# Patient Record
Sex: FEMALE | Race: BLACK OR AFRICAN AMERICAN | NOT HISPANIC OR LATINO | Employment: UNEMPLOYED | URBAN - METROPOLITAN AREA
[De-identification: names, ages, dates, MRNs, and addresses within clinical notes are randomized per-mention and may not be internally consistent; named-entity substitution may affect disease eponyms.]

---

## 2023-03-23 ENCOUNTER — DOCUMENTATION (OUTPATIENT)
Dept: CARE COORDINATION | Facility: CLINIC | Age: 68
End: 2023-03-23
Payer: COMMERCIAL

## 2023-10-17 DIAGNOSIS — Z72.0 TOBACCO ABUSE: Primary | ICD-10-CM

## 2023-10-17 RX ORDER — NICOTINE POLACRILEX 2 MG/1
2 LOZENGE ORAL EVERY 2 HOUR PRN
Qty: 100 LOZENGE | Refills: 6 | Status: CANCELLED | OUTPATIENT
Start: 2023-10-17

## 2023-10-17 RX ORDER — NICOTINE POLACRILEX 2 MG/1
2 LOZENGE ORAL EVERY 2 HOUR PRN
COMMUNITY
Start: 2023-08-25 | End: 2023-10-24 | Stop reason: SDUPTHER

## 2023-10-23 ENCOUNTER — HOSPITAL ENCOUNTER (OUTPATIENT)
Dept: CARDIOLOGY | Facility: CLINIC | Age: 68
Discharge: HOME | End: 2023-10-23
Payer: COMMERCIAL

## 2023-10-23 DIAGNOSIS — Z95.810 PRESENCE OF AUTOMATIC (IMPLANTABLE) CARDIAC DEFIBRILLATOR: ICD-10-CM

## 2023-10-23 DIAGNOSIS — I42.0 DILATED CARDIOMYOPATHY (MULTI): ICD-10-CM

## 2023-10-23 DIAGNOSIS — I50.9 HEART FAILURE (MULTI): ICD-10-CM

## 2023-10-23 PROCEDURE — 93296 REM INTERROG EVL PM/IDS: CPT

## 2023-10-23 PROCEDURE — 93295 DEV INTERROG REMOTE 1/2/MLT: CPT | Performed by: INTERNAL MEDICINE

## 2023-10-24 RX ORDER — NICOTINE POLACRILEX 2 MG/1
2 LOZENGE ORAL EVERY 2 HOUR PRN
Qty: 100 LOZENGE | Refills: 3 | Status: SHIPPED | OUTPATIENT
Start: 2023-10-24 | End: 2023-12-18 | Stop reason: WASHOUT

## 2023-11-20 ENCOUNTER — APPOINTMENT (OUTPATIENT)
Dept: RADIOLOGY | Facility: HOSPITAL | Age: 68
DRG: 208 | End: 2023-11-20
Payer: COMMERCIAL

## 2023-11-20 ENCOUNTER — HOSPITAL ENCOUNTER (INPATIENT)
Facility: HOSPITAL | Age: 68
LOS: 6 days | Discharge: HOME | DRG: 208 | End: 2023-11-26
Attending: EMERGENCY MEDICINE | Admitting: STUDENT IN AN ORGANIZED HEALTH CARE EDUCATION/TRAINING PROGRAM
Payer: COMMERCIAL

## 2023-11-20 DIAGNOSIS — E87.20 METABOLIC ACIDOSIS: ICD-10-CM

## 2023-11-20 DIAGNOSIS — I20.81 ANGINA PECTORIS WITH CORONARY MICROVASCULAR DYSFUNCTION (CMS-HCC): ICD-10-CM

## 2023-11-20 DIAGNOSIS — R11.2 NAUSEA VOMITING AND DIARRHEA: ICD-10-CM

## 2023-11-20 DIAGNOSIS — J96.21 ACUTE ON CHRONIC RESPIRATORY FAILURE WITH HYPOXIA (MULTI): Primary | ICD-10-CM

## 2023-11-20 DIAGNOSIS — R19.7 NAUSEA VOMITING AND DIARRHEA: ICD-10-CM

## 2023-11-20 DIAGNOSIS — R00.0 TACHYCARDIA: ICD-10-CM

## 2023-11-20 DIAGNOSIS — M47.816 LUMBAR SPONDYLOSIS: ICD-10-CM

## 2023-11-20 DIAGNOSIS — M54.40 LOW BACK PAIN WITH SCIATICA, SCIATICA LATERALITY UNSPECIFIED, UNSPECIFIED BACK PAIN LATERALITY, UNSPECIFIED CHRONICITY: ICD-10-CM

## 2023-11-20 DIAGNOSIS — K21.9 GASTRO-ESOPHAGEAL REFLUX DISEASE WITHOUT ESOPHAGITIS: ICD-10-CM

## 2023-11-20 DIAGNOSIS — I50.41 ACUTE COMBINED SYSTOLIC (CONGESTIVE) AND DIASTOLIC (CONGESTIVE) HEART FAILURE (MULTI): ICD-10-CM

## 2023-11-20 DIAGNOSIS — D50.9 IRON DEFICIENCY ANEMIA, UNSPECIFIED IRON DEFICIENCY ANEMIA TYPE: ICD-10-CM

## 2023-11-20 DIAGNOSIS — I50.42 CHRONIC COMBINED SYSTOLIC AND DIASTOLIC CHF (CONGESTIVE HEART FAILURE) (MULTI): ICD-10-CM

## 2023-11-20 DIAGNOSIS — J44.9 CHRONIC OBSTRUCTIVE PULMONARY DISEASE, UNSPECIFIED COPD TYPE (MULTI): ICD-10-CM

## 2023-11-20 DIAGNOSIS — I50.20 HFREF (HEART FAILURE WITH REDUCED EJECTION FRACTION) (MULTI): ICD-10-CM

## 2023-11-20 LAB
ALBUMIN SERPL BCP-MCNC: 4 G/DL (ref 3.4–5)
ALP SERPL-CCNC: 107 U/L (ref 33–136)
ALT SERPL W P-5'-P-CCNC: 15 U/L (ref 7–45)
ANION GAP BLDV CALCULATED.4IONS-SCNC: 14 MMOL/L (ref 10–25)
ANION GAP BLDV CALCULATED.4IONS-SCNC: 15 MMOL/L (ref 10–25)
ANION GAP SERPL CALC-SCNC: 14 MMOL/L (ref 10–20)
AST SERPL W P-5'-P-CCNC: 20 U/L (ref 9–39)
BASE EXCESS BLDV CALC-SCNC: -6.6 MMOL/L (ref -2–3)
BASE EXCESS BLDV CALC-SCNC: -7 MMOL/L (ref -2–3)
BASOPHILS # BLD AUTO: 0.02 X10*3/UL (ref 0–0.1)
BASOPHILS NFR BLD AUTO: 0.2 %
BILIRUB SERPL-MCNC: 0.4 MG/DL (ref 0–1.2)
BNP SERPL-MCNC: 238 PG/ML (ref 0–99)
BODY TEMPERATURE: 37 DEGREES CELSIUS
BODY TEMPERATURE: 37 DEGREES CELSIUS
BUN SERPL-MCNC: 15 MG/DL (ref 6–23)
CA-I BLDV-SCNC: 1.04 MMOL/L (ref 1.1–1.33)
CA-I BLDV-SCNC: 1.16 MMOL/L (ref 1.1–1.33)
CALCIUM SERPL-MCNC: 8.9 MG/DL (ref 8.6–10.6)
CARDIAC TROPONIN I PNL SERPL HS: 168 NG/L (ref 0–34)
CHLORIDE BLDV-SCNC: 108 MMOL/L (ref 98–107)
CHLORIDE BLDV-SCNC: 109 MMOL/L (ref 98–107)
CHLORIDE SERPL-SCNC: 108 MMOL/L (ref 98–107)
CO2 SERPL-SCNC: 22 MMOL/L (ref 21–32)
CREAT SERPL-MCNC: 1.22 MG/DL (ref 0.5–1.05)
EOSINOPHIL # BLD AUTO: 0.23 X10*3/UL (ref 0–0.7)
EOSINOPHIL NFR BLD AUTO: 1.9 %
ERYTHROCYTE [DISTWIDTH] IN BLOOD BY AUTOMATED COUNT: 19.2 % (ref 11.5–14.5)
FLUAV RNA RESP QL NAA+PROBE: NOT DETECTED
FLUBV RNA RESP QL NAA+PROBE: NOT DETECTED
GFR SERPL CREATININE-BSD FRML MDRD: 48 ML/MIN/1.73M*2
GLUCOSE BLD MANUAL STRIP-MCNC: 196 MG/DL (ref 74–99)
GLUCOSE BLDV-MCNC: 196 MG/DL (ref 74–99)
GLUCOSE BLDV-MCNC: 246 MG/DL (ref 74–99)
GLUCOSE SERPL-MCNC: 188 MG/DL (ref 74–99)
HCO3 BLDV-SCNC: 19.7 MMOL/L (ref 22–26)
HCO3 BLDV-SCNC: 20.9 MMOL/L (ref 22–26)
HCT VFR BLD AUTO: 47.2 % (ref 36–46)
HCT VFR BLD EST: 44 % (ref 36–46)
HCT VFR BLD EST: 46 % (ref 36–46)
HGB BLD-MCNC: 14.8 G/DL (ref 12–16)
HGB BLDV-MCNC: 14.7 G/DL (ref 12–16)
HGB BLDV-MCNC: 15.3 G/DL (ref 12–16)
HOLD SPECIMEN: NORMAL
IMM GRANULOCYTES # BLD AUTO: 0.03 X10*3/UL (ref 0–0.7)
IMM GRANULOCYTES NFR BLD AUTO: 0.2 % (ref 0–0.9)
LACTATE BLDV-SCNC: 2.4 MMOL/L (ref 0.4–2)
LACTATE BLDV-SCNC: 2.6 MMOL/L (ref 0.4–2)
LIPASE SERPL-CCNC: 22 U/L (ref 9–82)
LYMPHOCYTES # BLD AUTO: 1.01 X10*3/UL (ref 1.2–4.8)
LYMPHOCYTES NFR BLD AUTO: 8.4 %
MAGNESIUM SERPL-MCNC: 2.32 MG/DL (ref 1.6–2.4)
MCH RBC QN AUTO: 28.4 PG (ref 26–34)
MCHC RBC AUTO-ENTMCNC: 31.4 G/DL (ref 32–36)
MCV RBC AUTO: 91 FL (ref 80–100)
MONOCYTES # BLD AUTO: 0.53 X10*3/UL (ref 0.1–1)
MONOCYTES NFR BLD AUTO: 4.4 %
NEUTROPHILS # BLD AUTO: 10.24 X10*3/UL (ref 1.2–7.7)
NEUTROPHILS NFR BLD AUTO: 84.9 %
NRBC BLD-RTO: 0 /100 WBCS (ref 0–0)
OXYHGB MFR BLDV: 51.8 % (ref 45–75)
OXYHGB MFR BLDV: 77.1 % (ref 45–75)
PCO2 BLDV: 41 MM HG (ref 41–51)
PCO2 BLDV: 50 MM HG (ref 41–51)
PH BLDV: 7.23 PH (ref 7.33–7.43)
PH BLDV: 7.29 PH (ref 7.33–7.43)
PHOSPHATE SERPL-MCNC: 2.6 MG/DL (ref 2.5–4.9)
PLATELET # BLD AUTO: 239 X10*3/UL (ref 150–450)
PO2 BLDV: 36 MM HG (ref 35–45)
PO2 BLDV: 55 MM HG (ref 35–45)
POTASSIUM BLDV-SCNC: 3.8 MMOL/L (ref 3.5–5.3)
POTASSIUM BLDV-SCNC: 4 MMOL/L (ref 3.5–5.3)
POTASSIUM SERPL-SCNC: 4 MMOL/L (ref 3.5–5.3)
PROT SERPL-MCNC: 7.3 G/DL (ref 6.4–8.2)
RBC # BLD AUTO: 5.21 X10*6/UL (ref 4–5.2)
SAO2 % BLDV: 55 % (ref 45–75)
SAO2 % BLDV: 80 % (ref 45–75)
SARS-COV-2 RNA RESP QL NAA+PROBE: NOT DETECTED
SODIUM BLDV-SCNC: 139 MMOL/L (ref 136–145)
SODIUM BLDV-SCNC: 140 MMOL/L (ref 136–145)
SODIUM SERPL-SCNC: 140 MMOL/L (ref 136–145)
WBC # BLD AUTO: 12.1 X10*3/UL (ref 4.4–11.3)

## 2023-11-20 PROCEDURE — 71045 X-RAY EXAM CHEST 1 VIEW: CPT | Mod: FR

## 2023-11-20 PROCEDURE — 83690 ASSAY OF LIPASE: CPT

## 2023-11-20 PROCEDURE — 85018 HEMOGLOBIN: CPT

## 2023-11-20 PROCEDURE — 93010 ELECTROCARDIOGRAM REPORT: CPT | Performed by: EMERGENCY MEDICINE

## 2023-11-20 PROCEDURE — 84484 ASSAY OF TROPONIN QUANT: CPT

## 2023-11-20 PROCEDURE — 2020000001 HC ICU ROOM DAILY

## 2023-11-20 PROCEDURE — 83735 ASSAY OF MAGNESIUM: CPT

## 2023-11-20 PROCEDURE — 74018 RADEX ABDOMEN 1 VIEW: CPT | Mod: FR

## 2023-11-20 PROCEDURE — 36415 COLL VENOUS BLD VENIPUNCTURE: CPT

## 2023-11-20 PROCEDURE — 82435 ASSAY OF BLOOD CHLORIDE: CPT

## 2023-11-20 PROCEDURE — 2500000004 HC RX 250 GENERAL PHARMACY W/ HCPCS (ALT 636 FOR OP/ED)

## 2023-11-20 PROCEDURE — 99291 CRITICAL CARE FIRST HOUR: CPT | Mod: 25 | Performed by: EMERGENCY MEDICINE

## 2023-11-20 PROCEDURE — 2500000001 HC RX 250 WO HCPCS SELF ADMINISTERED DRUGS (ALT 637 FOR MEDICARE OP)

## 2023-11-20 PROCEDURE — 96374 THER/PROPH/DIAG INJ IV PUSH: CPT | Mod: 59

## 2023-11-20 PROCEDURE — 2500000002 HC RX 250 W HCPCS SELF ADMINISTERED DRUGS (ALT 637 FOR MEDICARE OP, ALT 636 FOR OP/ED)

## 2023-11-20 PROCEDURE — 82947 ASSAY GLUCOSE BLOOD QUANT: CPT

## 2023-11-20 PROCEDURE — 2500000005 HC RX 250 GENERAL PHARMACY W/O HCPCS

## 2023-11-20 PROCEDURE — 31500 INSERT EMERGENCY AIRWAY: CPT

## 2023-11-20 PROCEDURE — 87636 SARSCOV2 & INF A&B AMP PRB: CPT

## 2023-11-20 PROCEDURE — 96375 TX/PRO/DX INJ NEW DRUG ADDON: CPT | Mod: 59

## 2023-11-20 PROCEDURE — 2500000005 HC RX 250 GENERAL PHARMACY W/O HCPCS: Performed by: EMERGENCY MEDICINE

## 2023-11-20 PROCEDURE — 74018 RADEX ABDOMEN 1 VIEW: CPT | Mod: FOREIGN READ | Performed by: RADIOLOGY

## 2023-11-20 PROCEDURE — 84295 ASSAY OF SERUM SODIUM: CPT

## 2023-11-20 PROCEDURE — 84100 ASSAY OF PHOSPHORUS: CPT

## 2023-11-20 PROCEDURE — 83605 ASSAY OF LACTIC ACID: CPT

## 2023-11-20 PROCEDURE — 99285 EMERGENCY DEPT VISIT HI MDM: CPT | Performed by: EMERGENCY MEDICINE

## 2023-11-20 PROCEDURE — 31500 INSERT EMERGENCY AIRWAY: CPT | Performed by: EMERGENCY MEDICINE

## 2023-11-20 PROCEDURE — 5A1945Z RESPIRATORY VENTILATION, 24-96 CONSECUTIVE HOURS: ICD-10-PCS | Performed by: EMERGENCY MEDICINE

## 2023-11-20 PROCEDURE — 71045 X-RAY EXAM CHEST 1 VIEW: CPT | Mod: FOREIGN READ | Performed by: RADIOLOGY

## 2023-11-20 PROCEDURE — 85025 COMPLETE CBC W/AUTO DIFF WBC: CPT

## 2023-11-20 PROCEDURE — 96376 TX/PRO/DX INJ SAME DRUG ADON: CPT | Mod: 59

## 2023-11-20 PROCEDURE — 71045 X-RAY EXAM CHEST 1 VIEW: CPT | Mod: FY

## 2023-11-20 PROCEDURE — 0BH17EZ INSERTION OF ENDOTRACHEAL AIRWAY INTO TRACHEA, VIA NATURAL OR ARTIFICIAL OPENING: ICD-10-PCS | Performed by: EMERGENCY MEDICINE

## 2023-11-20 PROCEDURE — 94002 VENT MGMT INPAT INIT DAY: CPT | Mod: CCI

## 2023-11-20 PROCEDURE — 82746 ASSAY OF FOLIC ACID SERUM: CPT | Performed by: STUDENT IN AN ORGANIZED HEALTH CARE EDUCATION/TRAINING PROGRAM

## 2023-11-20 PROCEDURE — 82607 VITAMIN B-12: CPT | Performed by: STUDENT IN AN ORGANIZED HEALTH CARE EDUCATION/TRAINING PROGRAM

## 2023-11-20 PROCEDURE — 84132 ASSAY OF SERUM POTASSIUM: CPT

## 2023-11-20 PROCEDURE — 82805 BLOOD GASES W/O2 SATURATION: CPT

## 2023-11-20 PROCEDURE — 94762 N-INVAS EAR/PLS OXIMTRY CONT: CPT

## 2023-11-20 PROCEDURE — 83880 ASSAY OF NATRIURETIC PEPTIDE: CPT

## 2023-11-20 PROCEDURE — 82330 ASSAY OF CALCIUM: CPT

## 2023-11-20 RX ORDER — PROPOFOL 10 MG/ML
INJECTION, EMULSION INTRAVENOUS
Status: COMPLETED
Start: 2023-11-20 | End: 2023-11-20

## 2023-11-20 RX ORDER — ISOSORBIDE DINITRATE 20 MG/1
20 TABLET ORAL ONCE
Status: DISCONTINUED | OUTPATIENT
Start: 2023-11-20 | End: 2023-11-21

## 2023-11-20 RX ORDER — METOCLOPRAMIDE HYDROCHLORIDE 5 MG/ML
10 INJECTION INTRAMUSCULAR; INTRAVENOUS ONCE
Status: COMPLETED | OUTPATIENT
Start: 2023-11-20 | End: 2023-11-20

## 2023-11-20 RX ORDER — CEFEPIME 1 G/50ML
2 INJECTION, SOLUTION INTRAVENOUS EVERY 12 HOURS
Status: COMPLETED | OUTPATIENT
Start: 2023-11-20 | End: 2023-11-25

## 2023-11-20 RX ORDER — ETOMIDATE 2 MG/ML
INJECTION INTRAVENOUS
Status: COMPLETED
Start: 2023-11-20 | End: 2023-11-20

## 2023-11-20 RX ORDER — ROCURONIUM BROMIDE 10 MG/ML
INJECTION, SOLUTION INTRAVENOUS
Status: COMPLETED
Start: 2023-11-20 | End: 2023-11-20

## 2023-11-20 RX ORDER — FUROSEMIDE 10 MG/ML
40 INJECTION INTRAMUSCULAR; INTRAVENOUS ONCE
Status: COMPLETED | OUTPATIENT
Start: 2023-11-20 | End: 2023-11-20

## 2023-11-20 RX ORDER — HYDROMORPHONE HYDROCHLORIDE 1 MG/ML
1 INJECTION, SOLUTION INTRAMUSCULAR; INTRAVENOUS; SUBCUTANEOUS ONCE
Status: COMPLETED | OUTPATIENT
Start: 2023-11-20 | End: 2023-11-20

## 2023-11-20 RX ORDER — PROPOFOL 10 MG/ML
5-20 INJECTION, EMULSION INTRAVENOUS CONTINUOUS
Status: DISCONTINUED | OUTPATIENT
Start: 2023-11-20 | End: 2023-11-21

## 2023-11-20 RX ORDER — FENTANYL CITRATE-0.9 % NACL/PF 10 MCG/ML
50 PLASTIC BAG, INJECTION (ML) INTRAVENOUS CONTINUOUS
Status: DISCONTINUED | OUTPATIENT
Start: 2023-11-20 | End: 2023-11-21

## 2023-11-20 RX ORDER — FENTANYL CITRATE 50 UG/ML
INJECTION, SOLUTION INTRAMUSCULAR; INTRAVENOUS
Status: COMPLETED
Start: 2023-11-20 | End: 2023-11-20

## 2023-11-20 RX ORDER — ROCURONIUM BROMIDE 10 MG/ML
60 INJECTION, SOLUTION INTRAVENOUS ONCE
Status: COMPLETED | OUTPATIENT
Start: 2023-11-20 | End: 2023-11-20

## 2023-11-20 RX ORDER — IPRATROPIUM BROMIDE AND ALBUTEROL SULFATE 2.5; .5 MG/3ML; MG/3ML
3 SOLUTION RESPIRATORY (INHALATION)
Status: DISPENSED | OUTPATIENT
Start: 2023-11-20 | End: 2023-11-20

## 2023-11-20 RX ORDER — ETOMIDATE 2 MG/ML
20 INJECTION INTRAVENOUS ONCE
Status: COMPLETED | OUTPATIENT
Start: 2023-11-20 | End: 2023-11-20

## 2023-11-20 RX ORDER — ROCURONIUM BROMIDE 10 MG/ML
100 INJECTION, SOLUTION INTRAVENOUS ONCE
Status: COMPLETED | OUTPATIENT
Start: 2023-11-20 | End: 2023-11-20

## 2023-11-20 RX ORDER — HYDROMORPHONE HYDROCHLORIDE 1 MG/ML
INJECTION, SOLUTION INTRAMUSCULAR; INTRAVENOUS; SUBCUTANEOUS
Status: COMPLETED
Start: 2023-11-20 | End: 2023-11-20

## 2023-11-20 RX ORDER — FENTANYL CITRATE 50 UG/ML
50 INJECTION, SOLUTION INTRAMUSCULAR; INTRAVENOUS ONCE
Status: COMPLETED | OUTPATIENT
Start: 2023-11-20 | End: 2023-11-20

## 2023-11-20 RX ORDER — ONDANSETRON HYDROCHLORIDE 2 MG/ML
4 INJECTION, SOLUTION INTRAVENOUS ONCE
Status: COMPLETED | OUTPATIENT
Start: 2023-11-20 | End: 2023-11-20

## 2023-11-20 RX ORDER — ROCURONIUM BROMIDE 10 MG/ML
40 INJECTION, SOLUTION INTRAVENOUS ONCE
Status: COMPLETED | OUTPATIENT
Start: 2023-11-20 | End: 2023-11-20

## 2023-11-20 RX ORDER — PHENYLEPHRINE HCL IN 0.9% NACL 0.4MG/10ML
SYRINGE (ML) INTRAVENOUS
Status: DISCONTINUED
Start: 2023-11-20 | End: 2023-11-20 | Stop reason: WASHOUT

## 2023-11-20 RX ADMIN — ETOMIDATE 20 MG: 2 INJECTION, SOLUTION INTRAVENOUS at 18:35

## 2023-11-20 RX ADMIN — IPRATROPIUM BROMIDE AND ALBUTEROL SULFATE 3 ML: .5; 3 SOLUTION RESPIRATORY (INHALATION) at 17:45

## 2023-11-20 RX ADMIN — PROPOFOL 15 MCG/KG/MIN: 10 INJECTION, EMULSION INTRAVENOUS at 18:25

## 2023-11-20 RX ADMIN — FENTANYL CITRATE 50 MCG: 50 INJECTION, SOLUTION INTRAMUSCULAR; INTRAVENOUS at 18:35

## 2023-11-20 RX ADMIN — ROCURONIUM 40 MG: 50 INJECTION, SOLUTION INTRAVENOUS at 18:10

## 2023-11-20 RX ADMIN — ETOMIDATE 20 MG: 2 INJECTION INTRAVENOUS at 18:02

## 2023-11-20 RX ADMIN — FUROSEMIDE 40 MG: 10 INJECTION, SOLUTION INTRAVENOUS at 17:45

## 2023-11-20 RX ADMIN — HYDROMORPHONE HYDROCHLORIDE 1 MG: 1 INJECTION, SOLUTION INTRAMUSCULAR; INTRAVENOUS; SUBCUTANEOUS at 19:33

## 2023-11-20 RX ADMIN — ROCURONIUM BROMIDE 60 MG: 10 INJECTION, SOLUTION INTRAVENOUS at 18:05

## 2023-11-20 RX ADMIN — ETOMIDATE 20 MG: 2 INJECTION INTRAVENOUS at 18:35

## 2023-11-20 RX ADMIN — FENTANYL CITRATE 50 MCG/HR: 0.05 INJECTION, SOLUTION INTRAMUSCULAR; INTRAVENOUS at 22:52

## 2023-11-20 RX ADMIN — IPRATROPIUM BROMIDE AND ALBUTEROL SULFATE 3 ML: .5; 3 SOLUTION RESPIRATORY (INHALATION) at 17:12

## 2023-11-20 RX ADMIN — ROCURONIUM BROMIDE 40 MG: 10 INJECTION, SOLUTION INTRAVENOUS at 18:10

## 2023-11-20 RX ADMIN — ETOMIDATE 20 MG: 2 INJECTION, SOLUTION INTRAVENOUS at 18:02

## 2023-11-20 RX ADMIN — Medication 100 PERCENT: at 18:37

## 2023-11-20 RX ADMIN — FUROSEMIDE 40 MG: 10 INJECTION, SOLUTION INTRAMUSCULAR; INTRAVENOUS at 22:15

## 2023-11-20 RX ADMIN — ROCURONIUM 60 MG: 50 INJECTION, SOLUTION INTRAVENOUS at 18:05

## 2023-11-20 RX ADMIN — ROCURONIUM BROMIDE 100 MG: 10 INJECTION, SOLUTION INTRAVENOUS at 18:35

## 2023-11-20 RX ADMIN — METOCLOPRAMIDE 10 MG: 5 INJECTION, SOLUTION INTRAMUSCULAR; INTRAVENOUS at 14:08

## 2023-11-20 RX ADMIN — ONDANSETRON 4 MG: 2 INJECTION INTRAMUSCULAR; INTRAVENOUS at 13:22

## 2023-11-20 RX ADMIN — ROCURONIUM 100 MG: 50 INJECTION, SOLUTION INTRAVENOUS at 18:35

## 2023-11-20 ASSESSMENT — LIFESTYLE VARIABLES
EVER HAD A DRINK FIRST THING IN THE MORNING TO STEADY YOUR NERVES TO GET RID OF A HANGOVER: NO
HAVE PEOPLE ANNOYED YOU BY CRITICIZING YOUR DRINKING: NO
REASON UNABLE TO ASSESS: NO
EVER FELT BAD OR GUILTY ABOUT YOUR DRINKING: NO
HAVE YOU EVER FELT YOU SHOULD CUT DOWN ON YOUR DRINKING: NO

## 2023-11-20 ASSESSMENT — COLUMBIA-SUICIDE SEVERITY RATING SCALE - C-SSRS
2. HAVE YOU ACTUALLY HAD ANY THOUGHTS OF KILLING YOURSELF?: NO
6. HAVE YOU EVER DONE ANYTHING, STARTED TO DO ANYTHING, OR PREPARED TO DO ANYTHING TO END YOUR LIFE?: NO
1. IN THE PAST MONTH, HAVE YOU WISHED YOU WERE DEAD OR WISHED YOU COULD GO TO SLEEP AND NOT WAKE UP?: NO

## 2023-11-20 ASSESSMENT — PAIN - FUNCTIONAL ASSESSMENT: PAIN_FUNCTIONAL_ASSESSMENT: 0-10

## 2023-11-20 NOTE — ED TRIAGE NOTES
Pt presents to ED via ems for c/o n/v, diarrhea and SOB that started 1 hour prior to arrival. Upon arrival pt groggy and hard to stay awake. EMS states pts BP was 80/50, they gave her 150mls of NS and pressure went back up to 140/80. BP WNL upon arrival. Per ems pt was 88% on room air, they placed her on 4L NC. Pt was 91% room air upon arrival, placed back on NC at 2L. Pt reports abd pain at this time, denies chest pain. Is a&o x3 but drowsy.

## 2023-11-20 NOTE — ED PROVIDER NOTES
CC: Flu Symptoms     History provided by: Patient, EMS, and Nursing Staff  Limitations to History: None    HPI:  Patient is a 68-year-old female with extensive medical history including but not limited to HrEF (EF 15-20%) s/p ICD 5/2020, CAD s/p NSTEMI s/p RIRI to LCX (Jan 2016), MVR s/p mitral valve repair  in (June 2019; 29 mm Epic bioprosthetic valve with Dr. Montana), COPD (never formally diagnosed), HTN, HL, GERD, CVA, and DMII who presents with flulike symptoms.  She notes few episodes of nonbloody, nonbilious emesis earlier today with diarrhea.  She denies known sick contact.  Patient is drowsy during examination but alert and oriented x3, responding to questions and commands.  She has no focal tenderness, bilateral lower extremity edema, denies any significant shortness of breath.  She denies any fevers, chills, urinary symptoms.    Per chart review, patient was admitted in September 2023 for COPD exacerbation and acute hypoxic hypercapnic respiratory failure with mental status change that improved with positive pressure ventilation    External Records Reviewed: Prior ED visits, discharge summaries, outpatient medical records, Care Everywhere reviewed as appropriate  ???????????????????????????????????????????????????????????????  Triage Vitals:  T 35.3 °C (95.5 °F)  HR 82  /77  RR 16  O2 91 % None (Room air)    Physical Exam  Vitals and nursing note reviewed.   Constitutional:       General: She is not in acute distress.     Appearance: Normal appearance.      Comments: Chronically-ill appearing   HENT:      Head: Normocephalic and atraumatic.   Eyes:      Conjunctiva/sclera: Conjunctivae normal.   Cardiovascular:      Rate and Rhythm: Normal rate and regular rhythm.   Pulmonary:      Effort: Pulmonary effort is normal. No respiratory distress.   Abdominal:      General: Abdomen is flat. There is no distension.      Palpations: Abdomen is soft.      Tenderness: There is no abdominal tenderness.  There is no right CVA tenderness, left CVA tenderness or guarding.   Musculoskeletal:         General: Normal range of motion.      Cervical back: Normal range of motion and neck supple.   Skin:     General: Skin is warm and dry.   Neurological:      General: No focal deficit present.      Mental Status: She is alert and oriented to person, place, and time. Mental status is at baseline.      GCS: GCS eye subscore is 4. GCS verbal subscore is 5. GCS motor subscore is 6.      Cranial Nerves: Cranial nerves 2-12 are intact.      Sensory: Sensation is intact.      Motor: Motor function is intact.   Psychiatric:         Mood and Affect: Mood normal.         Behavior: Behavior normal.        ???????????????????????????????????????????????????????????????  ED Course/Treatment/Medical Decision Making  MDM:  Patient is a 68-year-old female who presents with nausea, vomiting, diarrhea.  Patient is respirating appropriately on room air no acute distress.  She was initially brought in on 2 L oxygen due to desats to 88% however during my evaluation she was satting 97% on room air no acute respiratory distress.  She does not appear septic or peritonitic.  Patient does appear drowsy but is responding to painful stimuli and speaking in full sentences and responding to my commands.  Diagnoses considered include metabolic encephalopathy, hypercarbia, DKA, viral illness.  In the absence of photophobia, headache, neck pain, seizure-like activity I have low suspicion for meningitis or encephalitis.  Conservative fluid resuscitation given patient's history of heart failure provided with 500 cc of IVF. I do not believe patient warrants imaging based on non-tender abdomen, non-distended despite scant blood tinged loose stool in ED, no obvious external lesions appreciated.  Given patient's comorbidities, NAGMA, persistent nausea, vomiting, diarrhea and fluid losses and limitations of fluid resuscitation due to extensive heart failure I  believe patient would benefit from admission for further monitoring and p.o. challenge.     ED Course:  ED Course as of 11/21/23 1449   Mon Nov 20, 2023   1249 Venous blood gas with slightly elevated lactate of 2.4, acidosis that appears metabolic with pH 7.29 and hyperchloremia 109, glucose 196 and bicarbonate 19, likely in the setting of GI losses, will further evaluate with formal metabolic panel [SA]   1322 EKG obtained at 1230 with normal sinus rhythm rate 82, UT interval 152 ms,  ms, QTc 474 ms, there is motion artifact limiting interpretation however ST depressions in 2, 3, aVF, V5 are seen on previous EKGs as well, no acute ST segment elevations [SA]   1421 POCT Lactate, Venous(!): 2.4 [AL]   1436 CBC with slight leukocytosis of 12.1 with neutrophil predominance, likely reactional.  Hemoglobin stable. [SA]   1447 Covid/flu neg [SA]      ED Course User Index  [AL] Pj Medellin MD  [SA] Jemima Palomino DO         Diagnoses as of 11/21/23 1449   Nausea vomiting and diarrhea   Metabolic acidosis   Acute on chronic respiratory failure with hypoxia (CMS/HCC)       EKG Interpretation:  See ED Course/Below:    Independent Interpretation of Studies:  I independently interpreted labs/imaging as stated in ED Course or below.    Differential diagnoses considered include but are not limited to: See MDM/Below:    Social Determinants Limiting Care:  None identified      Disposition:  Patient signed out in stable condition pending completion of work-up including labs and p.o. challenge and final disposition    JIAN King, PGY-2    I reviewed the case with the attending ED physician. The attending ED physician agrees with the plan. Patient and/or patient´s representative was counseled regarding labs, imaging, likely diagnosis, and plan. All questions were answered.    Disclaimer: This note was dictated by speech recognition.  Attempt at proofreading was made to minimize errors.  Errors in  transcription may be present.  Please call if questions.    Procedures ? [employer] last updated 11/21/2023 2:49 PM        Jemima Palomino, DO  Resident  11/20/23 1437       Jemima Palomino, DO  Resident  11/20/23 1448       Jemima Palomino, DO  Resident  11/21/23 1453

## 2023-11-20 NOTE — PROGRESS NOTES
Patient was handed off to me from the previous team. For full history, physical, and prior ED course, please see previous provider note prior to patient handoff. This is an addendum to the record.    HPI/prior hospital course:   Patient is a 68-year-old female with extensive medical history including but not limited to HrEF (EF 15-20%) s/p ICD 5/2020, CAD s/p NSTEMI s/p RIRI to LCX (Jan 2016), MVR s/p mitral valve repair  in (June 2019; 29 mm Epic bioprosthetic valve with Dr. Montana), COPD (never formally diagnosed), HTN, HL, GERD, CVA, and DMII who presented with nausea, vomiting and diarrhea.  Impression of prior provider most consistent with gastroenteritis.  Patient treated symptomatically with Zofran, metoclopramide and 500 mL fluid bolus given clinical evidence of dehydration.  Patient with dry mucous membranes, not on gap metabolic acidosis prompting decision being made to fluid resuscitate.  Patient handed off pending results of labs with plan to admit for symptomatic treatment of suspected gastroenteritis in context of a difficult patient to manage at home given severe heart failure.      Hospital Course/MDM:  Labs with troponin 168, downtrending from recent baselines, , bicarbonate 22 with no elevation in anion gap.  Over the course of the ED stay, patient began having increased work of breathing.  Work-up most significant for B-lines in all fields on POC US but no wheezing on auscultation more consistent with worsening heart failure than COPD.  Patient treated with 40 mg furosemide as well as trialed with DuoNebs.  DuoNebs did not have marked difference in patient's respiratory effort.  Patient began desatting even on nonrebreather mask and was escalated to BiPAP.  Patient during this time was becoming more tired, somnolent and still having nausea and spit up but was oxygenating and ventilating well.  Given concern for patient's ability to protect airway given her somnolence and nausea, decision  was made to intubate patient.  This was discussed both with patient and patient's family who were agreeable with plan of care.  Patient preoxygenated with BiPAP and intubated with 7.5 tube as described in procedure note.  Intubation was somewhat prolonged by infiltration of medication from IV but patient was appropriately bagged with no hypoxia during extended intubation.  Suspicion is the patient received first 100 mg of rocuronium subcutaneously rather than IV.  Patient did receive IO due to difficulty obtaining second point of access after first point of access infiltrated during intubation attempt.  Postintubation chest x-ray confirmed tube placement.  Patient started on propofol drip at 15 mcg/kg as blood pressure still 150s/90s after intubation.  Fentanyl bolused for postintubation analgesia.  NG tube placed and second point of access and external jugular achieved.  Patient did have transient episode of hypotension down to 80s/50s prompting discontinuation of propofol.  Patient with increase in blood pressure with no other interventions back to 180s/110s and propofol resumed.  Given concern for paralysis without sedation, patient also started on fentanyl drip at 50 mcg/h.  Patient has listed allergy to fentanyl but family reports this was just mental status changes she did not like rather than true allergy and were okay with patient receiving this medication while sedated.  Case was discussed with MICU and heart failure ICU and patient admitted to heart failure ICU for further management.  In discussion with ICU, there is some concern for pneumonia based on chest x-ray as well and patient covered with vancomycin and cefepime given penicillin allergy.  CT of chest/abdomen/pelvis additionally ordered and pending at time of handoff.    Disposition:  Admitted to HF ICU    Patient seen and discussed with Dr. Golden Locke MD, PhD  Emergency Medicine PGY2

## 2023-11-21 ENCOUNTER — APPOINTMENT (OUTPATIENT)
Dept: RADIOLOGY | Facility: HOSPITAL | Age: 68
DRG: 208 | End: 2023-11-21
Payer: COMMERCIAL

## 2023-11-21 ENCOUNTER — APPOINTMENT (OUTPATIENT)
Dept: CARDIOLOGY | Facility: HOSPITAL | Age: 68
DRG: 208 | End: 2023-11-21
Payer: COMMERCIAL

## 2023-11-21 ENCOUNTER — HOSPITAL ENCOUNTER (OUTPATIENT)
Dept: CARDIOLOGY | Facility: CLINIC | Age: 68
Discharge: HOME | End: 2023-11-21
Payer: COMMERCIAL

## 2023-11-21 PROBLEM — J90 BILATERAL PLEURAL EFFUSION: Status: ACTIVE | Noted: 2023-04-10

## 2023-11-21 PROBLEM — F41.9 ANXIETY: Status: ACTIVE | Noted: 2023-09-02

## 2023-11-21 PROBLEM — R00.0 TACHYCARDIA: Status: ACTIVE | Noted: 2022-12-30

## 2023-11-21 PROBLEM — Z99.2 END STAGE RENAL FAILURE ON DIALYSIS (MULTI): Status: ACTIVE | Noted: 2023-03-22

## 2023-11-21 PROBLEM — Z95.2 HISTORY OF MITRAL VALVE REPLACEMENT: Status: ACTIVE | Noted: 2022-12-30

## 2023-11-21 PROBLEM — E11.9 TYPE 2 DIABETES MELLITUS (MULTI): Status: ACTIVE | Noted: 2023-03-20

## 2023-11-21 PROBLEM — R04.0 EPISTAXIS: Status: ACTIVE | Noted: 2023-11-21

## 2023-11-21 PROBLEM — I24.9 ACUTE ISCHEMIC HEART DISEASE, UNSPECIFIED (MULTI): Status: ACTIVE | Noted: 2019-12-02

## 2023-11-21 PROBLEM — J18.9 PNEUMONIA: Status: ACTIVE | Noted: 2020-05-18

## 2023-11-21 PROBLEM — K80.20 CHOLELITHIASIS: Status: ACTIVE | Noted: 2023-11-21

## 2023-11-21 PROBLEM — Z86.16 PERSONAL HISTORY OF COVID-19: Status: ACTIVE | Noted: 2023-09-02

## 2023-11-21 PROBLEM — T14.8XXA OPEN WOUND: Status: ACTIVE | Noted: 2023-11-21

## 2023-11-21 PROBLEM — N17.9 ACUTE RENAL FAILURE ON DIALYSIS (CMS-HCC): Status: ACTIVE | Noted: 2023-04-10

## 2023-11-21 PROBLEM — K21.9 GASTRO-ESOPHAGEAL REFLUX DISEASE WITHOUT ESOPHAGITIS: Status: ACTIVE | Noted: 2019-12-02

## 2023-11-21 PROBLEM — N18.6 END STAGE RENAL FAILURE ON DIALYSIS (MULTI): Status: ACTIVE | Noted: 2023-03-22

## 2023-11-21 PROBLEM — Z20.822 CONTACT WITH AND (SUSPECTED) EXPOSURE TO COVID-19: Status: ACTIVE | Noted: 2023-09-02

## 2023-11-21 PROBLEM — Z95.810 CARDIAC DEFIBRILLATOR IN PLACE: Status: ACTIVE | Noted: 2023-03-20

## 2023-11-21 PROBLEM — J96.01 ACUTE RESPIRATORY FAILURE WITH HYPOXEMIA (MULTI): Status: ACTIVE | Noted: 2023-04-10

## 2023-11-21 PROBLEM — G47.33 OBSTRUCTIVE SLEEP APNEA SYNDROME: Status: ACTIVE | Noted: 2023-04-10

## 2023-11-21 PROBLEM — J81.0 ACUTE PULMONARY EDEMA (MULTI): Status: ACTIVE | Noted: 2023-04-10

## 2023-11-21 PROBLEM — I50.20 HFREF (HEART FAILURE WITH REDUCED EJECTION FRACTION) (MULTI): Status: ACTIVE | Noted: 2023-11-21

## 2023-11-21 PROBLEM — I42.9 CARDIOMYOPATHY (MULTI): Status: ACTIVE | Noted: 2023-06-22

## 2023-11-21 PROBLEM — J34.2 DEVIATED NASAL SEPTUM: Status: ACTIVE | Noted: 2023-06-13

## 2023-11-21 PROBLEM — J45.909 ASTHMA (HHS-HCC): Status: ACTIVE | Noted: 2023-11-21

## 2023-11-21 PROBLEM — R57.0 CARDIOGENIC SHOCK (MULTI): Status: ACTIVE | Noted: 2023-04-10

## 2023-11-21 PROBLEM — Z99.2 ACUTE RENAL FAILURE ON DIALYSIS (CMS-HCC): Status: ACTIVE | Noted: 2023-04-10

## 2023-11-21 PROBLEM — E78.5 HYPERLIPIDEMIA: Status: ACTIVE | Noted: 2023-09-02

## 2023-11-21 PROBLEM — Z86.2 HISTORY OF HEMOLYTIC ANEMIA: Status: ACTIVE | Noted: 2023-04-10

## 2023-11-21 PROBLEM — F32.A DEPRESSION: Status: ACTIVE | Noted: 2020-09-12

## 2023-11-21 PROBLEM — I22.2 SUBSEQUENT NON-ST ELEVATION (NSTEMI) MYOCARDIAL INFARCTION (MULTI): Status: ACTIVE | Noted: 2023-03-21

## 2023-11-21 PROBLEM — J30.1 ALLERGIC RHINITIS DUE TO POLLEN: Status: ACTIVE | Noted: 2023-04-10

## 2023-11-21 PROBLEM — D50.9 IRON DEFICIENCY ANEMIA: Status: ACTIVE | Noted: 2023-09-02

## 2023-11-21 LAB
ALBUMIN SERPL BCP-MCNC: 3.6 G/DL (ref 3.4–5)
ALP SERPL-CCNC: 100 U/L (ref 33–136)
ALT SERPL W P-5'-P-CCNC: 13 U/L (ref 7–45)
ANION GAP BLDV CALCULATED.4IONS-SCNC: 12 MMOL/L (ref 10–25)
ANION GAP SERPL CALC-SCNC: 17 MMOL/L (ref 10–20)
AORTIC VALVE PEAK VELOCITY: 1.14
APTT PPP: 27 SECONDS (ref 27–38)
AST SERPL W P-5'-P-CCNC: 17 U/L (ref 9–39)
AV PEAK GRADIENT: 5.2
AVA (PEAK VEL): 1.73
BASE EXCESS BLDMV CALC-SCNC: -0.2 MMOL/L (ref -2–3)
BASE EXCESS BLDMV CALC-SCNC: -1.6 MMOL/L (ref -2–3)
BASE EXCESS BLDMV CALC-SCNC: -1.9 MMOL/L (ref -2–3)
BASE EXCESS BLDMV CALC-SCNC: 0.1 MMOL/L (ref -2–3)
BASE EXCESS BLDV CALC-SCNC: -4.7 MMOL/L (ref -2–3)
BASOPHILS # BLD AUTO: 0.01 X10*3/UL (ref 0–0.1)
BASOPHILS NFR BLD AUTO: 0.2 %
BILIRUB SERPL-MCNC: 0.6 MG/DL (ref 0–1.2)
BNP SERPL-MCNC: 3373 PG/ML (ref 0–99)
BNP SERPL-MCNC: >5000 PG/ML (ref 0–99)
BODY TEMPERATURE: 37 DEGREES CELSIUS
BUN SERPL-MCNC: 21 MG/DL (ref 6–23)
CA-I BLDV-SCNC: 1.13 MMOL/L (ref 1.1–1.33)
CALCIUM SERPL-MCNC: 8.9 MG/DL (ref 8.6–10.6)
CARDIAC TROPONIN I PNL SERPL HS: 362 NG/L (ref 0–34)
CARDIAC TROPONIN I PNL SERPL HS: 533 NG/L (ref 0–34)
CARDIAC TROPONIN I PNL SERPL HS: 636 NG/L (ref 0–34)
CARDIAC TROPONIN I PNL SERPL HS: 657 NG/L (ref 0–34)
CHLORIDE BLDV-SCNC: 110 MMOL/L (ref 98–107)
CHLORIDE SERPL-SCNC: 110 MMOL/L (ref 98–107)
CO2 SERPL-SCNC: 22 MMOL/L (ref 21–32)
CREAT SERPL-MCNC: 1.58 MG/DL (ref 0.5–1.05)
EJECTION FRACTION APICAL 4 CHAMBER: 34.5
EJECTION FRACTION: 23
EOSINOPHIL # BLD AUTO: 0.01 X10*3/UL (ref 0–0.7)
EOSINOPHIL NFR BLD AUTO: 0.2 %
ERYTHROCYTE [DISTWIDTH] IN BLOOD BY AUTOMATED COUNT: 18 % (ref 11.5–14.5)
ERYTHROCYTE [DISTWIDTH] IN BLOOD BY AUTOMATED COUNT: 18.1 % (ref 11.5–14.5)
EST. AVERAGE GLUCOSE BLD GHB EST-MCNC: 111 MG/DL
GFR SERPL CREATININE-BSD FRML MDRD: 35 ML/MIN/1.73M*2
GLUCOSE BLD MANUAL STRIP-MCNC: 118 MG/DL (ref 74–99)
GLUCOSE BLD MANUAL STRIP-MCNC: 141 MG/DL (ref 74–99)
GLUCOSE BLD MANUAL STRIP-MCNC: 86 MG/DL (ref 74–99)
GLUCOSE BLD MANUAL STRIP-MCNC: 91 MG/DL (ref 74–99)
GLUCOSE BLD MANUAL STRIP-MCNC: 98 MG/DL (ref 74–99)
GLUCOSE BLDV-MCNC: 134 MG/DL (ref 74–99)
GLUCOSE SERPL-MCNC: 63 MG/DL (ref 74–99)
HBA1C MFR BLD: 5.5 %
HCO3 BLDMV-SCNC: 21.7 MMOL/L (ref 22–26)
HCO3 BLDMV-SCNC: 22.2 MMOL/L (ref 22–26)
HCO3 BLDMV-SCNC: 23.8 MMOL/L (ref 22–26)
HCO3 BLDMV-SCNC: 24 MMOL/L (ref 22–26)
HCO3 BLDV-SCNC: 22.1 MMOL/L (ref 22–26)
HCT VFR BLD AUTO: 41.3 % (ref 36–46)
HCT VFR BLD AUTO: 44 % (ref 36–46)
HCT VFR BLD EST: 47 % (ref 36–46)
HGB BLD-MCNC: 13.5 G/DL (ref 12–16)
HGB BLD-MCNC: 14.3 G/DL (ref 12–16)
HGB BLDV-MCNC: 15.6 G/DL (ref 12–16)
IMM GRANULOCYTES # BLD AUTO: 0.02 X10*3/UL (ref 0–0.7)
IMM GRANULOCYTES NFR BLD AUTO: 0.3 % (ref 0–0.9)
INHALED O2 CONCENTRATION: 100 %
INHALED O2 CONCENTRATION: 30 %
INHALED O2 CONCENTRATION: 50 %
INR PPP: 1.1 (ref 0.9–1.1)
LACTATE BLDV-SCNC: 1.9 MMOL/L (ref 0.4–2)
LACTATE BLDV-SCNC: 2.2 MMOL/L (ref 0.4–2)
LACTATE SERPL-SCNC: 1.3 MMOL/L (ref 0.4–2)
LEFT VENTRICLE INTERNAL DIMENSION DIASTOLE: 5.9 (ref 3.5–6)
LEFT VENTRICULAR OUTFLOW TRACT DIAMETER: 1.86
LYMPHOCYTES # BLD AUTO: 1.24 X10*3/UL (ref 1.2–4.8)
LYMPHOCYTES NFR BLD AUTO: 19.6 %
MAGNESIUM SERPL-MCNC: 2.25 MG/DL (ref 1.6–2.4)
MCH RBC QN AUTO: 29 PG (ref 26–34)
MCH RBC QN AUTO: 29.3 PG (ref 26–34)
MCHC RBC AUTO-ENTMCNC: 32.5 G/DL (ref 32–36)
MCHC RBC AUTO-ENTMCNC: 32.7 G/DL (ref 32–36)
MCV RBC AUTO: 89 FL (ref 80–100)
MCV RBC AUTO: 90 FL (ref 80–100)
MITRAL VALVE E/A RATIO: 0.76
MITRAL VALVE E/E' RATIO: 48.46
MONOCYTES # BLD AUTO: 0.53 X10*3/UL (ref 0.1–1)
MONOCYTES NFR BLD AUTO: 8.4 %
MRSA DNA SPEC QL NAA+PROBE: NOT DETECTED
NEUTROPHILS # BLD AUTO: 4.53 X10*3/UL (ref 1.2–7.7)
NEUTROPHILS NFR BLD AUTO: 71.3 %
NRBC BLD-RTO: 0 /100 WBCS (ref 0–0)
NRBC BLD-RTO: 0 /100 WBCS (ref 0–0)
OXYHGB MFR BLDMV: 55.6 % (ref 45–75)
OXYHGB MFR BLDMV: 65.1 % (ref 45–75)
OXYHGB MFR BLDMV: 65.7 % (ref 45–75)
OXYHGB MFR BLDMV: 66.8 % (ref 45–75)
OXYHGB MFR BLDV: 91.6 % (ref 45–75)
PCO2 BLDMV: 32 MM HG (ref 41–51)
PCO2 BLDMV: 35 MM HG (ref 41–51)
PCO2 BLDMV: 35 MM HG (ref 41–51)
PCO2 BLDMV: 37 MM HG (ref 41–51)
PCO2 BLDV: 46 MM HG (ref 41–51)
PH BLDMV: 7.41 PH (ref 7.33–7.43)
PH BLDMV: 7.42 PH (ref 7.33–7.43)
PH BLDMV: 7.44 PH (ref 7.33–7.43)
PH BLDMV: 7.44 PH (ref 7.33–7.43)
PH BLDV: 7.29 PH (ref 7.33–7.43)
PLATELET # BLD AUTO: 177 X10*3/UL (ref 150–450)
PLATELET # BLD AUTO: 222 X10*3/UL (ref 150–450)
PO2 BLDMV: 36 MM HG (ref 35–45)
PO2 BLDMV: 39 MM HG (ref 35–45)
PO2 BLDMV: 41 MM HG (ref 35–45)
PO2 BLDMV: 42 MM HG (ref 35–45)
PO2 BLDV: 68 MM HG (ref 35–45)
POTASSIUM BLDV-SCNC: 4.6 MMOL/L (ref 3.5–5.3)
POTASSIUM SERPL-SCNC: 4.6 MMOL/L (ref 3.5–5.3)
PROCALCITONIN SERPL-MCNC: 3.44 NG/ML
PROT SERPL-MCNC: 6.3 G/DL (ref 6.4–8.2)
PROTHROMBIN TIME: 12.1 SECONDS (ref 9.8–12.8)
RBC # BLD AUTO: 4.6 X10*6/UL (ref 4–5.2)
RBC # BLD AUTO: 4.93 X10*6/UL (ref 4–5.2)
RIGHT VENTRICLE FREE WALL PEAK S': 6
RIGHT VENTRICLE PEAK SYSTOLIC PRESSURE: 28.6
SAO2 % BLDMV: 57 % (ref 45–75)
SAO2 % BLDMV: 66 % (ref 45–75)
SAO2 % BLDMV: 67 % (ref 45–75)
SAO2 % BLDMV: 68 % (ref 45–75)
SAO2 % BLDV: 94 % (ref 45–75)
SODIUM BLDV-SCNC: 139 MMOL/L (ref 136–145)
SODIUM SERPL-SCNC: 144 MMOL/L (ref 136–145)
TSH SERPL-ACNC: 1.41 MIU/L (ref 0.44–3.98)
WBC # BLD AUTO: 2.3 X10*3/UL (ref 4.4–11.3)
WBC # BLD AUTO: 6.3 X10*3/UL (ref 4.4–11.3)

## 2023-11-21 PROCEDURE — 2500000002 HC RX 250 W HCPCS SELF ADMINISTERED DRUGS (ALT 637 FOR MEDICARE OP, ALT 636 FOR OP/ED): Performed by: STUDENT IN AN ORGANIZED HEALTH CARE EDUCATION/TRAINING PROGRAM

## 2023-11-21 PROCEDURE — 85027 COMPLETE CBC AUTOMATED: CPT | Performed by: STUDENT IN AN ORGANIZED HEALTH CARE EDUCATION/TRAINING PROGRAM

## 2023-11-21 PROCEDURE — 94660 CPAP INITIATION&MGMT: CPT

## 2023-11-21 PROCEDURE — 82947 ASSAY GLUCOSE BLOOD QUANT: CPT

## 2023-11-21 PROCEDURE — 2500000004 HC RX 250 GENERAL PHARMACY W/ HCPCS (ALT 636 FOR OP/ED)

## 2023-11-21 PROCEDURE — 85025 COMPLETE CBC W/AUTO DIFF WBC: CPT | Performed by: NURSE PRACTITIONER

## 2023-11-21 PROCEDURE — 2500000004 HC RX 250 GENERAL PHARMACY W/ HCPCS (ALT 636 FOR OP/ED): Performed by: INTERNAL MEDICINE

## 2023-11-21 PROCEDURE — 2500000004 HC RX 250 GENERAL PHARMACY W/ HCPCS (ALT 636 FOR OP/ED): Performed by: STUDENT IN AN ORGANIZED HEALTH CARE EDUCATION/TRAINING PROGRAM

## 2023-11-21 PROCEDURE — 71045 X-RAY EXAM CHEST 1 VIEW: CPT

## 2023-11-21 PROCEDURE — 3E033XZ INTRODUCTION OF VASOPRESSOR INTO PERIPHERAL VEIN, PERCUTANEOUS APPROACH: ICD-10-PCS | Performed by: STUDENT IN AN ORGANIZED HEALTH CARE EDUCATION/TRAINING PROGRAM

## 2023-11-21 PROCEDURE — 87641 MR-STAPH DNA AMP PROBE: CPT | Performed by: STUDENT IN AN ORGANIZED HEALTH CARE EDUCATION/TRAINING PROGRAM

## 2023-11-21 PROCEDURE — 87205 SMEAR GRAM STAIN: CPT | Performed by: STUDENT IN AN ORGANIZED HEALTH CARE EDUCATION/TRAINING PROGRAM

## 2023-11-21 PROCEDURE — 84145 PROCALCITONIN (PCT): CPT | Performed by: STUDENT IN AN ORGANIZED HEALTH CARE EDUCATION/TRAINING PROGRAM

## 2023-11-21 PROCEDURE — 83880 ASSAY OF NATRIURETIC PEPTIDE: CPT | Performed by: NURSE PRACTITIONER

## 2023-11-21 PROCEDURE — 94003 VENT MGMT INPAT SUBQ DAY: CPT

## 2023-11-21 PROCEDURE — 93005 ELECTROCARDIOGRAM TRACING: CPT

## 2023-11-21 PROCEDURE — 83036 HEMOGLOBIN GLYCOSYLATED A1C: CPT | Performed by: NURSE PRACTITIONER

## 2023-11-21 PROCEDURE — 85610 PROTHROMBIN TIME: CPT | Performed by: NURSE PRACTITIONER

## 2023-11-21 PROCEDURE — 83605 ASSAY OF LACTIC ACID: CPT | Performed by: NURSE PRACTITIONER

## 2023-11-21 PROCEDURE — 87075 CULTR BACTERIA EXCEPT BLOOD: CPT | Performed by: NURSE PRACTITIONER

## 2023-11-21 PROCEDURE — 2500000004 HC RX 250 GENERAL PHARMACY W/ HCPCS (ALT 636 FOR OP/ED): Performed by: NURSE PRACTITIONER

## 2023-11-21 PROCEDURE — 2500000005 HC RX 250 GENERAL PHARMACY W/O HCPCS: Performed by: NURSE PRACTITIONER

## 2023-11-21 PROCEDURE — 71045 X-RAY EXAM CHEST 1 VIEW: CPT | Performed by: RADIOLOGY

## 2023-11-21 PROCEDURE — 80053 COMPREHEN METABOLIC PANEL: CPT | Performed by: NURSE PRACTITIONER

## 2023-11-21 PROCEDURE — 87449 NOS EACH ORGANISM AG IA: CPT | Performed by: STUDENT IN AN ORGANIZED HEALTH CARE EDUCATION/TRAINING PROGRAM

## 2023-11-21 PROCEDURE — 82805 BLOOD GASES W/O2 SATURATION: CPT | Performed by: NURSE PRACTITIONER

## 2023-11-21 PROCEDURE — 36415 COLL VENOUS BLD VENIPUNCTURE: CPT | Performed by: NURSE PRACTITIONER

## 2023-11-21 PROCEDURE — 84484 ASSAY OF TROPONIN QUANT: CPT | Performed by: NURSE PRACTITIONER

## 2023-11-21 PROCEDURE — 37799 UNLISTED PX VASCULAR SURGERY: CPT

## 2023-11-21 PROCEDURE — 74176 CT ABD & PELVIS W/O CONTRAST: CPT

## 2023-11-21 PROCEDURE — 83880 ASSAY OF NATRIURETIC PEPTIDE: CPT | Performed by: STUDENT IN AN ORGANIZED HEALTH CARE EDUCATION/TRAINING PROGRAM

## 2023-11-21 PROCEDURE — 87070 CULTURE OTHR SPECIMN AEROBIC: CPT | Performed by: STUDENT IN AN ORGANIZED HEALTH CARE EDUCATION/TRAINING PROGRAM

## 2023-11-21 PROCEDURE — 93010 ELECTROCARDIOGRAM REPORT: CPT | Performed by: INTERNAL MEDICINE

## 2023-11-21 PROCEDURE — 02HP32Z INSERTION OF MONITORING DEVICE INTO PULMONARY TRUNK, PERCUTANEOUS APPROACH: ICD-10-PCS | Performed by: STUDENT IN AN ORGANIZED HEALTH CARE EDUCATION/TRAINING PROGRAM

## 2023-11-21 PROCEDURE — S4991 NICOTINE PATCH NONLEGEND: HCPCS | Performed by: STUDENT IN AN ORGANIZED HEALTH CARE EDUCATION/TRAINING PROGRAM

## 2023-11-21 PROCEDURE — 2500000001 HC RX 250 WO HCPCS SELF ADMINISTERED DRUGS (ALT 637 FOR MEDICARE OP)

## 2023-11-21 PROCEDURE — 5A09357 ASSISTANCE WITH RESPIRATORY VENTILATION, LESS THAN 24 CONSECUTIVE HOURS, CONTINUOUS POSITIVE AIRWAY PRESSURE: ICD-10-PCS | Performed by: STUDENT IN AN ORGANIZED HEALTH CARE EDUCATION/TRAINING PROGRAM

## 2023-11-21 PROCEDURE — 2500000001 HC RX 250 WO HCPCS SELF ADMINISTERED DRUGS (ALT 637 FOR MEDICARE OP): Performed by: STUDENT IN AN ORGANIZED HEALTH CARE EDUCATION/TRAINING PROGRAM

## 2023-11-21 PROCEDURE — 93306 TTE W/DOPPLER COMPLETE: CPT

## 2023-11-21 PROCEDURE — 82805 BLOOD GASES W/O2 SATURATION: CPT | Performed by: STUDENT IN AN ORGANIZED HEALTH CARE EDUCATION/TRAINING PROGRAM

## 2023-11-21 PROCEDURE — 99223 1ST HOSP IP/OBS HIGH 75: CPT | Performed by: NURSE PRACTITIONER

## 2023-11-21 PROCEDURE — 87899 AGENT NOS ASSAY W/OPTIC: CPT | Performed by: STUDENT IN AN ORGANIZED HEALTH CARE EDUCATION/TRAINING PROGRAM

## 2023-11-21 PROCEDURE — 37799 UNLISTED PX VASCULAR SURGERY: CPT | Performed by: NURSE PRACTITIONER

## 2023-11-21 PROCEDURE — 99291 CRITICAL CARE FIRST HOUR: CPT | Performed by: STUDENT IN AN ORGANIZED HEALTH CARE EDUCATION/TRAINING PROGRAM

## 2023-11-21 PROCEDURE — C9113 INJ PANTOPRAZOLE SODIUM, VIA: HCPCS | Performed by: NURSE PRACTITIONER

## 2023-11-21 PROCEDURE — 2020000001 HC ICU ROOM DAILY

## 2023-11-21 PROCEDURE — 83605 ASSAY OF LACTIC ACID: CPT

## 2023-11-21 PROCEDURE — 83735 ASSAY OF MAGNESIUM: CPT | Performed by: NURSE PRACTITIONER

## 2023-11-21 PROCEDURE — 71250 CT THORAX DX C-: CPT | Performed by: RADIOLOGY

## 2023-11-21 PROCEDURE — 84484 ASSAY OF TROPONIN QUANT: CPT | Performed by: STUDENT IN AN ORGANIZED HEALTH CARE EDUCATION/TRAINING PROGRAM

## 2023-11-21 PROCEDURE — 74176 CT ABD & PELVIS W/O CONTRAST: CPT | Performed by: RADIOLOGY

## 2023-11-21 PROCEDURE — 93306 TTE W/DOPPLER COMPLETE: CPT | Performed by: INTERNAL MEDICINE

## 2023-11-21 PROCEDURE — 96372 THER/PROPH/DIAG INJ SC/IM: CPT | Performed by: NURSE PRACTITIONER

## 2023-11-21 PROCEDURE — 2500000001 HC RX 250 WO HCPCS SELF ADMINISTERED DRUGS (ALT 637 FOR MEDICARE OP): Performed by: NURSE PRACTITIONER

## 2023-11-21 PROCEDURE — 84443 ASSAY THYROID STIM HORMONE: CPT | Performed by: NURSE PRACTITIONER

## 2023-11-21 PROCEDURE — 87081 CULTURE SCREEN ONLY: CPT | Performed by: STUDENT IN AN ORGANIZED HEALTH CARE EDUCATION/TRAINING PROGRAM

## 2023-11-21 RX ORDER — ISOSORBIDE DINITRATE 10 MG/1
10 TABLET ORAL 2 TIMES DAILY
COMMUNITY
End: 2023-11-26 | Stop reason: HOSPADM

## 2023-11-21 RX ORDER — ONDANSETRON HYDROCHLORIDE 2 MG/ML
4 INJECTION, SOLUTION INTRAVENOUS ONCE
Status: COMPLETED | OUTPATIENT
Start: 2023-11-21 | End: 2023-11-21

## 2023-11-21 RX ORDER — DOCUSATE SODIUM 50 MG/5ML
100 LIQUID ORAL DAILY
Status: DISCONTINUED | OUTPATIENT
Start: 2023-11-21 | End: 2023-11-22

## 2023-11-21 RX ORDER — ACETAMINOPHEN 500 MG
1000 TABLET ORAL EVERY 8 HOURS PRN
COMMUNITY
Start: 2023-10-19

## 2023-11-21 RX ORDER — IPRATROPIUM BROMIDE AND ALBUTEROL SULFATE 2.5; .5 MG/3ML; MG/3ML
3 SOLUTION RESPIRATORY (INHALATION) EVERY 6 HOURS PRN
Status: DISCONTINUED | OUTPATIENT
Start: 2023-11-21 | End: 2023-11-26 | Stop reason: HOSPADM

## 2023-11-21 RX ORDER — ENOXAPARIN SODIUM 100 MG/ML
40 INJECTION SUBCUTANEOUS EVERY 24 HOURS
Status: DISCONTINUED | OUTPATIENT
Start: 2023-11-21 | End: 2023-11-26 | Stop reason: HOSPADM

## 2023-11-21 RX ORDER — DEXAMETHASONE 4 MG/1
1 TABLET ORAL
COMMUNITY
End: 2023-11-26 | Stop reason: HOSPADM

## 2023-11-21 RX ORDER — DEXMEDETOMIDINE HYDROCHLORIDE 4 UG/ML
.1-1.5 INJECTION, SOLUTION INTRAVENOUS CONTINUOUS
Status: DISCONTINUED | OUTPATIENT
Start: 2023-11-21 | End: 2023-11-21

## 2023-11-21 RX ORDER — ONDANSETRON HYDROCHLORIDE 2 MG/ML
INJECTION, SOLUTION INTRAVENOUS
Status: COMPLETED
Start: 2023-11-21 | End: 2023-11-21

## 2023-11-21 RX ORDER — BUMETANIDE 0.5 MG/1
0.5 TABLET ORAL
Status: ON HOLD | COMMUNITY
Start: 2023-08-25 | End: 2023-11-26 | Stop reason: SDUPTHER

## 2023-11-21 RX ORDER — DEXTROSE 50 % IN WATER (D50W) INTRAVENOUS SYRINGE
25
Status: DISCONTINUED | OUTPATIENT
Start: 2023-11-21 | End: 2023-11-26 | Stop reason: HOSPADM

## 2023-11-21 RX ORDER — INSULIN LISPRO 100 [IU]/ML
0-5 INJECTION, SOLUTION INTRAVENOUS; SUBCUTANEOUS EVERY 4 HOURS
Status: DISCONTINUED | OUTPATIENT
Start: 2023-11-21 | End: 2023-11-22

## 2023-11-21 RX ORDER — VANCOMYCIN HYDROCHLORIDE 1 G/200ML
1000 INJECTION, SOLUTION INTRAVENOUS EVERY 24 HOURS
Status: DISCONTINUED | OUTPATIENT
Start: 2023-11-22 | End: 2023-11-22

## 2023-11-21 RX ORDER — NICOTINE 7MG/24HR
1 PATCH, TRANSDERMAL 24 HOURS TRANSDERMAL DAILY
Status: DISCONTINUED | OUTPATIENT
Start: 2024-01-02 | End: 2023-11-25

## 2023-11-21 RX ORDER — ATORVASTATIN CALCIUM 80 MG/1
80 TABLET, FILM COATED ORAL DAILY
COMMUNITY
Start: 2023-03-23 | End: 2023-11-26 | Stop reason: HOSPADM

## 2023-11-21 RX ORDER — FENTANYL CITRATE 50 UG/ML
25 INJECTION, SOLUTION INTRAMUSCULAR; INTRAVENOUS
Status: DISCONTINUED | OUTPATIENT
Start: 2023-11-21 | End: 2023-11-22

## 2023-11-21 RX ORDER — MULTIVIT-MIN/IRON FUM/FOLIC AC 7.5 MG-4
1 TABLET ORAL
COMMUNITY
End: 2023-12-18 | Stop reason: WASHOUT

## 2023-11-21 RX ORDER — NOREPINEPHRINE BITARTRATE/D5W 8 MG/250ML
PLASTIC BAG, INJECTION (ML) INTRAVENOUS
Status: DISPENSED
Start: 2023-11-21 | End: 2023-11-22

## 2023-11-21 RX ORDER — PANTOPRAZOLE SODIUM 40 MG/1
40 TABLET, DELAYED RELEASE ORAL
Status: DISCONTINUED | OUTPATIENT
Start: 2023-11-22 | End: 2023-11-26 | Stop reason: HOSPADM

## 2023-11-21 RX ORDER — HYDRALAZINE HYDROCHLORIDE 10 MG/1
10 TABLET, FILM COATED ORAL 2 TIMES DAILY
COMMUNITY
End: 2023-11-26 | Stop reason: HOSPADM

## 2023-11-21 RX ORDER — AZITHROMYCIN 500 MG/1
500 TABLET, FILM COATED ORAL
Status: COMPLETED | OUTPATIENT
Start: 2023-11-21 | End: 2023-11-23

## 2023-11-21 RX ORDER — DAPAGLIFLOZIN 10 MG/1
10 TABLET, FILM COATED ORAL DAILY
COMMUNITY
End: 2024-06-10 | Stop reason: SDUPTHER

## 2023-11-21 RX ORDER — DEXMEDETOMIDINE HYDROCHLORIDE 4 UG/ML
.1-1.5 INJECTION, SOLUTION INTRAVENOUS CONTINUOUS
Status: DISCONTINUED | OUTPATIENT
Start: 2023-11-21 | End: 2023-11-22

## 2023-11-21 RX ORDER — DEXMEDETOMIDINE HYDROCHLORIDE 4 UG/ML
INJECTION, SOLUTION INTRAVENOUS
Status: COMPLETED
Start: 2023-11-21 | End: 2023-11-21

## 2023-11-21 RX ORDER — DOBUTAMINE HYDROCHLORIDE 400 MG/100ML
2.5 INJECTION INTRAVENOUS CONTINUOUS
Status: DISCONTINUED | OUTPATIENT
Start: 2023-11-21 | End: 2023-11-22

## 2023-11-21 RX ORDER — POLYETHYLENE GLYCOL 3350 17 G/17G
17 POWDER, FOR SOLUTION ORAL DAILY PRN
Status: DISCONTINUED | OUTPATIENT
Start: 2023-11-21 | End: 2023-11-26 | Stop reason: HOSPADM

## 2023-11-21 RX ORDER — BUDESONIDE AND FORMOTEROL FUMARATE DIHYDRATE 80; 4.5 UG/1; UG/1
2 AEROSOL RESPIRATORY (INHALATION) 2 TIMES DAILY
COMMUNITY
Start: 2023-11-20

## 2023-11-21 RX ORDER — PANTOPRAZOLE SODIUM 40 MG/10ML
40 INJECTION, POWDER, LYOPHILIZED, FOR SOLUTION INTRAVENOUS
Status: DISCONTINUED | OUTPATIENT
Start: 2023-11-21 | End: 2023-11-21

## 2023-11-21 RX ORDER — NAPROXEN SODIUM 220 MG/1
81 TABLET, FILM COATED ORAL DAILY
COMMUNITY
Start: 2023-08-25 | End: 2024-02-21

## 2023-11-21 RX ORDER — VANCOMYCIN HYDROCHLORIDE 1 G/200ML
15 INJECTION, SOLUTION INTRAVENOUS EVERY 24 HOURS
Status: DISCONTINUED | OUTPATIENT
Start: 2023-11-21 | End: 2023-11-21 | Stop reason: ALTCHOICE

## 2023-11-21 RX ORDER — DEXTROSE MONOHYDRATE 100 MG/ML
0.3 INJECTION, SOLUTION INTRAVENOUS ONCE AS NEEDED
Status: DISCONTINUED | OUTPATIENT
Start: 2023-11-21 | End: 2023-11-26 | Stop reason: HOSPADM

## 2023-11-21 RX ORDER — SODIUM NITROPRUSSIDE IN 0.9% SODIUM CHLORIDE 0.5 MG/ML
0-5 INJECTION INTRAVENOUS CONTINUOUS
Status: DISCONTINUED | OUTPATIENT
Start: 2023-11-21 | End: 2023-11-22

## 2023-11-21 RX ORDER — DOBUTAMINE HYDROCHLORIDE 400 MG/100ML
2.5 INJECTION INTRAVENOUS CONTINUOUS
Status: DISCONTINUED | OUTPATIENT
Start: 2023-11-21 | End: 2023-11-21

## 2023-11-21 RX ORDER — SODIUM NITROPRUSSIDE IN 0.9% SODIUM CHLORIDE 0.5 MG/ML
INJECTION INTRAVENOUS
Status: COMPLETED
Start: 2023-11-21 | End: 2023-11-21

## 2023-11-21 RX ORDER — IBUPROFEN 200 MG
1 TABLET ORAL DAILY
Status: DISCONTINUED | OUTPATIENT
Start: 2023-11-21 | End: 2023-11-25

## 2023-11-21 RX ORDER — VANCOMYCIN HYDROCHLORIDE 1 G/200ML
1000 INJECTION, SOLUTION INTRAVENOUS ONCE
Status: COMPLETED | OUTPATIENT
Start: 2023-11-21 | End: 2023-11-21

## 2023-11-21 RX ADMIN — DOBUTAMINE IN DEXTROSE 2.5 MCG/KG/MIN: 400 INJECTION, SOLUTION INTRAVENOUS at 12:03

## 2023-11-21 RX ADMIN — CEFEPIME 2 G: 1 INJECTION, SOLUTION INTRAVENOUS at 05:00

## 2023-11-21 RX ADMIN — PROPOFOL 10 MCG/KG/MIN: 10 INJECTION, EMULSION INTRAVENOUS at 06:46

## 2023-11-21 RX ADMIN — ONDANSETRON HYDROCHLORIDE 4 MG: 2 INJECTION, SOLUTION INTRAVENOUS at 20:30

## 2023-11-21 RX ADMIN — DEXMEDETOMIDINE HYDROCHLORIDE 0.2 MCG/KG/HR: 4 INJECTION, SOLUTION INTRAVENOUS at 09:31

## 2023-11-21 RX ADMIN — SODIUM CHLORIDE, POTASSIUM CHLORIDE, SODIUM LACTATE AND CALCIUM CHLORIDE 500 ML: 600; 310; 30; 20 INJECTION, SOLUTION INTRAVENOUS at 21:29

## 2023-11-21 RX ADMIN — ONDANSETRON 4 MG: 2 INJECTION, SOLUTION INTRAMUSCULAR; INTRAVENOUS at 20:30

## 2023-11-21 RX ADMIN — DOCUSATE SODIUM 100 MG: 50 LIQUID ORAL at 09:00

## 2023-11-21 RX ADMIN — PROPOFOL 15 MCG/KG/MIN: 10 INJECTION, EMULSION INTRAVENOUS at 03:27

## 2023-11-21 RX ADMIN — CEFEPIME 2 G: 1 INJECTION, SOLUTION INTRAVENOUS at 15:37

## 2023-11-21 RX ADMIN — PERFLUTREN 3 ML OF DILUTION: 6.52 INJECTION, SUSPENSION INTRAVENOUS at 15:47

## 2023-11-21 RX ADMIN — SODIUM CHLORIDE, POTASSIUM CHLORIDE, SODIUM LACTATE AND CALCIUM CHLORIDE 500 ML: 600; 310; 30; 20 INJECTION, SOLUTION INTRAVENOUS at 13:01

## 2023-11-21 RX ADMIN — CEFEPIME 2 G: 1 INJECTION, SOLUTION INTRAVENOUS at 23:07

## 2023-11-21 RX ADMIN — VANCOMYCIN HYDROCHLORIDE 1000 MG: 1 INJECTION, SOLUTION INTRAVENOUS at 05:00

## 2023-11-21 RX ADMIN — SODIUM NITROPRUSSIDE 0.2 MCG/KG/MIN: 0.5 INJECTION, SOLUTION INTRAVENOUS at 12:25

## 2023-11-21 RX ADMIN — SODIUM NITROPRUSSIDE 0.3 MCG: 0.5 INJECTION, SOLUTION INTRAVENOUS at 05:05

## 2023-11-21 RX ADMIN — NICOTINE 1 PATCH: 14 PATCH, EXTENDED RELEASE TRANSDERMAL at 12:30

## 2023-11-21 RX ADMIN — Medication 50 PERCENT: at 08:19

## 2023-11-21 RX ADMIN — PANTOPRAZOLE SODIUM 40 MG: 40 INJECTION, POWDER, FOR SOLUTION INTRAVENOUS at 08:30

## 2023-11-21 RX ADMIN — AZITHROMYCIN DIHYDRATE 500 MG: 500 TABLET, FILM COATED ORAL at 09:00

## 2023-11-21 RX ADMIN — Medication: at 21:29

## 2023-11-21 RX ADMIN — FENTANYL CITRATE 75 MCG/HR: 0.05 INJECTION, SOLUTION INTRAMUSCULAR; INTRAVENOUS at 03:28

## 2023-11-21 RX ADMIN — DEXMEDETOMIDINE HYDROCHLORIDE 0.2 MCG/KG/HR: 400 INJECTION INTRAVENOUS at 12:18

## 2023-11-21 RX ADMIN — ENOXAPARIN SODIUM 40 MG: 100 INJECTION SUBCUTANEOUS at 08:30

## 2023-11-21 SDOH — ECONOMIC STABILITY: TRANSPORTATION INSECURITY

## 2023-11-21 SDOH — ECONOMIC STABILITY: INCOME INSECURITY

## 2023-11-21 SDOH — ECONOMIC STABILITY: HOUSING INSECURITY

## 2023-11-21 SDOH — SOCIAL STABILITY: SOCIAL INSECURITY: DO YOU FEEL ANYONE HAS EXPLOITED OR TAKEN ADVANTAGE OF YOU FINANCIALLY OR OF YOUR PERSONAL PROPERTY?: UNABLE TO ASSESS

## 2023-11-21 SDOH — SOCIAL STABILITY: SOCIAL INSECURITY: ARE THERE ANY APPARENT SIGNS OF INJURIES/BEHAVIORS THAT COULD BE RELATED TO ABUSE/NEGLECT?: UNABLE TO ASSESS

## 2023-11-21 SDOH — SOCIAL STABILITY: SOCIAL INSECURITY: ABUSE: ADULT

## 2023-11-21 SDOH — ECONOMIC STABILITY: FOOD INSECURITY

## 2023-11-21 SDOH — SOCIAL STABILITY: SOCIAL INSECURITY: ARE YOU OR HAVE YOU BEEN THREATENED OR ABUSED PHYSICALLY, EMOTIONALLY, OR SEXUALLY BY ANYONE?: UNABLE TO ASSESS

## 2023-11-21 SDOH — SOCIAL STABILITY: SOCIAL INSECURITY: HAS ANYONE EVER THREATENED TO HURT YOUR FAMILY OR YOUR PETS?: UNABLE TO ASSESS

## 2023-11-21 SDOH — SOCIAL STABILITY: SOCIAL INSECURITY: DO YOU FEEL UNSAFE GOING BACK TO THE PLACE WHERE YOU ARE LIVING?: UNABLE TO ASSESS

## 2023-11-21 SDOH — SOCIAL STABILITY: SOCIAL INSECURITY: HAVE YOU HAD THOUGHTS OF HARMING ANYONE ELSE?: UNABLE TO ASSESS

## 2023-11-21 SDOH — SOCIAL STABILITY: SOCIAL INSECURITY: DOES ANYONE TRY TO KEEP YOU FROM HAVING/CONTACTING OTHER FRIENDS OR DOING THINGS OUTSIDE YOUR HOME?: UNABLE TO ASSESS

## 2023-11-21 SDOH — SOCIAL STABILITY: SOCIAL INSECURITY: WERE YOU ABLE TO COMPLETE ALL THE BEHAVIORAL HEALTH SCREENINGS?: NO

## 2023-11-21 SDOH — HEALTH STABILITY: PHYSICAL HEALTH

## 2023-11-21 ASSESSMENT — ACTIVITIES OF DAILY LIVING (ADL)
JUDGMENT_ADEQUATE_SAFELY_COMPLETE_DAILY_ACTIVITIES: UNABLE TO ASSESS
LACK_OF_TRANSPORTATION: NO
HEARING - RIGHT EAR: FUNCTIONAL
ADEQUATE_TO_COMPLETE_ADL: UNABLE TO ASSESS
ADEQUATE_TO_COMPLETE_ADL: UNABLE TO ASSESS
FEEDING YOURSELF: INDEPENDENT
ASSISTIVE_DEVICE: OTHER (COMMENT)
JUDGMENT_ADEQUATE_SAFELY_COMPLETE_DAILY_ACTIVITIES: UNABLE TO ASSESS
PATIENT'S MEMORY ADEQUATE TO SAFELY COMPLETE DAILY ACTIVITIES?: UNABLE TO ASSESS
BATHING: INDEPENDENT
PATIENT'S MEMORY ADEQUATE TO SAFELY COMPLETE DAILY ACTIVITIES?: UNABLE TO ASSESS
HEARING - LEFT EAR: FUNCTIONAL
FEEDING YOURSELF: INDEPENDENT
DRESSING YOURSELF: INDEPENDENT
TOILETING: INDEPENDENT
WALKS IN HOME: INDEPENDENT
HEARING - LEFT EAR: FUNCTIONAL
GROOMING: INDEPENDENT
HEARING - RIGHT EAR: FUNCTIONAL
TOILETING: INDEPENDENT
DRESSING YOURSELF: INDEPENDENT
WALKS IN HOME: INDEPENDENT
BATHING: INDEPENDENT
GROOMING: INDEPENDENT

## 2023-11-21 ASSESSMENT — COGNITIVE AND FUNCTIONAL STATUS - GENERAL
CLIMB 3 TO 5 STEPS WITH RAILING: A LOT
EATING MEALS: TOTAL
MOBILITY SCORE: 6
DRESSING REGULAR UPPER BODY CLOTHING: A LITTLE
MOVING FROM LYING ON BACK TO SITTING ON SIDE OF FLAT BED WITH BEDRAILS: TOTAL
CLIMB 3 TO 5 STEPS WITH RAILING: TOTAL
STANDING UP FROM CHAIR USING ARMS: TOTAL
PATIENT BASELINE BEDBOUND: NO
TOILETING: TOTAL
STANDING UP FROM CHAIR USING ARMS: TOTAL
TURNING FROM BACK TO SIDE WHILE IN FLAT BAD: TOTAL
MOBILITY SCORE: 14
MOVING TO AND FROM BED TO CHAIR: TOTAL
WALKING IN HOSPITAL ROOM: A LOT
MOVING FROM LYING ON BACK TO SITTING ON SIDE OF FLAT BED WITH BEDRAILS: A LITTLE
TOILETING: A LITTLE
DAILY ACTIVITIY SCORE: 6
TURNING FROM BACK TO SIDE WHILE IN FLAT BAD: A LITTLE
DRESSING REGULAR LOWER BODY CLOTHING: TOTAL
MOVING TO AND FROM BED TO CHAIR: TOTAL
EATING MEALS: TOTAL
TOILETING: TOTAL
DRESSING REGULAR LOWER BODY CLOTHING: TOTAL
DRESSING REGULAR UPPER BODY CLOTHING: TOTAL
CLIMB 3 TO 5 STEPS WITH RAILING: TOTAL
TURNING FROM BACK TO SIDE WHILE IN FLAT BAD: TOTAL
MOBILITY SCORE: 6
HELP NEEDED FOR BATHING: TOTAL
DRESSING REGULAR LOWER BODY CLOTHING: A LITTLE
HELP NEEDED FOR BATHING: TOTAL
PERSONAL GROOMING: TOTAL
PERSONAL GROOMING: A LITTLE
HELP NEEDED FOR BATHING: A LITTLE
EATING MEALS: A LITTLE
WALKING IN HOSPITAL ROOM: TOTAL
WALKING IN HOSPITAL ROOM: TOTAL
DAILY ACTIVITIY SCORE: 18
STANDING UP FROM CHAIR USING ARMS: A LOT
PERSONAL GROOMING: TOTAL
DAILY ACTIVITIY SCORE: 6
DRESSING REGULAR UPPER BODY CLOTHING: TOTAL
MOVING TO AND FROM BED TO CHAIR: A LOT
MOVING FROM LYING ON BACK TO SITTING ON SIDE OF FLAT BED WITH BEDRAILS: TOTAL

## 2023-11-21 ASSESSMENT — PAIN - FUNCTIONAL ASSESSMENT
PAIN_FUNCTIONAL_ASSESSMENT: CPOT (CRITICAL CARE PAIN OBSERVATION TOOL)
PAIN_FUNCTIONAL_ASSESSMENT: 0-10
PAIN_FUNCTIONAL_ASSESSMENT: 0-10

## 2023-11-21 ASSESSMENT — PAIN SCALES - GENERAL
PAINLEVEL_OUTOF10: 0 - NO PAIN
PAINLEVEL_OUTOF10: 0 - NO PAIN

## 2023-11-21 ASSESSMENT — LIFESTYLE VARIABLES
SUBSTANCE_ABUSE_PAST_12_MONTHS: YES
PRESCIPTION_ABUSE_PAST_12_MONTHS: NO

## 2023-11-21 ASSESSMENT — COLUMBIA-SUICIDE SEVERITY RATING SCALE - C-SSRS
6. HAVE YOU EVER DONE ANYTHING, STARTED TO DO ANYTHING, OR PREPARED TO DO ANYTHING TO END YOUR LIFE?: NO
1. IN THE PAST MONTH, HAVE YOU WISHED YOU WERE DEAD OR WISHED YOU COULD GO TO SLEEP AND NOT WAKE UP?: NO
2. HAVE YOU ACTUALLY HAD ANY THOUGHTS OF KILLING YOURSELF?: NO

## 2023-11-21 NOTE — SIGNIFICANT EVENT
23 0158   Pre-Procedure Checklist   Emergent Line Insertion Yes   Type of Line to be Placed Introducer   Consent Obtained No   Emergency Medication Necessary No   Patient Identified with 2 Independent Identifiers Yes   Review of Allergies, Anticoagulation, Relevant Labs, ECG/Telemetry Yes   Risks/Benefits/Alternatives Discussed with Patient/POA/Legal Representative Yes   Stop Sign on Door Yes   Time Out Performed Yes   Catheter Exchange No   Positioning and Preparation Checklist   All People, Including Patient, in the Room with Cap and Mask Yes   Fluoroscopy Used to Identify Vessel and Guide Insertion; Sterile Cover Used No   Ultrasound Used to Identify Vessel and Guide Insertion; Sterile Cover Used Yes   Full Barrier Precautions Followed (Mask, Cap, Gown, Gloves) Yes   Hands Washed Yes   Monitors Attached with Sound Alarms On Yes   Full Body Sterile Drape (Head-to-Toe) Used to Cover Patient Yes   [place] Position (For IJ and Subclavian) Yes   CHG Skin Prep Used and Allowed to Air Dry Prior to Skin Procedure Yes   Procedure Checklist   Blood Aspirated From All Lumens, All Ports Subsequently Flushed Yes   Catheter Caps Placed On All Lumens; Lumens Clamped Yes   Maintain Guidewire Control Throughout, Ensuring Guidewire Removal Yes   Maintain Sterile Field Throughout Insertion Yes   Catheter Secured Yes   Confirmatory Test of Venous Placement Longitudinal ultrasound   Post-Procedure Checklist   Date and Time Written on Dressing Yes   Sharp and Wire Count and Safe Disposal of all Sharps/Wires Yes   Sterile Dressing Applied Per Protocol Yes   X-ray Ordered or ECG Image Yes     Central Line Insertion Practices/2nd Observer Note   Name:  Melissa Marinelli  Admission Date:  2023 12:14 PM  MRN: [id number]  Attending Provider: Brandin Keene MD  Room/Bed:               Sex: female  : 1955       Age: 68 y.o.    Pre-Procedure Checklist  Emergent Line Insertion: Yes  Type of Line to be Placed:  Saturation Normal Values: Arterial Blood: PO2 75-100mmHg, %Sat 95-98%, pH 7.35-7.45mmHg, pCO2 35-45mmHg, CO2 19-25mmol/L, Venous Blood: %Sat 60-85%, pH 7.35-7.45mmHg, pCO2 38-50mmHg, CO2 22-30mmol/L.   Introducer  Consent Obtained: No  Emergency Medication Necessary: (P) No  Patient Identified with 2 Independent Identifiers: (P) Yes  Review of Allergies, Anticoagulation, Relevant Labs, ECG/Telemetry: (P) Yes  Risks/Benefits/Alternatives Discussed with Patient/POA/Legal Representative: (P) Yes  Stop Sign on Door: (P) Yes  Time Out Performed: (P) Yes  Catheter Exchange: (P) No  Positioning and Preparation Checklist  All People, Including Patient, in the Room with Cap and Mask: (P) Yes  Fluoroscopy Used to Identify Vessel and Guide Insertion; Sterile Cover Used: (P) No  Ultrasound Used to Identify Vessel and Guide Insertion; Sterile Cover Used: (P) Yes  Full Barrier Precautions Followed (Mask, Cap, Gown, Gloves): (P) Yes  Hands Washed: (P) Yes  Monitors Attached with Sound Alarms On: (P) Yes  Full Body Sterile Drape (Head-to-Toe) Used to Cover Patient: (P) Yes  [place] Position (For IJ and Subclavian): (P) Yes  CHG Skin Prep Used and Allowed to Air Dry Prior to Skin Procedure: (P) Yes  Procedure Checklist  Blood Aspirated From All Lumens, All Ports Subsequently Flushed: (P) Yes  Catheter Caps Placed On All Lumens; Lumens Clamped: (P) Yes  Maintain Guidewire Control Throughout, Ensuring Guidewire Removal: (P) Yes  Maintain Sterile Field Throughout Insertion: (P) Yes  Catheter Secured: (P) Yes  Confirmatory Test of Venous Placement: (P) Longitudinal ultrasound  Post-Procedure Checklist  Date and Time Written on Dressing: (P) Yes  Sharp and Wire Count and Safe Disposal of all Sharps/Wires: (P) Yes  Sterile Dressing Applied Per Protocol: (P) Yes  X-ray Ordered or ECG Image: (P) Yes    Teresa Hartman RN  Date: 11/21/2023  Time: 2:28 AM

## 2023-11-21 NOTE — PROGRESS NOTES
"Social Work Note   11/21/23 2660   Discharge Planning   Living Arrangements Children   Support Systems Children;Family members;Other (Comment)   Assistance Needed Pt's daughter reported that pt uses a cane as needed.   Type of Residence Private residence   Do you have animals or pets at home? No   Home or Post Acute Services None   Financial Resource Strain   How hard is it for you to pay for the very basics like food, housing, medical care, and heating? Not hard   Housing Stability   In the last 12 months, was there a time when you were not able to pay the mortgage or rent on time? N   In the last 12 months, how many places have you lived? 1   In the last 12 months, was there a time when you did not have a steady place to sleep or slept in a shelter (including now)? N   Transportation Needs   In the past 12 months, has lack of transportation kept you from medical appointments or from getting medications? no   In the past 12 months, has lack of transportation kept you from meetings, work, or from getting things needed for daily living? No       LSW met with pt's daughter at bedside due to pt being intubated. LSW asked pt's daughter questions to assess for discharge planning. Pt's daughter reported that pt resides with her in a residential residence. She reported all utilities are on in the home and there no concerns financially for keeping the utilities on. She reported the pt typically drives places herself with no issues. She reported pt utilizes a cane as needed, typically if she is feeling tired or unwell. She reported pt also utilizes another daughter as support, Elin. She reported pt utilized [facility] as the preferred pharmacy. She reported pt used to utilize a \"home therapist\" but was unsure exactly. She reported pt has someone from the insurance company come and check in either yearly or every 6 months. Pt's daughter reported they had no questions and no current SW needs.     DELICIA Weir  "

## 2023-11-21 NOTE — PROGRESS NOTES
SOCIAL WORK NOTE   SW met with team for rounds. Patient is newly admitted, currently on inotrope. Social work to follow.  CHRIS Paula, LISW-S ([id number])

## 2023-11-21 NOTE — H&P
[facility]  HFICU HISTORY AND PHYSICAL     Melissa Marinelli/[id number]    Admit Date: 11/20/2023  Hospital Length of Stay: 1   ICU Length of Stay: 1h   Primary Service:   Primary HF Cardiologist:   Referring:    HPI:   Melissa Marinelli is  69yo female with a past medical history significant for HFrEF (EF 15-20%), CAD s/p NSTEMI with RIRI to LCx (Jan 2016), s/p MVR (June 2019),  s/p ICD placement 5/2020, COPD (never formally diagnosed), HTN, HL, GERD, CVA, CKD IIIB and DMII who presented to the ED with a chief complaint of non-bloody, non-bilious emesis and diarrhea at home.  She was given a 500ml fluid bolus as it was thought that she might be dehydrated however she became hypoxic and tachypneic.  She was initially placed on a non-rebreather mask and was upgraded to  BIPAP and was doing well, however she continued to have nausea and vomiting prompting intubation for airway protection.   Basic labs, and a CXR were perfomed in the ED showing a BNP of 238, NML Lipase level and hyperinflated lungs with focal lung consolidations.  Prior to intubation patient denied sick contacts, fever, chills or urinary symptoms.  The decision was made to admit her to HFICU to r/o CHF exacerbation.       Interval history: Patient was admitted 9/2023 for COPD exacerbation and acute hypoxic hypercapnic respiratory respiratory failure with mental status changes that improved with NIPPV.      Upon arrival to HFICU, Patient was intubated, sedated and unable to answer questions. Ventilator settings as follows : AC, 18, 450, 50%, 8Fentanyl 50mcg/hr, Propfol 30mcg/kg/min.  B/L lower extremities were cool to the touch and without edema, no JVD present.  [place] tapan catheter placed bedside, awaiting swan numbers.        Cardiac Tests:  - TTE 3/21/23: Left ventricular systolic function is severely decreased with a 15-20% estimated ejection fraction.  Reduced RV systolic function.      Past Medical History:  Past Medical  History:   Diagnosis Date    Asthma     CAD (coronary artery disease)     CHF (congestive heart failure) (CMS/[facility])     CKD (chronic kidney disease)     COPD (chronic obstructive pulmonary disease) (CMS/HCC)     CVA (cerebral vascular accident) (CMS/HCC)     Diabetes mellitus (CMS/HCC)     GERD (gastroesophageal reflux disease)     Hyperlipidemia     Hypertension     NSTEMI (non-ST elevated myocardial infarction) (CMS/HCC)     Unspecified systolic (congestive) heart failure (CMS/HCC) 08/11/2022    HFrEF (heart failure with reduced ejection fraction)       Past Surgical History:  Past Surgical History:   Procedure Laterality Date    MITRAL VALVE REPLACEMENT  06/2019    OTHER SURGICAL HISTORY  08/11/2022    Tonsillectomy    OTHER SURGICAL HISTORY  08/11/2022    Right ventricular assist device placement    OTHER SURGICAL HISTORY  08/11/2022    Mitral valve replacement       Family History:  Family History   Problem Relation Name Age of Onset    Other (CVA) Brother         Social History:  Social History     Socioeconomic History    Marital status: Single     Spouse name: Not on file    Number of children: Not on file    Years of education: Not on file    Highest education level: Not on file   Occupational History    Not on file   Tobacco Use    Smoking status: Every Day     Types: Cigarettes    Smokeless tobacco: Not on file   Substance and Sexual Activity    Alcohol use: Not on file    Drug use: Yes     Types: Marijuana    Sexual activity: Not on file   Other Topics Concern    Not on file   Social History Narrative    Not on file     Social Determinants of Health     Financial Resource Strain: Not on file   Food Insecurity: Not on file   Transportation Needs: Not on file   Physical Activity: Not on file   Stress: Not on file   Social Connections: Not on file   Intimate Partner Violence: Not on file   Housing Stability: Not on file       Allergies:  Allergies   Allergen Reactions    Metoprolol Shortness of breath and  "Other    Ticagrelor Anaphylaxis and Other     Feels a severe \"burning feeling\" in her chest    Ace Inhibitors Cough and Other     COUGH    Fentanyl Other    Gadolinium-Containing Contrast Media Hives and Other    Hydralazine Dermatitis    Iodinated Contrast Media Hives    Prednisone Other    Statins-Hmg-Coa Reductase Inhibitors Unknown    Egg Rash    Penicillins Rash and Unknown       Prior to Admission Meds:  Medications Prior to Admission   Medication Sig Dispense Refill Last Dose    acetaminophen (Tylenol) 500 mg tablet Take 2 tablets (1,000 mg) by mouth every 8 hours if needed for mild pain (1 - 3).       aspirin 81 mg chewable tablet Chew 1 tablet (81 mg) once daily.       atorvastatin (Lipitor) 80 mg tablet Take 1 tablet (80 mg) by mouth once daily.       bumetanide (Bumex) 0.5 mg tablet Take 1 tablet (0.5 mg) by mouth once daily.       Symbicort 80-4.5 mcg/actuation inhaler Inhale 2 puffs twice a day.       Farxiga 10 mg Take 1 tablet (10 mg) by mouth once daily.       Flovent  mcg/actuation inhaler Inhale 1 puff 2 times a day.       hydrALAZINE (Apresoline) 10 mg tablet Take 1 tablet (10 mg) by mouth 2 times a day.       isosorbide dinitrate (Isordil) 10 mg tablet Take 1 tablet (10 mg) by mouth 2 times a day.       multivitamin with minerals (multivit-min-iron fum-folic ac) tablet Take 1 tablet by mouth once daily.       nicotine polacrilex (Nicorette) 2 mg lozenge Use 1 lozenge (2 mg) in the mouth or throat every 2 hours if needed for smoking cessation. 100 lozenge 3        Current Medications:  Infusions:  fentaNYL, Last Rate: 50 mcg/hr (11/20/23 2252)  propofol, Last Rate: 20 mcg/kg/min (11/21/23 0020)      Scheduled:  cefepime, 2 g, q12h  enoxaparin, 40 mg, q24h  pantoprazole, 40 mg, Daily before breakfast  vancomycin, 1,000 mg, Once      PRN:  oxygen, , Continuous PRN - O2/gases  promethazine, 12.5 mg, q6h PRN          Invasive Hemodynamics:    Most Recent Range Past 24hrs   BP (Art)   No data " recorded   MAP(Art)   No data recorded   RA/CVP   No data recorded   PA   No data recorded   PA(mean)   No data recorded   PCWP   No data recorded   CO   No data recorded   CI   No data recorded   Mixed Venous   No data recorded   SVR    No data recorded   PVR   No data recorded       VENT:    Most Recent Range Past 24hrs   Mode Volume control/assist control    FiO2 50 % FiO2 (%)  Min: 50 %   Min taken time: 11/21/23 0120  Max: 100 %   Max taken time: 11/20/23 2031   Rate 18 Resp Rate (Set)  Min: 18   Min taken time: 11/21/23 0120  Max: 18   Max taken time: 11/21/23 0120   Vt 450 mL  Vt (Set, mL)  Min: 450 mL   Min taken time: 11/21/23 0120  Max: 450 mL   Max taken time: 11/21/23 0120   PEEP 8 cm H20 PEEP/CPAP (cm H2O)  Min: 8 cm H20   Min taken time: 11/21/23 0120  Max: 8 cm H20   Max taken time: 11/21/23 0120     PHYSICAL EXAM:   Visit Vitals  /67   Pulse (!) 121   Temp 35.3 °C (95.5 °F)   Resp 18   SpO2 100%   Smoking Status Every Day       Wt Readings from Last 5 Encounters:   06/22/23 67.1 kg (148 lb)   05/04/23 66 kg (145 lb 6.4 oz)   04/17/23 66.2 kg (146 lb)   12/30/22 65.8 kg (145 lb 0.2 oz)   08/11/22 67.1 kg (148 lb)       INTAKE/OUTPUT:  No intake/output data recorded.     Physical Exam  Constitutional:       Interventions: She is sedated, intubated and restrained.   HENT:      Head: Normocephalic and atraumatic.      Comments: OG in place      Mouth/Throat:      Mouth: Mucous membranes are moist.   Eyes:      Pupils: Pupils are equal, round, and reactive to light.   Neck:      Comments: Lexa to right neck, dressing c/d/i  Cardiovascular:      Rate and Rhythm: Normal rate and regular rhythm.      Pulses: Normal pulses.      Heart sounds: Normal heart sounds.   Pulmonary:      Effort: She is intubated.      Breath sounds: Normal breath sounds and air entry.      Comments: 7.5 ETT, 25cm at the lip  Abdominal:      General: Abdomen is flat.      Palpations: Abdomen is soft.   Genitourinary:      Comments: FTG draining clear yellow urine   Musculoskeletal:      Right lower leg: No edema.      Left lower leg: No edema.   Skin:     General: Skin is cool and dry.       Review of Systems   Unable to perform ROS: Intubated       DATA:  CMP:  Recent Labs     11/20/23  1548 09/02/23  0730 09/01/23  0524 08/31/23  2221 08/31/23  1041 08/31/23  0356 03/21/23 2005 03/21/23  0203 03/20/23 2235 03/20/23  1604 12/20/21  0701 07/16/21  0500 07/15/21  0315 07/14/21  1234 07/13/21  1007 05/22/20 2029 05/21/20  2102    137 139 138 142 138 137 139 CANCELED 140   < > 139   < > 138  --    < > 138   K 4.0 4.1 4.5 4.3 4.4 4.0 4.4 4.6 CANCELED 3.6   < > 3.9   < > 4.0  --    < > 5.1   * 103 100 99 104 102 102 100 CANCELED 104   < > 104   < > 103  --    < > 111*   CO2 22 24 25 25 29 24 24 21 CANCELED 25   < > 28   < > 26  --    < > 21   ANIONGAP 14 14 19 18 13 16 15 23* CANCELED 15   < > 11   < > 13  --    < > 11   BUN 15 34* 30* 28* 21 19 41* 32* CANCELED 29*   < > 26*   < > 40*  --    < > 26*   CREATININE 1.22* 1.61* 1.68* 1.58* 1.45* 1.34* 1.84* 1.84* CANCELED 1.56*   < > 1.33*   < > 1.89*  --    < > 1.23*   EGFR 48*  --   --   --   --   --   --   --   --   --   --   --   --   --   --   --   --    MG 2.32 2.80* 2.99* 1.95 2.34 2.38  --   --   --   --   --  2.37  --  2.17 2.41*  --  2.40    < > = values in this interval not displayed.     Recent Labs     11/20/23  1548 09/02/23  0730 09/01/23  0524 08/31/23  2221 08/31/23  1041 08/31/23  0430 08/31/23  0356 03/21/23 2005 03/21/23  0203 03/20/23  2235 03/20/23  1604 12/23/21  0454 12/20/21  0701 07/14/21  1234 07/13/21  0700   ALBUMIN 4.0 3.2* 4.1 3.9 4.1  --  3.8 3.8 4.3 CANCELED 3.9   < > 4.4   < > 4.0   ALT 15  --   --   --  10  --  9  --  21 CANCELED 12  --  10  --  10   AST 20  --   --   --  16  --  13  --  38 CANCELED 24  --  19  --  21   BILITOT 0.4  --   --   --  0.3  --  0.3  --  0.4 CANCELED 0.3  --  0.3  --  0.4   LIPASE 22  --   --   --   --  CANCELED 54   --  59 CANCELED  --   --  96*  --  75    < > = values in this interval not displayed.     CBC:  Recent Labs     11/20/23  1325 09/02/23  0730 09/01/23  0524 08/31/23  1040 08/31/23  0356 03/21/23  2005 03/21/23  0203 03/20/23  2235   WBC 12.1* 7.9 12.8* 9.4 11.8* 6.1 7.3 CANCELED   HGB 14.8 9.4* 11.4* 11.1* 11.2* 12.5 12.9 CANCELED   HCT 47.2* 31.4* 36.9 35.5* 34.5* 37.0 39.1 CANCELED    205 276 229 334 231 241 CANCELED   MCV 91 92 89 87 87 90 90 CANCELED     COAG:   Recent Labs     03/20/23  1604 12/20/21  0721 05/02/21  1255 05/18/20  0343 05/16/20  1400 05/14/20  2106 12/17/19  1223 12/06/19  1942   INR 1.0 1.0 1.0 1.1 1.1 1.1 1.1 1.2*     ABO:   Recent Labs     03/20/23  1604   ABO O     HEME/ENDO:  Recent Labs     08/31/23  1637 08/31/23  1548 05/21/20  2102 05/15/20  0338 12/17/19  1223   FERRITIN  --  37 277*   < >  --    IRONSAT  --  10* 16*  --   --    TSH  --  0.51  --   --  0.26*   HGBA1C 5.8* CANCELED  --    < >  --     < > = values in this interval not displayed.      CARDIAC:   Recent Labs     11/20/23  2252 11/20/23  1325 08/31/23  1603 08/31/23  1041 08/31/23  0501 08/31/23  0356 03/21/23  1950 03/21/23  1531 03/21/23  0514 03/21/23  0203 03/20/23  1520 03/20/23  1520 01/05/23  0854 12/20/21  0700 07/13/21  0700 05/02/21  1255 05/21/20  2102 12/06/19  1942 11/29/19  0937 11/22/19  0418 11/21/19  0513 11/20/19  0327 11/19/19  0411 11/18/19  0808 11/17/19  0436   LDH  --   --   --   --   --   --   --   --   --   --   --   --   --   --   --   --  293*  --  414* 481* 463* 539* 479* 528* 585*   TROPHS 362* 168* CANCELED 736* 335* 383* CANCELED 1,167*   < > 269*   < > CANCELED  --   --   --   --   --   --   --   --   --   --   --   --   --    BNP  --  238*  --   --   --  655*  --   --   --  414*  --  CANCELED 415* 280* 242* 227*  --    < >  --   --   --   --   --   --   --     < > = values in this interval not displayed.     Recent Labs     08/31/23  0844 05/19/20  1451   LACTATEART 1.6  --   "  SO2MV  --  63     No results for input(s): \"TACROLIMUS\", \"SIROLIMUS\", \"CYCLOSPORINE\" in the last [id number] hours.      ASSESSMENT AND PLAN:   Neuro:  # Hx Anxiety  #Sedated   #Hx of CVA   - c/w propofol and fentanyl gtt   - daily Spontaneous awake trials   - Serial neuro and pain assessments   - PT/OT Consult, for post extubation period  - CAM ICU score every shift  - Sleep/wake cycle normalization    # Physical Status  -weight: 67.1kg  -Age-related debility/bed confined? /Reduced Mobility     #Substance abuse  -current cigaret and marijuana user     Cardiovascular:  # Acute on chronic HFrEF, EF 15-20%  - TTE 3/21/23: Left ventricular systolic function is severely decreased with a 15-20% estimated ejection fraction.  Reduced RV systolic function.  - admit weight: 67kg   - admit BNP >5000  -Lactate: 1.3  -opening SGC #s: /74 (90), CVP 5, PAP 29/14(18), W 12, CO/CI 3.2/1.89 (f) 2.5/1.45(t), SVR 2125, SVO2 66% (note that Hgb is 14.3)  - Nipride initiated  - Daily standing weights, 2gm sodium diet, 2L fluid restriction, strict I&Os    #CAD   #NSTEMI s/p RIRI to Lcx (Jan 2016)  #Hx of Mitral Valve repair (June 2019) w/ Dr Montana  -29mm Epic Bioprosthetc valve   - No need for anticoagulation   -High sensitivity Trop: 168-->362-->533; continue to trend      Pulmonary:   #Acute Hypoxic respiratory Failure requiring emergent intubation likely 2/2 COPDvs CHF exacerbation  - Ventilator settings: AC, 18, 450, 50%,  8  - CXR with signs of hyperinflated lungs and focal lung consolidations   - Covid and Flu negative   - Respiratory Viral Panel- pending   - daily spontaneous breathing trials       GI:  # Hx of GERD  #?gastroenteritis-->multiple episodes of nausea/diarrhea prior to presentation  - Intubated for airway protection d/t N/V on Bipap   - stool pathogen PCR   - PRN Phenergan   - liquid docusate and PRN mirilax    - PPI ordered        :  #CKD IIIB  -Baseline BUN/Cr: 29-41/ 1.3-1.8  -Admit BUN/Cr:  " 21/1.58  -I/Os  -avoid hypotension and nephrotoxic agents    Heme:  No active issue   - H/H         Endo:  #DM  - Euglycemic  - hgbA1c-->5.5     #Thyroid  -TSH--> 1.45       ID:  #Leukocytosis   WBC--> 12.1 in ED   - B/l blood cultures sent   -Cefepime and vancomycin ordered  -WBC 6.3 on am draw  -afebrile, nontoxic, no s/s infx  -trend temps q4h        PHYSICAL AND OCCUPATIONAL THERAPY: ordered     LINES:  PIVs   Central/Portia- 11/21  [place]- 11/20 ordered    DVT: Lovenox   VAP BUNDLE: ordered  ULCER PPX: ordered  GLYCEMIC CONTROL:  BOWEL CARE: docusate +PRN mirilax   INDWELLING CATHETER: bentley  NUTRITION: Adult diet Regular      EMERGENCY CONTACT: Extended Emergency Contact Information  Primary Emergency Contact: Sarah Caruso  Home Phone: [phone]  Relation: Child  FAMILY UPDATE:  CODE STATUS: Full Code  DISPO:      Dr. Keene aware of Admit    _________________________________________________  LAWSON Szymanski-CNP

## 2023-11-21 NOTE — CONSULTS
Inpatient consult to Pulmonology  Consult performed by: Hesham Flores MD  Consult ordered by: Janice Lenz, LAWSON-CNP  Reason for consult: respiratory failure, mechanical ventilation      Reason For Consult  Respiratory failure, mechanical ventilation    History Of Present Illness  Melissa Marinelli is a 68 y.o. female presenting with past medical history significant for HFrEF (EF 15-20%), CAD s/p NSTEMI with RIRI to LCx (Jan 2016), s/p MVR (June 2019),  s/p ICD placement 5/2020, COPD (never formally diagnosed), HTN, HL, GERD, CVA, CKD IIIB and DMII. She initially presented to the [facility] with reports of nausea, vomiting and diarrhea. On arrival to the ED, due concern for dehydration, she was given a small amount of volume resuscitation. However, patient developed worsening N/V followed by dyspnea and tachypnea. NIPPV was briefly initiated however the patient continued to deteriorate and so underwent intubation and initiation of mechanical ventilation.  After intubation, patient was given furosemide IV as well as broad-spectrum antibiotics.    On arrival to the ICU, patient was intubated and sedated.  No significant secretions were reported during suction prior to ICU arrival and none were appreciated on initial evaluation.  She was noted to have some waveforms on the ventilator associated with mild obstruction but not have any air trapping, had very acceptable plateau pressures and lung compliance.  She was hemodynamically stable.     Past Medical History  She has a past medical history of Asthma, CAD (coronary artery disease), CHF (congestive heart failure) (CMS/[facility]), CKD (chronic kidney disease), COPD (chronic obstructive pulmonary disease) (CMS/[facility]), CVA (cerebral vascular accident) (CMS/[facility]ly [facility]), Diabetes mellitus (CMS/[facility]), GERD (gastroesophageal reflux disease), Hyperlipidemia, Hypertension, NSTEMI (non-ST elevated myocardial infarction) (CMS/[facility]ly [facility]), and Unspecified systolic (congestive) heart failure (CMS/[facility]ly [facility])  (08/11/2022).    Surgical History  She has a past surgical history that includes Other surgical history (08/11/2022); Other surgical history (08/11/2022); Other surgical history (08/11/2022); and Mitral valve replacement (06/2019).     Social History  She reports that she has been smoking cigarettes. She does not have any smokeless tobacco history on file. She reports current drug use. Drug: Marijuana. No history on file for alcohol use.    Family History  Family History   Problem Relation Name Age of Onset    Other (CVA) Brother          Allergies  Metoprolol, Ticagrelor, Ace inhibitors, Fentanyl, Gadolinium-containing contrast media, Hydralazine, Iodinated contrast media, Prednisone, Statins-hmg-coa reductase inhibitors, Egg, and Penicillins    Review of Systems   Unable to perform ROS: Intubated        Physical Exam  Vitals and nursing note reviewed.   Constitutional:       General: She is not in acute distress.     Appearance: Normal appearance. She is not toxic-appearing or diaphoretic.      Interventions: She is intubated.   HENT:      Head: Normocephalic and atraumatic.   Eyes:      General: No scleral icterus.     Pupils: Pupils are equal, round, and reactive to light.   Cardiovascular:      Rate and Rhythm: Rhythm irregularly irregular.      Heart sounds: No murmur heard.  Pulmonary:      Effort: She is intubated.      Breath sounds: Normal breath sounds and air entry.   Abdominal:      General: There is no distension.      Palpations: Abdomen is soft.   Musculoskeletal:      Right lower leg: No edema.      Left lower leg: No edema.   Skin:     General: Skin is cool and dry.          Last Recorded Vitals  Blood pressure 105/67, pulse 105, temperature 35.3 °C (95.5 °F), resp. rate 18, SpO2 98 %.    Relevant Results  CXR (11/20/2023)  IMPRESSION:  Interval extubation, with perceived increasing pulmonary vascular  congestion and edema superimposed upon more focal consolidation in the  right upper lobe.    CXR  (11/21/23)  IMPRESSION:  Interval improvement of the previously noted opacification of the  right upper lung when compared to prior radiograph. No new  consolidation, effusion, or pneumothorax.     Assessment/Plan     Melissa Marinelli is a 68 y.o. female presenting with past medical history significant for HFrEF (EF 15-20%), CAD s/p NSTEMI with RIRI to LCx (Jan 2016), s/p MVR (June 2019),  s/p ICD placement 5/2020, COPD (never formally diagnosed), HTN, HL, GERD, CVA, CKD IIIB and DMII presents to HFICU for respiratory failure. Pulmonary consulted for recommendations    #Acute hypoxemic respiratory failure  #Mechanical ventilation  #Acute decompensated heart failure    Etiology for acute respiratory decompensation includes: volume overload in setting of IVF and basline HF (BNP >5000), aspiration pneumonitis in setting of N/V. Presentation not consistent with respiratory failure primarily due to acute exacerbation of COPD even if she has obstructive lung disease (no PFTs found in system).    Recommendations:  -Agree with broad-spectrum antibiotics, would add azithromycin 500 mg daily for 3 days  -Infectious workup with tracheal aspirate, serum procalcitonin  -Lung-protective ventilation with target Vt 6-8 mL/kg IBW (330-440 mL), PEEP titration using high PEEP vs FiO2 ARDSNet protocol, titrate FiO2 to goal SpO2 ~92%  -Minimize sedation for RASS goal 0 to -2 with daily SATs  -Can treat with bronchodilators PRN, would not treat with systemic corticosteroids    MICU team is available with further questions/issues, please call [phone] to contact fellow on-call.    Discussed with MICU attending Dr. Goddard.

## 2023-11-21 NOTE — PROGRESS NOTES
Procedure  Critical Care    Performed by: Ramila Franks MD  Authorized by: Vineet Velasquez MD    Critical care provider statement:     Critical care time (minutes):  45    Critical care time was exclusive of:  Separately billable procedures and treating other patients    Critical care was necessary to treat or prevent imminent or life-threatening deterioration of the following conditions:  Respiratory failure    Critical care was time spent personally by me on the following activities:  Development of treatment plan with patient or surrogate, evaluation of patient's response to treatment, examination of patient, ordering and performing treatments and interventions, ordering and review of laboratory studies, ordering and review of radiographic studies, pulse oximetry, re-evaluation of patient's condition and vascular access procedures              Ramila Franks MD

## 2023-11-21 NOTE — NURSING NOTE
Central Line Insertion Practices/2nd Observer Note   Name:  Melissa Marinelli  Admission Date:  2023 12:14 PM  MRN: [id number]  Attending Provider: Brandin Keene MD  Room/Bed:               Sex: female  : 1955       Age: 68 y.o.    Pre-Procedure Checklist  Emergent Line Insertion: Yes  Type of Line to be Placed: Introducer  Consent Obtained: No  Emergency Medication Necessary: (P) No  Patient Identified with 2 Independent Identifiers: (P) Yes  Review of Allergies, Anticoagulation, Relevant Labs, ECG/Telemetry: (P) Yes  Risks/Benefits/Alternatives Discussed with Patient/POA/Legal Representative: (P) Yes  Stop Sign on Door: (P) Yes  Time Out Performed: (P) Yes  Catheter Exchange: (P) No  Positioning and Preparation Checklist  All People, Including Patient, in the Room with Cap and Mask: (P) Yes  Fluoroscopy Used to Identify Vessel and Guide Insertion; Sterile Cover Used: (P) No  Ultrasound Used to Identify Vessel and Guide Insertion; Sterile Cover Used: (P) Yes  Full Barrier Precautions Followed (Mask, Cap, Gown, Gloves): (P) Yes  Hands Washed: (P) Yes  Monitors Attached with Sound Alarms On: (P) Yes  Full Body Sterile Drape (Head-to-Toe) Used to Cover Patient: (P) Yes  [place] Position (For IJ and Subclavian): (P) Yes  CHG Skin Prep Used and Allowed to Air Dry Prior to Skin Procedure: (P) Yes  Procedure Checklist  Blood Aspirated From All Lumens, All Ports Subsequently Flushed: (P) Yes  Catheter Caps Placed On All Lumens; Lumens Clamped: (P) Yes  Maintain Guidewire Control Throughout, Ensuring Guidewire Removal: (P) Yes  Maintain Sterile Field Throughout Insertion: (P) Yes  Catheter Secured: (P) Yes  Confirmatory Test of Venous Placement: (P) Longitudinal ultrasound  Post-Procedure Checklist  Date and Time Written on Dressing: (P) Yes  Sharp and Wire Count and Safe Disposal of all Sharps/Wires: (P) Yes  Sterile Dressing Applied Per Protocol: (P) Yes  X-ray Ordered or ECG Image: (P)  Yes    Teresa Hartman RN  Date: 11/21/2023  Time: 2:29 AM      This provider perfromed this procedure   Janice Lenz, APRN-CNP

## 2023-11-21 NOTE — PROGRESS NOTES
Vancomycin Dosing by Pharmacy- INITIAL    Melissa Marinelli is a 68 y.o. year old female who Pharmacy has been consulted for vancomycin dosing for pneumonia. Based on the patient's indication and renal status this patient will be dosed based on a goal AUC of 400-600.     Scr currently elevated, yet patient baseline appears to be ~1.3-1.8 mg/dL.     Visit Vitals  BP 70/56   Pulse 94   Temp 36.2 °C (97.1 °F)   Resp 12        Lab Results   Component Value Date    CREATININE 1.58 (H) 11/21/2023    CREATININE 1.22 (H) 11/20/2023    CREATININE 1.61 (H) 09/02/2023    CREATININE 1.68 (H) 09/01/2023    CREATININE 1.58 (H) 08/31/2023    CREATININE 1.45 (H) 08/31/2023        Patient weight is 67.1 kg    I/O last 3 completed shifts:  In: 415.6 (6.2 mL/kg) [I.V.:115.6 (1.7 mL/kg); IV Piggyback:300]  Out: 275 (4.1 mL/kg) [Urine:125 (0.1 mL/kg/hr); Emesis/NG output:150]  Weight: 67.1 kg   [unfilled]    Lab Results   Component Value Date    PATIENTTEMP 37.0 11/21/2023    PATIENTTEMP 37.0 11/21/2023    PATIENTTEMP 37.0 11/20/2023          Assessment/Plan     As renal function appears to be at baseline, Will initiate vancomycin maintenance,  1000 mg every 24 hours. Will monitor renal function closely and switch to dosing per levels as needed.     This dosing regimen is predicted by [employer] to result in the following pharmacokinetic parameters:    Loading dose: N/A  Regimen: 1000 mg IV every 24 hours.  Start time: 17:00 on 11/21/2023  Exposure target: AUC24 (range)400-600 mg/L.hr   AUC24,ss: 562 mg/L.hr  Probability of AUC24 > 400: 84 %  Ctrough,ss: 18.2 mg/L  Probability of Ctrough,ss > 20: 40 %  Probability of nephrotoxicity (Lodise BOB 2009): 14 %    Follow-up level will be ordered on 11/22 at AM labs unless clinically indicated sooner.  Will continue to monitor renal function daily while on vancomycin and order serum creatinine at least every 48 hours if not already ordered.  Follow for continued vancomycin needs, clinical  response, and signs/symptoms of toxicity.       Sung Herndon, PharmD

## 2023-11-21 NOTE — PROGRESS NOTES
[facility]  HFICU PROGRESS NOTE    Melissa Marinelli/[id number]    Admit Date: 11/20/2023  Hospital Length of Stay: 1   ICU Length of Stay: 10h   Primary Service: HFICU  Primary HF Cardiologist: Dr. Rivera     INTERVAL EVENTS / PERTINENT ROS:   Admitted to HFICU O/N after presenting to the ED for AHRF requiring intubation. Upon arrival SGC was completed at bedside that was consistent with low output state and she was started on nipride. This morning she was following commands appropriately off of sedation, but failed her SBT 2/2 apnea despite multiple attempts.     Plan:  - Start dobutamine 2.5 mcg/kg/min   - C/w nipride gtt   - Trend hemodynamics q6  - ECHO ordered  - Follow infectious workup: MRSA, strep PNA, legionella, sputum cx, blood cx, respiratory viral panel   - C/w cefepime + vancomycin + azithromycin   - Continue off of sedation, utilize fentanyl pushes as needed     MEDICATIONS  Infusions:  DOBUTamine  fentaNYL, Last Rate: Stopped (11/21/23 0650)  nitroprusside      Scheduled:  azithromycin, 500 mg, q24h AWAIS  cefepime, 2 g, q12h  docusate sodium, 100 mg, Daily  enoxaparin, 40 mg, q24h  insulin lispro, 0-5 Units, q4h  pantoprazole, 40 mg, Daily before breakfast  [START ON 11/22/2023] vancomycin, 1,000 mg, q24h      PRN:  dextrose 10 % in water (D10W), 0.3 g/kg/hr, Once PRN  dextrose, 25 g, q15 min PRN  fentaNYL, 25 mcg, q1h PRN  glucagon, 1 mg, q15 min PRN  ipratropium-albuteroL, 3 mL, q6h PRN  oxygen, , Continuous PRN - O2/gases  polyethylene glycol, 17 g, Daily PRN  promethazine, 12.5 mg, q6h PRN      Invasive Hemodynamics:    Most Recent Range Past 24hrs   BP (Art)  96/59 No data recorded   MAP(Art)  73 No data recorded   RA/CVP   No data recorded   PA 22/18 PAP  Min: 13/10  Max: 33/22   PA(mean) 20 mmHg PAP (Mean)  Min: 12 mmHg  Max: 25 mmHg   PCWP 12 mmHg PCWP (mmHg)  Min: 12 mmHg  Max: 12 mmHg   CO 3 L/min CO (L/min)  Min: 3 L/min  Max: 3 L/min   CI 2 L/min/m2 CI (L/min/m2)  " Min: 2 L/min/m2  Max: 2 L/min/m2   Mixed Venous 66 % SVO2 (%)  Min: 66 %  Max: 66 %   SVR   2266 No data recorded   PVR   No data recorded       VENT:    Most Recent Range Past 24hrs   Mode  (Pt again trialed on cpap, apnea triggered 3x in 5 mins, pt unable to SBT)    FiO2 50 % FiO2 (%)  Min: 40 %   Min taken time: 11/21/23 0415  Max: 100 %   Max taken time: 11/20/23 2031   Rate 14 Resp Rate (Set)  Min: 14   Min taken time: 11/21/23 0918  Max: 18   Max taken time: 11/21/23 0415   Vt 450 mL  Vt (Set, mL)  Min: 450 mL   Min taken time: 11/21/23 0918  Max: 450 mL   Max taken time: 11/21/23 0918   PEEP 5 cm H20 PEEP/CPAP (cm H2O)  Min: 5 cm H20   Min taken time: 11/21/23 0918  Max: 8 cm H20   Max taken time: 11/21/23 0415     PHYSICAL EXAM:   Visit Vitals  BP 70/56   Pulse 94   Temp 36.2 °C (97.1 °F)   Resp 12   Ht 1.626 m (5' 4.02\")   Wt 67.1 kg (147 lb 14.9 oz)   SpO2 99%   BMI 25.38 kg/m²   Smoking Status Every Day   BSA 1.74 m²       Wt Readings from Last 5 Encounters:   11/21/23 67.1 kg (147 lb 14.9 oz)   06/22/23 67.1 kg (148 lb)   05/04/23 66 kg (145 lb 6.4 oz)   04/17/23 66.2 kg (146 lb)   12/30/22 65.8 kg (145 lb 0.2 oz)       INTAKE/OUTPUT:  I/O last 3 completed shifts:  In: 415.6 (6.2 mL/kg) [I.V.:115.6 (1.7 mL/kg); IV Piggyback:300]  Out: 275 (4.1 mL/kg) [Urine:125 (0.1 mL/kg/hr); Emesis/NG output:150]  Weight: 67.1 kg      Physical Exam  Constitutional:       General: She is awake.      Appearance: She is ill-appearing.      Interventions: She is intubated and restrained.      Comments: Bilateral soft wrist restraints    Eyes:      Extraocular Movements: Extraocular movements intact.   Neck:      Vascular: No hepatojugular reflux or JVD.      Comments: [facility] RIJ- CDI   Cardiovascular:      Rate and Rhythm: Normal rate.      Pulses: Normal pulses.      Heart sounds: Normal heart sounds.      Comments: Cool extremities   Pulmonary:      Effort: She is intubated.      Breath sounds: Decreased breath sounds and " "rales present. No wheezing or rhonchi.   Abdominal:      General: Abdomen is flat. Bowel sounds are normal.      Palpations: Abdomen is soft.      Tenderness: There is no abdominal tenderness.   Genitourinary:     Comments: Willis present   Musculoskeletal:      Right lower leg: No edema.      Left lower leg: No edema.   Neurological:      Mental Status: She is alert and oriented to person, place, and time.      Comments: Drowsy        DATA:  CMP:  Results from last 7 days   Lab Units 11/21/23  0315 11/20/23  1548   SODIUM mmol/L 144 140   POTASSIUM mmol/L 4.6 4.0   CHLORIDE mmol/L 110* 108*   CO2 mmol/L 22 22   ANION GAP mmol/L 17 14   BUN mg/dL 21 15   CREATININE mg/dL 1.58* 1.22*   EGFR mL/min/1.73m*2 35* 48*   MAGNESIUM mg/dL 2.25 2.32   ALBUMIN g/dL 3.6 4.0   ALT U/L 13 15   AST U/L 17 20   BILIRUBIN TOTAL mg/dL 0.6 0.4   LIPASE U/L  --  22     CBC:  Results from last 7 days   Lab Units 11/21/23  0811 11/21/23  0315 11/20/23  1325   WBC AUTO x10*3/uL 2.3* 6.3 12.1*   HEMOGLOBIN g/dL 13.5 14.3 14.8   HEMATOCRIT % 41.3 44.0 47.2*   PLATELETS AUTO x10*3/uL 177 222 239   MCV fL 90 89 91     COAG:   Results from last 7 days   Lab Units 11/21/23  0315   INR  1.1     ABO: No results found for: \"ABO\"  HEME/ENDO:  Results from last 7 days   Lab Units 11/21/23  0316 11/21/23  0315   TSH mIU/L 1.41  --    HEMOGLOBIN A1C %  --  5.5      CARDIAC:   Results from last 7 days   Lab Units 11/21/23  0811 11/21/23  0316 11/21/23  0315 11/20/23  2252 11/20/23  1325   TROPHS ng/L  --  533*  --  362* 168*   BNP pg/mL 3,373*  --  >5,000*  --  238*       XR chest 1 view    Result Date: 11/21/2023  Interpreted By:  Roly Conti and Liller Gregory STUDY: XR CHEST 1 VIEW;  11/21/2023 2:57 am   INDICATION: Signs/Symptoms:[place] placement.   COMPARISON: 11/20/2023   ACCESSION NUMBER(S): [id number]   ORDERING CLINICIAN: KARLEY MAN   FINDINGS: AP radiograph of the chest was provided.   Left chest wall implantable " pacemaker/defibrillator with leads projecting over the expected location of the right atrial appendage and right ventricle. Endotracheal tube terminates3.8 cm from the lena. Right internal jugular Reading-Alex catheter projects over the expected location of the distal right main pulmonary artery. Enteric tube courses past the diaphragm and terminates outside the field of view.   CARDIOMEDIASTINAL SILHOUETTE: Cardiomediastinal silhouette is normal in size and configuration. Aortic arch calcifications are seen.   LUNGS: Interval improvement in aeration of the right upper lung zone when compared to prior radiograph. There is persistent mild hazy opacity within the area. No new focal consolidation, pleural effusion, or pneumothorax.   ABDOMEN: No remarkable upper abdominal findings.   BONES: No acute osseous changes.       Interval improvement of the previously noted opacification of the right upper lung when compared to prior radiograph. No new consolidation, effusion, or pneumothorax.   I personally reviewed the images/study and I agree with the findings as stated above by resident physician, Dr. Alex Sterling. The study was interpreted at [facility].   Signed by: Roly Conti 11/21/2023 11:05 AM Dictation workstation:   [id number]    XR chest 1 view    Result Date: 11/20/2023  STUDY: Chest Radiograph;  11/20/2023 6:43PM INDICATION: ETT placement. COMPARISON: XR chest 11/20/2023 ACCESSION NUMBER(S): [id number] ORDERING CLINICIAN: BRITTANY TINSLEY TECHNIQUE:  Frontal chest was obtained at 18:40 hours. FINDINGS: Endotracheal tube is in good position, tip 4.5 cm above the lena. NG tube extends into the distal esophagus, tip near the GE junction. There is moderate consolidation/edema in the right upper lobe, with background mild vascular congestion. There is no pneumothorax.    Endotracheal tube in good position. Remainder as above. Signed by Regan Flynn MD    XR  abdomen 1 view    Result Date: 11/20/2023  STUDY: Abdomen Radiographs;  12/20/2023 6:43PM INDICATION: Enteric tube placement. COMPARISON: None available. ACCESSION NUMBER(S): [id number] ORDERING CLINICIAN: TECHNIQUE:  One view(s) of the abdomen. FINDINGS:  The tip of the nasogastric tube is in the area of the fundus of the stomach..  The bowel pattern in the included abdomen is unremarkable but there is a question of cholelithiasis in the right upper quadrant..  No focal osseous abnormalities.      The nasogastric tube extends to the area of the fundus of the stomach.. Signed by Reagan Ely MD      ASSESSMENT AND PLAN:   Melissa Marinelli is a 69 y/o F with PMHx of HFrEF (EF 15-20%), CAD s/p NSTEMI with RIRI to LCx (Jan 2016), s/p MVR (June 2019), s/p ICD placement 5/2020, COPD (never formally diagnosed), HTN, HLD, GERD, CVA, CKD 3B and DM2 who presented to the ED with a chief complaint of non-bloody, non-bilious emesis and diarrhea at home. In the ED she became hypoxic and tachypneic and was trialed on BiPAP and eventually intubated 2/2 inability to protect her airway (11/21). She was admitted to the HFICU for concern for CHF exacerbation. Upon arrival SGC was completed and initial numbers where /74 (90), CVP 5, PAP 29/14(18), W 12, CO/CI 3.2/1.89 (f) 2.5/1.45(t), SVR 2125, SVO2 66% (note that Hgb is 14.3). She was started on a nipride gtt + dobutamine 2.5 mcg/kg/min. Also started on broad spectrum abx given concern for aspiration PNA and infectious workup was sent.    Neuro:  #Sedated  - C/w precedex for RAAS goal 0 to -2 for comfort while on MV  - Fentanyl 25 mcg q1 hr PRN for pain   - Daily SAT    - Bilateral wrist restraints ordered 11/21   - Serial neuro and pain assessments   - PT/OT Consult  - CAM ICU score every shift  - Sleep/wake cycle normalization     #Substance abuse  Current cigarette and marijuana user   - Nicotine patch ordered PRN      Cardiovascular:  #Acute on Chronic HFrEF, EF 15-20% s/p  ICD 5/2020  Home medications: isordil 10 mg BID, ASA 81 mg daily, bumex 0.5 mg daily PRN, farxiga 10 mg daily,   TTE (3/21/23): [id number]%. Reduced RV systolic function.  Admit weight: 67kg   Admit BNP: 3373  Opening SGC #s (11/21): /74 (90), CVP 5, PAP 29/14(18), W 12, CO/CI 3.2/1.89 (f) 2.5/1.45(t), SVR 2125, SVO2 66% (note that Hgb is 14.3)  Daily SGC#s (11/21): BP 70/56 (63), CVP 1, PAP 22/18 (20), W 5, CO/CI 2.79/1.62, SVR 1605, SVO2 57% on nipride   Device last interrogated 10/23/2023   - C/w nipride gtt  - Start dobutamine 2.5 mcg/kg/min   - 500 ml LR bolus ordered   - TTE ordered  - Daily standing weights, 2gm sodium diet, 2L fluid restriction, strict I&Os     #CAD   #NSTEMI s/p RIRI to Lcx (Jan 2016)  #Hx of Mitral Valve repair (June 2019) w/ Dr Montana  29mm Epic Bioprosthetc valve   EKG (11/20) NSR, , no acute ST elevation   Lipid panel (9/1/23): cholesterol 227, HDL 56, , , VLDL 21  Troponin 168-->362-->533  - C/w ASA, not on a statin at home   - Trend troponin q6     Pulmonary:   #Acute Hypoxic Respiratory Failure, unclear etiology  #Concern for aspiration PNA   #Hx of COPD   ETT (11/20-*)  CXR (11/21) RUL opacification  - Follow up infectious workup: respiratory viral panel, sputum cx, procalcitonin, strep PNA, legionella, blood cultures, MRSA  - Daily SBT   - CT chest pending read  - Duonebs q6 PRN   - Pulmonary following, appreciate recommendations  - Continue broad spectrum abx: vancomycin + cefepime + azithromycin      GI:  #GERD  #N/V/D (resolved)  KUB (11/20) no acute process  - Stool pathogen PCR ordered   - Phenergan PRN   - Bowel regimen: liquid docusate and miralax PRN  - PPI   - CT abd/pelvis pending read   - Fluids as above      :  #PATSY on CKD IIIB, likely prerenal 2/2 hypovolemia  Baseline sCr ~1.3-1.4  sCr currently 1.58  - Give 500 ml LR bolus as above   - I/Os  - Avoid hypotension and nephrotoxic agents     Heme:  #No active issue   - CTM      Endo:  #DM2  Euglycemic, HgbA1c 5.5 (11/2023)  - Sliding scale ordered     #Thyroid  TSH (11/21) 1.41  - CTM      ID:  #Leukocytosis (resolved)  #Concern for aspiration PNA  WBC 12K-> 6K, afebrile, nontoxic  - See pulmonary plan as above  - Trend temps q4h  - Send MRSA swab  - De-escalate antibiotics as able      PHYSICAL AND OCCUPATIONAL THERAPY: ordered    LINES:  PIVs  Cordis + SGC (11/21-*)  Willis (11/20-*)    DVT: lovenox   VAP BUNDLE: ordered  ULCER PPX: PPI  GLYCEMIC CONTROL: Sliding scale ordered   BOWEL CARE: miralax PRN  INDWELLING CATHETER: present   NUTRITION: NPO Diet; Effective now      EMERGENCY CONTACT: Extended Emergency Contact Information  Primary Emergency Contact: ShadySarah  Home Phone: [phone]  Relation: Child  FAMILY UPDATE: Updated over the phone 11/21  CODE STATUS: Full Code  DISPO: Remain in HFICU    Patient seen and assessed with Dr. Keene     _________________________________________________  Andre Bryant PA-C

## 2023-11-21 NOTE — PROGRESS NOTES
HFICU Attending Note    68F f/b Dr. Rivera for Stage D HFrEF (last EF 15-20%) with prio MVR and PCI history of tobacco use, and recurrent admissions for respiratory failure. Presented to ER with nausea, vomiting diarrhea. BNP lower than prior, weight stable. CXR with no effusions and focal RUL opacity (?aspiration). She was intubated in ER. Declined by the MICU team.     INvasive hemodynamics with low filling pressures and inconsistent with HF exacerbation to explain etiology of hypoxic respiratory failure. Low filling pressures requiring IVF resuscitation. Respiratory mechanics seems not c/f for COPD exacerbation based on vent assessment. Suspect aspiration/pneumonic process given focal consolidation.     She is apneic preventing extubation this AM.   Volume resuscitate based on swan numbers.   Ideally wean precedex though may need inotropic support to overcome effect of sedation.     This critically ill patient continues to be at-risk for clinically significant deterioration / failure due to the above mentioned dysfunctional, unstable organ systems.  I have personally identified and managed all complex critical care issues to prevent aforementioned clinical deterioration.  Critical care time is spent at bedside and/or the immediate area and has included, but is not limited to, the review of diagnostic tests, labs, radiographs, serial assessments of hemodynamics, respiratory status, ventilatory management, and family updates.  Time spent in procedures and teaching are reported separately.    Critical care time: __45__ minutes     Objective    Melissa Marinelli/[id number]  Admit Date: 11/20/2023  Hospital Length of Stay: 1   ICU Length of Stay: 5h     MEDICATIONS  Infusions:  fentaNYL, Last Rate: 75 mcg/hr (11/21/23 0559)  nitroprusside  propofol, Last Rate: 15 mcg/kg/min (11/21/23 0559)      Scheduled:  cefepime, 2 g, q12h  docusate sodium, 100 mg, Daily  enoxaparin, 40 mg, q24h  pantoprazole, 40 mg, Daily before  breakfast      PRN:  oxygen, , Continuous PRN - O2/gases  polyethylene glycol, 17 g, Daily PRN  promethazine, 12.5 mg, q6h PRN        Prior to Admission Meds:  Medications Prior to Admission   Medication Sig Dispense Refill Last Dose    acetaminophen (Tylenol) 500 mg tablet Take 2 tablets (1,000 mg) by mouth every 8 hours if needed for mild pain (1 - 3).   Unknown    aspirin 81 mg chewable tablet Chew 1 tablet (81 mg) once daily.   Unknown    atorvastatin (Lipitor) 80 mg tablet Take 1 tablet (80 mg) by mouth once daily.   Unknown    bumetanide (Bumex) 0.5 mg tablet Take 1 tablet (0.5 mg) by mouth once daily.   Unknown    Symbicort 80-4.5 mcg/actuation inhaler Inhale 2 puffs twice a day.   Unknown    Farxiga 10 mg Take 1 tablet (10 mg) by mouth once daily.   Unknown    Flovent  mcg/actuation inhaler Inhale 1 puff 2 times a day.   Unknown    hydrALAZINE (Apresoline) 10 mg tablet Take 1 tablet (10 mg) by mouth 2 times a day.   Unknown    isosorbide dinitrate (Isordil) 10 mg tablet Take 1 tablet (10 mg) by mouth 2 times a day.   Unknown    multivitamin with minerals (multivit-min-iron fum-folic ac) tablet Take 1 tablet by mouth once daily.   Unknown    nicotine polacrilex (Nicorette) 2 mg lozenge Use 1 lozenge (2 mg) in the mouth or throat every 2 hours if needed for smoking cessation. 100 lozenge 3 Unknown       Invasive Hemodynamics:    Most Recent Range Past 24hrs   BP (Art)   No data recorded   MAP(Art)   No data recorded   RA/CVP   No data recorded   PA 22/15 PAP  Min: 18/11  Max: 33/22   PA(mean) 17 mmHg PAP (Mean)  Min: 13 mmHg  Max: 25 mmHg   PCWP   No data recorded   CO   No data recorded   CI   No data recorded   Mixed Venous   No data recorded   SVR    No data recorded   PVR   No data recorded       VENT:    Most Recent Range Past 24hrs   Mode Volume control/assist control    FiO2 40 % FiO2 (%)  Min: 40 %   Min taken time: 11/21/23 0415  Max: 100 %   Max taken time: 11/20/23 2031   Rate 18 Resp Rate  "(Set)  Min: 18   Min taken time: 11/21/23 0415  Max: 18   Max taken time: 11/21/23 0415   Vt 450 mL  Vt (Set, mL)  Min: 450 mL   Min taken time: 11/21/23 0415  Max: 450 mL   Max taken time: 11/21/23 0415   PEEP 8 cm H20 PEEP/CPAP (cm H2O)  Min: 8 cm H20   Min taken time: 11/21/23 0415  Max: 8 cm H20   Max taken time: 11/21/23 0415 11/21/2023     5:45 AM 11/21/2023     5:30 AM 11/21/2023     5:15 AM 11/21/2023     5:00 AM 11/21/2023     4:45 AM 11/21/2023     4:30 AM 11/21/2023     4:15 AM   Vitals   Systolic 87 102 104 106 102 101 101   Diastolic 64 62 62 66 57 57 60   Heart Rate 86 90 83 81 83 83 97   Resp 18 18 18 18 21 18 18   Height (in)    1.626 m (5' 4.02\")      Weight (lb)    147.93      BMI    25.38 kg/m2      BSA (m2)    1.74 m2        Visit Vitals  BP 87/64   Pulse 86   Temp 35.4 °C (95.7 °F) (Temporal)   Resp 18   Ht 1.626 m (5' 4.02\")   Wt 67.1 kg (147 lb 14.9 oz)   SpO2 100%   BMI 25.38 kg/m²   Smoking Status Every Day   BSA 1.74 m²     Wt Readings from Last 5 Encounters:   11/21/23 67.1 kg (147 lb 14.9 oz)   06/22/23 67.1 kg (148 lb)   05/04/23 66 kg (145 lb 6.4 oz)   04/17/23 66.2 kg (146 lb)   12/30/22 65.8 kg (145 lb 0.2 oz)     INTAKE/OUTPUT:  No intake/output data recorded.   CHEST: Unlabored, Other (Comment) (course)  CV:  Normal sinus rhythm  ABD:  Soft Soft Bowel sounds:  , Flatus: Yes  EXT:   RLE:  ,   DP:    PT: Moderate  LLE:  ,   DP:    PT: Moderate  NEURO:   RASS: Light sedation  CAM: Negative  LOC: Sedated  Cognition: Impulsive, Follows commands, Poor judgement, Poor safety awareness, Poor attention/concentration  GCS: 14    DATA:  CMP:  Recent Labs     11/21/23  0315 11/20/23  1548 09/02/23  0730 09/01/23  0524 08/31/23  2221 08/31/23  1041 08/31/23  0356 03/21/23  2005 03/21/23  0203 03/20/23  2235 12/20/21  0701 07/16/21  0500 07/15/21  0315 07/14/21  1234 07/13/21  1007    140 137 139 138 142 138 137 139 CANCELED   < > 139   < > 138  --    K 4.6 4.0 4.1 4.5 4.3 4.4 4.0 " 4.4 4.6 CANCELED   < > 3.9   < > 4.0  --    * 108* 103 100 99 104 102 102 100 CANCELED   < > 104   < > 103  --    CO2 22 22 24 25 25 29 24 24 21 CANCELED   < > 28   < > 26  --    ANIONGAP 17 14 14 19 18 13 16 15 23* CANCELED   < > 11   < > 13  --    BUN 21 15 34* 30* 28* 21 19 41* 32* CANCELED   < > 26*   < > 40*  --    CREATININE 1.58* 1.22* 1.61* 1.68* 1.58* 1.45* 1.34* 1.84* 1.84* CANCELED   < > 1.33*   < > 1.89*  --    EGFR 35* 48*  --   --   --   --   --   --   --   --   --   --   --   --   --    MG 2.25 2.32 2.80* 2.99* 1.95 2.34 2.38  --   --   --   --  2.37  --  2.17 2.41*    < > = values in this interval not displayed.     Recent Labs     11/21/23  0315 11/20/23  1548 09/02/23  0730 09/01/23  0524 08/31/23  2221 08/31/23  1041 08/31/23  0430 08/31/23  0356 03/21/23 2005 03/21/23  0203 03/20/23  2235 03/20/23  1604 12/23/21  0454 12/20/21  0701 07/14/21  1234 07/13/21  0700   ALBUMIN 3.6 4.0 3.2* 4.1 3.9 4.1  --  3.8 3.8 4.3 CANCELED 3.9   < > 4.4   < > 4.0   ALT 13 15  --   --   --  10  --  9  --  21 CANCELED 12  --  10  --  10   AST 17 20  --   --   --  16  --  13  --  38 CANCELED 24  --  19  --  21   BILITOT 0.6 0.4  --   --   --  0.3  --  0.3  --  0.4 CANCELED 0.3  --  0.3  --  0.4   LIPASE  --  22  --   --   --   --  CANCELED 54  --  59 CANCELED  --   --  96*  --  75    < > = values in this interval not displayed.     CBC:  Recent Labs     11/21/23  0315 11/20/23  1325 09/02/23  0730 09/01/23  0524 08/31/23  1040 08/31/23  0356 03/21/23 2005 03/21/23  0203   WBC 6.3 12.1* 7.9 12.8* 9.4 11.8* 6.1 7.3   HGB 14.3 14.8 9.4* 11.4* 11.1* 11.2* 12.5 12.9   HCT 44.0 47.2* 31.4* 36.9 35.5* 34.5* 37.0 39.1    239 205 276 229 334 231 241   MCV 89 91 92 89 87 87 90 90     COAG:   Recent Labs     11/21/23  0315 03/21/23  2104 03/20/23  1604 12/23/21  0547 12/22/21  1039 12/22/21  0631 12/22/21  0138 12/21/21  1737 12/20/21  0721 05/02/21  1255 05/18/20  0343 05/16/20  1400 05/15/20  1636  05/14/20  2106 12/17/19  1223 06/07/19  0225 06/06/19  1400 06/06/19  1224 06/06/19  0749 05/23/19  1154 02/28/19  0809   INR 1.1  --  1.0  --   --   --   --   --  1.0 1.0 1.1 1.1  --  1.1 1.1   < > 1.3* 1.5* 0.9   < > 1.0   HAUF  --  1.2*  --  1.0 0.6 0.4 0.3   < >  --   --   --   --    < >  --   --    < >  --   --   --   --   --    DDIMERVTE  --   --   --   --   --   --   --   --   --   --   --   --   --   --   --   --   --   --   --   --  642*   FIBRINOGEN  --   --   --   --   --   --   --   --   --   --   --   --   --   --   --   --  190* 165* 416*  --   --     < > = values in this interval not displayed.     ABO:   Recent Labs     03/20/23  1604   ABO O     HEME/ENDO:   Recent Labs     11/21/23 0316 11/21/23 0315 08/31/23  1637 08/31/23  1548 05/21/20  2102 05/18/20  1135 05/15/20  2022 05/15/20  0338 12/17/19  1223 11/19/19  2313 11/17/19  0436   FERRITIN  --   --   --  37 277*  --  384* 395*  --   --  634*   IRONSAT  --   --   --  10* 16*  --   --   --   --   --  13*   TSH 1.41  --   --  0.51  --   --   --   --  0.26* 0.82  --    HGBA1C  --  5.5 5.8* CANCELED  --  6.0  --   --   --   --   --      CARDIAC:   Recent Labs     11/21/23 0316 11/21/23 0315 11/20/23  2252 11/20/23  1325 08/31/23  1603 08/31/23  1041 08/31/23  0501 08/31/23  0356 03/21/23  1950 03/21/23  0514 03/21/23  0203 03/20/23  1520 03/20/23  1520 01/05/23  0854 12/20/21  0700 07/13/21  0700 05/02/21  1255 05/21/20  2102 12/06/19  1942 11/29/19  0937 11/22/19  0418 11/21/19  0513 11/20/19  0327 11/19/19  0411 11/18/19  0808 11/17/19  0436   LDH  --   --   --   --   --   --   --   --   --   --   --   --   --   --   --   --   --  293*  --  414* 481* 463* 539* 479* 528* 585*   TROPHS 533*  --  362* 168* CANCELED 736* 335* 383* CANCELED   < > 269*   < > CANCELED  --   --   --   --   --   --   --   --   --   --   --   --   --    BNP  --  >5,000*  --  238*  --   --   --  655*  --   --  414*  --  CANCELED 415* 280* 242*   < >  --    < >  --   --    "--   --   --   --   --     < > = values in this interval not displayed.     Recent Labs     11/21/23  0321 08/31/23  0844   LACTATEART  --  1.6   SO2MV 66  --      No results for input(s): \"TACROLIMUS\", \"SIROLIMUS\", \"CYCLOSPORINE\" in the last 73845 hours.  Recent Labs     09/01/23  0524 05/21/20  2102 11/11/19  0248 11/08/19  0356 11/07/19  1957   CHOL 227* 109  --   --  215*   LDLF 150* 51  --   --  148*   HDL 56.9 26.9*  --   --  46.5   TRIG 103 156* 131   < > 103    < > = values in this interval not displayed.     MICRO:   Recent Labs     08/31/23  1548 05/16/20  0202 05/15/20  0338 11/10/19  1649   CRP  --   --   --  18.03*   PROCAL 2.64* 3.86* 4.95*  --      Susceptibility data from last 90 days.  Collected Specimen Info Organism   09/01/23 Respiratory Staphylococcus aureus       LDA:  Introducer 11/21/23 Internal jugular Right (Active)   Placement Date/Time: 11/21/23 0200   Hand Hygiene Completed: Yes  Location: Internal jugular  Orientation: Right  Placed by: Janice  Placement Verified: X-ray   Number of days: 0       Pulmonary Artery Catheter Internal jugular (Active)   Placement Date/Time: 11/21/23 0200   Hand Hygiene Performed Prior to CVC Insertion: Yes  Site Prep: Chlorhexidine   Site Prep Agent has Completely Dried Before Insertion: Yes  All 5 Sterile Barriers Used (Gloves, Gown, Cap, Mask, Large Sterile Drape):...   Number of days: 0       ETT  7.5 mm (Active)   Placement Date/Time: 11/20/23 1822   Hand Hygiene Completed: Yes  Mask Ventilation: Vent by mask + OA or adjuvant +/- NMBA  Technique: Direct laryngoscopy;Rapid sequence  ETT Type: ETT - single  Single Lumen Tube Size: 7.5 mm  Location: Oral  Airway I...   Number of days: 0       Urethral Catheter 16 Fr. (Active)   Placement Date/Time: 11/20/23 1851   Tube Size (Fr.): 16 Fr.  Catheter Balloon Size: 10 mL   Number of days: 0       NG/OG/Feeding Tube OG - [place] sump 16 Fr Left mouth (Active)   Placement Date/Time: 11/20/23 1827   Type of Tube: " NG/OG Tube  Tube Length: 52 cm  Tube Type: OG - [place] sump  NG/OG Tube Size: 16 Fr  Tube Location: Left mouth   Number of days: 0     NUTRITION: NPO Diet; Effective now  EMERGENCY CONTACT: Extended Emergency Contact Information  Primary Emergency Contact: Sarah Caruso  Home Phone: [phone]  Relation: Child  CODE STATUS: Full Code  DISPO: Discharge Planning  Living Arrangements: Alone  Support Systems: Children  Assistance Needed: not at baseline  Type of Residence: Other (Comment) (currently unknown)  Home or Post Acute Services: None  Patient expects to be discharged to:: [facility]  FOLLOWUP:   Future Appointments   Date Time Provider Department Center   3/18/2024 10:00 AM Kathy Rivera MD PhD [id number]

## 2023-11-21 NOTE — PROGRESS NOTES
"Vancomycin Dosing by Pharmacy- INITIAL    Melissa Marinelli is a 68 y.o. year old female who Pharmacy has been consulted for vancomycin dosing for pneumonia. Based on the patient's indication and renal status this patient will be dosed based on a goal trough/random level of 15-20.     Renal function is currently declining.    Visit Vitals  /67   Pulse 97   Temp 35.4 °C (95.7 °F) (Temporal)   Resp 18        Lab Results   Component Value Date    CREATININE 1.58 (H) 11/21/2023    CREATININE 1.22 (H) 11/20/2023    CREATININE 1.61 (H) 09/02/2023    CREATININE 1.68 (H) 09/01/2023    CREATININE 1.58 (H) 08/31/2023    CREATININE 1.45 (H) 08/31/2023        Patient weight is No results found for: \"PTWEIGHT\"    No results found for: \"CULTURE\"     No intake/output data recorded.  [unfilled]    Lab Results   Component Value Date    PATIENTTEMP 37.0 11/21/2023    PATIENTTEMP 37.0 11/20/2023    PATIENTTEMP 37.0 11/20/2023          Assessment/Plan     Patient will not be given a loading dose.  Will initiate vancomycin maintenance, a one time dose of 1000 mg.  Follow-up level will be ordered on 11/22 at 0500 unless clinically indicated sooner.  Will continue to monitor renal function daily while on vancomycin and order serum creatinine at least every 48 hours if not already ordered.  Follow for continued vancomycin needs, clinical response, and signs/symptoms of toxicity.       Leti Walters, PharmD       "

## 2023-11-21 NOTE — ED PROCEDURE NOTE
Procedure  Intubation    Performed by: Kory Locke MD  Authorized by: Ramila Franks MD    Consent:     Consent obtained:  Verbal and emergent situation    Consent given by:  Patient    Risks, benefits, and alternatives were discussed: yes      Risks discussed:  Aspiration, hypoxia, death and bleeding    Alternatives discussed:  Alternative treatment  Universal protocol:     Procedure explained and questions answered to patient or proxy's satisfaction: yes      Relevant documents present and verified: yes      Test results available: yes      Imaging studies available: yes      Required blood products, implants, devices, and special equipment available: yes      Site/side marked: yes      Immediately prior to procedure, a time out was called: yes      Patient identity confirmed:  Hospital-assigned identification number and arm band  Pre-procedure details:     Indications: airway protection, altered consciousness and respiratory failure      Patient status:  Altered mental status (somnolent)    Look externally: no concerns      Mouth opening - incisor distance:  3 or more finger widths    Hyoid-mental distance: 3 or more finger widths      Hyoid-thyroid distance: 2 or more finger widths      Mallampati score:  II    Obstruction: edema      Neck mobility: normal      Pharmacologic strategy: RSI      Induction agents:  Etomidate    Paralytics:  Rocuronium  Procedure details:     Preoxygenation:  BiLevel    CPR in progress: no      Number of attempts:  2  Successful intubation attempt details:     Intubation method:  Oral    Intubation technique: video assisted      Laryngoscope blade:  Mac 3    Bougie used: no      Grade view: I      Tube size (mm):  7.5    Tube type:  Cuffed    Tube visualized through cords: yes    First unsuccessful intubation attempt details:     Intubation method:  Oral    Intubation technique:  Video assisted    Laryngoscope blade:  Mac 3    Bougie used: no      Grade view: I      Tube size (mm):   7.5    Ventilation between 1st and 2nd attempt: yes with mask      Tube visualized through cords: no    Placement assessment:     ETT at teeth/gumline (cm):  21    Tube secured with:  ETT main    Breath sounds:  Equal    Placement verification: chest rise, colorimetric ETCO2, CXR verification, direct visualization, equal breath sounds, numeric ETCO2, tube exhalation and waveform ETCO2      CXR findings:  Appropriate position  Post-procedure details:     Procedure completion:  Tolerated  Comments:      Initial attempt complicated by patient's movement and collapsible vocal cords secondary to failure of paralytic due to IV extravasation.  Additionally with copious secretions making visualization difficult.               Kory Locke MD  Resident  11/20/23 2329

## 2023-11-22 ENCOUNTER — APPOINTMENT (OUTPATIENT)
Dept: RADIOLOGY | Facility: HOSPITAL | Age: 68
DRG: 208 | End: 2023-11-22
Payer: COMMERCIAL

## 2023-11-22 LAB
ALBUMIN SERPL BCP-MCNC: 3 G/DL (ref 3.4–5)
ANION GAP SERPL CALC-SCNC: 11 MMOL/L (ref 10–20)
BASE EXCESS BLDMV CALC-SCNC: -1.1 MMOL/L (ref -2–3)
BASE EXCESS BLDMV CALC-SCNC: -1.3 MMOL/L (ref -2–3)
BASE EXCESS BLDMV CALC-SCNC: 0.1 MMOL/L (ref -2–3)
BODY TEMPERATURE: 37 DEGREES CELSIUS
BUN SERPL-MCNC: 25 MG/DL (ref 6–23)
CALCIUM SERPL-MCNC: 8.1 MG/DL (ref 8.6–10.6)
CARDIAC TROPONIN I PNL SERPL HS: 606 NG/L (ref 0–34)
CARDIAC TROPONIN I PNL SERPL HS: 631 NG/L (ref 0–34)
CARDIAC TROPONIN I PNL SERPL HS: 639 NG/L (ref 0–34)
CHLORIDE SERPL-SCNC: 109 MMOL/L (ref 98–107)
CO2 SERPL-SCNC: 24 MMOL/L (ref 21–32)
CREAT SERPL-MCNC: 1.4 MG/DL (ref 0.5–1.05)
ERYTHROCYTE [DISTWIDTH] IN BLOOD BY AUTOMATED COUNT: 18.4 % (ref 11.5–14.5)
ERYTHROCYTE [DISTWIDTH] IN BLOOD BY AUTOMATED COUNT: 18.4 % (ref 11.5–14.5)
FERRITIN SERPL-MCNC: 92 NG/ML (ref 8–150)
FOLATE SERPL-MCNC: 11.2 NG/ML
GFR SERPL CREATININE-BSD FRML MDRD: 41 ML/MIN/1.73M*2
GLUCOSE BLD MANUAL STRIP-MCNC: 113 MG/DL (ref 74–99)
GLUCOSE BLD MANUAL STRIP-MCNC: 123 MG/DL (ref 74–99)
GLUCOSE BLD MANUAL STRIP-MCNC: 69 MG/DL (ref 74–99)
GLUCOSE BLD MANUAL STRIP-MCNC: 79 MG/DL (ref 74–99)
GLUCOSE BLD MANUAL STRIP-MCNC: 80 MG/DL (ref 74–99)
GLUCOSE BLD MANUAL STRIP-MCNC: 81 MG/DL (ref 74–99)
GLUCOSE BLD MANUAL STRIP-MCNC: 81 MG/DL (ref 74–99)
GLUCOSE BLD MANUAL STRIP-MCNC: 96 MG/DL (ref 74–99)
GLUCOSE BLD MANUAL STRIP-MCNC: 96 MG/DL (ref 74–99)
GLUCOSE SERPL-MCNC: 92 MG/DL (ref 74–99)
HCO3 BLDMV-SCNC: 24.3 MMOL/L (ref 22–26)
HCO3 BLDMV-SCNC: 24.3 MMOL/L (ref 22–26)
HCO3 BLDMV-SCNC: 26 MMOL/L (ref 22–26)
HCT VFR BLD AUTO: 33.2 % (ref 36–46)
HCT VFR BLD AUTO: 33.4 % (ref 36–46)
HGB BLD-MCNC: 10.6 G/DL (ref 12–16)
HGB BLD-MCNC: 10.7 G/DL (ref 12–16)
INHALED O2 CONCENTRATION: 21 %
INHALED O2 CONCENTRATION: 30 %
INHALED O2 CONCENTRATION: 30 %
IRON SATN MFR SERPL: 11 % (ref 25–45)
IRON SERPL-MCNC: 25 UG/DL (ref 35–150)
LEGIONELLA AG UR QL: NEGATIVE
MAGNESIUM SERPL-MCNC: 2.08 MG/DL (ref 1.6–2.4)
MCH RBC QN AUTO: 29.1 PG (ref 26–34)
MCH RBC QN AUTO: 29.3 PG (ref 26–34)
MCHC RBC AUTO-ENTMCNC: 31.9 G/DL (ref 32–36)
MCHC RBC AUTO-ENTMCNC: 32 G/DL (ref 32–36)
MCV RBC AUTO: 91 FL (ref 80–100)
MCV RBC AUTO: 92 FL (ref 80–100)
NRBC BLD-RTO: 0 /100 WBCS (ref 0–0)
NRBC BLD-RTO: 0 /100 WBCS (ref 0–0)
OXYHGB MFR BLDMV: 64 % (ref 45–75)
OXYHGB MFR BLDMV: 74 % (ref 45–75)
OXYHGB MFR BLDMV: 77.9 % (ref 45–75)
PCO2 BLDMV: 42 MM HG (ref 41–51)
PCO2 BLDMV: 43 MM HG (ref 41–51)
PCO2 BLDMV: 46 MM HG (ref 41–51)
PH BLDMV: 7.36 PH (ref 7.33–7.43)
PH BLDMV: 7.36 PH (ref 7.33–7.43)
PH BLDMV: 7.37 PH (ref 7.33–7.43)
PHOSPHATE SERPL-MCNC: 2.9 MG/DL (ref 2.5–4.9)
PLATELET # BLD AUTO: 116 X10*3/UL (ref 150–450)
PLATELET # BLD AUTO: 117 X10*3/UL (ref 150–450)
PO2 BLDMV: 42 MM HG (ref 35–45)
PO2 BLDMV: 47 MM HG (ref 35–45)
PO2 BLDMV: 51 MM HG (ref 35–45)
POTASSIUM SERPL-SCNC: 4.2 MMOL/L (ref 3.5–5.3)
RBC # BLD AUTO: 3.64 X10*6/UL (ref 4–5.2)
RBC # BLD AUTO: 3.65 X10*6/UL (ref 4–5.2)
S PNEUM AG UR QL: NEGATIVE
SAO2 % BLDMV: 65 % (ref 45–75)
SAO2 % BLDMV: 76 % (ref 45–75)
SAO2 % BLDMV: 79 % (ref 45–75)
SODIUM SERPL-SCNC: 140 MMOL/L (ref 136–145)
STAPHYLOCOCCUS SPEC CULT: ABNORMAL
TIBC SERPL-MCNC: 231 UG/DL (ref 240–445)
UIBC SERPL-MCNC: 206 UG/DL (ref 110–370)
VANCOMYCIN TROUGH SERPL-MCNC: 13.9 UG/ML (ref 5–20)
VIT B12 SERPL-MCNC: 316 PG/ML (ref 211–911)
WBC # BLD AUTO: 6.6 X10*3/UL (ref 4.4–11.3)
WBC # BLD AUTO: 6.9 X10*3/UL (ref 4.4–11.3)

## 2023-11-22 PROCEDURE — 2500000004 HC RX 250 GENERAL PHARMACY W/ HCPCS (ALT 636 FOR OP/ED): Performed by: NURSE PRACTITIONER

## 2023-11-22 PROCEDURE — 2500000001 HC RX 250 WO HCPCS SELF ADMINISTERED DRUGS (ALT 637 FOR MEDICARE OP): Performed by: STUDENT IN AN ORGANIZED HEALTH CARE EDUCATION/TRAINING PROGRAM

## 2023-11-22 PROCEDURE — 71045 X-RAY EXAM CHEST 1 VIEW: CPT | Mod: FY

## 2023-11-22 PROCEDURE — 83735 ASSAY OF MAGNESIUM: CPT | Performed by: STUDENT IN AN ORGANIZED HEALTH CARE EDUCATION/TRAINING PROGRAM

## 2023-11-22 PROCEDURE — 97530 THERAPEUTIC ACTIVITIES: CPT | Mod: GP

## 2023-11-22 PROCEDURE — 82947 ASSAY GLUCOSE BLOOD QUANT: CPT

## 2023-11-22 PROCEDURE — 2500000004 HC RX 250 GENERAL PHARMACY W/ HCPCS (ALT 636 FOR OP/ED)

## 2023-11-22 PROCEDURE — 2020000001 HC ICU ROOM DAILY

## 2023-11-22 PROCEDURE — 71045 X-RAY EXAM CHEST 1 VIEW: CPT | Performed by: STUDENT IN AN ORGANIZED HEALTH CARE EDUCATION/TRAINING PROGRAM

## 2023-11-22 PROCEDURE — 85027 COMPLETE CBC AUTOMATED: CPT | Performed by: STUDENT IN AN ORGANIZED HEALTH CARE EDUCATION/TRAINING PROGRAM

## 2023-11-22 PROCEDURE — 84484 ASSAY OF TROPONIN QUANT: CPT | Performed by: STUDENT IN AN ORGANIZED HEALTH CARE EDUCATION/TRAINING PROGRAM

## 2023-11-22 PROCEDURE — 80069 RENAL FUNCTION PANEL: CPT | Performed by: STUDENT IN AN ORGANIZED HEALTH CARE EDUCATION/TRAINING PROGRAM

## 2023-11-22 PROCEDURE — 82805 BLOOD GASES W/O2 SATURATION: CPT | Performed by: STUDENT IN AN ORGANIZED HEALTH CARE EDUCATION/TRAINING PROGRAM

## 2023-11-22 PROCEDURE — 2500000004 HC RX 250 GENERAL PHARMACY W/ HCPCS (ALT 636 FOR OP/ED): Performed by: STUDENT IN AN ORGANIZED HEALTH CARE EDUCATION/TRAINING PROGRAM

## 2023-11-22 PROCEDURE — 94003 VENT MGMT INPAT SUBQ DAY: CPT

## 2023-11-22 PROCEDURE — 99291 CRITICAL CARE FIRST HOUR: CPT | Performed by: STUDENT IN AN ORGANIZED HEALTH CARE EDUCATION/TRAINING PROGRAM

## 2023-11-22 PROCEDURE — 82728 ASSAY OF FERRITIN: CPT | Performed by: STUDENT IN AN ORGANIZED HEALTH CARE EDUCATION/TRAINING PROGRAM

## 2023-11-22 PROCEDURE — 97166 OT EVAL MOD COMPLEX 45 MIN: CPT | Mod: GO

## 2023-11-22 PROCEDURE — 97162 PT EVAL MOD COMPLEX 30 MIN: CPT | Mod: GP

## 2023-11-22 PROCEDURE — 83540 ASSAY OF IRON: CPT | Performed by: STUDENT IN AN ORGANIZED HEALTH CARE EDUCATION/TRAINING PROGRAM

## 2023-11-22 PROCEDURE — 2500000004 HC RX 250 GENERAL PHARMACY W/ HCPCS (ALT 636 FOR OP/ED): Performed by: INTERNAL MEDICINE

## 2023-11-22 PROCEDURE — 96372 THER/PROPH/DIAG INJ SC/IM: CPT | Performed by: NURSE PRACTITIONER

## 2023-11-22 PROCEDURE — 37799 UNLISTED PX VASCULAR SURGERY: CPT | Performed by: STUDENT IN AN ORGANIZED HEALTH CARE EDUCATION/TRAINING PROGRAM

## 2023-11-22 PROCEDURE — 80202 ASSAY OF VANCOMYCIN: CPT

## 2023-11-22 RX ORDER — ONDANSETRON HYDROCHLORIDE 2 MG/ML
4 INJECTION, SOLUTION INTRAVENOUS EVERY 8 HOURS PRN
Status: DISCONTINUED | OUTPATIENT
Start: 2023-11-22 | End: 2023-11-26 | Stop reason: HOSPADM

## 2023-11-22 RX ORDER — INSULIN LISPRO 100 [IU]/ML
0-5 INJECTION, SOLUTION INTRAVENOUS; SUBCUTANEOUS
Status: DISCONTINUED | OUTPATIENT
Start: 2023-11-23 | End: 2023-11-26 | Stop reason: HOSPADM

## 2023-11-22 RX ORDER — ONDANSETRON HYDROCHLORIDE 2 MG/ML
INJECTION, SOLUTION INTRAVENOUS
Status: DISPENSED
Start: 2023-11-22 | End: 2023-11-23

## 2023-11-22 RX ORDER — ISOSORBIDE DINITRATE 10 MG/1
10 TABLET ORAL
Status: DISCONTINUED | OUTPATIENT
Start: 2023-11-22 | End: 2023-11-25

## 2023-11-22 RX ORDER — FUROSEMIDE 10 MG/ML
40 INJECTION INTRAMUSCULAR; INTRAVENOUS ONCE
Status: COMPLETED | OUTPATIENT
Start: 2023-11-22 | End: 2023-11-22

## 2023-11-22 RX ORDER — AMOXICILLIN 250 MG
1 CAPSULE ORAL 2 TIMES DAILY
Status: DISCONTINUED | OUTPATIENT
Start: 2023-11-22 | End: 2023-11-26 | Stop reason: HOSPADM

## 2023-11-22 RX ORDER — VANCOMYCIN HYDROCHLORIDE 750 MG/150ML
750 INJECTION, SOLUTION INTRAVENOUS EVERY 24 HOURS
Status: DISCONTINUED | OUTPATIENT
Start: 2023-11-23 | End: 2023-11-22

## 2023-11-22 RX ORDER — DAPAGLIFLOZIN 10 MG/1
10 TABLET, FILM COATED ORAL DAILY
Status: DISCONTINUED | OUTPATIENT
Start: 2023-11-22 | End: 2023-11-26 | Stop reason: HOSPADM

## 2023-11-22 RX ORDER — AMOXICILLIN 250 MG
1 CAPSULE ORAL DAILY
Status: DISCONTINUED | OUTPATIENT
Start: 2023-11-22 | End: 2023-11-22

## 2023-11-22 RX ADMIN — FUROSEMIDE 40 MG: 10 INJECTION, SOLUTION INTRAMUSCULAR; INTRAVENOUS at 13:48

## 2023-11-22 RX ADMIN — CEFEPIME 2 G: 1 INJECTION, SOLUTION INTRAVENOUS at 12:50

## 2023-11-22 RX ADMIN — VANCOMYCIN HYDROCHLORIDE 1000 MG: 1 INJECTION, SOLUTION INTRAVENOUS at 05:31

## 2023-11-22 RX ADMIN — CEFEPIME 2 G: 1 INJECTION, SOLUTION INTRAVENOUS at 23:23

## 2023-11-22 RX ADMIN — SENNOSIDES AND DOCUSATE SODIUM 1 TABLET: 8.6; 5 TABLET ORAL at 20:05

## 2023-11-22 RX ADMIN — ISOSORBIDE DINITRATE 10 MG: 10 TABLET ORAL at 08:55

## 2023-11-22 RX ADMIN — AZITHROMYCIN DIHYDRATE 500 MG: 500 TABLET, FILM COATED ORAL at 09:04

## 2023-11-22 RX ADMIN — ENOXAPARIN SODIUM 40 MG: 100 INJECTION SUBCUTANEOUS at 08:55

## 2023-11-22 RX ADMIN — SODIUM CHLORIDE, SODIUM LACTATE, POTASSIUM CHLORIDE, AND CALCIUM CHLORIDE 500 ML: 600; 310; 30; 20 INJECTION, SOLUTION INTRAVENOUS at 04:00

## 2023-11-22 RX ADMIN — DAPAGLIFLOZIN 10 MG: 10 TABLET, FILM COATED ORAL at 15:11

## 2023-11-22 RX ADMIN — ISOSORBIDE DINITRATE 10 MG: 10 TABLET ORAL at 13:48

## 2023-11-22 ASSESSMENT — PAIN - FUNCTIONAL ASSESSMENT
PAIN_FUNCTIONAL_ASSESSMENT: 0-10

## 2023-11-22 ASSESSMENT — PAIN SCALES - GENERAL
PAINLEVEL_OUTOF10: 0 - NO PAIN

## 2023-11-22 ASSESSMENT — COGNITIVE AND FUNCTIONAL STATUS - GENERAL
CLIMB 3 TO 5 STEPS WITH RAILING: A LOT
MOBILITY SCORE: 16
WALKING IN HOSPITAL ROOM: A LOT
DRESSING REGULAR LOWER BODY CLOTHING: A LITTLE
HELP NEEDED FOR BATHING: A LITTLE
MOVING FROM LYING ON BACK TO SITTING ON SIDE OF FLAT BED WITH BEDRAILS: A LITTLE
MOVING TO AND FROM BED TO CHAIR: A LITTLE
DRESSING REGULAR UPPER BODY CLOTHING: A LITTLE
TOILETING: A LITTLE
STANDING UP FROM CHAIR USING ARMS: A LITTLE
DAILY ACTIVITIY SCORE: 20
TURNING FROM BACK TO SIDE WHILE IN FLAT BAD: A LITTLE

## 2023-11-22 ASSESSMENT — ACTIVITIES OF DAILY LIVING (ADL): BATHING_ASSISTANCE: MINIMAL

## 2023-11-22 NOTE — NURSING NOTE
FANNIE RIOS    HEMODYNAMICS 1200    PAP - 50/30 (38)  PAWP - 17  CVP - 13  SVR - 1508  SVRI - 2594  CO - 4.4  CI - 2.6  SVO2 - 65%    DRIPS - NONE

## 2023-11-22 NOTE — CONSULTS
Vancomycin Dosing by Pharmacy- Cessation of Therapy    Consult to pharmacy for vancomycin dosing has been discontinued by the prescriber, pharmacy will sign off at this time.    Please call pharmacy if there are further questions or re-enter a consult if vancomycin is resumed.     Monica Sna, PharmD

## 2023-11-22 NOTE — PROGRESS NOTES
HFICU Attending Note    68F f/b Dr. Rivera for Stage D HFrEF (last EF 15-20%) with prio MVR and PCI history of tobacco use, and recurrent admissions for respiratory failure. Presented to ER with nausea, vomiting diarrhea. BNP lower than prior, weight stable. CXR with no effusions and focal RUL opacity (?aspiration). She was intubated in ER. Declined by the MICU team.     INvasive hemodynamics with low filling pressures and inconsistent with HF exacerbation to explain etiology of hypoxic respiratory failure. Low filling pressures requiring IVF resuscitation. Respiratory mechanics seems not c/f for COPD exacerbation based on vent assessment. Suspect aspiration/pneumonic process given focal consolidation.     Successfully extubated overnight.   CI's much improved with removal of ETT with accompanying sedation.     This critically ill patient continues to be at-risk for clinically significant deterioration / failure due to the above mentioned dysfunctional, unstable organ systems.  I have personally identified and managed all complex critical care issues to prevent aforementioned clinical deterioration.  Critical care time is spent at bedside and/or the immediate area and has included, but is not limited to, the review of diagnostic tests, labs, radiographs, serial assessments of hemodynamics, respiratory status, ventilatory management, and family updates.  Time spent in procedures and teaching are reported separately.    Critical care time: __45__ minutes     Objective    Melissa Marinelli/[id number]  Admit Date: 11/20/2023  Hospital Length of Stay: 2   ICU Length of Stay: 1d 6h     MEDICATIONS  Infusions:       Scheduled:  azithromycin, 500 mg, q24h AWAIS  cefepime, 2 g, q12h  docusate sodium, 100 mg, Daily  enoxaparin, 40 mg, q24h  insulin lispro, 0-5 Units, q4h  isosorbide dinitrate, 10 mg, BID  nicotine, 1 patch, Daily   Followed by  [START ON 1/2/2024] nicotine, 1 patch, Daily  pantoprazole, 40 mg, Daily before  breakfast  perflutren protein A microsphere, 0.5 mL, Once in imaging      PRN:  dextrose 10 % in water (D10W), 0.3 g/kg/hr, Once PRN  dextrose, 25 g, q15 min PRN  glucagon, 1 mg, q15 min PRN  ipratropium-albuteroL, 3 mL, q6h PRN  oxygen, , Continuous PRN - O2/gases  oxygen, , Continuous PRN - O2/gases  polyethylene glycol, 17 g, Daily PRN  promethazine, 12.5 mg, q6h PRN        Prior to Admission Meds:  Medications Prior to Admission   Medication Sig Dispense Refill Last Dose    acetaminophen (Tylenol) 500 mg tablet Take 2 tablets (1,000 mg) by mouth every 8 hours if needed for mild pain (1 - 3).   Unknown    aspirin 81 mg chewable tablet Chew 1 tablet (81 mg) once daily.   Unknown    atorvastatin (Lipitor) 80 mg tablet Take 1 tablet (80 mg) by mouth once daily.   Unknown    bumetanide (Bumex) 0.5 mg tablet Take 1 tablet (0.5 mg) by mouth once daily.   Unknown    Symbicort 80-4.5 mcg/actuation inhaler Inhale 2 puffs twice a day.   Unknown    Farxiga 10 mg Take 1 tablet (10 mg) by mouth once daily.   Unknown    Flovent  mcg/actuation inhaler Inhale 1 puff 2 times a day.   Unknown    hydrALAZINE (Apresoline) 10 mg tablet Take 1 tablet (10 mg) by mouth 2 times a day.   Unknown    isosorbide dinitrate (Isordil) 10 mg tablet Take 1 tablet (10 mg) by mouth 2 times a day.   Unknown    multivitamin with minerals (multivit-min-iron fum-folic ac) tablet Take 1 tablet by mouth once daily.   Unknown    nicotine polacrilex (Nicorette) 2 mg lozenge Use 1 lozenge (2 mg) in the mouth or throat every 2 hours if needed for smoking cessation. 100 lozenge 3 Unknown       Invasive Hemodynamics:    Most Recent Range Past 24hrs   BP (Art)   No data recorded   MAP(Art)   No data recorded   RA/CVP   No data recorded   PA 39/17 PAP  Min: 13/10  Max: 47/29   PA(mean) 24 mmHg PAP (Mean)  Min: 12 mmHg  Max: 36 mmHg   PCWP 15 mmHg PCWP (mmHg)  Min: 5 mmHg  Max: 15 mmHg   CO 4.88 L/min CO (L/min)  Min: 2.79 L/min  Max: 5.6 L/min   CI 2.84  "L/min/m2 CI (L/min/m2)  Min: 1.62 L/min/m2  Max: 3.2 L/min/m2   Mixed Venous 57 % SVO2 (%)  Min: 57 %  Max: 57 %   SVR  1016 (dyne*sec)/cm5 SVR (dyne*sec)/cm5  Min: 1016 (dyne*sec)/cm5  Max: 1605 (dyne*sec)/cm5    (dyne*sec)/cm5 PVR (dyne*sec)/cm5  Min: 115 (dyne*sec)/cm5  Max: 344 (dyne*sec)/cm5       VENT:    Most Recent Range Past 24hrs   Mode CPAP    FiO2 30 % FiO2 (%)  Min: 30 %   Min taken time: 11/21/23 2350  Max: 50 %   Max taken time: 11/21/23 0819   Rate 14 Resp Rate (Set)  Min: 14   Min taken time: 11/21/23 1552  Max: 14   Max taken time: 11/21/23 1552   Vt 450 mL  Vt (Set, mL)  Min: 450 mL   Min taken time: 11/21/23 1552  Max: 450 mL   Max taken time: 11/21/23 1552   PEEP 5 cm H20 PEEP/CPAP (cm H2O)  Min: 5 cm H20   Min taken time: 11/1954  Max: 5 cm H20   Max taken time: 11/1954 11/22/2023     6:00 AM 11/22/2023     5:00 AM 11/22/2023     4:00 AM 11/22/2023     3:00 AM 11/22/2023     2:00 AM 11/22/2023     1:00 AM 11/22/2023    12:41 AM   Vitals   Systolic 109 109 117 111 88 93 109   Diastolic 59 59 76 63 51 52 57   Heart Rate 90 93 103 88 76 90 104   Temp    36.6 °C (97.9 °F)      Resp 22 19 19 24 15 18 19     Visit Vitals  /59   Pulse 90   Temp 36.6 °C (97.9 °F) (Temporal)   Resp 22   Ht 1.626 m (5' 4.02\")   Wt 67.1 kg (147 lb 14.9 oz)   SpO2 100%   BMI 25.38 kg/m²   Smoking Status Every Day   BSA 1.74 m²     Wt Readings from Last 5 Encounters:   11/21/23 67.1 kg (147 lb 14.9 oz)   06/22/23 67.1 kg (148 lb)   05/04/23 66 kg (145 lb 6.4 oz)   04/17/23 66.2 kg (146 lb)   12/30/22 65.8 kg (145 lb 0.2 oz)     INTAKE/OUTPUT:  I/O last 3 completed shifts:  In: 1942 (28.9 mL/kg) [P.O.:50; I.V.:192 (2.9 mL/kg); IV Piggyback:1700]  Out: 1575 (23.5 mL/kg) [Urine:1425 (0.6 mL/kg/hr); Emesis/NG output:150]  Weight: 67.1 kg    CHEST: Unlabored, Diminished  CV:  Normal sinus rhythm  ABD:  Nondistended Soft Bowel sounds: All quadrants, Flatus: Yes  EXT:   RLE:  ,   DP:    PT: " Moderate  LLE:  ,   DP:    PT: Moderate  NEURO:   RASS: Alert and calm  CAM: Negative  LOC: Alert  Cognition: Appropriate judgement, Appropriate attention/concentration, Follows commands  GCS: 15    DATA:  CMP:  Recent Labs     11/22/23 0525 11/21/23 0315 11/20/23  1548 09/02/23  0730 09/01/23  0524 08/31/23 2221 08/31/23  1041 08/31/23  0356 03/21/23 2005 03/21/23 0203 12/20/21  0701 07/16/21  0500 07/15/21  0315 07/14/21  1234    144 140 137 139 138 142 138 137 139   < > 139   < > 138   K 4.2 4.6 4.0 4.1 4.5 4.3 4.4 4.0 4.4 4.6   < > 3.9   < > 4.0   * 110* 108* 103 100 99 104 102 102 100   < > 104   < > 103   CO2 24 22 22 24 25 25 29 24 24 21   < > 28   < > 26   ANIONGAP 11 17 14 14 19 18 13 16 15 23*   < > 11   < > 13   BUN 25* 21 15 34* 30* 28* 21 19 41* 32*   < > 26*   < > 40*   CREATININE 1.40* 1.58* 1.22* 1.61* 1.68* 1.58* 1.45* 1.34* 1.84* 1.84*   < > 1.33*   < > 1.89*   EGFR 41* 35* 48*  --   --   --   --   --   --   --   --   --   --   --    MG 2.08 2.25 2.32 2.80* 2.99* 1.95 2.34 2.38  --   --   --  2.37  --  2.17    < > = values in this interval not displayed.       Recent Labs     11/22/23 0525 11/21/23 0315 11/20/23  1548 09/02/23 0730 09/01/23  0524 08/31/23 2221 08/31/23  1041 08/31/23  0430 08/31/23  0356 03/21/23 2005 03/21/23 0203 03/20/23 2235 03/20/23  1604 12/23/21  0454 12/20/21  0701 07/14/21  1234 07/13/21  0700   ALBUMIN 3.0* 3.6 4.0 3.2* 4.1 3.9 4.1  --  3.8   < > 4.3 CANCELED 3.9   < > 4.4   < > 4.0   ALT  --  13 15  --   --   --  10  --  9  --  21 CANCELED 12  --  10  --  10   AST  --  17 20  --   --   --  16  --  13  --  38 CANCELED 24  --  19  --  21   BILITOT  --  0.6 0.4  --   --   --  0.3  --  0.3  --  0.4 CANCELED 0.3  --  0.3  --  0.4   LIPASE  --   --  22  --   --   --   --  CANCELED 54  --  59 CANCELED  --   --  96*  --  75    < > = values in this interval not displayed.       CBC:  Recent Labs     11/22/23  0525 11/21/23  0811 11/21/23  0315  11/20/23  1325 09/02/23  0730 09/01/23  0524 08/31/23  1040 08/31/23  0356   WBC 6.9 2.3* 6.3 12.1* 7.9 12.8* 9.4 11.8*   HGB 10.6* 13.5 14.3 14.8 9.4* 11.4* 11.1* 11.2*   HCT 33.2* 41.3 44.0 47.2* 31.4* 36.9 35.5* 34.5*   * 177 222 239 205 276 229 334   MCV 91 90 89 91 92 89 87 87       COAG:   Recent Labs     11/21/23  0315 03/21/23  2104 03/20/23  1604 12/23/21  0547 12/22/21  1039 12/22/21  0631 12/22/21  0138 12/21/21  1737 12/20/21  0721 05/02/21  1255 05/18/20  0343 05/16/20  1400 05/15/20  1636 05/14/20  2106 12/17/19  1223 06/07/19  0225 06/06/19  1400 06/06/19  1224 06/06/19  0749 05/23/19  1154 02/28/19  0809   INR 1.1  --  1.0  --   --   --   --   --  1.0 1.0 1.1 1.1  --  1.1 1.1   < > 1.3* 1.5* 0.9   < > 1.0   HAUF  --  1.2*  --  1.0 0.6 0.4 0.3   < >  --   --   --   --    < >  --   --    < >  --   --   --   --   --    DDIMERVTE  --   --   --   --   --   --   --   --   --   --   --   --   --   --   --   --   --   --   --   --  642*   FIBRINOGEN  --   --   --   --   --   --   --   --   --   --   --   --   --   --   --   --  190* 165* 416*  --   --     < > = values in this interval not displayed.       ABO:   Recent Labs     03/20/23  1604   ABO O       HEME/ENDO:   Recent Labs     11/21/23  0316 11/21/23  0315 08/31/23  1637 08/31/23  1548 05/21/20  2102 05/18/20  1135 05/15/20  2022 05/15/20  0338 12/17/19  1223 11/19/19  2313 11/17/19  0436   FERRITIN  --   --   --  37 277*  --  384* 395*  --   --  634*   IRONSAT  --   --   --  10* 16*  --   --   --   --   --  13*   TSH 1.41  --   --  0.51  --   --   --   --  0.26* 0.82  --    HGBA1C  --  5.5 5.8* CANCELED  --  6.0  --   --   --   --   --        CARDIAC:   Recent Labs     11/22/23  0525 11/22/23  0116 11/21/23  2358 11/21/23  1619 11/21/23  1331 11/21/23  0811 11/21/23  0316 11/21/23  0315 11/20/23  2252 11/20/23  1325 08/31/23  0501 08/31/23  0356 03/21/23  0514 03/21/23  0203 03/20/23  1520 03/20/23  1520 01/05/23  0854 12/20/21  0700  "05/02/21  1255 05/21/20  2102 12/06/19  1942 11/29/19  0937 11/22/19  0418 11/21/19  0513 11/20/19  0327 11/19/19  0411 11/18/19  0808 11/17/19  0436   LDH  --   --   --   --   --   --   --   --   --   --   --   --   --   --   --   --   --   --   --  293*  --  414* 481* 463* 539* 479* 528* 585*   TROPHS 606* 639* 631* 657* 636*  --  533*  --  362* 168*   < > 383*   < > 269*   < > CANCELED  --   --   --   --   --   --   --   --   --   --   --   --    BNP  --   --   --   --   --  3,373*  --  >5,000*  --  238*  --  655*  --  414*  --  CANCELED 415* 280*   < >  --    < >  --   --   --   --   --   --   --     < > = values in this interval not displayed.       Recent Labs     11/22/23  0525 11/21/23 0321 08/31/23  0844   LACTATEART  --   --  1.6   SO2MV 76*   < >  --     < > = values in this interval not displayed.       No results for input(s): \"TACROLIMUS\", \"SIROLIMUS\", \"CYCLOSPORINE\" in the last 16537 hours.  Recent Labs     09/01/23  0524 05/21/20 2102 11/11/19  0248 11/08/19  0356 11/07/19  1957   CHOL 227* 109  --   --  215*   LDLF 150* 51  --   --  148*   HDL 56.9 26.9*  --   --  46.5   TRIG 103 156* 131   < > 103    < > = values in this interval not displayed.       MICRO:   Recent Labs     11/21/23  0811 08/31/23  1548 05/16/20  0202 05/15/20  0338 11/10/19  1649   CRP  --   --   --   --  18.03*   PROCAL 3.44* 2.64* 3.86*   < >  --     < > = values in this interval not displayed.       Susceptibility data from last 90 days.  Collected Specimen Info Organism   09/01/23 Respiratory Staphylococcus aureus         LDA:  Introducer 11/21/23 Internal jugular Right (Active)   Placement Date/Time: 11/21/23 0200   Hand Hygiene Completed: Yes  Location: Internal jugular  Orientation: Right  Placed by: Janice  Placement Verified: X-ray   Number of days: 1       Pulmonary Artery Catheter Internal jugular (Active)   Placement Date/Time: 11/21/23 0200   Hand Hygiene Performed Prior to CVC Insertion: Yes  Site Prep: " Chlorhexidine   Site Prep Agent has Completely Dried Before Insertion: Yes  All 5 Sterile Barriers Used (Gloves, Gown, Cap, Mask, Large Sterile Drape):...   Number of days: 1       Urethral Catheter 16 Fr. (Active)   Placement Date/Time: 11/20/23 1851   Tube Size (Fr.): 16 Fr.  Catheter Balloon Size: 10 mL   Number of days: 1     NUTRITION: NPO Diet; Effective now  EMERGENCY CONTACT: Extended Emergency Contact Information  Primary Emergency Contact: Sarah Caruso  Home Phone: [phone]  Relation: Child  CODE STATUS: Full Code  DISPO: Discharge Planning  Living Arrangements: Children  Support Systems: Children, Family members, Other (Comment)  Assistance Needed: Pt's daughter reported that pt uses a cane as needed.  Type of Residence: Private residence  Do you have animals or pets at home?: No  Home or Post Acute Services: None  Patient expects to be discharged to:: [facility]  FOLLOWUP:   Future Appointments   Date Time Provider Department Center   3/18/2024 10:00 AM Kathy Rivera MD PhD [id number]

## 2023-11-22 NOTE — PROGRESS NOTES
Occupational Therapy    Evaluation    Patient Name: Melissa Marinelli  MRN: [id number]  Today's Date: 11/22/2023  Time Calculation  Start Time: 1155  Stop Time: 1225  Time Calculation (min): 30 min    Assessment  IP OT Assessment  OT Assessment: impaired ADLs/transfers  Prognosis: Good  End of Session Communication: Bedside nurse  End of Session Patient Position: Bed, 3 rail up, Alarm off, not on at start of session  Plan:  Treatment Interventions: ADL retraining, Functional transfer training, Endurance training, Patient/family training, Equipment evaluation/education, Neuromuscular reeducation, Compensatory technique education  OT Frequency: 3 times per week  OT Discharge Recommendations: Moderate intensity level of continued care  OT Recommended Transfer Status: Minimal assist  OT - OK to Discharge: Yes    Subjective   General:  General  Reason for Referral: vomiting and diarrhea, r/o CHF exacerbation, AHRF requiring intubation; patient extubated 11/21  Past Medical History Relevant to Rehab: HFrEF (EF 15-20%), CAD s/p NSTEMI with RIRI to LCx (Jan 2016), s/p MVR (June 2019),  s/p ICD placement 5/2020, COPD (never formally diagnosed), HTN, HL, GERD, CVA, CKD IIIB and DMII  Family/Caregiver Present: No  Co-Treatment: PT  Co-Treatment Reason: to maximize patient safety and mobility  Prior to Session Communication: Bedside nurse  Patient Position Received: Bed, 3 rail up, Alarm off, not on at start of session  General Comment: patient very pleasant and agreeable to therapy; tele, [place], IV  Precautions:  Hearing/Visual Limitations: hearing is WFL  Medical Precautions: Cardiac precautions, Fall precautions  Vital Signs:  Heart Rate:  (pre 95, post 96)  Resp:  (pre 18, post 21)  SpO2:  (pre 94%, post 97%)  BP:  (pre 111/88, sitting EOB 71/50, post 94/57)  MAP (mmHg):  (pre 96, post 68)  Pain:  Pain Assessment  Pain Assessment: 0-10  Pain Score: 0 - No pain    Objective   Cognition:  Overall Cognitive Status: Within Functional  Limits  Arousal/Alertness: Appropriate responses to stimuli  Orientation Level: Oriented X4 (CAM-ICU (-))  Following Commands: Follows multistep commands with repetition  Attention:  (requires redirection to task at times)    Home Living:  Type of Home: House  Lives With:  (daughter (works days))  Home Adaptive Equipment:  (cane)  Home Layout: Two level  Home Access: Stairs to enter with rails  Entrance Stairs-Number of Steps: 4  Bathroom Shower/Tub: Tub/shower unit  Bathroom Equipment:  (shower chair/stool)   Prior Function:  Level of [place]: Independent with ADLs and functional transfers (daughter assists with IADLs)  Receives Help From:  (daughter)  ADL Assistance:  (independent but needs occasional assist for LB dressing, (S) for bathing)  Homemaking Assistance:  (patient's daughter preps meals and does the cleaning)  Ambulatory Assistance:  (independent in her house, uses cane in community)  Vocational: Retired  Leisure: enjoys singing and dancing  Hand Dominance: Right  IADL History:     ADL:  Eating Assistance: Independent  Eating Deficit: Setup  Grooming Assistance: Independent  Grooming Deficit: Setup (in sitting)  Bathing Assistance: Minimal  Bathing Deficit:  (anticipated)  UE Dressing Assistance: Stand by  UE Dressing Deficit:  (to don gown as robe)  LE Dressing Assistance: Minimal  LE Dressing Deficit: Don/doff R sock, Don/doff L sock  Toileting Assistance with Device: Stand by  Toileting Deficit:  (anticipated)  Activity Tolerance:     Bed Mobility/Transfers: Bed Mobility  Bed Mobility: Yes  Bed Mobility 1  Bed Mobility 1: Supine to sitting  Level of Assistance 1: Contact guard  Bed Mobility Comments 1: HOB elevated  Bed Mobility 2  Bed Mobility  2: Sitting to supine  Level of Assistance 2: Close supervision  Bed Mobility Comments 2: patient impulsively returned self to supine    Transfers  Transfer: Yes  Transfer 1  Transfer From 1: Sit to, Stand to  Transfer to 1: Sit, Stand  Technique 1: Sit  to stand, Stand to sit  Transfer Device 1: Walker  Transfer Level of Assistance 1: Contact guard  Trials/Comments 1: functional mobility with FWW and CGA, functional mobility without FWW and min A x2, patient with (B)LE buckling R >L without FWW    Sitting Balance:  Static Sitting Balance  Static Sitting-Level of Assistance: Distant supervision  Dynamic Sitting Balance  Dynamic Sitting-Comments: SBA/(S)  Standing Balance:  Static Standing Balance  Static Standing-Comment/Number of Minutes: CGA with FWW, min A x2 without FWW  Dynamic Standing Balance  Dynamic Standing-Comments: CGA with FWW, min A x2 without FWW     Vision: Vision - Basic Assessment  Current Vision:  (reports she is supposed to wear glasses all the time)  Sensation:  Light Touch: No apparent deficits  Strength:  Strength Comments: (B)  WFL, (B) shoulders/elbows >/= 3/35     Extremities: RUE   RUE : Within Functional Limits and LUE   LUE: Within Functional Limits    Outcome Measures: [facility] Daily Activity  Putting on and taking off regular lower body clothing: A little  Bathing (including washing, rinsing, drying): A little  Putting on and taking off regular upper body clothing: A little  Toileting, which includes using toilet, bedpan or urinal: A little  Taking care of personal grooming such as brushing teeth: None  Eating Meals: None  Daily Activity - Total Score: 20    , Confusion Assessment Method-ICU (CAM-ICU)  Feature 1: Acute Onset or Fluctuating Course: Negative  Overall CAM-ICU: Negative  ,    , and E = Exercise and Early Mobility  Current Activity: Ambulating in brown  Education Documentation  Body Mechanics, taught by Britany Cabrera OT at 11/22/2023  2:52 PM.  Learner: Patient  Readiness: Acceptance  Method: Explanation  Response: Verbalizes Understanding, Needs Reinforcement    ADL Training, taught by Britany Cabrera OT at 11/22/2023  2:52 PM.  Learner: Patient  Readiness: Acceptance  Method: Explanation  Response: Verbalizes  Understanding, Needs Reinforcement    Education Comments  No comments found.      Goals:   Encounter Problems       Encounter Problems (Active)       ADLs       Patient with complete upper body dressing with independent level of assistance donning and doffing all UE clothes with no adaptive equipment.       Start:  11/22/23    Expected End:  12/06/23            Patient with complete lower body dressing with independent level of assistance donning and doffing all LE clothes  with PRN adaptive equipment.       Start:  11/22/23    Expected End:  12/06/23            Patient will complete daily grooming tasks with independent level of assistance and PRN adaptive equipment while standing.       Start:  11/22/23    Expected End:  12/06/23            Patient will complete toileting including hygiene clothing management/hygiene with independent level of assistance.       Start:  11/22/23    Expected End:  12/06/23            Patient will perform IADLs/simulated household tasks with (I) while utilizing EC/WS principles as needed.       Start:  11/22/23    Expected End:  12/06/23               BALANCE       Pt will maintain dynamic standing balance during ADL task with modified independent level of assistance in order to demonstrate decreased risk of falling and improved postural control.       Start:  11/22/23    Expected End:  12/06/23               MOBILITY       Patient will perform Functional mobility Household distances/Community Distances with modified independent level of assistance and least restrictive device in order to improve safety and functional mobility.       Start:  11/22/23    Expected End:  12/06/23               TRANSFERS       Patient will perform bed mobility independent level of assistance in order to improve safety and independence with mobility       Start:  11/22/23    Expected End:  12/06/23            Patient will complete functional transfers with least restrictive device with modified independent  level of assistance.       Start:  11/22/23    Expected End:  12/06/23                  Britany Cabrera OTR/L

## 2023-11-22 NOTE — TREATMENT PLAN
Closing SGC #s    MAP 69, CVP 13, PAP 50/30 (38), W 17, SVR 1508, [place] CO/CI 4.4/2.6, SVO2 65% on isordil 10 mg BID    Lasix 40 mg IV push ordered    Andre Bryant PA-C  3:41 PM

## 2023-11-22 NOTE — PROGRESS NOTES
[facility]  HFICU PROGRESS NOTE    Melissa Marinelli/[id number]    Admit Date: 11/20/2023  Hospital Length of Stay: 2   ICU Length of Stay: 1d 10h   Primary Service: HFICU  Primary HF Cardiologist: Dr. Rivera     INTERVAL EVENTS / PERTINENT ROS:   Extubated to RA O/N and weaned off dobutamine 2.5 mcg/kg/min. Also received another 500 ml bolus given CP of 2. This morning is alert and oriented x3. Complains of constipation, but otherwise is feeling well. Patient denies nausea/vomiting, shortness of breath and chest pain. Sputum cx growing few gram + cocci. CT chest/abd/pelvis and ECHO completed yesterday.     Plan:  - Restart home isordil 10 mg BID  - Discontinue vancomycin given MRSA negative   - Follow infectious workup: sputum cx, blood cx, respiratory viral panel   - C/w cefepime + azithromycin   - Follow hemodynamics, likely will discontinue swan/cordis today  - Increase bowel regimen   - Remove bentley  - Send iron studies     MEDICATIONS  Scheduled:  azithromycin, 500 mg, q24h AWAIS  cefepime, 2 g, q12h  enoxaparin, 40 mg, q24h  insulin lispro, 0-5 Units, q4h  isosorbide dinitrate, 10 mg, BID  nicotine, 1 patch, Daily   Followed by  [START ON 1/2/2024] nicotine, 1 patch, Daily  pantoprazole, 40 mg, Daily before breakfast  perflutren protein A microsphere, 0.5 mL, Once in imaging  sennosides-docusate sodium, 1 tablet, Daily      PRN:  dextrose 10 % in water (D10W), 0.3 g/kg/hr, Once PRN  dextrose, 25 g, q15 min PRN  glucagon, 1 mg, q15 min PRN  ipratropium-albuteroL, 3 mL, q6h PRN  oxygen, , Continuous PRN - O2/gases  oxygen, , Continuous PRN - O2/gases  polyethylene glycol, 17 g, Daily PRN  promethazine, 12.5 mg, q6h PRN      Invasive Hemodynamics:    Most Recent Range Past 24hrs   BP (Art)  96/59 No data recorded   MAP(Art)  73 No data recorded   RA/CVP   No data recorded   PA 50/26 PAP  Min: 17/11  Max: 50/26   PA(mean) 37 mmHg PAP (Mean)  Min: 13 mmHg  Max: 37 mmHg   PCWP 15 mmHg PCWP  "(mmHg)  Min: 10 mmHg  Max: 15 mmHg   CO 4.88 L/min CO (L/min)  Min: 3.78 L/min  Max: 5.6 L/min   CI 2.84 L/min/m2 CI (L/min/m2)  Min: 2.2 L/min/m2  Max: 3.2 L/min/m2   Mixed Venous 57 % No data recorded   SVR  1016 (dyne*sec)/[id number] SVR (dyne*sec)/cm5  Min: 1016 (dyne*sec)/cm5  Max: 1248 (dyne*sec)/cm5    (dyne*sec)/cm5 PVR (dyne*sec)/cm5  Min: 115 (dyne*sec)/cm5  Max: 201 (dyne*sec)/cm5       VENT:    Most Recent Range Past 24hrs   Mode CPAP    FiO2 30 % FiO2 (%)  Min: 30 %   Min taken time: 11/21/23 2350  Max: 30 %   Max taken time: 11/21/23 2350   Rate 14 Resp Rate (Set)  Min: 14   Min taken time: 11/21/23 1552  Max: 14   Max taken time: 11/21/23 1552   Vt 450 mL  Vt (Set, mL)  Min: 450 mL   Min taken time: 11/21/23 1552  Max: 450 mL   Max taken time: 11/21/23 1552   PEEP 5 cm H20 PEEP/CPAP (cm H2O)  Min: 5 cm H20   Min taken time: 11/1954  Max: 5 cm H20   Max taken time: 11/1954     PHYSICAL EXAM:   Visit Vitals  BP 96/76   Pulse 93   Temp 36.6 °C (97.9 °F)   Resp 15   Ht 1.626 m (5' 4.02\")   Wt 67 kg (147 lb 11.3 oz)   SpO2 93%   BMI 25.34 kg/m²   Smoking Status Every Day   BSA 1.74 m²       Wt Readings from Last 5 Encounters:   11/22/23 67 kg (147 lb 11.3 oz)   06/22/23 67.1 kg (148 lb)   05/04/23 66 kg (145 lb 6.4 oz)   04/17/23 66.2 kg (146 lb)   12/30/22 65.8 kg (145 lb 0.2 oz)       INTAKE/OUTPUT:  I/O last 3 completed shifts:  In: 1942 (28.9 mL/kg) [P.O.:50; I.V.:192 (2.9 mL/kg); IV Piggyback:1700]  Out: 1575 (23.5 mL/kg) [Urine:1425 (0.6 mL/kg/hr); Emesis/NG output:150]  Weight: 67.1 kg      Physical Exam  Constitutional:       General: She is awake. She is not in acute distress.     Appearance: She is not ill-appearing.   Eyes:      Extraocular Movements: Extraocular movements intact.   Neck:      Vascular: No hepatojugular reflux or JVD.      Comments: [facility]- [facility]   Cardiovascular:      Rate and Rhythm: Normal rate.      Pulses: Normal pulses.      Heart sounds: Normal heart " "sounds.      Comments: Cool extremities   Pulmonary:      Breath sounds: Decreased breath sounds present. No wheezing, rhonchi or rales.   Abdominal:      General: Abdomen is flat. Bowel sounds are normal.      Palpations: Abdomen is soft.      Tenderness: There is no abdominal tenderness.   Genitourinary:     Comments: Willis present   Musculoskeletal:      Right lower leg: No edema.      Left lower leg: No edema.   Neurological:      Mental Status: She is alert and oriented to person, place, and time.   Psychiatric:         Behavior: Behavior is cooperative.       DATA:  CMP:  Results from last 7 days   Lab Units 11/22/23  0525 11/21/23  0315 11/20/23  1548   SODIUM mmol/L 140 144 140   POTASSIUM mmol/L 4.2 4.6 4.0   CHLORIDE mmol/L 109* 110* 108*   CO2 mmol/L 24 22 22   ANION GAP mmol/L 11 17 14   BUN mg/dL 25* 21 15   CREATININE mg/dL 1.40* 1.58* 1.22*   EGFR mL/min/1.73m*2 41* 35* 48*   MAGNESIUM mg/dL 2.08 2.25 2.32   ALBUMIN g/dL 3.0* 3.6 4.0   ALT U/L  --  13 15   AST U/L  --  17 20   BILIRUBIN TOTAL mg/dL  --  0.6 0.4   LIPASE U/L  --   --  22     CBC:  Results from last 7 days   Lab Units 11/22/23  0909 11/22/23  0525 11/21/23  0811 11/21/23  0315 11/20/23  1325   WBC AUTO x10*3/uL 6.6 6.9 2.3* 6.3 12.1*   HEMOGLOBIN g/dL 10.7* 10.6* 13.5 14.3 14.8   HEMATOCRIT % 33.4* 33.2* 41.3 44.0 47.2*   PLATELETS AUTO x10*3/uL 116* 117* 177 222 239   MCV fL 92 91 90 89 91     COAG:   Results from last 7 days   Lab Units 11/21/23 0315   INR  1.1     ABO: No results found for: \"ABO\"  HEME/ENDO:  Results from last 7 days   Lab Units 11/21/23  0316 11/21/23 0315   TSH mIU/L 1.41  --    HEMOGLOBIN A1C %  --  5.5      CARDIAC:   Results from last 7 days   Lab Units 11/22/23  0525 11/22/23  0116 11/21/23  2358 11/21/23  1619 11/21/23  1331 11/21/23  0811 11/21/23  0316 11/21/23  0315 11/20/23  2252 11/20/23  1325   TROPHS ng/L 606* 639* 631* 657* 636*  --  533*  --  362* 168*   BNP pg/mL  --   --   --   --   --  3,373*  -- "  >5,000*  --  238*          CT chest abdomen pelvis wo IV contrast    Result Date: 11/22/2023  Interpreted By:  Wei Mccain and Benza Andrew STUDY: CT CHEST ABDOMEN PELVIS WO CONTRAST;  11/21/2023 10:41 pm   INDICATION: Signs/Symptoms:Hypoxic, gastroenteritis symptoms.  Assessment of pneumonia versus fluid on chest and looking for infectious source in abdomen..   COMPARISON: CT angio chest dated 03/21/2023; CT abdomen and pelvis dated 03/21/2023   ACCESSION NUMBER(S): [id number]   ORDERING CLINICIAN: BRITTANY TINSLEY   TECHNIQUE: CT of the chest, abdomen and pelvis was performed. Contiguous axial images were obtained at 3 mm slice thickness through the chest, abdomen and pelvis. Coronal and sagittal reconstructions at 3 mm slice thickness were performed.  No intravenous or oral contrast agents were administered.   FINDINGS: Please note that the study is limited without intravenous contrast.   CHEST:     LUNG/PLEURA/LARGE AIRWAYS: Bilateral ground-glass opacities and smooth interlobular septal thickening compatible with edema has resolved from the prior CT from 03/29/2023. Ground-glass and reticular opacities in the dependent portion of the right upper lobe have mildly increased since the prior study. Similar mild bibasilar atelectasis. Background of mild-to-moderate centrilobular and paraseptal emphysema. No pleural effusions or pneumothorax.   VESSELS: Aorta and pulmonary arteries are normal caliber.  Moderate atherosclerotic changes are noted of the aorta and branching vessels. Severe coronary artery calcifications are present. Right internal jugular approach Holcomb-Alex catheter tip terminates in the proximal right interlobar pulmonary artery. Left chest wall pacemaker generator is noted with leads terminating in the right ventricle.   HEART: The heart is enlarged.  No pericardial effusion. A mitral valve prosthesis is noted.   MEDIASTINUM AND LELA: No mediastinal, hilar or axillary lymph nodes are present.   There is a moderate to large sliding hiatal hernia.   CHEST WALL AND LOWER NECK: Postsurgical changes in the right subclavian region. The visualized thyroid gland appears within normal limits. Median sternotomy wires are noted.   ABDOMEN:   LIVER: The liver is normal in size without evidence of focal liver lesions.   BILE DUCTS: The bile ducts are not dilated.   GALLBLADDER: There are radiopaque gallstones measuring up to 1.2 cm with no evidence of wall thickening or pericholecystic fluid.   PANCREAS: There are scattered pancreatic parenchymal calcifications.   SPLEEN: Within normal limits.   ADRENAL GLANDS: Bilateral adrenal glands appear normal.   KIDNEYS AND URETERS: The kidneys are normal in size. No hydroureteronephrosis or nephroureterolithiasis is present.   PELVIS:   BLADDER: The urinary bladder is decompressed with a Willis catheter in place. Air within the urinary bladder lumen likely related to instrumentation.   REPRODUCTIVE ORGANS: The uterus is present.   BOWEL: Moderate to large hiatal hernia. Otherwise, the stomach is unremarkable.  The small and large bowel are normal in caliber and demonstrate no wall thickening.  The appendix appears normal.   VESSELS: There is no aneurysmal dilatation of the abdominal aorta. The IVC is within normal limits. There is moderate atherosclerotic calcification of the abdominal aorta and its branches.   PERITONEUM/RETROPERITONEUM/LYMPH NODES: There is no free or loculated fluid collection, no free intraperitoneal air.  The retroperitoneum appears unremarkable.  No enlarged mesenteric lymph nodes.   ABDOMINAL WALL: There is a small fat containing umbilical hernia. Otherwise, the abdominal wall soft tissues appear normal.   BONES: No suspicious osseous lesions are present. Degenerative discogenic disease is noted in the lower thoracic and lumbar spine.       Chest 1. Ground-glass opacities in the dependent portion of the right upper and lower lobe may reflect residual  edema/atelectasis, with pneumonia not excluded. 2. Right internal jugular approach Guilford-Alex catheter tip terminates in the proximal right interlobar pulmonary artery. Consider retraction. 3. Cardiomegaly consistent with known history of systolic heart failure. 4. Moderate to large sliding hiatal hernia.   Abdomen-Pelvis 1. No acute findings in the abdomen or pelvis 2. Scattered pancreatic parenchymal calcifications. Correlate with history of chronic pancreatitis. 3. Cholelithiasis without evidence of acute cholecystitis.   I personally reviewed the images/study and I agree with the findings as stated above by resident physician, Michel Leonard MD. This study was interpreted at [place], [place].   MACRO: None   Signed by: Wei Mccain 11/22/2023 8:42 AM Dictation workstation:   [id number]    Transthoracic Echo (TTE) Complete    Result Date: 11/21/2023   [facility], [address] [place] [address]                Tel [phone] and Fax [phone] TRANSTHORACIC ECHOCARDIOGRAM REPORT  Patient Name:      FANNIE RIOS       Reading Physician:    [id number] Isaac Rivera MD Study Date:        11/21/2023           Ordering Provider:    [id number] FRANCISCO CHICAS MRN/PID:           [id number]             Fellow: Accession#:        [id number]         Nurse: Date of Birth/Age: 1955 / 68 years Sonographer:          Kary Tran RDCS Gender:            F                    Additional Staff: Height:            162.56 cm            Admit Date:           11/20/2023 Weight:            66.68 kg             Admission Status:     Inpatient -                                                               Critical/Stat                                                               (within 1-3 hours) BSA:               1.72 m2              Encounter#:           [id number]                                          Department Location:  [address] Blood Pressure: 77 /51 mmHg Study Type:    TRANSTHORACIC ECHO (TTE) COMPLETE Diagnosis/ICD: Acute combined systolic (congestive) and diastolic (congestive)                heart failure (CHF)-I50.41 Indication:    Congestive Heart Failure CPT Code:      Echo Complete w Full Doppler-20687 Patient History: Pertinent History: Hx of Low EF, CHF, CAD, HTN, DLD, CVA, Cardiogenic shock, MVR                    2019, ICD 2020. Study Detail: The following Echo studies were performed: 2D, M-Mode, Doppler and               color flow. Technically challenging study due to poor acoustic               windows, body habitus and patient lying in supine position. The               patient is intubated. Definity used as a contrast agent for               endocardial border definition. Total contrast used for this               procedure was 3.0 mL via IV push. Unable to obtain suprasternal               notch view.  PHYSICIAN INTERPRETATION: Left Ventricle: The left ventricular systolic function is severely decreased, with an estimated ejection fraction of 15-20%. The patient is in atrial fibrillation which may influence the estimate of left ventricular function and transvalvular flows. There is global hypokinesis of the left ventricle with minor regional variations. The left ventricular cavity size is severely dilated. Left ventricular diastolic filling was not assessed. There is a 1.2 cm x 0.6 cm mobile echodensity along the anterolateral wall of the LV (clips 56, 70) which could represent a calcified papillary muscle, thrombus or vegetation. The mobile echodensity is not well seen with definity echocontrast. Left Atrium: The left atrium is enlarged. Right Ventricle: The right ventricle is normal in size. There is normal right ventricular global systolic function. A device is visualized in the right ventricle. Right Atrium: The right atrium was not well visualized. There is a device visualized  in the right atrium. Aortic Valve: The aortic valve is probably trileaflet. There is no evidence of aortic valve regurgitation. The peak instantaneous gradient of the aortic valve is 5.2 mmHg. Mitral Valve: There is a prosthetic mitral valve present. There is a Epic mitral valve bioprosthesis with a 29 mm reported size. There is trace mitral valve regurgitation. Mean mitral gradient of 6.5 mmHg at a heart rate of 82 bpm. Tricuspid Valve: The tricuspid valve is structurally normal. There is trace tricuspid regurgitation. The Doppler estimated RVSP is within normal limits at 28.6 mmHg. Pulmonic Valve: The pulmonic valve is not well visualized. The pulmonic valve regurgitation was not well visualized. Pericardium: There is a trivial pericardial effusion. Aorta: The aortic root is normal. Systemic Veins: The inferior vena cava appears to be of normal size. In comparison to the previous echocardiogram(s): Compared with study from 3/21/2023, the mobile echodensity was seen on the echo from 3/21/23 and 7/14/2021 and intraoperative SHRUTHI in 2019 and appears similar. Current mitral mean gradient is similar to prior (current HR 82 bpm, previously 109 bpm).  CONCLUSIONS:  1. Left ventricular systolic function is severely decreased with a 15-20% estimated ejection fraction.  2. There is global hypokinesis of the left ventricle with minor regional variations.  3. Left ventricular cavity size is severely dilated.  4. There is a 1.2 cm x 0.6 cm mobile echodensity along the anterolateral wall of the LV (clips 56, 70) which could represent a calcified papillary muscle, thrombus or vegetation. The mobile echodensity is not well seen with definity echocontrast.  5. RVSP within normal limits.  6. There is a Epic mitral valve bioprosthesis with a 29 mm reported size. There is trace mitral valve regurgitation. Mean mitral gradient of 6.5 mmHg at a heart rate of 82 bpm.  7. Compared with study from 3/21/2023, the mobile echodensity was seen  on the echo from 3/21/23 and 2021 and intraoperative SHRUTHI in 2019 and appears similar. Current mitral mean gradient is similar to prior (current HR 82 bpm, previously 109 bpm). QUANTITATIVE DATA SUMMARY: 2D MEASUREMENTS:                           Normal Ranges: Ao Root d:     3.00 cm    (2.0-3.7cm) IVSd:          1.03 cm    (0.6-1.1cm) LVPWd:         1.26 cm    (0.6-1.1cm) LVIDd:         5.90 cm    (3.9-5.9cm) LV Mass Index: 167.1 g/m2 LA VOLUME:                     Normal Ranges: LA Vol A2C: 65.9 ml LV SYSTOLIC FUNCTION BY 2D PLANIMETRY (MOD):                     Normal Ranges: EF-A4C View: 34.5 % (>=55%) EF-A2C View: 10.5 % EF-Biplane:  23.0 % LV DIASTOLIC FUNCTION:                      Normal Ranges: MV Peak E:  1.45 m/s (0.7-1.2 m/s) MV Peak A:  1.92 m/s (0.42-0.7 m/s) E/A Ratio:  0.76     (1.0-2.2) MV e'       0.03 m/s (>8.0) E/e' Ratio: 48.46    (<8.0) a'          0.04 m/s MITRAL VALVE:                       Normal Ranges: MV Vmax:    2.04 m/s  (<=1.3m/s) MV peak P.6 mmHg (<5mmHg) MV mean P.5 mmHg  (<2mmHg) MV VTI:     38.89 cm  (10-13cm) MV DT:      237 msec  (150-240msec) AORTIC VALVE:                         Normal Ranges: AoV Vmax:      1.14 m/s (<=1.7m/s) AoV Peak P.2 mmHg (<20mmHg) LVOT Max Edvin:  0.72 m/s (<=1.1m/s) LVOT VTI:      10.09 cm LVOT Diameter: 1.86 cm  (1.8-2.4cm) AoV Area,Vmax: 1.73 cm2 (2.5-4.5cm2)  RIGHT VENTRICLE: RV s' 0.06 m/s TRICUSPID VALVE/RVSP:                             Normal Ranges: Peak TR Velocity: 2.53 m/s RV Syst Pressure: 28.6 mmHg (< 30mmHg) IVC Diam:         2.00 cm  [id number] Isaca Rivera MD Electronically signed on 2023 at 4:55:38 PM  ** Final **     XR chest 1 view    Result Date: 2023  Interpreted By:  Roly Conti and Liller Gregory STUDY: XR CHEST 1 VIEW;  2023 2:57 am   INDICATION: Signs/Symptoms:Mosby placement.   COMPARISON: 2023   ACCESSION NUMBER(S): [id number]   ORDERING CLINICIAN: KARLEY MAN   FINDINGS: AP  radiograph of the chest was provided.   Left chest wall implantable pacemaker/defibrillator with leads projecting over the expected location of the right atrial appendage and right ventricle. Endotracheal tube terminates3.8 cm from the lena. Right internal jugular Hinkle-Alex catheter projects over the expected location of the distal right main pulmonary artery. Enteric tube courses past the diaphragm and terminates outside the field of view.   CARDIOMEDIASTINAL SILHOUETTE: Cardiomediastinal silhouette is normal in size and configuration. Aortic arch calcifications are seen.   LUNGS: Interval improvement in aeration of the right upper lung zone when compared to prior radiograph. There is persistent mild hazy opacity within the area. No new focal consolidation, pleural effusion, or pneumothorax.   ABDOMEN: No remarkable upper abdominal findings.   BONES: No acute osseous changes.       Interval improvement of the previously noted opacification of the right upper lung when compared to prior radiograph. No new consolidation, effusion, or pneumothorax.   I personally reviewed the images/study and I agree with the findings as stated above by resident physician, Dr. Alex Sterling. The study was interpreted at [facility] in [facility].   Signed by: Roly Conti 11/21/2023 11:05 AM Dictation workstation:   [id number]        ASSESSMENT AND PLAN:   Melissa Marinelli is a 69 y/o F with PMHx of HFrEF (EF 15-20%), CAD s/p NSTEMI with RIRI to LCx (Jan 2016), s/p MVR (June 2019), s/p ICD placement 5/2020, COPD (never formally diagnosed), HTN, HLD, GERD, CVA, CKD 3B and DM2 who presented to the ED with a chief complaint of non-bloody, non-bilious emesis and diarrhea at home. In the ED she became hypoxic and tachypneic and was trialed on BiPAP and eventually intubated 2/2 inability to protect her airway (11/21). She was admitted to the HFICU for concern for CHF exacerbation. Upon arrival SGC  was completed and initial numbers where /74 (90), CVP 5, PAP 29/14(18), W 12, CO/CI 3.2/1.89 (f) 2.5/1.45(t), SVR 2125, SVO2 66% (note that Hgb is 14.3). She was started on a nipride gtt + dobutamine 2.5 mcg/kg/min briefly but was weaned off 11/21. Also started on broad spectrum abx given concern for aspiration PNA. CT chest/abd/pelvis completed 11/21 which showed ground glass opacities in the RUL and cholelithiasis without evidence of acute cholecystitis. She was extubated to RA 11/ 21. Hospital course c/b PATSY on CKD IIIB for which fluids were given + home farxiga was held.     Neuro:  #No active issues  A&Ox3  - Serial neuro and pain assessments   - PT/OT Consult  - CAM ICU score every shift  - Sleep/wake cycle normalization     #Substance abuse  Current cigarette and marijuana user   - Nicotine patch ordered PRN      Cardiovascular:  #Acute on Chronic HFrEF, EF 15-20% s/p ICD 5/2020  Home medications: isordil 10 mg BID, ASA 81 mg daily, bumex 0.5 mg daily PRN, farxiga 10 mg daily  TTE (3/21/23): [id number]%. Reduced RV systolic function.  TTE (11/21/23): LVEF 15-20%, 1.2cm x 0.6 cm mobile echodensity along the anterolateral wall of LV which could represent a calcified papillary muscle, thrombus or vegetation (previously seen on echo from 3/21/23 and 7/14/21)  Admit weight: 67 kg   Daily weight (11/22): 67 kg  Admit BNP: 3373  Opening SGC #s (11/21): /74 (90), CVP 5, PAP 29/14(18), W 12, CO/CI 3.2/1.89 (f) 2.5/1.45(t), SVR 2125, SVO2 66% (note that Hgb is 14.3)  Daily SGC#s (11/22): MAP 75, CVP 13, PAP 39/17 (24), W 15, CO/CI 4.88/2.84, SVR 1016, SVO2 76%   Nipride + dobutamine 2.5 mcg/kg/min weaned off 11/21  Device last interrogated 10/23/2023    - Restart home isordil 10 mg BID  - Holding home farxiga 10 mg daily given PATSY  - Assess daily need for diuresis  - Likely will remove SGC + cordis today   - Daily standing weights, 2gm sodium diet, 2L fluid restriction, strict I&Os     #CAD   #NSTEMI s/p RIRI  to Lcx (Jan 2016)  #Hx of Mitral Valve repair (June 2019) w/ Dr Montana  29mm Epic Bioprosthetc valve   EKG (11/20) NSR, , no acute ST elevation   Lipid panel (9/1/23): cholesterol 227, HDL 56, , , VLDL 21  - C/w ASA, not on a statin at home      Pulmonary:   #Acute Hypoxic Respiratory Failure (resolved)   #Concern for aspiration PNA   #Hx of COPD   ETT (11/20-11/21)  CXR (11/21) RUL opacification  CT Chest (11/21): bilateral ground glass opacities and smooth interlobular septal thickening. Mild-moderate centrilobular and paraseptal emphysema   Procalcitonin (11/21): 3.44  S/p vancomycin (11/20-11/21), MRSA (11/21) negative  - Follow up infectious workup: respiratory viral panel, sputum cx, procalcitonin, strep PNA, legionella, blood cultures  - Duonebs q6 PRN   - Pulmonary following, appreciate recommendations  - Continue broad spectrum abx: cefepime + azithromycin      GI:  #GERD  #Constipation  #Cholelithiasis  #N/V/D (resolved)  CT A/P (11/21) with cholelithiasis, no evidence of cholecystitis  - Bowel regimen: luz-colace BID and miralax PRN  - PPI   - Zofran PRN  - Stool PCR pending collection      :  #PATSY on CKD IIIB, likely prerenal 2/2 hypovolemia (improving)  Baseline sCr ~1.3-1.4  sCr currently 1.4  S/p 1 L of fluids  - I/Os  - Avoid hypotension and nephrotoxic agents     Heme:  #No active issue   - Send iron studies 11/22     Endo:  #DM2  Euglycemic, HgbA1c 5.5 (11/2023)  - Sliding scale ordered     #Thyroid  TSH (11/21) 1.41  - CTM      ID:  #Leukocytosis (resolved)  #Concern for aspiration PNA  WBC 12K-> 6K, afebrile, nontoxic  S/p Vancomycin (11/20-11/21), MRSA swab negative   - See pulmonary plan as above  - Trend temps q4h  - De-escalate antibiotics as able      PHYSICAL AND OCCUPATIONAL THERAPY: ordered    LINES:  PIVs  Cordis + SGC (11/21-*)  Willis (11/20-11/22)    DVT: lovenox   VAP BUNDLE: N/A  ULCER PPX: PPI  GLYCEMIC CONTROL: Sliding scale ordered   BOWEL CARE:  Stefany-colace BID +miralax   INDWELLING CATHETER: present   NUTRITION: Adult diet Cardiac; 70 gm fat; 2 - 3 grams Sodium      EMERGENCY CONTACT: Extended Emergency Contact Information  Primary Emergency Contact: Sarah Caruso  Home Phone: [phone]  Relation: Child  FAMILY UPDATE: Updated over the phone 11/22  CODE STATUS: Full Code  DISPO: Remain in HFICU, potential transfer later today    Patient seen and assessed with Dr. Keene     _________________________________________________  Andre Bryant PA-C

## 2023-11-22 NOTE — CARE PLAN
2106-  Extubation    Sedation was shut off at 1950 and ptient was initiated on CPAP trial at 2005.  NIF was -27, VC 1.1 and RSBI 21.  She remained on CPAP until 2106 when she was extubated to NC at 3L. No stridor or wheezes noted post extubation.  Plan for CPAP overnight.

## 2023-11-22 NOTE — CONSULTS
Vancomycin Dosing by Pharmacy- Cessation of Therapy    Consult to pharmacy for vancomycin dosing has been discontinued by the prescriber, pharmacy will sign off at this time.    Please call pharmacy if there are further questions or re-enter a consult if vancomycin is resumed.     Monica San, PharmD

## 2023-11-22 NOTE — PROGRESS NOTES
Physical Therapy    Physical Therapy Evaluation & Treatment    Patient Name: Melissa Marinelli  MRN: [id number]  Today's Date: 11/22/2023   Time Calculation  Start Time: 1155  Stop Time: 1221  Time Calculation (min): 26 min    Assessment/Plan   PT Assessment  PT Assessment Results: Decreased strength, Decreased endurance, Impaired balance, Decreased mobility  Rehab Prognosis: Excellent  Medical Staff Made Aware: Yes  End of Session Communication: Bedside nurse  End of Session Patient Position: Bed, 3 rail up, Alarm off, not on at start of session   IP OR SWING BED PT PLAN  Inpatient or Swing Bed: Inpatient  PT Plan  Treatment/Interventions: Bed mobility, Transfer training, Gait training, Balance training, Strengthening, Endurance training, Therapeutic exercise, Therapeutic activity  PT Plan: Skilled PT  PT Frequency: 4 times per week  PT Discharge Recommendations: Moderate intensity level of continued care  Equipment Recommended upon Discharge:  (Rollator)  PT Recommended Transfer Status: Assist x1  PT - OK to Discharge: Yes    Subjective     General Visit Information:  General  Reason for Referral: vomiting and diarrhea, r/o CHF exacerbation, AHRF requiring intubation; patient extubated 11/21  Past Medical History Relevant to Rehab: HFrEF (EF 15-20%), CAD s/p NSTEMI with RIRI to LCx (Jan 2016), s/p MVR (June 2019),  s/p ICD placement 5/2020, COPD (never formally diagnosed), HTN, HL, GERD, CVA, CKD IIIB and DMII  Family/Caregiver Present: No  Co-Treatment: OT  Co-Treatment Reason: to maximize patient safety and mobility  Prior to Session Communication: Bedside nurse  Patient Position Received: Bed, 3 rail up, Alarm off, not on at start of session  General Comment: Pt is pleasant and cooperative with therapy, motivated.    Home Living:  Home Living  Type of Home: House  Lives With:  (daughter who works during the day)  Home Adaptive Equipment: Cane  Home Layout: Two level, Stairs to alternate level with rails  Alternate  Level Stairs-Rails: Right  Alternate Level Stairs-Number of Steps: 20  Home Access: Stairs to enter with rails  Entrance Stairs-Rails: Right  Entrance Stairs-Number of Steps: 4  Bathroom Shower/Tub: Tub/shower unit    Prior Level of Function:  Prior Function Per Pt/Caregiver Report  Level of [place]: Independent with ADLs and functional transfers (daughter assists with ADLs)  Receives Help From:  (daughter)  Ambulatory Assistance: Independent  Gait: Independent, Assistive device (household amb without AD, uses cane for community ambulation)  Vocational: Retired  Leisure: enjoys singing and dancing  Hand Dominance: Right  Prior Function Comments: Denies falls within the last 2 months    Precautions:  Precautions  Hearing/Visual Limitations: hearing is WFL  Medical Precautions: Cardiac precautions, Fall precautions    Vital Signs:  Vital Signs  Heart Rate:  (PRE: 92; POST: 100)  SpO2:  (SpO2 >/= 96%)  BP:  (PRE: 111/88, POST: 94/57)    Objective     Pain:  Pain Assessment  Pain Assessment: 0-10  Pain Score: 0 - No pain    Cognition:  Cognition  Overall Cognitive Status: Within Functional Limits  Arousal/Alertness: Appropriate responses to stimuli  Orientation Level: Oriented X4  Following Commands: Follows all commands and directions without difficulty  Attention: Within Functional Limits    General Assessments:      Activity Tolerance  Early Mobility/Exercise Safety Screen: Proceed with mobilization - No exclusion criteria met    Sensation  Light Touch: No apparent deficits    Strength  Strength Comments: B LE strength >/= 3+/5 throughout       Static Sitting Balance  Static Sitting-Balance Support: No upper extremity supported, Feet supported  Static Sitting-Level of Assistance: Independent  Dynamic Sitting Balance  Dynamic Sitting-Balance Support: No upper extremity supported, Feet supported  Dynamic Sitting-Comments: Supervision    Static Standing Balance  Static Standing-Balance Support: Bilateral upper  extremity supported  Static Standing-Level of Assistance: Close supervision  Dynamic Standing Balance  Dynamic Standing-Balance Support: Bilateral upper extremity supported  Dynamic Standing-Comments: CGA with FWW; minAx1 without AD    Functional Assessments:  Bed Mobility  Bed Mobility: Yes  Bed Mobility 1  Bed Mobility 1: Supine to sitting  Level of Assistance 1: Contact guard  Bed Mobility Comments 1: HOB elevated  Bed Mobility 2  Bed Mobility  2: Sitting to supine  Level of Assistance 2: Close supervision    Transfers  Transfer: Yes  Transfer 1  Transfer From 1: Sit to  Transfer to 1: Stand  Technique 1: Sit to stand  Transfer Device 1: Walker  Transfer Level of Assistance 1: Contact guard  Transfers 2  Transfer From 2: Stand to  Transfer to 2: Sit  Technique 2: Stand to sit  Transfer Level of Assistance 2: Minimum assistance (x1 person assist)    Ambulation/Gait Training  Ambulation/Gait Training Performed: Yes  Ambulation/Gait Training 1  Surface 1: Level tile  Device 1: Rolling walker  Assistance 1: Contact guard  Quality of Gait 1: Forward flexed posture (slow katie)  Comments/Distance (ft) 1: 80 ft    Extremity/Trunk Assessments:  RLE   RLE : Within Functional Limits  LLE   LLE : Within Functional Limits    Treatments:  Therapeutic Activity  Therapeutic Activity Performed: Yes  Therapeutic Activity 1: Pt ambulated with no AD and minAx1 with mild pathway deviation and B knee buckling throughout (R >L). Pt reports this is not her baseline.  Therapeutic Activity 2: Pt sat EOB total of 8 minutes with supervision.    Outcome Measures:  [facility] Basic Mobility  Turning from your back to your side while in a flat bed without using bedrails: A little  Moving from lying on your back to sitting on the side of a flat bed without using bedrails: A little  Moving to and from bed to chair (including a wheelchair): A little  Standing up from a chair using your arms (e.g. wheelchair or bedside chair): A little  To walk in  hospital room: A lot  Climbing 3-5 steps with railing: A lot  Basic Mobility - Total Score: 16    Confusion Assessment Method-ICU (CAM-ICU)  Feature 1: Acute Onset or Fluctuating Course: Negative  Overall CAM-ICU: Negative    FSS-ICU  Ambulation: Walks >/ or equal to 50 feet with any assistance x1  Rolling: Minimal assistance (performs 75% or more of task)  Sitting: Supervision or set-up only  Transfer Sit-to-Stand: Minimal assistance (performs 75% or more of task)  Transfer Supine-to-Sit: Minimal assistance (performs 75% or more of task)  Total Score: 19    ICU Mobility Screen  Early Mobility/Exercise Safety Screen: Proceed with mobilization - No exclusion criteria met  E = Exercise and Early Mobility  Early Mobility/Exercise Safety Screen: Proceed with mobilization - No exclusion criteria met  Current Activity: Ambulating in brown    Encounter Problems       Encounter Problems (Active)       Balance       Pt will score >24 on Tinetti for low risk of falls (Progressing)       Start:  11/22/23    Expected End:  12/13/23               Mobility       Patient will ambulate >100 ft with LRAD and no LOB or LE buckling (Progressing)       Start:  11/22/23    Expected End:  12/13/23            Patient will ascend/descend a flight of stairs with handrail and no LOB with supervision (Progressing)       Start:  11/22/23    Expected End:  12/13/23               Transfers       Patient to transfer to and from sit to supine with supervision (Progressing)       Start:  11/22/23    Expected End:  12/13/23            Patient will transfer sit to and from stand with LRAD and supervision  (Progressing)       Start:  11/22/23    Expected End:  12/13/23                   Education Documentation  Mobility Training, taught by Kristina Cardoso PT at 11/22/2023  3:29 PM.  Learner: Patient  Readiness: Acceptance  Method: Explanation  Response: Verbalizes Understanding    Education Comments  No comments found.    Signed by Kristina Cardoso  DPT

## 2023-11-23 LAB
ALBUMIN SERPL BCP-MCNC: 3.1 G/DL (ref 3.4–5)
ANION GAP SERPL CALC-SCNC: 13 MMOL/L (ref 10–20)
BUN SERPL-MCNC: 22 MG/DL (ref 6–23)
CALCIUM SERPL-MCNC: 8.4 MG/DL (ref 8.6–10.6)
CHLORIDE SERPL-SCNC: 106 MMOL/L (ref 98–107)
CO2 SERPL-SCNC: 23 MMOL/L (ref 21–32)
CREAT SERPL-MCNC: 1.42 MG/DL (ref 0.5–1.05)
ERYTHROCYTE [DISTWIDTH] IN BLOOD BY AUTOMATED COUNT: 17.8 % (ref 11.5–14.5)
GFR SERPL CREATININE-BSD FRML MDRD: 40 ML/MIN/1.73M*2
GLUCOSE BLD MANUAL STRIP-MCNC: 101 MG/DL (ref 74–99)
GLUCOSE BLD MANUAL STRIP-MCNC: 73 MG/DL (ref 74–99)
GLUCOSE BLD MANUAL STRIP-MCNC: 77 MG/DL (ref 74–99)
GLUCOSE SERPL-MCNC: 68 MG/DL (ref 74–99)
HCT VFR BLD AUTO: 32.1 % (ref 36–46)
HGB BLD-MCNC: 9.9 G/DL (ref 12–16)
MAGNESIUM SERPL-MCNC: 2.02 MG/DL (ref 1.6–2.4)
MCH RBC QN AUTO: 28.3 PG (ref 26–34)
MCHC RBC AUTO-ENTMCNC: 30.8 G/DL (ref 32–36)
MCV RBC AUTO: 92 FL (ref 80–100)
NRBC BLD-RTO: 0 /100 WBCS (ref 0–0)
PHOSPHATE SERPL-MCNC: 2.2 MG/DL (ref 2.5–4.9)
PLATELET # BLD AUTO: 109 X10*3/UL (ref 150–450)
POTASSIUM SERPL-SCNC: 3.6 MMOL/L (ref 3.5–5.3)
RBC # BLD AUTO: 3.5 X10*6/UL (ref 4–5.2)
SODIUM SERPL-SCNC: 138 MMOL/L (ref 136–145)
WBC # BLD AUTO: 5.2 X10*3/UL (ref 4.4–11.3)

## 2023-11-23 PROCEDURE — 2500000004 HC RX 250 GENERAL PHARMACY W/ HCPCS (ALT 636 FOR OP/ED): Performed by: STUDENT IN AN ORGANIZED HEALTH CARE EDUCATION/TRAINING PROGRAM

## 2023-11-23 PROCEDURE — 96372 THER/PROPH/DIAG INJ SC/IM: CPT | Performed by: NURSE PRACTITIONER

## 2023-11-23 PROCEDURE — 82947 ASSAY GLUCOSE BLOOD QUANT: CPT

## 2023-11-23 PROCEDURE — 94640 AIRWAY INHALATION TREATMENT: CPT

## 2023-11-23 PROCEDURE — 1200000002 HC GENERAL ROOM WITH TELEMETRY DAILY

## 2023-11-23 PROCEDURE — 83735 ASSAY OF MAGNESIUM: CPT | Performed by: STUDENT IN AN ORGANIZED HEALTH CARE EDUCATION/TRAINING PROGRAM

## 2023-11-23 PROCEDURE — 2500000004 HC RX 250 GENERAL PHARMACY W/ HCPCS (ALT 636 FOR OP/ED): Performed by: NURSE PRACTITIONER

## 2023-11-23 PROCEDURE — 36415 COLL VENOUS BLD VENIPUNCTURE: CPT

## 2023-11-23 PROCEDURE — 2500000002 HC RX 250 W HCPCS SELF ADMINISTERED DRUGS (ALT 637 FOR MEDICARE OP, ALT 636 FOR OP/ED): Performed by: STUDENT IN AN ORGANIZED HEALTH CARE EDUCATION/TRAINING PROGRAM

## 2023-11-23 PROCEDURE — 2500000004 HC RX 250 GENERAL PHARMACY W/ HCPCS (ALT 636 FOR OP/ED)

## 2023-11-23 PROCEDURE — 99291 CRITICAL CARE FIRST HOUR: CPT | Performed by: STUDENT IN AN ORGANIZED HEALTH CARE EDUCATION/TRAINING PROGRAM

## 2023-11-23 PROCEDURE — S4991 NICOTINE PATCH NONLEGEND: HCPCS | Performed by: STUDENT IN AN ORGANIZED HEALTH CARE EDUCATION/TRAINING PROGRAM

## 2023-11-23 PROCEDURE — 2500000001 HC RX 250 WO HCPCS SELF ADMINISTERED DRUGS (ALT 637 FOR MEDICARE OP): Performed by: STUDENT IN AN ORGANIZED HEALTH CARE EDUCATION/TRAINING PROGRAM

## 2023-11-23 PROCEDURE — 94660 CPAP INITIATION&MGMT: CPT

## 2023-11-23 PROCEDURE — 37799 UNLISTED PX VASCULAR SURGERY: CPT | Performed by: STUDENT IN AN ORGANIZED HEALTH CARE EDUCATION/TRAINING PROGRAM

## 2023-11-23 PROCEDURE — 86022 PLATELET ANTIBODIES: CPT

## 2023-11-23 PROCEDURE — 80069 RENAL FUNCTION PANEL: CPT | Performed by: STUDENT IN AN ORGANIZED HEALTH CARE EDUCATION/TRAINING PROGRAM

## 2023-11-23 PROCEDURE — 85027 COMPLETE CBC AUTOMATED: CPT | Performed by: STUDENT IN AN ORGANIZED HEALTH CARE EDUCATION/TRAINING PROGRAM

## 2023-11-23 RX ORDER — POTASSIUM CHLORIDE 20 MEQ/1
40 TABLET, EXTENDED RELEASE ORAL ONCE
Status: COMPLETED | OUTPATIENT
Start: 2023-11-23 | End: 2023-11-23

## 2023-11-23 RX ORDER — FUROSEMIDE 10 MG/ML
40 INJECTION INTRAMUSCULAR; INTRAVENOUS ONCE
Status: COMPLETED | OUTPATIENT
Start: 2023-11-23 | End: 2023-11-23

## 2023-11-23 RX ORDER — ACETAMINOPHEN 325 MG/1
975 TABLET ORAL EVERY 6 HOURS PRN
Status: DISCONTINUED | OUTPATIENT
Start: 2023-11-23 | End: 2023-11-26 | Stop reason: HOSPADM

## 2023-11-23 RX ADMIN — CEFEPIME 2 G: 1 INJECTION, SOLUTION INTRAVENOUS at 11:50

## 2023-11-23 RX ADMIN — IRON SUCROSE 200 MG: 20 INJECTION, SOLUTION INTRAVENOUS at 21:02

## 2023-11-23 RX ADMIN — SENNOSIDES AND DOCUSATE SODIUM 1 TABLET: 8.6; 5 TABLET ORAL at 08:59

## 2023-11-23 RX ADMIN — NICOTINE 1 PATCH: 14 PATCH, EXTENDED RELEASE TRANSDERMAL at 08:59

## 2023-11-23 RX ADMIN — ACETAMINOPHEN 975 MG: 325 TABLET ORAL at 08:58

## 2023-11-23 RX ADMIN — SENNOSIDES AND DOCUSATE SODIUM 1 TABLET: 8.6; 5 TABLET ORAL at 20:01

## 2023-11-23 RX ADMIN — AZITHROMYCIN DIHYDRATE 500 MG: 500 TABLET, FILM COATED ORAL at 08:59

## 2023-11-23 RX ADMIN — ISOSORBIDE DINITRATE 10 MG: 10 TABLET ORAL at 16:14

## 2023-11-23 RX ADMIN — PANTOPRAZOLE SODIUM 40 MG: 40 TABLET, DELAYED RELEASE ORAL at 08:59

## 2023-11-23 RX ADMIN — DAPAGLIFLOZIN 10 MG: 10 TABLET, FILM COATED ORAL at 08:59

## 2023-11-23 RX ADMIN — CEFEPIME 2 G: 1 INJECTION, SOLUTION INTRAVENOUS at 23:23

## 2023-11-23 RX ADMIN — POTASSIUM CHLORIDE 40 MEQ: 1500 TABLET, EXTENDED RELEASE ORAL at 08:59

## 2023-11-23 RX ADMIN — ISOSORBIDE DINITRATE 10 MG: 10 TABLET ORAL at 08:59

## 2023-11-23 RX ADMIN — IPRATROPIUM BROMIDE AND ALBUTEROL SULFATE 3 ML: .5; 3 SOLUTION RESPIRATORY (INHALATION) at 19:49

## 2023-11-23 RX ADMIN — IPRATROPIUM BROMIDE AND ALBUTEROL SULFATE 3 ML: .5; 3 SOLUTION RESPIRATORY (INHALATION) at 00:23

## 2023-11-23 RX ADMIN — FUROSEMIDE 40 MG: 10 INJECTION, SOLUTION INTRAMUSCULAR; INTRAVENOUS at 08:59

## 2023-11-23 RX ADMIN — ENOXAPARIN SODIUM 40 MG: 100 INJECTION SUBCUTANEOUS at 08:59

## 2023-11-23 ASSESSMENT — COGNITIVE AND FUNCTIONAL STATUS - GENERAL
STANDING UP FROM CHAIR USING ARMS: A LITTLE
DRESSING REGULAR LOWER BODY CLOTHING: A LITTLE
DAILY ACTIVITIY SCORE: 20
TOILETING: A LITTLE
MOBILITY SCORE: 16
HELP NEEDED FOR BATHING: A LITTLE
TURNING FROM BACK TO SIDE WHILE IN FLAT BAD: A LITTLE
WALKING IN HOSPITAL ROOM: A LOT
MOVING TO AND FROM BED TO CHAIR: A LITTLE
MOVING FROM LYING ON BACK TO SITTING ON SIDE OF FLAT BED WITH BEDRAILS: A LITTLE
CLIMB 3 TO 5 STEPS WITH RAILING: A LOT
DRESSING REGULAR UPPER BODY CLOTHING: A LITTLE

## 2023-11-23 ASSESSMENT — PAIN SCALES - GENERAL
PAINLEVEL_OUTOF10: 3
PAINLEVEL_OUTOF10: 0 - NO PAIN

## 2023-11-23 ASSESSMENT — PAIN - FUNCTIONAL ASSESSMENT
PAIN_FUNCTIONAL_ASSESSMENT: 0-10

## 2023-11-23 NOTE — SIGNIFICANT EVENT
Patient safely transferred to the floor with all belongings in stable condition to the [facility] service. Upon evaluation, patient is HDS and in no acute distress with no active complaints. Based on chart review and physical examination, patient is appropriate for the [facility] service at this time.    Brief physical exam: lungs clear to auscultation, on room air. Regular rate and rhythm, no lower extremity edema. +BS. Last BM prior to admit, but +flatus. Alert and oriented X3.     Labwork reviewed, Plt with drop from 239 on admit to 109 this AM. Prophylactic lovenox held, PF4 sent out of abundance of caution. May be secondary to patient being dehydrated on admit as Hgb/Hct also with drops in levels.      All questions answered at this time, plan of care as per daily progress note. To be seen and discussed with AM staff.

## 2023-11-23 NOTE — PROGRESS NOTES
[facility]  HFICU PROGRESS NOTE    Melissa Marinelli/[id number]    Admit Date: 11/20/2023  Hospital Length of Stay: 3   ICU Length of Stay: 2d 10h   Primary Service: HFICU  Primary HF Cardiologist: Dr. Rivera     INTERVAL EVENTS / PERTINENT ROS:   No acute events O/N. No acute complaints this morning. Continues to have sputum production + cough. Respiratory culture growing staph aureus. Blood cultures, legionella and strep PNA came back negative. In for a transfer to HVI service.    Plan:  - Start IV venofer x 5 days given iron deficiency anemia, will benefit from outpatient GI follow up given this is ongoing  - C/w cefepime for PNA - will need a 5 day course (11/21-11/25)  - Last day of azithromycin   - 40 IV lasix ordered  - C/w home isordil 10 mg BID + farxiga 10 mg daily   - Transfer to HVI service    MEDICATIONS  Scheduled:  cefepime, 2 g, q12h  dapagliflozin propanediol, 10 mg, Daily  enoxaparin, 40 mg, q24h  insulin lispro, 0-5 Units, TID with meals  iron sucrose, 200 mg, Daily  isosorbide dinitrate, 10 mg, BID  nicotine, 1 patch, Daily   Followed by  [START ON 1/2/2024] nicotine, 1 patch, Daily  pantoprazole, 40 mg, Daily before breakfast  perflutren protein A microsphere, 0.5 mL, Once in imaging  sennosides-docusate sodium, 1 tablet, BID      PRN:  acetaminophen, 975 mg, q6h PRN  dextrose 10 % in water (D10W), 0.3 g/kg/hr, Once PRN  dextrose, 25 g, q15 min PRN  glucagon, 1 mg, q15 min PRN  ipratropium-albuteroL, 3 mL, q6h PRN  ondansetron, 4 mg, q8h PRN  oxygen, , Continuous PRN - O2/gases  polyethylene glycol, 17 g, Daily PRN      Invasive Hemodynamics:    Most Recent Range Past 24hrs   BP (Art)  96/59 No data recorded   MAP(Art)  73 No data recorded   RA/CVP   No data recorded   PA 52/29 PAP  Min: 35/18  Max: 59/32   PA(mean) 36 mmHg PAP (Mean)  Min: 23 mmHg  Max: 42 mmHg   PCWP 17 mmHg PCWP (mmHg)  Min: 17 mmHg  Max: 17 mmHg   CO 4.4 L/min (LORRAINE) CO (L/min)  Min: 4.4 L/min  Max:  "4.4 L/min   CI 2.6 L/min/m2 (LORRAINE) CI (L/min/m2)  Min: 2.6 L/min/m2  Max: 2.6 L/min/m2   Mixed Venous 65 % SVO2 (%)  Min: 65 %  Max: 65 %   SVR  1508 (dyne*sec)/[id number] SVR (dyne*sec)/cm5  Min: 1508 (dyne*sec)/cm5  Max: 1508 (dyne*sec)/cm5    (dyne*sec)/cm5 PVR (dyne*sec)/cm5  Min: 382 (dyne*sec)/cm5  Max: 382 (dyne*sec)/cm5       PHYSICAL EXAM:   Visit Vitals  /67   Pulse 78   Temp 36.6 °C (97.9 °F) (Temporal)   Resp 16   Ht 1.626 m (5' 4.02\")   Wt 67 kg (147 lb 11.3 oz)   SpO2 99%   BMI 25.34 kg/m²   Smoking Status Every Day   BSA 1.74 m²       Wt Readings from Last 5 Encounters:   11/22/23 67 kg (147 lb 11.3 oz)   06/22/23 67.1 kg (148 lb)   05/04/23 66 kg (145 lb 6.4 oz)   04/17/23 66.2 kg (146 lb)   12/30/22 65.8 kg (145 lb 0.2 oz)       INTAKE/OUTPUT:  I/O last 3 completed shifts:  In: 1690 (25.2 mL/kg) [P.O.:750; I.V.:40 (0.6 mL/kg); IV Piggyback:900]  Out: 2170 (32.4 mL/kg) [Urine:2170 (0.9 mL/kg/hr)]  Weight: 67 kg      Physical Exam  Constitutional:       General: She is awake. She is not in acute distress.     Appearance: She is not ill-appearing.   Eyes:      Extraocular Movements: Extraocular movements intact.   Neck:      Vascular: No hepatojugular reflux or JVD.   Cardiovascular:      Rate and Rhythm: Normal rate.      Pulses: Normal pulses.      Heart sounds: Normal heart sounds.      Comments: Cool extremities   Pulmonary:      Breath sounds: Decreased breath sounds present. No wheezing, rhonchi or rales.   Abdominal:      General: Abdomen is flat. Bowel sounds are normal.      Palpations: Abdomen is soft.      Tenderness: There is no abdominal tenderness.   Genitourinary:     Comments: Voiding freely   Musculoskeletal:      Right lower leg: No edema.      Left lower leg: No edema.   Neurological:      Mental Status: She is alert and oriented to person, place, and time.   Psychiatric:         Behavior: Behavior is cooperative.       DATA:  CMP:  Results from last 7 days   Lab Units " "11/23/23  0457 11/22/23  0525 11/21/23  0315 11/20/23  1548   SODIUM mmol/L 138 140 144 140   POTASSIUM mmol/L 3.6 4.2 4.6 4.0   CHLORIDE mmol/L 106 109* 110* 108*   CO2 mmol/L 23 24 22 22   ANION GAP mmol/L 13 11 17 14   BUN mg/dL 22 25* 21 15   CREATININE mg/dL 1.42* 1.40* 1.58* 1.22*   EGFR mL/min/1.73m*2 40* 41* 35* 48*   MAGNESIUM mg/dL 2.02 2.08 2.25 2.32   ALBUMIN g/dL 3.1* 3.0* 3.6 4.0   ALT U/L  --   --  13 15   AST U/L  --   --  17 20   BILIRUBIN TOTAL mg/dL  --   --  0.6 0.4   LIPASE U/L  --   --   --  22     CBC:  Results from last 7 days   Lab Units 11/23/23  0457 11/22/23  0909 11/22/23  0525 11/21/23  0811 11/21/23  0315 11/20/23  1325   WBC AUTO x10*3/uL 5.2 6.6 6.9 2.3* 6.3 12.1*   HEMOGLOBIN g/dL 9.9* 10.7* 10.6* 13.5 14.3 14.8   HEMATOCRIT % 32.1* 33.4* 33.2* 41.3 44.0 47.2*   PLATELETS AUTO x10*3/uL 109* 116* 117* 177 222 239   MCV fL 92 92 91 90 89 91     COAG:   Results from last 7 days   Lab Units 11/21/23  0315   INR  1.1       ABO: No results found for: \"ABO\"  HEME/ENDO:  Results from last 7 days   Lab Units 11/22/23  0525 11/21/23  0316 11/21/23  0315   FERRITIN ng/mL 92  --   --    IRON SATURATION % 11*  --   --    TSH mIU/L  --  1.41  --    HEMOGLOBIN A1C %  --   --  5.5      CARDIAC:   Results from last 7 days   Lab Units 11/22/23  0525 11/22/23  0116 11/21/23  2358 11/21/23  1619 11/21/23  1331 11/21/23  0811 11/21/23  0316 11/21/23  0315 11/20/23  2252 11/20/23  1325   TROPHS ng/L 606* 639* 631* 657* 636*  --  533*  --  362* 168*   BNP pg/mL  --   --   --   --   --  3,373*  --  >5,000*  --  238*          CT chest abdomen pelvis wo IV contrast    Result Date: 11/22/2023  Interpreted By:  Wei Mccain  and Horacio Pearson STUDY: CT CHEST ABDOMEN PELVIS WO CONTRAST;  11/21/2023 10:41 pm   INDICATION: Signs/Symptoms:Hypoxic, gastroenteritis symptoms.  Assessment of pneumonia versus fluid on chest and looking for infectious source in abdomen..   COMPARISON: CT angio chest dated 03/21/2023; " CT abdomen and pelvis dated 03/21/2023   ACCESSION NUMBER(S): [id number]   ORDERING CLINICIAN: BRITTANY TINSLEY   TECHNIQUE: CT of the chest, abdomen and pelvis was performed. Contiguous axial images were obtained at 3 mm slice thickness through the chest, abdomen and pelvis. Coronal and sagittal reconstructions at 3 mm slice thickness were performed.  No intravenous or oral contrast agents were administered.   FINDINGS: Please note that the study is limited without intravenous contrast.   CHEST:     LUNG/PLEURA/LARGE AIRWAYS: Bilateral ground-glass opacities and smooth interlobular septal thickening compatible with edema has resolved from the prior CT from 03/29/2023. Ground-glass and reticular opacities in the dependent portion of the right upper lobe have mildly increased since the prior study. Similar mild bibasilar atelectasis. Background of mild-to-moderate centrilobular and paraseptal emphysema. No pleural effusions or pneumothorax.   VESSELS: Aorta and pulmonary arteries are normal caliber.  Moderate atherosclerotic changes are noted of the aorta and branching vessels. Severe coronary artery calcifications are present. Right internal jugular approach [place]-Alex catheter tip terminates in the proximal right interlobar pulmonary artery. Left chest wall pacemaker generator is noted with leads terminating in the right ventricle.   HEART: The heart is enlarged.  No pericardial effusion. A mitral valve prosthesis is noted.   MEDIASTINUM AND LELA: No mediastinal, hilar or axillary lymph nodes are present.  There is a moderate to large sliding hiatal hernia.   CHEST WALL AND LOWER NECK: Postsurgical changes in the right subclavian region. The visualized thyroid gland appears within normal limits. Median sternotomy wires are noted.   ABDOMEN:   LIVER: The liver is normal in size without evidence of focal liver lesions.   BILE DUCTS: The bile ducts are not dilated.   GALLBLADDER: There are radiopaque gallstones measuring  up to 1.2 cm with no evidence of wall thickening or pericholecystic fluid.   PANCREAS: There are scattered pancreatic parenchymal calcifications.   SPLEEN: Within normal limits.   ADRENAL GLANDS: Bilateral adrenal glands appear normal.   KIDNEYS AND URETERS: The kidneys are normal in size. No hydroureteronephrosis or nephroureterolithiasis is present.   PELVIS:   BLADDER: The urinary bladder is decompressed with a Willis catheter in place. Air within the urinary bladder lumen likely related to instrumentation.   REPRODUCTIVE ORGANS: The uterus is present.   BOWEL: Moderate to large hiatal hernia. Otherwise, the stomach is unremarkable.  The small and large bowel are normal in caliber and demonstrate no wall thickening.  The appendix appears normal.   VESSELS: There is no aneurysmal dilatation of the abdominal aorta. The IVC is within normal limits. There is moderate atherosclerotic calcification of the abdominal aorta and its branches.   PERITONEUM/RETROPERITONEUM/LYMPH NODES: There is no free or loculated fluid collection, no free intraperitoneal air.  The retroperitoneum appears unremarkable.  No enlarged mesenteric lymph nodes.   ABDOMINAL WALL: There is a small fat containing umbilical hernia. Otherwise, the abdominal wall soft tissues appear normal.   BONES: No suspicious osseous lesions are present. Degenerative discogenic disease is noted in the lower thoracic and lumbar spine.       Chest 1. Ground-glass opacities in the dependent portion of the right upper and lower lobe may reflect residual edema/atelectasis, with pneumonia not excluded. 2. Right internal jugular approach [place]-Alex catheter tip terminates in the proximal right interlobar pulmonary artery. Consider retraction. 3. Cardiomegaly consistent with known history of systolic heart failure. 4. Moderate to large sliding hiatal hernia.   Abdomen-Pelvis 1. No acute findings in the abdomen or pelvis 2. Scattered pancreatic parenchymal calcifications.  Correlate with history of chronic pancreatitis. 3. Cholelithiasis without evidence of acute cholecystitis.   I personally reviewed the images/study and I agree with the findings as stated above by resident physician, Michel Leonard MD. This study was interpreted at [place], [place].   MACRO: None   Signed by: Wei Mccain 11/22/2023 8:42 AM Dictation workstation:   [id number]    Transthoracic Echo (TTE) Complete    Result Date: 11/21/2023   [facility], [address] [place] [address]                Tel [phone] and Fax [phone] TRANSTHORACIC ECHOCARDIOGRAM REPORT  Patient Name:      FANNIE RIOS       Reading Physician:    [id number] Isaac Rivera MD Study Date:        11/21/2023           Ordering Provider:    [id number] FRANCISCO CHICAS MRN/PID:           [id number]             Fellow: Accession#:        [id number]         Nurse: Date of Birth/Age: 1955 / 68 years Sonographer:          Kary Tran RDCS Gender:            F                    Additional Staff: Height:            162.56 cm            Admit Date:           11/20/2023 Weight:            66.68 kg             Admission Status:     Inpatient -                                                               Critical/Stat                                                               (within 1-3 hours) BSA:               1.72 m2              Encounter#:           [id number]                                         Department Location:  [address] Blood Pressure: 77 /51 mmHg Study Type:    TRANSTHORACIC ECHO (TTE) COMPLETE Diagnosis/ICD: Acute combined systolic (congestive) and diastolic (congestive)                heart failure (CHF)-I50.41 Indication:    Congestive Heart Failure CPT Code:      Echo Complete w Full Doppler-[id number] Patient History: Pertinent History: Hx of Low EF, CHF, CAD, HTN, DLD, CVA, Cardiogenic  shock, MVR                    2019, ICD 2020. Study Detail: The following Echo studies were performed: 2D, M-Mode, Doppler and               color flow. Technically challenging study due to poor acoustic               windows, body habitus and patient lying in supine position. The               patient is intubated. Definity used as a contrast agent for               endocardial border definition. Total contrast used for this               procedure was 3.0 mL via IV push. Unable to obtain suprasternal               notch view.  PHYSICIAN INTERPRETATION: Left Ventricle: The left ventricular systolic function is severely decreased, with an estimated ejection fraction of 15-20%. The patient is in atrial fibrillation which may influence the estimate of left ventricular function and transvalvular flows. There is global hypokinesis of the left ventricle with minor regional variations. The left ventricular cavity size is severely dilated. Left ventricular diastolic filling was not assessed. There is a 1.2 cm x 0.6 cm mobile echodensity along the anterolateral wall of the LV (clips 56, 70) which could represent a calcified papillary muscle, thrombus or vegetation. The mobile echodensity is not well seen with definity echocontrast. Left Atrium: The left atrium is enlarged. Right Ventricle: The right ventricle is normal in size. There is normal right ventricular global systolic function. A device is visualized in the right ventricle. Right Atrium: The right atrium was not well visualized. There is a device visualized in the right atrium. Aortic Valve: The aortic valve is probably trileaflet. There is no evidence of aortic valve regurgitation. The peak instantaneous gradient of the aortic valve is 5.2 mmHg. Mitral Valve: There is a prosthetic mitral valve present. There is a Epic mitral valve bioprosthesis with a 29 mm reported size. There is trace mitral valve regurgitation. Mean mitral gradient of 6.5 mmHg at a heart rate  of 82 bpm. Tricuspid Valve: The tricuspid valve is structurally normal. There is trace tricuspid regurgitation. The Doppler estimated RVSP is within normal limits at 28.6 mmHg. Pulmonic Valve: The pulmonic valve is not well visualized. The pulmonic valve regurgitation was not well visualized. Pericardium: There is a trivial pericardial effusion. Aorta: The aortic root is normal. Systemic Veins: The inferior vena cava appears to be of normal size. In comparison to the previous echocardiogram(s): Compared with study from 3/21/2023, the mobile echodensity was seen on the echo from 3/21/23 and 7/14/2021 and intraoperative SHRUTHI in 2019 and appears similar. Current mitral mean gradient is similar to prior (current HR 82 bpm, previously 109 bpm).  CONCLUSIONS:  1. Left ventricular systolic function is severely decreased with a 15-20% estimated ejection fraction.  2. There is global hypokinesis of the left ventricle with minor regional variations.  3. Left ventricular cavity size is severely dilated.  4. There is a 1.2 cm x 0.6 cm mobile echodensity along the anterolateral wall of the LV (clips 56, 70) which could represent a calcified papillary muscle, thrombus or vegetation. The mobile echodensity is not well seen with definity echocontrast.  5. RVSP within normal limits.  6. There is a Epic mitral valve bioprosthesis with a 29 mm reported size. There is trace mitral valve regurgitation. Mean mitral gradient of 6.5 mmHg at a heart rate of 82 bpm.  7. Compared with study from 3/21/2023, the mobile echodensity was seen on the echo from 3/21/23 and 7/14/2021 and intraoperative SHRUTHI in 2019 and appears similar. Current mitral mean gradient is similar to prior (current HR 82 bpm, previously 109 bpm). QUANTITATIVE DATA SUMMARY: 2D MEASUREMENTS:                           Normal Ranges: Ao Root d:     3.00 cm    (2.0-3.7cm) IVSd:          1.03 cm    (0.6-1.1cm) LVPWd:         1.26 cm    (0.6-1.1cm) LVIDd:         5.90 cm     (3.9-5.9cm) LV Mass Index: 167.1 g/m2 LA VOLUME:                     Normal Ranges: LA Vol A2C: 65.9 ml LV SYSTOLIC FUNCTION BY 2D PLANIMETRY (MOD):                     Normal Ranges: EF-A4C View: 34.5 % (>=55%) EF-A2C View: 10.5 % EF-Biplane:  23.0 % LV DIASTOLIC FUNCTION:                      Normal Ranges: MV Peak E:  1.45 m/s (0.7-1.2 m/s) MV Peak A:  1.92 m/s (0.42-0.7 m/s) E/A Ratio:  0.76     (1.0-2.2) MV e'       0.03 m/s (>8.0) E/e' Ratio: 48.46    (<8.0) a'          0.04 m/s MITRAL VALVE:                       Normal Ranges: MV Vmax:    2.04 m/s  (<=1.3m/s) MV peak P.6 mmHg (<5mmHg) MV mean P.5 mmHg  (<2mmHg) MV VTI:     38.89 cm  (10-13cm) MV DT:      237 msec  (150-240msec) AORTIC VALVE:                         Normal Ranges: AoV Vmax:      1.14 m/s (<=1.7m/s) AoV Peak P.2 mmHg (<20mmHg) LVOT Max Edvin:  0.72 m/s (<=1.1m/s) LVOT VTI:      10.09 cm LVOT Diameter: 1.86 cm  (1.8-2.4cm) AoV Area,Vmax: 1.73 cm2 (2.5-4.5cm2)  RIGHT VENTRICLE: RV s' 0.06 m/s TRICUSPID VALVE/RVSP:                             Normal Ranges: Peak TR Velocity: 2.53 m/s RV Syst Pressure: 28.6 mmHg (< 30mmHg) IVC Diam:         2.00 cm  [id number] Isaac Rivera MD Electronically signed on 2023 at 4:55:38 PM  ** Final **     XR chest 1 view    Result Date: 2023  Interpreted By:  Roly Conti and Liller Gregory STUDY: XR CHEST 1 VIEW;  2023 2:57 am   INDICATION: Signs/Symptoms:[place] placement.   COMPARISON: 2023   ACCESSION NUMBER(S): [id number]   ORDERING CLINICIAN: KARLEY MAN   FINDINGS: AP radiograph of the chest was provided.   Left chest wall implantable pacemaker/defibrillator with leads projecting over the expected location of the right atrial appendage and right ventricle. Endotracheal tube terminates3.8 cm from the lena. Right internal jugular [place]-Alex catheter projects over the expected location of the distal right main pulmonary artery. Enteric tube courses past the diaphragm and  terminates outside the field of view.   CARDIOMEDIASTINAL SILHOUETTE: Cardiomediastinal silhouette is normal in size and configuration. Aortic arch calcifications are seen.   LUNGS: Interval improvement in aeration of the right upper lung zone when compared to prior radiograph. There is persistent mild hazy opacity within the area. No new focal consolidation, pleural effusion, or pneumothorax.   ABDOMEN: No remarkable upper abdominal findings.   BONES: No acute osseous changes.       Interval improvement of the previously noted opacification of the right upper lung when compared to prior radiograph. No new consolidation, effusion, or pneumothorax.   I personally reviewed the images/study and I agree with the findings as stated above by resident physician, Dr. Alex Sterling. The study was interpreted at [facility] in [facility].   Signed by: Roly Conti 11/21/2023 11:05 AM Dictation workstation:   [id number]        ASSESSMENT AND PLAN:   Melissa Marinelli is a 67 y/o F with PMHx of HFrEF (EF 15-20%), CAD s/p NSTEMI with RIRI to LCx (Jan 2016), s/p MVR (June 2019), s/p ICD placement 5/2020, COPD (never formally diagnosed), HTN, HLD, GERD, CVA, CKD 3B and DM2 who presented to the ED with a chief complaint of non-bloody, non-bilious emesis and diarrhea at home. In the ED she became hypoxic and tachypneic and was trialed on BiPAP and eventually intubated 2/2 inability to protect her airway (11/21). She was admitted to the HFICU for concern for CHF exacerbation. Upon arrival SGC was completed and initial numbers where /74 (90), CVP 5, PAP 29/14(18), W 12, CO/CI 3.2/1.89 (f) 2.5/1.45(t), SVR 2125, SVO2 66% (note that Hgb is 14.3). She was started on a nipride gtt + dobutamine 2.5 mcg/kg/min briefly but was weaned off 11/21. Also started on broad spectrum abx given concern for aspiration PNA. CT chest/abd/pelvis completed 11/21 which showed ground glass opacities in the RUL  and cholelithiasis without evidence of acute cholecystitis. She was extubated to RA 11/ 21. Hospital course c/b PATSY on CKD IIIB for which fluids were given + home farxiga was held briefly. Sputum cx growing staph aureus and she will complete a 5 day course of cefepime (11/21-11/25). Strep PNA, legionella and blood cultures were negative. Started on IV venofer (11/23) for iron deficiency anemia, likely will benefit from outpatient GI for scope given ongoing anemia every hospital admission. Received some diuresis with IV lasix 40 mg 11/22 and 11/23. Stable for transfer to [facility] 11/23.    Neuro:  #No active issues  A&Ox3  - Serial neuro and pain assessments   - PT/OT Consult  - CAM ICU score every shift  - Sleep/wake cycle normalization     #Substance abuse  Current cigarette and marijuana user   - Nicotine patch ordered PRN      Cardiovascular:  #Acute on Chronic HFrEF, EF 15-20% s/p ICD 5/2020  Home medications: isordil 10 mg BID, ASA 81 mg daily, bumex 0.5 mg daily PRN, farxiga 10 mg daily  TTE (3/21/23): [id number]%. Reduced RV systolic function.  TTE (11/21/23): LVEF 15-20%, 1.2cm x 0.6 cm mobile echodensity along the anterolateral wall of LV which could represent a calcified papillary muscle, thrombus or vegetation (previously seen on echo from 3/21/23 and 7/14/21)  Admit weight: 67 kg   Daily weight (11/23): pending  Opening SGC #s (11/21): /74 (90), CVP 5, PAP 29/14(18), W 12, CO/CI 3.2/1.89 (f) 2.5/1.45(t), SVR 2125, SVO2 66% (note that Hgb is 14.3)  Closing SGC #s (11/22): MAP 69, CVP 13, PAP 50/30 (38), W 17, SVR 1508, [place] CO/CI 4.4/2.6, SVO2 65% on isordil 10 mg BID   Nipride + dobutamine 2.5 mcg/kg/min weaned off 11/21  Device last interrogated 10/23/2023    - C/w home isordil 10 mg BID  - C/w home farxiga 10 mg daily   - 40 mg IV lasix x1 (11/22 and 11/23)  - Daily standing weights, 2gm sodium diet, 2L fluid restriction, strict I&Os     #CAD   #NSTEMI s/p RIRI to Lcx (Jan 2016)  #Hx of Mitral Valve  repair (June 2019) w/ Dr Montana  29mm Epic Bioprosthetc valve   EKG (11/20) NSR, , no acute ST elevation   Lipid panel (9/1/23): cholesterol 227, HDL 56, , , VLDL 21  - C/w ASA, not on a statin at home      Pulmonary:   #Acute Hypoxic Respiratory Failure (resolved)   #Staph Aureus PNA   #Hx of COPD   ETT (11/20-11/21)  CXR (11/21) RUL opacification  CT Chest (11/21): bilateral ground glass opacities and smooth interlobular septal thickening. Mild-moderate centrilobular and paraseptal emphysema   Procalcitonin (11/21): 3.44  Strep PNA + legionella + blood cultures negative  Respiratory Cx (11/21)+ MSSA  S/p vancomycin (11/20-11/21), MRSA (11/21) negative  S/p azithromycin (11/21/11/23)  - Continue to follow infectious workup  - C/w cefepime (11/21-11/25)  - Duonebs q6 PRN   - Pulmonary following, appreciate recommendations    GI:  #GERD  #Constipation  #Cholelithiasis  #N/V/D (resolved)  CT A/P (11/21) with cholelithiasis, no evidence of cholecystitis  - Bowel regimen: luz-colace BID and miralax PRN  - PPI   - Zofran PRN  - Stool PCR pending collection      :  #CKD IIIB  Baseline sCr ~1.3-1.4  - I/Os  - Avoid hypotension and nephrotoxic agents     Heme:  #Iron deficiency anemia   Labs (11/22): iron 25, TIBC 231, % sat 11, ferritin 92  - Order IV venofer 200 mg x 5 doses (11/23-11/27)  - Will benefit from outpatient GI given ongoing iron deficiency anemia      Endo:  #DM2  Euglycemic, HgbA1c 5.5 (11/2023)  - Sliding scale ordered     #Thyroid  TSH (11/21) 1.41  - CTM      ID:  #Leukocytosis (resolved)  #PNA  WBC 12K-> 6K, afebrile, nontoxic  - See pulmonary plan as above  - Trend temps q4h     PHYSICAL AND OCCUPATIONAL THERAPY: ordered    LINES:  PIVs  Cordis + SGC (11/21-11/23)  Willis (11/20-11/22)    DVT: lovenox   VAP BUNDLE: N/A  ULCER PPX: PPI  GLYCEMIC CONTROL: Sliding scale ordered   BOWEL CARE: Lzu-colace BID +miralax   INDWELLING CATHETER: present   NUTRITION: Adult diet Cardiac; 70  gm fat; 2 - 3 grams Sodium      EMERGENCY CONTACT: Extended Emergency Contact Information  Primary Emergency Contact: Sarah Caruso  Home Phone: [phone]  Relation: Child  FAMILY UPDATE: Updated over the phone 11/23  CODE STATUS: Full Code  DISPO: Transfer to [facility]    Patient seen and assessed with Dr. Keene     _________________________________________________  Andre Bryant PA-C

## 2023-11-24 PROBLEM — J96.01 ACUTE RESPIRATORY FAILURE WITH HYPOXEMIA (MULTI): Status: RESOLVED | Noted: 2023-04-10 | Resolved: 2023-11-24

## 2023-11-24 PROBLEM — N17.9 ACUTE RENAL FAILURE ON DIALYSIS (CMS-HCC): Status: RESOLVED | Noted: 2023-04-10 | Resolved: 2023-11-24

## 2023-11-24 PROBLEM — Z99.2 ACUTE RENAL FAILURE ON DIALYSIS (CMS-HCC): Status: RESOLVED | Noted: 2023-04-10 | Resolved: 2023-11-24

## 2023-11-24 LAB
ALBUMIN SERPL BCP-MCNC: 3.7 G/DL (ref 3.4–5)
ANION GAP SERPL CALC-SCNC: 15 MMOL/L (ref 10–20)
APPEARANCE UR: CLEAR
BACTERIA SPEC RESP CULT: ABNORMAL
BILIRUB UR STRIP.AUTO-MCNC: NEGATIVE MG/DL
BUN SERPL-MCNC: 21 MG/DL (ref 6–23)
CALCIUM SERPL-MCNC: 8.9 MG/DL (ref 8.6–10.6)
CHLORIDE SERPL-SCNC: 105 MMOL/L (ref 98–107)
CO2 SERPL-SCNC: 23 MMOL/L (ref 21–32)
COLOR UR: YELLOW
CREAT SERPL-MCNC: 1.37 MG/DL (ref 0.5–1.05)
ERYTHROCYTE [DISTWIDTH] IN BLOOD BY AUTOMATED COUNT: 17.5 % (ref 11.5–14.5)
GFR SERPL CREATININE-BSD FRML MDRD: 42 ML/MIN/1.73M*2
GLUCOSE BLD MANUAL STRIP-MCNC: 100 MG/DL (ref 74–99)
GLUCOSE BLD MANUAL STRIP-MCNC: 110 MG/DL (ref 74–99)
GLUCOSE BLD MANUAL STRIP-MCNC: 111 MG/DL (ref 74–99)
GLUCOSE BLD MANUAL STRIP-MCNC: 129 MG/DL (ref 74–99)
GLUCOSE BLD MANUAL STRIP-MCNC: 90 MG/DL (ref 74–99)
GLUCOSE SERPL-MCNC: 69 MG/DL (ref 74–99)
GLUCOSE UR STRIP.AUTO-MCNC: ABNORMAL MG/DL
GRAM STN SPEC: ABNORMAL
GRAM STN SPEC: ABNORMAL
HCT VFR BLD AUTO: 36.3 % (ref 36–46)
HGB BLD-MCNC: 11.2 G/DL (ref 12–16)
HOLD SPECIMEN: NORMAL
INTERPRETATION FOR ANTI-PLATELET FACTOR 4 ANTIBODY: NEGATIVE
KETONES UR STRIP.AUTO-MCNC: NEGATIVE MG/DL
LEUKOCYTE ESTERASE UR QL STRIP.AUTO: NEGATIVE
MAGNESIUM SERPL-MCNC: 2.73 MG/DL (ref 1.6–2.4)
MCH RBC QN AUTO: 28.6 PG (ref 26–34)
MCHC RBC AUTO-ENTMCNC: 30.9 G/DL (ref 32–36)
MCV RBC AUTO: 93 FL (ref 80–100)
NITRITE UR QL STRIP.AUTO: NEGATIVE
NRBC BLD-RTO: 0 /100 WBCS (ref 0–0)
PH UR STRIP.AUTO: 6 [PH]
PHOSPHATE SERPL-MCNC: 2.8 MG/DL (ref 2.5–4.9)
PLATELET # BLD AUTO: 99 X10*3/UL (ref 150–450)
POTASSIUM SERPL-SCNC: 4.3 MMOL/L (ref 3.5–5.3)
POTASSIUM SERPL-SCNC: 6.4 MMOL/L (ref 3.5–5.3)
PROT UR STRIP.AUTO-MCNC: ABNORMAL MG/DL
RBC # BLD AUTO: 3.92 X10*6/UL (ref 4–5.2)
RBC # UR STRIP.AUTO: ABNORMAL /UL
RBC #/AREA URNS AUTO: NORMAL /HPF
SERUM AND PLATELET FACTOR 4: 0.16 OD UNITS
SODIUM SERPL-SCNC: 137 MMOL/L (ref 136–145)
SP GR UR STRIP.AUTO: 1.01
SQUAMOUS #/AREA URNS AUTO: NORMAL /HPF
UROBILINOGEN UR STRIP.AUTO-MCNC: <2 MG/DL
WBC # BLD AUTO: 7 X10*3/UL (ref 4.4–11.3)
WBC #/AREA URNS AUTO: NORMAL /HPF

## 2023-11-24 PROCEDURE — 36415 COLL VENOUS BLD VENIPUNCTURE: CPT | Performed by: STUDENT IN AN ORGANIZED HEALTH CARE EDUCATION/TRAINING PROGRAM

## 2023-11-24 PROCEDURE — 36415 COLL VENOUS BLD VENIPUNCTURE: CPT | Performed by: NURSE PRACTITIONER

## 2023-11-24 PROCEDURE — 85027 COMPLETE CBC AUTOMATED: CPT | Performed by: STUDENT IN AN ORGANIZED HEALTH CARE EDUCATION/TRAINING PROGRAM

## 2023-11-24 PROCEDURE — 82947 ASSAY GLUCOSE BLOOD QUANT: CPT

## 2023-11-24 PROCEDURE — 2500000001 HC RX 250 WO HCPCS SELF ADMINISTERED DRUGS (ALT 637 FOR MEDICARE OP): Performed by: NURSE PRACTITIONER

## 2023-11-24 PROCEDURE — 81001 URINALYSIS AUTO W/SCOPE: CPT | Performed by: NURSE PRACTITIONER

## 2023-11-24 PROCEDURE — 2500000002 HC RX 250 W HCPCS SELF ADMINISTERED DRUGS (ALT 637 FOR MEDICARE OP, ALT 636 FOR OP/ED): Performed by: STUDENT IN AN ORGANIZED HEALTH CARE EDUCATION/TRAINING PROGRAM

## 2023-11-24 PROCEDURE — 2500000001 HC RX 250 WO HCPCS SELF ADMINISTERED DRUGS (ALT 637 FOR MEDICARE OP): Performed by: STUDENT IN AN ORGANIZED HEALTH CARE EDUCATION/TRAINING PROGRAM

## 2023-11-24 PROCEDURE — 93010 ELECTROCARDIOGRAM REPORT: CPT | Performed by: INTERNAL MEDICINE

## 2023-11-24 PROCEDURE — 84132 ASSAY OF SERUM POTASSIUM: CPT | Performed by: NURSE PRACTITIONER

## 2023-11-24 PROCEDURE — 2500000004 HC RX 250 GENERAL PHARMACY W/ HCPCS (ALT 636 FOR OP/ED): Performed by: STUDENT IN AN ORGANIZED HEALTH CARE EDUCATION/TRAINING PROGRAM

## 2023-11-24 PROCEDURE — 1200000002 HC GENERAL ROOM WITH TELEMETRY DAILY

## 2023-11-24 PROCEDURE — 99232 SBSQ HOSP IP/OBS MODERATE 35: CPT | Performed by: INTERNAL MEDICINE

## 2023-11-24 PROCEDURE — 82310 ASSAY OF CALCIUM: CPT | Performed by: STUDENT IN AN ORGANIZED HEALTH CARE EDUCATION/TRAINING PROGRAM

## 2023-11-24 PROCEDURE — 97530 THERAPEUTIC ACTIVITIES: CPT | Mod: GP,CQ

## 2023-11-24 PROCEDURE — 83735 ASSAY OF MAGNESIUM: CPT | Performed by: STUDENT IN AN ORGANIZED HEALTH CARE EDUCATION/TRAINING PROGRAM

## 2023-11-24 PROCEDURE — 97116 GAIT TRAINING THERAPY: CPT | Mod: GP,CQ

## 2023-11-24 RX ORDER — ASPIRIN 81 MG/1
81 TABLET ORAL DAILY
Status: DISCONTINUED | OUTPATIENT
Start: 2023-11-24 | End: 2023-11-26 | Stop reason: HOSPADM

## 2023-11-24 RX ORDER — ASPIRIN 81 MG/1
81 TABLET ORAL DAILY
Status: DISCONTINUED | OUTPATIENT
Start: 2023-11-25 | End: 2023-11-24

## 2023-11-24 RX ADMIN — IPRATROPIUM BROMIDE AND ALBUTEROL SULFATE 3 ML: .5; 3 SOLUTION RESPIRATORY (INHALATION) at 15:16

## 2023-11-24 RX ADMIN — CEFEPIME 2 G: 1 INJECTION, SOLUTION INTRAVENOUS at 11:44

## 2023-11-24 RX ADMIN — CEFEPIME 2 G: 1 INJECTION, SOLUTION INTRAVENOUS at 23:05

## 2023-11-24 RX ADMIN — ISOSORBIDE DINITRATE 10 MG: 10 TABLET ORAL at 09:36

## 2023-11-24 RX ADMIN — DAPAGLIFLOZIN 10 MG: 10 TABLET, FILM COATED ORAL at 09:35

## 2023-11-24 RX ADMIN — SENNOSIDES AND DOCUSATE SODIUM 1 TABLET: 8.6; 5 TABLET ORAL at 09:36

## 2023-11-24 RX ADMIN — IPRATROPIUM BROMIDE AND ALBUTEROL SULFATE 3 ML: .5; 3 SOLUTION RESPIRATORY (INHALATION) at 22:55

## 2023-11-24 RX ADMIN — IRON SUCROSE 200 MG: 20 INJECTION, SOLUTION INTRAVENOUS at 05:27

## 2023-11-24 RX ADMIN — SENNOSIDES AND DOCUSATE SODIUM 1 TABLET: 8.6; 5 TABLET ORAL at 20:20

## 2023-11-24 RX ADMIN — ASPIRIN 81 MG: 81 TABLET, COATED ORAL at 16:54

## 2023-11-24 RX ADMIN — ISOSORBIDE DINITRATE 10 MG: 10 TABLET ORAL at 15:35

## 2023-11-24 ASSESSMENT — COGNITIVE AND FUNCTIONAL STATUS - GENERAL
HELP NEEDED FOR BATHING: A LITTLE
DRESSING REGULAR UPPER BODY CLOTHING: A LITTLE
DAILY ACTIVITIY SCORE: 20
MOVING FROM LYING ON BACK TO SITTING ON SIDE OF FLAT BED WITH BEDRAILS: A LITTLE
CLIMB 3 TO 5 STEPS WITH RAILING: A LITTLE
TURNING FROM BACK TO SIDE WHILE IN FLAT BAD: A LITTLE
WALKING IN HOSPITAL ROOM: A LITTLE
STANDING UP FROM CHAIR USING ARMS: A LITTLE
TOILETING: A LITTLE
STANDING UP FROM CHAIR USING ARMS: A LITTLE
DRESSING REGULAR UPPER BODY CLOTHING: A LITTLE
TOILETING: A LITTLE
MOBILITY SCORE: 17
CLIMB 3 TO 5 STEPS WITH RAILING: A LOT
TURNING FROM BACK TO SIDE WHILE IN FLAT BAD: A LITTLE
MOBILITY SCORE: 18
MOVING TO AND FROM BED TO CHAIR: A LITTLE
MOVING TO AND FROM BED TO CHAIR: A LITTLE
MOBILITY SCORE: 18
WALKING IN HOSPITAL ROOM: A LITTLE
WALKING IN HOSPITAL ROOM: A LITTLE
STANDING UP FROM CHAIR USING ARMS: A LITTLE
MOVING TO AND FROM BED TO CHAIR: A LITTLE
MOVING FROM LYING ON BACK TO SITTING ON SIDE OF FLAT BED WITH BEDRAILS: A LITTLE
DAILY ACTIVITIY SCORE: 20
MOVING FROM LYING ON BACK TO SITTING ON SIDE OF FLAT BED WITH BEDRAILS: A LITTLE
TURNING FROM BACK TO SIDE WHILE IN FLAT BAD: A LITTLE
DRESSING REGULAR LOWER BODY CLOTHING: A LITTLE
DRESSING REGULAR LOWER BODY CLOTHING: A LITTLE
CLIMB 3 TO 5 STEPS WITH RAILING: A LITTLE
HELP NEEDED FOR BATHING: A LITTLE

## 2023-11-24 ASSESSMENT — PAIN - FUNCTIONAL ASSESSMENT
PAIN_FUNCTIONAL_ASSESSMENT: 0-10
PAIN_FUNCTIONAL_ASSESSMENT: 0-10

## 2023-11-24 ASSESSMENT — PAIN SCALES - GENERAL
PAINLEVEL_OUTOF10: 0 - NO PAIN

## 2023-11-24 NOTE — HOSPITAL COURSE
Melissa Marinelli is  67yo female with a past medical history significant for HFrEF (EF 15-20%), CAD s/p NSTEMI with RIRI to LCx (Jan 2016), s/p MVR (June 2019),  s/p ICD placement 5/2020, COPD (never formally diagnosed), HTN, HL, GERD, CVA, CKD IIIB and DMII who presented to the ED with a chief complaint of non-bloody, non-bilious emesis and diarrhea at home.  She was given a 500ml fluid bolus as it was thought that she might be dehydrated however she became hypoxic and tachypneic.  She was initially placed on a non-rebreather mask and was upgraded to  BIPAP and was doing well, however she continued to have nausea and vomiting prompting intubation for airway protection.   Basic labs, and a CXR were perfomed in the ED showing a BNP of 238, NML Lipase level and hyperinflated lungs with focal lung consolidations.  Prior to intubation patient denied sick contacts, fever, chills or urinary symptoms.  The decision was made to admit her to HFICU to r/o CHF exacerbation.        Interval history: Patient was admitted 9/2023 for COPD exacerbation and acute hypoxic hypercapnic respiratory respiratory failure with mental status changes that improved with NIPPV.       Upon arrival to HFICU, Patient was intubated, sedated and unable to answer questions. Upon arrival SGC was completed and initial numbers where /74 (90), CVP 5, PAP 29/14(18), W 12, CO/CI 3.2/1.89 (f) 2.5/1.45(t), SVR 2125, SVO2 66% (note that Hgb is 14.3). She was started on a nipride gtt + dobutamine 2.5 mcg/kg/min briefly but was weaned off 11/21. Also started on broad spectrum abx given concern for aspiration PNA. CT chest/abd/pelvis completed 11/21 which showed ground glass opacities in the RUL and cholelithiasis without evidence of acute cholecystitis. She was extubated to RA 11/ 21. Hospital course c/b PATSY on CKD IIIB for which fluids were given + home farxiga was held briefly. Sputum cx growing staph aureus and she will complete a 5 day course of cefepime  (11/21-11/25). Strep PNA, legionella and blood cultures were negative. Started on IV venofer (11/23) for iron deficiency anemia, likely will benefit from outpatient GI for scope given ongoing anemia every hospital admission. Received some diuresis with IV lasix 40 mg 11/22 and 11/23. Stable for transfer to [facility] 11/23.     Floor Course:   Patient completed cefepime AB course for Staph aureus PNA on 11/25. She had GI upset with IV iron so iron was discontinued. Gastroenterology referral placed upon discharge for further outpatient workup and investigation.    She was noted to have thrombocytopenia, lovenox held. PF4 returned and was negative.     Echo 11/21/23 showed EF 15-20%, 1.2cm x 0.6 cm mobile echodensity along the anterolateral wall of LV which could represent a calcified papillary muscle, thrombus or vegetation (previously seen on echo from 3/21/23 and 7/14/21). She received PRN doses of lasix IV during hospital course. Kidney function remained stable. Home dapagliflozin continued. Patient historically not on BB or ACE due to allergy. Had previously been on Entresto but was dc'd for hypotension. Low dose Entresto (half tab low dose 24-26 mg) started this admission. BPs tolerated initiation of Entresto well, orthostatic BPs negative on date of discharge with no reports of dizziness. Home dose of bumex 0.5mg daily PRN continued. Consider MRA initiation in outpatient setting.     Patient will need outpatient determination of lipid lowering therapy, has previously not tolerated statins per daughter and patient.      Patient did not want outpatient PT/OT and wished to go home. Rollator ordered for home use.     Discharge weight: 65.1 kg    After all labs and VS were reviewed the decision was made that the patient was medically stable for discharge.  The patient was discharged in satisfactory condition.    More than 30 minutes were spent in coordinating patient discharge.

## 2023-11-24 NOTE — CARE PLAN
The patient's goals for the shift include      The clinical goals for the shift include Patient will be HDS by end of shift    Over the shift, the patient did not make progress toward the following goals. Barriers to progression include decreased mobility. Recommendations to address these barriers include encourage activity.    Problem: Skin  Goal: Decreased wound size/increased tissue granulation at next dressing change  Outcome: Progressing  Goal: Participates in plan/prevention/treatment measures  Outcome: Progressing  Goal: Prevent/manage excess moisture  Outcome: Progressing

## 2023-11-24 NOTE — PROGRESS NOTES
Physical Therapy    Physical Therapy Treatment    Patient Name: Melissa Marinelli  MRN: [id number]  Today's Date: 11/24/2023  Time Calculation  Start Time: 1113  Stop Time: 1136  Time Calculation (min): 23 min       Assessment/Plan   PT Assessment  Rehab Prognosis: Good  Strengths: Attitude of self, Support of Caregivers  End of Session Communication: Bedside nurse  End of Session Patient Position:  (seated EOB)     PT Plan  Treatment/Interventions: Bed mobility, Transfer training, Gait training, Balance training, Strengthening, Endurance training, Therapeutic exercise, Therapeutic activity  PT Plan: Skilled PT  PT Frequency: 4 times per week  PT Discharge Recommendations: Moderate intensity level of continued care  Equipment Recommended upon Discharge:  (Rollator)  PT Recommended Transfer Status: Assist x1  PT - OK to Discharge: Yes      General Visit Information:   PT  Visit  PT Received On: 11/24/23  Response to Previous Treatment: Patient with no complaints from previous session.  Prior to Session Communication: Bedside nurse  Patient Position Received: Bed, 3 rail up  Family/Caregiver Present: No  General Comment: Pt is pleasant and cooperative with therapy, motivated.     Subjective   Precautions:  Precautions  Medical Precautions: Cardiac precautions  Vital Signs:  Vital Signs  Heart Rate: 88  Heart Rate Source: Monitor    Objective   Pain:  Pain Assessment  Pain Assessment: 0-10  Pain Score: 0 - No pain  Cognition:  Cognition  Overall Cognitive Status: Within Functional Limits  Orientation Level: Oriented X4  Lines/Tubes/Drains:       PT Treatments:     Therapeutic Activity  Therapeutic Activity Performed: Yes  Therapeutic Activity 1: Tolerated dynamic activites within room with SBA and no major LOB or unsteadiness.     Bed Mobility  Bed Mobility: Yes  Bed Mobility 1  Bed Mobility 1: Supine to sitting  Level of Assistance 1: Close supervision  Bed Mobility Comments 1: HOB elevated  Ambulation/Gait  Training  Ambulation/Gait Training Performed: Yes  Ambulation/Gait Training 1  Surface 1: Level tile  Device 1: Rolling walker  Assistance 1: Close supervision  Comments/Distance (ft) 1: 200 ft, 15 ft x 2  Transfers  Transfer: Yes  Transfer 1  Transfer From 1: Sit to  Transfer to 1: Stand  Technique 1: Sit to stand  Transfer Level of Assistance 1: Close supervision  Trials/Comments 1: x2 trials  Transfers 2  Transfer From 2: Stand to  Transfer to 2: Sit  Technique 2: Stand to sit  Transfer Level of Assistance 2: Close supervision  Trials/Comments 2: x2 trials             Outcome Measures:  [facility] Basic Mobility  Turning from your back to your side while in a flat bed without using bedrails: A little  Moving from lying on your back to sitting on the side of a flat bed without using bedrails: A little  Moving to and from bed to chair (including a wheelchair): A little  Standing up from a chair using your arms (e.g. wheelchair or bedside chair): A little  To walk in hospital room: A little  Climbing 3-5 steps with railing: A little  Basic Mobility - Total Score: 18                            Education Documentation  Mobility Training, taught by Linda Pike PTA at 11/24/2023 11:58 AM.  Learner: Patient  Readiness: Acceptance  Method: Explanation  Response: Verbalizes Understanding    Education Comments  No comments found.          OP EDUCATION:       Encounter Problems       Encounter Problems (Active)       Balance       Pt will score >24 on Tinetti for low risk of falls (Progressing)       Start:  11/22/23    Expected End:  12/13/23               Mobility       Patient will ambulate >100 ft with LRAD and no LOB or LE buckling (Met)       Start:  11/22/23    Expected End:  12/13/23    Resolved:  11/24/23         Patient will ascend/descend a flight of stairs with handrail and no LOB with supervision (Progressing)       Start:  11/22/23    Expected End:  12/13/23                  Encounter Problems (Resolved)        Transfers       Patient to transfer to and from sit to supine with supervision (Met)       Start:  11/22/23    Expected End:  12/13/23    Resolved:  11/24/23         Patient will transfer sit to and from stand with LRAD and supervision  (Met)       Start:  11/22/23    Expected End:  12/13/23    Resolved:  11/24/23 11/24/23 at 11:59 AM   Linda Pike PTA   Rehab Office: [phone]

## 2023-11-24 NOTE — PROGRESS NOTES
Subjective Data:  Patient states breathing is better, still feels congested and coughing, walking well. Weight same as yesterday. Feels discomfort in her superpubic area, some dysuria yesterday, does not feel like she empties out completely when she urinates.     Overnight Events:    Patient had a reaction after IV iron last evening and does not want any more iron. Her symptoms of nausea, GI upset, HR and BP rise resolved.      Objective Data:  Last Recorded Vitals:  Vitals:    11/24/23 0813 11/24/23 0947 11/24/23 1113 11/24/23 1220   BP: 98/61 132/78  118/62   BP Location: Right arm Right arm  Right arm   Patient Position: Lying Lying  Lying   Pulse: 72 77 88 78   Resp: 18 18  18   Temp: 36.5 °C (97.7 °F)   36.8 °C (98.2 °F)   TempSrc: Temporal   Temporal   SpO2: 96%   97%   Weight:       Height:           Last Labs:  CBC - 11/24/2023:  8:46 AM  7.0 11.2 99    36.3      CMP - 11/24/2023:  8:46 AM  8.9 6.3 17 --- 0.6   2.8 3.7 13 100      PTT - 11/21/2023:  3:15 AM  1.1   12.1 27     TROPHS   Date/Time Value Ref Range Status   11/22/2023 05:25  0 - 34 ng/L Final     Comment:     Previous result verified on 11/21/2023 1512 on specimen/case [id number] called with component TRPHS for procedure Troponin I, High Sensitivity with value 636 ng/L.   11/22/2023 01:16  0 - 34 ng/L Final     Comment:     Previous result verified on 11/21/2023 1512 on specimen/case [id number] called with component TRPHS for procedure Troponin I, High Sensitivity with value 636 ng/L.   11/21/2023 11:58  0 - 34 ng/L Final     Comment:     Previous result verified on 11/21/2023 1512 on specimen/case [id number] called with component TRPHS for procedure Troponin I, High Sensitivity with value 636 ng/L.     BNP   Date/Time Value Ref Range Status   11/21/2023 08:11 AM 3,373 0 - 99 pg/mL Final   11/21/2023 03:15 AM >5,000 0 - 99 pg/mL Final     HGBA1C   Date/Time Value Ref Range Status   11/21/2023 03:15 AM 5.5 see  below % Final   08/31/2023 04:37 PM 5.8 % Final     Comment:          Diagnosis of Diabetes-Adults   Non-Diabetic: < or = 5.6%   Increased risk for developing diabetes: 5.7-6.4%   Diagnostic of diabetes: > or = 6.5%  .       Monitoring of Diabetes                Age (y)     Therapeutic Goal (%)   Adults:          >18           <7.0   Pediatrics:    13-18           <7.5                   7-12           <8.0                   0- 6            7.5-8.5   American Diabetes Association. Diabetes Care 33(S1), Jan 2010.     08/31/2023 03:48 PM CANCELED       Comment:          Diagnosis of Diabetes-Adults   Non-Diabetic: < or = 5.6%   Increased risk for developing diabetes: 5.7-6.4%   Diagnostic of diabetes: > or = 6.5%  .       Monitoring of Diabetes                Age (y)     Therapeutic Goal (%)   Adults:          >18           <7.0   Pediatrics:    13-18           <7.5                   7-12           <8.0                   0- 6            7.5-8.5   American Diabetes Association. Diabetes Care 33(S1), Jan 2010.    Result canceled by the ancillary.       VLDL   Date/Time Value Ref Range Status   09/01/2023 05:24 AM 21 0 - 40 mg/dL Final   05/21/2020 09:02 PM 31 0 - 40 mg/dL Final   11/07/2019 07:57 PM 21 0 - 40 mg/dL Final      Last I/O:  I/O last 3 completed shifts:  In: 700 (10.8 mL/kg) [P.O.:600; IV Piggyback:100]  Out: 650 (10 mL/kg) [Urine:650 (0.3 mL/kg/hr)]  Weight: 64.7 kg     Past Cardiology Tests (Last 3 Years):  EKG:  No results found for this or any previous visit from the past 1095 days.    Echo:  Transthoracic Echo (TTE) Complete 11/21/2023    Ejection Fractions:  EF   Date/Time Value Ref Range Status   11/21/2023 04:03 PM 23       Cath:  No results found for this or any previous visit from the past 1095 days.    Stress Test:  No results found for this or any previous visit from the past 1095 days.    Cardiac Imaging:  No results found for this or any previous visit from the past 1095 days.      Inpatient  Medications:  Scheduled medications   Medication Dose Route Frequency    cefepime  2 g intravenous q12h    dapagliflozin propanediol  10 mg oral Daily    [Held by provider] enoxaparin  40 mg subcutaneous q24h    insulin lispro  0-5 Units subcutaneous TID with meals    iron sucrose  200 mg intravenous Daily    isosorbide dinitrate  10 mg oral BID    nicotine  1 patch transdermal Daily    Followed by    [START ON 1/2/2024] nicotine  1 patch transdermal Daily    pantoprazole  40 mg oral Daily before breakfast    sennosides-docusate sodium  1 tablet oral BID     PRN medications   Medication    acetaminophen    dextrose 10 % in water (D10W)    dextrose    glucagon    ipratropium-albuteroL    ondansetron    oxygen    polyethylene glycol     Continuous Medications   Medication Dose Last Rate       Physical Exam:  General: A&OX3 NAD  HEENT: no JVD, dressing for RHC on right neck dry appears to be not intact  Lungs: crackles bilateral lower lobes posteriorly  Cardiovascular: regular rate and rhythm, I/IV systolic murmur left sternal border  Abdomen: soft, distended, bowel sounds present  Extremities:  no edema, pedal pulses palpable   Skin: warm and dry          Assessment/Plan     Melissa Marinelli is a 67 y/o F with PMHx of HFrEF (EF 15-20%), CAD s/p NSTEMI with RIRI to LCx (Jan 2016), s/p MVR (June 2019), s/p ICD placement 5/2020, COPD (never formally diagnosed), HTN, HLD, GERD, CVA, CKD 3B and DM2 who presented to the ED with a chief complaint of non-bloody, non-bilious emesis and diarrhea at home. In the ED she became hypoxic and tachypneic and was trialed on BiPAP and eventually intubated 2/2 inability to protect her airway (11/21). She was admitted to the HFICU for concern for CHF exacerbation. Upon arrival SGC was completed and initial numbers where /74 (90), CVP 5, PAP 29/14(18), W 12, CO/CI 3.2/1.89 (f) 2.5/1.45(t), SVR 2125, SVO2 66% (note that Hgb is 14.3). She was started on a nipride gtt + dobutamine 2.5  mcg/kg/min briefly but was weaned off 11/21. Also started on broad spectrum abx given concern for aspiration PNA. CT chest/abd/pelvis completed 11/21 which showed ground glass opacities in the RUL and cholelithiasis without evidence of acute cholecystitis. She was extubated to RA 11/ 21. Hospital course c/b PATSY on CKD IIIB for which fluids were given + home farxiga was held briefly. Sputum cx growing staph aureus and she will complete a 5 day course of cefepime (11/21-11/25). Strep PNA, legionella and blood cultures were negative. Started on IV venofer (11/23) for iron deficiency anemia, likely will benefit from outpatient GI for scope given ongoing anemia every hospital admission. Received some diuresis with IV lasix 40 mg 11/22 and 11/23. Stable for transfer to [facility] 11/23.       Acute on Chronic HFrEF, EF 15-20% s/p ICD 5/2020  - Home medications: isordil 10 mg BID, ASA 81 mg daily, bumex 0.5 mg daily PRN, farxiga 10 mg daily  - TTE (3/21/23): QSND62-63%. Reduced RV systolic function.  - TTE (11/21/23): LVEF 15-20%, 1.2cm x 0.6 cm mobile echodensity along the anterolateral wall of LV which could represent a calcified papillary muscle, thrombus or vegetation (previously seen on echo from 3/21/23 and 7/14/21)  - Admit weight: 67 kg   - Daily weight: 64.7  - Opening SGC #s (11/21): /74 (90), CVP 5, PAP 29/14(18), W 12, CO/CI 3.2/1.89 (f) 2.5/1.45(t), SVR 2125, SVO2 66% (note that Hgb is 14.3)  - Closing SGC #s (11/22): MAP 69, CVP 13, PAP 50/30 (38), W 17, SVR 1508, Chirag CO/CI 4.4/2.6, SVO2 65% on isordil 10 mg BID   - Nipride + dobutamine 2.5 mcg/kg/min weaned off 11/21  - Device last interrogated 10/23/2023    - C/w home isordil 10 mg BID and farxiga 10 mg daily   - 40 mg IV lasix x1 (11/22 and 11/23)  - allergy to ACE and BB  - Daily standing weights, 2gm sodium diet, 2L fluid restriction, strict I&Os    Thrombocytopenia  - PLT trending down from 239 on admit  - 99 (109, 116, 117, 177, 222, 239)  - PF4  pending     Superpubic discomfort  - some symptoms of UTI including incomplete emptying and dysuria yesterday  - UA      CAD   NSTEMI s/p RIRI to Lcx (Jan 2016)  Hx of Mitral Valve repair (June 2019) w/ Dr Montana  - 29mm Epic Bioprosthetc valve   - EKG (11/20) NSR, , no acute ST elevation   - Lipid panel (9/1/23): cholesterol 227, HDL 56, , , VLDL 21  - C/w ASA, not on a statin at home due to allergy       Acute Hypoxic Respiratory Failure (resolved)   Staph Aureus PNA   Hx of COPD   - ETT (11/20-11/21)  - CXR (11/21) RUL opacification  - CT Chest (11/21): bilateral ground glass opacities and smooth interlobular septal thickening. Mild-moderate centrilobular and paraseptal emphysema   - Procalcitonin (11/21): 3.44  - Strep PNA + legionella + blood cultures negative  - Respiratory Cx (11/21)+ MSSA  - S/p vancomycin (11/20-11/21), MRSA (11/21) negative  - S/p azithromycin (11/21/11/23)  - Continue to follow infectious workup  - C/w cefepime (11/21-11/25)  - Duonebs q6 PRN   - Pulmonary following, appreciate recommendations       GERD  Constipation  Cholelithiasis  N/V/D (resolved)  - CT A/P (11/21) with cholelithiasis, no evidence of cholecystitis  - Bowel regimen: luz-colace BID and miralax PRN  - PPI   - Zofran PRN  - Stool PCR pending collection        CKD IIIB  - Baseline sCr ~1.3-1.4  - Cr today 1.37 (1.42, 1.4, 1.58, 1.22)  - I/Os  - Avoid hypotension and nephrotoxic agents       Iron deficiency anemia   - Labs (11/22): iron 25, TIBC 231, % sat 11, ferritin 92  - Order IV venofer 200 mg x 5 doses (11/23-11/27) discontinued due to GI side effects  - Will benefit from outpatient GI given ongoing iron deficiency anemia        DM2  - Euglycemic, HgbA1c 5.5 (11/2023)  - Sliding scale ordered     Leukocytosis (resolved)  PNA  WBC 12K-> 6K, afebrile, nontoxic  - See pulmonary plan as above  - Trend temps q4h    Substance abuse  - Current cigarette and marijuana user   - Nicotine patch ordered PRN       Dispo: Anticipate DC tomorrow   PHYSICAL AND OCCUPATIONAL THERAPY: ordered, patient does not want outpatient or home PT/OT. Rollator ordered for home.   DVT: lovenox   EMERGENCY CONTACT: Extended Emergency Contact Information  Primary Emergency Contact: Sarah Caruso  Home Phone: [phone]  Relation: Child         Patient seen and assessed with Dr. Kelly          Peripheral IV 11/22/23 22 G Right Forearm (Active)   Site Assessment Clean;Dry;Intact 11/23/23 2331   Dressing Status Clean;Dry 11/23/23 2331   Number of days: 2       Code Status:  Full Code    I spent 60 minutes in the professional and overall care of this patient.        Bucky Naik, APRN-CNP

## 2023-11-25 LAB
ALBUMIN SERPL BCP-MCNC: 3.2 G/DL (ref 3.4–5)
ANION GAP SERPL CALC-SCNC: 13 MMOL/L (ref 10–20)
BACTERIA BLD CULT: NORMAL
BUN SERPL-MCNC: 17 MG/DL (ref 6–23)
CALCIUM SERPL-MCNC: 8.8 MG/DL (ref 8.6–10.6)
CHLORIDE SERPL-SCNC: 109 MMOL/L (ref 98–107)
CO2 SERPL-SCNC: 23 MMOL/L (ref 21–32)
CREAT SERPL-MCNC: 1.28 MG/DL (ref 0.5–1.05)
ERYTHROCYTE [DISTWIDTH] IN BLOOD BY AUTOMATED COUNT: 17.5 % (ref 11.5–14.5)
GFR SERPL CREATININE-BSD FRML MDRD: 45 ML/MIN/1.73M*2
GLUCOSE BLD MANUAL STRIP-MCNC: 141 MG/DL (ref 74–99)
GLUCOSE BLD MANUAL STRIP-MCNC: 151 MG/DL (ref 74–99)
GLUCOSE BLD MANUAL STRIP-MCNC: 90 MG/DL (ref 74–99)
GLUCOSE BLD MANUAL STRIP-MCNC: 93 MG/DL (ref 74–99)
GLUCOSE SERPL-MCNC: 68 MG/DL (ref 74–99)
HCT VFR BLD AUTO: 33.9 % (ref 36–46)
HGB BLD-MCNC: 11.1 G/DL (ref 12–16)
MAGNESIUM SERPL-MCNC: 2.36 MG/DL (ref 1.6–2.4)
MCH RBC QN AUTO: 29.8 PG (ref 26–34)
MCHC RBC AUTO-ENTMCNC: 32.7 G/DL (ref 32–36)
MCV RBC AUTO: 91 FL (ref 80–100)
NRBC BLD-RTO: 0 /100 WBCS (ref 0–0)
PHOSPHATE SERPL-MCNC: 2.4 MG/DL (ref 2.5–4.9)
PLATELET # BLD AUTO: 123 X10*3/UL (ref 150–450)
POTASSIUM SERPL-SCNC: 4.1 MMOL/L (ref 3.5–5.3)
RBC # BLD AUTO: 3.73 X10*6/UL (ref 4–5.2)
SODIUM SERPL-SCNC: 141 MMOL/L (ref 136–145)
WBC # BLD AUTO: 5.2 X10*3/UL (ref 4.4–11.3)

## 2023-11-25 PROCEDURE — 2500000002 HC RX 250 W HCPCS SELF ADMINISTERED DRUGS (ALT 637 FOR MEDICARE OP, ALT 636 FOR OP/ED): Performed by: INTERNAL MEDICINE

## 2023-11-25 PROCEDURE — 82947 ASSAY GLUCOSE BLOOD QUANT: CPT

## 2023-11-25 PROCEDURE — S4991 NICOTINE PATCH NONLEGEND: HCPCS | Performed by: INTERNAL MEDICINE

## 2023-11-25 PROCEDURE — 2500000002 HC RX 250 W HCPCS SELF ADMINISTERED DRUGS (ALT 637 FOR MEDICARE OP, ALT 636 FOR OP/ED): Performed by: STUDENT IN AN ORGANIZED HEALTH CARE EDUCATION/TRAINING PROGRAM

## 2023-11-25 PROCEDURE — 2500000001 HC RX 250 WO HCPCS SELF ADMINISTERED DRUGS (ALT 637 FOR MEDICARE OP): Performed by: STUDENT IN AN ORGANIZED HEALTH CARE EDUCATION/TRAINING PROGRAM

## 2023-11-25 PROCEDURE — 2500000001 HC RX 250 WO HCPCS SELF ADMINISTERED DRUGS (ALT 637 FOR MEDICARE OP)

## 2023-11-25 PROCEDURE — 1200000002 HC GENERAL ROOM WITH TELEMETRY DAILY

## 2023-11-25 PROCEDURE — 85027 COMPLETE CBC AUTOMATED: CPT | Performed by: STUDENT IN AN ORGANIZED HEALTH CARE EDUCATION/TRAINING PROGRAM

## 2023-11-25 PROCEDURE — 83735 ASSAY OF MAGNESIUM: CPT | Performed by: STUDENT IN AN ORGANIZED HEALTH CARE EDUCATION/TRAINING PROGRAM

## 2023-11-25 PROCEDURE — 99232 SBSQ HOSP IP/OBS MODERATE 35: CPT | Performed by: STUDENT IN AN ORGANIZED HEALTH CARE EDUCATION/TRAINING PROGRAM

## 2023-11-25 PROCEDURE — 2500000004 HC RX 250 GENERAL PHARMACY W/ HCPCS (ALT 636 FOR OP/ED): Performed by: STUDENT IN AN ORGANIZED HEALTH CARE EDUCATION/TRAINING PROGRAM

## 2023-11-25 PROCEDURE — 84100 ASSAY OF PHOSPHORUS: CPT | Performed by: STUDENT IN AN ORGANIZED HEALTH CARE EDUCATION/TRAINING PROGRAM

## 2023-11-25 PROCEDURE — 2500000001 HC RX 250 WO HCPCS SELF ADMINISTERED DRUGS (ALT 637 FOR MEDICARE OP): Performed by: NURSE PRACTITIONER

## 2023-11-25 PROCEDURE — 36415 COLL VENOUS BLD VENIPUNCTURE: CPT | Performed by: STUDENT IN AN ORGANIZED HEALTH CARE EDUCATION/TRAINING PROGRAM

## 2023-11-25 RX ORDER — IBUPROFEN 200 MG
1 TABLET ORAL DAILY
Status: DISCONTINUED | OUTPATIENT
Start: 2024-01-07 | End: 2023-11-26 | Stop reason: HOSPADM

## 2023-11-25 RX ORDER — IBUPROFEN 200 MG
1 TABLET ORAL DAILY
Status: DISCONTINUED | OUTPATIENT
Start: 2024-01-07 | End: 2023-11-25

## 2023-11-25 RX ORDER — NICOTINE 7MG/24HR
1 PATCH, TRANSDERMAL 24 HOURS TRANSDERMAL DAILY
Status: DISCONTINUED | OUTPATIENT
Start: 2024-01-21 | End: 2023-11-25

## 2023-11-25 RX ORDER — MICONAZOLE NITRATE 2 %
4 CREAM (GRAM) TOPICAL EVERY 2 HOUR PRN
Status: DISCONTINUED | OUTPATIENT
Start: 2023-11-25 | End: 2023-11-26 | Stop reason: HOSPADM

## 2023-11-25 RX ORDER — IBUPROFEN 200 MG
1 TABLET ORAL DAILY
Status: DISCONTINUED | OUTPATIENT
Start: 2023-11-26 | End: 2023-11-26 | Stop reason: HOSPADM

## 2023-11-25 RX ORDER — NICOTINE 7MG/24HR
1 PATCH, TRANSDERMAL 24 HOURS TRANSDERMAL DAILY
Status: DISCONTINUED | OUTPATIENT
Start: 2024-01-21 | End: 2023-11-26 | Stop reason: HOSPADM

## 2023-11-25 RX ORDER — IBUPROFEN 200 MG
1 TABLET ORAL ONCE
Status: DISCONTINUED | OUTPATIENT
Start: 2023-11-25 | End: 2023-11-26 | Stop reason: HOSPADM

## 2023-11-25 RX ADMIN — CEFEPIME 2 G: 1 INJECTION, SOLUTION INTRAVENOUS at 12:17

## 2023-11-25 RX ADMIN — IPRATROPIUM BROMIDE AND ALBUTEROL SULFATE 3 ML: .5; 3 SOLUTION RESPIRATORY (INHALATION) at 21:07

## 2023-11-25 RX ADMIN — SENNOSIDES AND DOCUSATE SODIUM 1 TABLET: 8.6; 5 TABLET ORAL at 21:06

## 2023-11-25 RX ADMIN — SENNOSIDES AND DOCUSATE SODIUM 1 TABLET: 8.6; 5 TABLET ORAL at 09:44

## 2023-11-25 RX ADMIN — SACUBITRIL AND VALSARTAN 0.5 TABLET: 24; 26 TABLET, FILM COATED ORAL at 21:05

## 2023-11-25 RX ADMIN — IPRATROPIUM BROMIDE AND ALBUTEROL SULFATE 3 ML: .5; 3 SOLUTION RESPIRATORY (INHALATION) at 11:46

## 2023-11-25 RX ADMIN — POLYETHYLENE GLYCOL 3350 17 G: 17 POWDER, FOR SOLUTION ORAL at 09:52

## 2023-11-25 RX ADMIN — NICOTINE 1 PATCH: 21 PATCH, EXTENDED RELEASE TRANSDERMAL at 23:42

## 2023-11-25 RX ADMIN — ISOSORBIDE DINITRATE 10 MG: 10 TABLET ORAL at 09:44

## 2023-11-25 RX ADMIN — CEFEPIME 2 G: 1 INJECTION, SOLUTION INTRAVENOUS at 23:42

## 2023-11-25 RX ADMIN — ASPIRIN 81 MG: 81 TABLET, COATED ORAL at 09:44

## 2023-11-25 RX ADMIN — DAPAGLIFLOZIN 10 MG: 10 TABLET, FILM COATED ORAL at 09:44

## 2023-11-25 ASSESSMENT — ACTIVITIES OF DAILY LIVING (ADL)
ADEQUATE_TO_COMPLETE_ADL: YES
JUDGMENT_ADEQUATE_SAFELY_COMPLETE_DAILY_ACTIVITIES: YES
TOILETING: INDEPENDENT
BATHING: INDEPENDENT
GROOMING: INDEPENDENT
HEARING - LEFT EAR: FUNCTIONAL
HEARING - RIGHT EAR: FUNCTIONAL
WALKS IN HOME: INDEPENDENT
FEEDING YOURSELF: INDEPENDENT
PATIENT'S MEMORY ADEQUATE TO SAFELY COMPLETE DAILY ACTIVITIES?: YES
DRESSING YOURSELF: INDEPENDENT

## 2023-11-25 ASSESSMENT — COGNITIVE AND FUNCTIONAL STATUS - GENERAL
MOVING TO AND FROM BED TO CHAIR: A LITTLE
HELP NEEDED FOR BATHING: A LITTLE
STANDING UP FROM CHAIR USING ARMS: A LITTLE
WALKING IN HOSPITAL ROOM: A LITTLE
STANDING UP FROM CHAIR USING ARMS: A LITTLE
MOBILITY SCORE: 20
MOVING TO AND FROM BED TO CHAIR: A LITTLE
MOBILITY SCORE: 20
WALKING IN HOSPITAL ROOM: A LITTLE
CLIMB 3 TO 5 STEPS WITH RAILING: A LITTLE
DAILY ACTIVITIY SCORE: 23
CLIMB 3 TO 5 STEPS WITH RAILING: A LITTLE

## 2023-11-25 ASSESSMENT — PAIN SCALES - GENERAL
PAINLEVEL_OUTOF10: 0 - NO PAIN
PAINLEVEL_OUTOF10: 0 - NO PAIN

## 2023-11-25 NOTE — CARE PLAN
The patient's goals for the shift include      The clinical goals for the shift include Pt will remain HDS throughout shift    NAEON, pt slept in bed, safety maintained, no c/o pain, remained HDS.    Problem: Skin  Goal: Decreased wound size/increased tissue granulation at next dressing change  Outcome: Progressing  Goal: Participates in plan/prevention/treatment measures  Outcome: Progressing  Goal: Prevent/manage excess moisture  Outcome: Progressing  Goal: Prevent/minimize sheer/friction injuries  Outcome: Progressing  Goal: Promote/optimize nutrition  Outcome: Progressing  Goal: Promote skin healing  Outcome: Progressing

## 2023-11-25 NOTE — CARE PLAN
Patient safe and stable throughout shift. Plan for last antibiotic tonight and to start entresto tonight. To be discharged tomorrow.

## 2023-11-25 NOTE — PROGRESS NOTES
"Subjective Data:  Patient reports that she feels \"good\" today and is looking forward to being discharged. She is finishing her course of IV antibiotics today and Entresto 0.5 tab of 24-26 mg is being trialed this evening. Isordil dc'd. Will monitor BPs closely. Discussed whether or not to start spironolactone, but held off today so as not to have too many medication changes.     - IV Abx course to finish today  - isordil dc'd, low dose half tab Entresto to start this evening   - anticipate DC tomorrow    Overnight Events:    None      Objective Data:  Last Recorded Vitals:  Vitals:    11/25/23 0600 11/25/23 0800 11/25/23 1100 11/25/23 1521   BP:  130/73 113/70 101/64   BP Location:  Left arm Right arm Right arm   Pulse:  97 96 100   Resp:  18 18 18   Temp:  36.4 °C (97.6 °F) 36.4 °C (97.6 °F) 36.7 °C (98.1 °F)   TempSrc:  Temporal Temporal Temporal   SpO2:  97% 98% 98%   Weight: 65.4 kg (144 lb 2.9 oz)      Height:           Last Labs:  Results for orders placed or performed during the hospital encounter of 11/20/23 (from the past 24 hour(s))   POCT GLUCOSE   Result Value Ref Range    POCT Glucose 129 (H) 74 - 99 mg/dL   CBC   Result Value Ref Range    WBC 5.2 4.4 - 11.3 x10*3/uL    nRBC 0.0 0.0 - 0.0 /100 WBCs    RBC 3.73 (L) 4.00 - 5.20 x10*6/uL    Hemoglobin 11.1 (L) 12.0 - 16.0 g/dL    Hematocrit 33.9 (L) 36.0 - 46.0 %    MCV 91 80 - 100 fL    MCH 29.8 26.0 - 34.0 pg    MCHC 32.7 32.0 - 36.0 g/dL    RDW 17.5 (H) 11.5 - 14.5 %    Platelets 123 (L) 150 - 450 x10*3/uL   Renal function panel   Result Value Ref Range    Glucose 68 (L) 74 - 99 mg/dL    Sodium 141 136 - 145 mmol/L    Potassium 4.1 3.5 - 5.3 mmol/L    Chloride 109 (H) 98 - 107 mmol/L    Bicarbonate 23 21 - 32 mmol/L    Anion Gap 13 10 - 20 mmol/L    Urea Nitrogen 17 6 - 23 mg/dL    Creatinine 1.28 (H) 0.50 - 1.05 mg/dL    eGFR 45 (L) >60 mL/min/1.73m*2    Calcium 8.8 8.6 - 10.6 mg/dL    Phosphorus 2.4 (L) 2.5 - 4.9 mg/dL    Albumin 3.2 (L) 3.4 - 5.0 " g/dL   Magnesium   Result Value Ref Range    Magnesium 2.36 1.60 - 2.40 mg/dL   POCT GLUCOSE   Result Value Ref Range    POCT Glucose 90 74 - 99 mg/dL   POCT GLUCOSE   Result Value Ref Range    POCT Glucose 141 (H) 74 - 99 mg/dL   POCT GLUCOSE   Result Value Ref Range    POCT Glucose 151 (H) 74 - 99 mg/dL         TROPHS   Date/Time Value Ref Range Status   11/22/2023 05:25  0 - 34 ng/L Final     Comment:     Previous result verified on 11/21/2023 1512 on specimen/case [id number] called with component TRPHS for procedure Troponin I, High Sensitivity with value 636 ng/L.   11/22/2023 01:16  0 - 34 ng/L Final     Comment:     Previous result verified on 11/21/2023 1512 on specimen/case [id number] called with component TRPHS for procedure Troponin I, High Sensitivity with value 636 ng/L.   11/21/2023 11:58  0 - 34 ng/L Final     Comment:     Previous result verified on 11/21/2023 1512 on specimen/case [id number] called with component TRPHS for procedure Troponin I, High Sensitivity with value 636 ng/L.     BNP   Date/Time Value Ref Range Status   11/21/2023 08:11 AM 3,373 0 - 99 pg/mL Final   11/21/2023 03:15 AM >5,000 0 - 99 pg/mL Final     HGBA1C   Date/Time Value Ref Range Status   11/21/2023 03:15 AM 5.5 see below % Final   08/31/2023 04:37 PM 5.8 % Final     Comment:          Diagnosis of Diabetes-Adults   Non-Diabetic: < or = 5.6%   Increased risk for developing diabetes: 5.7-6.4%   Diagnostic of diabetes: > or = 6.5%  .       Monitoring of Diabetes                Age (y)     Therapeutic Goal (%)   Adults:          >18           <7.0   Pediatrics:    13-18           <7.5                   7-12           <8.0                   0- 6            7.5-8.5   American Diabetes Association. Diabetes Care 33(S1), Jan 2010.     08/31/2023 03:48 PM CANCELED       Comment:          Diagnosis of Diabetes-Adults   Non-Diabetic: < or = 5.6%   Increased risk for developing diabetes: 5.7-6.4%    Diagnostic of diabetes: > or = 6.5%  .       Monitoring of Diabetes                Age (y)     Therapeutic Goal (%)   Adults:          >18           <7.0   Pediatrics:    13-18           <7.5                   7-12           <8.0                   0- 6            7.5-8.5   American Diabetes Association. Diabetes Care 33(S1), Jan 2010.    Result canceled by the ancillary.       VLDL   Date/Time Value Ref Range Status   09/01/2023 05:24 AM 21 0 - 40 mg/dL Final   05/21/2020 09:02 PM 31 0 - 40 mg/dL Final   11/07/2019 07:57 PM 21 0 - 40 mg/dL Final      Last I/O:  I/O last 3 completed shifts:  In: 520 (8 mL/kg) [P.O.:120; IV Piggyback:400]  Out: 1400 (21.4 mL/kg) [Urine:1400 (0.6 mL/kg/hr)]  Weight: 65.4 kg     Past Cardiology Tests (Last 3 Years):  EKG 11/24:  NSR, rightward axis deviation, rate 85 BPM    Echo:  Transthoracic Echo (TTE) Complete 11/21/2023  CONCLUSIONS:   1. Left ventricular systolic function is severely decreased with a 15-20% estimated ejection fraction.   2. There is global hypokinesis of the left ventricle with minor regional variations.   3. Left ventricular cavity size is severely dilated.   4. There is a 1.2 cm x 0.6 cm mobile echodensity along the anterolateral wall of the LV (clips 56, 70) which could represent a calcified papillary muscle, thrombus or vegetation. The mobile echodensity is not well seen with definity echocontrast.   5. RVSP within normal limits.   6. There is a Epic mitral valve bioprosthesis with a 29 mm reported size. There is trace mitral valve regurgitation. Mean mitral gradient of 6.5 mmHg at a heart rate of 82 bpm.   7. Compared with study from 3/21/2023, the mobile echodensity was seen on the echo from 3/21/23 and 7/14/2021 and intraoperative SHRUTHI in 2019 and appears similar. Current mitral mean gradient is similar to prior (current HR 82 bpm, previously 109 bpm).    Inpatient Medications:  Scheduled medications   Medication Dose Route Frequency    aspirin  81 mg oral  Daily    cefepime  2 g intravenous q12h    dapagliflozin propanediol  10 mg oral Daily    [Held by provider] enoxaparin  40 mg subcutaneous q24h    insulin lispro  0-5 Units subcutaneous TID with meals    [Held by provider] iron sucrose  200 mg intravenous Daily    nicotine  1 patch transdermal Daily    Followed by    [START ON 1/2/2024] nicotine  1 patch transdermal Daily    pantoprazole  40 mg oral Daily before breakfast    sacubitriL-valsartan  0.5 tablet oral BID    sennosides-docusate sodium  1 tablet oral BID     PRN medications   Medication    acetaminophen    dextrose 10 % in water (D10W)    dextrose    glucagon    ipratropium-albuteroL    ondansetron    oxygen    polyethylene glycol     Continuous Medications   Medication Dose Last Rate       Physical Exam:  General: A&OX3, NAD  HEENT: no JVD, dressing for RHC right neck in place  Lungs: clear to auscultation anteriorly, diminished posterior bases  Cardiovascular: regular rate and rhythm, I/IV systolic murmur left sternal border  Abdomen: soft, distended, bowel sounds present  Extremities:  no edema, pedal pulses palpable   Skin: warm and dry     Assessment/Plan     Melissa Marinelli is a 69 y/o F with PMHx of HFrEF (EF 15-20%), CAD s/p NSTEMI with RIRI to LCx (Jan 2016), s/p MVR (June 2019), s/p ICD placement 5/2020, COPD (never formally diagnosed), HTN, HLD, GERD, CVA, CKD 3B and DM2 who presented to the ED with a chief complaint of non-bloody, non-bilious emesis and diarrhea at home. In the ED she became hypoxic and tachypneic and was trialed on BiPAP and eventually intubated 2/2 inability to protect her airway (11/21). She was admitted to the HFICU for concern for CHF exacerbation. Upon arrival SGC was completed and initial numbers where /74 (90), CVP 5, PAP 29/14(18), W 12, CO/CI 3.2/1.89 (f) 2.5/1.45(t), SVR 2125, SVO2 66% (note that Hgb is 14.3). She was started on a nipride gtt + dobutamine 2.5 mcg/kg/min briefly but was weaned off 11/21. Also  started on broad spectrum abx given concern for aspiration PNA. CT chest/abd/pelvis completed 11/21 which showed ground glass opacities in the RUL and cholelithiasis without evidence of acute cholecystitis. She was extubated to RA 11/ 21. Hospital course c/b PATSY on CKD IIIB for which fluids were given + home farxiga was held briefly. Sputum cx growing staph aureus and she will complete a 5 day course of cefepime (11/21-11/25). Strep PNA, legionella and blood cultures were negative. Started on IV venofer (11/23) for iron deficiency anemia, likely will benefit from outpatient GI for scope given ongoing anemia every hospital admission. Received some diuresis with IV lasix 40 mg 11/22 and 11/23. Stable for transfer to [facility] 11/23.       Acute on Chronic HFrEF, EF 15-20% s/p ICD 5/2020  - Home medications: isordil 10 mg BID, ASA 81 mg daily, bumex 0.5 mg daily PRN, farxiga 10 mg daily  - TTE (3/21/23): IJWD75-26%. Reduced RV systolic function.  - TTE (11/21/23): LVEF 15-20%, 1.2cm x 0.6 cm mobile echodensity along the anterolateral wall of LV which could represent a calcified papillary muscle, thrombus or vegetation (previously seen on echo from 3/21/23 and 7/14/21)  - Admit weight: 67 kg   - Daily weight: 65.4kg (64.7kg)  - Opening SGC #s (11/21): /74 (90), CVP 5, PAP 29/14(18), W 12, CO/CI 3.2/1.89 (f) 2.5/1.45(t), SVR 2125, SVO2 66% (note that Hgb is 14.3)  - Closing SGC #s (11/22): MAP 69, CVP 13, PAP 50/30 (38), W 17, SVR 1508, Chirag CO/CI 4.4/2.6, SVO2 65% on isordil 10 mg BID   - Nipride + dobutamine 2.5 mcg/kg/min weaned off 11/21  - Device last interrogated 10/23/2023    - C/w farxiga 10 mg daily   - upon review of last admission notes and Dr. Rivera note, patient had been trialed with Entresto before and had hypotension--> will attempt low dose, half tab 24-26 Entresto this evening with close BP monitoring; isordil dc'd   - 40 mg IV lasix x1 (11/22 and 11/23)  - allergy to ACE and BB  - Daily standing  weights, 2gm sodium diet, 2L fluid restriction, strict I&Os    Thrombocytopenia- improving  - PLT trending down from 239 on admit  - 123 (99, 109, 116, 117, 177, 222, 239)  - PF4- negative     Suprapubic discomfort  - some symptoms of UTI including incomplete emptying and dysuria yesterday  - UA negative for LE/ nitrites     CAD   NSTEMI s/p RIRI to Lcx (Jan 2016)  Hx of Mitral Valve repair (June 2019) w/ Dr Montana  - 29mm Epic Bioprosthetc valve   - EKG (11/20) NSR, , no acute ST elevation   - Lipid panel (9/1/23): cholesterol 227, HDL 56, , , VLDL 21  - not on a statin at home due to allergy     Acute Hypoxic Respiratory Failure (resolved)   Staph Aureus PNA   Hx of COPD   - ETT (11/20-11/21)  - CXR (11/21) RUL opacification  - CT Chest (11/21): bilateral ground glass opacities and smooth interlobular septal thickening. Mild-moderate centrilobular and paraseptal emphysema   - Procalcitonin (11/21): 3.44  - Strep PNA + legionella + blood cultures negative  - Respiratory Cx (11/21)+ MSSA  - Pulmonary consulted for resp failure- rec'd broad spectrum Abx   - S/p vancomycin (11/20-11/21), MRSA (11/21) negative  - S/p azithromycin (11/21/11/23)  - Continue to follow infectious workup  - C/w cefepime (11/21-11/25)- to complete today  - Duonebs q6 PRN     GERD  Constipation  Cholelithiasis  N/V/D (resolved)  - CT A/P (11/21) with cholelithiasis, no evidence of cholecystitis  - Bowel regimen: luz-colace BID and miralax PRN  - PPI   - Zofran PRN    CKD IIIB  - Baseline sCr ~1.3-1.4  - Cr today 1.28 (1.37, 1.42, 1.4, 1.58, 1.22)  - I/Os  - Avoid hypotension and nephrotoxic agents     Iron deficiency anemia   - Labs (11/22): iron 25, TIBC 231, % sat 11, ferritin 92  - Order IV venofer 200 mg x 5 doses (11/23-11/27) discontinued due to GI side effects  - Will benefit from outpatient GI given ongoing iron deficiency anemia      DM2  - Euglycemic, HgbA1c 5.5 (11/2023)  - Sliding scale ordered      Leukocytosis (resolved)  PNA  WBC 12K-> 6K, afebrile, nontoxic  - See pulmonary plan as above  - Trend temps q4h    Substance abuse  - Current cigarette and marijuana user   - Nicotine patch ordered PRN      Dispo: Anticipate DC tomorrow   PHYSICAL AND OCCUPATIONAL THERAPY: ordered, patient does not want outpatient or home PT/OT. Rollator ordered for home.   DVT: held due to thrombocytopenia, encourage OOB/ SCDs  EMERGENCY CONTACT: Extended Emergency Contact Information  Primary Emergency Contact: ShadySarah  Home Phone: [phone]  Relation: Child    Patient seen and assessed with Dr. Richardson    Code Status:  Full Code    Bel Chilel, APRN-CNP

## 2023-11-26 VITALS
HEART RATE: 91 BPM | RESPIRATION RATE: 18 BRPM | TEMPERATURE: 97.5 F | SYSTOLIC BLOOD PRESSURE: 134 MMHG | BODY MASS INDEX: 24.5 KG/M2 | HEIGHT: 64 IN | OXYGEN SATURATION: 96 % | DIASTOLIC BLOOD PRESSURE: 78 MMHG | WEIGHT: 143.52 LBS

## 2023-11-26 PROBLEM — I42.9 CARDIOMYOPATHY (MULTI): Status: ACTIVE | Noted: 2023-03-20

## 2023-11-26 PROBLEM — Z95.810 PRESENCE OF AUTOMATIC (IMPLANTABLE) CARDIAC DEFIBRILLATOR: Status: ACTIVE | Noted: 2020-05-29

## 2023-11-26 PROBLEM — E11.9 TYPE 2 DIABETES MELLITUS WITHOUT COMPLICATION, WITHOUT LONG-TERM CURRENT USE OF INSULIN (MULTI): Status: ACTIVE | Noted: 2019-05-10

## 2023-11-26 PROBLEM — R19.7 NAUSEA VOMITING AND DIARRHEA: Status: RESOLVED | Noted: 2023-11-26 | Resolved: 2023-11-26

## 2023-11-26 PROBLEM — F32.9 MAJOR DEPRESSIVE DISORDER: Status: ACTIVE | Noted: 2019-12-02

## 2023-11-26 PROBLEM — N18.30 CHRONIC KIDNEY DISEASE, STAGE 3 (MULTI): Status: ACTIVE | Noted: 2020-05-14

## 2023-11-26 PROBLEM — J45.909 ASTHMA (HHS-HCC): Status: ACTIVE | Noted: 2023-04-10

## 2023-11-26 PROBLEM — I20.89 STABLE ANGINA (CMS-HCC): Status: ACTIVE | Noted: 2019-12-02

## 2023-11-26 PROBLEM — K29.00 ACUTE GASTRITIS: Status: ACTIVE | Noted: 2023-11-26

## 2023-11-26 PROBLEM — N18.9 ANEMIA DUE TO CHRONIC KIDNEY DISEASE: Status: ACTIVE | Noted: 2020-05-14

## 2023-11-26 PROBLEM — J96.21 ACUTE ON CHRONIC RESPIRATORY FAILURE WITH HYPOXIA (MULTI): Status: RESOLVED | Noted: 2023-11-26 | Resolved: 2023-11-26

## 2023-11-26 PROBLEM — D50.9 IRON DEFICIENCY ANEMIA: Status: ACTIVE | Noted: 2023-04-10

## 2023-11-26 PROBLEM — D63.1 ANEMIA DUE TO CHRONIC KIDNEY DISEASE: Status: ACTIVE | Noted: 2020-05-14

## 2023-11-26 PROBLEM — I25.5 ISCHEMIC CARDIOMYOPATHY: Status: ACTIVE | Noted: 2020-05-21

## 2023-11-26 PROBLEM — Z86.73 HISTORY OF TRANSIENT ISCHEMIC ATTACK: Status: ACTIVE | Noted: 2023-09-02

## 2023-11-26 PROBLEM — R11.2 NAUSEA VOMITING AND DIARRHEA: Status: ACTIVE | Noted: 2023-11-26

## 2023-11-26 PROBLEM — F17.200 NICOTINE DEPENDENCE: Status: ACTIVE | Noted: 2023-06-22

## 2023-11-26 PROBLEM — Z99.2 END STAGE RENAL FAILURE ON DIALYSIS (MULTI): Status: RESOLVED | Noted: 2023-03-22 | Resolved: 2023-11-26

## 2023-11-26 PROBLEM — F41.9 ANXIETY: Status: ACTIVE | Noted: 2023-03-20

## 2023-11-26 PROBLEM — R11.2 NAUSEA VOMITING AND DIARRHEA: Status: RESOLVED | Noted: 2023-11-26 | Resolved: 2023-11-26

## 2023-11-26 PROBLEM — K80.20 CHOLELITHIASIS: Status: ACTIVE | Noted: 2023-04-10

## 2023-11-26 PROBLEM — R19.7 NAUSEA VOMITING AND DIARRHEA: Status: ACTIVE | Noted: 2023-11-26

## 2023-11-26 PROBLEM — E87.20 ACIDOSIS, UNSPECIFIED: Status: ACTIVE | Noted: 2023-11-20

## 2023-11-26 PROBLEM — J69.0 ASPIRATION PNEUMONIA (MULTI): Status: ACTIVE | Noted: 2020-05-18

## 2023-11-26 PROBLEM — J96.21 ACUTE ON CHRONIC RESPIRATORY FAILURE WITH HYPOXIA (MULTI): Status: ACTIVE | Noted: 2023-11-26

## 2023-11-26 PROBLEM — N18.6 END STAGE RENAL FAILURE ON DIALYSIS (MULTI): Status: RESOLVED | Noted: 2023-03-22 | Resolved: 2023-11-26

## 2023-11-26 LAB
ALBUMIN SERPL BCP-MCNC: 3.5 G/DL (ref 3.4–5)
ANION GAP SERPL CALC-SCNC: 14 MMOL/L (ref 10–20)
BUN SERPL-MCNC: 14 MG/DL (ref 6–23)
CALCIUM SERPL-MCNC: 9 MG/DL (ref 8.6–10.6)
CHLORIDE SERPL-SCNC: 108 MMOL/L (ref 98–107)
CO2 SERPL-SCNC: 24 MMOL/L (ref 21–32)
CREAT SERPL-MCNC: 1.12 MG/DL (ref 0.5–1.05)
ERYTHROCYTE [DISTWIDTH] IN BLOOD BY AUTOMATED COUNT: 17.2 % (ref 11.5–14.5)
GFR SERPL CREATININE-BSD FRML MDRD: 53 ML/MIN/1.73M*2
GLUCOSE BLD MANUAL STRIP-MCNC: 140 MG/DL (ref 74–99)
GLUCOSE SERPL-MCNC: 69 MG/DL (ref 74–99)
HCT VFR BLD AUTO: 36.7 % (ref 36–46)
HGB BLD-MCNC: 11.5 G/DL (ref 12–16)
MAGNESIUM SERPL-MCNC: 2.38 MG/DL (ref 1.6–2.4)
MCH RBC QN AUTO: 29 PG (ref 26–34)
MCHC RBC AUTO-ENTMCNC: 31.3 G/DL (ref 32–36)
MCV RBC AUTO: 92 FL (ref 80–100)
NRBC BLD-RTO: 0 /100 WBCS (ref 0–0)
PHOSPHATE SERPL-MCNC: 2.4 MG/DL (ref 2.5–4.9)
PLATELET # BLD AUTO: 157 X10*3/UL (ref 150–450)
POTASSIUM SERPL-SCNC: 4.3 MMOL/L (ref 3.5–5.3)
RBC # BLD AUTO: 3.97 X10*6/UL (ref 4–5.2)
SODIUM SERPL-SCNC: 142 MMOL/L (ref 136–145)
WBC # BLD AUTO: 5.7 X10*3/UL (ref 4.4–11.3)

## 2023-11-26 PROCEDURE — 2500000001 HC RX 250 WO HCPCS SELF ADMINISTERED DRUGS (ALT 637 FOR MEDICARE OP): Performed by: STUDENT IN AN ORGANIZED HEALTH CARE EDUCATION/TRAINING PROGRAM

## 2023-11-26 PROCEDURE — 36415 COLL VENOUS BLD VENIPUNCTURE: CPT | Performed by: STUDENT IN AN ORGANIZED HEALTH CARE EDUCATION/TRAINING PROGRAM

## 2023-11-26 PROCEDURE — 83735 ASSAY OF MAGNESIUM: CPT | Performed by: STUDENT IN AN ORGANIZED HEALTH CARE EDUCATION/TRAINING PROGRAM

## 2023-11-26 PROCEDURE — 82947 ASSAY GLUCOSE BLOOD QUANT: CPT

## 2023-11-26 PROCEDURE — 2500000001 HC RX 250 WO HCPCS SELF ADMINISTERED DRUGS (ALT 637 FOR MEDICARE OP): Performed by: NURSE PRACTITIONER

## 2023-11-26 PROCEDURE — 80069 RENAL FUNCTION PANEL: CPT | Performed by: STUDENT IN AN ORGANIZED HEALTH CARE EDUCATION/TRAINING PROGRAM

## 2023-11-26 PROCEDURE — 2500000004 HC RX 250 GENERAL PHARMACY W/ HCPCS (ALT 636 FOR OP/ED): Performed by: STUDENT IN AN ORGANIZED HEALTH CARE EDUCATION/TRAINING PROGRAM

## 2023-11-26 PROCEDURE — 99239 HOSP IP/OBS DSCHRG MGMT >30: CPT | Performed by: INTERNAL MEDICINE

## 2023-11-26 PROCEDURE — 85027 COMPLETE CBC AUTOMATED: CPT | Performed by: STUDENT IN AN ORGANIZED HEALTH CARE EDUCATION/TRAINING PROGRAM

## 2023-11-26 PROCEDURE — 2500000001 HC RX 250 WO HCPCS SELF ADMINISTERED DRUGS (ALT 637 FOR MEDICARE OP)

## 2023-11-26 RX ORDER — PANTOPRAZOLE SODIUM 40 MG/1
40 TABLET, DELAYED RELEASE ORAL
Qty: 30 TABLET | Refills: 3 | Status: SHIPPED | OUTPATIENT
Start: 2023-11-27 | End: 2023-12-18 | Stop reason: WASHOUT

## 2023-11-26 RX ORDER — ALBUTEROL SULFATE 90 UG/1
2 AEROSOL, METERED RESPIRATORY (INHALATION) EVERY 4 HOURS PRN
Qty: 18 G | Refills: 11 | Status: SHIPPED | OUTPATIENT
Start: 2023-11-26

## 2023-11-26 RX ORDER — ALBUTEROL SULFATE 90 UG/1
2 AEROSOL, METERED RESPIRATORY (INHALATION) EVERY 6 HOURS PRN
Qty: 18 G | Refills: 11 | Status: SHIPPED | OUTPATIENT
Start: 2023-11-26 | End: 2023-11-26 | Stop reason: SDUPTHER

## 2023-11-26 RX ORDER — BUMETANIDE 0.5 MG/1
0.5 TABLET ORAL DAILY PRN
Qty: 30 TABLET | Refills: 3 | Status: SHIPPED | OUTPATIENT
Start: 2023-11-26 | End: 2024-02-03 | Stop reason: HOSPADM

## 2023-11-26 RX ADMIN — SACUBITRIL AND VALSARTAN 0.5 TABLET: 24; 26 TABLET, FILM COATED ORAL at 08:46

## 2023-11-26 RX ADMIN — POLYETHYLENE GLYCOL 3350 17 G: 17 POWDER, FOR SOLUTION ORAL at 06:47

## 2023-11-26 RX ADMIN — SENNOSIDES AND DOCUSATE SODIUM 1 TABLET: 8.6; 5 TABLET ORAL at 08:47

## 2023-11-26 RX ADMIN — DAPAGLIFLOZIN 10 MG: 10 TABLET, FILM COATED ORAL at 08:47

## 2023-11-26 RX ADMIN — ASPIRIN 81 MG: 81 TABLET, COATED ORAL at 08:47

## 2023-11-26 ASSESSMENT — PAIN - FUNCTIONAL ASSESSMENT: PAIN_FUNCTIONAL_ASSESSMENT: 0-10

## 2023-11-26 ASSESSMENT — PAIN SCALES - GENERAL: PAINLEVEL_OUTOF10: 0 - NO PAIN

## 2023-11-26 NOTE — CARE PLAN
The patient's goals for the shift include      The clinical goals for the shift include Patient will recieve IV antibiotics and be discharged.

## 2023-11-26 NOTE — DISCHARGE SUMMARY
Discharge Diagnosis  Nausea/ Vomiting/ Diarrhea  Acute hypoxic respiratory failure requiring intubation  Aspiration pneumonia  Acute on chronic systolic and diastolic heart failure    Issues Requiring Follow-Up  Outpatient GI work- up for ROBERT  Close heart failure follow- up and management    Hospital Course  Melissa Marinelli is  69yo female with a past medical history significant for HFrEF (EF 15-20%), CAD s/p NSTEMI with RIRI to LCx (Jan 2016), s/p MVR (June 2019),  s/p ICD placement 5/2020, COPD (never formally diagnosed), HTN, HL, GERD, CVA, CKD IIIB and DMII who presented to the ED with a chief complaint of non-bloody, non-bilious emesis and diarrhea at home.  She was given a 500ml fluid bolus as it was thought that she might be dehydrated however she became hypoxic and tachypneic.  She was initially placed on a non-rebreather mask and was upgraded to  BIPAP and was doing well, however she continued to have nausea and vomiting prompting intubation for airway protection.   Basic labs, and a CXR were perfomed in the ED showing a BNP of 238, NML Lipase level and hyperinflated lungs with focal lung consolidations.  Prior to intubation patient denied sick contacts, fever, chills or urinary symptoms.  The decision was made to admit her to HFICU to r/o CHF exacerbation.        Interval history: Patient was admitted 9/2023 for COPD exacerbation and acute hypoxic hypercapnic respiratory respiratory failure with mental status changes that improved with NIPPV.       Upon arrival to HFICU, Patient was intubated, sedated and unable to answer questions. Upon arrival SGC was completed and initial numbers where /74 (90), CVP 5, PAP 29/14(18), W 12, CO/CI 3.2/1.89 (f) 2.5/1.45(t), SVR 2125, SVO2 66% (note that Hgb is 14.3). She was started on a nipride gtt + dobutamine 2.5 mcg/kg/min briefly but was weaned off 11/21. Also started on broad spectrum abx given concern for aspiration PNA. CT chest/abd/pelvis completed 11/21 which  showed ground glass opacities in the RUL and cholelithiasis without evidence of acute cholecystitis. She was extubated to RA 11/ 21. Hospital course c/b PATSY on CKD IIIB for which fluids were given + home farxiga was held briefly. Sputum cx growing staph aureus and she will complete a 5 day course of cefepime (11/21-11/25). Strep PNA, legionella and blood cultures were negative. Started on IV venofer (11/23) for iron deficiency anemia, likely will benefit from outpatient GI for scope given ongoing anemia every hospital admission. Received some diuresis with IV lasix 40 mg 11/22 and 11/23. Stable for transfer to [facility] 11/23.     Floor Course:   Patient completed cefepime AB course for Staph aureus PNA on 11/25. She had GI upset with IV iron so iron was discontinued. Gastroenterology referral placed upon discharge for further outpatient workup and investigation.    She was noted to have thrombocytopenia, lovenox held. PF4 returned and was negative.     Echo 11/21/23 showed EF 15-20%, 1.2cm x 0.6 cm mobile echodensity along the anterolateral wall of LV which could represent a calcified papillary muscle, thrombus or vegetation (previously seen on echo from 3/21/23 and 7/14/21). She received PRN doses of lasix IV during hospital course. Kidney function remained stable. Home dapagliflozin continued. Patient historically not on BB or ACE due to allergy. Had previously been on Entresto but was dc'd for hypotension. Low dose Entresto (half tab low dose 24-26 mg) started this admission. BPs tolerated initiation of Entresto well, orthostatic BPs negative on date of discharge with no reports of dizziness. Home dose of bumex 0.5mg daily PRN continued. Consider MRA initiation in outpatient setting.     Patient will need outpatient determination of lipid lowering therapy, has previously not tolerated statins per daughter and patient.      Patient did not want outpatient PT/OT and wished to go home. Rollator ordered for home use.      Discharge weight: 65.1 kg    After all labs and VS were reviewed the decision was made that the patient was medically stable for discharge.  The patient was discharged in satisfactory condition.    More than 30 minutes were spent in coordinating patient discharge.         Pertinent Physical Exam At Time of Discharge  Physical Exam  Constitutional:       Appearance: Normal appearance.   HENT:      Head: Normocephalic and atraumatic.      Mouth/Throat:      Mouth: Mucous membranes are moist.   Eyes:      Extraocular Movements: Extraocular movements intact.   Cardiovascular:      Rate and Rhythm: Normal rate and regular rhythm.   Pulmonary:      Breath sounds: Normal breath sounds.   Abdominal:      General: Bowel sounds are normal.   Musculoskeletal:      Cervical back: Normal range of motion.      Right lower leg: No edema.      Left lower leg: No edema.   Skin:     General: Skin is warm and dry.   Neurological:      General: No focal deficit present.      Mental Status: She is alert and oriented to person, place, and time.   Psychiatric:         Mood and Affect: Mood normal.         Behavior: Behavior normal.         Home Medications     Medication List      START taking these medications     albuterol 90 mcg/actuation inhaler; Inhale 2 puffs every 4 hours if   needed for wheezing or shortness of breath.   pantoprazole 40 mg EC tablet; Commonly known as: ProtoNix; Take 1 tablet   (40 mg) by mouth once daily in the morning. Take before meals. Do not   crush, chew, or split. Do not start before November 27, 2023.; Start   taking on: November 27, 2023   sacubitriL-valsartan 24-26 mg tablet; Commonly known as: Entresto; Take   0.5 tablets by mouth 2 times a day.   walker misc; Rollator to assist with ambulation as needed     CHANGE how you take these medications     bumetanide 0.5 mg tablet; Commonly known as: Bumex; Take 1 tablet (0.5   mg) by mouth once daily as needed (for weight gain of 2 or more pounds in   a  day; or for weight gain of 5 or more pounds in 1 week).; What changed:   when to take this, reasons to take this     CONTINUE taking these medications     acetaminophen 500 mg tablet; Commonly known as: Tylenol   aspirin 81 mg chewable tablet   Farxiga 10 mg; Generic drug: dapagliflozin propanediol   multivitamin with minerals tablet   nicotine polacrilex 2 mg lozenge; Commonly known as: Nicorette; Use 1   lozenge (2 mg) in the mouth or throat every 2 hours if needed for smoking   cessation.   Symbicort 80-4.5 mcg/actuation inhaler; Generic drug:   budesonide-formoteroL     STOP taking these medications     atorvastatin 80 mg tablet; Commonly known as: Lipitor   Flovent  mcg/actuation inhaler; Generic drug: fluticasone   hydrALAZINE 10 mg tablet; Commonly known as: Apresoline   isosorbide dinitrate 10 mg tablet; Commonly known as: Isordil       Outpatient Follow-Up  Future Appointments   Date Time Provider Department Center   3/18/2024 10:00 AM Kathy Rivera MD PhD [id number]       Bel Chilel, APRDILCIA-CNP

## 2023-11-26 NOTE — DISCHARGE INSTRUCTIONS
Please take your medications as prescribed and do not change any medications without discussing with your doctor.     Please weight yourself every day and keep a record of these weights for your heart failure appointments. If you gain more than 2 pounds in a day or more than 5 pounds in a week, please take your bumex pill. If you are requiring more frequent doses of bumex for weight gain, call and let your doctor know.     Your appointments with Aura Dwyer and Dr. Rivera are requested. You should be called this week with your appointments. If you do not hear back in 2 days, please call [phone].    You have a history of iron deficiency anemia. You have a a referral placed to follow- up with gastroenterology.

## 2023-11-27 LAB
ATRIAL RATE: 95 BPM
P AXIS: 81 DEGREES
P OFFSET: 202 MS
P ONSET: 148 MS
PR INTERVAL: 138 MS
Q ONSET: 217 MS
QRS COUNT: 16 BEATS
QRS DURATION: 120 MS
QT INTERVAL: 402 MS
QTC CALCULATION(BAZETT): 505 MS
QTC FREDERICIA: 468 MS
R AXIS: 83 DEGREES
T AXIS: 204 DEGREES
T OFFSET: 418 MS
VENTRICULAR RATE: 95 BPM

## 2023-11-28 LAB
ATRIAL RATE: 70 BPM
P AXIS: -74 DEGREES
P OFFSET: 215 MS
P ONSET: 153 MS
PR INTERVAL: 132 MS
Q ONSET: 219 MS
QRS COUNT: 12 BEATS
QRS DURATION: 98 MS
QT INTERVAL: 556 MS
QTC CALCULATION(BAZETT): 600 MS
QTC FREDERICIA: 585 MS
R AXIS: 47 DEGREES
T AXIS: 165 DEGREES
T OFFSET: 497 MS
VENTRICULAR RATE: 70 BPM

## 2023-11-30 NOTE — DOCUMENTATION CLARIFICATION NOTE
"    PATIENT:               FANNIE RIOS  ACCT #:                  [id number]  MRN:                       [id number]  :                       1955  ADMIT DATE:       2023 12:14 PM  DISCH DATE:        2023 11:17 AM  RESPONDING PROVIDER #:        [id number]          PROVIDER RESPONSE TEXT:    Hypovolemic shock    CDI QUERY TEXT:    [facility]_Shock Diagnosis      Instruction:    Based on your assessment of the patient and the clinical information, please provide the requested documentation by clicking on the appropriate radio button and enter any additional information if prompted.    Question: Is there a diagnosis associated with the clinical information    When answering this query, please exercise your independent professional judgment. The fact that a question is being asked, does not imply that any particular answer is desired or expected.    The patient's clinical indicators include:  Clinical Information: Progress notes indicate patient is a 69y/o female presenting to ED with c/o N/V/D and transferred to ICU with respiratory failure    Clinical Indicators: Progress notes suggest patient has shock requiring nipride and dobutamine infusions.    ED Provider Note on  shows \"Patient did have transient episode of hypotension down to 80s/50s prompting discontinuation of propofol. Patient with increase in blood pressure with no other interventions back to 180s/110s and propofol resumed\"    ED Provider Note, : \"500 mL fluid bolus given clinical evidence of dehydration.\"    Per Progress Note on , \"Upon arrival SGC was completed and initial numbers where /74, CVP CO/CI 3.2/1.89\" and \"She was started on a nipride gtt\" and \"dobutamine\"  Progress Note on  suggests pt with hypovolemia: \"PATSY on CKD IIIB, likely prerenal 2/2 hypovolemia\"      VS  at 1226: 35.3-82-/77, 91% RA  Labs , wbc 12.1, venous lactate 2.4, , hs Troponin 168  CXR  at 0257: RUL " opacification    Treatment: IVF bolus, Nipride infusion, Dobutamine infusion, SGC monitoring, Broad spectrum IV antibiotics    Risk Factors: CHF exacerbation, aspiration pneumonitis  Options provided:  -- Cardiogenic shock  -- Septic shock  -- Hypovolemic shock  -- Distributive shock  -- Hypotension only without shock  -- Other - I will add my own diagnosis  -- Refer to Clinical Documentation Reviewer    Query created by: Mariah Gonzalez on 11/30/2023 9:27 AM      Electronically signed by:  KELSI GARVEY MD 11/30/2023 10:05 AM

## 2023-12-01 ENCOUNTER — HOSPITAL ENCOUNTER (OUTPATIENT)
Dept: CARDIOLOGY | Facility: HOSPITAL | Age: 68
Discharge: HOME | End: 2023-12-01
Payer: COMMERCIAL

## 2023-12-01 LAB
ATRIAL RATE: 85 BPM
P AXIS: 77 DEGREES
P OFFSET: 186 MS
P ONSET: 148 MS
PR INTERVAL: 140 MS
Q ONSET: 218 MS
QRS COUNT: 14 BEATS
QRS DURATION: 100 MS
QT INTERVAL: 388 MS
QTC CALCULATION(BAZETT): 461 MS
QTC FREDERICIA: 435 MS
R AXIS: 92 DEGREES
T AXIS: 131 DEGREES
T OFFSET: 412 MS
VENTRICULAR RATE: 85 BPM

## 2023-12-01 PROCEDURE — 93005 ELECTROCARDIOGRAM TRACING: CPT

## 2023-12-18 ENCOUNTER — OFFICE VISIT (OUTPATIENT)
Dept: CARDIOLOGY | Facility: CLINIC | Age: 68
End: 2023-12-18
Payer: COMMERCIAL

## 2023-12-18 VITALS
OXYGEN SATURATION: 93 % | HEIGHT: 64 IN | HEART RATE: 67 BPM | DIASTOLIC BLOOD PRESSURE: 72 MMHG | WEIGHT: 143 LBS | SYSTOLIC BLOOD PRESSURE: 129 MMHG | BODY MASS INDEX: 24.41 KG/M2

## 2023-12-18 DIAGNOSIS — I50.41 ACUTE COMBINED SYSTOLIC (CONGESTIVE) AND DIASTOLIC (CONGESTIVE) HEART FAILURE (MULTI): ICD-10-CM

## 2023-12-18 PROCEDURE — 3078F DIAST BP <80 MM HG: CPT | Performed by: STUDENT IN AN ORGANIZED HEALTH CARE EDUCATION/TRAINING PROGRAM

## 2023-12-18 PROCEDURE — 99215 OFFICE O/P EST HI 40 MIN: CPT | Performed by: STUDENT IN AN ORGANIZED HEALTH CARE EDUCATION/TRAINING PROGRAM

## 2023-12-18 PROCEDURE — 3044F HG A1C LEVEL LT 7.0%: CPT | Performed by: STUDENT IN AN ORGANIZED HEALTH CARE EDUCATION/TRAINING PROGRAM

## 2023-12-18 PROCEDURE — 3066F NEPHROPATHY DOC TX: CPT | Performed by: STUDENT IN AN ORGANIZED HEALTH CARE EDUCATION/TRAINING PROGRAM

## 2023-12-18 PROCEDURE — 1111F DSCHRG MED/CURRENT MED MERGE: CPT | Performed by: STUDENT IN AN ORGANIZED HEALTH CARE EDUCATION/TRAINING PROGRAM

## 2023-12-18 PROCEDURE — 1159F MED LIST DOCD IN RCRD: CPT | Performed by: STUDENT IN AN ORGANIZED HEALTH CARE EDUCATION/TRAINING PROGRAM

## 2023-12-18 PROCEDURE — 1125F AMNT PAIN NOTED PAIN PRSNT: CPT | Performed by: STUDENT IN AN ORGANIZED HEALTH CARE EDUCATION/TRAINING PROGRAM

## 2023-12-18 PROCEDURE — 3074F SYST BP LT 130 MM HG: CPT | Performed by: STUDENT IN AN ORGANIZED HEALTH CARE EDUCATION/TRAINING PROGRAM

## 2023-12-18 RX ORDER — IBUPROFEN 200 MG
1 TABLET ORAL EVERY 24 HOURS
Qty: 30 PATCH | Refills: 3 | Status: SHIPPED | OUTPATIENT
Start: 2023-12-18 | End: 2024-02-18 | Stop reason: HOSPADM

## 2023-12-18 RX ORDER — NICOTINE 7MG/24HR
1 PATCH, TRANSDERMAL 24 HOURS TRANSDERMAL EVERY 24 HOURS
COMMUNITY
End: 2023-12-18 | Stop reason: WASHOUT

## 2023-12-18 ASSESSMENT — ENCOUNTER SYMPTOMS
HEMATOCHEZIA: 0
DEPRESSION: 0
LOSS OF SENSATION IN FEET: 0
COUGH: 0
BRUISES/BLEEDS EASILY: 0
BACK PAIN: 0
NEAR-SYNCOPE: 0
SHORTNESS OF BREATH: 0
WEIGHT LOSS: 0
FOCAL WEAKNESS: 0
PND: 0
HEMOPTYSIS: 0
OCCASIONAL FEELINGS OF UNSTEADINESS: 0
SYNCOPE: 0
DYSPNEA ON EXERTION: 0
ALTERED MENTAL STATUS: 0
PALPITATIONS: 1
DECREASED APPETITE: 0
IRREGULAR HEARTBEAT: 0
HEMATURIA: 0
WEAKNESS: 0
ORTHOPNEA: 1
WEIGHT GAIN: 0

## 2023-12-18 ASSESSMENT — LIFESTYLE VARIABLES: SUBSTANCE_ABUSE: 0

## 2023-12-18 ASSESSMENT — PAIN SCALES - GENERAL: PAINLEVEL: 5

## 2023-12-18 NOTE — PROGRESS NOTES
" Chief Complaint     Ambulatory heart failure care      History of Present Illness  Referring Physician: Dr. KYLIE Franklin  Accompanied by: alone      HPI:      68 year old woman who presents for advanced heart failure care. She has a past medical history significant for HrEF (EF 15-20%, multiple RWMA on TTE 5/2020) CAD s/p NSTEMI s/p RIRI to LCX (Jan 2016), MVR s/p mitral valve repair in (June 2019; 29 mm Epic bioprosthetic valve with Dr. Montana), COPD, HTN, HL, GERD, hx of CVA, DM.  She was admitted to [facility] 5/14-24/2020. Per her discharge summary, \" drop in LVEF from 2019 (40%->10%) likely due to mitral valve replacement and subsequent increase in afterload with ongoing worsening of underlying heart failure. Medical team had multiple conversation with patient and family given pt has severe cardiac dysfunction and with medical comorbidities (COPD, CKD) is unlikely to benefit from advanced therapies. Medical team unable to restart home heart failure medications (Entresto, ivabridine, bumex) given her low BPs. Would consider ICD for primary prevention, however, pt seems open to palliative care options as she is determined to be home with her family and is resistant to undergo invasive therapies - stating she would not want to go back on HD (previously required iHD following episodes of severe cardiogenic shock).   Floor Course: Urine tox screen positive for cannibinoid. cPET held for now. Plan to present to Advanced Therapies committee for consideration of LVAD as DT. Patient spoke with Dr. Tracey prior to discharge. She is considering LVAD placement and will discuss with her family. EP consulted, ICD placed 5/22. Initiated on 7-day course of doxycycline.\"  On multiple occasions in the past durable LVAD therapy has been discussed and has been declined on multiple occasions.  She was hospitalized with COVID 3/2023, fortunately she did not need to be intubated or require ventilatory support. She also reports that she has " been hypotensive and her isosorbide dinitrate and hydralazine were both discontinued.  TTE 3/2023 LVEF 15 to 20%.   Ms Marinelli was admitted with hypoxic resp failure  2/2 Staph aureus requiring IPPV. She had SGC and had inotropic infusion < 24 hrs. She was found to have ROBERT and was referred to GI for further evaluation.  ARNi was initiated in hospital and was tolerated. She continues to tolerate ARNi 12/13 mg bid    There is no history of chest pain, SOB at rest, SOBOE,  PND,  bendopnoea, syncope, pre syncope, palpitations, or leg  swelling.  Chronic  1 pillow orthopnea, occasional palpitations.    She has been adherent with prescribed medications  She has not had any defibrillator shocks.     She continues to smoke approximately 10 cigarettes a day.     Surgical Hx:  - MVR     Social Hx;  - Marijuana   -daily cigarettes      Fam Hx:  Brother- CVA      The electronic medical record has been reviewed by me for salient history. All cardiovascular imaging and testing available in the electronic medical record, and [employer] has been reviewed.      Past Medical History  Problems    · History of HFrEF (heart failure with reduced ejection fraction) (428.20) (I50.20)   · Resolved Date: 03 Dec 2019     Medication Documentation Review Audit       Reviewed by Analisa Agosto RN (Registered Nurse) on 12/18/23 at 1023      Medication Order Taking? Sig Documenting Provider Last Dose Status   acetaminophen (Tylenol) 500 mg tablet [id number] Yes Take 2 tablets (1,000 mg) by mouth every 8 hours if needed for mild pain (1 - 3). Historical Provider, MD Taking Active   albuterol 90 mcg/actuation inhaler [id number] Yes Inhale 2 puffs every 4 hours if needed for wheezing or shortness of breath. Bel Chilel, APRN-CNP Taking Active   aspirin 81 mg chewable tablet [id number] Yes Chew 1 tablet (81 mg) once daily. Historical Provider, MD Taking Active   bumetanide (Bumex) 0.5 mg tablet [id number] Yes Take 1 tablet (0.5 mg) by mouth once daily as  "needed (for weight gain of 2 or more pounds in a day; or for weight gain of 5 or more pounds in 1 week). JASMIN Burt Taking Active   Farxiga 10 mg [id number] Yes Take 1 tablet (10 mg) by mouth once daily. Historical Provider, MD Taking Active     Discontinued 12/18/23 1012   nicotine (Nicoderm CQ) 7 mg/24 hr patch [id number] No Place 1 patch on the skin once every 24 hours. Historical Provider, MD Not Taking Active   nicotine polacrilex (Nicorette) 2 mg lozenge [id number]  Use 1 lozenge (2 mg) in the mouth or throat every 2 hours if needed for smoking cessation. Kathy Rivera MD PhD  Active     Discontinued 12/18/23 1015   sacubitriL-valsartan (Entresto) 24-26 mg tablet [id number] Yes Take 0.5 tablets by mouth 2 times a day. JASMIN Brut Taking Active   Symbicort 80-4.5 mcg/actuation inhaler [id number] Yes Inhale 2 puffs twice a day. Historical Provider, MD Taking Active   walker misc [id number] Yes Rollator to assist with ambulation as needed LAWSON Owens-JEAN-CLAUDE Taking Active                    Allergies   Allergen Reactions    Metoprolol Shortness of breath and Other    Ticagrelor Anaphylaxis and Other     Feels a severe \"burning feeling\" in her chest    Ace Inhibitors Cough and Other     COUGH    Fentanyl Other    Gadolinium-Containing Contrast Media Hives and Other    Hydralazine Dermatitis    Iodinated Contrast Media Hives    Prednisone Other    Statins-Hmg-Coa Reductase Inhibitors Unknown    Penicillins Rash and Unknown         Family History  Brother    · Family history of depression (V17.0) (Z81.8)   · Family history of suicide attempt (V17.0) (Z81.8)     Social History  Problems    · Activities of daily living (ADL's), independent   · Current every day smoker (305.1) (F17.200)   · Does not use illicit drugs (V49.89) (Z78.9)   · Former smoker (V15.82) (Z87.891)   · Lives with adult children   · Marijuana   · No alcohol use     Review of Systems   Constitutional: Negative for decreased " "appetite, weight gain and weight loss.   HENT:  Negative for hearing loss.    Eyes:  Negative for visual disturbance.   Cardiovascular:  Positive for orthopnea and palpitations. Negative for chest pain, dyspnea on exertion, irregular heartbeat, leg swelling, near-syncope, paroxysmal nocturnal dyspnea and syncope.   Respiratory:  Negative for cough, hemoptysis and shortness of breath.    Hematologic/Lymphatic: Does not bruise/bleed easily.   Skin:  Negative for rash.   Musculoskeletal:  Negative for arthritis and back pain.   Gastrointestinal:  Negative for hematochezia and melena.   Genitourinary:  Negative for hematuria.   Neurological:  Negative for focal weakness and weakness.   Psychiatric/Behavioral:  Negative for altered mental status and substance abuse.          Visit Vitals  /72 (Patient Position: Sitting)   Pulse 67   Ht 1.626 m (5' 4\")   Wt 64.9 kg (143 lb)   SpO2 93%   BMI 24.55 kg/m²   Smoking Status Every Day   BSA 1.71 m²         Recorded: [id number] 11:42AM   Heart Rate 81   Systolic 120, Sitting   Diastolic 72, Sitting   Height 5 ft 4 in   Weight 145 lb    BMI Calculated 24.89 kg/m2   BSA Calculated 1.71   Tobacco Use a) Yes   Falls Screening (Age 18+) a) No falls within the last year   O2 Saturation 100, RA      Physical Exam  On examination:     Very pleasant thin elderly AA woman in no apparent CP or painful distress  Well groomed   AO x 4  Neck: No JVD or HJR today  CVS: HS 1,2. Gde 1-2 ESM at LLSE  Resp: CTA bilaterally  Abdomen:Mildly obese, SNT, BS wnl  Extremities: No pedal oedema  Skin: warm and dry  CNS: No gross deficits      Results for orders placed during the hospital encounter of 11/20/23    Transthoracic Echo (TTE) Complete    Narrative  [facility], [address] [place] [address]  Tel [phone] and Fax [phone]    TRANSTHORACIC ECHOCARDIOGRAM REPORT      Patient Name:      FANNIE Stephens Physician:    [id number] Isaac Rivera MD  Study " Date:        11/21/2023           Ordering Provider:    [id number] FRANCISCO CHICAS  MRN/PID:           [id number]             Fellow:  Accession#:        [id number]         Nurse:  Date of Birth/Age: 1955 / 68 years Sonographer:          Kary Tran RDCS  Gender:            F                    Additional Staff:  Height:            162.56 cm            Admit Date:           11/20/2023  Weight:            66.68 kg             Admission Status:     Inpatient -  Critical/Stat  (within 1-3 hours)  BSA:               1.72 m2              Encounter#:           [id number]  Department Location:  [address]  Blood Pressure: 77 /51 mmHg    Study Type:    TRANSTHORACIC ECHO (TTE) COMPLETE  Diagnosis/ICD: Acute combined systolic (congestive) and diastolic (congestive)  heart failure (CHF)-I50.41  Indication:    Congestive Heart Failure  CPT Code:      Echo Complete w Full Doppler-[id number]    Patient History:  Pertinent History: Hx of Low EF, CHF, CAD, HTN, DLD, CVA, Cardiogenic shock, MVR  2019, ICD 2020.    Study Detail: The following Echo studies were performed: 2D, M-Mode, Doppler and  color flow. Technically challenging study due to poor acoustic  windows, body habitus and patient lying in supine position. The  patient is intubated. Definity used as a contrast agent for  endocardial border definition. Total contrast used for this  procedure was 3.0 mL via IV push. Unable to obtain suprasternal  notch view.      PHYSICIAN INTERPRETATION:  Left Ventricle: The left ventricular systolic function is severely decreased, with an estimated ejection fraction of 15-20%. The patient is in atrial fibrillation which may influence the estimate of left ventricular function and transvalvular flows. There is global hypokinesis of the left ventricle with minor regional variations. The left ventricular cavity size is severely dilated. Left ventricular diastolic filling was not assessed. There is a 1.2 cm x 0.6 cm mobile echodensity along the  anterolateral wall of the LV (clips 56, 70) which could represent a calcified papillary muscle, thrombus or vegetation. The mobile echodensity is not well seen with definity echocontrast.  Left Atrium: The left atrium is enlarged.  Right Ventricle: The right ventricle is normal in size. There is normal right ventricular global systolic function. A device is visualized in the right ventricle.  Right Atrium: The right atrium was not well visualized. There is a device visualized in the right atrium.  Aortic Valve: The aortic valve is probably trileaflet. There is no evidence of aortic valve regurgitation. The peak instantaneous gradient of the aortic valve is 5.2 mmHg.  Mitral Valve: There is a prosthetic mitral valve present. There is a Epic mitral valve bioprosthesis with a 29 mm reported size. There is trace mitral valve regurgitation. Mean mitral gradient of 6.5 mmHg at a heart rate of 82 bpm.  Tricuspid Valve: The tricuspid valve is structurally normal. There is trace tricuspid regurgitation. The Doppler estimated RVSP is within normal limits at 28.6 mmHg.  Pulmonic Valve: The pulmonic valve is not well visualized. The pulmonic valve regurgitation was not well visualized.  Pericardium: There is a trivial pericardial effusion.  Aorta: The aortic root is normal.  Systemic Veins: The inferior vena cava appears to be of normal size.  In comparison to the previous echocardiogram(s): Compared with study from 3/21/2023, the mobile echodensity was seen on the echo from 3/21/23 and 7/14/2021 and intraoperative SHRUTHI in 2019 and appears similar. Current mitral mean gradient is similar to prior (current HR 82 bpm, previously 109 bpm).      CONCLUSIONS:  1. Left ventricular systolic function is severely decreased with a 15-20% estimated ejection fraction.  2. There is global hypokinesis of the left ventricle with minor regional variations.  3. Left ventricular cavity size is severely dilated.  4. There is a 1.2 cm x 0.6 cm  mobile echodensity along the anterolateral wall of the LV (clips 56, 70) which could represent a calcified papillary muscle, thrombus or vegetation. The mobile echodensity is not well seen with definity echocontrast.  5. RVSP within normal limits.  6. There is a Epic mitral valve bioprosthesis with a 29 mm reported size. There is trace mitral valve regurgitation. Mean mitral gradient of 6.5 mmHg at a heart rate of 82 bpm.  7. Compared with study from 3/21/2023, the mobile echodensity was seen on the echo from 3/21/23 and 2021 and intraoperative SHRUTHI in 2019 and appears similar. Current mitral mean gradient is similar to prior (current HR 82 bpm, previously 109 bpm).    QUANTITATIVE DATA SUMMARY:  2D MEASUREMENTS:  Normal Ranges:  Ao Root d:     3.00 cm    (2.0-3.7cm)  IVSd:          1.03 cm    (0.6-1.1cm)  LVPWd:         1.26 cm    (0.6-1.1cm)  LVIDd:         5.90 cm    (3.9-5.9cm)  LV Mass Index: 167.1 g/m2    LA VOLUME:  Normal Ranges:  LA Vol A2C: 65.9 ml    LV SYSTOLIC FUNCTION BY 2D PLANIMETRY (MOD):  Normal Ranges:  EF-A4C View: 34.5 % (>=55%)  EF-A2C View: 10.5 %  EF-Biplane:  23.0 %    LV DIASTOLIC FUNCTION:  Normal Ranges:  MV Peak E:  1.45 m/s (0.7-1.2 m/s)  MV Peak A:  1.92 m/s (0.42-0.7 m/s)  E/A Ratio:  0.76     (1.0-2.2)  MV e'       0.03 m/s (>8.0)  E/e' Ratio: 48.46    (<8.0)  a'          0.04 m/s    MITRAL VALVE:  Normal Ranges:  MV Vmax:    2.04 m/s  (<=1.3m/s)  MV peak P.6 mmHg (<5mmHg)  MV mean P.5 mmHg  (<2mmHg)  MV VTI:     38.89 cm  (10-13cm)  MV DT:      237 msec  (150-240msec)    AORTIC VALVE:  Normal Ranges:  AoV Vmax:      1.14 m/s (<=1.7m/s)  AoV Peak P.2 mmHg (<20mmHg)  LVOT Max Edvin:  0.72 m/s (<=1.1m/s)  LVOT VTI:      10.09 cm  LVOT Diameter: 1.86 cm  (1.8-2.4cm)  AoV Area,Vmax: 1.73 cm2 (2.5-4.5cm2)      RIGHT VENTRICLE:  RV s' 0.06 m/s    TRICUSPID VALVE/RVSP:  Normal Ranges:  Peak TR Velocity: 2.53 m/s  RV Syst Pressure: 28.6 mmHg (< 30mmHg)  IVC Diam:          2.00 cm      IMPRESSION:     68 year old woman who presents for continued advanced heart failure care. She has a past medical history significant for HrEF (LVEF 15-20%, multiple RWMA on TTE 5/2020) CAD s/p NSTEMI s/p RIRI to LCX (Jan 2016), MVR s/p mitral valve repair in 6/2019; 29 mm Epic bioprosthetic valve with Dr. Montana), COPD, HTN, HL, GERD, hx of CVA, DM.Ms Marinelli has declined durable LVAD therapies in the past and again today.  She was hospitalized 7/13-16/2021 with severe sepsis thought to be of a pneumonic source. Repeat TTE during this admission 7/14/2021 demonstrated LVEF 15-20% LVIDD 5.3 cm with RVSP 33 mmHg.  SGLT2i started at last visit and has been -tolerated.  She was hospitalized with COVID 3/2023 and on TTE 3/2023 LVEF 15 to 20%.  She continues to decline durable LVAD therapy. She is a current smoker and would not be a candidate for heart transplantation, no desire to stop smoking.  Previously, she was rechallenged with isosorbide dinitrate and hydralazine in low doses but she has only tolerated isosorbide dinitrate. Hydralazine makes her subjectively very hypotensive and she has declined to use this medication.  Ms Marinelli was admitted with hypoxic resp failure  2/2 Staph aureus requiring IPPV. She had SGC and had inotropic infusion < 24 hrs. She was found to have ROBERT and was referred to GI for further evaluation.  ARNi was initiated in hospital and was tolerated. She continues to tolerate ARNi.       NYHA Functional Class: 2  ACC/AHA Stage D heart failure  Volume status: Euvolaemic  Perfusion status: Warm to touch     PLAN:     #HFrEF  Declining advanced HF therapies, we discussed the possibility of durable LVAD again today and she again declined. She continues to smoke and is not a candidate for heart transplantation.     -Medication optimisation:  - Increase ARNi to 24/26 mg bid -Avoiding BB  -Continue empagliflozin 10 mg once daily.  -Continue to hold ISDN/hydralazine  - Start [place] at  next visit   -Encouraged to monitor blood pressure at home  -Heart failure lifestyle modifications discussed and Qs answered  - Labs, pre next visit        #Active smoking history  She does now want to quit, quir date 12/18/2023  -Nicotine replacement therapy accepted  -Referral made to the smoking cessation team today    This note was transcribed using the Dragon Dictation system. There may be grammatical, punctuation, or verbiage errors that can occur with voice recognition programs.    Kathy Rivera MD PhD

## 2023-12-18 NOTE — PATIENT INSTRUCTIONS
Thank you for coming in today. If you have any questions or concerns, you may call the Heart Failure Office at [phone] option 6, or [phone].  You may also contact our heart failure nursing team via email on [email or web address].    For quicker results set-up your  [employer] account to receive results and other correspondence directly to your phone.    Please bring all your pills/medications to your Cardiology appointments.    **  - You have set your smoking QUIT date is set for TODAY! You can do it!    -We will renew your heart medications today    - Please make the following medication changes:  1. START nicotine patch 14 mg/day    2. INCREASE Entresto to 24/26 mg twice a day    - Please have the following tests done:  1.Blood tests just before your next visit (RFP, BNP, CBC, iron, TIBC, ferritin)    You will be referred to the following teams, CALL  [phone] to schedule your appointments with:  1.  Gastroenterology (evaluation of your iron deficiency)    2. Smoking cessation team    - Please make an appointment to be seen in :  1 month ( VIRTUAL)  3 months ( in person)

## 2024-01-17 ENCOUNTER — TELEMEDICINE (OUTPATIENT)
Dept: CARDIOLOGY | Facility: HOSPITAL | Age: 69
End: 2024-01-17
Payer: COMMERCIAL

## 2024-01-17 VITALS — SYSTOLIC BLOOD PRESSURE: 80 MMHG | HEART RATE: 60 BPM | DIASTOLIC BLOOD PRESSURE: 60 MMHG

## 2024-01-17 DIAGNOSIS — I50.41 ACUTE COMBINED SYSTOLIC (CONGESTIVE) AND DIASTOLIC (CONGESTIVE) HEART FAILURE (MULTI): ICD-10-CM

## 2024-01-17 PROCEDURE — 99215 OFFICE O/P EST HI 40 MIN: CPT | Performed by: STUDENT IN AN ORGANIZED HEALTH CARE EDUCATION/TRAINING PROGRAM

## 2024-01-17 RX ORDER — POLYETHYLENE GLYCOL 3350 17 G/17G
17 POWDER, FOR SOLUTION ORAL DAILY
Status: ON HOLD | COMMUNITY
End: 2024-02-17 | Stop reason: WASHOUT

## 2024-01-17 NOTE — PROGRESS NOTES
" Patient ID: Melissa Marinelli   Primary Cardiologist:  Primary Care Provider: Jeremiah Champion MD   Visit Type:  Follow Up   On Call: Patient      Verbal consent was requested and obtained from patient on this date for a telehealth visit.      Chief Complaint     Ambulatory heart failure care      History of Present Illness  Referring Physician: Dr. KYLIE Franklin  Accompanied by: alone      HPI:      68 year old woman who presents for advanced heart failure care. She has a past medical history significant for HrEF (EF 15-20%, multiple RWMA on TTE 5/2020) CAD s/p NSTEMI s/p RIRI to LCX (Jan 2016), MVR s/p mitral valve repair in (June 2019; 29 mm Epic bioprosthetic valve with Dr. Montana), COPD, HTN, HL, GERD, hx of CVA, DM.  She was admitted to [facility] 5/14-24/2020. Per her discharge summary, \" drop in LVEF from 2019 (40%->10%) likely due to mitral valve replacement and subsequent increase in afterload with ongoing worsening of underlying heart failure. Medical team had multiple conversation with patient and family given pt has severe cardiac dysfunction and with medical comorbidities (COPD, CKD) is unlikely to benefit from advanced therapies. Medical team unable to restart home heart failure medications (Entresto, ivabridine, bumex) given her low BPs. Would consider ICD for primary prevention, however, pt seems open to palliative care options as she is determined to be home with her family and is resistant to undergo invasive therapies - stating she would not want to go back on HD (previously required iHD following episodes of severe cardiogenic shock).   Floor Course: Urine tox screen positive for cannibinoid. cPET held for now. Plan to present to Advanced Therapies committee for consideration of LVAD as DT. Patient spoke with Dr. Tracey prior to discharge. She is considering LVAD placement and will discuss with her family. EP consulted, ICD placed 5/22. Initiated on 7-day course of doxycycline.\"  On multiple " occasions in the past durable LVAD therapy has been discussed and has been declined on multiple occasions.  She was hospitalized with COVID 3/2023, fortunately she did not need to be intubated or require ventilatory support. She also reports that she has been hypotensive and her isosorbide dinitrate and hydralazine were both discontinued.  TTE 3/2023 LVEF 15 to 20%.   Ms Marinelli was admitted with hypoxic resp failure  2/2 Staph aureus requiring IPPV. She had SGC and had inotropic infusion < 24 hrs. She was found to have ROBERT and was referred to GI for further evaluation.  ARNi was initiated in hospital and was tolerated. At last visit ARNi 12/13 mg bid was doubled to 24/26 mg twice daily.  This has been poorly tolerated.  She reports that she has been symptomatically hypotensive and has been drinking additional fluids and using salt rich meals to get her blood pressure up.  (See vitals below).    She also describes that she has a 2 pillow orthopnea (1 pillow at baseline).  She denies chest pain, dyspnea at rest.  She has no real exertional dyspnea but has been limiting herself to indoor activities due to the cold weather.  She further denies syncope, presyncope.  She did have leg swelling last week and used her loop diuretic for 2 days and has not had leg swelling since.  She is since off of her prn diuretic.     She has been adherent with prescribed medications.    She has not had any defibrillator shocks.     She continues to smoke approximately 10 cigarettes a day.  She stopped at the end of December 2023 but has resumed.     Surgical Hx:  - MVR     Social Hx;  - Marijuana   -daily cigarettes      Fam Hx:  Brother- CVA      The electronic medical record has been reviewed by me for salient history. All cardiovascular imaging and testing available in the electronic medical record, and R&Vo has been reviewed.       Past Medical History  Problems    · History of HFrEF (heart failure with reduced ejection fraction)  "428.20 (I50.20)   · Resolved Date: 03 Dec 2019     Medication Documentation Review Audit       Reviewed by Yue Urias RN (Registered Nurse) on 01/17/24 at 1147      Medication Order Taking? Sig Documenting Provider Last Dose Status   acetaminophen (Tylenol) 500 mg tablet [id number] Yes Take 2 tablets (1,000 mg) by mouth every 8 hours if needed for mild pain (1 - 3). Historical Provider, MD Taking Active   albuterol 90 mcg/actuation inhaler [id number] Yes Inhale 2 puffs every 4 hours if needed for wheezing or shortness of breath. LAWSON Burt-JEAN-CLAUDE Taking Active   aspirin 81 mg chewable tablet [id number] Yes Chew 1 tablet (81 mg) once daily. Historical Provider, MD Taking Active   bumetanide (Bumex) 0.5 mg tablet [id number] Yes Take 1 tablet (0.5 mg) by mouth once daily as needed (for weight gain of 2 or more pounds in a day; or for weight gain of 5 or more pounds in 1 week). LAWSON Burt-CNP Taking Active   Farxiga 10 mg [id number] Yes Take 1 tablet (10 mg) by mouth once daily. Historical Provider, MD Taking Active   nicotine (Nicoderm CQ) 14 mg/24 hr patch [id number] Yes Place 1 patch over 24 hours on the skin once every 24 hours. Kathy Rivera MD PhD Taking Active   sacubitriL-valsartan (Entresto) 24-26 mg tablet [id number] Yes Take 1 tablet by mouth 2 times a day. Kathy Rivera MD PhD Taking Active   Symbicort 80-4.5 mcg/actuation inhaler [id number] Yes Inhale 2 puffs twice a day. Historical Provider, MD Taking Active   walker misc [id number] Yes Rollator to assist with ambulation as needed Bucky Naik APRN-CNP Taking Active                   Allergies   Allergen Reactions    Metoprolol Shortness of breath and Other    Ticagrelor Anaphylaxis and Other     Feels a severe \"burning feeling\" in her chest    Ace Inhibitors Cough and Other     COUGH    Fentanyl Other    Gadolinium-Containing Contrast Media Hives and Other    Hydralazine Dermatitis    Iodinated Contrast Media Hives    Prednisone " Other    Statins-Hmg-Coa Reductase Inhibitors Unknown    Penicillins Rash and Unknown       Family History  Brother    · Family history of depression (V17.0) (Z81.8)   · Family history of suicide attempt (V17.0) (Z81.8)     Social History  Problems    · Activities of daily living (ADL's), independent   · Current every day smoker (305.1) (F17.200)   · Does not use illicit drugs (V49.89) (Z78.9)   · Former smoker (V15.82) (Z87.891)   · Lives with adult children   · Marijuana   · No alcohol use      Review of Systems   Constitutional: Negative for decreased appetite, weight gain and weight loss.   HENT:  Negative for hearing loss.    Eyes:  Negative for visual disturbance.   Cardiovascular:  Positive for orthopnea . Negative for chest pain, dyspnea on exertion, irregular heartbeat, leg swelling, near-syncope, paroxysmal nocturnal dyspnea and syncope.   Respiratory:  Negative for cough, hemoptysis and shortness of breath.    Hematologic/Lymphatic: Does not bruise/bleed easily.   Skin:  Negative for rash.   Musculoskeletal:  Negative for arthritis and back pain.   Gastrointestinal:  Negative for hematochezia and melena.   Genitourinary:  Negative for hematuria.   Neurological:  Negative for focal weakness and weakness.   Psychiatric/Behavioral:  Negative for altered mental status and substance abuse.        Visit Vitals  BP 80/60   Pulse 60   Smoking Status Every Day         Physical Exam  O/E:  No in person physical examination done  AO x 4  Apparent conversational SOB: N     Transthoracic Echo (TTE) Complete     Narrative  [facility], [address] [place] [address]  Tel [phone] and Fax [phone]     TRANSTHORACIC ECHOCARDIOGRAM REPORT        Patient Name:      FANNIE Stephens Physician:    [id number] Isaac Rivera MD  Study Date:        11/21/2023           Ordering Provider:    [id number] FRANCISCO CHICAS  MRN/PID:           [id number]             Fellow:  Accession#:         [id number]         Nurse:  Date of Birth/Age: 1955 / 68 years Sonographer:          Kary Tran RDCS  Gender:            F                    Additional Staff:  Height:            162.56 cm            Admit Date:           11/20/2023  Weight:            66.68 kg             Admission Status:     Inpatient -  Critical/Stat  (within 1-3 hours)  BSA:               1.72 m2              Encounter#:           [id number]  Department Location:  [address]  Blood Pressure: 77 /51 mmHg     Study Type:    TRANSTHORACIC ECHO (TTE) COMPLETE  Diagnosis/ICD: Acute combined systolic (congestive) and diastolic (congestive)  heart failure (CHF)-I50.41  Indication:    Congestive Heart Failure  CPT Code:      Echo Complete w Full Doppler-85660     Patient History:  Pertinent History: Hx of Low EF, CHF, CAD, HTN, DLD, CVA, Cardiogenic shock, MVR  2019, ICD 2020.     Study Detail: The following Echo studies were performed: 2D, M-Mode, Doppler and  color flow. Technically challenging study due to poor acoustic  windows, body habitus and patient lying in supine position. The  patient is intubated. Definity used as a contrast agent for  endocardial border definition. Total contrast used for this  procedure was 3.0 mL via IV push. Unable to obtain suprasternal  notch view.        PHYSICIAN INTERPRETATION:  Left Ventricle: The left ventricular systolic function is severely decreased, with an estimated ejection fraction of 15-20%. The patient is in atrial fibrillation which may influence the estimate of left ventricular function and transvalvular flows. There is global hypokinesis of the left ventricle with minor regional variations. The left ventricular cavity size is severely dilated. Left ventricular diastolic filling was not assessed. There is a 1.2 cm x 0.6 cm mobile echodensity along the anterolateral wall of the LV (clips 56, 70) which could represent a calcified papillary muscle, thrombus or vegetation. The mobile  echodensity is not well seen with definity echocontrast.  Left Atrium: The left atrium is enlarged.  Right Ventricle: The right ventricle is normal in size. There is normal right ventricular global systolic function. A device is visualized in the right ventricle.  Right Atrium: The right atrium was not well visualized. There is a device visualized in the right atrium.  Aortic Valve: The aortic valve is probably trileaflet. There is no evidence of aortic valve regurgitation. The peak instantaneous gradient of the aortic valve is 5.2 mmHg.  Mitral Valve: There is a prosthetic mitral valve present. There is a Epic mitral valve bioprosthesis with a 29 mm reported size. There is trace mitral valve regurgitation. Mean mitral gradient of 6.5 mmHg at a heart rate of 82 bpm.  Tricuspid Valve: The tricuspid valve is structurally normal. There is trace tricuspid regurgitation. The Doppler estimated RVSP is within normal limits at 28.6 mmHg.  Pulmonic Valve: The pulmonic valve is not well visualized. The pulmonic valve regurgitation was not well visualized.  Pericardium: There is a trivial pericardial effusion.  Aorta: The aortic root is normal.  Systemic Veins: The inferior vena cava appears to be of normal size.  In comparison to the previous echocardiogram(s): Compared with study from 3/21/2023, the mobile echodensity was seen on the echo from 3/21/23 and 7/14/2021 and intraoperative SHRUTHI in 2019 and appears similar. Current mitral mean gradient is similar to prior (current HR 82 bpm, previously 109 bpm).        CONCLUSIONS:  1. Left ventricular systolic function is severely decreased with a 15-20% estimated ejection fraction.  2. There is global hypokinesis of the left ventricle with minor regional variations.  3. Left ventricular cavity size is severely dilated.  4. There is a 1.2 cm x 0.6 cm mobile echodensity along the anterolateral wall of the LV (clips 56, 70) which could represent a calcified papillary muscle, thrombus  or vegetation. The mobile echodensity is not well seen with definity echocontrast.  5. RVSP within normal limits.  6. There is a Epic mitral valve bioprosthesis with a 29 mm reported size. There is trace mitral valve regurgitation. Mean mitral gradient of 6.5 mmHg at a heart rate of 82 bpm.  7. Compared with study from 3/21/2023, the mobile echodensity was seen on the echo from 3/21/23 and 2021 and intraoperative SHRUTHI in 2019 and appears similar. Current mitral mean gradient is similar to prior (current HR 82 bpm, previously 109 bpm).     QUANTITATIVE DATA SUMMARY:  2D MEASUREMENTS:  Normal Ranges:  Ao Root d:     3.00 cm    (2.0-3.7cm)  IVSd:          1.03 cm    (0.6-1.1cm)  LVPWd:         1.26 cm    (0.6-1.1cm)  LVIDd:         5.90 cm    (3.9-5.9cm)  LV Mass Index: 167.1 g/m2     LA VOLUME:  Normal Ranges:  LA Vol A2C: 65.9 ml     LV SYSTOLIC FUNCTION BY 2D PLANIMETRY (MOD):  Normal Ranges:  EF-A4C View: 34.5 % (>=55%)  EF-A2C View: 10.5 %  EF-Biplane:  23.0 %     LV DIASTOLIC FUNCTION:  Normal Ranges:  MV Peak E:  1.45 m/s (0.7-1.2 m/s)  MV Peak A:  1.92 m/s (0.42-0.7 m/s)  E/A Ratio:  0.76     (1.0-2.2)  MV e'       0.03 m/s (>8.0)  E/e' Ratio: 48.46    (<8.0)  a'          0.04 m/s     MITRAL VALVE:  Normal Ranges:  MV Vmax:    2.04 m/s  (<=1.3m/s)  MV peak P.6 mmHg (<5mmHg)  MV mean P.5 mmHg  (<2mmHg)  MV VTI:     38.89 cm  (10-13cm)  MV DT:      237 msec  (150-240msec)     AORTIC VALVE:  Normal Ranges:  AoV Vmax:      1.14 m/s (<=1.7m/s)  AoV Peak P.2 mmHg (<20mmHg)  LVOT Max Edvin:  0.72 m/s (<=1.1m/s)  LVOT VTI:      10.09 cm  LVOT Diameter: 1.86 cm  (1.8-2.4cm)  AoV Area,Vmax: 1.73 cm2 (2.5-4.5cm2)        RIGHT VENTRICLE:  RV s' 0.06 m/s     TRICUSPID VALVE/RVSP:  Normal Ranges:  Peak TR Velocity: 2.53 m/s  RV Syst Pressure: 28.6 mmHg (< 30mmHg)  IVC Diam:         2.00 cm        IMPRESSION:     68 year old woman who presents for continued advanced heart failure care. She has a past medical  history significant for HrEF (LVEF 15-20%, multiple RWMA on TTE 5/2020) CAD s/p NSTEMI s/p RIRI to LCX (Jan 2016), MVR s/p mitral valve repair in 6/2019; 29 mm Epic bioprosthetic valve with Dr. Montana), COPD, HTN, HL, GERD, hx of CVA, DM.Ms Marinelli has declined durable LVAD therapies in the past and again today.  She was hospitalized 7/13-16/2021 with severe sepsis thought to be of a pneumonic source. Repeat TTE during this admission 7/14/2021 demonstrated LVEF 15-20% LVIDD 5.3 cm with RVSP 33 mmHg.  SGLT2i started at last visit and has been -tolerated.  She was hospitalized with COVID 3/2023 and on TTE 3/2023 LVEF 15 to 20%.  She continues to decline durable LVAD therapy. She is a current smoker and would not be a candidate for heart transplantation, no desire to stop smoking.  Previously, she was rechallenged with isosorbide dinitrate and hydralazine in low doses but she has only tolerated isosorbide dinitrate. Hydralazine makes her subjectively very hypotensive and she has declined to use this medication.  Ms Marinelli was admitted with hypoxic resp failure  2/2 Staph aureus requiring IPPV. She had SGC and had inotropic infusion < 24 hrs. She was found to have ROBERT and was referred to GI for further evaluation.  ARNi was initiated in hospital and was tolerated. She did not tolerate ARNi up titration to 24/26 mg bid - symptomatic hypotension        NYHA Functional Class: 2  ACC/AHA Stage D heart failure  Volume status: Euvolaemic  Perfusion status: Warm to touch     PLAN:     #HFrEF  Declining advanced HF therapies, we have discussed the possibility of durable LVAD at multiple times in the past and today and she continues to decline. She continues to smoke and is not a candidate for heart transplantation.  We discussed the possibility of inotropic therapy for symptomatic improvement.  She indicated that she needed to think about this some more and discuss with her family.  She will call with questions, and we will  continue discussing this potential option.  She does understand that this would be with palliative/symptomatic improvement intent and would not be life-prolonging.     -Medication optimisation:  - Decrease ARNi to 12/13 mg bid , unable to tolerate 24/26 mg twice daily 2/2 symptomatic hypotension  -Avoiding BB given RVF  -Continue empagliflozin 10 mg once daily.  -Continue to hold ISDN/hydralazine  - Consider Jose Luis at next visit   -Encouraged to monitor blood pressure at home  -Heart failure lifestyle modifications discussed and Qs answered     #Active smoking history  She continues to want to quit, but is struggling.  -Nicotine replacement therapy prescribed  -Referral made to the smoking cessation team again today     This note was transcribed using the Dragon Dictation system. There may be grammatical, punctuation, or verbiage errors that can occur with voice recognition programs.      Time Spent: 30 minutes ( preparation, visit and documentation)     Kathy Rivera MD PhD

## 2024-01-17 NOTE — PATIENT INSTRUCTIONS
- Decrease Entresto to 1/2 pill of the 24/26 mg twice a day  -Smoking cessation team referral  - Cardiac rehab referral  - has follow up arranged

## 2024-01-22 ENCOUNTER — CLINICAL SUPPORT (OUTPATIENT)
Dept: EMERGENCY MEDICINE | Facility: HOSPITAL | Age: 69
DRG: 286 | End: 2024-01-22
Payer: COMMERCIAL

## 2024-01-22 ENCOUNTER — HOSPITAL ENCOUNTER (INPATIENT)
Facility: HOSPITAL | Age: 69
LOS: 12 days | Discharge: HOME HEALTH | DRG: 286 | End: 2024-02-03
Attending: EMERGENCY MEDICINE | Admitting: INTERNAL MEDICINE
Payer: COMMERCIAL

## 2024-01-22 ENCOUNTER — APPOINTMENT (OUTPATIENT)
Dept: RADIOLOGY | Facility: HOSPITAL | Age: 69
DRG: 286 | End: 2024-01-22
Payer: COMMERCIAL

## 2024-01-22 DIAGNOSIS — I50.20 HFREF (HEART FAILURE WITH REDUCED EJECTION FRACTION) (MULTI): ICD-10-CM

## 2024-01-22 DIAGNOSIS — K29.00 ACUTE GASTRITIS WITHOUT HEMORRHAGE, UNSPECIFIED GASTRITIS TYPE: ICD-10-CM

## 2024-01-22 DIAGNOSIS — Z95.2 HISTORY OF MITRAL VALVE REPLACEMENT: ICD-10-CM

## 2024-01-22 DIAGNOSIS — I50.22 CHRONIC SYSTOLIC HEART FAILURE (MULTI): ICD-10-CM

## 2024-01-22 DIAGNOSIS — J96.91 HYPOXIC RESPIRATORY FAILURE (MULTI): Primary | ICD-10-CM

## 2024-01-22 DIAGNOSIS — Z95.810 PRESENCE OF AUTOMATIC (IMPLANTABLE) CARDIAC DEFIBRILLATOR: ICD-10-CM

## 2024-01-22 DIAGNOSIS — N18.30 STAGE 3 CHRONIC KIDNEY DISEASE, UNSPECIFIED WHETHER STAGE 3A OR 3B CKD (MULTI): ICD-10-CM

## 2024-01-22 DIAGNOSIS — I25.5 ISCHEMIC CARDIOMYOPATHY: ICD-10-CM

## 2024-01-22 DIAGNOSIS — R06.09 OTHER FORMS OF DYSPNEA: ICD-10-CM

## 2024-01-22 LAB
ALBUMIN SERPL BCP-MCNC: 4.5 G/DL (ref 3.4–5)
ALP SERPL-CCNC: 87 U/L (ref 33–136)
ALT SERPL W P-5'-P-CCNC: 16 U/L (ref 7–45)
ANION GAP BLDV CALCULATED.4IONS-SCNC: 10 MMOL/L (ref 10–25)
ANION GAP BLDV CALCULATED.4IONS-SCNC: 10 MMOL/L (ref 10–25)
ANION GAP BLDV CALCULATED.4IONS-SCNC: 11 MMOL/L (ref 10–25)
ANION GAP BLDV CALCULATED.4IONS-SCNC: 12 MMOL/L (ref 10–25)
ANION GAP BLDV CALCULATED.4IONS-SCNC: 7 MMOL/L (ref 10–25)
ANION GAP SERPL CALC-SCNC: 15 MMOL/L (ref 10–20)
APTT PPP: 30 SECONDS (ref 27–38)
AST SERPL W P-5'-P-CCNC: 40 U/L (ref 9–39)
ATRIAL RATE: 93 BPM
BASE EXCESS BLDV CALC-SCNC: -0.8 MMOL/L (ref -2–3)
BASE EXCESS BLDV CALC-SCNC: -0.8 MMOL/L (ref -2–3)
BASE EXCESS BLDV CALC-SCNC: -1.4 MMOL/L (ref -2–3)
BASE EXCESS BLDV CALC-SCNC: -2.3 MMOL/L (ref -2–3)
BASE EXCESS BLDV CALC-SCNC: -3.2 MMOL/L (ref -2–3)
BASOPHILS # BLD AUTO: 0.04 X10*3/UL (ref 0–0.1)
BASOPHILS NFR BLD AUTO: 0.5 %
BILIRUB SERPL-MCNC: 0.4 MG/DL (ref 0–1.2)
BNP SERPL-MCNC: 335 PG/ML (ref 0–99)
BODY TEMPERATURE: 37 DEGREES CELSIUS
BUN SERPL-MCNC: 19 MG/DL (ref 6–23)
CA-I BLDV-SCNC: 1.15 MMOL/L (ref 1.1–1.33)
CA-I BLDV-SCNC: 1.17 MMOL/L (ref 1.1–1.33)
CA-I BLDV-SCNC: 1.17 MMOL/L (ref 1.1–1.33)
CA-I BLDV-SCNC: 1.19 MMOL/L (ref 1.1–1.33)
CA-I BLDV-SCNC: 1.22 MMOL/L (ref 1.1–1.33)
CALCIUM SERPL-MCNC: 9.3 MG/DL (ref 8.6–10.6)
CARDIAC TROPONIN I PNL SERPL HS: 1257 NG/L (ref 0–34)
CARDIAC TROPONIN I PNL SERPL HS: 151 NG/L (ref 0–34)
CARDIAC TROPONIN I PNL SERPL HS: 185 NG/L (ref 0–34)
CARDIAC TROPONIN I PNL SERPL HS: 214 NG/L (ref 0–34)
CARDIAC TROPONIN I PNL SERPL HS: 484 NG/L (ref 0–34)
CARDIAC TROPONIN I PNL SERPL HS: 996 NG/L (ref 0–34)
CHLORIDE BLDV-SCNC: 108 MMOL/L (ref 98–107)
CHLORIDE BLDV-SCNC: 109 MMOL/L (ref 98–107)
CHLORIDE BLDV-SCNC: 110 MMOL/L (ref 98–107)
CHLORIDE SERPL-SCNC: 107 MMOL/L (ref 98–107)
CO2 SERPL-SCNC: 25 MMOL/L (ref 21–32)
CREAT SERPL-MCNC: 1.3 MG/DL (ref 0.5–1.05)
EGFRCR SERPLBLD CKD-EPI 2021: 45 ML/MIN/1.73M*2
EOSINOPHIL # BLD AUTO: 0.52 X10*3/UL (ref 0–0.7)
EOSINOPHIL NFR BLD AUTO: 6.3 %
ERYTHROCYTE [DISTWIDTH] IN BLOOD BY AUTOMATED COUNT: 16.3 % (ref 11.5–14.5)
GLUCOSE BLD MANUAL STRIP-MCNC: 135 MG/DL (ref 74–99)
GLUCOSE BLDV-MCNC: 107 MG/DL (ref 74–99)
GLUCOSE BLDV-MCNC: 110 MG/DL (ref 74–99)
GLUCOSE BLDV-MCNC: 119 MG/DL (ref 74–99)
GLUCOSE BLDV-MCNC: 176 MG/DL (ref 74–99)
GLUCOSE BLDV-MCNC: 90 MG/DL (ref 74–99)
GLUCOSE SERPL-MCNC: 194 MG/DL (ref 74–99)
HCO3 BLDV-SCNC: 25.4 MMOL/L (ref 22–26)
HCO3 BLDV-SCNC: 25.4 MMOL/L (ref 22–26)
HCO3 BLDV-SCNC: 25.8 MMOL/L (ref 22–26)
HCO3 BLDV-SCNC: 26.9 MMOL/L (ref 22–26)
HCO3 BLDV-SCNC: 27.2 MMOL/L (ref 22–26)
HCT VFR BLD AUTO: 42.5 % (ref 36–46)
HCT VFR BLD EST: 40 % (ref 36–46)
HCT VFR BLD EST: 40 % (ref 36–46)
HCT VFR BLD EST: 41 % (ref 36–46)
HCT VFR BLD EST: 41 % (ref 36–46)
HCT VFR BLD EST: 42 % (ref 36–46)
HGB BLD-MCNC: 13.8 G/DL (ref 12–16)
HGB BLDV-MCNC: 13.3 G/DL (ref 12–16)
HGB BLDV-MCNC: 13.4 G/DL (ref 12–16)
HGB BLDV-MCNC: 13.6 G/DL (ref 12–16)
HGB BLDV-MCNC: 13.8 G/DL (ref 12–16)
HGB BLDV-MCNC: 14.1 G/DL (ref 12–16)
IMM GRANULOCYTES # BLD AUTO: 0.03 X10*3/UL (ref 0–0.7)
IMM GRANULOCYTES NFR BLD AUTO: 0.4 % (ref 0–0.9)
INHALED O2 CONCENTRATION: 28 %
INHALED O2 CONCENTRATION: 28 %
INHALED O2 CONCENTRATION: 30 %
INR PPP: 1 (ref 0.9–1.1)
LACTATE BLDV-SCNC: 1.5 MMOL/L (ref 0.4–2)
LACTATE BLDV-SCNC: 1.5 MMOL/L (ref 0.4–2)
LACTATE BLDV-SCNC: 1.6 MMOL/L (ref 0.4–2)
LACTATE BLDV-SCNC: 1.8 MMOL/L (ref 0.4–2)
LACTATE BLDV-SCNC: 2.2 MMOL/L (ref 0.4–2)
LACTATE BLDV-SCNC: 3.1 MMOL/L (ref 0.4–2)
LYMPHOCYTES # BLD AUTO: 1.75 X10*3/UL (ref 1.2–4.8)
LYMPHOCYTES NFR BLD AUTO: 21.3 %
MAGNESIUM SERPL-MCNC: 2.26 MG/DL (ref 1.6–2.4)
MCH RBC QN AUTO: 29.3 PG (ref 26–34)
MCHC RBC AUTO-ENTMCNC: 32.5 G/DL (ref 32–36)
MCV RBC AUTO: 90 FL (ref 80–100)
MONOCYTES # BLD AUTO: 0.29 X10*3/UL (ref 0.1–1)
MONOCYTES NFR BLD AUTO: 3.5 %
NEUTROPHILS # BLD AUTO: 5.59 X10*3/UL (ref 1.2–7.7)
NEUTROPHILS NFR BLD AUTO: 68 %
NRBC BLD-RTO: 0 /100 WBCS (ref 0–0)
OXYHGB MFR BLDV: 20.3 % (ref 45–75)
OXYHGB MFR BLDV: 24.4 % (ref 45–75)
OXYHGB MFR BLDV: 27.5 % (ref 45–75)
OXYHGB MFR BLDV: 63.2 % (ref 45–75)
OXYHGB MFR BLDV: 67.8 % (ref 45–75)
P AXIS: 68 DEGREES
P OFFSET: 189 MS
P ONSET: 147 MS
PCO2 BLDV: 47 MM HG (ref 41–51)
PCO2 BLDV: 47 MM HG (ref 41–51)
PCO2 BLDV: 60 MM HG (ref 41–51)
PCO2 BLDV: 63 MM HG (ref 41–51)
PCO2 BLDV: 68 MM HG (ref 41–51)
PH BLDV: 7.21 PH (ref 7.33–7.43)
PH BLDV: 7.22 PH (ref 7.33–7.43)
PH BLDV: 7.26 PH (ref 7.33–7.43)
PH BLDV: 7.34 PH (ref 7.33–7.43)
PH BLDV: 7.34 PH (ref 7.33–7.43)
PLATELET # BLD AUTO: 196 X10*3/UL (ref 150–450)
PO2 BLDV: 21 MM HG (ref 35–45)
PO2 BLDV: 25 MM HG (ref 35–45)
PO2 BLDV: 28 MM HG (ref 35–45)
PO2 BLDV: 41 MM HG (ref 35–45)
PO2 BLDV: 43 MM HG (ref 35–45)
POTASSIUM BLDV-SCNC: 4.5 MMOL/L (ref 3.5–5.3)
POTASSIUM BLDV-SCNC: 4.6 MMOL/L (ref 3.5–5.3)
POTASSIUM BLDV-SCNC: 4.7 MMOL/L (ref 3.5–5.3)
POTASSIUM BLDV-SCNC: 4.9 MMOL/L (ref 3.5–5.3)
POTASSIUM BLDV-SCNC: 5.2 MMOL/L (ref 3.5–5.3)
POTASSIUM SERPL-SCNC: 4.2 MMOL/L (ref 3.5–5.3)
POTASSIUM SERPL-SCNC: 7 MMOL/L (ref 3.5–5.3)
PR INTERVAL: 142 MS
PROT SERPL-MCNC: 8.4 G/DL (ref 6.4–8.2)
PROTHROMBIN TIME: 11.4 SECONDS (ref 9.8–12.8)
Q ONSET: 218 MS
QRS COUNT: 15 BEATS
QRS DURATION: 114 MS
QT INTERVAL: 380 MS
QTC CALCULATION(BAZETT): 472 MS
QTC FREDERICIA: 440 MS
R AXIS: 83 DEGREES
RBC # BLD AUTO: 4.71 X10*6/UL (ref 4–5.2)
SAO2 % BLDV: 21 % (ref 45–75)
SAO2 % BLDV: 25 % (ref 45–75)
SAO2 % BLDV: 29 % (ref 45–75)
SAO2 % BLDV: 66 % (ref 45–75)
SAO2 % BLDV: 70 % (ref 45–75)
SODIUM BLDV-SCNC: 138 MMOL/L (ref 136–145)
SODIUM BLDV-SCNC: 141 MMOL/L (ref 136–145)
SODIUM BLDV-SCNC: 141 MMOL/L (ref 136–145)
SODIUM BLDV-SCNC: 142 MMOL/L (ref 136–145)
SODIUM BLDV-SCNC: 142 MMOL/L (ref 136–145)
SODIUM SERPL-SCNC: 140 MMOL/L (ref 136–145)
T AXIS: 176 DEGREES
T OFFSET: 408 MS
VENTRICULAR RATE: 93 BPM
WBC # BLD AUTO: 8.2 X10*3/UL (ref 4.4–11.3)

## 2024-01-22 PROCEDURE — 83880 ASSAY OF NATRIURETIC PEPTIDE: CPT | Performed by: EMERGENCY MEDICINE

## 2024-01-22 PROCEDURE — 83605 ASSAY OF LACTIC ACID: CPT | Mod: MUE | Performed by: STUDENT IN AN ORGANIZED HEALTH CARE EDUCATION/TRAINING PROGRAM

## 2024-01-22 PROCEDURE — 2500000001 HC RX 250 WO HCPCS SELF ADMINISTERED DRUGS (ALT 637 FOR MEDICARE OP)

## 2024-01-22 PROCEDURE — 93005 ELECTROCARDIOGRAM TRACING: CPT

## 2024-01-22 PROCEDURE — 85025 COMPLETE CBC W/AUTO DIFF WBC: CPT | Performed by: EMERGENCY MEDICINE

## 2024-01-22 PROCEDURE — 82947 ASSAY GLUCOSE BLOOD QUANT: CPT | Mod: MUE

## 2024-01-22 PROCEDURE — 99291 CRITICAL CARE FIRST HOUR: CPT | Mod: 25 | Performed by: EMERGENCY MEDICINE

## 2024-01-22 PROCEDURE — 2500000002 HC RX 250 W HCPCS SELF ADMINISTERED DRUGS (ALT 637 FOR MEDICARE OP, ALT 636 FOR OP/ED): Mod: MUE | Performed by: STUDENT IN AN ORGANIZED HEALTH CARE EDUCATION/TRAINING PROGRAM

## 2024-01-22 PROCEDURE — 36415 COLL VENOUS BLD VENIPUNCTURE: CPT | Performed by: EMERGENCY MEDICINE

## 2024-01-22 PROCEDURE — 93010 ELECTROCARDIOGRAM REPORT: CPT | Performed by: EMERGENCY MEDICINE

## 2024-01-22 PROCEDURE — 84132 ASSAY OF SERUM POTASSIUM: CPT | Mod: MUE

## 2024-01-22 PROCEDURE — 87632 RESP VIRUS 6-11 TARGETS: CPT

## 2024-01-22 PROCEDURE — 2500000004 HC RX 250 GENERAL PHARMACY W/ HCPCS (ALT 636 FOR OP/ED): Performed by: STUDENT IN AN ORGANIZED HEALTH CARE EDUCATION/TRAINING PROGRAM

## 2024-01-22 PROCEDURE — 2500000004 HC RX 250 GENERAL PHARMACY W/ HCPCS (ALT 636 FOR OP/ED)

## 2024-01-22 PROCEDURE — 99223 1ST HOSP IP/OBS HIGH 75: CPT

## 2024-01-22 PROCEDURE — 84484 ASSAY OF TROPONIN QUANT: CPT | Performed by: STUDENT IN AN ORGANIZED HEALTH CARE EDUCATION/TRAINING PROGRAM

## 2024-01-22 PROCEDURE — 94762 N-INVAS EAR/PLS OXIMTRY CONT: CPT

## 2024-01-22 PROCEDURE — 83735 ASSAY OF MAGNESIUM: CPT | Performed by: STUDENT IN AN ORGANIZED HEALTH CARE EDUCATION/TRAINING PROGRAM

## 2024-01-22 PROCEDURE — 71045 X-RAY EXAM CHEST 1 VIEW: CPT

## 2024-01-22 PROCEDURE — 71045 X-RAY EXAM CHEST 1 VIEW: CPT | Performed by: RADIOLOGY

## 2024-01-22 PROCEDURE — 84132 ASSAY OF SERUM POTASSIUM: CPT | Performed by: EMERGENCY MEDICINE

## 2024-01-22 PROCEDURE — 5A09357 ASSISTANCE WITH RESPIRATORY VENTILATION, LESS THAN 24 CONSECUTIVE HOURS, CONTINUOUS POSITIVE AIRWAY PRESSURE: ICD-10-PCS | Performed by: INTERNAL MEDICINE

## 2024-01-22 PROCEDURE — 96375 TX/PRO/DX INJ NEW DRUG ADDON: CPT

## 2024-01-22 PROCEDURE — 84132 ASSAY OF SERUM POTASSIUM: CPT

## 2024-01-22 PROCEDURE — 85610 PROTHROMBIN TIME: CPT | Performed by: EMERGENCY MEDICINE

## 2024-01-22 PROCEDURE — 84484 ASSAY OF TROPONIN QUANT: CPT | Performed by: EMERGENCY MEDICINE

## 2024-01-22 PROCEDURE — 2500000002 HC RX 250 W HCPCS SELF ADMINISTERED DRUGS (ALT 637 FOR MEDICARE OP, ALT 636 FOR OP/ED): Mod: MUE

## 2024-01-22 PROCEDURE — 2500000004 HC RX 250 GENERAL PHARMACY W/ HCPCS (ALT 636 FOR OP/ED): Performed by: EMERGENCY MEDICINE

## 2024-01-22 PROCEDURE — 85730 THROMBOPLASTIN TIME PARTIAL: CPT | Performed by: EMERGENCY MEDICINE

## 2024-01-22 PROCEDURE — 93010 ELECTROCARDIOGRAM REPORT: CPT

## 2024-01-22 PROCEDURE — 94640 AIRWAY INHALATION TREATMENT: CPT

## 2024-01-22 PROCEDURE — 87899 AGENT NOS ASSAY W/OPTIC: CPT

## 2024-01-22 PROCEDURE — 99291 CRITICAL CARE FIRST HOUR: CPT | Performed by: EMERGENCY MEDICINE

## 2024-01-22 PROCEDURE — 80053 COMPREHEN METABOLIC PANEL: CPT | Performed by: EMERGENCY MEDICINE

## 2024-01-22 PROCEDURE — 94660 CPAP INITIATION&MGMT: CPT

## 2024-01-22 PROCEDURE — 99223 1ST HOSP IP/OBS HIGH 75: CPT | Performed by: STUDENT IN AN ORGANIZED HEALTH CARE EDUCATION/TRAINING PROGRAM

## 2024-01-22 PROCEDURE — 2500000004 HC RX 250 GENERAL PHARMACY W/ HCPCS (ALT 636 FOR OP/ED): Mod: SE | Performed by: EMERGENCY MEDICINE

## 2024-01-22 PROCEDURE — 96374 THER/PROPH/DIAG INJ IV PUSH: CPT

## 2024-01-22 PROCEDURE — 1200000002 HC GENERAL ROOM WITH TELEMETRY DAILY

## 2024-01-22 PROCEDURE — 84132 ASSAY OF SERUM POTASSIUM: CPT | Performed by: STUDENT IN AN ORGANIZED HEALTH CARE EDUCATION/TRAINING PROGRAM

## 2024-01-22 PROCEDURE — 84484 ASSAY OF TROPONIN QUANT: CPT | Mod: MUE

## 2024-01-22 PROCEDURE — 2500000002 HC RX 250 W HCPCS SELF ADMINISTERED DRUGS (ALT 637 FOR MEDICARE OP, ALT 636 FOR OP/ED): Mod: SE | Performed by: EMERGENCY MEDICINE

## 2024-01-22 PROCEDURE — 87449 NOS EACH ORGANISM AG IA: CPT

## 2024-01-22 PROCEDURE — 2500000005 HC RX 250 GENERAL PHARMACY W/O HCPCS: Mod: SE | Performed by: EMERGENCY MEDICINE

## 2024-01-22 RX ORDER — ONDANSETRON HYDROCHLORIDE 2 MG/ML
INJECTION, SOLUTION INTRAVENOUS
Status: DISPENSED
Start: 2024-01-22 | End: 2024-01-22

## 2024-01-22 RX ORDER — IPRATROPIUM BROMIDE AND ALBUTEROL SULFATE 2.5; .5 MG/3ML; MG/3ML
3 SOLUTION RESPIRATORY (INHALATION)
Status: COMPLETED | OUTPATIENT
Start: 2024-01-22 | End: 2024-01-22

## 2024-01-22 RX ORDER — ONDANSETRON 4 MG/1
4 TABLET, FILM COATED ORAL AS NEEDED
Status: DISCONTINUED | OUTPATIENT
Start: 2024-01-22 | End: 2024-01-23

## 2024-01-22 RX ORDER — FUROSEMIDE 10 MG/ML
40 INJECTION INTRAMUSCULAR; INTRAVENOUS ONCE
Status: COMPLETED | OUTPATIENT
Start: 2024-01-22 | End: 2024-01-22

## 2024-01-22 RX ORDER — NAPROXEN SODIUM 220 MG/1
81 TABLET, FILM COATED ORAL DAILY
Status: DISCONTINUED | OUTPATIENT
Start: 2024-01-22 | End: 2024-02-03 | Stop reason: HOSPADM

## 2024-01-22 RX ORDER — IBUPROFEN 200 MG
1 TABLET ORAL EVERY 24 HOURS
Status: CANCELLED | OUTPATIENT
Start: 2024-01-22

## 2024-01-22 RX ORDER — ACETAMINOPHEN 325 MG/1
975 TABLET ORAL EVERY 8 HOURS PRN
Status: DISCONTINUED | OUTPATIENT
Start: 2024-01-22 | End: 2024-02-03 | Stop reason: HOSPADM

## 2024-01-22 RX ORDER — AMOXICILLIN 250 MG
2 CAPSULE ORAL 2 TIMES DAILY
Status: DISCONTINUED | OUTPATIENT
Start: 2024-01-22 | End: 2024-01-27

## 2024-01-22 RX ORDER — ONDANSETRON HYDROCHLORIDE 2 MG/ML
4 INJECTION, SOLUTION INTRAVENOUS ONCE
Status: COMPLETED | OUTPATIENT
Start: 2024-01-22 | End: 2024-01-23

## 2024-01-22 RX ORDER — ENOXAPARIN SODIUM 100 MG/ML
40 INJECTION SUBCUTANEOUS EVERY 24 HOURS
Status: DISCONTINUED | OUTPATIENT
Start: 2024-01-22 | End: 2024-01-23

## 2024-01-22 RX ORDER — ONDANSETRON HYDROCHLORIDE 2 MG/ML
4 INJECTION, SOLUTION INTRAVENOUS ONCE
Status: COMPLETED | OUTPATIENT
Start: 2024-01-22 | End: 2024-01-22

## 2024-01-22 RX ORDER — DAPAGLIFLOZIN 10 MG/1
10 TABLET, FILM COATED ORAL DAILY
Status: DISCONTINUED | OUTPATIENT
Start: 2024-01-22 | End: 2024-01-28

## 2024-01-22 RX ORDER — INSULIN LISPRO 100 [IU]/ML
0-5 INJECTION, SOLUTION INTRAVENOUS; SUBCUTANEOUS
Status: DISCONTINUED | OUTPATIENT
Start: 2024-01-22 | End: 2024-02-03 | Stop reason: HOSPADM

## 2024-01-22 RX ORDER — IPRATROPIUM BROMIDE AND ALBUTEROL SULFATE 2.5; .5 MG/3ML; MG/3ML
SOLUTION RESPIRATORY (INHALATION)
Status: COMPLETED
Start: 2024-01-22 | End: 2024-01-22

## 2024-01-22 RX ORDER — POLYETHYLENE GLYCOL 3350 17 G/17G
17 POWDER, FOR SOLUTION ORAL DAILY
Status: DISCONTINUED | OUTPATIENT
Start: 2024-01-22 | End: 2024-01-26

## 2024-01-22 RX ORDER — DEXTROSE 50 % IN WATER (D50W) INTRAVENOUS SYRINGE
25
Status: DISCONTINUED | OUTPATIENT
Start: 2024-01-22 | End: 2024-02-03 | Stop reason: HOSPADM

## 2024-01-22 RX ORDER — DEXTROSE MONOHYDRATE 100 MG/ML
0.3 INJECTION, SOLUTION INTRAVENOUS ONCE AS NEEDED
Status: DISCONTINUED | OUTPATIENT
Start: 2024-01-22 | End: 2024-02-03 | Stop reason: HOSPADM

## 2024-01-22 RX ORDER — IPRATROPIUM BROMIDE AND ALBUTEROL SULFATE 2.5; .5 MG/3ML; MG/3ML
3 SOLUTION RESPIRATORY (INHALATION)
Status: DISCONTINUED | OUTPATIENT
Start: 2024-01-22 | End: 2024-01-23

## 2024-01-22 RX ORDER — NITROGLYCERIN 0.4 MG/1
TABLET SUBLINGUAL
Status: DISPENSED
Start: 2024-01-22 | End: 2024-01-22

## 2024-01-22 RX ADMIN — IPRATROPIUM BROMIDE AND ALBUTEROL SULFATE 3 ML: .5; 3 SOLUTION RESPIRATORY (INHALATION) at 13:55

## 2024-01-22 RX ADMIN — ASPIRIN 81 MG CHEWABLE TABLET 81 MG: 81 TABLET CHEWABLE at 19:07

## 2024-01-22 RX ADMIN — IPRATROPIUM BROMIDE AND ALBUTEROL SULFATE 3 ML: .5; 3 SOLUTION RESPIRATORY (INHALATION) at 05:50

## 2024-01-22 RX ADMIN — IPRATROPIUM BROMIDE AND ALBUTEROL SULFATE 3 ML: .5; 3 SOLUTION RESPIRATORY (INHALATION) at 10:33

## 2024-01-22 RX ADMIN — IPRATROPIUM BROMIDE AND ALBUTEROL SULFATE 3 ML: .5; 3 SOLUTION RESPIRATORY (INHALATION) at 11:55

## 2024-01-22 RX ADMIN — FUROSEMIDE 40 MG: 10 INJECTION, SOLUTION INTRAMUSCULAR; INTRAVENOUS at 06:30

## 2024-01-22 RX ADMIN — ONDANSETRON 4 MG: 2 INJECTION INTRAMUSCULAR; INTRAVENOUS at 09:15

## 2024-01-22 RX ADMIN — ONDANSETRON 4 MG: 2 INJECTION INTRAMUSCULAR; INTRAVENOUS at 23:31

## 2024-01-22 RX ADMIN — ONDANSETRON 4 MG: 2 INJECTION, SOLUTION INTRAMUSCULAR; INTRAVENOUS at 05:10

## 2024-01-22 RX ADMIN — IPRATROPIUM BROMIDE AND ALBUTEROL SULFATE 3 ML: .5; 3 SOLUTION RESPIRATORY (INHALATION) at 06:20

## 2024-01-22 RX ADMIN — ACETAMINOPHEN 975 MG: 325 TABLET ORAL at 19:25

## 2024-01-22 RX ADMIN — IPRATROPIUM BROMIDE AND ALBUTEROL SULFATE 3 ML: .5; 3 SOLUTION RESPIRATORY (INHALATION) at 10:13

## 2024-01-22 RX ADMIN — FUROSEMIDE 40 MG: 10 INJECTION, SOLUTION INTRAMUSCULAR; INTRAVENOUS at 06:10

## 2024-01-22 RX ADMIN — IPRATROPIUM BROMIDE AND ALBUTEROL SULFATE 3 ML: .5; 3 SOLUTION RESPIRATORY (INHALATION) at 19:07

## 2024-01-22 RX ADMIN — IPRATROPIUM BROMIDE AND ALBUTEROL SULFATE 3 ML: .5; 3 SOLUTION RESPIRATORY (INHALATION) at 12:47

## 2024-01-22 RX ADMIN — Medication 30 PERCENT: at 04:26

## 2024-01-22 RX ADMIN — ENOXAPARIN SODIUM 40 MG: 100 INJECTION SUBCUTANEOUS at 19:07

## 2024-01-22 RX ADMIN — DAPAGLIFLOZIN 10 MG: 10 TABLET, FILM COATED ORAL at 19:07

## 2024-01-22 RX ADMIN — IPRATROPIUM BROMIDE AND ALBUTEROL SULFATE 3 ML: .5; 3 SOLUTION RESPIRATORY (INHALATION) at 06:50

## 2024-01-22 ASSESSMENT — ENCOUNTER SYMPTOMS
HEMATURIA: 0
FLANK PAIN: 0
DIAPHORESIS: 1
CHILLS: 0
ABDOMINAL PAIN: 0
DIZZINESS: 1
CHEST TIGHTNESS: 0
ARTHRALGIAS: 0
ABDOMINAL DISTENTION: 0
CONSTIPATION: 0
FEVER: 0
DIFFICULTY URINATING: 0
HEADACHES: 0
AGITATION: 0
SORE THROAT: 0
VOMITING: 1
PALPITATIONS: 0
FATIGUE: 1
BACK PAIN: 0
SINUS PRESSURE: 0
STRIDOR: 0
LIGHT-HEADEDNESS: 0
COLOR CHANGE: 0
FREQUENCY: 0
WHEEZING: 0
DYSURIA: 0
RHINORRHEA: 0
SHORTNESS OF BREATH: 1
CHOKING: 0
COUGH: 1

## 2024-01-22 ASSESSMENT — LIFESTYLE VARIABLES
HAVE PEOPLE ANNOYED YOU BY CRITICIZING YOUR DRINKING: NO
EVER HAD A DRINK FIRST THING IN THE MORNING TO STEADY YOUR NERVES TO GET RID OF A HANGOVER: NO
EVER FELT BAD OR GUILTY ABOUT YOUR DRINKING: NO
HAVE YOU EVER FELT YOU SHOULD CUT DOWN ON YOUR DRINKING: NO
REASON UNABLE TO ASSESS: NO

## 2024-01-22 ASSESSMENT — PAIN DESCRIPTION - PAIN TYPE: TYPE: ACUTE PAIN

## 2024-01-22 ASSESSMENT — PAIN - FUNCTIONAL ASSESSMENT
PAIN_FUNCTIONAL_ASSESSMENT: 0-10
PAIN_FUNCTIONAL_ASSESSMENT: 0-10

## 2024-01-22 ASSESSMENT — PAIN DESCRIPTION - LOCATION: LOCATION: ABDOMEN

## 2024-01-22 ASSESSMENT — PAIN SCALES - GENERAL: PAINLEVEL_OUTOF10: 6

## 2024-01-22 NOTE — ED TRIAGE NOTES
Pt BAUDILIO [facility]. Pt c/o SOB since yesterday. Pt unable to tolerate CPAP per EMS. Pt given one duoneb in route

## 2024-01-22 NOTE — PROGRESS NOTES
Patient was handed off to me from the previous team. For full history, physical, and prior ED course, please see previous provider note prior to patient handoff. This is an addendum to the record.    Patient is a 68-year-old female with PMH of COPD with chronic respiratory failure, (no oxygen requirement at home), HFrEF (EF 15 to 20%), CAD status post NSTEMI, s/p MVR, s/p ICD, HTN, HLD, GERD, CKD, and DM2, presenting to ED with SOB.  Found to have acute hypercapnic respiratory failure requiring BiPAP.  Patient handed off to me pending MICU admission.  Was not able to get a hold of MICU and patient eventually able to be weaned off BiPAP with improvement in hypercapnia.  Patient's symptoms likely do have a component of heart failure for which she was diuresed.  Patient was admitted for further workup and management.  Patient and daughter at bedside updated on the plan and all questions were answered.    Disposition:  As a result of their workup, the patient will require admission to the hospital.  The patient was informed of their diagnosis.  Patient was given the opportunity to ask questions and answered them.  Patient agreed to be admitted to the hospital.    Patient discussed with attending physician.     Margaret Beltran MD PGY3  Emergency Medicine

## 2024-01-22 NOTE — ED PROVIDER NOTES
"HPI:  Patient is a 68-year-old female with a history of chronic hypoxic respiratory failure (no oxygen requirement at home), HFrEF (EF 15 to 20%), CAD status post NSTEMI, status post mitral valve replacement and ICD placement, COPD, hypertension, hyperlipidemia, GERD, CKD stage III, type 2 diabetes who presented via EMS from home with acute onset shortness of breath, onset 1 hour ago.  No Lower extremity edema.  No orthopnea.  No fever or chills.  No nausea or vomiting.  No body aches.    ROS: unable to assess 2/2 respiratory distress    PMH/PSH: Reviewed in EMR. As above in HPI.  SH: Pt smokes 3 cigarettes per day. Pt smokes marijuana daily. Denies EtOH illicit drug use.  Allergies:   Allergies   Allergen Reactions    Metoprolol Shortness of breath and Other    Ticagrelor Anaphylaxis and Other     Feels a severe \"burning feeling\" in her chest    Ace Inhibitors Cough and Other     COUGH    Fentanyl Other    Gadolinium-Containing Contrast Media Hives and Other    Hydralazine Dermatitis    Iodinated Contrast Media Hives    Prednisone Other    Statins-Hmg-Coa Reductase Inhibitors Unknown    Penicillins Rash and Unknown      Medications: See prescription writer for full medication list.     General: elderly  female in severe respiratory distress, minimal but appropriate conversation  HEENT:  No rhinorrhea. MMM.  Cardiac: tachycardic but regular rhythm, no murmurs  Pulm: increased respiratory effort on BiPAP, tachypneic, tripoding, equal chest expansion, diminished breath sounds throughout without wheezing  GI: soft, nontender, nondistended, +BS  Extremities:  moves all extremities freely, no edema noted, no calf swelling or TTP  Skin: warm, well-perfused, no lesions noted on exposed skin.  Neuro:  AOx3, moves all 4 extremities freely and independently     DDX: Includes but not limited to COPD exacerbation versus CHF exacerbation versus ACS versus pneumonia versus other    Assessment/Plan/MDM  Patient is a " 68-year-old female with a history of chronic hypoxic respiratory failure (no oxygen requirement at home), HFrEF (EF 15 to 20%), CAD status post NSTEMI, status post mitral valve replacement and ICD placement, COPD, hypertension, hyperlipidemia, GERD, CKD stage III, type 2 diabetes who presented via EMS from home with acute onset shortness of breath, onset 1 hour ago.  Patient requiring BiPAP secondary to chronic hypoxia.  Initial trial of Lasix 40 mg ordered however she has not urinated, repeat Lasix provided.  Initial troponin elevated, delta pending.  BNP elevated.  Patient with elevated creatinine which is her baseline.  There is no leukocytosis, hemoglobin is stable.  Patient signed out in stable condition pending MICU admission.    EKG shows NSR, rate 99, normal [place] intervals, upper limit of normal QTc, no ST elevation, PVCs in leads V2/3, T wave inversions in lateral leads, I, II, III    ED Course/Progress:    ED Course as of 01/23/24 0355   Mon Jan 22, 2024   2325 Troponin I, High Sensitivity(!!) [LM]      ED Course User Index  [LM] Lakeshia Cat MD         Diagnoses as of 01/23/24 0355   Hypoxic respiratory failure (CMS/HCC)        Clinical Impression: as above  Dispo: deferred to Dr. Beltran, admit to MICU    Pt seen and discussed with attending physician, Dr. Charlette Elliott MD  PGY3, Emergency Medicine    Disclaimer: This note was dictated by speech recognition. An attempt at proof reading was made to minimize errors. Errors in transcription may be present.  Please call if questions.      Kailee Elliott MD  Resident  01/23/24 3524

## 2024-01-22 NOTE — H&P
History Of Present Illness  Melissa Marinelli is a 68 y.o. female presenting with a history of HFrEF (EF 15-20%), CAD s/p NSTEMI with RIRI to LCx (Jan 2016), s/p MVR (June 2019),  s/p ICD placement 5/2020, COPD (never formally diagnosed), HTN, HLD, GERD, CVA, CKD IIIB and DMII who presented via EMS from home with acute onset shortness of breath. Patient states that she got up to use the bathroom last night and when she got to the bathroom, she had acute onset of SOB that progressed over 15 minutes, to the point that she called her daughter and her daughter called EMS for the patient. Patient did have diaphoresis at that time as well as wheezing. She denies chest pain, chest pressure, pleuritic chest pain, syncope. Patient did have nausea and vomiting at home, as well as in the ED while on BiPAP, but has not had any since taken off NIPPV. She denies LE edema, worsening orthopnea, worsening cough/sputum production, fevers, chills, hemoptysis, diarrhea. Overall, after being treated w/ duonebs, BiPAP, and lasix in the ED, patient reports that she feels 80% of her normal baseline.     Patient was last seen by cardiology for a televisit on 1/17/2024 at which time her entresto was decreased back to 1/2 tab of 24/26mg BID due to orthostatic symptoms. Beta blocker was held given RV dysfunction, SGLT2i was continued. Stated that MRA could be considered at next visit. Per cardiology note, patient had previously and continued to decline LVAD as destination therapy, and the topic of palliative inotrope was brought up, but no decision was made at that time.     In the emergency department, patient was found to have low pH and elevated pCO2, was started on bipap and given lasix 40mg IV x2, as well as treated w/ duo nebs. Acid/base status improved, and patient was weaned off BiPAP.     Surgical Hx:  - MVR     Social Hx:  - Marijuana   -daily cigarettes      Fam Hx:  Brother- CVA       Imaging:  CXR 1/22/2024:   FINDINGS:  AP radiograph  of the chest was provided.      Postsurgical changes status post median sternotomy with intact  sternal wires. Left-sided single-chamber ICD device is noted in place  projecting appropriately.      CARDIOMEDIASTINAL SILHOUETTE:  Cardiopericardial silhouette is enlarged and is stable in size and  configuration compared to prior radiograph.      LUNGS:  There is central and peripheral vascular prominence with  peribronchial, perivascular haziness suggesting interstitial  pulmonary edema. There are mild linear bibasilar opacities suggesting  atelectasis.  No evidence of focal consolidations, pleural effusions  or pneumothorax.      ABDOMEN:  No remarkable upper abdominal findings.      BONES:  No acute osseous changes.       Impression:     1.  Cardiomegaly with mild-to-moderate interstitial pulmonary edema.       Echo 11/2023:  CONCLUSIONS:   1. Left ventricular systolic function is severely decreased with a 15-20% estimated ejection fraction.   2. There is global hypokinesis of the left ventricle with minor regional variations.   3. Left ventricular cavity size is severely dilated.   4. There is a 1.2 cm x 0.6 cm mobile echodensity along the anterolateral wall of the LV (clips 56, 70) which could represent a calcified papillary muscle, thrombus or vegetation. The mobile echodensity is not well seen with definity echocontrast.   5. RVSP within normal limits.   6. There is a Epic mitral valve bioprosthesis with a 29 mm reported size. There is trace mitral valve regurgitation. Mean mitral gradient of 6.5 mmHg at a heart rate of 82 bpm.   7. Compared with study from 3/21/2023, the mobile echodensity was seen on the echo from 3/21/23 and 7/14/2021 and intraoperative SHRUTHI in 2019 and appears similar. Current mitral mean gradient is similar to prior (current HR 82 bpm, previously 109 bpm).     Past Medical History  Past Medical History:   Diagnosis Date    Asthma     CAD (coronary artery disease)     CHF (congestive heart  failure) (CMS/[facility])     CKD (chronic kidney disease)     COPD (chronic obstructive pulmonary disease) (CMS/[facility])     CVA (cerebral vascular accident) (CMS/[facility])     Diabetes mellitus (CMS/[facility])     GERD (gastroesophageal reflux disease)     Hyperlipidemia     Hypertension     NSTEMI (non-ST elevated myocardial infarction) (CMS/[facility])     Unspecified systolic (congestive) heart failure (CMS/HCC) 08/11/2022    HFrEF (heart failure with reduced ejection fraction)       Surgical History  Past Surgical History:   Procedure Laterality Date    MITRAL VALVE REPLACEMENT  06/2019    OTHER SURGICAL HISTORY  08/11/2022    Tonsillectomy    OTHER SURGICAL HISTORY  08/11/2022    Right ventricular assist device placement    OTHER SURGICAL HISTORY  08/11/2022    Mitral valve replacement        Social History  She reports that she has been smoking cigarettes. She does not have any smokeless tobacco history on file. She reports current drug use. Drug: Marijuana. No history on file for alcohol use.    Family History  Family History   Problem Relation Name Age of Onset    Other (CVA) Brother          Allergies  Metoprolol, Ticagrelor, Ace inhibitors, Fentanyl, Gadolinium-containing contrast media, Hydralazine, Iodinated contrast media, Prednisone, Statins-hmg-coa reductase inhibitors, and Penicillins    Review of Systems   Constitutional:  Positive for diaphoresis and fatigue. Negative for chills and fever.   HENT:  Negative for nosebleeds, rhinorrhea, sinus pressure, sneezing and sore throat.    Respiratory:  Positive for cough and shortness of breath. Negative for choking, chest tightness, wheezing and stridor.    Cardiovascular:  Negative for chest pain, palpitations and leg swelling.   Gastrointestinal:  Positive for vomiting. Negative for abdominal distention, abdominal pain and constipation.   Genitourinary:  Negative for difficulty urinating, dysuria, flank pain, frequency and hematuria.   Musculoskeletal:  Negative for arthralgias and  "back pain.   Skin:  Negative for color change and rash.   Neurological:  Positive for dizziness (chronic). Negative for syncope, light-headedness and headaches.   Psychiatric/Behavioral:  Negative for agitation and behavioral problems.         Physical Exam  Constitutional:       General: She is not in acute distress.     Appearance: Normal appearance. She is not toxic-appearing.   HENT:      Head: Normocephalic and atraumatic.      Nose: Nose normal.      Mouth/Throat:      Mouth: Mucous membranes are dry.      Pharynx: Oropharynx is clear.   Eyes:      General: No scleral icterus.     Extraocular Movements: Extraocular movements intact.      Conjunctiva/sclera: Conjunctivae normal.      Pupils: Pupils are equal, round, and reactive to light.   Cardiovascular:      Rate and Rhythm: Regular rhythm. Tachycardia present.      Pulses: Normal pulses.      Heart sounds: No murmur heard.  Pulmonary:      Effort: Pulmonary effort is normal.      Breath sounds: No wheezing.      Comments: Decreased breath sounds throughout all auscultated lung fields. Crackles noted at bilateral basilar lung fields.   Abdominal:      General: Abdomen is flat. There is no distension.      Palpations: Abdomen is soft.      Tenderness: There is no abdominal tenderness.   Musculoskeletal:         General: No swelling.      Cervical back: Normal range of motion and neck supple.      Right lower leg: No edema.      Left lower leg: No edema.   Skin:     General: Skin is warm and dry.      Capillary Refill: Capillary refill takes less than 2 seconds.   Neurological:      General: No focal deficit present.      Mental Status: She is alert and oriented to person, place, and time. Mental status is at baseline.   Psychiatric:         Mood and Affect: Mood normal.          Last Recorded Vitals  Blood pressure 130/87, pulse (!) 110, resp. rate 20, height 1.626 m (5' 4\"), weight 64.9 kg (143 lb), SpO2 97 %.    Relevant Results  Scheduled " medications  ipratropium-albuteroL, 3 mL, nebulization, q1h  nitroglycerin, , ,       Continuous medications     PRN medications  PRN medications: nitroglycerin, oxygen  Results for orders placed or performed during the hospital encounter of 01/22/24 (from the past 24 hour(s))   CBC and Auto Differential   Result Value Ref Range    WBC 8.2 4.4 - 11.3 x10*3/uL    nRBC 0.0 0.0 - 0.0 /100 WBCs    RBC 4.71 4.00 - 5.20 x10*6/uL    Hemoglobin 13.8 12.0 - 16.0 g/dL    Hematocrit 42.5 36.0 - 46.0 %    MCV 90 80 - 100 fL    MCH 29.3 26.0 - 34.0 pg    MCHC 32.5 32.0 - 36.0 g/dL    RDW 16.3 (H) 11.5 - 14.5 %    Platelets 196 150 - 450 x10*3/uL    Neutrophils % 68.0 40.0 - 80.0 %    Immature Granulocytes %, Automated 0.4 0.0 - 0.9 %    Lymphocytes % 21.3 13.0 - 44.0 %    Monocytes % 3.5 2.0 - 10.0 %    Eosinophils % 6.3 0.0 - 6.0 %    Basophils % 0.5 0.0 - 2.0 %    Neutrophils Absolute 5.59 1.20 - 7.70 x10*3/uL    Immature Granulocytes Absolute, Automated 0.03 0.00 - 0.70 x10*3/uL    Lymphocytes Absolute 1.75 1.20 - 4.80 x10*3/uL    Monocytes Absolute 0.29 0.10 - 1.00 x10*3/uL    Eosinophils Absolute 0.52 0.00 - 0.70 x10*3/uL    Basophils Absolute 0.04 0.00 - 0.10 x10*3/uL   Comprehensive metabolic panel   Result Value Ref Range    Glucose 194 (H) 74 - 99 mg/dL    Sodium 140 136 - 145 mmol/L    Potassium 7.0 (HH) 3.5 - 5.3 mmol/L    Chloride 107 98 - 107 mmol/L    Bicarbonate 25 21 - 32 mmol/L    Anion Gap 15 10 - 20 mmol/L    Urea Nitrogen 19 6 - 23 mg/dL    Creatinine 1.30 (H) 0.50 - 1.05 mg/dL    eGFR 45 (L) >60 mL/min/1.73m*2    Calcium 9.3 8.6 - 10.6 mg/dL    Albumin 4.5 3.4 - 5.0 g/dL    Alkaline Phosphatase 87 33 - 136 U/L    Total Protein 8.4 (H) 6.4 - 8.2 g/dL    AST 40 (H) 9 - 39 U/L    Bilirubin, Total 0.4 0.0 - 1.2 mg/dL    ALT 16 7 - 45 U/L   Protime-INR   Result Value Ref Range    Protime 11.4 9.8 - 12.8 seconds    INR 1.0 0.9 - 1.1   aPTT   Result Value Ref Range    aPTT 30 27 - 38 seconds   Troponin I, High  Sensitivity   Result Value Ref Range    Troponin I, High Sensitivity 151 (HH) 0 - 34 ng/L   B-Type Natriuretic Peptide   Result Value Ref Range     (H) 0 - 99 pg/mL   Blood Gas Venous Full Panel   Result Value Ref Range    POCT pH, Venous 7.22 (LL) 7.33 - 7.43 pH    POCT pCO2, Venous 63 (H) 41 - 51 mm Hg    POCT pO2, Venous 28 (L) 35 - 45 mm Hg    POCT SO2, Venous 29 (L) 45 - 75 %    POCT Oxy Hemoglobin, Venous 27.5 (L) 45.0 - 75.0 %    POCT Hematocrit Calculated, Venous 42.0 36.0 - 46.0 %    POCT Sodium, Venous 142 136 - 145 mmol/L    POCT Potassium, Venous 4.5 3.5 - 5.3 mmol/L    POCT Chloride, Venous 110 (H) 98 - 107 mmol/L    POCT Ionized Calicum, Venous 1.22 1.10 - 1.33 mmol/L    POCT Glucose, Venous 176 (H) 74 - 99 mg/dL    POCT Lactate, Venous 2.2 (H) 0.4 - 2.0 mmol/L    POCT Base Excess, Venous -3.2 (L) -2.0 - 3.0 mmol/L    POCT HCO3 Calculated, Venous 25.8 22.0 - 26.0 mmol/L    POCT Hemoglobin, Venous 14.1 12.0 - 16.0 g/dL    POCT Anion Gap, Venous 11.0 10.0 - 25.0 mmol/L    Patient Temperature 37.0 degrees Celsius    FiO2 30 %   Potassium   Result Value Ref Range    Potassium 4.2 3.5 - 5.3 mmol/L   Troponin I, High Sensitivity   Result Value Ref Range    Troponin I, High Sensitivity 185 (HH) 0 - 34 ng/L   Troponin I, High Sensitivity, Initial   Result Value Ref Range    Troponin I, High Sensitivity 214 (HH) 0 - 34 ng/L   Magnesium   Result Value Ref Range    Magnesium 2.26 1.60 - 2.40 mg/dL   BLOOD GAS VENOUS FULL PANEL   Result Value Ref Range    POCT pH, Venous 7.21 (LL) 7.33 - 7.43 pH    POCT pCO2, Venous 68 (H) 41 - 51 mm Hg    POCT pO2, Venous 21 (L) 35 - 45 mm Hg    POCT SO2, Venous 21 (L) 45 - 75 %    POCT Oxy Hemoglobin, Venous 20.3 (L) 45.0 - 75.0 %    POCT Hematocrit Calculated, Venous 41.0 36.0 - 46.0 %    POCT Sodium, Venous 141 136 - 145 mmol/L    POCT Potassium, Venous 5.2 3.5 - 5.3 mmol/L    POCT Chloride, Venous 109 (H) 98 - 107 mmol/L    POCT Ionized Calicum, Venous 1.19 1.10 - 1.33  mmol/L    POCT Glucose, Venous 90 74 - 99 mg/dL    POCT Lactate, Venous 3.1 (H) 0.4 - 2.0 mmol/L    POCT Base Excess, Venous -2.3 (L) -2.0 - 3.0 mmol/L    POCT HCO3 Calculated, Venous 27.2 (H) 22.0 - 26.0 mmol/L    POCT Hemoglobin, Venous 13.8 12.0 - 16.0 g/dL    POCT Anion Gap, Venous 10.0 10.0 - 25.0 mmol/L    Patient Temperature 37.0 degrees Celsius    FiO2 28 %   Troponin, High Sensitivity, 1 Hour   Result Value Ref Range    Troponin I, High Sensitivity 484 (HH) 0 - 34 ng/L   BLOOD GAS VENOUS FULL PANEL   Result Value Ref Range    POCT pH, Venous 7.34 7.33 - 7.43 pH    POCT pCO2, Venous 47 41 - 51 mm Hg    POCT pO2, Venous 41 35 - 45 mm Hg    POCT SO2, Venous 66 45 - 75 %    POCT Oxy Hemoglobin, Venous 63.2 45.0 - 75.0 %    POCT Hematocrit Calculated, Venous 40.0 36.0 - 46.0 %    POCT Sodium, Venous 138 136 - 145 mmol/L    POCT Potassium, Venous 4.7 3.5 - 5.3 mmol/L    POCT Chloride, Venous 110 (H) 98 - 107 mmol/L    POCT Ionized Calicum, Venous 1.17 1.10 - 1.33 mmol/L    POCT Glucose, Venous 110 (H) 74 - 99 mg/dL    POCT Lactate, Venous 1.5 0.4 - 2.0 mmol/L    POCT Base Excess, Venous -0.8 -2.0 - 3.0 mmol/L    POCT HCO3 Calculated, Venous 25.4 22.0 - 26.0 mmol/L    POCT Hemoglobin, Venous 13.4 12.0 - 16.0 g/dL    POCT Anion Gap, Venous 7.0 (L) 10.0 - 25.0 mmol/L    Patient Temperature 37.0 degrees Celsius    FiO2 28 %   Blood Gas Lactic Acid, Venous   Result Value Ref Range    POCT Lactate, Venous 1.5 0.4 - 2.0 mmol/L     Assessment/Plan   Active Problems:  There are no active Hospital Problems.  Melissa Marinelli is a 68 y.o. female presenting with a history of HFrEF (EF 15-20%), CAD s/p NSTEMI with RIRI to LCx (Jan 2016), s/p MVR (June 2019),  s/p ICD placement 5/2020, COPD (never formally diagnosed), HTN, HLD, GERD, CVA, CKD IIIB and DMII who presented via EMS from home with acute onset shortness of breath. Patient's presentation not clearly consistent w/ COPD exacerbation or ADHF. Crackles present on exam and  CXR showing some possible pulmonary edema and symptoms improved with NIPPV. Troponin elevation could be consistent w/ new ischemic insult, but ECG relatively stable compared to 11/2023. At this time, will treat for ADHF w/ IV lasix, plan for duo nebs for shortness of breath, and could consider steroids + Azithromycin in AM if fails to improve. Will trend troponin, low threshold to call cardiology if troponin continues to rise or if patient decompensates clinically.     #Acute hypoxic respiratory failure  #? COPD exacerbation vs Acute decompensated HF  - Patient hypoxic upon presentation to ED (no baseline O2 requirement) and did require NIPPV w/ BiPAP, now with resolved respiratory acidosis  - No PFTs on file to confirm diagnosis of COPD   - , but on prior admission for ADHF, BNP more significantly elevated at >3000  - S/p 40mg IV lasix x2 in ED    - Will give IV lasix 40mg this PM and monitor UOP and clinical progression  - Hold home symbicort  - Duonebs q6hrs for SOB; will hold for now on prednisone and azithro, lower concern for COPD exacerbation at this time.   - Wean supplemental O2 as able   - Respiratory viral panel pending   - Strep and legionella antigens pending     #CAD   #NSTEMI s/p RIRI to Lcx (Jan 2016)  #Hx of Mitral Valve repair (June 2019) w/ Dr Montana  #Troponinemia   - Hold home entresto for PM dose due to softer blood pressure and diuresis; will restart tomorrow AM blood pressure permitting  - Troponin elevation on presentation to ED (151 -> 185 -> 214 -> 484); continue to trend, if does not return negative and continues to elevate, or if patient decompensates clinically, low threshold to consult cardiology   - Continue Farxiga 10mg daily   - Not on statin therapy due to reported allergy per chart review     #CKD IIIB  - Baseline sCr ~1.3-1.4  - Cr today 1.28 (1.37, 1.42, 1.4, 1.58, 1.22)  - I/Os  - Avoid hypotension and nephrotoxic agents  - Additional lasix as above under AHRF  management     #DM2  - Euglycemic, HgbA1c 5.5 (11/2023)  - Farxiga 10mg daily   - Sliding scale ordered    #Tobacco use disorder  #Marijuana use disorder   - Smokes 10 cigarettes per day, nicotine replacement therapy declined     F: Bolus PRN  E: PRN; replete aggressively (K>4, Mg>2) while diuresing  N: Low na and fluid restricted  A: PIV  GI: n/a  DVT: Lovenox    Code status: Full code (confirmed on admission)  NOK: Chloé Smith [phone]         Patient will be seen and staffed with attending physician Dr. Bates tomorrow AM.     Leroy Muñiz MD  Internal Medicine PGY-1

## 2024-01-22 NOTE — SIGNIFICANT EVENT
Senior Staffing Note     PMH HFrEF (last Echo 2023 EF 15-20%), COPD, CAD s/p NSTEMI (PCI in 2016) with mitral valve replacement and ICD placement in 2019, HTN/HLD, GERD, CKD III (baseline Cr 1.1-1.2), T2DM, presenting to the ED on  by ambulance with SOB.  Patient reports she got up to use the bathroom early this morning and felt short of breath and was incontinent of stool; asked her daughter to call 911. She denies any shortness of breath leading up to this event, denies worsening cough from her baseline (usually productive of clear sputum, this has not changed), denies flulike symptoms or sick contacts. She has been using her home inhalers (albuterol, symbicort) as prescribed. Denies any chest pain throughout this episode or fluid retention; notes that she weighs herself daily and has not gained any.  She notes that she regularly has low blood pressures and feels lightheaded but denies any syncope.     In the ED her initial VBG showed pH 7.22/63/28 and CXR showed mild interstitial edema. She was started on BiPAP, given DuoNebs and Lasix 40IV x2. Her blood gas improved throughout the night and was most recently 7.34/47/43.  Her troponin on arrival was 151 (uptrended to 484) with no concerns for chest pain and no ST elevations on EKG. She says that she feels 80% back to baseline after these therapies.     She was recently admitted in 2023 for GI symptoms and her stay was complicated by aspiration pneumonia requiring intubation; completed a course of cefepime for sputum cx growing staph aureus. She was also admitted in 2023 for a COPD exacerbation.     Please see Dr. Muñiz's H&P for detailed history.     In the ED:   Vitals:  /95, , RR 26, spO2 100%    Labs:  RFP: , Na 140, K 4.2, Cl 107, HCO3 24, BUN 19, Cr 1.30, Mg 2.26  CBC: WBC 8.2, Hgb 13.8, Plt 196    Troponin: 151 -> 484    VB.22/63/28 -> 7.34/47/43    EKG: Sinus tachycardia with ectopic beats    Home meds  Tylenol   Albuterol  MDI  Asa 81  Bumetanide 0.5mg once daily PRN  Farxiga 10mg  Miralax   Entresto 24-36  Symbicort       Physical Exam:  Gen: Awake, alert, pleasant  HEENT: NCAT, MMM, no JVD  CV: Tachycardic, regular rhythm. Soft S3 auscultated  Resp: Regular work of breathing on NC O2. Mild crackles in the bilateral bases. No wheezing  Abd: Soft, nontender, nondistended  Ext: No edema or other gross deformities      Assessment/Plan:  67 yo female PMH HFrEF (last Echo 11/2023 EF 15-20%), COPD, CAD s/p NSTEMI (PCI in 2016) with mitral valve replacement and ICD placement in 2019, HTN/HLD, GERD, CKD III (baseline Cr 1.1-1.2), T2DM, presenting to the ED with acute hypoxic hypercapnic respiratory failure. Etiology is likely multifactorial with history of severe HF and COPD (although no PFTs on file); possibly angina equivalent with ICM hx and elevated troponin.     #Acute hypoxic hypercapnic respiratory failure  #COPD vs CHF exacerbation   -Continue duonebs PRN, will hold home symbicort  -s/p Lasix 40 x2, will repeat this evening   -Patient with new O2 requirements but feeling almost back to baseline, will wean as tolerated  -Low concern for acute infection, will obtain viral panel and urine strep/legionella   -Will benefit from outpatient PFTs     #HFrEF  #ICM  #Elevated troponin  -Continue aspirin 81mg  -Will hold home entresto for persistent hypotension  -Continue farxiga  -Diuresis as above  -Will continue to trend troponin to peak and monitor on telemetry     Code status: Full (confirmed)   NOK: Daughter (number on file)

## 2024-01-23 ENCOUNTER — CLINICAL SUPPORT (OUTPATIENT)
Dept: EMERGENCY MEDICINE | Facility: HOSPITAL | Age: 69
DRG: 286 | End: 2024-01-23
Payer: COMMERCIAL

## 2024-01-23 LAB
ALBUMIN SERPL BCP-MCNC: 4 G/DL (ref 3.4–5)
ANION GAP BLDA CALCULATED.4IONS-SCNC: 17 MMO/L (ref 10–25)
ANION GAP BLDV CALCULATED.4IONS-SCNC: 15 MMOL/L (ref 10–25)
ANION GAP SERPL CALC-SCNC: 16 MMOL/L (ref 10–20)
APTT PPP: 32 SECONDS (ref 27–38)
BASE EXCESS BLDA CALC-SCNC: -3.9 MMOL/L (ref -2–3)
BASE EXCESS BLDV CALC-SCNC: -4.5 MMOL/L (ref -2–3)
BASOPHILS # BLD AUTO: 0 X10*3/UL (ref 0–0.1)
BASOPHILS # BLD AUTO: 0.04 X10*3/UL (ref 0–0.1)
BASOPHILS NFR BLD AUTO: 0 %
BASOPHILS NFR BLD AUTO: 1 %
BODY TEMPERATURE: 37 DEGREES CELSIUS
BODY TEMPERATURE: 37 DEGREES CELSIUS
BUN SERPL-MCNC: 23 MG/DL (ref 6–23)
CA-I BLDA-SCNC: 1.21 MMOL/L (ref 1.1–1.33)
CA-I BLDV-SCNC: 1.11 MMOL/L (ref 1.1–1.33)
CALCIUM SERPL-MCNC: 9.4 MG/DL (ref 8.6–10.6)
CARDIAC TROPONIN I PNL SERPL HS: 1230 NG/L (ref 0–34)
CHLORIDE BLDA-SCNC: 107 MMOL/L (ref 98–107)
CHLORIDE BLDV-SCNC: 106 MMOL/L (ref 98–107)
CHLORIDE SERPL-SCNC: 107 MMOL/L (ref 98–107)
CO2 SERPL-SCNC: 20 MMOL/L (ref 21–32)
CREAT SERPL-MCNC: 1.51 MG/DL (ref 0.5–1.05)
EGFRCR SERPLBLD CKD-EPI 2021: 37 ML/MIN/1.73M*2
EOSINOPHIL # BLD AUTO: 0 X10*3/UL (ref 0–0.7)
EOSINOPHIL # BLD AUTO: 0.12 X10*3/UL (ref 0–0.7)
EOSINOPHIL NFR BLD AUTO: 0 %
EOSINOPHIL NFR BLD AUTO: 3 %
ERYTHROCYTE [DISTWIDTH] IN BLOOD BY AUTOMATED COUNT: 15.9 % (ref 11.5–14.5)
ERYTHROCYTE [DISTWIDTH] IN BLOOD BY AUTOMATED COUNT: 16.1 % (ref 11.5–14.5)
FLUAV RNA RESP QL NAA+PROBE: NOT DETECTED
FLUBV RNA RESP QL NAA+PROBE: NOT DETECTED
GLUCOSE BLD MANUAL STRIP-MCNC: 111 MG/DL (ref 74–99)
GLUCOSE BLD MANUAL STRIP-MCNC: 115 MG/DL (ref 74–99)
GLUCOSE BLD MANUAL STRIP-MCNC: 147 MG/DL (ref 74–99)
GLUCOSE BLDA-MCNC: 183 MG/DL (ref 74–99)
GLUCOSE BLDV-MCNC: 111 MG/DL (ref 74–99)
GLUCOSE SERPL-MCNC: 199 MG/DL (ref 74–99)
HCO3 BLDA-SCNC: 20.8 MMOL/L (ref 22–26)
HCO3 BLDV-SCNC: 21.7 MMOL/L (ref 22–26)
HCT VFR BLD AUTO: 40.3 % (ref 36–46)
HCT VFR BLD AUTO: 41.7 % (ref 36–46)
HCT VFR BLD EST: 42 % (ref 36–46)
HCT VFR BLD EST: 44 % (ref 36–46)
HGB BLD-MCNC: 13.2 G/DL (ref 12–16)
HGB BLD-MCNC: 13.3 G/DL (ref 12–16)
HGB BLDA-MCNC: 14.6 G/DL (ref 12–16)
HGB BLDV-MCNC: 14 G/DL (ref 12–16)
IMM GRANULOCYTES # BLD AUTO: 0.01 X10*3/UL (ref 0–0.7)
IMM GRANULOCYTES # BLD AUTO: 0.03 X10*3/UL (ref 0–0.7)
IMM GRANULOCYTES NFR BLD AUTO: 0.3 % (ref 0–0.9)
IMM GRANULOCYTES NFR BLD AUTO: 0.4 % (ref 0–0.9)
INHALED O2 CONCENTRATION: 40 %
INR PPP: 1.1 (ref 0.9–1.1)
LACTATE BLDA-SCNC: 2.3 MMOL/L (ref 0.4–2)
LACTATE BLDV-SCNC: 2 MMOL/L (ref 0.4–2)
LEGIONELLA AG UR QL: NEGATIVE
LYMPHOCYTES # BLD AUTO: 0.27 X10*3/UL (ref 1.2–4.8)
LYMPHOCYTES # BLD AUTO: 1.54 X10*3/UL (ref 1.2–4.8)
LYMPHOCYTES NFR BLD AUTO: 3.8 %
LYMPHOCYTES NFR BLD AUTO: 39 %
MAGNESIUM SERPL-MCNC: 2.13 MG/DL (ref 1.6–2.4)
MCH RBC QN AUTO: 28.8 PG (ref 26–34)
MCH RBC QN AUTO: 29 PG (ref 26–34)
MCHC RBC AUTO-ENTMCNC: 31.9 G/DL (ref 32–36)
MCHC RBC AUTO-ENTMCNC: 32.8 G/DL (ref 32–36)
MCV RBC AUTO: 88 FL (ref 80–100)
MCV RBC AUTO: 91 FL (ref 80–100)
MONOCYTES # BLD AUTO: 0.11 X10*3/UL (ref 0.1–1)
MONOCYTES # BLD AUTO: 0.36 X10*3/UL (ref 0.1–1)
MONOCYTES NFR BLD AUTO: 1.5 %
MONOCYTES NFR BLD AUTO: 9.1 %
NEUTROPHILS # BLD AUTO: 1.88 X10*3/UL (ref 1.2–7.7)
NEUTROPHILS # BLD AUTO: 6.69 X10*3/UL (ref 1.2–7.7)
NEUTROPHILS NFR BLD AUTO: 47.6 %
NEUTROPHILS NFR BLD AUTO: 94.3 %
NRBC BLD-RTO: 0 /100 WBCS (ref 0–0)
NRBC BLD-RTO: 0 /100 WBCS (ref 0–0)
OXYHGB MFR BLDA: 93.5 % (ref 94–98)
OXYHGB MFR BLDV: 46.8 % (ref 45–75)
PCO2 BLDA: 36 MM HG (ref 38–42)
PCO2 BLDV: 43 MM HG (ref 41–51)
PH BLDA: 7.37 PH (ref 7.38–7.42)
PH BLDV: 7.31 PH (ref 7.33–7.43)
PHOSPHATE SERPL-MCNC: 3.5 MG/DL (ref 2.5–4.9)
PLATELET # BLD AUTO: 162 X10*3/UL (ref 150–450)
PLATELET # BLD AUTO: 198 X10*3/UL (ref 150–450)
PO2 BLDA: 72 MM HG (ref 85–95)
PO2 BLDV: 28 MM HG (ref 35–45)
POTASSIUM BLDA-SCNC: 5.8 MMOL/L (ref 3.5–5.3)
POTASSIUM BLDV-SCNC: 4.7 MMOL/L (ref 3.5–5.3)
POTASSIUM SERPL-SCNC: 4.5 MMOL/L (ref 3.5–5.3)
PROTHROMBIN TIME: 11.9 SECONDS (ref 9.8–12.8)
RBC # BLD AUTO: 4.59 X10*6/UL (ref 4–5.2)
RBC # BLD AUTO: 4.59 X10*6/UL (ref 4–5.2)
RSV RNA RESP QL NAA+PROBE: NOT DETECTED
S PNEUM AG UR QL: NEGATIVE
SAO2 % BLDA: 96 % (ref 94–100)
SAO2 % BLDV: 48 % (ref 45–75)
SARS-COV-2 RNA RESP QL NAA+PROBE: DETECTED
SODIUM BLDA-SCNC: 139 MMOL/L (ref 136–145)
SODIUM BLDV-SCNC: 138 MMOL/L (ref 136–145)
SODIUM SERPL-SCNC: 138 MMOL/L (ref 136–145)
WBC # BLD AUTO: 4 X10*3/UL (ref 4.4–11.3)
WBC # BLD AUTO: 7.1 X10*3/UL (ref 4.4–11.3)

## 2024-01-23 PROCEDURE — 93005 ELECTROCARDIOGRAM TRACING: CPT

## 2024-01-23 PROCEDURE — 2500000004 HC RX 250 GENERAL PHARMACY W/ HCPCS (ALT 636 FOR OP/ED): Performed by: STUDENT IN AN ORGANIZED HEALTH CARE EDUCATION/TRAINING PROGRAM

## 2024-01-23 PROCEDURE — 2500000005 HC RX 250 GENERAL PHARMACY W/O HCPCS: Mod: JZ

## 2024-01-23 PROCEDURE — 2500000001 HC RX 250 WO HCPCS SELF ADMINISTERED DRUGS (ALT 637 FOR MEDICARE OP)

## 2024-01-23 PROCEDURE — 2500000002 HC RX 250 W HCPCS SELF ADMINISTERED DRUGS (ALT 637 FOR MEDICARE OP, ALT 636 FOR OP/ED): Mod: MUE

## 2024-01-23 PROCEDURE — 87637 SARSCOV2&INF A&B&RSV AMP PRB: CPT | Performed by: EMERGENCY MEDICINE

## 2024-01-23 PROCEDURE — 83735 ASSAY OF MAGNESIUM: CPT

## 2024-01-23 PROCEDURE — 83735 ASSAY OF MAGNESIUM: CPT | Performed by: STUDENT IN AN ORGANIZED HEALTH CARE EDUCATION/TRAINING PROGRAM

## 2024-01-23 PROCEDURE — 2500000004 HC RX 250 GENERAL PHARMACY W/ HCPCS (ALT 636 FOR OP/ED)

## 2024-01-23 PROCEDURE — 84484 ASSAY OF TROPONIN QUANT: CPT

## 2024-01-23 PROCEDURE — 82947 ASSAY GLUCOSE BLOOD QUANT: CPT

## 2024-01-23 PROCEDURE — 36415 COLL VENOUS BLD VENIPUNCTURE: CPT

## 2024-01-23 PROCEDURE — 84100 ASSAY OF PHOSPHORUS: CPT | Performed by: STUDENT IN AN ORGANIZED HEALTH CARE EDUCATION/TRAINING PROGRAM

## 2024-01-23 PROCEDURE — 99232 SBSQ HOSP IP/OBS MODERATE 35: CPT

## 2024-01-23 PROCEDURE — 93010 ELECTROCARDIOGRAM REPORT: CPT | Performed by: INTERNAL MEDICINE

## 2024-01-23 PROCEDURE — 3E0DX3Z INTRODUCTION OF ANTI-INFLAMMATORY INTO MOUTH AND PHARYNX, EXTERNAL APPROACH: ICD-10-PCS | Performed by: INTERNAL MEDICINE

## 2024-01-23 PROCEDURE — 94660 CPAP INITIATION&MGMT: CPT

## 2024-01-23 PROCEDURE — 87040 BLOOD CULTURE FOR BACTERIA: CPT

## 2024-01-23 PROCEDURE — 82248 BILIRUBIN DIRECT: CPT | Performed by: STUDENT IN AN ORGANIZED HEALTH CARE EDUCATION/TRAINING PROGRAM

## 2024-01-23 PROCEDURE — 94640 AIRWAY INHALATION TREATMENT: CPT | Mod: MUE

## 2024-01-23 PROCEDURE — 94640 AIRWAY INHALATION TREATMENT: CPT

## 2024-01-23 PROCEDURE — 2500000001 HC RX 250 WO HCPCS SELF ADMINISTERED DRUGS (ALT 637 FOR MEDICARE OP): Performed by: STUDENT IN AN ORGANIZED HEALTH CARE EDUCATION/TRAINING PROGRAM

## 2024-01-23 PROCEDURE — 2500000005 HC RX 250 GENERAL PHARMACY W/O HCPCS

## 2024-01-23 PROCEDURE — 80053 COMPREHEN METABOLIC PANEL: CPT

## 2024-01-23 PROCEDURE — 83605 ASSAY OF LACTIC ACID: CPT | Mod: MUE

## 2024-01-23 PROCEDURE — 85610 PROTHROMBIN TIME: CPT

## 2024-01-23 PROCEDURE — 84132 ASSAY OF SERUM POTASSIUM: CPT | Performed by: STUDENT IN AN ORGANIZED HEALTH CARE EDUCATION/TRAINING PROGRAM

## 2024-01-23 PROCEDURE — 85025 COMPLETE CBC W/AUTO DIFF WBC: CPT

## 2024-01-23 PROCEDURE — XW033E5 INTRODUCTION OF REMDESIVIR ANTI-INFECTIVE INTO PERIPHERAL VEIN, PERCUTANEOUS APPROACH, NEW TECHNOLOGY GROUP 5: ICD-10-PCS | Performed by: INTERNAL MEDICINE

## 2024-01-23 PROCEDURE — 99291 CRITICAL CARE FIRST HOUR: CPT | Performed by: STUDENT IN AN ORGANIZED HEALTH CARE EDUCATION/TRAINING PROGRAM

## 2024-01-23 PROCEDURE — 84132 ASSAY OF SERUM POTASSIUM: CPT

## 2024-01-23 PROCEDURE — 2020000001 HC ICU ROOM DAILY

## 2024-01-23 PROCEDURE — 86901 BLOOD TYPING SEROLOGIC RH(D): CPT

## 2024-01-23 RX ORDER — METOCLOPRAMIDE HYDROCHLORIDE 5 MG/ML
10 INJECTION INTRAMUSCULAR; INTRAVENOUS ONCE
Status: COMPLETED | OUTPATIENT
Start: 2024-01-23 | End: 2024-01-23

## 2024-01-23 RX ORDER — ONDANSETRON HYDROCHLORIDE 2 MG/ML
4 INJECTION, SOLUTION INTRAVENOUS ONCE
Status: DISCONTINUED | OUTPATIENT
Start: 2024-01-23 | End: 2024-01-23

## 2024-01-23 RX ORDER — ENOXAPARIN SODIUM 100 MG/ML
1 INJECTION SUBCUTANEOUS EVERY 12 HOURS
Status: DISCONTINUED | OUTPATIENT
Start: 2024-01-23 | End: 2024-01-24

## 2024-01-23 RX ORDER — ONDANSETRON HYDROCHLORIDE 2 MG/ML
INJECTION, SOLUTION INTRAVENOUS
Status: COMPLETED
Start: 2024-01-23 | End: 2024-01-23

## 2024-01-23 RX ORDER — DEXAMETHASONE 6 MG/1
6 TABLET ORAL DAILY
Status: DISPENSED | OUTPATIENT
Start: 2024-01-23 | End: 2024-02-02

## 2024-01-23 RX ORDER — IPRATROPIUM BROMIDE AND ALBUTEROL SULFATE 2.5; .5 MG/3ML; MG/3ML
3 SOLUTION RESPIRATORY (INHALATION)
Status: CANCELLED | OUTPATIENT
Start: 2024-01-23

## 2024-01-23 RX ORDER — IPRATROPIUM BROMIDE AND ALBUTEROL SULFATE 2.5; .5 MG/3ML; MG/3ML
3 SOLUTION RESPIRATORY (INHALATION)
Status: DISCONTINUED | OUTPATIENT
Start: 2024-01-23 | End: 2024-01-24

## 2024-01-23 RX ORDER — ONDANSETRON HYDROCHLORIDE 2 MG/ML
4 INJECTION, SOLUTION INTRAVENOUS EVERY 6 HOURS PRN
Status: DISCONTINUED | OUTPATIENT
Start: 2024-01-23 | End: 2024-01-24

## 2024-01-23 RX ORDER — ALUMINUM HYDROXIDE, MAGNESIUM HYDROXIDE, AND SIMETHICONE 1200; 120; 1200 MG/30ML; MG/30ML; MG/30ML
10 SUSPENSION ORAL 4 TIMES DAILY PRN
Status: DISCONTINUED | OUTPATIENT
Start: 2024-01-23 | End: 2024-01-25

## 2024-01-23 RX ORDER — HEPARIN SODIUM 5000 [USP'U]/ML
5000 INJECTION, SOLUTION INTRAVENOUS; SUBCUTANEOUS EVERY 8 HOURS
Status: DISCONTINUED | OUTPATIENT
Start: 2024-01-23 | End: 2024-01-23

## 2024-01-23 RX ORDER — IPRATROPIUM BROMIDE AND ALBUTEROL SULFATE 2.5; .5 MG/3ML; MG/3ML
3 SOLUTION RESPIRATORY (INHALATION) ONCE
Status: COMPLETED | OUTPATIENT
Start: 2024-01-23 | End: 2024-01-23

## 2024-01-23 RX ORDER — DIPHENHYDRAMINE HYDROCHLORIDE 50 MG/ML
50 INJECTION INTRAMUSCULAR; INTRAVENOUS ONCE
Status: COMPLETED | OUTPATIENT
Start: 2024-01-23 | End: 2024-01-23

## 2024-01-23 RX ORDER — DOXYCYCLINE HYCLATE 100 MG
100 TABLET ORAL EVERY 12 HOURS SCHEDULED
Status: DISPENSED | OUTPATIENT
Start: 2024-01-23 | End: 2024-01-28

## 2024-01-23 RX ORDER — METOCLOPRAMIDE HYDROCHLORIDE 5 MG/ML
INJECTION INTRAMUSCULAR; INTRAVENOUS
Status: COMPLETED
Start: 2024-01-23 | End: 2024-01-23

## 2024-01-23 RX ADMIN — METOCLOPRAMIDE 10 MG: 5 INJECTION, SOLUTION INTRAMUSCULAR; INTRAVENOUS at 05:25

## 2024-01-23 RX ADMIN — DAPAGLIFLOZIN 10 MG: 10 TABLET, FILM COATED ORAL at 10:13

## 2024-01-23 RX ADMIN — DEXAMETHASONE 6 MG: 6 TABLET ORAL at 11:17

## 2024-01-23 RX ADMIN — DOXYCYCLINE HYCLATE 100 MG: 100 TABLET, COATED ORAL at 16:39

## 2024-01-23 RX ADMIN — DIPHENHYDRAMINE HYDROCHLORIDE 50 MG: 50 INJECTION INTRAMUSCULAR; INTRAVENOUS at 17:46

## 2024-01-23 RX ADMIN — METOCLOPRAMIDE HYDROCHLORIDE 10 MG: 5 INJECTION INTRAMUSCULAR; INTRAVENOUS at 01:10

## 2024-01-23 RX ADMIN — METOCLOPRAMIDE 10 MG: 5 INJECTION, SOLUTION INTRAMUSCULAR; INTRAVENOUS at 01:10

## 2024-01-23 RX ADMIN — IPRATROPIUM BROMIDE AND ALBUTEROL SULFATE 3 ML: .5; 3 SOLUTION RESPIRATORY (INHALATION) at 12:04

## 2024-01-23 RX ADMIN — IPRATROPIUM BROMIDE AND ALBUTEROL SULFATE 3 ML: .5; 3 SOLUTION RESPIRATORY (INHALATION) at 14:29

## 2024-01-23 RX ADMIN — IPRATROPIUM BROMIDE AND ALBUTEROL SULFATE 3 ML: .5; 3 SOLUTION RESPIRATORY (INHALATION) at 07:00

## 2024-01-23 RX ADMIN — ASPIRIN 81 MG CHEWABLE TABLET 81 MG: 81 TABLET CHEWABLE at 10:13

## 2024-01-23 RX ADMIN — ONDANSETRON 4 MG: 2 INJECTION, SOLUTION INTRAMUSCULAR; INTRAVENOUS at 18:45

## 2024-01-23 RX ADMIN — SENNOSIDES AND DOCUSATE SODIUM 2 TABLET: 8.6; 5 TABLET ORAL at 21:58

## 2024-01-23 RX ADMIN — Medication 4 L/MIN: at 20:40

## 2024-01-23 RX ADMIN — TRIMETHOBENZAMIDE HYDROCHLORIDE 200 MG: 100 INJECTION INTRAMUSCULAR at 10:12

## 2024-01-23 RX ADMIN — ALUMINUM HYDROXIDE, MAGNESIUM HYDROXIDE, AND SIMETHICONE 10 ML: 200; 200; 20 SUSPENSION ORAL at 21:57

## 2024-01-23 RX ADMIN — IPRATROPIUM BROMIDE AND ALBUTEROL SULFATE 3 ML: .5; 3 SOLUTION RESPIRATORY (INHALATION) at 20:40

## 2024-01-23 RX ADMIN — METHYLPREDNISOLONE SODIUM SUCCINATE 40 MG: 125 INJECTION, POWDER, FOR SOLUTION INTRAMUSCULAR; INTRAVENOUS at 16:38

## 2024-01-23 RX ADMIN — DOXYCYCLINE HYCLATE 100 MG: 100 TABLET, COATED ORAL at 21:58

## 2024-01-23 RX ADMIN — ONDANSETRON 4 MG: 2 INJECTION INTRAMUSCULAR; INTRAVENOUS at 18:45

## 2024-01-23 RX ADMIN — REMDESIVIR 200 MG: 100 INJECTION, POWDER, LYOPHILIZED, FOR SOLUTION INTRAVENOUS at 12:02

## 2024-01-23 ASSESSMENT — PAIN SCALES - GENERAL
PAINLEVEL_OUTOF10: 0 - NO PAIN
PAINLEVEL_OUTOF10: 0 - NO PAIN

## 2024-01-23 ASSESSMENT — PAIN - FUNCTIONAL ASSESSMENT
PAIN_FUNCTIONAL_ASSESSMENT: 0-10
PAIN_FUNCTIONAL_ASSESSMENT: 0-10

## 2024-01-23 NOTE — PROGRESS NOTES
"Melissa Marinelli is a 68 y.o. female on day 1 of admission presenting with Hypoxic respiratory failure (CMS/HCC).    Subjective   Patient reporting increased WoB this AM, dyspnea is worse. She continues to have nausea as well. Cough is stable, no production. Maybe, some fevers overnight, but also thinks her room may have been too warm. During rounds, patient was placed on BiPAP due to increased WOB.        Objective     Physical Exam  Constitutional:       General: She is in acute distress (moderate).      Appearance: She is normal weight.   HENT:      Head: Normocephalic and atraumatic.      Mouth/Throat:      Mouth: Mucous membranes are dry.      Pharynx: Oropharynx is clear. No oropharyngeal exudate or posterior oropharyngeal erythema.   Eyes:      Extraocular Movements: Extraocular movements intact.      Pupils: Pupils are equal, round, and reactive to light.   Cardiovascular:      Rate and Rhythm: Regular rhythm. Tachycardia present.      Pulses: Normal pulses.      Heart sounds: No murmur heard.  Pulmonary:      Comments: Tachypneic. Increased work of breathing, not using accessory muscles. Rhonchi noted bilaterally throughout lung fields.   Abdominal:      General: Abdomen is flat. Bowel sounds are normal. There is no distension.      Palpations: Abdomen is soft.      Tenderness: There is no abdominal tenderness.   Musculoskeletal:      Cervical back: Normal range of motion and neck supple.      Right lower leg: No edema.      Left lower leg: No edema.   Skin:     General: Skin is dry.      Comments: Cool to touch   Neurological:      Mental Status: She is alert.      Comments: Patient more somnolent this AM   Psychiatric:         Mood and Affect: Mood normal.         Last Recorded Vitals  Blood pressure 145/87, pulse (!) 123, temperature 36.9 °C (98.4 °F), temperature source Oral, resp. rate (!) 32, height 1.626 m (5' 4\"), weight 64.9 kg (143 lb), SpO2 (!) 92 %.  Intake/Output last 3 Shifts:  No intake/output " data recorded.    Relevant Results  Scheduled medications  aspirin, 81 mg, oral, Daily  dapagliflozin propanediol, 10 mg, oral, Daily  dexAMETHasone, 6 mg, oral, Daily  doxycylcine, 100 mg, oral, q12h AWAIS  enoxaparin, 40 mg, subcutaneous, q24h  insulin lispro, 0-5 Units, subcutaneous, TID with meals  ipratropium-albuteroL, 3 mL, nebulization, q4h  polyethylene glycol, 17 g, oral, Daily  [START ON 1/24/2024] remdesivir, 100 mg, intravenous, q24h  sennosides-docusate sodium, 2 tablet, oral, BID      Continuous medications     PRN medications  PRN medications: acetaminophen, dextrose 10 % in water (D10W), dextrose, glucagon, oxygen, sodium chloride  Results for orders placed or performed during the hospital encounter of 01/22/24 (from the past 24 hour(s))   Troponin I, High Sensitivity   Result Value Ref Range    Troponin I, High Sensitivity 996 (HH) 0 - 34 ng/L   POCT GLUCOSE   Result Value Ref Range    POCT Glucose 135 (H) 74 - 99 mg/dL   Streptococcus pneumoniae Antigen, Urine    Specimen: Urine   Result Value Ref Range    Streptococcus pneumoniae Ag, Urine Negative Negative   Legionella Antigen, Urine    Specimen: Urine   Result Value Ref Range    L. pneumophila Urine Ag Negative Negative   Troponin I, High Sensitivity   Result Value Ref Range    Troponin I, High Sensitivity 1,257 (HH) 0 - 34 ng/L   Troponin I, High Sensitivity   Result Value Ref Range    Troponin I, High Sensitivity 1,230 (HH) 0 - 34 ng/L   Sars-CoV-2 and Influenza A/B PCR   Result Value Ref Range    Flu A Result Not Detected Not Detected    Flu B Result Not Detected Not Detected    Coronavirus 2019, PCR Detected (A) Not Detected   RSV PCR   Result Value Ref Range    RSV PCR Not Detected Not Detected   CBC and Auto Differential   Result Value Ref Range    WBC 4.0 (L) 4.4 - 11.3 x10*3/uL    nRBC 0.0 0.0 - 0.0 /100 WBCs    RBC 4.59 4.00 - 5.20 x10*6/uL    Hemoglobin 13.2 12.0 - 16.0 g/dL    Hematocrit 40.3 36.0 - 46.0 %    MCV 88 80 - 100 fL    MCH  28.8 26.0 - 34.0 pg    MCHC 32.8 32.0 - 36.0 g/dL    RDW 15.9 (H) 11.5 - 14.5 %    Platelets 198 150 - 450 x10*3/uL    Neutrophils % 47.6 40.0 - 80.0 %    Immature Granulocytes %, Automated 0.3 0.0 - 0.9 %    Lymphocytes % 39.0 13.0 - 44.0 %    Monocytes % 9.1 2.0 - 10.0 %    Eosinophils % 3.0 0.0 - 6.0 %    Basophils % 1.0 0.0 - 2.0 %    Neutrophils Absolute 1.88 1.20 - 7.70 x10*3/uL    Immature Granulocytes Absolute, Automated 0.01 0.00 - 0.70 x10*3/uL    Lymphocytes Absolute 1.54 1.20 - 4.80 x10*3/uL    Monocytes Absolute 0.36 0.10 - 1.00 x10*3/uL    Eosinophils Absolute 0.12 0.00 - 0.70 x10*3/uL    Basophils Absolute 0.04 0.00 - 0.10 x10*3/uL   Renal function panel   Result Value Ref Range    Glucose 199 (H) 74 - 99 mg/dL    Sodium 138 136 - 145 mmol/L    Potassium 4.5 3.5 - 5.3 mmol/L    Chloride 107 98 - 107 mmol/L    Bicarbonate 20 (L) 21 - 32 mmol/L    Anion Gap 16 10 - 20 mmol/L    Urea Nitrogen 23 6 - 23 mg/dL    Creatinine 1.51 (H) 0.50 - 1.05 mg/dL    eGFR 37 (L) >60 mL/min/1.73m*2    Calcium 9.4 8.6 - 10.6 mg/dL    Phosphorus 3.5 2.5 - 4.9 mg/dL    Albumin 4.0 3.4 - 5.0 g/dL   Magnesium   Result Value Ref Range    Magnesium 2.13 1.60 - 2.40 mg/dL   POCT GLUCOSE   Result Value Ref Range    POCT Glucose 147 (H) 74 - 99 mg/dL   Blood Gas Arterial Full Panel Unsolicited   Result Value Ref Range    POCT pH, Arterial 7.37 (L) 7.38 - 7.42 pH    POCT pCO2, Arterial 36 (L) 38 - 42 mm Hg    POCT pO2, Arterial 72 (L) 85 - 95 mm Hg    POCT SO2, Arterial 96 94 - 100 %    POCT Oxy Hemoglobin, Arterial 93.5 (L) 94.0 - 98.0 %    POCT Hematocrit Calculated, Arterial 44.0 36.0 - 46.0 %    POCT Sodium, Arterial 139 136 - 145 mmol/L    POCT Potassium, Arterial 5.8 (H) 3.5 - 5.3 mmol/L    POCT Chloride, Arterial 107 98 - 107 mmol/L    POCT Ionized Calcium, Arterial 1.21 1.10 - 1.33 mmol/L    POCT Glucose, Arterial 183 (H) 74 - 99 mg/dL    POCT Lactate, Arterial 2.3 (H) 0.4 - 2.0 mmol/L    POCT Base Excess, Arterial -3.9 (L)  -2.0 - 3.0 mmol/L    POCT HCO3 Calculated, Arterial 20.8 (L) 22.0 - 26.0 mmol/L    POCT Hemoglobin, Arterial 14.6 12.0 - 16.0 g/dL    POCT Anion Gap, Arterial 17 10 - 25 mmo/L    Patient Temperature 37.0 degrees Celsius     ECG 12 Lead    Result Date: 1/22/2024  Sinus rhythm with marked sinus arrhythmia Low voltage QRS Cannot rule out Anterior infarct , age undetermined T wave abnormality, consider lateral ischemia Abnormal ECG When compared with ECG of 22-JAN-2024 04:30, Minimal criteria for Anterior infarct are now Present Inverted T waves have replaced nonspecific T wave abnormality in Lateral leads See ED provider note for full interpretation and clinical correlation Confirmed by Alycia Joyner ([id number]) on 1/22/2024 7:42:14 PM    XR chest 1 view    Result Date: 1/22/2024  Interpreted By:  Minerva Treviño and Dervishi Mario STUDY: XR CHEST 1 VIEW;  1/22/2024 5:09 am   INDICATION: Signs/Symptoms:SOB. H/o CHF and COPD.   COMPARISON: Chest radiograph: 11/22/2023   ACCESSION NUMBER(S): [id number]   ORDERING CLINICIAN: OPAL CASTRO   FINDINGS: AP radiograph of the chest was provided.   Postsurgical changes status post median sternotomy with intact sternal wires. Left-sided single-chamber ICD device is noted in place projecting appropriately.   CARDIOMEDIASTINAL SILHOUETTE: Cardiopericardial silhouette is enlarged and is stable in size and configuration compared to prior radiograph.   LUNGS: There is central and peripheral vascular prominence with peribronchial, perivascular haziness suggesting interstitial pulmonary edema. There are mild linear bibasilar opacities suggesting atelectasis.  No evidence of focal consolidations, pleural effusions or pneumothorax.   ABDOMEN: No remarkable upper abdominal findings.   BONES: No acute osseous changes.       1.  Cardiomegaly with mild-to-moderate interstitial pulmonary edema.   I personally reviewed the images/study and I agree with the findings as stated by Resident Crum  MD Debbie. This study was interpreted at [facility], [place], [place].   MACRO: NONE.   Signed by: Minerva Treviño 1/22/2024 9:28 AM Dictation workstation:   [id number]              Assessment/Plan   Principal Problem:    Hypoxic respiratory failure (CMS/HCC)    Updates 1/23:  - Patient found to be covid positive, started on course of remdesivir and dexamethasone  - Increased dyspnea today unresponsive to breathing treatment, Bcx and VBG ordered   - Patient placed on BiPAP due to increased work of breathing, ABG showing pH 7.37 pCO2 36 and pO2 72 while on BiPAP  - MICU made aware and will accept patient once non-crash bed becomes available  - Started patient on 5 day course of doxycycline for presumed COPD exacerbation (prolonged QT interval precluded use of macrolides or fluoroquinolones)   - At this point, unclear if patient's deteriorating respiratory status is from primarily a respiratory standpoint (most likely at this point given new COVID infection) or if there is some contribution from her underlying cardiac disease and heart failure with reduced ejection fraction  -Will obtain CT PE protocol given patient's persistent tachycardia, hypoxia    #Acute hypercapnic and hypoxic respiratory failure  #? COPD exacerbation vs Acute decompensated HF  #COVID infection  - Requiring BiPAP again for work of breathing; ABG pH 7.37, pCO2 36, pO2 72  - Start remdesivir 200mg 1 day then 100mg for 4 days  - Start dexamethasone 6mg daily for 10 days; consider changing to IV formulation if patient not able to tolerate PO on BiPAP  - Start doxycycline 100mg BID for 5 days for presumed COPD exacerbation   - No PFTs on file to confirm diagnosis of COPD   - CT PE protocol today to evaluate lung parenchyma as well as to evaluate for potential embolus given persistent tachycardia and hypoxia   - , but on prior admission for ADHF, BNP more significantly elevated at >3000  - S/p 40mg IV  lasix x2 in ED             - Hold on further diuresis per cardiology recommendations  - Hold home symbicort  - Duonebs q6hrs for SOB  - Wean supplemental O2 as able   - Strep and legionella antigens pending      #CAD   #NSTEMI s/p RIRI to Lcx (Jan 2016)  #Hx of Mitral Valve repair (June 2019) w/ Dr Montana  #Troponinemia   - Hold home entresto for PM dose due to softer blood pressure and diuresis; will restart tomorrow AM blood pressure permitting  - Troponin elevation to peak of 1257, down trended since, likely in setting of demand ischemia; cardiology consulted, appreciate recommendations  - Cardiology recommended low concern for ACS at this time; hold on further diuresis, concern for dehydration; monitor BID RFP, Strict I/Os, limited TTE to evaluate function, MVR, and IVC  - Continue Farxiga 10mg daily   - Not on statin therapy due to reported allergy per chart review      #CKD IIIB  - Baseline sCr ~1.3-1.4  - Cr slightly increased to 1.51, could be in setting of diuresis and possible hypovolemia now  - Will be cautious w/ further diuresis to avoid PATSY  -Will need to monitor closely given IV contrast that will be administered for PE study     #DM2  - Euglycemic, HgbA1c 5.5 (11/2023)  - Farxiga 10mg daily   - Sliding scale ordered     #Tobacco use disorder  #Marijuana use disorder   - Smokes 10 cigarettes per day, nicotine replacement therapy declined      F: Bolus PRN  E: PRN; replete aggressively (K>4, Mg>2) while diuresing  N: Low na and fluid restricted  A: PIV  GI: n/a  DVT: Lovenox     Code status: Full code (confirmed on admission)  NOK: Daughter Sarah [phone]           Leroy Muñiz MD

## 2024-01-23 NOTE — CONSULTS
Inpatient consult to Cardiology  Consult performed by: Leonel Alcantar MD  Consult ordered by: Vineet Velasquez MD  Reason for consult: troponin elevation      History Of Present Illness:    Melissa Marinelli is a 68 y.o. female with PMH of HFrEF (EF 15-20%) s/p ICD placement 5/2020, CAD s/p NSTEMI with RIRI to LCx (Jan 2016), s/p MVR (June 2019), COPD (never formally diagnosed), HTN, HLD, GERD, CVA, CKD IIIB and DMII who presented via EMS from home with acute onset shortness of breath. Patient states that she got up to use the bathroom last night and when she got to the bathroom, she had acute onset of SOB that progressed over 15 minutes, to the point that she called her daughter and her daughter called EMS for the patient. Patient did have diaphoresis at that time as well as wheezing. She denies chest pain, chest pressure, pleuritic chest pain, syncope. Patient did have nausea and vomiting at home, as well as in the ED while on BiPAP, but has not had any since taken off NIPPV. She denies LE edema, worsening orthopnea, worsening cough/sputum production, fevers, chills, hemoptysis, diarrhea. Overall, after being treated w/ duonebs, BiPAP, and lasix in the ED, patient reports that she feels 80% of her normal baseline.      Patient was last seen by cardiology for a televisit on 1/17/2024 at which time her entresto was decreased back to 1/2 tab of 24/26mg BID due to orthostatic symptoms. Beta blocker was held given RV dysfunction, SGLT2i was continued. Stated that MRA could be considered at next visit. Per cardiology note, patient had previously and continued to decline LVAD as destination therapy, and the topic of palliative inotrope was brought up, but no decision was made at that time.      In the emergency department, patient was found to have low pH and elevated pCO2, was started on bipap and given lasix 40mg IV x2, as well as treated w/ duonebs. Acid/base status improved, and patient was weaned off BiPAP.    On  interview, patient is in good spirits with a lot of energy, she is accompanied by her daughter and grandson who help with history, though she knows her history very well. She is in no acute distress, says she has been feeling better since breathing treatments in the ED. She uses albuterol and symbicort at home. She does not have formal COPD diagnosis through , but reports prior PFTs through [facility] (she and daughter debate about this, but she describes the testing well, says daughter was not present). She feels generally that her SOB can be tied with wheezing, she does not feel like she has been volume overloaded and on exam looks more likely to be volume down. She endorses recent soft stool and then diarrhea at home. She is very adamant about certain therapies she does not want and in general does not want to be on many medications, but is very reasonable overall. As above, she has discussed LVAD with Dr. Rivera on multiple occasions and this is not something she wants to pursue. She does not take a statin because she reported hair loss when taken in the past. She endorses a very mild chest pain, but says this is only since I have been talking to her, nothing prior.     She had lengthy admission in 2019 for cardiogenic shock of unknown etiology, required multiple pressors and impella support and was discharged on dialysis at that time. She believes this was caused by COVID-19 before people knew what it was.     She reports she was told in the past she was shocked by her device, but she has never felt this.     Vitals: BP: 127-137/59-95, HR: 100-146, RR 25-37, O2: %  Labs: CBC: WBC 8.2, Hgb 13.8, Plt 196; INR: 1.0; RFP: K 4.2, Mg 2.26, CO2 25, Cr 1.3 (b/l ~1.1?); ; hs-troponin 151>185>214>484>996    Cardiac Hx:  Cardiac meds: entresto 12/13 BID, aspirin 81, bumex 0.5 mg PRN (she says takes every 3 days), farxiga 10  ECG 1/22/24: sinus rhythm, normal axis, PAC, TWIs V2-V6, II, III, aVF (stable since  3/2023)  TTE 11/2023: EF 15-20%, LV severe dilated, 1.2 x 0.6 cm mobile echo density along anterolateral wall of LV (calcified papillary muscle vs thrombus vs vegetation), epic MV bioprothesis with 29 mm reported size (MG 6.5 mmHg at HR 82 bpm) - echo density is stable from prior echoes back to 2019 and MV MG is stable    XA 11/8/19: stable CAD with patent LCx-OM stent, impella present at that time  LAD: diffuse disease, distal LAD 80% stenosis  LCx: dominant, normal caliber, prior patent stent in LCx-OM  RCA: was not injected, in 4/2019 cath was found to be small without significant stenosis    RHC 11/7/19: RA 6, RV 33/9, PA 33/20 (m26), PCWP 19, CO/CI 3.0/1.7     Last Recorded Vitals:  Vitals:    01/22/24 1658 01/22/24 1659 01/22/24 1700 01/22/24 1912   BP:  111/66  102/70   BP Location:  Left arm  Left arm   Patient Position:  Lying     Pulse: (!) 105  (!) 106 97   Resp: 16 17 14 15   SpO2: 100% 100% 100%    Weight:       Height:           Last Labs:  CBC - 1/22/2024:  5:21 AM  8.2 13.8 196    42.5      CMP - 1/22/2024:  5:21 AM  9.3 8.4 40 --- 0.4   2.4 4.5 16 87      PTT - 1/22/2024:  5:21 AM  1.0   11.4 30     Troponin I, High Sensitivity   Date/Time Value Ref Range Status   01/22/2024 04:39  (HH) 0 - 34 ng/L Final     Comment:     Previous result verified on 1/22/2024 0554 on specimen/case [id number] called with component TRPHS for procedure Troponin I, High Sensitivity with value 151 ng/L.   01/22/2024 09:55  (HH) 0 - 34 ng/L Final     Comment:     Previous result verified on 1/22/2024 0554 on specimen/case [id number] called with component TRPHS for procedure Troponin I, High Sensitivity with value 151 ng/L.   01/22/2024 06:53  () 0 - 34 ng/L Final     Comment:     Previous result verified on 1/22/2024 0554 on specimen/case [id number] called with component [facility] for procedure Troponin I, High Sensitivity with value 151 ng/L.     BNP   Date/Time Value Ref Range Status    01/22/2024 05:21  (H) 0 - 99 pg/mL Final   11/21/2023 08:11 AM 3,373 (H) 0 - 99 pg/mL Final     Hemoglobin A1C   Date/Time Value Ref Range Status   11/21/2023 03:15 AM 5.5 see below % Final   08/31/2023 04:37 PM 5.8 (A) % Final     Comment:          Diagnosis of Diabetes-Adults   Non-Diabetic: < or = 5.6%   Increased risk for developing diabetes: 5.7-6.4%   Diagnostic of diabetes: > or = 6.5%  .       Monitoring of Diabetes                Age (y)     Therapeutic Goal (%)   Adults:          >18           <7.0   Pediatrics:    13-18           <7.5                   7-12           <8.0                   0- 6            7.5-8.5   American Diabetes Association. Diabetes Care 33(S1), Jan 2010.     08/31/2023 03:48 PM CANCELED       Comment:          Diagnosis of Diabetes-Adults   Non-Diabetic: < or = 5.6%   Increased risk for developing diabetes: 5.7-6.4%   Diagnostic of diabetes: > or = 6.5%  .       Monitoring of Diabetes                Age (y)     Therapeutic Goal (%)   Adults:          >18           <7.0   Pediatrics:    13-18           <7.5                   7-12           <8.0                   0- 6            7.5-8.5   American Diabetes Association. Diabetes Care 33(S1), Jan 2010.    Result canceled by the ancillary.       VLDL   Date/Time Value Ref Range Status   09/01/2023 05:24 AM 21 0 - 40 mg/dL Final   05/21/2020 09:02 PM 31 0 - 40 mg/dL Final   11/07/2019 07:57 PM 21 0 - 40 mg/dL Final      Last I/O:  No intake/output data recorded.    Past Cardiology Tests (Last 3 Years):  EKG:  ECG 12 Lead 12/01/2023      ECG 12 Lead       ECG 12 lead 11/21/2023      ECG 12 lead 11/21/2023    Echo:  Transthoracic Echo (TTE) Complete 11/21/2023    Ejection Fractions:  EF   Date/Time Value Ref Range Status   11/21/2023 04:03 PM 23       Cath:  No results found for this or any previous visit from the past 1095 days.    Stress Test:  No results found for this or any previous visit from the past 1095 days.    Cardiac  Imaging:  No results found for this or any previous visit from the past 1095 days.      Past Medical History:  She has a past medical history of Asthma, CAD (coronary artery disease), CHF (congestive heart failure) (CMS/[facility]), CKD (chronic kidney disease), COPD (chronic obstructive pulmonary disease) (CMS/[facility]), CVA (cerebral vascular accident) (CMS/HCC), Diabetes mellitus (CMS/HCC), GERD (gastroesophageal reflux disease), Hyperlipidemia, Hypertension, NSTEMI (non-ST elevated myocardial infarction) (CMS/HCC), and Unspecified systolic (congestive) heart failure (CMS/HCC) (08/11/2022).    Past Surgical History:  She has a past surgical history that includes Other surgical history (08/11/2022); Other surgical history (08/11/2022); Other surgical history (08/11/2022); and Mitral valve replacement (06/2019).      Social History:  She reports that she has been smoking cigarettes. She does not have any smokeless tobacco history on file. She reports current drug use. Drug: Marijuana. No history on file for alcohol use.    Family History:  Family History   Problem Relation Name Age of Onset    Other (CVA) Brother          Allergies:  Metoprolol, Ticagrelor, Ace inhibitors, Fentanyl, Gadolinium-containing contrast media, Hydralazine, Iodinated contrast media, Prednisone, Statins-hmg-coa reductase inhibitors, and Penicillins    Inpatient Medications:  Scheduled medications   Medication Dose Route Frequency    aspirin  81 mg oral Daily    dapagliflozin propanediol  10 mg oral Daily    enoxaparin  40 mg subcutaneous q24h    insulin lispro  0-5 Units subcutaneous TID with meals    ipratropium-albuteroL  3 mL nebulization q6h    polyethylene glycol  17 g oral Daily    sennosides-docusate sodium  2 tablet oral BID     PRN medications   Medication    acetaminophen    dextrose 10 % in water (D10W)    dextrose    glucagon    oxygen    sodium chloride     Continuous Medications   Medication Dose Last Rate     Outpatient  Medications:  Current Outpatient Medications   Medication Instructions    acetaminophen (TYLENOL) 1,000 mg, oral, Every 8 hours PRN    albuterol 90 mcg/actuation inhaler 2 puffs, inhalation, Every 4 hours PRN    aspirin 81 mg, oral, Daily    bumetanide (BUMEX) 0.5 mg, oral, Daily PRN    Farxiga 10 mg, oral, Daily    nicotine (Nicoderm CQ) 14 mg/24 hr patch 1 patch, transdermal, Every 24 hours    polyethylene glycol (GLYCOLAX, MIRALAX) 17 g, oral, Daily, As needed for constipation.    sacubitriL-valsartan (Entresto) 24-26 mg tablet 0.5 tablets, oral, 2 times daily    sodium chloride (Ocean) 0.65 % nasal spray 1 spray, Each Nostril, As needed    Symbicort 80-4.5 mcg/actuation inhaler 2 puffs, inhalation, 2 times daily    walker misc Rollator to assist with ambulation as needed     Physical Exam:  Constitutional: NAD, pleasant, funny  HEENT: PERRLA, EOMI, no scleral icterus, MMM  CV: RRR, no m/r/g, no JVD  Pulm: CTAB, no increased WOB  GI: Soft, NT, ND, normoactive BS  Extremities: no LE edema  Skin: WWP  Neuro: A&Ox4, no FND  Psych: Mood and affect appropriate to situation     Assessment/Plan   Melissa Marinelli is a 68 y.o. female with PMH of HFrEF (EF 15-20%) s/p ICD placement 5/2020 prior admission for cardiogenic shock 11/2019 w/impella support, CAD s/p NSTEMI with RIRI to LCx (Jan 2016), MR s/p MVR (June 2019), COPD (never formally diagnosed at ), HTN, DLD, GERD, CVA, CKD IIIB, and DMII who presented via EMS from home with acute onset shortness of breath. She has been given breathing treatments, diuresed, and placed on BiPAP with improvement, initially planned for MICU admission, now off BiPAP and admitted to pulmonary service. Cardiology is consulted for elevated troponin.      Impression:  #Troponin elevation c/f NSTEMI type II (demand ischemia) c/f COPD exacerbation vs ADHF vs possible dehydration  #HFrEF (EF 15-20%) s/p ICD placement 5/2020  #CAD s/p PCI to LCx 2016  #MR s/p MVR 6/2019  -Patient follows  carefully with HF clinic, last seen by Dr. Rivera on 1/17 via virtual visit; she has had difficult with HF GDMT and has episodes with hypotension on low-dose entresto and uses diuretics sparingly; she has received breathing treatments and diuresis in the ED, it is unclear which has provided her benefit, but with continued rise in troponin I am concerned she is being overdiuresed, especially based on her exam with very small LEs without edema and with her reporting recent diarrhea at home; CXR is read as mild-mod pulmonary interstitial pulmonary edema, but overall does not look very significant  -Patient reports wheezing at home and seems to think duonebs have provided her the most benefit at this time while in the ED  -ECG stable from prior, no acute ischemic changes  -; hs-troponin 151>185>214>484>996    Recommendations:  -Continue to trend troponin until plateau; would not treat for ACS at this time, low concern for plaque rupture  -Page cardiology if significant rise in troponin (doubling or more) to consider ACS treatment  -Would hold off on further diuresis at this time, concern that she may be dehydrated rather than in ADHF; recommend POCUS assessment of IVC to guide further management  -Monitor RFP BID, monitor for worsening PATSY with possible dehydration; replete K>4, Mg>2  -Strict I/Os, unclear if there was possible benefit from diuresis with no charted output  -Recommend limited TTE to evaluated function, MV bioprosthetic, and IVC  -Will consider non-invasive ischemic evaluation in the AM, though based on her presentation seems of little benefit  -Recommend device interrogation in the AM    I spent 60 minutes in the professional and overall care of this patient.    Leonel Alcantar MD

## 2024-01-23 NOTE — SIGNIFICANT EVENT
RAPID RESPONSE   01/23/24 1212   Onset Documentation   Rapid Response Initiated By RN;Physician   Pager Time 1212   Arrival Time 1230   Event End Time 1312   Primary Reason for Call BiPAP/CPAP;Staff concern     RAPID RESPONSE called for assist for MICU acceptance- pt with heart failure and lung issues- not official COPD diagnosis-presented with SOB- COVID positive and on bipap. Pt has been unable to be weaned from bipap. Settings 30% fiO2 I 12 E 5 and rate 16. Pt lethargic but arousable to verbal stimuli. VSS- see flow sheet. DACR at the bedside and discussed with MICU fellow and pt accepted to ICU. Will transfer once bed available-pt stable to wait and be monitored in the MICU for bed availability.

## 2024-01-23 NOTE — HOSPITAL COURSE
68 y.o. female w/ PMhx HFrEF (EF 15-20%) s/p ICD placement 5/2020, CAD s/p NSTEMI with RIRI to LCx (Jan 2016), s/p MVR (June 2019), COPD (no formal PFTs), HTN, HLD, GERD, CVA, CKD IIIB and DMII presenting initially from ED to MICU d/t Acute Hypoxic Resp Failure requiring BiPAP which was quickly weaned down to NC .     Etiologies for acute hypoxic respiratory failure at time of presentation include COVID diagnosis, COPD exacerbation, ADHF related to underlying HFrEF. PE was ruled out. Discontinued remdesivir due to limited access and lack of improvement on it.  Continued 5 days of Remdesivir, Azithromycin, prednisone for COVID infection. Patient's respiratory status improved significantly. Recommended that patient follow up with PFTs outpatient, pulmonary rehab and COPD navigator. She was Transferred to HFICU on 1/26 after she was found to have high left and right filling pressures and low CO in cath lab. The initial SG # (1/26): /77(90), CVP 8, PAP 47/32 (40), W 25, CO/CI 2.94/1.74, SVR 2230, SVO2 51% on Nipride at 2mcg/kg/min. (Thermistor on swan broken, unable to obtain thermos). She was treated with Lasix and Placed on BiPAP and started on dobutamine and Nipride.  Dobutamine has since been weaned off and home GDMT has been restarted; titrated as patient tolerated (limited d/t hypotension and allergies; also, no overt needs for maintenance diuretic). Patient has declined LVAD in the past. Patient agreeable to trial Milrinone for symptome management on 1/29. Maps low, 50's, milrinone drip had to drop from 0.25 mcg to 0.125 mcg/kg/min. With Milrinone drip, patient's renal function slightly improved, and she feels a little better. SG D/c'd, the last set of SG # (1/31):  /54 (70),  CVP 1,  PAP 34/14(21), CO/CI 7/3.9,  , SVO2 77% on milrinone drip 0.125 mcg/kg/min, Farxiga 10mg daily, Jose Luis 25mg daily.  Repeated CVP : 7. Discussed with the patient, decision was made to have Wiley tunneled line  (placed in right internal jugular on 2/1). Recommended smoking cessation, to which patient is precontemplative. She has stable anemia with normal b12/folate,iron; may be a component of CKD, also has low platelets may be a primary BM process per Dr. Fernandez.    Tele reviewed, patient with occasionally run with NSVT, discussed with the patient at bedside, patient refused Metoprolol which she tried in the past and it was  listed as an allergy. May consider of trying bisoprolol on an OP basis.    Home health care referral placed, pharmacy delivered milrinone medication to bedside on 2/2, home care RN hooked patient up to milrinone gtt on home pump. Bag changes at scheduled for patient's home on 2/5 and 2/7. Patient discharged home with home milrinone drip at 0.125 mcg/kg/min. Follow-up with Dr. Rivera in March; does not regularly follow Aura Dwyer.    Discharge weight: 65.6kg    After all labs and VS were reviewed the decision was made that the patient was medically stable for discharge.  The patient was discharged in satisfactory condition.    More than 60 minutes were spent in coordinating patient discharge.

## 2024-01-23 NOTE — SIGNIFICANT EVENT
Rapid Response ED 6 For SOB  Pt arrived with complaint of SOB, Hx. CHF,TRISHA, possible COPD,   Found to be Covid positive  Placed on bipap to help with increased of WOB,   PT had a bout of Vtac, nurse and team aware  Pt to be transferred to MICU when room available   PT continues to be stable on Bipap at this time

## 2024-01-24 ENCOUNTER — APPOINTMENT (OUTPATIENT)
Dept: CARDIOLOGY | Facility: HOSPITAL | Age: 69
DRG: 286 | End: 2024-01-24
Payer: COMMERCIAL

## 2024-01-24 ENCOUNTER — APPOINTMENT (OUTPATIENT)
Dept: RADIOLOGY | Facility: HOSPITAL | Age: 69
DRG: 286 | End: 2024-01-24
Payer: COMMERCIAL

## 2024-01-24 LAB
ABO GROUP (TYPE) IN BLOOD: NORMAL
ADENOVIRUS RVP, VIRC: NOT DETECTED
ALBUMIN SERPL BCP-MCNC: 3.7 G/DL (ref 3.4–5)
ALBUMIN SERPL BCP-MCNC: 3.8 G/DL (ref 3.4–5)
ALBUMIN SERPL BCP-MCNC: 3.8 G/DL (ref 3.4–5)
ALP SERPL-CCNC: 79 U/L (ref 33–136)
ALT SERPL W P-5'-P-CCNC: 11 U/L (ref 7–45)
ANION GAP SERPL CALC-SCNC: 20 MMOL/L (ref 10–20)
ANION GAP SERPL CALC-SCNC: 22 MMOL/L (ref 10–20)
ANION GAP SERPL CALC-SCNC: 25 MMOL/L
ANTIBODY SCREEN: NORMAL
AST SERPL W P-5'-P-CCNC: 17 U/L (ref 9–39)
ATRIAL RATE: 125 BPM
ATRIAL RATE: 94 BPM
BASOPHILS # BLD AUTO: 0 X10*3/UL (ref 0–0.1)
BASOPHILS NFR BLD AUTO: 0 %
BILIRUB DIRECT SERPL-MCNC: 0.1 MG/DL (ref 0–0.3)
BILIRUB SERPL-MCNC: 0.4 MG/DL (ref 0–1.2)
BUN SERPL-MCNC: 30 MG/DL (ref 6–23)
BUN SERPL-MCNC: 36 MG/DL (ref 6–23)
BUN SERPL-MCNC: 42 MG/DL (ref 6–23)
CALCIUM SERPL-MCNC: 8.9 MG/DL (ref 8.6–10.6)
CALCIUM SERPL-MCNC: 9.3 MG/DL (ref 8.6–10.6)
CALCIUM SERPL-MCNC: 9.6 MG/DL (ref 8.6–10.6)
CHLORIDE SERPL-SCNC: 104 MMOL/L (ref 98–107)
CHLORIDE SERPL-SCNC: 104 MMOL/L (ref 98–107)
CHLORIDE SERPL-SCNC: 105 MMOL/L (ref 98–107)
CO2 SERPL-SCNC: 12 MMOL/L (ref 21–32)
CO2 SERPL-SCNC: 16 MMOL/L (ref 21–32)
CO2 SERPL-SCNC: 20 MMOL/L (ref 21–32)
CREAT SERPL-MCNC: 1.54 MG/DL (ref 0.5–1.05)
CREAT SERPL-MCNC: 1.65 MG/DL (ref 0.5–1.05)
CREAT SERPL-MCNC: 1.76 MG/DL (ref 0.5–1.05)
EGFRCR SERPLBLD CKD-EPI 2021: 31 ML/MIN/1.73M*2
EGFRCR SERPLBLD CKD-EPI 2021: 34 ML/MIN/1.73M*2
EGFRCR SERPLBLD CKD-EPI 2021: 36 ML/MIN/1.73M*2
EJECTION FRACTION APICAL 4 CHAMBER: 24.6
EJECTION FRACTION: 20 %
ENTEROVIRUS/RHINOVIRUS RVP, VIRC: NOT DETECTED
EOSINOPHIL # BLD AUTO: 0 X10*3/UL (ref 0–0.7)
EOSINOPHIL NFR BLD AUTO: 0 %
ERYTHROCYTE [DISTWIDTH] IN BLOOD BY AUTOMATED COUNT: 16.5 % (ref 11.5–14.5)
GLUCOSE BLD MANUAL STRIP-MCNC: 115 MG/DL (ref 74–99)
GLUCOSE BLD MANUAL STRIP-MCNC: 115 MG/DL (ref 74–99)
GLUCOSE BLD MANUAL STRIP-MCNC: 125 MG/DL (ref 74–99)
GLUCOSE BLD MANUAL STRIP-MCNC: 158 MG/DL (ref 74–99)
GLUCOSE BLD MANUAL STRIP-MCNC: 164 MG/DL (ref 74–99)
GLUCOSE BLD MANUAL STRIP-MCNC: 167 MG/DL (ref 74–99)
GLUCOSE SERPL-MCNC: 109 MG/DL (ref 74–99)
GLUCOSE SERPL-MCNC: 111 MG/DL (ref 74–99)
GLUCOSE SERPL-MCNC: 158 MG/DL (ref 74–99)
HCT VFR BLD AUTO: 40.7 % (ref 36–46)
HGB BLD-MCNC: 13 G/DL (ref 12–16)
HUMAN BOCAVIRUS RVP, VIRC: NOT DETECTED
HUMAN CORONAVIRUS RVP, VIRC: NOT DETECTED
IMM GRANULOCYTES # BLD AUTO: 0.03 X10*3/UL (ref 0–0.7)
IMM GRANULOCYTES NFR BLD AUTO: 0.5 % (ref 0–0.9)
INFLUENZA A , VIRC: NOT DETECTED
INFLUENZA A H1N1-09 , VIRC: NOT DETECTED
INFLUENZA B PCR, VIRC: NOT DETECTED
LACTATE SERPL-SCNC: 1.9 MMOL/L (ref 0.4–2)
LEFT VENTRICLE INTERNAL DIMENSION DIASTOLE: 6.3 CM (ref 3.5–6)
LYMPHOCYTES # BLD AUTO: 0.48 X10*3/UL (ref 1.2–4.8)
LYMPHOCYTES NFR BLD AUTO: 7.2 %
MAGNESIUM SERPL-MCNC: 2.14 MG/DL (ref 1.6–2.4)
MAGNESIUM SERPL-MCNC: 2.23 MG/DL (ref 1.6–2.4)
MAGNESIUM SERPL-MCNC: 2.92 MG/DL (ref 1.6–2.4)
MCH RBC QN AUTO: 28.8 PG (ref 26–34)
MCHC RBC AUTO-ENTMCNC: 31.9 G/DL (ref 32–36)
MCV RBC AUTO: 90 FL (ref 80–100)
METAPNEUMOVIRUS , VIRC: NOT DETECTED
MONOCYTES # BLD AUTO: 0.09 X10*3/UL (ref 0.1–1)
MONOCYTES NFR BLD AUTO: 1.4 %
NEUTROPHILS # BLD AUTO: 6.06 X10*3/UL (ref 1.2–7.7)
NEUTROPHILS NFR BLD AUTO: 90.9 %
NRBC BLD-RTO: 0 /100 WBCS (ref 0–0)
P AXIS: 63 DEGREES
P AXIS: 70 DEGREES
P OFFSET: 188 MS
P OFFSET: 189 MS
P ONSET: 143 MS
P ONSET: 143 MS
PARAINFLUENZA PCR, VIRC: NOT DETECTED
PHOSPHATE SERPL-MCNC: 3.1 MG/DL (ref 2.5–4.9)
PHOSPHATE SERPL-MCNC: 3.6 MG/DL (ref 2.5–4.9)
PHOSPHATE SERPL-MCNC: 3.7 MG/DL (ref 2.5–4.9)
PLATELET # BLD AUTO: 119 X10*3/UL (ref 150–450)
POTASSIUM SERPL-SCNC: 4.6 MMOL/L (ref 3.5–5.3)
POTASSIUM SERPL-SCNC: 4.9 MMOL/L (ref 3.5–5.3)
POTASSIUM SERPL-SCNC: 7.3 MMOL/L (ref 3.5–5.3)
PR INTERVAL: 144 MS
PR INTERVAL: 150 MS
PROT SERPL-MCNC: 7.4 G/DL (ref 6.4–8.2)
Q ONSET: 215 MS
Q ONSET: 218 MS
QRS COUNT: 15 BEATS
QRS COUNT: 20 BEATS
QRS DURATION: 106 MS
QRS DURATION: 114 MS
QT INTERVAL: 314 MS
QT INTERVAL: 406 MS
QTC CALCULATION(BAZETT): 453 MS
QTC CALCULATION(BAZETT): 507 MS
QTC FREDERICIA: 400 MS
QTC FREDERICIA: 471 MS
R AXIS: 67 DEGREES
R AXIS: 70 DEGREES
RBC # BLD AUTO: 4.52 X10*6/UL (ref 4–5.2)
RH FACTOR (ANTIGEN D): NORMAL
RSV PCR, RVP, VIRC: NOT DETECTED
SODIUM SERPL-SCNC: 134 MMOL/L (ref 136–145)
SODIUM SERPL-SCNC: 137 MMOL/L (ref 136–145)
SODIUM SERPL-SCNC: 140 MMOL/L (ref 136–145)
T AXIS: 232 DEGREES
T AXIS: 263 DEGREES
T OFFSET: 372 MS
T OFFSET: 421 MS
TRICUSPID ANNULAR PLANE SYSTOLIC EXCURSION: 1.3 CM
VENTRICULAR RATE: 125 BPM
VENTRICULAR RATE: 94 BPM
WBC # BLD AUTO: 6.7 X10*3/UL (ref 4.4–11.3)

## 2024-01-24 PROCEDURE — 2500000001 HC RX 250 WO HCPCS SELF ADMINISTERED DRUGS (ALT 637 FOR MEDICARE OP)

## 2024-01-24 PROCEDURE — C1751 CATH, INF, PER/CENT/MIDLINE: HCPCS

## 2024-01-24 PROCEDURE — 2500000004 HC RX 250 GENERAL PHARMACY W/ HCPCS (ALT 636 FOR OP/ED)

## 2024-01-24 PROCEDURE — 2500000005 HC RX 250 GENERAL PHARMACY W/O HCPCS: Mod: JZ

## 2024-01-24 PROCEDURE — 99232 SBSQ HOSP IP/OBS MODERATE 35: CPT

## 2024-01-24 PROCEDURE — 93005 ELECTROCARDIOGRAM TRACING: CPT

## 2024-01-24 PROCEDURE — 36415 COLL VENOUS BLD VENIPUNCTURE: CPT

## 2024-01-24 PROCEDURE — 71275 CT ANGIOGRAPHY CHEST: CPT | Performed by: STUDENT IN AN ORGANIZED HEALTH CARE EDUCATION/TRAINING PROGRAM

## 2024-01-24 PROCEDURE — 85025 COMPLETE CBC W/AUTO DIFF WBC: CPT

## 2024-01-24 PROCEDURE — 93325 DOPPLER ECHO COLOR FLOW MAPG: CPT | Performed by: INTERNAL MEDICINE

## 2024-01-24 PROCEDURE — 82947 ASSAY GLUCOSE BLOOD QUANT: CPT

## 2024-01-24 PROCEDURE — 36600 WITHDRAWAL OF ARTERIAL BLOOD: CPT

## 2024-01-24 PROCEDURE — 93325 DOPPLER ECHO COLOR FLOW MAPG: CPT

## 2024-01-24 PROCEDURE — 2500000002 HC RX 250 W HCPCS SELF ADMINISTERED DRUGS (ALT 637 FOR MEDICARE OP, ALT 636 FOR OP/ED): Mod: MUE

## 2024-01-24 PROCEDURE — 2780000003 HC OR 278 NO HCPCS

## 2024-01-24 PROCEDURE — 80069 RENAL FUNCTION PANEL: CPT | Mod: MUE

## 2024-01-24 PROCEDURE — 83735 ASSAY OF MAGNESIUM: CPT

## 2024-01-24 PROCEDURE — 80069 RENAL FUNCTION PANEL: CPT

## 2024-01-24 PROCEDURE — 93010 ELECTROCARDIOGRAM REPORT: CPT | Performed by: INTERNAL MEDICINE

## 2024-01-24 PROCEDURE — 2550000001 HC RX 255 CONTRASTS: Performed by: INTERNAL MEDICINE

## 2024-01-24 PROCEDURE — 2500000005 HC RX 250 GENERAL PHARMACY W/O HCPCS

## 2024-01-24 PROCEDURE — 2500000002 HC RX 250 W HCPCS SELF ADMINISTERED DRUGS (ALT 637 FOR MEDICARE OP, ALT 636 FOR OP/ED)

## 2024-01-24 PROCEDURE — 2500000004 HC RX 250 GENERAL PHARMACY W/ HCPCS (ALT 636 FOR OP/ED): Performed by: STUDENT IN AN ORGANIZED HEALTH CARE EDUCATION/TRAINING PROGRAM

## 2024-01-24 PROCEDURE — 93308 TTE F-UP OR LMTD: CPT | Performed by: INTERNAL MEDICINE

## 2024-01-24 PROCEDURE — 99233 SBSQ HOSP IP/OBS HIGH 50: CPT | Performed by: STUDENT IN AN ORGANIZED HEALTH CARE EDUCATION/TRAINING PROGRAM

## 2024-01-24 PROCEDURE — 93321 DOPPLER ECHO F-UP/LMTD STD: CPT | Performed by: INTERNAL MEDICINE

## 2024-01-24 PROCEDURE — 71275 CT ANGIOGRAPHY CHEST: CPT

## 2024-01-24 PROCEDURE — 1100000001 HC PRIVATE ROOM DAILY

## 2024-01-24 PROCEDURE — 94640 AIRWAY INHALATION TREATMENT: CPT

## 2024-01-24 RX ORDER — HEPARIN SODIUM 5000 [USP'U]/ML
5000 INJECTION, SOLUTION INTRAVENOUS; SUBCUTANEOUS EVERY 8 HOURS
Status: DISCONTINUED | OUTPATIENT
Start: 2024-01-24 | End: 2024-01-25

## 2024-01-24 RX ORDER — ALBUTEROL SULFATE 90 UG/1
2 AEROSOL, METERED RESPIRATORY (INHALATION) EVERY 4 HOURS PRN
Status: DISCONTINUED | OUTPATIENT
Start: 2024-01-24 | End: 2024-01-25

## 2024-01-24 RX ORDER — DIPHENHYDRAMINE HYDROCHLORIDE 50 MG/ML
25 INJECTION INTRAMUSCULAR; INTRAVENOUS ONCE
Status: COMPLETED | OUTPATIENT
Start: 2024-01-24 | End: 2024-01-24

## 2024-01-24 RX ORDER — FLUTICASONE FUROATE AND VILANTEROL 100; 25 UG/1; UG/1
1 POWDER RESPIRATORY (INHALATION)
Status: DISCONTINUED | OUTPATIENT
Start: 2024-01-24 | End: 2024-01-25

## 2024-01-24 RX ORDER — LIDOCAINE HYDROCHLORIDE 10 MG/ML
5 INJECTION INFILTRATION; PERINEURAL ONCE
Status: DISCONTINUED | OUTPATIENT
Start: 2024-01-24 | End: 2024-02-03 | Stop reason: HOSPADM

## 2024-01-24 RX ADMIN — HEPARIN SODIUM 5000 UNITS: 5000 INJECTION INTRAVENOUS; SUBCUTANEOUS at 14:00

## 2024-01-24 RX ADMIN — ALBUTEROL SULFATE 2 PUFF: 90 AEROSOL, METERED RESPIRATORY (INHALATION) at 08:35

## 2024-01-24 RX ADMIN — ONDANSETRON 4 MG: 2 INJECTION INTRAMUSCULAR; INTRAVENOUS at 08:22

## 2024-01-24 RX ADMIN — SODIUM CHLORIDE, SODIUM LACTATE, POTASSIUM CHLORIDE, AND CALCIUM CHLORIDE 500 ML: 600; 310; 30; 20 INJECTION, SOLUTION INTRAVENOUS at 12:00

## 2024-01-24 RX ADMIN — PERFLUTREN 2 ML OF DILUTION: 6.52 INJECTION, SUSPENSION INTRAVENOUS at 08:32

## 2024-01-24 RX ADMIN — IOHEXOL 75 ML: 350 INJECTION, SOLUTION INTRAVENOUS at 06:21

## 2024-01-24 RX ADMIN — DOXYCYCLINE HYCLATE 100 MG: 100 TABLET, COATED ORAL at 21:18

## 2024-01-24 RX ADMIN — TRIMETHOBENZAMIDE HYDROCHLORIDE 200 MG: 100 INJECTION INTRAMUSCULAR at 13:58

## 2024-01-24 RX ADMIN — DIPHENHYDRAMINE HYDROCHLORIDE 25 MG: 50 INJECTION INTRAMUSCULAR; INTRAVENOUS at 05:09

## 2024-01-24 RX ADMIN — REMDESIVIR 100 MG: 100 INJECTION, POWDER, LYOPHILIZED, FOR SOLUTION INTRAVENOUS at 08:15

## 2024-01-24 RX ADMIN — Medication 3 L/MIN: at 08:34

## 2024-01-24 RX ADMIN — METHYLPREDNISOLONE SODIUM SUCCINATE 40 MG: 40 INJECTION, POWDER, FOR SOLUTION INTRAMUSCULAR; INTRAVENOUS at 05:09

## 2024-01-24 RX ADMIN — ALBUTEROL SULFATE 2 PUFF: 90 AEROSOL, METERED RESPIRATORY (INHALATION) at 16:44

## 2024-01-24 RX ADMIN — SENNOSIDES AND DOCUSATE SODIUM 2 TABLET: 8.6; 5 TABLET ORAL at 21:18

## 2024-01-24 RX ADMIN — INSULIN LISPRO 1 UNITS: 100 INJECTION, SOLUTION INTRAVENOUS; SUBCUTANEOUS at 13:59

## 2024-01-24 RX ADMIN — ONDANSETRON 4 MG: 2 INJECTION INTRAMUSCULAR; INTRAVENOUS at 06:38

## 2024-01-24 RX ADMIN — Medication 3 L/MIN: at 16:45

## 2024-01-24 RX ADMIN — ENOXAPARIN SODIUM 60 MG: 60 INJECTION SUBCUTANEOUS at 01:07

## 2024-01-24 RX ADMIN — METHYLPREDNISOLONE SODIUM SUCCINATE 40 MG: 125 INJECTION, POWDER, FOR SOLUTION INTRAMUSCULAR; INTRAVENOUS at 01:06

## 2024-01-24 RX ADMIN — SACUBITRIL AND VALSARTAN 0.5 TABLET: 24; 26 TABLET, FILM COATED ORAL at 21:18

## 2024-01-24 RX ADMIN — FLUTICASONE FUROATE AND VILANTEROL TRIFENATATE 1 PUFF: 100; 25 POWDER RESPIRATORY (INHALATION) at 08:34

## 2024-01-24 SDOH — SOCIAL STABILITY: SOCIAL INSECURITY
WITHIN THE LAST YEAR, HAVE YOU BEEN KICKED, HIT, SLAPPED, OR OTHERWISE PHYSICALLY HURT BY YOUR PARTNER OR EX-PARTNER?: NO

## 2024-01-24 SDOH — SOCIAL STABILITY: SOCIAL INSECURITY
WITHIN THE LAST YEAR, HAVE TO BEEN RAPED OR FORCED TO HAVE ANY KIND OF SEXUAL ACTIVITY BY YOUR PARTNER OR EX-PARTNER?: NO

## 2024-01-24 SDOH — ECONOMIC STABILITY: HOUSING INSECURITY
IN THE LAST 12 MONTHS, WAS THERE A TIME WHEN YOU DID NOT HAVE A STEADY PLACE TO SLEEP OR SLEPT IN A SHELTER (INCLUDING NOW)?: NO

## 2024-01-24 SDOH — SOCIAL STABILITY: SOCIAL INSECURITY: WITHIN THE LAST YEAR, HAVE YOU BEEN AFRAID OF YOUR PARTNER OR EX-PARTNER?: NO

## 2024-01-24 SDOH — ECONOMIC STABILITY: INCOME INSECURITY: IN THE PAST 12 MONTHS, HAS THE ELECTRIC, GAS, OIL, OR WATER COMPANY THREATENED TO SHUT OFF SERVICE IN YOUR HOME?: NO

## 2024-01-24 SDOH — ECONOMIC STABILITY: TRANSPORTATION INSECURITY
IN THE PAST 12 MONTHS, HAS THE LACK OF TRANSPORTATION KEPT YOU FROM MEDICAL APPOINTMENTS OR FROM GETTING MEDICATIONS?: NO

## 2024-01-24 SDOH — ECONOMIC STABILITY: HOUSING INSECURITY: IN THE LAST 12 MONTHS, HOW MANY PLACES HAVE YOU LIVED?: 1

## 2024-01-24 SDOH — ECONOMIC STABILITY: FOOD INSECURITY: WITHIN THE PAST 12 MONTHS, YOU WORRIED THAT YOUR FOOD WOULD RUN OUT BEFORE YOU GOT MONEY TO BUY MORE.: NEVER TRUE

## 2024-01-24 SDOH — ECONOMIC STABILITY: INCOME INSECURITY: IN THE LAST 12 MONTHS, WAS THERE A TIME WHEN YOU WERE NOT ABLE TO PAY THE MORTGAGE OR RENT ON TIME?: NO

## 2024-01-24 SDOH — SOCIAL STABILITY: SOCIAL INSECURITY: WITHIN THE LAST YEAR, HAVE YOU BEEN HUMILIATED OR EMOTIONALLY ABUSED IN OTHER WAYS BY YOUR PARTNER OR EX-PARTNER?: NO

## 2024-01-24 SDOH — ECONOMIC STABILITY: FOOD INSECURITY: WITHIN THE PAST 12 MONTHS, THE FOOD YOU BOUGHT JUST DIDN'T LAST AND YOU DIDN'T HAVE MONEY TO GET MORE.: NEVER TRUE

## 2024-01-24 SDOH — ECONOMIC STABILITY: TRANSPORTATION INSECURITY
IN THE PAST 12 MONTHS, HAS LACK OF TRANSPORTATION KEPT YOU FROM MEETINGS, WORK, OR FROM GETTING THINGS NEEDED FOR DAILY LIVING?: NO

## 2024-01-24 SDOH — ECONOMIC STABILITY: INCOME INSECURITY: HOW HARD IS IT FOR YOU TO PAY FOR THE VERY BASICS LIKE FOOD, HOUSING, MEDICAL CARE, AND HEATING?: NOT HARD AT ALL

## 2024-01-24 ASSESSMENT — ACTIVITIES OF DAILY LIVING (ADL): LACK_OF_TRANSPORTATION: NO

## 2024-01-24 ASSESSMENT — PAIN SCALES - GENERAL
PAINLEVEL_OUTOF10: 0 - NO PAIN

## 2024-01-24 ASSESSMENT — PAIN - FUNCTIONAL ASSESSMENT
PAIN_FUNCTIONAL_ASSESSMENT: 0-10

## 2024-01-24 NOTE — CONSULTS
Vascular Access Team  Consult     Visit Date: 1/24/2024      Patient Name: Melissa Marinelli         MRN: [id number]                Reason for Consult: Midline            Assessment: Pre-Procedure Checklist:  Emergent Line Insertion: No  Type of Line to be Placed: Midline  Consent Obtained: Verbal  Emergency Medication Necessary: No  Patient Identified with 2 Independent Identifiers: Yes  Review of Allergies, Anticoagulation, Relevant Labs, ECG/Telemetry: Yes  Risks/Benefits/Alternatives Discussed with Patient/POA/Legal Representative: Yes  Stop Sign on Door: Yes  Time Out Performed: Yes  Catheter Exchange: No    Positioning Checklist:  All People, Including Patient, in the Room with Cap and Mask: Yes  Fluoroscopy Used to Identify Vessel and Guide Insertion: No   Sterile Cover Used: Yes  Full Barrier Precautions Followed (Mask, Cap, Gown, Gloves): Yes  Hands Washed: Yes  Monitors Attached with Sound Alarms On: No  Full Body Sterile Drape (Head-to-Toe) Used to Cover Patient: Yes  Trendelenburg Position (For IJ and Subclavian): No  CHG Skin Prep Used and Allowed to Air Dry to Skin Procedure: Yes    Procedure Checklist:  Blood Aspirated From All Lumens, All Ports Subsequently Flushed: n/a  Catheter Caps Placed on All Lumens; Lumens Clamped: n/a  Maintain Guidewire Control Throughout, Ensuring Guidewire Removal: Yes  Maintain Sterile Field Throughout Insertion: Yes  Catheter Secured: n/a  Confirmatory Test of Venous Placement: n/a    Post Procedure Checklist:  Date and Time Written on Dressing: n/a  Sharp and Wire Count and Safe Disposal of all Sharps/Wires: Yes  Sterile Dressing Applied Per Protocol: n/a  X-ray Ordered or ECG Image: n/a    MidlineInsertion Details:  Size (Fr): 3  Lumen Type:SL  Catheter to Vein Ratio Less Than 50%: Yes  Total Length (cm):   External Length (cm):   Orientation: right  Location: brachial  Site Prep: Chlorohexidine; Usual sterile procedure followed  Local Anesthetic:  Injectable/Subcutaneous  Indication:access  Insertion Team Members in the Room: Nurse, LPN  Initial Extremity Circumference (cm):   Insertion Attempts:3 unsuccessful poor vessel presentation with venospasm.  Vessels very small.  RN and MD aware.  Patient Tolerance: Tolerated Well, Age Appropriate  Comfort Measures: Subcutaneous anesthetic; Verbal  Procedure Location: Bedside  Safety Measures: Patient specific safety measures addressed with RN  Estimated Blood Loss (mL): 0  Vessel Fully Compressible Proximally and Distally to Insertion Site: Yes  Brisk Blood Return Obtained and Line Draws Easily: n/a      Post Procedure Checklist: Handoff with RN; Obtain all new IV tubing prior to use; Bed at lowest level and wheels locked; Line discharge information at bedside.  Additional Details: Line was inserted using Modified Seldinger's Technique.   Placed by: Ashwini Woodson RN-[facility]            Plan: alternative access       Ashwini Woodson RN  1/24/2024  1:53 PM

## 2024-01-24 NOTE — SIGNIFICANT EVENT
OK for midline placement even though Bcx 1/23 are pending. Low suspicion for infectious process at this time.

## 2024-01-24 NOTE — PROGRESS NOTES
"Melissa Marinelli is a 68 y.o. female on day 2 of admission presenting with Hypoxic respiratory failure (CMS/HCC).    Subjective   Patient feels tired this morning. She denies shortness of breath, chest pain. She reports feeling nauseous and having dry heaving. Had BM yesterday.       Objective     Physical Exam  General: NAD, awake, alert, conversant, appears stated age  HENT: NCAT  Eyes: no scleral icterus or conjunctival pallor  Skin: no suspect lesions or rashes noted on visible skin  Cardiac: tachycardic. Regular rhythm. No murmurs.  Pulm: Coarse breath sounds anteriorly. normal respiratory effort, symmetric chest rise, on 3L O2 via NC  Abd: soft, ND, NT, no involuntary guarding or rebound tenderness  EXT: no peripheral edema, no asymmetry noted  MSK: no focal joint swelling noted  Neuro: AOx3, able to follow commands, no gross FND  Psych: coherent thought process, appropriate mood and affect    Last Recorded Vitals  Blood pressure 138/87, pulse (!) 118, temperature 36.1 °C (97 °F), resp. rate 16, height 1.626 m (5' 4\"), weight 63.2 kg (139 lb 5.3 oz), SpO2 100 %.  Intake/Output last 3 Shifts:  I/O last 3 completed shifts:  In: 250 (4 mL/kg) [IV Piggyback:250]  Out: - (0 mL/kg)   Weight: 63.2 kg     Relevant Results           This patient currently has cardiac telemetry ordered; if you would like to modify or discontinue the telemetry order, click here to go to the orders activity to modify/discontinue the order.                 Assessment/Plan   Principal Problem:    Hypoxic respiratory failure (CMS/HCC)  Melissa Marinelli is a 68 y.o. female w/ Mhx HFrEF (EF 15-20%) s/p ICD placement 5/2020, CAD s/p NSTEMI with RIRI to LCx (Jan 2016), s/p MVR (June 2019), COPD (no formal PFTs), HTN, HLD, GERD, CVA, CKD IIIB and DMII presenting to MICU d/t AHRF requiring BiPAP.     Quickly weaned off BiPAP to 4 L O2 via nasal cannula on arrival to MICU, with patient in no acute distress.  Etiologies for acute hypoxic respiratory " failure at time of presentation include COVID diagnosis, COPD exacerbation. Low index of suspicion for ADHF. Patient BNP elevated at 335, however during previously decompensations, patient has had significantly higher BNP. Patient also does not appear hypervolemic. CTPE negative.    Updates 1/24  -Zofran switched to tigan due to prolonged qtc  -Restarted home symbicort and albuterol  -500 ml as maintenance fluids for PATSY. If patient tolerates, can give additional fluids.  [ ] fu limited TTE    #Neuro  #Tobacco use disorder  #Marijuana use disorder   - Smokes 10 cigarettes per day, nicotine replacement therapy declined      Cardiac  #CAD   #NSTEMI s/p RIRI to Lcx (Jan 2016)  #Hx of Mitral Valve repair (June 2019) w/ Dr Montana  #Troponinemia  #HTN  #DLD  :: Troponin elevation to peak of 1257, down trended since, likely in setting of demand ischemia;   - Hold home entresto due to softer blood pressure and diuresis; will restart tomorrow AM blood pressure permitting  - Continue Farxiga 10mg daily   - Not on statin therapy due to reported allergy per chart review  [ ] Cardiology consulted , appreciate recs  [ ] fu limited TTE     Pulm  #Acute hypercapnic and hypoxic respiratory failure  #C/f COPD exacerbation vs Acute decompensated HF  #COVID infection  :: Required BiPAP again for work of breathing on 1/23; ABG pH 7.37, pCO2 36, pO2 72  :: No PFTs on file to confirm diagnosis of COPD   :: CTPE negative 1/23  :: , but on prior admission for ADHF, BNP more significantly elevated at >3000  - C/w remdesivir 200mg 1 day then 100mg for 4 days  - C/w dexamethasone 6mg daily for 10 days; consider changing to IV formulation if patient not able to tolerate PO on BiPAP  - C/w doxycycline 100mg BID for 5 days for presumed COPD exacerbation   - S/p 40mg IV lasix x2 in ED             - Hold on further diuresis per cardiology ecommendations  - Continue home symbicort and albuterol  - Duonebs q6hrs for SOB  - Wean supplemental  O2 as able      GI  #Nausea  - Tigan (prolonged qt)     /Renal  #PATSY on CKD IIIB  :: Baseline sCr ~1.3-1.4  :: Likely prerenal 2/2 hypovolemia with possible component of contrast toxicity.   - Avoid nephrotic agents  - 500 ml as maintenance fluids. If patient tolerates, can give additional fluids.     MSK/Rheum - No Active Issues  ID - No Active Issues     Endo  #DM2  - Euglycemic, HgbA1c 5.5 (11/2023)  - Farxiga 10mg daily   - Sliding scale ordered     Derm - No Active Issues     F: Caution with diuresis and IV fluids given HFrEF  E: Replete as needed  N: Carb Controlled Diet  A: PIV x 1     Pressors: N/A  Sedation: N/A  GTT: N/A  O2: 4 L via NC     CODE STATUS: Full code  SDM: Mary (child) [phone]           Duke Spann MD

## 2024-01-24 NOTE — H&P
History Of Present Illness  Melissa Marinelli is a 68 y.o. female presenting to MICU d/t AHRF requiring BiPAP.    PMH includes HFrEF (EF 15-20%) s/p ICD placement 5/2020, CAD s/p NSTEMI with RIRI to LCx (Jan 2016), s/p MVR (June 2019), COPD (no formal PFTs), HTN, HLD, GERD, CVA, CKD IIIB and DMII.    ED and Floor Course:  Initially presented via EMS from home with acute onset shortness of breath.  In the ED, patient was found to have low pH and elevated pCO2, was started on BiPAP, given Lasix 40 mg IV push x 2, treated with DuoNebs.  Acid-base status improved the patient was weaned off BiPAP, admitted to floor.  Patient found to be COVID-positive and started on remdesivir/dexamethasone.  Also started on doxycycline (penicillin and other allergies) for presumed COPD exacerbation.  After admission, cardiology was consulted d/t cf ADHF.    Per Cards c/s note on 1/22:  Patient was last seen by cardiology for a televisit on 1/17/2024 at which time her entresto was decreased back to 1/2 tab of 24/26mg BID due to orthostatic symptoms. Beta blocker was held given RV dysfunction, SGLT2i was continued. Stated that MRA could be considered at next visit. Per cardiology note, patient had previously and continued to decline LVAD as destination therapy, and the topic of palliative inotrope was brought up, but no decision was made at that time.     Note that trops peaked on 1/23 and downtrended.  Cardiology never had concern for ACS due to low concern for plaque rupture.  Recommend holding off on additional diuresis due to concern that she may be dehydrated rather than an ADHF.  Recommended limited TTE to evaluate function, MV bioprosthetic, IVC and will consider further noninvasive ischemic evaluation in the a.m. they are considered to be of little benefit.  Also recommended device interrogation in the a.m.    Rapid Response 1/23 PM:  Patient had increased dyspnea, unresponsive to breathing treatments.  Blood culture and EKG were  ordered.  Patient placed on BiPAP due to increased work of breathing with ABG showing pH 7.37, pCO2 36, pO2 72 while on BiPAP.  Was excepted to MICU given the potential etiologies for acute hypoxic respiratory failure including either COVID diagnosis, possible COPD exacerbation, component of underlying cardiac disease and heart failure with reduced ejection fraction versus new pulmonary embolism.  Patient had CT PE ordered, but has history of contrast allergy, so was pretreated with methylprednisolone on arrival to MICU.    On Arrival to MICU:  Patient quickly weaned off BiPAP to 4 L nasal cannula O2.  Patient confirms that on initial presentation to the emergency room, she felt very short of breath.  However she denies current shortness of breath, chest pain, fever, chills, vomiting, abdominal pain, any symptoms whatsoever.  Her primary questions are whether she will be getting a right heart cath in the morning.  We also explained the plan for pursuing CT PE.    Plan for CT PE complicated not only by allergy but also by limited IV access.  IV team attempted to get IVs, which infiltrated.  Will continue to pursue IV access for both diagnostic and therapeutic interventions.    Past Medical History  Past Medical History:   Diagnosis Date    Asthma     CAD (coronary artery disease)     CHF (congestive heart failure) (CMS/[facility])     CKD (chronic kidney disease)     COPD (chronic obstructive pulmonary disease) (CMS/[facility])     CVA (cerebral vascular accident) (CMS/[facility])     Diabetes mellitus (CMS/[facility])     GERD (gastroesophageal reflux disease)     Hyperlipidemia     Hypertension     NSTEMI (non-ST elevated myocardial infarction) (CMS/[facility])     Unspecified systolic (congestive) heart failure (CMS/[facility]) 08/11/2022    HFrEF (heart failure with reduced ejection fraction)       Surgical History  Past Surgical History:   Procedure Laterality Date    MITRAL VALVE REPLACEMENT  06/2019    OTHER SURGICAL HISTORY  08/11/2022     Tonsillectomy    OTHER SURGICAL HISTORY  08/11/2022    Right ventricular assist device placement    OTHER SURGICAL HISTORY  08/11/2022    Mitral valve replacement        Social History  She reports that she has been smoking cigarettes. She does not have any smokeless tobacco history on file. She reports current drug use. Drug: Marijuana. No history on file for alcohol use.    Family History  Family History   Problem Relation Name Age of Onset    Other (CVA) Brother          Allergies  Metoprolol, Ticagrelor, Ace inhibitors, Fentanyl, Gadolinium-containing contrast media, Hydralazine, Iodinated contrast media, Prednisone, Statins-hmg-coa reductase inhibitors, and Penicillins    Review of Systems  12 point ROS reviewed and otherwise negative except as stated above.    Physical Exam  General: NAD, awake, alert, conversant, appears stated age  HENT: NCAT  Eyes: no scleral icterus or conjunctival pallor  Skin: no suspect lesions or rashes noted on visible skin  Cardiac: tachycardic to 100s  Pulm: normal respiratory effort, symmetric chest rise, on 4L O2 via NC  Abd: soft, ND, NT, no involuntary guarding or rebound tenderness  : no flank pain or indwelling urinary catheter  EXT: no peripheral edema, no asymmetry noted  MSK: no focal joint swelling noted, sensation and strength intact 5/5 in all extremities b/l  Neuro: AOx3, able to follow commands, no gross FND  Psych: coherent thought process, appropriate mood and affect    Last Recorded Vitals  Vitals:    01/23/24 2040   BP:    Pulse: (!) 125   Resp: (!) 32   Temp:    SpO2: 95%         Relevant Results  Scheduled medications  aspirin, 81 mg, oral, Daily  dapagliflozin propanediol, 10 mg, oral, Daily  dexAMETHasone, 6 mg, oral, Daily  doxycylcine, 100 mg, oral, q12h AWAIS  enoxaparin, 1 mg/kg, subcutaneous, q12h  insulin lispro, 0-5 Units, subcutaneous, TID with meals  ipratropium-albuteroL, 3 mL, nebulization, q4h  methylPREDNISolone sodium succinate (PF), 40 mg,  intravenous, q4h  polyethylene glycol, 17 g, oral, Daily  [START ON 1/24/2024] remdesivir, 100 mg, intravenous, q24h  sennosides-docusate sodium, 2 tablet, oral, BID      Continuous medications     PRN medications  PRN medications: acetaminophen, alum-mag hydroxide-simeth, dextrose 10 % in water (D10W), dextrose, glucagon, ondansetron, oxygen, sodium chloride    Results for orders placed or performed during the hospital encounter of 01/22/24 (from the past 24 hour(s))   Troponin I, High Sensitivity   Result Value Ref Range    Troponin I, High Sensitivity 1,230 (HH) 0 - 34 ng/L   Sars-CoV-2 and Influenza A/B PCR   Result Value Ref Range    Flu A Result Not Detected Not Detected    Flu B Result Not Detected Not Detected    Coronavirus 2019, PCR Detected (A) Not Detected   RSV PCR   Result Value Ref Range    RSV PCR Not Detected Not Detected   CBC and Auto Differential   Result Value Ref Range    WBC 4.0 (L) 4.4 - 11.3 x10*3/uL    nRBC 0.0 0.0 - 0.0 /100 WBCs    RBC 4.59 4.00 - 5.20 x10*6/uL    Hemoglobin 13.2 12.0 - 16.0 g/dL    Hematocrit 40.3 36.0 - 46.0 %    MCV 88 80 - 100 fL    MCH 28.8 26.0 - 34.0 pg    MCHC 32.8 32.0 - 36.0 g/dL    RDW 15.9 (H) 11.5 - 14.5 %    Platelets 198 150 - 450 x10*3/uL    Neutrophils % 47.6 40.0 - 80.0 %    Immature Granulocytes %, Automated 0.3 0.0 - 0.9 %    Lymphocytes % 39.0 13.0 - 44.0 %    Monocytes % 9.1 2.0 - 10.0 %    Eosinophils % 3.0 0.0 - 6.0 %    Basophils % 1.0 0.0 - 2.0 %    Neutrophils Absolute 1.88 1.20 - 7.70 x10*3/uL    Immature Granulocytes Absolute, Automated 0.01 0.00 - 0.70 x10*3/uL    Lymphocytes Absolute 1.54 1.20 - 4.80 x10*3/uL    Monocytes Absolute 0.36 0.10 - 1.00 x10*3/uL    Eosinophils Absolute 0.12 0.00 - 0.70 x10*3/uL    Basophils Absolute 0.04 0.00 - 0.10 x10*3/uL   Renal function panel   Result Value Ref Range    Glucose 199 (H) 74 - 99 mg/dL    Sodium 138 136 - 145 mmol/L    Potassium 4.5 3.5 - 5.3 mmol/L    Chloride 107 98 - 107 mmol/L    Bicarbonate  20 (L) 21 - 32 mmol/L    Anion Gap 16 10 - 20 mmol/L    Urea Nitrogen 23 6 - 23 mg/dL    Creatinine 1.51 (H) 0.50 - 1.05 mg/dL    eGFR 37 (L) >60 mL/min/1.73m*2    Calcium 9.4 8.6 - 10.6 mg/dL    Phosphorus 3.5 2.5 - 4.9 mg/dL    Albumin 4.0 3.4 - 5.0 g/dL   Magnesium   Result Value Ref Range    Magnesium 2.13 1.60 - 2.40 mg/dL   POCT GLUCOSE   Result Value Ref Range    POCT Glucose 147 (H) 74 - 99 mg/dL   Blood Culture    Specimen: Peripheral Venipuncture; Blood culture   Result Value Ref Range    Blood Culture Loaded on Instrument - Culture in progress    Blood Culture    Specimen: Peripheral Venipuncture; Blood culture   Result Value Ref Range    Blood Culture Loaded on Instrument - Culture in progress    Blood Gas Arterial Full Panel Unsolicited   Result Value Ref Range    POCT pH, Arterial 7.37 (L) 7.38 - 7.42 pH    POCT pCO2, Arterial 36 (L) 38 - 42 mm Hg    POCT pO2, Arterial 72 (L) 85 - 95 mm Hg    POCT SO2, Arterial 96 94 - 100 %    POCT Oxy Hemoglobin, Arterial 93.5 (L) 94.0 - 98.0 %    POCT Hematocrit Calculated, Arterial 44.0 36.0 - 46.0 %    POCT Sodium, Arterial 139 136 - 145 mmol/L    POCT Potassium, Arterial 5.8 (H) 3.5 - 5.3 mmol/L    POCT Chloride, Arterial 107 98 - 107 mmol/L    POCT Ionized Calcium, Arterial 1.21 1.10 - 1.33 mmol/L    POCT Glucose, Arterial 183 (H) 74 - 99 mg/dL    POCT Lactate, Arterial 2.3 (H) 0.4 - 2.0 mmol/L    POCT Base Excess, Arterial -3.9 (L) -2.0 - 3.0 mmol/L    POCT HCO3 Calculated, Arterial 20.8 (L) 22.0 - 26.0 mmol/L    POCT Hemoglobin, Arterial 14.6 12.0 - 16.0 g/dL    POCT Anion Gap, Arterial 17 10 - 25 mmo/L    Patient Temperature 37.0 degrees Celsius   POCT GLUCOSE   Result Value Ref Range    POCT Glucose 115 (H) 74 - 99 mg/dL        Assessment/Plan   Principal Problem:    Hypoxic respiratory failure (CMS/HCC)    Melissa Marinelli is a 68 y.o. female w/ Mhx HFrEF (EF 15-20%) s/p ICD placement 5/2020, CAD s/p NSTEMI with RIRI to LCx (Jan 2016), s/p MVR (June 2019),  COPD (no formal PFTs), HTN, HLD, GERD, CVA, CKD IIIB and DMII presenting to MICU d/t AHRF requiring BiPAP.    Quickly weaned off BiPAP to 4 L O2 via nasal cannula on arrival to MICU, with patient in no acute distress.  Etiologies for acute hypoxic respiratory failure at time of presentation include COVID diagnosis, COPD exacerbation, ADHF related to underlying HFrEF, versus possible PE.  Given difficulties with IV access and concern for delays in diagnostic workup given allergy to contrast agent, will pursue empiric treatment with Lovenox twice daily while awaiting completion of pre-treatment and CT Chest Angio protocol.    Neuro  #Tobacco use disorder  #Marijuana use disorder   - Smokes 10 cigarettes per day, nicotine replacement therapy declined     Cardiac  #CAD   #NSTEMI s/p RIRI to Lcx (Jan 2016)  #Hx of Mitral Valve repair (June 2019) w/ Dr Montana  #Troponinemia  #HTN  #DLD  - Hold home entresto for PM dose due to softer blood pressure and diuresis; will restart tomorrow AM blood pressure permitting  - Troponin elevation to peak of 1257, down trended since, likely in setting of demand ischemia; cardiology consulted  - Cardiology recommended low concern for ACS at this time; hold on further diuresis, concern for dehydration; monitor BID RFP, Strict I/Os, limited TTE to evaluate function, MVR, and IVC  - Continue Farxiga 10mg daily   - Not on statin therapy due to reported allergy per chart review  [ ] fu Cardiology recs re: further ischemic evaluation, additional fatima  [ ] fu limited TTE    Pulm  #Acute hypercapnic and hypoxic respiratory failure  #C/f COPD exacerbation vs Acute decompensated HF  #COVID infection  #C/f PE  - Requiring BiPAP again for work of breathing on 1/23; ABG pH 7.37, pCO2 36, pO2 72  - C/w remdesivir 200mg 1 day then 100mg for 4 days  - C/w dexamethasone 6mg daily for 10 days; consider changing to IV formulation if patient not able to tolerate PO on BiPAP  - C/w doxycycline 100mg BID for  5 days for presumed COPD exacerbation   - No PFTs on file to confirm diagnosis of COPD   - CT PE protocol to evaluate lung parenchyma as well as to evaluate for potential embolus given persistent tachycardia and hypoxia  - , but on prior admission for ADHF, BNP more significantly elevated at >3000  - S/p 40mg IV lasix x2 in ED             - Hold on further diuresis per cardiology ecommendations  - Hold home symbicort  - Duonebs q6hrs for SOB  - Wean supplemental O2 as able   - Strep and legionella antigens pending   [ ] fu CT PE after pre-treatment w/ IV steroids  [ ] empiric tx w/ Lovenox BID for PE    GI  /Renal  #CKD IIIB  - Baseline sCr ~1.3-1.4  - Cr slightly increased to 1.51, could be in setting of diuresis and possible hypovolemia now  - Will be cautious w/ further diuresis to avoid PATSY  -Will need to monitor closely given IV contrast that will be administered for PE study    MSK/Rheum - No Active Issues  ID - No Active Issues    Endo  #DM2  - Euglycemic, HgbA1c 5.5 (11/2023)  - Farxiga 10mg daily   - Sliding scale ordered    Derm - No Active Issues    F: Caution with diuresis and IV fluids given HFrEF  E: Replete as needed  N: Carb Controlled Diet  A: PIV x 1    Pressors: N/A  Sedation: N/A  GTT: N/A  O2: 4 L via NC    CODE STATUS: Full code  SDM: Mary (child) [phone]    Tate Gomes MD  [facility] PGY3

## 2024-01-24 NOTE — CARE PLAN
The patient's goals for the shift include    Problem: Diabetes  Goal: Achieve decreasing blood glucose levels by end of shift  Outcome: Progressing  Goal: Increase stability of blood glucose readings by end of shift  Outcome: Progressing  Goal: Decrease in ketones present in urine by end of shift  Outcome: Progressing  Goal: Maintain electrolyte levels within acceptable range throughout shift  Outcome: Progressing  Goal: Maintain glucose levels >70mg/dl to <250mg/dl throughout shift  Outcome: Progressing  Goal: No changes in neurological exam by end of shift  Outcome: Progressing  Goal: Learn about and adhere to nutrition recommendations by end of shift  Outcome: Progressing  Goal: Vital signs within normal range for age by end of shift  Outcome: Progressing  Goal: Increase self care and/or family involovement by end of shift  Outcome: Progressing  Goal: Receive DSME education by end of shift  Outcome: Progressing

## 2024-01-24 NOTE — SIGNIFICANT EVENT
Floor Readiness Note       I, personally, evaluated Melissa Marinelli prior to transfer to the floor, including reviewing all current laboratory and imaging studies. The patient remains appropriate for transfer to the floor. Bedside nurse and respiratory therapy are also in agreement of patient's readiness for the floor.     Brief summary:  Melissa Marinelli is a 68 y.o. female who was admitted to the MICU on 1/23/24 for AHRF. They have been treated with duonebs and diuresis, as well as remdesivir, dexamethasone, and doxycycline.     Updated focused Physical Exam:  GEN: NAD  CARDIO: Tachycardic, regular, normal S1 and S2  PULM: B/l mild wheezes, poor air movement. No increased WOB or accessory muscle use    Current Vital Signs:  Heart Rate: (!) 125 (01/24/24 1200 : Audrey Dockery RN)  BP: (!) 152/105 (01/24/24 1200 : Audrey Dockery RN)  Temp: 36.1 °C (97 °F) (01/24/24 1100 : Snug Castro)  Resp: 16 (01/24/24 1200 : Audrey Dockery RN)  SpO2: 100 % (01/24/24 0800 : Audrey Dockery RN)    Relevant updates since rounds:  Unable to establish midline in this patient. Very limited access.    Accepting team, Kael, received verbal sign out and the Provider Care team/Attending has been updated. Bedside nurse will now call accepting nurse for report and patient will be transferred to [address].    Hardik Reid MD

## 2024-01-24 NOTE — PROGRESS NOTES
Melissa Marinelli is a 68 y.o. female on day 2 of admission presenting with Hypoxic respiratory failure (CMS/HCC).    Subjective   Reports feeling nauseated this AM which she believes is from contrast. Endorses her breathing is significantly improved compared to overnight, but continues to endorse wheezing. Denies CP, palpitations, new leg swelling.        Objective     Physical Exam  General: thin, ill-appearing  HEENT: NCAT, no scleral icterus  CV: tachycardic, no m/r/g  Pulm: symmetric chest rise, mildly increased WOB, no accessory muscle use, rhonchi bilaterally  Abdomen: soft, nontender, nondistended  Skin: warm and dry  Extremities: No significant LE or UE edema  Neuro: AOx3, responding appropriately  Psych: appropriate affect      Patient Vitals for the past 24 hrs:   BP Temp Pulse Resp SpO2 Weight   01/24/24 1200 (!) 152/105 -- (!) 125 16 -- --   01/24/24 1100 -- 36.1 °C (97 °F) -- -- -- --   01/24/24 0805 -- -- (!) 118 16 -- --   01/24/24 0800 138/87 -- (!) 117 18 100 % --   01/24/24 0700 (!) 165/148 -- (!) 127 (!) 27 96 % --   01/24/24 0600 -- -- -- -- -- 63.2 kg (139 lb 5.3 oz)   01/24/24 0500 141/88 -- (!) 120 25 94 % --   01/24/24 0400 -- 36.7 °C (98.1 °F) 108 26 98 % --   01/24/24 0300 105/53 -- 105 22 (!) 88 % --   01/24/24 0200 110/59 -- (!) 112 (!) 9 99 % --   01/24/24 0100 -- -- 107 24 100 % --   01/24/24 0001 -- 36.2 °C (97.2 °F) -- -- -- --   01/24/24 0000 -- -- 101 (!) 27 98 % --   01/23/24 2300 -- -- (!) 134 25 99 % --   01/23/24 2200 111/64 -- (!) 120 (!) 27 -- --   01/23/24 2100 118/72 -- (!) 118 24 92 % --   01/23/24 2040 -- -- (!) 125 (!) 32 95 % --   01/23/24 2000 (!) 149/104 36.1 °C (97 °F) (!) 132 (!) 27 98 % --   01/23/24 1900 -- -- (!) 114 21 95 % --   01/23/24 1815 135/88 37 °C (98.6 °F) (!) 118 (!) 26 98 % --   01/23/24 1715 (!) 158/102 -- (!) 130 (!) 34 96 % --   01/23/24 1704 (!) 155/97 -- (!) 126 (!) 25 96 % --   01/23/24 1700 -- -- (!) 130 12 96 % --      Intake/Output last 3  Shifts:  I/O last 3 completed shifts:  In: 250 (4 mL/kg) [IV Piggyback:250]  Out: - (0 mL/kg)   Weight: 63.2 kg       Current Facility-Administered Medications:     acetaminophen (Tylenol) tablet 975 mg, 975 mg, oral, q8h PRN, Alex Briseno MD, 975 mg at 01/22/24 1925    albuterol 90 mcg/actuation inhaler 2 puff, 2 puff, inhalation, q4h PRN, Duke Spann MD, 2 puff at 01/24/24 1644    alum-mag hydroxide-simeth (Mylanta) 200-200-20 mg/5 mL oral suspension 10 mL, 10 mL, oral, 4x daily PRN, Tate Gomes MD, 10 mL at 01/23/24 2157    aspirin chewable tablet 81 mg, 81 mg, oral, Daily, Alex Briseno MD, 81 mg at 01/23/24 1013    dapagliflozin propanediol (Farxiga) tablet 10 mg, 10 mg, oral, Daily, Alex Briseno MD, 10 mg at 01/23/24 1013    dexAMETHasone (Decadron) tablet 6 mg, 6 mg, oral, Daily, Aelx Briseno MD, 6 mg at 01/23/24 1117    dextrose 10 % in water (D10W) infusion, 0.3 g/kg/hr, intravenous, Once PRN, Alex Briseno MD    dextrose 50 % injection 25 g, 25 g, intravenous, q15 min PRN, Alex Briseno MD    doxycycline (Vibra-Tabs) tablet 100 mg, 100 mg, oral, q12h AWAIS, Alex Briseno MD, 100 mg at 01/23/24 2158    fluticasone furoate-vilanteroL (Breo Ellipta) 100-25 mcg/dose inhaler 1 puff, 1 puff, inhalation, Daily, Duke Spann MD, 1 puff at 01/24/24 0834    glucagon (Glucagen) injection 1 mg, 1 mg, intramuscular, q15 min PRN, Alex Briseno MD    heparin (porcine) injection 5,000 Units, 5,000 Units, subcutaneous, q8h, Jaylan Ames MD, 5,000 Units at 01/24/24 1400    insulin lispro (HumaLOG) injection 0-5 Units, 0-5 Units, subcutaneous, TID with meals, Alex Briseno MD, 1 Units at 01/24/24 1359    lidocaine (Xylocaine) 10 mg/mL (1 %) injection 50 mg, 5 mL, infiltration, Once, Jaylan Ames MD    oxygen (O2) therapy, , inhalation, Continuous PRN - O2/gases, Alex Briseno MD, 3 L/min at 01/24/24 1645    polyethylene glycol (Glycolax, Miralax) packet 17 g, 17 g, oral, Daily, Alex L  MD Finn    sennosides-docusate sodium (Stefany-Colace) 8.6-50 mg per tablet 2 tablet, 2 tablet, oral, BID, Alex Briseno MD, 2 tablet at 01/23/24 2158    sodium chloride (Ocean) 0.65 % nasal spray 1 spray, 1 spray, Each Nostril, PRN, Alex Briseno MD    trimethobenzamide (Tigan) injection 200 mg, 200 mg, intramuscular, q6h PRN, Hardik Reid MD, 200 mg at 01/24/24 1358     Relevant Results  Lab Results   Component Value Date    WBC 6.7 01/24/2024    HGB 13.0 01/24/2024    HCT 40.7 01/24/2024    MCV 90 01/24/2024     (L) 01/24/2024     Lab Results   Component Value Date    GLUCOSE 158 (H) 01/24/2024    CALCIUM 9.6 01/24/2024     01/24/2024    K 4.9 01/24/2024    CO2 16 (L) 01/24/2024     01/24/2024    BUN 42 (H) 01/24/2024    CREATININE 1.76 (H) 01/24/2024              Assessment/Plan   Principal Problem:    Hypoxic respiratory failure (CMS/HCC)    Melissa Marinelli is a 68 y.o. female with PMH of HFrEF (EF 15-20%) s/p ICD placement 5/2020 prior admission for cardiogenic shock 11/2019 w/impella support, CAD s/p NSTEMI with RIRI to LCx (Jan 2016), MR s/p MVR (June 2019), COPD (never formally diagnosed at ), HTN, DLD, GERD, CVA, CKD IIIB, and DMII who presented via EMS from home with acute onset shortness of breath. She has been given breathing treatments, diuresed, and placed on BiPAP with improvement, initially planned for MICU admission, now off BiPAP and admitted to pulmonary service. Cardiology is consulted for elevated troponin.      Impression:    #COVID positive  #hypoxic/hypercarbic respiratory failure c/f COPD exacerbation   #r/o ADHF contribution  -Patient follows carefully with HF clinic, last seen by Dr. Rivera on 1/17 via virtual visit; she has had difficult with HF GDMT and has episodes with hypotension on low-dose entresto and uses her Bumex sparingly; she has received breathing treatments and diuresis in the ED, it is unclear which has provided her benefit, but with continued  rise in troponin there is concern she was being overdiuresed, especially based on her exam with no LE edema and recent diarrhea at home  -Patient reports wheezing at home and seems to think breathing treatments have provided her the most benefit at this time  -ECG stable from prior, no acute ischemic changes  -CTPE 01/23/24 with evidence of mild focal pulmonary edema, though continues to appear euvolemic/dry on exam    #HFrEF (EF 15-20%) s/p ICD placement 5/2020  #CAD s/p PCI to LCx 2016  #MR s/p MVR 6/2019  #Troponin elevation c/f NSTEMI type II (demand ischemia)   -hs-troponin plateaued in 1257 -> 1230  -Echo 01/24/24 reviewed, stable EF 15-20%, no wall motion abnormalities, low c/f new ischemia    Recommendations:  -Continue to suspect COPD exacerbation and COVID is driving patient's respiratory symptoms  -Recommend continuing to hold off on further diuresis at this time, particularly given PATSY and plan for IV fluids  -Can stop trending troponins given her rapid improvement in MICU and low overall c/f ACS    Patient was seen and discussed with Leonel Alcantar PGY4 and Dr. Ajith Zacarias. Recommendations are preliminary until note is co-signed by an attending.     Chadwick Posey, M4

## 2024-01-24 NOTE — PROGRESS NOTES
Internal Medicine Progress Note   Melissa Marinelli is a 68 y.o. female with a past medical history of HFrEF (EF 15-20%) s/p ICD placement 5/2020, CAD s/p NSTEMI with RIRI to LCx (Jan 2016), s/p MVR (June 2019), COPD (no formal PFTs), HTN, HLD, GERD, CVA, CKD IIIB and DMII admitted on 1/22/2024 with AHRF requiring BiPAP.     ED and Floor Course:  Initially presented via EMS from home with acute onset shortness of breath.  In the ED, patient was found to have low pH and elevated pCO2, was started on BiPAP, given Lasix 40 mg IV push x 2, treated with DuoNebs.  Acid-base status improved the patient was weaned off BiPAP, admitted to floor.  Patient found to be COVID-positive and started on remdesivir/dexamethasone.  Also started on doxycycline (penicillin and other allergies) for presumed COPD exacerbation.  After admission, cardiology was consulted d/t cf ADHF.     Per Cards c/s note on 1/22:  Patient was last seen by cardiology for a televisit on 1/17/2024 at which time her entresto was decreased back to 1/2 tab of 24/26mg BID due to orthostatic symptoms. Beta blocker was held given RV dysfunction, SGLT2i was continued. Stated that MRA could be considered at next visit. Per cardiology note, patient had previously and continued to decline LVAD as destination therapy, and the topic of palliative inotrope was brought up, but no decision was made at that time.      Note that trops peaked on 1/23 and downtrended.  Cardiology never had concern for ACS due to low concern for plaque rupture.  Recommend holding off on additional diuresis due to concern that she may be dehydrated rather than an ADHF.  Recommended limited TTE to evaluate function, MV bioprosthetic, IVC and will consider further noninvasive ischemic evaluation in the a.m. they are considered to be of little benefit.  Also recommended device interrogation in the a.m.     Rapid Response 1/23 PM:  Patient had increased dyspnea, unresponsive to breathing treatments.   Blood culture and EKG were ordered.  Patient placed on BiPAP due to increased work of breathing with ABG showing pH 7.37, pCO2 36, pO2 72 while on BiPAP.  Was excepted to MICU given the potential etiologies for acute hypoxic respiratory failure including either COVID diagnosis, possible COPD exacerbation, component of underlying cardiac disease and heart failure with reduced ejection fraction versus new pulmonary embolism.       MICU  Patient quickly weaned off BiPAP to 3 L nasal cannula O2. Patient had CT PE ordered, but has history of contrast allergy, so was pretreated with methylprednisolone. CTPE negative for PE. Patient stable on 3L for transfer back to floor. Patient reporting nausea and dry heaving after receiving contrast. Patient with limited IV access.  IV team attempted to get IVs, which infiltrated. Midline attempted, unsuccessful. Discontinued remdesivir due to limited access and lack of improvement on it.      Subjective   Patient seen at bedside. She reports feeling well overall and believes her work of breathing has improved significantly since admission. She stated she is having some nausea and spitting up but is unsure if this is related to nausea vs mucus production. She stated Tigan is helping her. Denies any fevers, chills, worsening SOB, chest pain at this time. No other concerns.     Medications     Medications:   Scheduled medications  aspirin, 81 mg, oral, Daily  dapagliflozin propanediol, 10 mg, oral, Daily  dexAMETHasone, 6 mg, oral, Daily  doxycylcine, 100 mg, oral, q12h AWAIS  fluticasone furoate-vilanteroL, 1 puff, inhalation, Daily  heparin (porcine), 5,000 Units, subcutaneous, q8h  insulin lispro, 0-5 Units, subcutaneous, TID with meals  lidocaine, 5 mL, infiltration, Once  polyethylene glycol, 17 g, oral, Daily  sennosides-docusate sodium, 2 tablet, oral, BID      Continuous medications     PRN medications  PRN medications: acetaminophen, albuterol, alum-mag hydroxide-simeth, dextrose  "10 % in water (D10W), dextrose, glucagon, oxygen, sodium chloride, trimethobenzamide     Objective     Vitals  Visit Vitals  BP (!) 152/105   Pulse (!) 125   Temp 36.1 °C (97 °F)   Resp 16   Ht 1.626 m (5' 4\")   Wt 63.2 kg (139 lb 5.3 oz)   SpO2 100%   BMI 23.92 kg/m²   Smoking Status Every Day   BSA 1.69 m²       Intake/Output Summary (Last 24 hours) at 1/24/2024 1726  Last data filed at 1/24/2024 1200  Gross per 24 hour   Intake 750 ml   Output 50 ml   Net 700 ml       Physical Exam  GENERAL: In no acute distress, conversant   HEENT: Extraocular motion intact.  No scleral icterus.  MOUTH: MMM, no lesions observed.  CARDIAC: S1 + S2, RRR, no M/R/G.    LUNGS: Normal respirator effort on 3L NC. No wheezes or coarse breath sounds.   ABDOMEN: Soft, non-tender to palpation. No organomegaly. BS +.   EXTREMITIES: No peripheral edema noted  NEUROLOGIC: Cranial nerves 2 through 12 grossly intact.  PSYCH: Appropriate mood and behavior.      Labs  Lab Results   Component Value Date    WBC 6.7 01/24/2024    HGB 13.0 01/24/2024    HCT 40.7 01/24/2024    MCV 90 01/24/2024     (L) 01/24/2024      Lab Results   Component Value Date    GLUCOSE 158 (H) 01/24/2024    CALCIUM 9.6 01/24/2024     01/24/2024    K 4.9 01/24/2024    CO2 16 (L) 01/24/2024     01/24/2024    BUN 42 (H) 01/24/2024    CREATININE 1.76 (H) 01/24/2024      Lab Results   Component Value Date    ALT 11 01/23/2024    AST 17 01/23/2024    ALKPHOS 79 01/23/2024    BILITOT 0.4 01/23/2024        Imaging  === 01/22/24 ===    XR CHEST 1 VIEW    - Impression -  1.  Cardiomegaly with mild-to-moderate interstitial pulmonary edema.    I personally reviewed the images/study and I agree with the findings  as stated by Resident Radames Lewis MD. This study was interpreted  at [place], [place].    MACRO:  NONE.    Signed by: Minerva Treviño 1/22/2024 9:28 AM  Dictation workstation:   [id number]   === 01/22/24 " ===    CT ANGIO CHEST FOR PULMONARY EMBOLISM    - Impression -  1. No evidence of acute pulmonary embolism to segmental level. Please  note that, assessment of subsegmental branches is limited and small  peripheral emboli are not entirely excluded.  2. Stable cardiomegaly with asymmetric left heart enlargement. Status  post mitral valve replacement.  3. Very poor contrast opacification on LV and aorta dysfunction,  correlating with patient's known severe LV. Intravenous contrast  reflux into IVC and central hepatic veins is suggestive of right  heart strain. Please refer to recent echocardiogram.  4. Findings suggestive of interstitial and alveolar pulmonary edema,  asymmetric to the right and new from prior examination; correlate  with mitral valve disease. Less likely imaging differential include  infectious/inflammatory process.  5. New small right and trace left pleural effusions with mild  bibasilar dependent atelectasis.  6. Moderate sized hiatal hernia with mildly patulous esophagus;  correlate with concern for gastroesophageal reflux. Aspiration  precautions are recommended.    I personally reviewed the images/study and I agree with the findings  as stated by Resident Radames Lewis MD. This study was interpreted  at [place], [place].    MACRO:  None    Signed by: Gordo Mansfield 1/24/2024 7:14 AM  Dictation workstation:   [id number]       Assessment/Plan   Melissa Marinelli is a 68 y.o. female with PMHx of  HFrEF (EF 15-20%) s/p ICD placement 5/2020, CAD s/p NSTEMI with RIRI to LCx (Jan 2016), s/p MVR (June 2019), COPD (no formal PFTs), HTN, HLD, GERD, CVA, CKD IIIB and DMII admitted for AHRF requiring BiPAP and transferred to [facility] for ongoing management of AHRF in the setting of COVID.     # Acute hypercapnic and hypoxic respiratory failure, improving   # COVID infection  # C/f COPD exacerbation vs Acute decompensated HF  :: Required BiPAP again for work of  breathing on 1/23; ABG pH 7.37, pCO2 36, pO2 72  :: No PFTs on file to confirm diagnosis of COPD   :: CTPE negative 1/23  :: , but on prior admission for ADHF, BNP more significantly elevated at >3000  C/w remdesivir 200mg 1 day then 100mg for 4 days  C/w dexamethasone 6mg daily for 10 days; consider changing to IV formulation if patient not able to tolerate PO on BiPAP  C/w doxycycline 100mg BID for 5 days for presumed COPD exacerbation   S/p 40mg IV lasix x2 in ED             - Hold on further diuresis per cardiology recommendations  Continue home symbicort and albuterol  Duonebs q6hrs for SOB  Wean supplemental O2 as able, currently on 3L NC   Plan for PFTs , none on file   COPD navigator consult  Will need follow up with pulmonolgy and  pulmonary rehab on discharge     #CAD   #HFrEF (EF 15-20% 1/2024) s/p ICD placement 5/2020   #NSTEMI s/p RIRI to Lcx (Jan 2016)  #Hx of Mitral Valve repair (June 2019) w/ Dr Montana  #Troponinemia, stable  #Prolonged Qtc (Bazett) 507   #HTN  #DLD  :: Troponin elevation to peak of 1257 on admission, down trended since, likely in setting of demand ischemia;   - Continue Farxiga 10mg daily   - Not on statin therapy due to reported allergy per chart review  - Cardiology consulted in MICU, appreciate recs  - Limited TTE significant for LVEF 15-20% 1/24/24  -Not  trending troponin as low suspicion for ACS  -Was holding Entresto due to lower Bps on admission, can restart     # PATSY on CKD IIIB  :: Baseline sCr ~1.3-1.4  :: Likely prerenal 2/2 hypovolemia with possible component of contrast toxicity.   S/p 500 ml as maintenance fluids in MICU   CTM, Daily RFP     # T2DM   Euglycemic, HgbA1c 5.5 (11/2023)  Farxiga 10mg daily   Mild SSI     # Nausea  Continue Tigan PRN (prolonged Qtc)    F: Caution with diuresis and IV fluids given HFrEF  E: Replete as needed  N: Carb Controlled Diet  A: PIV x 1  O2: 3 L via NC  Abx: Doxycycline     CODE STATUS: Full code  SDM: Mary (child)  [phone]    Above recommendations are not final until attested by attending physician in the morning.       Marisol Bojorquez MD  Department of Internal Medicine, PGY-1

## 2024-01-24 NOTE — PROGRESS NOTES
Melissa Marinelli is a 68 y.o. female on day 2 of admission presenting with Hypoxic respiratory failure (CMS/HCC).    ICU to [facility] Transfer Summary     I:  ICU Admission Reason & Brief ICU Course:    Melissa Marinelli is a 68 y.o. female presenting to MICU d/t AHRF requiring BiPAP.     PMH includes HFrEF (EF 15-20%) s/p ICD placement 5/2020, CAD s/p NSTEMI with RIRI to LCx (Jan 2016), s/p MVR (June 2019), COPD (no formal PFTs), HTN, HLD, GERD, CVA, CKD IIIB and DMII.     ED and Floor Course:  Initially presented via EMS from home with acute onset shortness of breath.  In the ED, patient was found to have low pH and elevated pCO2, was started on BiPAP, given Lasix 40 mg IV push x 2, treated with DuoNebs.  Acid-base status improved the patient was weaned off BiPAP, admitted to floor.  Patient found to be COVID-positive and started on remdesivir/dexamethasone.  Also started on doxycycline (penicillin and other allergies) for presumed COPD exacerbation.  After admission, cardiology was consulted d/t cf ADHF.     Per Cards c/s note on 1/22:  Patient was last seen by cardiology for a televisit on 1/17/2024 at which time her entresto was decreased back to 1/2 tab of 24/26mg BID due to orthostatic symptoms. Beta blocker was held given RV dysfunction, SGLT2i was continued. Stated that MRA could be considered at next visit. Per cardiology note, patient had previously and continued to decline LVAD as destination therapy, and the topic of palliative inotrope was brought up, but no decision was made at that time.      Note that trops peaked on 1/23 and downtrended.  Cardiology never had concern for ACS due to low concern for plaque rupture.  Recommend holding off on additional diuresis due to concern that she may be dehydrated rather than an ADHF.  Recommended limited TTE to evaluate function, MV bioprosthetic, IVC and will consider further noninvasive ischemic evaluation in the a.m. they are considered to be of little benefit.  Also  recommended device interrogation in the a.m.     Rapid Response 1/23 PM:  Patient had increased dyspnea, unresponsive to breathing treatments.  Blood culture and EKG were ordered.  Patient placed on BiPAP due to increased work of breathing with ABG showing pH 7.37, pCO2 36, pO2 72 while on BiPAP.  Was excepted to MICU given the potential etiologies for acute hypoxic respiratory failure including either COVID diagnosis, possible COPD exacerbation, component of underlying cardiac disease and heart failure with reduced ejection fraction versus new pulmonary embolism.       MICU  Patient quickly weaned off BiPAP to 3 L nasal cannula O2. Patient had CT PE ordered, but has history of contrast allergy, so was pretreated with methylprednisolone. CTPE negative for PE. Patient stable on 3L for transfer back to floor. Patient reporting nausea and dry heaving after receiving contrast. Patient with limited IV access.  IV team attempted to get IVs, which infiltrated. Midline attempted, unsuccessful. Discontinued remdesivir due to limited access and lack of improvement on it.       C: Code Status/DPOA Info/Goals of Care/ACP Note    Full Code  DPOA/Contact Number: SDM: Mary (child) [phone]     U: Unprescribing & Pertinent High-Risk Medications    Changes to home meds: Home Entresto being held     Anticoagulation: Yes - SQH     Antibiotics:   [] N/A - no current planned antimicrobioals  [x]  Doxycycline indication COPD exacerbation start date 1/23 planned duration 5 days    P: Pending Tests at the Time of Transfer   Echocardiogram  Midline placement    A: Active consultants, including Rehab:   [x]  Subspecialty Consultants: cardiology  [x]  PT  [x]  OT  []  SLP  []  Wound Care    U: Uncertainty Measure/Diagnostic Pause:    Working diagnosis at the time of transfer AHRF 2/2 covid and COPD exacerbation, though ddx includes Acute heart failure     Diagnosis Degree of Certainty: 2. Some uncertainty about the clinical diagnosis.      S: Summary of Major Problems and To-Dos:   #Neuro  #Tobacco use disorder  #Marijuana use disorder   - Smokes 10 cigarettes per day, nicotine replacement therapy declined      Cardiac  #CAD   #NSTEMI s/p RIRI to Lcx (Jan 2016)  #Hx of Mitral Valve repair (June 2019) w/ Dr Montana  #Troponinemia  #HTN  #DLD  :: Troponin elevation to peak of 1257, down trended since, likely in setting of demand ischemia;   - Hold home entresto due to softer blood pressure and diuresis; will restart tomorrow AM blood pressure permitting  - Continue Farxiga 10mg daily   - Not on statin therapy due to reported allergy per chart review  [ ] Cardiology consulted , appreciate recs  [ ] fu limited TTE     Pulm  #Acute hypercapnic and hypoxic respiratory failure  #C/f COPD exacerbation vs Acute decompensated HF  #COVID infection  :: Required BiPAP again for work of breathing on 1/23; ABG pH 7.37, pCO2 36, pO2 72  :: No PFTs on file to confirm diagnosis of COPD   :: CTPE negative 1/23  :: , but on prior admission for ADHF, BNP more significantly elevated at >3000  - C/w remdesivir 200mg 1 day then 100mg for 4 days  - C/w dexamethasone 6mg daily for 10 days; consider changing to IV formulation if patient not able to tolerate PO on BiPAP  - C/w doxycycline 100mg BID for 5 days for presumed COPD exacerbation   - S/p 40mg IV lasix x2 in ED             - Hold on further diuresis per cardiology ecommendations  - Continue home symbicort and albuterol  - Duonebs q6hrs for SOB  - Wean supplemental O2 as able      GI  #Nausea  - Tigan (prolonged qt)     /Renal  #PATSY on CKD IIIB  :: Baseline sCr ~1.3-1.4  :: Likely prerenal 2/2 hypovolemia with possible component of contrast toxicity.   - Avoid nephrotic agents  - Plan to give 500 ml as maintenance fluids once better access obtained     MSK/Rheum - No Active Issues  ID - No Active Issues     Endo  #DM2  - Euglycemic, HgbA1c 5.5 (11/2023)  - Farxiga 10mg daily   - Sliding scale ordered      Derm - No Active Issues     F: Caution with diuresis and IV fluids given HFrEF  E: Replete as needed  N: Carb Controlled Diet  A: PIV x 1  E: Exam, including Lines/Drains/Airways & Data Review:   General: NAD, awake, alert, conversant, appears stated age  HENT: NCAT  Eyes: no scleral icterus or conjunctival pallor  Skin: no suspect lesions or rashes noted on visible skin  Cardiac: tachycardic to 100s  Pulm: normal respiratory effort, symmetric chest rise, on 4L O2 via NC  Abd: soft, ND, NT, no involuntary guarding or rebound tenderness  : no flank pain or indwelling urinary catheter  EXT: no peripheral edema, no asymmetry noted  MSK: no focal joint swelling noted, sensation and strength intact 5/5 in all extremities b/l  Neuro: AOx3, able to follow commands, no gross FND  Psych: coherent thought process, appropriate mood and affect  Difficult airway? No  Lines/drains assessed for removal? No  Difficult airway? No  Lines/drains assessed for removal? No    Within 30 minutes of the patient physically leaving the floor, a Floor Readiness Note needs to be placed with updated vitals.           Hardik Reid MD

## 2024-01-24 NOTE — PROGRESS NOTES
SOCIAL WORK NOTE   SW attempted to assess at bedside, but not available. SW spoke with daughter for assessment via phone (please see flowsheets for further details). Patient normally lives at home with her. She uses [employer] for longer trips, but no DME or HC at home. Daughter assists with most personal care and IADLs. Sees Dr. Pollack at  for pcp. Currently denied needs for social work. Social work to follow.   Carmen Salguero, CHRIS, LISW-S ([id number])

## 2024-01-25 ENCOUNTER — APPOINTMENT (OUTPATIENT)
Dept: RADIOLOGY | Facility: HOSPITAL | Age: 69
DRG: 286 | End: 2024-01-25
Payer: COMMERCIAL

## 2024-01-25 ENCOUNTER — APPOINTMENT (OUTPATIENT)
Dept: CARDIOLOGY | Facility: HOSPITAL | Age: 69
DRG: 286 | End: 2024-01-25
Payer: COMMERCIAL

## 2024-01-25 LAB
ALBUMIN SERPL BCP-MCNC: 3.9 G/DL (ref 3.4–5)
ALBUMIN SERPL BCP-MCNC: 4.2 G/DL (ref 3.4–5)
ANION GAP BLDA CALCULATED.4IONS-SCNC: 13 MMO/L (ref 10–25)
ANION GAP BLDV CALCULATED.4IONS-SCNC: 12 MMOL/L (ref 10–25)
ANION GAP SERPL CALC-SCNC: 21 MMOL/L (ref 10–20)
ANION GAP SERPL CALC-SCNC: 22 MMOL/L (ref 10–20)
ATRIAL RATE: 128 BPM
B-OH-BUTYR SERPL-SCNC: 2.06 MMOL/L (ref 0.02–0.27)
BASE EXCESS BLDA CALC-SCNC: -7.4 MMOL/L (ref -2–3)
BASE EXCESS BLDV CALC-SCNC: -7.2 MMOL/L (ref -2–3)
BASOPHILS # BLD AUTO: 0.01 X10*3/UL (ref 0–0.1)
BASOPHILS NFR BLD AUTO: 0.1 %
BODY TEMPERATURE: 37 DEGREES CELSIUS
BODY TEMPERATURE: 37 DEGREES CELSIUS
BUN SERPL-MCNC: 63 MG/DL (ref 6–23)
BUN SERPL-MCNC: 77 MG/DL (ref 6–23)
CA-I BLDA-SCNC: 1.12 MMOL/L (ref 1.1–1.33)
CA-I BLDV-SCNC: 1.12 MMOL/L (ref 1.1–1.33)
CALCIUM SERPL-MCNC: 9.1 MG/DL (ref 8.6–10.6)
CALCIUM SERPL-MCNC: 9.6 MG/DL (ref 8.6–10.6)
CHLORIDE BLDA-SCNC: 104 MMOL/L (ref 98–107)
CHLORIDE BLDV-SCNC: 102 MMOL/L (ref 98–107)
CHLORIDE SERPL-SCNC: 101 MMOL/L (ref 98–107)
CHLORIDE SERPL-SCNC: 103 MMOL/L (ref 98–107)
CO2 SERPL-SCNC: 15 MMOL/L (ref 21–32)
CO2 SERPL-SCNC: 19 MMOL/L (ref 21–32)
CREAT SERPL-MCNC: 2.34 MG/DL (ref 0.5–1.05)
CREAT SERPL-MCNC: 2.38 MG/DL (ref 0.5–1.05)
EGFRCR SERPLBLD CKD-EPI 2021: 22 ML/MIN/1.73M*2
EGFRCR SERPLBLD CKD-EPI 2021: 22 ML/MIN/1.73M*2
EOSINOPHIL # BLD AUTO: 0 X10*3/UL (ref 0–0.7)
EOSINOPHIL NFR BLD AUTO: 0 %
ERYTHROCYTE [DISTWIDTH] IN BLOOD BY AUTOMATED COUNT: 16.4 % (ref 11.5–14.5)
GLUCOSE BLD MANUAL STRIP-MCNC: 129 MG/DL (ref 74–99)
GLUCOSE BLD MANUAL STRIP-MCNC: 134 MG/DL (ref 74–99)
GLUCOSE BLD MANUAL STRIP-MCNC: 146 MG/DL (ref 74–99)
GLUCOSE BLDA-MCNC: 159 MG/DL (ref 74–99)
GLUCOSE BLDV-MCNC: 163 MG/DL (ref 74–99)
GLUCOSE SERPL-MCNC: 109 MG/DL (ref 74–99)
GLUCOSE SERPL-MCNC: 127 MG/DL (ref 74–99)
HCO3 BLDA-SCNC: 17.1 MMOL/L (ref 22–26)
HCO3 BLDV-SCNC: 20.5 MMOL/L (ref 22–26)
HCT VFR BLD AUTO: 44.1 % (ref 36–46)
HCT VFR BLD EST: 39 % (ref 36–46)
HCT VFR BLD EST: 40 % (ref 36–46)
HGB BLD-MCNC: 13.9 G/DL (ref 12–16)
HGB BLDA-MCNC: 13 G/DL (ref 12–16)
HGB BLDV-MCNC: 13.2 G/DL (ref 12–16)
IMM GRANULOCYTES # BLD AUTO: 0.03 X10*3/UL (ref 0–0.7)
IMM GRANULOCYTES NFR BLD AUTO: 0.4 % (ref 0–0.9)
INHALED O2 CONCENTRATION: 32 %
INHALED O2 CONCENTRATION: 40 %
LACTATE BLDA-SCNC: 1.6 MMOL/L (ref 0.4–2)
LACTATE BLDV-SCNC: 2.3 MMOL/L (ref 0.4–2)
LACTATE SERPL-SCNC: 1.9 MMOL/L (ref 0.4–2)
LYMPHOCYTES # BLD AUTO: 0.41 X10*3/UL (ref 1.2–4.8)
LYMPHOCYTES NFR BLD AUTO: 5.3 %
MAGNESIUM SERPL-MCNC: 2.59 MG/DL (ref 1.6–2.4)
MCH RBC QN AUTO: 28.8 PG (ref 26–34)
MCHC RBC AUTO-ENTMCNC: 31.5 G/DL (ref 32–36)
MCV RBC AUTO: 91 FL (ref 80–100)
MONOCYTES # BLD AUTO: 0.24 X10*3/UL (ref 0.1–1)
MONOCYTES NFR BLD AUTO: 3.1 %
NEUTROPHILS # BLD AUTO: 7.1 X10*3/UL (ref 1.2–7.7)
NEUTROPHILS NFR BLD AUTO: 91.1 %
NRBC BLD-RTO: 0 /100 WBCS (ref 0–0)
OXYHGB MFR BLDA: 97.9 % (ref 94–98)
OXYHGB MFR BLDV: 38.9 % (ref 45–75)
P AXIS: 82 DEGREES
P OFFSET: 190 MS
P ONSET: 135 MS
PCO2 BLDA: 31 MM HG (ref 38–42)
PCO2 BLDV: 49 MM HG (ref 41–51)
PH BLDA: 7.35 PH (ref 7.38–7.42)
PH BLDV: 7.23 PH (ref 7.33–7.43)
PHOSPHATE SERPL-MCNC: 4.2 MG/DL (ref 2.5–4.9)
PHOSPHATE SERPL-MCNC: 4.7 MG/DL (ref 2.5–4.9)
PLATELET # BLD AUTO: 89 X10*3/UL (ref 150–450)
PO2 BLDA: 151 MM HG (ref 85–95)
PO2 BLDV: 33 MM HG (ref 35–45)
POTASSIUM BLDA-SCNC: 5 MMOL/L (ref 3.5–5.3)
POTASSIUM BLDV-SCNC: 4.8 MMOL/L (ref 3.5–5.3)
POTASSIUM SERPL-SCNC: 5.2 MMOL/L (ref 3.5–5.3)
POTASSIUM SERPL-SCNC: 5.4 MMOL/L (ref 3.5–5.3)
PR INTERVAL: 164 MS
Q ONSET: 217 MS
QRS COUNT: 21 BEATS
QRS DURATION: 116 MS
QT INTERVAL: 290 MS
QTC CALCULATION(BAZETT): 423 MS
QTC FREDERICIA: 373 MS
R AXIS: 90 DEGREES
RBC # BLD AUTO: 4.83 X10*6/UL (ref 4–5.2)
SAO2 % BLDA: 100 % (ref 94–100)
SAO2 % BLDV: 39 % (ref 45–75)
SODIUM BLDA-SCNC: 129 MMOL/L (ref 136–145)
SODIUM BLDV-SCNC: 130 MMOL/L (ref 136–145)
SODIUM SERPL-SCNC: 134 MMOL/L (ref 136–145)
SODIUM SERPL-SCNC: 137 MMOL/L (ref 136–145)
T AXIS: 265 DEGREES
T OFFSET: 362 MS
VENTRICULAR RATE: 128 BPM
WBC # BLD AUTO: 7.8 X10*3/UL (ref 4.4–11.3)

## 2024-01-25 PROCEDURE — 82947 ASSAY GLUCOSE BLOOD QUANT: CPT

## 2024-01-25 PROCEDURE — 93282 PRGRMG EVAL IMPLANTABLE DFB: CPT | Performed by: STUDENT IN AN ORGANIZED HEALTH CARE EDUCATION/TRAINING PROGRAM

## 2024-01-25 PROCEDURE — 93010 ELECTROCARDIOGRAM REPORT: CPT | Performed by: INTERNAL MEDICINE

## 2024-01-25 PROCEDURE — 36600 WITHDRAWAL OF ARTERIAL BLOOD: CPT

## 2024-01-25 PROCEDURE — 83735 ASSAY OF MAGNESIUM: CPT

## 2024-01-25 PROCEDURE — 84132 ASSAY OF SERUM POTASSIUM: CPT

## 2024-01-25 PROCEDURE — 85025 COMPLETE CBC W/AUTO DIFF WBC: CPT

## 2024-01-25 PROCEDURE — 93282 PRGRMG EVAL IMPLANTABLE DFB: CPT

## 2024-01-25 PROCEDURE — 36415 COLL VENOUS BLD VENIPUNCTURE: CPT

## 2024-01-25 PROCEDURE — 2500000001 HC RX 250 WO HCPCS SELF ADMINISTERED DRUGS (ALT 637 FOR MEDICARE OP)

## 2024-01-25 PROCEDURE — 80069 RENAL FUNCTION PANEL: CPT

## 2024-01-25 PROCEDURE — 93005 ELECTROCARDIOGRAM TRACING: CPT

## 2024-01-25 PROCEDURE — 71045 X-RAY EXAM CHEST 1 VIEW: CPT

## 2024-01-25 PROCEDURE — 82010 KETONE BODYS QUAN: CPT | Performed by: STUDENT IN AN ORGANIZED HEALTH CARE EDUCATION/TRAINING PROGRAM

## 2024-01-25 PROCEDURE — 71045 X-RAY EXAM CHEST 1 VIEW: CPT | Performed by: RADIOLOGY

## 2024-01-25 PROCEDURE — 2500000004 HC RX 250 GENERAL PHARMACY W/ HCPCS (ALT 636 FOR OP/ED)

## 2024-01-25 PROCEDURE — 1100000001 HC PRIVATE ROOM DAILY

## 2024-01-25 PROCEDURE — 2500000005 HC RX 250 GENERAL PHARMACY W/O HCPCS: Mod: JZ

## 2024-01-25 PROCEDURE — 83605 ASSAY OF LACTIC ACID: CPT

## 2024-01-25 PROCEDURE — 99233 SBSQ HOSP IP/OBS HIGH 50: CPT | Performed by: STUDENT IN AN ORGANIZED HEALTH CARE EDUCATION/TRAINING PROGRAM

## 2024-01-25 PROCEDURE — 99232 SBSQ HOSP IP/OBS MODERATE 35: CPT

## 2024-01-25 PROCEDURE — 80069 RENAL FUNCTION PANEL: CPT | Mod: MUE

## 2024-01-25 RX ORDER — FLUTICASONE FUROATE AND VILANTEROL 100; 25 UG/1; UG/1
1 POWDER RESPIRATORY (INHALATION) DAILY
Status: DISCONTINUED | OUTPATIENT
Start: 2024-01-25 | End: 2024-02-01

## 2024-01-25 RX ORDER — SENNOSIDES 8.6 MG/1
1 TABLET ORAL NIGHTLY
Status: DISCONTINUED | OUTPATIENT
Start: 2024-01-25 | End: 2024-01-26

## 2024-01-25 RX ORDER — SODIUM CHLORIDE, SODIUM LACTATE, POTASSIUM CHLORIDE, CALCIUM CHLORIDE 600; 310; 30; 20 MG/100ML; MG/100ML; MG/100ML; MG/100ML
125 INJECTION, SOLUTION INTRAVENOUS CONTINUOUS
Status: DISCONTINUED | OUTPATIENT
Start: 2024-01-25 | End: 2024-01-25

## 2024-01-25 RX ORDER — HEPARIN SODIUM 5000 [USP'U]/ML
5000 INJECTION, SOLUTION INTRAVENOUS; SUBCUTANEOUS EVERY 8 HOURS
Status: DISCONTINUED | OUTPATIENT
Start: 2024-01-25 | End: 2024-02-03 | Stop reason: HOSPADM

## 2024-01-25 RX ORDER — ALBUTEROL SULFATE 90 UG/1
2 AEROSOL, METERED RESPIRATORY (INHALATION) EVERY 6 HOURS PRN
Status: DISCONTINUED | OUTPATIENT
Start: 2024-01-25 | End: 2024-01-26

## 2024-01-25 RX ADMIN — SODIUM CHLORIDE, POTASSIUM CHLORIDE, SODIUM LACTATE AND CALCIUM CHLORIDE 125 ML/HR: 600; 310; 30; 20 INJECTION, SOLUTION INTRAVENOUS at 14:15

## 2024-01-25 RX ADMIN — HEPARIN SODIUM 5000 UNITS: 5000 INJECTION INTRAVENOUS; SUBCUTANEOUS at 16:24

## 2024-01-25 RX ADMIN — DEXAMETHASONE 6 MG: 6 TABLET ORAL at 16:55

## 2024-01-25 RX ADMIN — DOXYCYCLINE HYCLATE 100 MG: 100 TABLET, COATED ORAL at 16:55

## 2024-01-25 RX ADMIN — REMDESIVIR 100 MG: 100 INJECTION, POWDER, LYOPHILIZED, FOR SOLUTION INTRAVENOUS at 16:24

## 2024-01-25 RX ADMIN — TRIMETHOBENZAMIDE HYDROCHLORIDE 200 MG: 100 INJECTION INTRAMUSCULAR at 08:56

## 2024-01-25 RX ADMIN — TRIMETHOBENZAMIDE HYDROCHLORIDE 200 MG: 100 INJECTION INTRAMUSCULAR at 16:55

## 2024-01-25 NOTE — PROGRESS NOTES
"Melissa Marinelli is a 68 y.o. female on day 3 of admission presenting with Hypoxic respiratory failure (CMS/HCC).    Subjective   NAEO. Endorsing nausea this AM, as well as dizziness when walking to the bathroom. Endorses not drinking much PO fluid. Believes her SOB is mildly improved. Denies CP, new leg swelling.       Objective     Physical Exam  General: ill-appearing, in NAD  HEENT: NCAT, no scleral icterus, dry mucous membranes, no JVD  CV: tachycardic, no m/r/g  Pulm: CTAB, no abnormal lung sounds  Abdomen: soft, nontender, nondistended  Skin: warm and dry  Extremities: No significant LE or UE edema  Neuro: AOx3, responding appropriately  Psych: appropriate affect    Last Recorded Vitals  Blood pressure (!) 135/92, pulse (!) 113, temperature 36.3 °C (97.3 °F), resp. rate 17, height 1.626 m (5' 4\"), weight 63.2 kg (139 lb 5.3 oz), SpO2 100 %.  Intake/Output last 3 Shifts:  I/O last 3 completed shifts:  In: 500 (7.9 mL/kg) [IV Piggyback:500]  Out: 50 (0.8 mL/kg) [Emesis/NG output:50]  Weight: 63.2 kg     Relevant Results  Lab Results   Component Value Date    WBC 7.8 01/25/2024    HGB 13.9 01/25/2024    HCT 44.1 01/25/2024    MCV 91 01/25/2024    PLT 89 (L) 01/25/2024     Lab Results   Component Value Date    GLUCOSE 109 (H) 01/25/2024    CALCIUM 9.6 01/25/2024     01/25/2024    K 5.4 (H) 01/25/2024    CO2 19 (L) 01/25/2024     01/25/2024    BUN 63 (H) 01/25/2024    CREATININE 2.34 (H) 01/25/2024         Current Facility-Administered Medications:     acetaminophen (Tylenol) tablet 975 mg, 975 mg, oral, q8h PRN, Alex Briseno MD, 975 mg at 01/22/24 1925    albuterol 90 mcg/actuation inhaler 2 puff, 2 puff, inhalation, q6h PRN, Marisol Bojorquez MD    aspirin chewable tablet 81 mg, 81 mg, oral, Daily, Alex Briseno MD, 81 mg at 01/23/24 1013    dapagliflozin propanediol (Farxiga) tablet 10 mg, 10 mg, oral, Daily, Alex Briseno MD, 10 mg at 01/23/24 1013    dexAMETHasone (Decadron) tablet 6 mg, 6 " mg, oral, Daily, Alex Briseno MD, 6 mg at 01/23/24 1117    dextrose 10 % in water (D10W) infusion, 0.3 g/kg/hr, intravenous, Once PRN, Alex Briseno MD    dextrose 50 % injection 25 g, 25 g, intravenous, q15 min PRN, Alex Briseno MD    doxycycline (Vibra-Tabs) tablet 100 mg, 100 mg, oral, q12h AWAIS, Alex Briseno MD, 100 mg at 01/24/24 2118    fluticasone furoate-vilanteroL (Breo Ellipta) 100-25 mcg/dose inhaler 1 puff, 1 puff, inhalation, Daily, Marisol Bojorquez MD    glucagon (Glucagen) injection 1 mg, 1 mg, intramuscular, q15 min PRN, Alex Briseno MD    heparin (porcine) injection 5,000 Units, 5,000 Units, subcutaneous, q8h, Marisol Bojorquez MD    insulin lispro (HumaLOG) injection 0-5 Units, 0-5 Units, subcutaneous, TID with meals, Alex Briseno MD, 1 Units at 01/24/24 1359    lactated Ringer's infusion, 125 mL/hr, intravenous, Continuous, Marisol Bojorquez MD, Last Rate: 125 mL/hr at 01/25/24 1415, 125 mL/hr at 01/25/24 1415    [MAR Hold] lidocaine (Xylocaine) 10 mg/mL (1 %) injection 50 mg, 5 mL, infiltration, Once, Jaylan Ames MD    oxygen (O2) therapy, , inhalation, Continuous PRN - O2/gases, Alex Briseno MD, 3 L/min at 01/24/24 1645    perflutren protein A microsphere (Optison) injection 0.5 mL, 0.5 mL, intravenous, Once in imaging, Leroy Muñiz MD    polyethylene glycol (Glycolax, Miralax) packet 17 g, 17 g, oral, Daily, Alex Briseno MD    remdesivir (Veklury) 100 mg in sodium chloride 0.9% 250 mL IV, 100 mg, intravenous, Once **FOLLOWED BY** [START ON 1/26/2024] remdesivir (Veklury) 100 mg in sodium chloride 0.9% 250 mL IV, 100 mg, intravenous, q24h, Ye Sequeira MD    [Held by provider] sacubitriL-valsartan (Entresto) 24-26 mg per tablet 0.5 tablet, 0.5 tablet, oral, BID, Marisol Bojorquez MD, 0.5 tablet at 01/24/24 2118    sennosides (Senokot) tablet 8.6 mg, 1 tablet, oral, Nightly, Marisol Bojorquez MD    sennosides-docusate sodium (Stefany-Colace) 8.6-50 mg per tablet 2  tablet, 2 tablet, oral, BID, Alex Briseno MD, 2 tablet at 01/24/24 2118    sodium chloride (Ocean) 0.65 % nasal spray 1 spray, 1 spray, Each Nostril, PRN, Alex Briseno MD    sulfur hexafluoride microsphr (Lumason) injection 24.28 mg, 2 mL, intravenous, Once in imaging, Leroy Muñiz MD    trimethobenzamide (Tigan) injection 200 mg, 200 mg, intramuscular, q6h PRN, French Romo MD, 200 mg at 01/25/24 0856            Assessment/Plan   Principal Problem:    Hypoxic respiratory failure (CMS/HCC)    Melissa Marinelli is a 68 y.o. female with PMH of HFrEF (EF 15-20%) s/p ICD placement 5/2020 prior admission for cardiogenic shock 11/2019 w/impella support, CAD s/p NSTEMI with RIRI to LCx (Jan 2016), MR s/p MVR (June 2019), COPD (never formally diagnosed at ), HTN, DLD, GERD, CVA, CKD IIIB, and DMII who presented via EMS from home with acute onset shortness of breath. She has been given breathing treatments, diuresed, and placed on BiPAP with improvement, initially planned for MICU admission, now off BiPAP and admitted to pulmonary service. Cardiology is consulted for elevated troponin.      Impression:    Updates 01/25:  - PATSY worsening today (Cr 1.76 -> 2.34); may be 2/2 volume depletion +/- contrast PATSY  - c/f volume depletion given exam, tachycardia, and recent history of nausea/poor PO intake    #COVID positive  #hypoxic/hypercarbic respiratory failure c/f COPD exacerbation   #r/o ADHF contribution  -Patient follows carefully with HF clinic, last seen by Dr. Rivera on 1/17 via virtual visit; she has had difficult with HF GDMT and has episodes with hypotension on low-dose entresto and uses her Bumex sparingly; she has received breathing treatments and diuresis in the ED, it is unclear which has provided her benefit, but with continued rise in troponin there is concern she was being overdiuresed, especially based on her exam with no LE edema and recent diarrhea at home  -Patient reports wheezing at home and seems to  think breathing treatments have provided her the most benefit at this time  -ECG stable from prior, no acute ischemic changes  -CTPE 01/23/24 with evidence of mild focal pulmonary edema, though continues to appear euvolemic/dry on exam     #HFrEF (EF 15-20%) s/p ICD placement 5/2020  #CAD s/p PCI to LCx 2016  #MR s/p MVR 6/2019  #Troponin elevation c/f NSTEMI type II (demand ischemia)   -hs-troponin plateaued in 1257 -> 1230  -Echo 01/24/24 reviewed, stable EF 15-20%, no wall motion abnormalities, low c/f new ischemia     Recommendations:  -Continue to suspect COPD exacerbation and COVID is driving patient's respiratory symptoms  -For worsening PATSY, recommend mIVF (125cc/hr) for 12 hours   -Recommend holding dapa and Entresto given worsening renal function  -Recommend repeating BMP this evening to trend Cr and hyperkalemia  -Recommend obtaining UA      Patient was seen and discussed with fellow Sung Costa and attending Dr. Ajith Zacarias. Recommendations are preliminary until note is co-signed by an attending.      Chadwick Posey, M4

## 2024-01-25 NOTE — ED PROCEDURE NOTE
Procedure  Critical Care    Performed by: Bijan Ovalles MD  Authorized by: Bijan Ovalles MD    Critical care provider statement:     Critical care time (minutes):  30    Critical care time was exclusive of:  Separately billable procedures and treating other patients    Critical care was necessary to treat or prevent imminent or life-threatening deterioration of the following conditions:  Respiratory failure    Critical care was time spent personally by me on the following activities:  Ordering and performing treatments and interventions, ordering and review of laboratory studies, development of treatment plan with patient or surrogate, ordering and review of radiographic studies, pulse oximetry, discussions with primary provider, evaluation of patient's response to treatment, re-evaluation of patient's condition, examination of patient, interpretation of cardiac output measurements and obtaining history from patient or surrogate    I assumed direction of critical care for this patient from another provider in my specialty: no                 Bijan Ovalles MD  01/24/24 9807

## 2024-01-25 NOTE — PROGRESS NOTES
Internal Medicine Progress Note   Melissa Marinelli is a 68 y.o. female with a past medical history of HFrEF (EF 15-20%) s/p ICD placement 5/2020, CAD s/p NSTEMI with RIRI to LCx (Jan 2016), s/p MVR (June 2019), COPD (no formal PFTs), HTN, HLD, GERD, CVA, CKD IIIB and DMII admitted on 1/22/2024 with AHRF requiring BiPAP.     ED and Floor Course:  Initially presented via EMS from home with acute onset shortness of breath.  In the ED, patient was found to have low pH and elevated pCO2, was started on BiPAP, given Lasix 40 mg IV push x 2, treated with DuoNebs.  Acid-base status improved the patient was weaned off BiPAP, admitted to floor.  Patient found to be COVID-positive and started on remdesivir/dexamethasone.  Also started on doxycycline (penicillin and other allergies) for presumed COPD exacerbation.  After admission, cardiology was consulted d/t cf ADHF.     Per Cards c/s note on 1/22:  Patient was last seen by cardiology for a televisit on 1/17/2024 at which time her entresto was decreased back to 1/2 tab of 24/26mg BID due to orthostatic symptoms. Beta blocker was held given RV dysfunction, SGLT2i was continued. Stated that MRA could be considered at next visit. Per cardiology note, patient had previously and continued to decline LVAD as destination therapy, and the topic of palliative inotrope was brought up, but no decision was made at that time.      Note that trops peaked on 1/23 and downtrended.  Cardiology never had concern for ACS due to low concern for plaque rupture.  Recommend holding off on additional diuresis due to concern that she may be dehydrated rather than an ADHF.  Recommended limited TTE to evaluate function, MV bioprosthetic, IVC and will consider further noninvasive ischemic evaluation in the a.m. they are considered to be of little benefit.  Also recommended device interrogation in the a.m.     Rapid Response 1/23 PM:  Patient had increased dyspnea, unresponsive to breathing treatments.   Blood culture and EKG were ordered.  Patient placed on BiPAP due to increased work of breathing with ABG showing pH 7.37, pCO2 36, pO2 72 while on BiPAP.  Was excepted to MICU given the potential etiologies for acute hypoxic respiratory failure including either COVID diagnosis, possible COPD exacerbation, component of underlying cardiac disease and heart failure with reduced ejection fraction versus new pulmonary embolism.       MICU  Patient quickly weaned off BiPAP to 3 L nasal cannula O2. Patient had CT PE ordered, but has history of contrast allergy, so was pretreated with methylprednisolone. CTPE negative for PE. Patient stable on 3L for transfer back to floor. Patient reporting nausea and dry heaving after receiving contrast. Patient with limited IV access.  IV team attempted to get IVs, which infiltrated. Midline attempted, unsuccessful. Discontinued remdesivir due to limited access and lack of improvement on it.      Subjective   Patient seen at bedside. She reports feeling nauseated and having abdominal pain today. She believes her work of breathing has improved overall. Having some mucus production. Denies any fevers, chills, worsening SOB, chest pain at this time. No other concerns.     Medications     Medications:   Scheduled medications  aspirin, 81 mg, oral, Daily  dapagliflozin propanediol, 10 mg, oral, Daily  dexAMETHasone, 6 mg, oral, Daily  doxycylcine, 100 mg, oral, q12h AWAIS  fluticasone furoate-vilanteroL, 1 puff, inhalation, Daily  heparin, 5,000 Units, subcutaneous, q8h  insulin lispro, 0-5 Units, subcutaneous, TID with meals  [MAR Hold] lidocaine, 5 mL, infiltration, Once  perflutren protein A microsphere, 0.5 mL, intravenous, Once in imaging  polyethylene glycol, 17 g, oral, Daily  remdesivir, 100 mg, intravenous, Once   Followed by  [START ON 1/26/2024] remdesivir, 100 mg, intravenous, q24h  [Held by provider] sacubitriL-valsartan, 0.5 tablet, oral, BID  sennosides, 1 tablet, oral,  "Nightly  sennosides-docusate sodium, 2 tablet, oral, BID  sulfur hexafluoride microsphr, 2 mL, intravenous, Once in imaging      Continuous medications  lactated Ringer's, 125 mL/hr, Last Rate: 125 mL/hr (01/25/24 1415)    PRN medications  PRN medications: acetaminophen, albuterol, dextrose 10 % in water (D10W), dextrose, glucagon, oxygen, sodium chloride, trimethobenzamide     Objective     Vitals  Visit Vitals  BP (!) 135/92   Pulse (!) 113   Temp 36.3 °C (97.3 °F)   Resp 17   Ht 1.626 m (5' 4\")   Wt 63.2 kg (139 lb 5.3 oz)   SpO2 100%   BMI 23.92 kg/m²   Smoking Status Every Day   BSA 1.69 m²       Intake/Output Summary (Last 24 hours) at 1/25/2024 1443  Last data filed at 1/25/2024 0911  Gross per 24 hour   Intake 360 ml   Output --   Net 360 ml         Physical Exam  GENERAL: In no acute distress, conversant   HEENT: Extraocular motion intact.  No scleral icterus.  MOUTH: MMM, no lesions observed.  CARDIAC: S1 + S2, RRR, no M/R/G.    LUNGS: Normal respirator effort on 3L NC. No wheezes or coarse breath sounds.   ABDOMEN: Soft, non-tender to palpation. No organomegaly. BS +.   EXTREMITIES: No peripheral edema noted  NEUROLOGIC: Cranial nerves 2 through 12 grossly intact.  PSYCH: Appropriate mood and behavior.      Labs  Lab Results   Component Value Date    WBC 7.8 01/25/2024    HGB 13.9 01/25/2024    HCT 44.1 01/25/2024    MCV 91 01/25/2024    PLT 89 (L) 01/25/2024      Lab Results   Component Value Date    GLUCOSE 109 (H) 01/25/2024    CALCIUM 9.6 01/25/2024     01/25/2024    K 5.4 (H) 01/25/2024    CO2 19 (L) 01/25/2024     01/25/2024    BUN 63 (H) 01/25/2024    CREATININE 2.34 (H) 01/25/2024      Lab Results   Component Value Date    ALT 11 01/23/2024    AST 17 01/23/2024    ALKPHOS 79 01/23/2024    BILITOT 0.4 01/23/2024        Imaging  === 01/22/24 ===    XR CHEST 1 VIEW    - Impression -  1.  Cardiomegaly with mild-to-moderate interstitial pulmonary edema.    I personally reviewed the " images/study and I agree with the findings  as stated by Resident Radames Lewis MD. This study was interpreted  at [place], [place].    MACRO:  NONE.    Signed by: Minerva Treviño 1/22/2024 9:28 AM  Dictation workstation:   [id number]   === 01/22/24 ===    CT ANGIO CHEST FOR PULMONARY EMBOLISM    - Impression -  1. No evidence of acute pulmonary embolism to segmental level. Please  note that, assessment of subsegmental branches is limited and small  peripheral emboli are not entirely excluded.  2. Stable cardiomegaly with asymmetric left heart enlargement. Status  post mitral valve replacement.  3. Very poor contrast opacification on LV and aorta dysfunction,  correlating with patient's known severe LV. Intravenous contrast  reflux into IVC and central hepatic veins is suggestive of right  heart strain. Please refer to recent echocardiogram.  4. Findings suggestive of interstitial and alveolar pulmonary edema,  asymmetric to the right and new from prior examination; correlate  with mitral valve disease. Less likely imaging differential include  infectious/inflammatory process.  5. New small right and trace left pleural effusions with mild  bibasilar dependent atelectasis.  6. Moderate sized hiatal hernia with mildly patulous esophagus;  correlate with concern for gastroesophageal reflux. Aspiration  precautions are recommended.    I personally reviewed the images/study and I agree with the findings  as stated by Resident Radames Lewis MD. This study was interpreted  at [facility], [place], [place].    MACRO:  None    Signed by: Gordo Mansfield 1/24/2024 7:14 AM  Dictation workstation:   [id number]       Assessment/Plan   Melissa Marinelli is a 68 y.o. female with PMHx of  HFrEF (EF 15-20%) s/p ICD placement 5/2020, CAD s/p NSTEMI with RIRI to LCx (Jan 2016), s/p MVR (June 2019), COPD (no formal PFTs), HTN, HLD, GERD, CVA, CKD IIIB and  DMII admitted for AHRF requiring BiPAP and transferred to [facility] for ongoing management of AHRF in the setting of COVID.     Updates 1/25:   - Started 125cc/hr for 12 hours for hypovolemia, PATSY likely prerenal    - Holding Entresto in setting of PATSY   - UA pending   - Continues on 3L NC   - Abdominal pain and nausea persisting, on Tigan     # Acute hypercapnic and hypoxic respiratory failure, improving   # COVID infection  # C/f COPD exacerbation vs Acute decompensated HF  :: Required BiPAP again for work of breathing on 1/23; ABG pH 7.37, pCO2 36, pO2 72  :: No PFTs on file to confirm diagnosis of COPD   :: CTPE negative 1/23  :: , but on prior admission for ADHF, BNP more significantly elevated at >3000  C/w remdesivir 200mg 1 day then 100mg for 4 days  C/w dexamethasone 6mg daily for 10 days; consider changing to IV formulation if patient not able to tolerate PO on BiPAP  C/w doxycycline 100mg BID for 5 days for presumed COPD exacerbation   S/p 40mg IV lasix x2 in ED             - Hold on further diuresis per cardiology recommendations  Continue home symbicort and albuterol  Duonebs q6hrs for SOB  Wean supplemental O2 as able, currently on 3L NC   Plan for PFTs outpatient  , none on file   COPD navigator consult  Will need follow up with pulmonolgy and  pulmonary rehab on discharge     #CAD   #HFrEF (EF 15-20% 1/2024) s/p ICD placement 5/2020   #NSTEMI s/p RIRI to Lcx (Jan 2016)  #Hx of Mitral Valve repair (June 2019) w/ Dr Montana  #Troponinemia, stable  #Prolonged Qtc (Bazett) 507   #HTN  #DLD  :: Troponin elevation to peak of 1257 on admission, down trended since, likely in setting of demand ischemia;   - Continue Farxiga 10mg daily   - Not on statin therapy due to reported allergy per chart review  - Cardiology consulted in MICU, appreciate recs  - Limited TTE significant for LVEF 15-20% 1/24/24  -Not trending troponin as low suspicion for ACS  -Was holding Entresto due to lower Bps and PATSY   - ICD  device check 1/25     # PATSY on CKD IIIB  :: Baseline sCr ~1.3-1.4  :: Likely prerenal 2/2 hypovolemia with possible component of contrast toxicity.   S/p 500 ml as maintenance fluids in MICU   125cc/hr for 12h maintenance fluids  UA pending 1/25   Continue to hold nephrotoxic medications   CTM, Daily RFP     # T2DM   Euglycemic, HgbA1c 5.5 (11/2023)  Farxiga 10mg daily   Mild SSI     # Nausea  Continue Tigan PRN (prolonged Qtc)    F: Caution with diuresis and IV fluids given HFrEF, on 125cc/hr for 12 hours   E: Replete as needed  N: Carb Controlled Diet  A: PIV x 1  O2: 3 L via NC  Abx: Doxycycline     CODE STATUS: Full code  SDM: Mary (child) [phone]      Marisol Bojorquez MD  Department of Internal Medicine, PGY-1

## 2024-01-26 ENCOUNTER — APPOINTMENT (OUTPATIENT)
Dept: RADIOLOGY | Facility: HOSPITAL | Age: 69
DRG: 286 | End: 2024-01-26
Payer: COMMERCIAL

## 2024-01-26 LAB
ALBUMIN SERPL BCP-MCNC: 3.6 G/DL (ref 3.4–5)
ALBUMIN SERPL BCP-MCNC: 3.8 G/DL (ref 3.4–5)
ALP SERPL-CCNC: 107 U/L (ref 33–136)
ALT SERPL W P-5'-P-CCNC: 98 U/L (ref 7–45)
ANION GAP BLDV CALCULATED.4IONS-SCNC: 9 MMOL/L (ref 10–25)
ANION GAP SERPL CALC-SCNC: 15 MMOL/L (ref 10–20)
ANION GAP SERPL CALC-SCNC: 20 MMOL/L (ref 10–20)
APPEARANCE UR: CLEAR
AST SERPL W P-5'-P-CCNC: 63 U/L (ref 9–39)
ATRIAL RATE: 119 BPM
B-OH-BUTYR SERPL-SCNC: 1.2 MMOL/L (ref 0.02–0.27)
BASE EXCESS BLDV CALC-SCNC: -0.8 MMOL/L (ref -2–3)
BASE EXCESS BLDV CALC-SCNC: -1.5 MMOL/L (ref -2–3)
BASOPHILS # BLD AUTO: 0.01 X10*3/UL (ref 0–0.1)
BASOPHILS # BLD AUTO: 0.01 X10*3/UL (ref 0–0.1)
BASOPHILS NFR BLD AUTO: 0.2 %
BASOPHILS NFR BLD AUTO: 0.2 %
BILIRUB SERPL-MCNC: 0.7 MG/DL (ref 0–1.2)
BILIRUB UR STRIP.AUTO-MCNC: NEGATIVE MG/DL
BNP SERPL-MCNC: >5000 PG/ML (ref 0–99)
BODY TEMPERATURE: 37 DEGREES CELSIUS
BODY TEMPERATURE: 37 DEGREES CELSIUS
BUN SERPL-MCNC: 80 MG/DL (ref 6–23)
BUN SERPL-MCNC: 86 MG/DL (ref 6–23)
CA-I BLDV-SCNC: 1.19 MMOL/L (ref 1.1–1.33)
CALCIUM SERPL-MCNC: 9 MG/DL (ref 8.6–10.6)
CALCIUM SERPL-MCNC: 9 MG/DL (ref 8.6–10.6)
CARDIAC TROPONIN I PNL SERPL HS: 700 NG/L (ref 0–34)
CHLORIDE BLDV-SCNC: 101 MMOL/L (ref 98–107)
CHLORIDE SERPL-SCNC: 100 MMOL/L (ref 98–107)
CHLORIDE SERPL-SCNC: 102 MMOL/L (ref 98–107)
CO2 SERPL-SCNC: 19 MMOL/L (ref 21–32)
CO2 SERPL-SCNC: 23 MMOL/L (ref 21–32)
COLOR UR: YELLOW
CREAT SERPL-MCNC: 2.17 MG/DL (ref 0.5–1.05)
CREAT SERPL-MCNC: 2.28 MG/DL (ref 0.5–1.05)
EGFRCR SERPLBLD CKD-EPI 2021: 23 ML/MIN/1.73M*2
EGFRCR SERPLBLD CKD-EPI 2021: 24 ML/MIN/1.73M*2
EOSINOPHIL # BLD AUTO: 0 X10*3/UL (ref 0–0.7)
EOSINOPHIL # BLD AUTO: 0 X10*3/UL (ref 0–0.7)
EOSINOPHIL NFR BLD AUTO: 0 %
EOSINOPHIL NFR BLD AUTO: 0 %
ERYTHROCYTE [DISTWIDTH] IN BLOOD BY AUTOMATED COUNT: 15.6 % (ref 11.5–14.5)
ERYTHROCYTE [DISTWIDTH] IN BLOOD BY AUTOMATED COUNT: 15.9 % (ref 11.5–14.5)
EST. AVERAGE GLUCOSE BLD GHB EST-MCNC: 108 MG/DL
FERRITIN SERPL-MCNC: 720 NG/ML (ref 8–150)
FOLATE SERPL-MCNC: 5.3 NG/ML
GLUCOSE BLD MANUAL STRIP-MCNC: 154 MG/DL (ref 74–99)
GLUCOSE BLD MANUAL STRIP-MCNC: 176 MG/DL (ref 74–99)
GLUCOSE BLDV-MCNC: 190 MG/DL (ref 74–99)
GLUCOSE SERPL-MCNC: 156 MG/DL (ref 74–99)
GLUCOSE SERPL-MCNC: 172 MG/DL (ref 74–99)
GLUCOSE UR STRIP.AUTO-MCNC: ABNORMAL MG/DL
HBA1C MFR BLD: 5.4 %
HCO3 BLDV-SCNC: 24.3 MMOL/L (ref 22–26)
HCO3 BLDV-SCNC: 24.8 MMOL/L (ref 22–26)
HCT VFR BLD AUTO: 34.9 % (ref 36–46)
HCT VFR BLD AUTO: 39.9 % (ref 36–46)
HCT VFR BLD EST: 38 % (ref 36–46)
HGB BLD-MCNC: 11.5 G/DL (ref 12–16)
HGB BLD-MCNC: 13.3 G/DL (ref 12–16)
HGB BLDV-MCNC: 12.8 G/DL (ref 12–16)
HYALINE CASTS #/AREA URNS AUTO: ABNORMAL /LPF
IMM GRANULOCYTES # BLD AUTO: 0.04 X10*3/UL (ref 0–0.7)
IMM GRANULOCYTES # BLD AUTO: 0.05 X10*3/UL (ref 0–0.7)
IMM GRANULOCYTES NFR BLD AUTO: 0.7 % (ref 0–0.9)
IMM GRANULOCYTES NFR BLD AUTO: 0.8 % (ref 0–0.9)
INHALED O2 CONCENTRATION: 35 %
IRON SATN MFR SERPL: 34 % (ref 25–45)
IRON SERPL-MCNC: 82 UG/DL (ref 35–150)
KETONES UR STRIP.AUTO-MCNC: ABNORMAL MG/DL
LACTATE BLDV-SCNC: 1.2 MMOL/L (ref 0.4–2)
LEUKOCYTE ESTERASE UR QL STRIP.AUTO: NEGATIVE
LYMPHOCYTES # BLD AUTO: 0.3 X10*3/UL (ref 1.2–4.8)
LYMPHOCYTES # BLD AUTO: 0.47 X10*3/UL (ref 1.2–4.8)
LYMPHOCYTES NFR BLD AUTO: 5.2 %
LYMPHOCYTES NFR BLD AUTO: 7.3 %
MAGNESIUM SERPL-MCNC: 2.52 MG/DL (ref 1.6–2.4)
MAGNESIUM SERPL-MCNC: 2.65 MG/DL (ref 1.6–2.4)
MCH RBC QN AUTO: 28.8 PG (ref 26–34)
MCH RBC QN AUTO: 29.3 PG (ref 26–34)
MCHC RBC AUTO-ENTMCNC: 33 G/DL (ref 32–36)
MCHC RBC AUTO-ENTMCNC: 33.3 G/DL (ref 32–36)
MCV RBC AUTO: 88 FL (ref 80–100)
MCV RBC AUTO: 88 FL (ref 80–100)
MONOCYTES # BLD AUTO: 0.16 X10*3/UL (ref 0.1–1)
MONOCYTES # BLD AUTO: 0.18 X10*3/UL (ref 0.1–1)
MONOCYTES NFR BLD AUTO: 2.8 %
MONOCYTES NFR BLD AUTO: 2.8 %
MUCOUS THREADS #/AREA URNS AUTO: ABNORMAL /LPF
NEUTROPHILS # BLD AUTO: 5.21 X10*3/UL (ref 1.2–7.7)
NEUTROPHILS # BLD AUTO: 5.72 X10*3/UL (ref 1.2–7.7)
NEUTROPHILS NFR BLD AUTO: 88.9 %
NEUTROPHILS NFR BLD AUTO: 91.1 %
NITRITE UR QL STRIP.AUTO: NEGATIVE
NRBC BLD-RTO: 0 /100 WBCS (ref 0–0)
NRBC BLD-RTO: 0 /100 WBCS (ref 0–0)
OXYHGB MFR BLDV: 39.8 % (ref 45–75)
OXYHGB MFR BLDV: 49.9 % (ref 45–75)
P AXIS: 70 DEGREES
P OFFSET: 190 MS
P ONSET: 144 MS
PCO2 BLDV: 41 MM HG (ref 41–51)
PCO2 BLDV: 47 MM HG (ref 41–51)
PH BLDV: 7.33 PH (ref 7.33–7.43)
PH BLDV: 7.38 PH (ref 7.33–7.43)
PH UR STRIP.AUTO: 5 [PH]
PHOSPHATE SERPL-MCNC: 3.9 MG/DL (ref 2.5–4.9)
PLATELET # BLD AUTO: 161 X10*3/UL (ref 150–450)
PLATELET # BLD AUTO: 171 X10*3/UL (ref 150–450)
PO2 BLDV: 32 MM HG (ref 35–45)
PO2 BLDV: 35 MM HG (ref 35–45)
POTASSIUM BLDV-SCNC: 5.4 MMOL/L (ref 3.5–5.3)
POTASSIUM SERPL-SCNC: 5.1 MMOL/L (ref 3.5–5.3)
POTASSIUM SERPL-SCNC: 5.1 MMOL/L (ref 3.5–5.3)
PR INTERVAL: 144 MS
PROT SERPL-MCNC: 6.9 G/DL (ref 6.4–8.2)
PROT UR STRIP.AUTO-MCNC: ABNORMAL MG/DL
Q ONSET: 216 MS
QRS COUNT: 20 BEATS
QRS DURATION: 118 MS
QT INTERVAL: 386 MS
QTC CALCULATION(BAZETT): 542 MS
QTC FREDERICIA: 485 MS
R AXIS: 79 DEGREES
RBC # BLD AUTO: 3.99 X10*6/UL (ref 4–5.2)
RBC # BLD AUTO: 4.54 X10*6/UL (ref 4–5.2)
RBC # UR STRIP.AUTO: NEGATIVE /UL
RBC #/AREA URNS AUTO: ABNORMAL /HPF
SAO2 % BLDV: 40 % (ref 45–75)
SAO2 % BLDV: 51 % (ref 45–75)
SODIUM BLDV-SCNC: 129 MMOL/L (ref 136–145)
SODIUM SERPL-SCNC: 133 MMOL/L (ref 136–145)
SODIUM SERPL-SCNC: 136 MMOL/L (ref 136–145)
SP GR UR STRIP.AUTO: 1.02
SQUAMOUS #/AREA URNS AUTO: ABNORMAL /HPF
T AXIS: 223 DEGREES
T OFFSET: 409 MS
TIBC SERPL-MCNC: 243 UG/DL (ref 240–445)
TSH SERPL-ACNC: 0.45 MIU/L (ref 0.44–3.98)
UIBC SERPL-MCNC: 161 UG/DL (ref 110–370)
UROBILINOGEN UR STRIP.AUTO-MCNC: <2 MG/DL
VENTRICULAR RATE: 119 BPM
VIT B12 SERPL-MCNC: 931 PG/ML (ref 211–911)
WBC # BLD AUTO: 5.7 X10*3/UL (ref 4.4–11.3)
WBC # BLD AUTO: 6.4 X10*3/UL (ref 4.4–11.3)
WBC #/AREA URNS AUTO: ABNORMAL /HPF

## 2024-01-26 PROCEDURE — 82010 KETONE BODYS QUAN: CPT

## 2024-01-26 PROCEDURE — 37799 UNLISTED PX VASCULAR SURGERY: CPT

## 2024-01-26 PROCEDURE — 2500000004 HC RX 250 GENERAL PHARMACY W/ HCPCS (ALT 636 FOR OP/ED)

## 2024-01-26 PROCEDURE — 82607 VITAMIN B-12: CPT

## 2024-01-26 PROCEDURE — 83036 HEMOGLOBIN GLYCOSYLATED A1C: CPT

## 2024-01-26 PROCEDURE — 2500000001 HC RX 250 WO HCPCS SELF ADMINISTERED DRUGS (ALT 637 FOR MEDICARE OP)

## 2024-01-26 PROCEDURE — C1894 INTRO/SHEATH, NON-LASER: HCPCS | Performed by: HOSPITALIST

## 2024-01-26 PROCEDURE — 71045 X-RAY EXAM CHEST 1 VIEW: CPT | Performed by: RADIOLOGY

## 2024-01-26 PROCEDURE — 82728 ASSAY OF FERRITIN: CPT

## 2024-01-26 PROCEDURE — 83735 ASSAY OF MAGNESIUM: CPT

## 2024-01-26 PROCEDURE — 1100000001 HC PRIVATE ROOM DAILY

## 2024-01-26 PROCEDURE — 2720000007 HC OR 272 NO HCPCS: Performed by: HOSPITALIST

## 2024-01-26 PROCEDURE — 93451 RIGHT HEART CATH: CPT | Performed by: HOSPITALIST

## 2024-01-26 PROCEDURE — 36415 COLL VENOUS BLD VENIPUNCTURE: CPT

## 2024-01-26 PROCEDURE — 94660 CPAP INITIATION&MGMT: CPT

## 2024-01-26 PROCEDURE — 83540 ASSAY OF IRON: CPT

## 2024-01-26 PROCEDURE — 84443 ASSAY THYROID STIM HORMONE: CPT

## 2024-01-26 PROCEDURE — 80069 RENAL FUNCTION PANEL: CPT

## 2024-01-26 PROCEDURE — 82805 BLOOD GASES W/O2 SATURATION: CPT

## 2024-01-26 PROCEDURE — 84132 ASSAY OF SERUM POTASSIUM: CPT

## 2024-01-26 PROCEDURE — 85025 COMPLETE CBC W/AUTO DIFF WBC: CPT

## 2024-01-26 PROCEDURE — 87040 BLOOD CULTURE FOR BACTERIA: CPT

## 2024-01-26 PROCEDURE — 99232 SBSQ HOSP IP/OBS MODERATE 35: CPT

## 2024-01-26 PROCEDURE — 97161 PT EVAL LOW COMPLEX 20 MIN: CPT | Mod: GP

## 2024-01-26 PROCEDURE — C1727 CATH, BAL TIS DIS, NON-VAS: HCPCS | Performed by: HOSPITALIST

## 2024-01-26 PROCEDURE — 2500000004 HC RX 250 GENERAL PHARMACY W/ HCPCS (ALT 636 FOR OP/ED): Performed by: NURSE PRACTITIONER

## 2024-01-26 PROCEDURE — 2500000002 HC RX 250 W HCPCS SELF ADMINISTERED DRUGS (ALT 637 FOR MEDICARE OP, ALT 636 FOR OP/ED): Mod: MUE

## 2024-01-26 PROCEDURE — 82746 ASSAY OF FOLIC ACID SERUM: CPT

## 2024-01-26 PROCEDURE — 2500000005 HC RX 250 GENERAL PHARMACY W/O HCPCS: Performed by: HOSPITALIST

## 2024-01-26 PROCEDURE — C1769 GUIDE WIRE: HCPCS | Performed by: HOSPITALIST

## 2024-01-26 PROCEDURE — 83880 ASSAY OF NATRIURETIC PEPTIDE: CPT

## 2024-01-26 PROCEDURE — 99222 1ST HOSP IP/OBS MODERATE 55: CPT | Performed by: NURSE PRACTITIONER

## 2024-01-26 PROCEDURE — 99233 SBSQ HOSP IP/OBS HIGH 50: CPT | Performed by: STUDENT IN AN ORGANIZED HEALTH CARE EDUCATION/TRAINING PROGRAM

## 2024-01-26 PROCEDURE — 97165 OT EVAL LOW COMPLEX 30 MIN: CPT | Mod: GO

## 2024-01-26 PROCEDURE — 84484 ASSAY OF TROPONIN QUANT: CPT

## 2024-01-26 PROCEDURE — 4A023N6 MEASUREMENT OF CARDIAC SAMPLING AND PRESSURE, RIGHT HEART, PERCUTANEOUS APPROACH: ICD-10-PCS | Performed by: INTERNAL MEDICINE

## 2024-01-26 PROCEDURE — 71045 X-RAY EXAM CHEST 1 VIEW: CPT

## 2024-01-26 PROCEDURE — 82947 ASSAY GLUCOSE BLOOD QUANT: CPT

## 2024-01-26 PROCEDURE — 81001 URINALYSIS AUTO W/SCOPE: CPT | Performed by: STUDENT IN AN ORGANIZED HEALTH CARE EDUCATION/TRAINING PROGRAM

## 2024-01-26 RX ORDER — POLYETHYLENE GLYCOL 3350 17 G/17G
17 POWDER, FOR SOLUTION ORAL DAILY
Status: COMPLETED | OUTPATIENT
Start: 2024-01-26 | End: 2024-01-29

## 2024-01-26 RX ORDER — FUROSEMIDE 10 MG/ML
80 INJECTION INTRAMUSCULAR; INTRAVENOUS ONCE
Status: COMPLETED | OUTPATIENT
Start: 2024-01-26 | End: 2024-01-26

## 2024-01-26 RX ORDER — ALBUTEROL SULFATE 90 UG/1
2 AEROSOL, METERED RESPIRATORY (INHALATION) EVERY 6 HOURS
Status: DISCONTINUED | OUTPATIENT
Start: 2024-01-26 | End: 2024-01-27

## 2024-01-26 RX ORDER — SODIUM NITROPRUSSIDE IN 0.9% SODIUM CHLORIDE 0.5 MG/ML
INJECTION INTRAVENOUS
Status: COMPLETED
Start: 2024-01-26 | End: 2024-01-26

## 2024-01-26 RX ORDER — HYDROXYZINE HYDROCHLORIDE 25 MG/1
25 TABLET, FILM COATED ORAL ONCE
Status: COMPLETED | OUTPATIENT
Start: 2024-01-26 | End: 2024-01-26

## 2024-01-26 RX ORDER — DOBUTAMINE HYDROCHLORIDE 400 MG/100ML
2.5 INJECTION INTRAVENOUS CONTINUOUS
Status: DISCONTINUED | OUTPATIENT
Start: 2024-01-26 | End: 2024-01-27

## 2024-01-26 RX ORDER — ONDANSETRON HYDROCHLORIDE 2 MG/ML
4 INJECTION, SOLUTION INTRAVENOUS ONCE
Status: COMPLETED | OUTPATIENT
Start: 2024-01-26 | End: 2024-01-26

## 2024-01-26 RX ORDER — SENNOSIDES 8.6 MG/1
1 TABLET ORAL ONCE
Status: DISCONTINUED | OUTPATIENT
Start: 2024-01-26 | End: 2024-01-26

## 2024-01-26 RX ORDER — SODIUM CHLORIDE, SODIUM LACTATE, POTASSIUM CHLORIDE, CALCIUM CHLORIDE 600; 310; 30; 20 MG/100ML; MG/100ML; MG/100ML; MG/100ML
10 INJECTION, SOLUTION INTRAVENOUS CONTINUOUS
Status: DISCONTINUED | OUTPATIENT
Start: 2024-01-26 | End: 2024-01-31

## 2024-01-26 RX ORDER — SODIUM NITROPRUSSIDE IN 0.9% SODIUM CHLORIDE 0.5 MG/ML
.25-5 INJECTION INTRAVENOUS CONTINUOUS
Status: DISCONTINUED | OUTPATIENT
Start: 2024-01-26 | End: 2024-01-28

## 2024-01-26 RX ORDER — LIDOCAINE HYDROCHLORIDE 20 MG/ML
INJECTION, SOLUTION INFILTRATION; PERINEURAL AS NEEDED
Status: DISCONTINUED | OUTPATIENT
Start: 2024-01-26 | End: 2024-01-26 | Stop reason: HOSPADM

## 2024-01-26 RX ADMIN — HYDROXYZINE HYDROCHLORIDE 25 MG: 25 TABLET, FILM COATED ORAL at 01:57

## 2024-01-26 RX ADMIN — SENNOSIDES AND DOCUSATE SODIUM 2 TABLET: 8.6; 5 TABLET ORAL at 21:11

## 2024-01-26 RX ADMIN — SODIUM NITROPRUSSIDE 0.3 MCG/KG/MIN: 0.5 INJECTION, SOLUTION INTRAVENOUS at 18:10

## 2024-01-26 RX ADMIN — SENNOSIDES AND DOCUSATE SODIUM 2 TABLET: 8.6; 5 TABLET ORAL at 10:11

## 2024-01-26 RX ADMIN — SODIUM CHLORIDE, POTASSIUM CHLORIDE, SODIUM LACTATE AND CALCIUM CHLORIDE 10 ML/HR: 600; 310; 30; 20 INJECTION, SOLUTION INTRAVENOUS at 18:15

## 2024-01-26 RX ADMIN — ALBUTEROL SULFATE 2 PUFF: 90 AEROSOL, METERED RESPIRATORY (INHALATION) at 19:00

## 2024-01-26 RX ADMIN — SODIUM NITROPRUSSIDE 3 MCG/KG/MIN: 0.5 INJECTION, SOLUTION INTRAVENOUS at 22:41

## 2024-01-26 RX ADMIN — ALBUTEROL SULFATE 2 PUFF: 90 AEROSOL, METERED RESPIRATORY (INHALATION) at 15:32

## 2024-01-26 RX ADMIN — ASPIRIN 81 MG CHEWABLE TABLET 81 MG: 81 TABLET CHEWABLE at 10:13

## 2024-01-26 RX ADMIN — POLYETHYLENE GLYCOL 3350 17 G: 17 POWDER, FOR SOLUTION ORAL at 10:11

## 2024-01-26 RX ADMIN — ACETAMINOPHEN 975 MG: 325 TABLET ORAL at 21:18

## 2024-01-26 RX ADMIN — DEXAMETHASONE 6 MG: 6 TABLET ORAL at 10:12

## 2024-01-26 RX ADMIN — DOBUTAMINE HYDROCHLORIDE 2.5 MCG/KG/MIN: 400 INJECTION INTRAVENOUS at 21:12

## 2024-01-26 RX ADMIN — FUROSEMIDE 80 MG: 10 INJECTION, SOLUTION INTRAMUSCULAR; INTRAVENOUS at 21:12

## 2024-01-26 RX ADMIN — SODIUM BICARBONATE 125 ML/HR: 84 INJECTION, SOLUTION INTRAVENOUS at 01:41

## 2024-01-26 RX ADMIN — DOXYCYCLINE HYCLATE 100 MG: 100 TABLET, COATED ORAL at 21:12

## 2024-01-26 RX ADMIN — HEPARIN SODIUM 5000 UNITS: 5000 INJECTION INTRAVENOUS; SUBCUTANEOUS at 10:12

## 2024-01-26 RX ADMIN — HEPARIN SODIUM 5000 UNITS: 5000 INJECTION INTRAVENOUS; SUBCUTANEOUS at 01:51

## 2024-01-26 RX ADMIN — DOXYCYCLINE HYCLATE 100 MG: 100 TABLET, COATED ORAL at 06:02

## 2024-01-26 RX ADMIN — ONDANSETRON 4 MG: 2 INJECTION INTRAMUSCULAR; INTRAVENOUS at 21:03

## 2024-01-26 ASSESSMENT — COGNITIVE AND FUNCTIONAL STATUS - GENERAL
TURNING FROM BACK TO SIDE WHILE IN FLAT BAD: A LITTLE
TOILETING: A LITTLE
HELP NEEDED FOR BATHING: A LITTLE
WALKING IN HOSPITAL ROOM: A LITTLE
CLIMB 3 TO 5 STEPS WITH RAILING: TOTAL
PERSONAL GROOMING: A LITTLE
EATING MEALS: A LITTLE
CLIMB 3 TO 5 STEPS WITH RAILING: TOTAL
MOBILITY SCORE: 16
DAILY ACTIVITIY SCORE: 19
MOBILITY SCORE: 16
DRESSING REGULAR UPPER BODY CLOTHING: A LITTLE
TOILETING: A LITTLE
DRESSING REGULAR LOWER BODY CLOTHING: A LOT
DAILY ACTIVITIY SCORE: 19
TOILETING: A LOT
WALKING IN HOSPITAL ROOM: A LITTLE
STANDING UP FROM CHAIR USING ARMS: A LITTLE
DRESSING REGULAR UPPER BODY CLOTHING: A LITTLE
MOVING FROM LYING ON BACK TO SITTING ON SIDE OF FLAT BED WITH BEDRAILS: A LITTLE
DRESSING REGULAR LOWER BODY CLOTHING: A LITTLE
TURNING FROM BACK TO SIDE WHILE IN FLAT BAD: A LITTLE
HELP NEEDED FOR BATHING: A LITTLE
DAILY ACTIVITIY SCORE: 16
MOVING FROM LYING ON BACK TO SITTING ON SIDE OF FLAT BED WITH BEDRAILS: A LITTLE
HELP NEEDED FOR BATHING: A LITTLE
STANDING UP FROM CHAIR USING ARMS: A LITTLE
DRESSING REGULAR UPPER BODY CLOTHING: A LITTLE
MOVING TO AND FROM BED TO CHAIR: A LITTLE
DRESSING REGULAR LOWER BODY CLOTHING: A LITTLE
PERSONAL GROOMING: A LITTLE
PERSONAL GROOMING: A LITTLE
MOVING TO AND FROM BED TO CHAIR: A LITTLE

## 2024-01-26 ASSESSMENT — PAIN - FUNCTIONAL ASSESSMENT
PAIN_FUNCTIONAL_ASSESSMENT: 0-10

## 2024-01-26 ASSESSMENT — PAIN DESCRIPTION - DESCRIPTORS: DESCRIPTORS: ACHING

## 2024-01-26 ASSESSMENT — PAIN SCALES - GENERAL
PAINLEVEL_OUTOF10: 0 - NO PAIN
PAINLEVEL_OUTOF10: 4

## 2024-01-26 ASSESSMENT — ACTIVITIES OF DAILY LIVING (ADL)
ADL_ASSISTANCE: NEEDS ASSISTANCE
ADL_ASSISTANCE: NEEDS ASSISTANCE
BATHING_ASSISTANCE: MODERATE
LACK_OF_TRANSPORTATION: NO

## 2024-01-26 NOTE — INTERVAL H&P NOTE
H&P reviewed. The patient was examined and there are no changes to the H&P.    Patient seen this afternoon, reports main concern is her dyspnea. She does not have significant LE edema but is cool to the touch throughout. Currently on supplemental O2.

## 2024-01-26 NOTE — NURSING NOTE
"Pt current /83  100% on 3L NC RR 23. Current pH 7.23. pt co SOB and feel like she is \"going to pass out\". RT paged for a breathing treatment.   On call notified via secure chat. See new orders and eMAR.   Will continue to monitor the patient.   "

## 2024-01-26 NOTE — PROGRESS NOTES
01/26/24 1506   Discharge Planning   Living Arrangements Children  (daughter and grand son)   Support Systems Children   Assistance Needed Pt is completely funtional at baseline + drives. Daughter is her HHA and assists with cooking, getting in/out tub etc.   Type of Residence Private residence   Number of Stairs to Enter Residence 4   Number of Stairs Within Residence 8  (bed/bathroom located on 2nd level)   Do you have animals or pets at home? No   Who is requesting discharge planning? Provider   Home or Post Acute Services In home services   Type of Home Care Services Home health aide;Home nursing visits;Home OT;DME or oxygen;Home PT   Patient expects to be discharged to: Home with [facility] services   Does the patient need discharge transport arranged? No  (Daughter will drive home)   Financial Resource Strain   How hard is it for you to pay for the very basics like food, housing, medical care, and heating? Not hard   Housing Stability   In the last 12 months, was there a time when you were not able to pay the mortgage or rent on time? N   In the last 12 months, how many places have you lived? 1   In the last 12 months, was there a time when you did not have a steady place to sleep or slept in a shelter (including now)? N   Transportation Needs   In the past 12 months, has lack of transportation kept you from medical appointments or from getting medications? no   In the past 12 months, has lack of transportation kept you from meetings, work, or from getting things needed for daily living? No   Patient Choice   Provider Choice list and CMS website (https://medicare.gov/care-compare#search) for post-acute Quality and Resource Measure Data were provided and reviewed with: Patient   Patient / Family choosing to utilize agency / facility established prior to hospitalization No  (Pt declined SNF list)     Met with pt and introduced myself as care coordinator with Care Transitions Team for discharge planning. Spoke with  daughter Sarah [phone] via phone for collateral info. Pt stated daughter is her HHA thru [employer]. Pt stated she was functioning independently at home and uses a rollator for assistance with ambulation. Pt is wearing O2/NC but does not have any home O2 equipment at home. Pt stated she feels safe at home. Pt denies any falls. Pt's address, phone number, and contact information was verified. Pt denies any social or financial concerns at this time.    Home care: Yes Agency: daughter is  Disciplines: HHA services  DME: rollator, defibrillator monitor, nebulizer machine  : [facility] # [phone]  PCP: Michel Chatman MD  Last appt: Last month  Transport to appts: Pt drives herself  [facility] [address] (denies issues affording/obtaining medications)    Discharge Planning: Both pt/ daughter Sarah updated regarding PT recs for moderate intensity therapy post discharge, and both prefers for pt to discharge home with home PT/OT vs. SNF. Pt stated she had a bad experience in the past at SNF and will absolutely not discharge to a facility regardless of where it is. Daughter Sarah attested to this as well, but would like for pt to receive home PT/OT services post discharge. Medical team updated of above. Care coordinator will continue to follow for discharge planning needs.    Danuta Mack RN  Transitional Care Coordinator/TCC  [id number]

## 2024-01-26 NOTE — PROGRESS NOTES
Occupational Therapy    Occupational Therapy    Evaluation    Patient Name: Melissa Marinelli  MRN: [id number]  Today's Date: 1/26/2024  Time Calculation  Start Time: 0927  Stop Time: 0943  Time Calculation (min): 16 min    Assessment  IP OT Assessment  End of Session Communication: Bedside nurse  End of Session Patient Position: Bed, 3 rail up, Alarm off, not on at start of session  Plan:  Treatment Interventions: ADL retraining, Functional transfer training, Endurance training, Patient/family training  OT Frequency: 3 times per week  OT Discharge Recommendations: Moderate intensity level of continued care  OT - OK to Discharge: Yes    Subjective   Current Problem:  1. Hypoxic respiratory failure (CMS/HCC)        2. Ischemic cardiomyopathy  Transthoracic Echo (TTE) Limited    Transthoracic Echo (TTE) Limited      3. History of mitral valve replacement  Transthoracic Echo (TTE) Limited    Transthoracic Echo (TTE) Limited      4. HFrEF (heart failure with reduced ejection fraction) (CMS/HCC)  Transthoracic Echo (TTE) Limited    Transthoracic Echo (TTE) Limited      5. Presence of automatic (implantable) cardiac defibrillator  Cardiac device check - Inpatient    Cardiac device check - Inpatient        General:  Reason for Referral: presenting to MICU d/t AHRF requiring BiPAP  Past Medical History Relevant to Rehab: HFrEF (EF 15-20%) s/p ICD placement 5/2020, CAD s/p NSTEMI with RIRI to LCx (Jan 2016), s/p MVR (June 2019), COPD (no formal PFTs), HTN, HLD, GERD, CVA, CKD IIIB and DMII.  Co-Treatment: PT  Co-Treatment Reason: to maximize safe pt mobility and participation  Prior to Session Communication: Bedside nurse  Patient Position Received: Bed, 3 rail up, Alarm off, not on at start of session  General Comment: RN cleared pt for therapy. Pt in supine on arrival, willing to participate with encouragement, pt fatigues easily with min activity.   Precautions:  Medical Precautions: Cardiac precautions, Fall  precautions  Precautions Comment: Droplet plus, covid precautions  Vital Signs:  Heart Rate: 110  SpO2: 98 % (1 L O2)  Pain:  Pain Assessment  Pain Assessment: 0-10  Pain Score: 0 - No pain  Lines/Tubes/Drains:         Objective   Cognition:  Overall Cognitive Status: Within Functional Limits           Home Living:  Type of Home: House  Lives With: Adult children  Home Adaptive Equipment:  (rollator)  Home Layout: Two level, Bed/bath upstairs, Stairs to alternate level with rails  Alternate Level Stairs-Rails: Right  Alternate Level Stairs-Number of Steps: 10  Home Access: Stairs to enter with rails  Entrance Stairs-Rails: Right  Entrance Stairs-Number of Steps: 4  Bathroom Shower/Tub: Tub/shower unit   Prior Function:  Level of [place]: Independent with ADLs and functional transfers  Receives Help From: Family  ADL Assistance: Needs assistance  Homemaking Assistance: Needs assistance  Meal Prep:  (daughter completes)  Ambulatory Assistance: Independent  Vocational: Retired  IADL History:  Mode of Transportation: Family  ADL:  Eating Assistance: Independent  Eating Deficit: Setup  Grooming Assistance: Independent  Grooming Deficit: Setup  Bathing Assistance: Moderate  UE Dressing Assistance: Minimal  LE Dressing Assistance: Moderate  Toileting Assistance with Device: Minimal  Activity Tolerance:  Endurance: Decreased tolerance for upright activites  Balance:     Bed Mobility/Transfers: Bed Mobility  Bed Mobility: Yes  Bed Mobility 1  Bed Mobility 1: Supine to sitting, Sitting to supine  Level of Assistance 1: Minimum assistance  Bed Mobility Comments 1: HOB elevated, bed rails   and Transfers  Transfer: Yes  Transfer 1  Transfer From 1: Sit to  Transfer to 1: Stand  Technique 1: Sit to stand  Transfer Device 1: Walker  Transfer Level of Assistance 1: Minimum assistance  Trials/Comments 1: 2  IADL's:   Mode of Transportation: Family  Vision: Vision - Basic Assessment  Current Vision: No visual deficits   and     Sensation:  Light Touch: No apparent deficits      Hand Function:  Hand Function  Gross Grasp: Functional  Coordination: Functional  Extremities: RUE   RUE : Within Functional Limits, LUE   LUE: Within Functional Limits, RLE   RLE : Within Functional Limits, and LLE   LLE : Within Functional Limits    Outcome Measures: [facility] Daily Activity  Putting on and taking off regular lower body clothing: A lot  Bathing (including washing, rinsing, drying): A little  Putting on and taking off regular upper body clothing: A little  Toileting, which includes using toilet, bedpan or urinal: A lot  Taking care of personal grooming such as brushing teeth: A little  Eating Meals: A little  Daily Activity - Total Score: 16         ,          Education Documentation  ADL Training, taught by Myriam Mar OT at 1/26/2024  2:10 PM.  Learner: Patient  Readiness: Acceptance  Method: Explanation  Response: Needs Reinforcement    Education Comments  No comments found.        Goals:   Encounter Problems       Encounter Problems (Active)       ADLs       Patient with complete upper body dressing with independent level of assistance donning and doffing all UE clothes with no adaptive equipment while edge of bed  (Progressing)       Start:  01/26/24    Expected End:  02/16/24            Patient with complete lower body dressing with modified independent level of assistance donning and doffing all LE clothes  with no adaptive equipment while edge of bed  (Progressing)       Start:  01/26/24    Expected End:  02/16/24            Patient will complete daily grooming tasks washing face/hair with modified independent level of assistance and PRN adaptive equipment while standing. (Progressing)       Start:  01/26/24    Expected End:  02/16/24            Patient will complete toileting including hygiene clothing management/hygiene with independent level of assistance and raised toilet seat. (Progressing)       Start:  01/26/24    Expected End:   02/16/24               BALANCE       Patient will tolerate standing for >/ = 5 minutes to modified independent level of assistance with least restrictive device in order to improve functional activity tolerance for ADL tasks. (Progressing)       Start:  01/26/24    Expected End:  02/16/24                       MOBILITY       Patient will perform Functional mobility Household distances with modified independent level of assistance and least restrictive device in order to improve safety and functional mobility. (Progressing)       Start:  01/26/24    Expected End:  02/16/24                       TRANSFERS       Patient will perform bed mobility independent level of assistance in order to improve safety and independence with mobility (Progressing)       Start:  01/26/24    Expected End:  02/16/24 01/26/24 at 2:11 PM   Myriam Mar, OT   Rehab Office: [phone]

## 2024-01-26 NOTE — NURSING NOTE
ROSA consulted for PIV replacement. Upon chart review, unsuccessful midline placement was attempted on 1/24 after PIV placed by ROSA the previous day failed.     Patient has a 20g accucath in place in the right forearm, however tip position was unable to be visualized using ultrasound, with scant blood return on assessment. No appropriate diameter venous targets visualized in the right arm via ultrasound. A 24g diffusics was successfully placed into a diminutive vein of the left hand. Primary team, please make a venous access plan in the event ROSA is unable to successfully replace peripheral devices currently in use.

## 2024-01-26 NOTE — SIGNIFICANT EVENT
Rapid Response RN Note    Rapid response RN at bedside for RADAR score 7 due to the following VS: T 36.3 °Celsius;  ; RR 25; /83; SPO2 100%.     On arrival, MD already at bedside. Blood work ordered with ABG.  Respiratory to bedside for treatment.      Staff to page rapid response for any concerns or acute change in condition/VS.

## 2024-01-26 NOTE — NURSING NOTE
Patient transferred to HF ICU, attempted to call report starting at approximately 5:45pm. Multiple unsuccessful calls made to the nurse. Last call made, nurse was in the room with the patient after direct transfer from cath lab, ICU nurse to call back for report. Attempted to call report to HF ICU stating report was not needed. Patient's belongings in [place] [address] in the nurse's station.

## 2024-01-26 NOTE — SIGNIFICANT EVENT
"Significant Events Overnight    On call team was paged by nursing ~6pm regarding patient endorsed worsening shortness of breath and feeling subjectively feverish. She was notably started on LR 125cc/hr ~2pm (x12hr) iso concern for prerenal PATSY. She was seen at the bedside at which time she was lethargic, A&Ox3 but slow to respond and needing significant prompting to answer. Vitals then showed she was AF, tachycardic to 117, tachypneic to 20s, /101, 100% on 3L, with improved tachypnea s/p duoneb.  Exam was notable for increased respiratory effort with decreased breath sounds at the bases but no wheezing or crackles. She otherwise appeared euvolemic with no JVD or peripheral edema. ABG was drawn then (results not uploaded but showed 7.32, CO2 33, O2 124, HCO3 17, lac 1.7. Notably RFP drawn ~4pm showed Na134, K 5.2 (hemolyzed), Cr 2.38 (BL~1.4) Bicarb 15, AG 16 with delta ratio of 0.4 suggesting mixed gap/non-gap acidosis. BHB added on was 2.06 (also hemolyzed) EKG showed sinus tachycardia. CXR showed bilateral airspace opacities c/w pulmonary edema which appeared slightly improved compared to 1/22. Trop/BNP pending. Decision was made to continue LR and monitor serial VBG complete ~9:30 which showed 7.23, CO2 49, HCO3 20.5, lac 2.3. MAPs remained Decision was made to order blood cultures stop her LR and start bicarb gtt for the 4 hours remaining in her maintenance fluids.     The team was again called ~11pm when the patient attempted to get up and use the restroom during which time she became dyspneic once again noting she felt like she \"was going to pass out\". Vitals showed 137/83  100% on RA. Exam was similar compared to previous, though work of breathing was improved compared to previous. ABG was drawn which showed 7.35, CO2 31, O2 151, HCO3 17.1, lac 1.6. RT was called but did not recommend nebulized treatment iso COVID and lack of physical or biochemical evidence of obstruction.     She continued to " remain dyspneic and anxious during the night and wanted nebulized therapy, but was refusing albuterol treatment. She was again seen at the bedside for this ~1:30 am at which time she was breathing comfortably with no wheezing on exam, still saturating well on room air, but subjectively short of breath. She felt exhausted and stated she just wanted to get some sleep. She was given Atarax 25mg. Will continue to follow closely.    French Romo  PGY-1, Medicine Resident

## 2024-01-26 NOTE — PROGRESS NOTES
Physical Therapy    Physical Therapy Evaluation    Patient Name: eMlissa Marinelli  MRN: [id number]  Today's Date: 1/26/2024   Time Calculation  Start Time: 0926  Stop Time: 0943  Time Calculation (min): 17 min    Assessment/Plan   PT Assessment  PT Assessment Results: Decreased strength, Decreased range of motion, Decreased endurance, Impaired balance, Decreased mobility, Decreased safety awareness, Pain  Rehab Prognosis: Good  Evaluation/Treatment Tolerance: Patient limited by fatigue  Medical Staff Made Aware: Yes  End of Session Communication: Bedside nurse  Assessment Comment: pt admitted for AHRF requiring bipap. pt now on 1 L O2. pt uses rollator at baseline. pt deonstrated decreased tolerance for upright activities. pt demonstrates decreased balance, endurance and functional mobility. pt benefits from continued PT to address deficits.  End of Session Patient Position: Bed, 3 rail up, Alarm off, not on at start of session  IP OR SWING BED PT PLAN  Inpatient or Swing Bed: Inpatient  PT Plan  Treatment/Interventions: Bed mobility, Transfer training, Gait training, Stair training, Balance training, Strengthening, Endurance training, Range of motion, Therapeutic exercise, Therapeutic activity, Home exercise program, Positioning  PT Plan: Skilled PT  PT Frequency: 3 times per week  PT Discharge Recommendations: Moderate intensity level of continued care  PT Recommended Transfer Status: Assist x1  PT - OK to Discharge: Yes      Subjective   General Visit Information:  General  Reason for Referral: presenting to MICU d/t AHRF requiring BiPAP  Past Medical History Relevant to Rehab: HFrEF (EF 15-20%) s/p ICD placement 5/2020, CAD s/p NSTEMI with RIRI to LCx (Jan 2016), s/p MVR (June 2019), COPD (no formal PFTs), HTN, HLD, GERD, CVA, CKD IIIB and DMII.  Co-Treatment: OT  Co-Treatment Reason: to maximize safe pt mobility and participation  Prior to Session Communication: Bedside nurse  Patient Position Received: Bed, 3 rail  up, Alarm off, not on at start of session  General Comment: pt supine in bed. tired appearing. agreeable for therapy. pt stating SOB, fatigue and lightheadedness with activity. batsheva, notified RN.  Home Living:  Home Living  Type of Home: House  Lives With: Adult children  Home Adaptive Equipment:  (rollator)  Home Layout: Two level, Bed/bath upstairs, Stairs to alternate level with rails  Alternate Level Stairs-Rails: Right  Alternate Level Stairs-Number of Steps: 10  Home Access: Stairs to enter with rails  Entrance Stairs-Rails: Right  Entrance Stairs-Number of Steps: 4  Bathroom Shower/Tub: Tub/shower unit  Prior Level of Function:  Prior Function Per Pt/Caregiver Report  Level of [place]:  (dtr assists with ADLs)  Receives Help From: Family  ADL Assistance: Needs assistance  Homemaking Assistance: Needs assistance  Ambulatory Assistance: Independent (rollator for longer distances)  Precautions:  Precautions  Medical Precautions: Cardiac precautions, Fall precautions (COVID prec)  Vital Signs:  Vital Signs  Heart Rate: 110  SpO2: 98 % (1L)    Objective   Pain:  Pain Assessment  Pain Assessment: 0-10  Pain Score: 0 - No pain  Cognition:  Cognition  Overall Cognitive Status: Within Functional Limits    General Assessments:                  Activity Tolerance  Endurance: Decreased tolerance for upright activites    Sensation  Light Touch: No apparent deficits                   Coordination  Movements are Fluid and Coordinated: Yes    Postural Control  Postural Control:  (favors leaning anteriorly/ R lateral lean)    Static Sitting Balance  Static Sitting-Balance Support: Bilateral upper extremity supported  Static Sitting-Level of Assistance: Contact guard  Dynamic Sitting Balance  Dynamic Sitting-Balance Support: Bilateral upper extremity supported  Dynamic Sitting-Balance: Trunk control activities  Dynamic Sitting-Comments: CGA    Static Standing Balance  Static Standing-Balance Support: Bilateral upper  "extremity supported  Static Standing-Level of Assistance: Minimum assistance  Dynamic Standing Balance  Dynamic Standing-Balance Support: Bilateral upper extremity supported  Dynamic Standing-Balance: Turning (gait; transfers)  Dynamic Standing-Comments: [place]  Functional Assessments:  Bed Mobility  Bed Mobility: Yes  Bed Mobility 1  Bed Mobility 1: Supine to sitting, Sitting to supine  Level of Assistance 1: Minimum assistance  Bed Mobility Comments 1: HOB elevated; pt tending to \"flop\" into bed 2/2 decreased eccentric control    Transfers  Transfer: Yes  Transfer 1  Transfer From 1: Bed to  Transfer to 1: Stand  Technique 1: Sit to stand, Stand to sit  Transfer Device 1: Walker  Transfer Level of Assistance 1: Minimum assistance, Minimal verbal cues  Trials/Comments 1: 2    Ambulation/Gait Training  Ambulation/Gait Training Performed: Yes  Ambulation/Gait Training 1  Surface 1: Level tile  Device 1: Rolling walker  Assistance 1: Minimum assistance, Minimal verbal cues  Quality of Gait 1: Wide base of support, Inconsistent stride length, Decreased step length, Diminished heel strike, Soft knee(s)  Comments/Distance (ft) 1: ~5ft  Extremity/Trunk Assessments:          RUE   RUE : Within Functional Limits  LUE   LUE: Within Functional Limits  RLE   RLE : Within Functional Limits  LLE   LLE : Within Functional Limits  Outcome Measures:  [facility] Basic Mobility  Turning from your back to your side while in a flat bed without using bedrails: A little  Moving from lying on your back to sitting on the side of a flat bed without using bedrails: A little  Moving to and from bed to chair (including a wheelchair): A little  Standing up from a chair using your arms (e.g. wheelchair or bedside chair): A little  To walk in hospital room: A little  Climbing 3-5 steps with railing: Total  Basic Mobility - Total Score: 16    Encounter Problems       Encounter Problems (Active)       Balance       pt will score ./= 242/8 on [place] " balance assessment indicating low risk for falls  (Progressing)       Start:  01/26/24    Expected End:  02/09/24               Mobility       STG - Patient will ambulate >/= 150 ft with FWW and CGA, VSS  (Progressing)       Start:  01/26/24    Expected End:  02/09/24            STG - Patient will ascend and descend a flight of stairs CGA (Progressing)       Start:  01/26/24    Expected End:  02/09/24               Transfers       STG - Transfer from bed to chair CGA And FWW (Progressing)       Start:  01/26/24    Expected End:  02/09/24            STG - Patient will transfer sit to and from stand CGA and FWW (Progressing)       Start:  01/26/24    Expected End:  02/09/24                   Education Documentation  Body Mechanics, taught by Sue Oseguera, PT at 1/26/2024 10:19 AM.  Learner: Patient  Readiness: Acceptance  Method: Explanation  Response: Verbalizes Understanding    Mobility Training, taught by Sue Oseguera, PT at 1/26/2024 10:19 AM.  Learner: Patient  Readiness: Acceptance  Method: Explanation  Response: Verbalizes Understanding    Education Comments  No comments found.        01/26/24 at 10:20 AM - Sue Oseguera, PT

## 2024-01-26 NOTE — PROGRESS NOTES
"Melissa Marinelli is a 68 y.o. female on day 4 of admission presenting with Hypoxic respiratory failure (CMS/HCC).    Subjective   Event note noted from overnight. Patient endorses this AM that her breathing is better than the SOB events last night, but still endorses subjective SOB. Becomes SOB even with transferring to commode at bedside. Denies CP, palpitations, leg swelling.        Objective     Physical Exam  General: ill-appearing, in NAD  HEENT: NCAT, no scleral icterus  CV: RRR, no m/r/g  Pulm: CTAB, no abnormal lung sounds, no JVD  Abdomen: soft, nontender, nondistended  Skin: warm and dry  Extremities: No significant LE or UE edema  Neuro: AOx3, responding appropriately  Psych: appropriate affect    Last Recorded Vitals  Blood pressure 136/85, pulse 108, temperature 36.3 °C (97.3 °F), resp. rate 18, height 1.626 m (5' 4\"), weight 64.4 kg (141 lb 15.6 oz), SpO2 100 %.  Intake/Output last 3 Shifts:  I/O last 3 completed shifts:  In: 360 (5.7 mL/kg) [P.O.:360]  Out: - (0 mL/kg)   Weight: 63.2 kg     Relevant Results  Lab Results   Component Value Date    WBC 6.4 01/26/2024    HGB 13.3 01/26/2024    HCT 39.9 01/26/2024    MCV 88 01/26/2024     01/26/2024     Lab Results   Component Value Date    GLUCOSE 156 (H) 01/26/2024    CALCIUM 9.0 01/26/2024     01/26/2024    K 5.1 01/26/2024    CO2 19 (L) 01/26/2024     01/26/2024    BUN 80 (H) 01/26/2024    CREATININE 2.17 (H) 01/26/2024       Current Facility-Administered Medications:     acetaminophen (Tylenol) tablet 975 mg, 975 mg, oral, q8h PRN, Alex Briseno MD, 975 mg at 01/22/24 1925    albuterol 90 mcg/actuation inhaler 2 puff, 2 puff, inhalation, q6h, Marisol Bojorquez MD, 2 puff at 01/26/24 1532    aspirin chewable tablet 81 mg, 81 mg, oral, Daily, Alex Briseno MD, 81 mg at 01/26/24 1013    [Held by provider] dapagliflozin propanediol (Farxiga) tablet 10 mg, 10 mg, oral, Daily, Alxe Briseno MD, 10 mg at 01/23/24 1013    " dexAMETHasone (Decadron) tablet 6 mg, 6 mg, oral, Daily, Alex Briseno MD, 6 mg at 01/26/24 1012    dextrose 10 % in water (D10W) infusion, 0.3 g/kg/hr, intravenous, Once PRN, Alex Briseno MD    dextrose 50 % injection 25 g, 25 g, intravenous, q15 min PRN, Alex Briseno MD    doxycycline (Vibra-Tabs) tablet 100 mg, 100 mg, oral, q12h AWAIS, Alex Briseno MD, 100 mg at 01/26/24 0602    fluticasone furoate-vilanteroL (Breo Ellipta) 100-25 mcg/dose inhaler 1 puff, 1 puff, inhalation, Daily, Marisol Bojorquez MD    glucagon (Glucagen) injection 1 mg, 1 mg, intramuscular, q15 min PRN, Alex Briseno MD    heparin (porcine) injection 5,000 Units, 5,000 Units, subcutaneous, q8h, Marisol Bojorquez MD, 5,000 Units at 01/26/24 1012    insulin lispro (HumaLOG) injection 0-5 Units, 0-5 Units, subcutaneous, TID with meals, Alex Briseno MD, 1 Units at 01/24/24 1359    [MAR Hold] lidocaine (Xylocaine) 10 mg/mL (1 %) injection 50 mg, 5 mL, infiltration, Once, Jaylan Ames MD    oxygen (O2) therapy, , inhalation, Continuous PRN - O2/gases, Ye Sequeira MD    perflutren protein A microsphere (Optison) injection 0.5 mL, 0.5 mL, intravenous, Once in imaging, Leroy Muñiz MD    polyethylene glycol (Glycolax, Miralax) packet 17 g, 17 g, oral, Daily, Marisol Bojorquez MD, 17 g at 01/26/24 1011    [COMPLETED] remdesivir (Veklury) 100 mg in sodium chloride 0.9% 250 mL IV, 100 mg, intravenous, Once, Stopped at 01/25/24 1724 **FOLLOWED BY** remdesivir (Veklury) 100 mg in sodium chloride 0.9% 250 mL IV, 100 mg, intravenous, q24h, Ye Sequeira MD    [Held by provider] sacubitriL-valsartan (Entresto) 24-26 mg per tablet 0.5 tablet, 0.5 tablet, oral, BID, Marisol Bojorquez MD, 0.5 tablet at 01/24/24 2118    sennosides-docusate sodium (Stefany-Colace) 8.6-50 mg per tablet 2 tablet, 2 tablet, oral, BID, Alex Briseno MD, 2 tablet at 01/26/24 1011    sodium chloride (Ocean) 0.65 % nasal spray 1 spray, 1 spray, Each Nostril, PRN,  Alex Briseno MD    sulfur hexafluoride microsphr (Lumason) injection 24.28 mg, 2 mL, intravenous, Once in imaging, Leroy Muñiz MD    trimethobenzamide (Tigan) injection 200 mg, 200 mg, intramuscular, q6h PRN, French Romo MD, 200 mg at 01/25/24 1655     === 01/22/24 ===    XR CHEST 1 VIEW    - Impression -  1.  Similar interstitial airspace opacities in background of  ground-glass opacities. Findings can be seen in interstitial  pulmonary edema.  2. Small pleural effusions with associated atelectasis.    I personally reviewed the images/study and I agree with the findings  as stated by Resident Radames Lewis MD. This study was interpreted  at [facility], [place], [place].    MACRO:  NONE.    Signed by: Roly Conti 1/26/2024 8:20 AM  Dictation workstation:   [id number]        Assessment/Plan   Principal Problem:    Hypoxic respiratory failure (CMS/HCC)  Active Problems:    HFrEF (heart failure with reduced ejection fraction) (CMS/HCC)    Updates 01/26:  - s/p ~1.5L fluid yesterday  - Creatinine without significant improvement overnight  - Worsening SOB overnight while receiving IVF  - Growing concern that she may be fluid overloaded even though exam indicates she is dry  - Recommend pursuing RHC to guide further management    #COVID positive  #hypoxic/hypercarbic respiratory failure c/f COPD exacerbation   #r/o ADHF contribution  -CTPE 01/23/24 with evidence of mild focal pulmonary edema, though continues to appear euvolemic/dry on exam  -CXR 01/26/24 stable with prominent vasculature    #HFrEF (EF 15-20%) s/p ICD placement 5/2020  #CAD s/p PCI to LCx 2016  #MR s/p MVR 6/2019  #Troponin elevation c/f NSTEMI type II (demand ischemia)   -Follows with Dr. Rivera outpatient, has refused advanced therapies in the past  -hs-troponin plateaued in 1257 -> 1230  -Echo 01/24/24 reviewed, stable EF 15-20%, no wall motion abnormalities, low c/f new ischemia      Recommendations:  -Recommend obtaining RHC to examine pressures, ensure patient is not fluid overloaded, and to r/o cardiorenal syndrome   -Discussed RHC with patient, is agreeable  -NPO before cath  -Patient will require tele bed for at least 24H after RHC  -If pressures suggest overload and leave-in is desired for swan-guided diuresis, may require transfer to HF ICU (cleared with Dr. Rivera)  -Continue holding dapagliflozin and Entresto with renal function    Patient was seen and discussed with fellow Sung Costa and attending Dr. Ajith Zacarias. Recommendations are preliminary until note is co-signed by an attending.   Chadwick Posey, M4

## 2024-01-26 NOTE — PROGRESS NOTES
"  Internal Medicine Progress Note   Melissa Marinelli is a 68 y.o. female with a past medical history of HFrEF (EF 15-20%) s/p ICD placement 5/2020, CAD s/p NSTEMI with RIRI to LCx (Jan 2016), s/p MVR (June 2019), COPD (no formal PFTs), HTN, HLD, GERD, CVA, CKD IIIB and DMII admitted on 1/22/2024 with AHRF requiring BiPAP.     Subjective   Overnight patient with worsening dyspnea when getting up and stating she \"was going to pass out\" .  Vitals showed 137/83  100% on RA. ABG was drawn which showed 7.35, CO2 31, O2 151, HCO3 17.1, lac 1.6. Please see significant event note 1/26 for more details. Patient was refusing albuterol treatment and given Atarax for anxiety.     Patient seen at bedside. She reports feeling intermittently nauseated and having abdominal pain but improved with medications. She believes her work of breathing has improved since admission and better than yesterday night's events. Having some mucus production. Denies any fevers, chills, worsening SOB, chest pain at this time. No other concerns.     Medications     Medications:   Scheduled medications  albuterol, 2 puff, inhalation, q6h  aspirin, 81 mg, oral, Daily  [Held by provider] dapagliflozin propanediol, 10 mg, oral, Daily  dexAMETHasone, 6 mg, oral, Daily  doxycylcine, 100 mg, oral, q12h AWAIS  fluticasone furoate-vilanteroL, 1 puff, inhalation, Daily  heparin, 5,000 Units, subcutaneous, q8h  insulin lispro, 0-5 Units, subcutaneous, TID with meals  [MAR Hold] lidocaine, 5 mL, infiltration, Once  perflutren protein A microsphere, 0.5 mL, intravenous, Once in imaging  polyethylene glycol, 17 g, oral, Daily  remdesivir, 100 mg, intravenous, q24h  [Held by provider] sacubitriL-valsartan, 0.5 tablet, oral, BID  sennosides-docusate sodium, 2 tablet, oral, BID  sulfur hexafluoride microsphr, 2 mL, intravenous, Once in imaging      Continuous medications     PRN medications  PRN medications: acetaminophen, dextrose 10 % in water (D10W), dextrose, " "glucagon, oxygen, sodium chloride, trimethobenzamide     Objective     Vitals  Visit Vitals  /85   Pulse 110   Temp 36.2 °C (97.2 °F)   Resp 17   Ht 1.626 m (5' 4\")   Wt 64.4 kg (141 lb 15.6 oz)   SpO2 98% Comment: 1L   BMI 24.37 kg/m²   Smoking Status Every Day   BSA 1.71 m²       Intake/Output Summary (Last 24 hours) at 1/26/2024 1317  Last data filed at 1/26/2024 1313  Gross per 24 hour   Intake 240 ml   Output 275 ml   Net -35 ml         Physical Exam  GENERAL: In no acute distress, conversant   HEENT: Extraocular motion intact.  No scleral icterus.  MOUTH: MMM, no lesions observed.  CARDIAC: S1 + S2, RRR, no M/R/G.    LUNGS: Normal respiratory effort on 3L NC. No wheezes or coarse breath sounds.   ABDOMEN: Soft, non-tender to palpation. No organomegaly. BS +.   EXTREMITIES: No peripheral edema noted  NEUROLOGIC: Cranial nerves 2 through 12 grossly intact.  PSYCH: Appropriate mood and behavior.      Labs  Lab Results   Component Value Date    WBC 6.4 01/26/2024    HGB 13.3 01/26/2024    HCT 39.9 01/26/2024    MCV 88 01/26/2024     01/26/2024      Lab Results   Component Value Date    GLUCOSE 156 (H) 01/26/2024    CALCIUM 9.0 01/26/2024     01/26/2024    K 5.1 01/26/2024    CO2 19 (L) 01/26/2024     01/26/2024    BUN 80 (H) 01/26/2024    CREATININE 2.17 (H) 01/26/2024      Lab Results   Component Value Date    ALT 11 01/23/2024    AST 17 01/23/2024    ALKPHOS 79 01/23/2024    BILITOT 0.4 01/23/2024        Imaging  === 01/22/24 ===    XR CHEST 1 VIEW    - Impression -  1.  Cardiomegaly with mild-to-moderate interstitial pulmonary edema.    I personally reviewed the images/study and I agree with the findings  as stated by Resident Radames Lewis MD. This study was interpreted  at [place], [place].    MACRO:  NONE.    Signed by: Minerva Treviño 1/22/2024 9:28 AM  Dictation workstation:   [id number]   === 01/22/24 ===    CT ANGIO CHEST FOR " PULMONARY EMBOLISM    - Impression -  1. No evidence of acute pulmonary embolism to segmental level. Please  note that, assessment of subsegmental branches is limited and small  peripheral emboli are not entirely excluded.  2. Stable cardiomegaly with asymmetric left heart enlargement. Status  post mitral valve replacement.  3. Very poor contrast opacification on LV and aorta dysfunction,  correlating with patient's known severe LV. Intravenous contrast  reflux into IVC and central hepatic veins is suggestive of right  heart strain. Please refer to recent echocardiogram.  4. Findings suggestive of interstitial and alveolar pulmonary edema,  asymmetric to the right and new from prior examination; correlate  with mitral valve disease. Less likely imaging differential include  infectious/inflammatory process.  5. New small right and trace left pleural effusions with mild  bibasilar dependent atelectasis.  6. Moderate sized hiatal hernia with mildly patulous esophagus;  correlate with concern for gastroesophageal reflux. Aspiration  precautions are recommended.    I personally reviewed the images/study and I agree with the findings  as stated by Resident Radames Lewis MD. This study was interpreted  at [facility], [place], [place].    MACRO:  None    Signed by: Gordo Mansfield 1/24/2024 7:14 AM  Dictation workstation:   [id number]       Assessment/Plan   Melissa Marinelli is a 68 y.o. female with PMHx of  HFrEF (EF 15-20%) s/p ICD placement 5/2020, CAD s/p NSTEMI with RIRI to LCx (Jan 2016), s/p MVR (June 2019), COPD (no formal PFTs), HTN, HLD, GERD, CVA, CKD IIIB and DMII admitted for AHRF requiring BiPAP and transferred to [facility] for ongoing management of AHRF in the setting of COVID.     Updates 1/26:   - Overnight patient with dyspnea on exertion, workup including ABGs x2 showing anion gap metabolic acidosis unchanged from previous, troponin 700 (prev 1200), CXR non-contributory and  VSS with duonebs and given Atarax.   - Holding Entresto in setting of PATSY, improving   - On RA satting well   - Continuing bowel regimen     # Acute hypercapnic and hypoxic respiratory failure, improving   # COVID infection  # C/f COPD exacerbation vs Acute decompensated HF  :: Required BiPAP again for work of breathing on 1/23; ABG pH 7.37, pCO2 36, pO2 72  :: No PFTs on file to confirm diagnosis of COPD   :: CTPE negative 1/23  :: , but on prior admission for ADHF, BNP more significantly elevated at >3000  C/w remdesivir 200mg 1 day then 100mg for 4 days (end date 1/27)  C/w dexamethasone 6mg daily for 10 days; consider changing to IV formulation if patient not able to tolerate PO on BiPAP  C/w doxycycline 100mg BID for 5 days for presumed COPD exacerbation (end date 1/27)  S/p 40mg IV lasix x2 in ED             - Hold on further diuresis per cardiology recommendations  Continue home symbicort and albuterol  Duonebs q6hrs for SOB  Wean supplemental O2 as able, currently weaned from 3L -> RA   Plan for PFTs outpatient  , none on file   COPD navigator consult  Will need follow up with pulmonolgy and  pulmonary rehab on discharge     #CAD   #HFrEF (EF 15-20% 1/2024) s/p ICD placement 5/2020   #NSTEMI s/p RIRI to Lcx (Jan 2016)  #Hx of Mitral Valve repair (June 2019) w/ Dr Montana  #Troponinemia, stable  #Prolonged Qtc (Bazett) 507   #HTN  #DLD  :: Troponin elevation to peak of 1257 on admission, down trended since, likely in setting of demand ischemia;   - Continue Farxiga 10mg daily   - Not on statin therapy due to reported allergy per chart review  - Cardiology consulted in MICU, appreciate recs  - Limited TTE significant for LVEF 15-20% 1/24/24  -Not trending troponin as low suspicion for ACS  -Was holding Entresto due to lower Bps and PATSY   - ICD device check 1/25     # PATSY on CKD IIIB  :: Baseline sCr ~1.3-1.4  :: Likely prerenal 2/2 hypovolemia with possible component of contrast toxicity.   S/p 500 ml  as maintenance fluids in MICU   Received maintenance fluids 1/25  Continue to hold nephrotoxic medications   CTM, Daily RFP   Cr downtrending 2.38 > 2.17     # T2DM   Euglycemic, HgbA1c 5.5 (11/2023)  Farxiga 10mg daily   Mild SSI     # Nausea  Continue Tigan PRN (prolonged Qtc)    #Constipation   - Miralax and Senakot PRN     F: Caution with diuresis and IV fluids given HFrEF, on 125cc/hr for 12 hours   E: Replete as needed  N: Carb Controlled Diet  A: PIV x 1  O2: 3 L via NC  Abx: Doxycycline     CODE STATUS: Full code  SDM: Mary (child) [phone]      Marisol Bojorquez MD  Department of Internal Medicine, PGY-1

## 2024-01-27 ENCOUNTER — APPOINTMENT (OUTPATIENT)
Dept: RADIOLOGY | Facility: HOSPITAL | Age: 69
DRG: 286 | End: 2024-01-27
Payer: COMMERCIAL

## 2024-01-27 LAB
ALBUMIN SERPL BCP-MCNC: 3.5 G/DL (ref 3.4–5)
ANION GAP SERPL CALC-SCNC: 16 MMOL/L (ref 10–20)
BACTERIA BLD CULT: NORMAL
BACTERIA BLD CULT: NORMAL
BASE EXCESS BLDMV CALC-SCNC: 0.8 MMOL/L (ref -2–3)
BASE EXCESS BLDMV CALC-SCNC: 1 MMOL/L (ref -2–3)
BASE EXCESS BLDMV CALC-SCNC: 3.5 MMOL/L (ref -2–3)
BASE EXCESS BLDV CALC-SCNC: 1 MMOL/L (ref -2–3)
BASOPHILS # BLD AUTO: 0.01 X10*3/UL (ref 0–0.1)
BASOPHILS NFR BLD AUTO: 0.2 %
BODY TEMPERATURE: 37 DEGREES CELSIUS
BUN SERPL-MCNC: 83 MG/DL (ref 6–23)
CALCIUM SERPL-MCNC: 8.6 MG/DL (ref 8.6–10.6)
CHLORIDE SERPL-SCNC: 101 MMOL/L (ref 98–107)
CO2 SERPL-SCNC: 25 MMOL/L (ref 21–32)
CREAT SERPL-MCNC: 2.26 MG/DL (ref 0.5–1.05)
EGFRCR SERPLBLD CKD-EPI 2021: 23 ML/MIN/1.73M*2
EOSINOPHIL # BLD AUTO: 0 X10*3/UL (ref 0–0.7)
EOSINOPHIL NFR BLD AUTO: 0 %
ERYTHROCYTE [DISTWIDTH] IN BLOOD BY AUTOMATED COUNT: 14.9 % (ref 11.5–14.5)
GLUCOSE BLD MANUAL STRIP-MCNC: 130 MG/DL (ref 74–99)
GLUCOSE BLD MANUAL STRIP-MCNC: 157 MG/DL (ref 74–99)
GLUCOSE SERPL-MCNC: 147 MG/DL (ref 74–99)
HCO3 BLDMV-SCNC: 26.6 MMOL/L (ref 22–26)
HCO3 BLDMV-SCNC: 27.1 MMOL/L (ref 22–26)
HCO3 BLDMV-SCNC: 29.1 MMOL/L (ref 22–26)
HCO3 BLDV-SCNC: 26 MMOL/L (ref 22–26)
HCT VFR BLD AUTO: 33.7 % (ref 36–46)
HGB BLD-MCNC: 11.7 G/DL (ref 12–16)
HOLD SPECIMEN: NORMAL
IMM GRANULOCYTES # BLD AUTO: 0.06 X10*3/UL (ref 0–0.7)
IMM GRANULOCYTES NFR BLD AUTO: 1.1 % (ref 0–0.9)
INHALED O2 CONCENTRATION: 28 %
INHALED O2 CONCENTRATION: 30 %
LYMPHOCYTES # BLD AUTO: 0.37 X10*3/UL (ref 1.2–4.8)
LYMPHOCYTES NFR BLD AUTO: 6.5 %
MAGNESIUM SERPL-MCNC: 2.4 MG/DL (ref 1.6–2.4)
MCH RBC QN AUTO: 28.6 PG (ref 26–34)
MCHC RBC AUTO-ENTMCNC: 34.7 G/DL (ref 32–36)
MCV RBC AUTO: 82 FL (ref 80–100)
MONOCYTES # BLD AUTO: 0.29 X10*3/UL (ref 0.1–1)
MONOCYTES NFR BLD AUTO: 5.1 %
NEUTROPHILS # BLD AUTO: 4.95 X10*3/UL (ref 1.2–7.7)
NEUTROPHILS NFR BLD AUTO: 87.1 %
NRBC BLD-RTO: 0.4 /100 WBCS (ref 0–0)
OXYHGB MFR BLDMV: 68.7 % (ref 45–75)
OXYHGB MFR BLDMV: 70.6 % (ref 45–75)
OXYHGB MFR BLDMV: 72.7 % (ref 45–75)
OXYHGB MFR BLDV: 69.3 % (ref 45–75)
PCO2 BLDMV: 46 MM HG (ref 41–51)
PCO2 BLDMV: 47 MM HG (ref 41–51)
PCO2 BLDMV: 48 MM HG (ref 41–51)
PCO2 BLDV: 42 MM HG (ref 41–51)
PH BLDMV: 7.36 PH (ref 7.33–7.43)
PH BLDMV: 7.37 PH (ref 7.33–7.43)
PH BLDMV: 7.4 PH (ref 7.33–7.43)
PH BLDV: 7.4 PH (ref 7.33–7.43)
PHOSPHATE SERPL-MCNC: 3.7 MG/DL (ref 2.5–4.9)
PLATELET # BLD AUTO: 165 X10*3/UL (ref 150–450)
PO2 BLDMV: 46 MM HG (ref 35–45)
PO2 BLDMV: 47 MM HG (ref 35–45)
PO2 BLDMV: 48 MM HG (ref 35–45)
PO2 BLDV: 43 MM HG (ref 35–45)
POTASSIUM SERPL-SCNC: 4.4 MMOL/L (ref 3.5–5.3)
RBC # BLD AUTO: 4.09 X10*6/UL (ref 4–5.2)
SAO2 % BLDMV: 70 % (ref 45–75)
SAO2 % BLDMV: 72 % (ref 45–75)
SAO2 % BLDMV: 74 % (ref 45–75)
SAO2 % BLDV: 71 % (ref 45–75)
SODIUM SERPL-SCNC: 138 MMOL/L (ref 136–145)
WBC # BLD AUTO: 5.7 X10*3/UL (ref 4.4–11.3)

## 2024-01-27 PROCEDURE — 83735 ASSAY OF MAGNESIUM: CPT

## 2024-01-27 PROCEDURE — 2500000004 HC RX 250 GENERAL PHARMACY W/ HCPCS (ALT 636 FOR OP/ED): Performed by: NURSE PRACTITIONER

## 2024-01-27 PROCEDURE — 2500000002 HC RX 250 W HCPCS SELF ADMINISTERED DRUGS (ALT 637 FOR MEDICARE OP, ALT 636 FOR OP/ED)

## 2024-01-27 PROCEDURE — 99233 SBSQ HOSP IP/OBS HIGH 50: CPT | Performed by: NURSE PRACTITIONER

## 2024-01-27 PROCEDURE — 2500000004 HC RX 250 GENERAL PHARMACY W/ HCPCS (ALT 636 FOR OP/ED)

## 2024-01-27 PROCEDURE — 71045 X-RAY EXAM CHEST 1 VIEW: CPT | Performed by: RADIOLOGY

## 2024-01-27 PROCEDURE — 71045 X-RAY EXAM CHEST 1 VIEW: CPT

## 2024-01-27 PROCEDURE — 2500000001 HC RX 250 WO HCPCS SELF ADMINISTERED DRUGS (ALT 637 FOR MEDICARE OP): Performed by: NURSE PRACTITIONER

## 2024-01-27 PROCEDURE — 85025 COMPLETE CBC W/AUTO DIFF WBC: CPT | Performed by: NURSE PRACTITIONER

## 2024-01-27 PROCEDURE — 37799 UNLISTED PX VASCULAR SURGERY: CPT

## 2024-01-27 PROCEDURE — 82947 ASSAY GLUCOSE BLOOD QUANT: CPT

## 2024-01-27 PROCEDURE — 1100000001 HC PRIVATE ROOM DAILY

## 2024-01-27 PROCEDURE — 94640 AIRWAY INHALATION TREATMENT: CPT | Mod: MUE

## 2024-01-27 PROCEDURE — 82805 BLOOD GASES W/O2 SATURATION: CPT | Performed by: NURSE PRACTITIONER

## 2024-01-27 PROCEDURE — 2500000001 HC RX 250 WO HCPCS SELF ADMINISTERED DRUGS (ALT 637 FOR MEDICARE OP)

## 2024-01-27 PROCEDURE — 2500000005 HC RX 250 GENERAL PHARMACY W/O HCPCS: Mod: JZ | Performed by: NURSE PRACTITIONER

## 2024-01-27 PROCEDURE — 80069 RENAL FUNCTION PANEL: CPT

## 2024-01-27 PROCEDURE — 99291 CRITICAL CARE FIRST HOUR: CPT | Performed by: STUDENT IN AN ORGANIZED HEALTH CARE EDUCATION/TRAINING PROGRAM

## 2024-01-27 RX ORDER — POLYETHYLENE GLYCOL 3350 17 G/17G
17 POWDER, FOR SOLUTION ORAL DAILY PRN
Status: DISCONTINUED | OUTPATIENT
Start: 2024-01-27 | End: 2024-02-03 | Stop reason: HOSPADM

## 2024-01-27 RX ORDER — PANTOPRAZOLE SODIUM 40 MG/1
40 TABLET, DELAYED RELEASE ORAL
Status: DISCONTINUED | OUTPATIENT
Start: 2024-01-27 | End: 2024-01-31

## 2024-01-27 RX ORDER — ALBUTEROL SULFATE 90 UG/1
2 AEROSOL, METERED RESPIRATORY (INHALATION) EVERY 6 HOURS PRN
Status: DISCONTINUED | OUTPATIENT
Start: 2024-01-27 | End: 2024-02-03 | Stop reason: HOSPADM

## 2024-01-27 RX ORDER — AMOXICILLIN 250 MG
1 CAPSULE ORAL DAILY
Status: DISCONTINUED | OUTPATIENT
Start: 2024-01-27 | End: 2024-02-03 | Stop reason: HOSPADM

## 2024-01-27 RX ADMIN — SODIUM NITROPRUSSIDE 1.5 MCG/KG/MIN: 0.5 INJECTION, SOLUTION INTRAVENOUS at 22:35

## 2024-01-27 RX ADMIN — SACUBITRIL AND VALSARTAN 0.5 TABLET: 24; 26 TABLET, FILM COATED ORAL at 11:10

## 2024-01-27 RX ADMIN — DOXYCYCLINE HYCLATE 100 MG: 100 TABLET, COATED ORAL at 20:23

## 2024-01-27 RX ADMIN — INSULIN LISPRO 1 UNITS: 100 INJECTION, SOLUTION INTRAVENOUS; SUBCUTANEOUS at 12:00

## 2024-01-27 RX ADMIN — SENNOSIDES AND DOCUSATE SODIUM 1 TABLET: 8.6; 5 TABLET ORAL at 10:01

## 2024-01-27 RX ADMIN — SACUBITRIL AND VALSARTAN 0.5 TABLET: 24; 26 TABLET, FILM COATED ORAL at 20:22

## 2024-01-27 RX ADMIN — FLUTICASONE FUROATE AND VILANTEROL TRIFENATATE 1 PUFF: 100; 25 POWDER RESPIRATORY (INHALATION) at 08:19

## 2024-01-27 RX ADMIN — ALBUTEROL SULFATE 2 PUFF: 90 AEROSOL, METERED RESPIRATORY (INHALATION) at 08:19

## 2024-01-27 RX ADMIN — HEPARIN SODIUM 5000 UNITS: 5000 INJECTION INTRAVENOUS; SUBCUTANEOUS at 00:13

## 2024-01-27 RX ADMIN — HEPARIN SODIUM 5000 UNITS: 5000 INJECTION INTRAVENOUS; SUBCUTANEOUS at 10:02

## 2024-01-27 RX ADMIN — ACETAMINOPHEN 975 MG: 325 TABLET ORAL at 22:34

## 2024-01-27 RX ADMIN — TRIMETHOBENZAMIDE HYDROCHLORIDE 200 MG: 100 INJECTION INTRAMUSCULAR at 20:22

## 2024-01-27 RX ADMIN — POLYETHYLENE GLYCOL 3350 17 G: 17 POWDER, FOR SOLUTION ORAL at 10:02

## 2024-01-27 RX ADMIN — DEXAMETHASONE 6 MG: 6 TABLET ORAL at 10:01

## 2024-01-27 RX ADMIN — REMDESIVIR 100 MG: 100 INJECTION, POWDER, LYOPHILIZED, FOR SOLUTION INTRAVENOUS at 15:13

## 2024-01-27 RX ADMIN — DOXYCYCLINE HYCLATE 100 MG: 100 TABLET, COATED ORAL at 10:01

## 2024-01-27 RX ADMIN — TRIMETHOBENZAMIDE HYDROCHLORIDE 200 MG: 100 INJECTION INTRAMUSCULAR at 14:41

## 2024-01-27 RX ADMIN — SODIUM NITROPRUSSIDE 2.2 MCG/KG/MIN: 0.5 INJECTION, SOLUTION INTRAVENOUS at 12:17

## 2024-01-27 RX ADMIN — HEPARIN SODIUM 5000 UNITS: 5000 INJECTION INTRAVENOUS; SUBCUTANEOUS at 15:48

## 2024-01-27 RX ADMIN — ASPIRIN 81 MG CHEWABLE TABLET 81 MG: 81 TABLET CHEWABLE at 10:01

## 2024-01-27 ASSESSMENT — ENCOUNTER SYMPTOMS
NEUROLOGICAL NEGATIVE: 1
APPETITE CHANGE: 1
SHORTNESS OF BREATH: 1
NAUSEA: 1
HEMATOLOGIC/LYMPHATIC NEGATIVE: 1
ABDOMINAL PAIN: 1
EYES NEGATIVE: 1
ENDOCRINE NEGATIVE: 1
PSYCHIATRIC NEGATIVE: 1
CARDIOVASCULAR NEGATIVE: 1

## 2024-01-27 ASSESSMENT — PAIN SCALES - GENERAL
PAINLEVEL_OUTOF10: 0 - NO PAIN

## 2024-01-27 ASSESSMENT — PAIN - FUNCTIONAL ASSESSMENT
PAIN_FUNCTIONAL_ASSESSMENT: 0-10

## 2024-01-27 NOTE — PROGRESS NOTES
HFICU Attending Note    Daughter and grandson at bedside.    68 year old woman with HFrEF ( HYHA FC 3, ACC/AHA Stage D) who has declined  LVAD, and is not for OHT ( active smoker), COPD.  She has been admitted with COVID ( being treated with Dexamathasone and Remdesivir). There was concern for low output state which was confirmed on [facility] 1/26/2024.  Overnight she needed Dobutamine briefly, and had good haemodynamic improve met with SNP and diuresis. Plan to titrate to oral vasodilator therapy today    Low O2 requirements and will see whether O2 can be weaned today. Continue COVID specific rx    This critically ill patient continues to be at-risk for clinically significant deterioration / failure due to the above mentioned dysfunctional, unstable organ systems.  I have personally identified and managed all complex critical care issues to prevent aforementioned clinical deterioration.  Critical care time is spent at bedside and/or the immediate area and has included, but is not limited to, the review of diagnostic tests, labs, radiographs, serial assessments of hemodynamics, respiratory status, ventilatory management, and family updates.  Time spent in procedures and teaching are reported separately.    Critical care time: 35  minutes

## 2024-01-27 NOTE — PROGRESS NOTES
[facility]  HFICU PROGRESS NOTE    Melissa Marinelli/[id number]    Admit Date: 1/22/2024  Hospital Length of Stay: 5   ICU Length of Stay: 13h   Primary Service: HF ICU  Primary HF Cardiologist: Dr. Graham     INTERVAL EVENTS / PERTINENT ROS:   Uneventful evening and pt was on dobutamine gtt for short period of time and wean off. Pt states that she feels much better with only occasional cough, but C/O tiredness this morning.     Patient is alert, oriented x3, moves all extremities. Asks appropriated questions. Denies chest pain or chest discomfort. Denies SOB or palpation. Denies abd pain or abd discomfort. Denies N/V/D.      Plans:  - Resumed home Entresto 0.5 mg PO BID  - Titrate down Nipride gtt as tolerated  - Hemodynamic monitoring q 6 hrs  - C/W Dexamethasone 6 mg for total 10 days  - Finish the last dose of Remdesivir 100 mg IV for total 5 days    MEDICATIONS  Infusions:  lactated Ringer's, Last Rate: 10 mL/hr (01/27/24 0900)  nitroprusside, Last Rate: 2.2 mcg/kg/min (01/27/24 1217)      Scheduled:  albuterol, 2 puff, q6h  aspirin, 81 mg, Daily  [Held by provider] dapagliflozin propanediol, 10 mg, Daily  dexAMETHasone, 6 mg, Daily  doxycylcine, 100 mg, q12h AWAIS  fluticasone furoate-vilanteroL, 1 puff, Daily  heparin, 5,000 Units, q8h  insulin lispro, 0-5 Units, TID with meals  lidocaine, 5 mL, Once  pantoprazole, 40 mg, Daily before breakfast  perflutren protein A microsphere, 0.5 mL, Once in imaging  polyethylene glycol, 17 g, Daily  remdesivir, 100 mg, q24h  [Held by provider] sacubitriL-valsartan, 0.5 tablet, BID  sennosides-docusate sodium, 1 tablet, Daily  sulfur hexafluoride microsphr, 2 mL, Once in imaging      PRN:  acetaminophen, 975 mg, q8h PRN  dextrose 10 % in water (D10W), 0.3 g/kg/hr, Once PRN  dextrose, 25 g, q15 min PRN  glucagon, 1 mg, q15 min PRN  oxygen, , Continuous PRN - O2/gases  polyethylene glycol, 17 g, Daily PRN  sodium chloride, 1 spray,  "PRN  trimethobenzamide, 200 mg, q6h PRN      Invasive Hemodynamics:    Most Recent Range Past 24hrs   BP (Art)   No data recorded   MAP(Art)   No data recorded   RA/CVP   No data recorded   PA 40/28 PAP  Min: 39/27  Max: 53/33   PA(mean) 32 mmHg PAP (Mean)  Min: 30 mmHg  Max: 267 mmHg   PCWP 19 mmHg PCWP (mmHg)  Min: 18 mmHg  Max: 19 mmHg   CO (S) 4.5 L/min CO (L/min)  Min: 4.5 L/min  Max: 4.8 L/min   CI (S) 2.7 L/min/m2 (LORRAINE) CI (L/min/m2)  Min: 2.7 L/min/m2  Max: 2.9 L/min/m2   Mixed Venous   No data recorded   SVR  1368 (dyne*sec)/cm5 SVR (dyne*sec)/cm5  Min: 1244 (dyne*sec)/cm5  Max: 1368 (dyne*sec)/cm5    (dyne*sec)/cm5 PVR (dyne*sec)/cm5  Min: 199 (dyne*sec)/cm5  Max: 231 (dyne*sec)/cm5       PHYSICAL EXAM:   Visit Vitals  /67   Pulse 108   Temp 36.7 °C (98.1 °F)   Resp 18   Ht 1.626 m (5' 4\")   Wt 64 kg (141 lb 1.5 oz)   SpO2 100%   BMI 24.22 kg/m²   Smoking Status Every Day   BSA 1.7 m²       Wt Readings from Last 5 Encounters:   01/27/24 64 kg (141 lb 1.5 oz)   12/18/23 64.9 kg (143 lb)   11/26/23 65.1 kg (143 lb 8.3 oz)   09/18/23 65.8 kg (145 lb)   06/22/23 67.1 kg (148 lb)       INTAKE/OUTPUT:  I/O last 3 completed shifts:  In: 521.7 (8.2 mL/kg) [P.O.:240; I.V.:281.7 (4.4 mL/kg)]  Out: 1680 (26.3 mL/kg) [Urine:1675 (0.7 mL/kg/hr); Blood:5]  Weight: 64 kg      Physical Exam    DATA:  CMP:  Results from last 7 days   Lab Units 01/27/24  0432 01/26/24  1815 01/26/24  0715 01/25/24  1600 01/25/24  0714 01/24/24  1031 01/24/24  0500 01/23/24  2252 01/23/24  0548 01/22/24  0653 01/22/24  0609 01/22/24  0521   SODIUM mmol/L 138 133* 136 134* 137 137 134* 140 138  --   --  140   POTASSIUM mmol/L 4.4 5.1 5.1 5.2 5.4* 4.9 7.3* 4.6 4.5  --  4.2 7.0*   CHLORIDE mmol/L 101 100 102 103 101 104 104 105 107  --   --  107   CO2 mmol/L 25 23 19* 15* 19* 16* 12* 20* 20*  --   --  25   ANION GAP mmol/L 16 15 20 21* 22* 22* 25 20 16  --   --  15   BUN mg/dL 83* 86* 80* 77* 63* 42* 36* 30* 23  --   --  19 " "  CREATININE mg/dL 2.26* 2.28* 2.17* 2.38* 2.34* 1.76* 1.65* 1.54* 1.51*  --   --  1.30*   EGFR mL/min/1.73m*2 23* 23* 24* 22* 22* 31* 34* 36* 37*  --   --  45*   MAGNESIUM mg/dL 2.40 2.52* 2.65*  --  2.59* 2.23 2.92* 2.14 2.13 2.26  --   --    ALBUMIN g/dL 3.5 3.6 3.8 3.9 4.2 3.7 3.8 3.8 4.0  --   --  4.5   ALT U/L  --  98*  --   --   --   --   --  11  --   --   --  16   AST U/L  --  63*  --   --   --   --   --  17  --   --   --  40*   BILIRUBIN TOTAL mg/dL  --  0.7  --   --   --   --   --  0.4  --   --   --  0.4     CBC:  Results from last 7 days   Lab Units 01/27/24  0432 01/26/24  2020 01/26/24  0715 01/25/24  0714 01/24/24  0500 01/23/24 2252 01/23/24  0548 01/22/24  0521   WBC AUTO x10*3/uL 5.7 5.7 6.4 7.8 6.7 7.1 4.0* 8.2   HEMOGLOBIN g/dL 11.7* 11.5* 13.3 13.9 13.0 13.3 13.2 13.8   HEMATOCRIT % 33.7* 34.9* 39.9 44.1 40.7 41.7 40.3 42.5   PLATELETS AUTO x10*3/uL 165 161 171 89* 119* 162 198 196   MCV fL 82 88 88 91 90 91 88 90     COAG:   Results from last 7 days   Lab Units 01/23/24 2252 01/22/24  0521   INR  1.1 1.0     ABO: No results found for: \"ABO\"  HEME/ENDO:  Results from last 7 days   Lab Units 01/26/24  1815 01/26/24  1805   FERRITIN ng/mL 720*  --    IRON SATURATION % 34  --    TSH mIU/L  --  0.45   HEMOGLOBIN A1C %  --  5.4      CARDIAC:   Results from last 7 days   Lab Units 01/26/24  0715 01/23/24  0121 01/22/24  2154 01/22/24  1639 01/22/24  0955 01/22/24  0653 01/22/24  0609 01/22/24  0521   TROPHS ng/L 700* 1,230* 1,257* 996* 484* 214* 185* 151*   BNP pg/mL >5,000*  --   --   --   --   --   --  335*       ASSESSMENT AND PLAN:   ASSESSMENT AND PLAN:   68 y.o. female w/ Mhx HFrEF (EF 15-20%) s/p ICD placement 5/2020, CAD s/p NSTEMI with RIRI to LCx (Jan 2016), s/p MVR (June 2019), COPD (no formal PFTs), HTN, HLD, GERD, CVA, CKD IIIB and DMII presenting to MICU d/t AHRF requiring BiPAP.     Quickly weaned off BiPAP to 4 L O2 via nasal cannula on arrival to MICU, with patient in no acute " distress.  Etiologies for acute hypoxic respiratory failure at time of presentation include COVID diagnosis, COPD exacerbation, ADHF related to underlying HFrEF. Transferred to HFICU on 1/26 after she was found to have high left and right filling pressures and low CO in cath lab.       Neuro:  # Anxiety. Depression  #Hx of CVA   - Serial neuro and pain assessments   - PO Tylenol PRN for pain  - PT/OT Consult, OOB to chair  - CAM ICU score every shift  - Sleep/wake cycle normalization     # Physical Status  -BMI : 24.37      #Substance abuse  -denies ETOH use  -Smokes 10 cigarettes per day, nicotine replacement therapy declined        Cardiovascular:  # Acute on chronic decompensated HFrEF, EF 15-20%  # Acute cardiogenic shock  - Echocardiogram 1/24:  severely decreased LV systolic function, EF 22507% with everely dilated LV cavity size LVIDd 6.3.  No LV thrombus mild - moderate LA dilation.  Mildly reduced RV systolic function   - admit weight: 64.4kg   - admit BNP >5000  - Lactate: 1.9  - HOLD home Farxiga for now util Cr<2.0  - Resumed Entresto 0.5 mg PO BID and no titration (1/27)  - Dobutamine 2.5mcg/kg/min started with intentions to wean off by morning as patient is undecided about home palliative inotrope----> weaned off last night  - Nipride gtt for MBP 60-70 mm Hg and wean down as tolerated  - Lasix 80mg IV x1 (1/26)  - Opening SGC #s: /77(90), CVP 8, PAP 47/32 (40), W 25, CO/CI 2.94/1.74, SVR 2230, SVO2 51% on Nipride at 2mcg/kg/min. (Thermistor on swan broken, unable to obtain thermos)  - Daily SGC #s: BP 91/63(73), CVP 8, PAP 40/28 (32), W 19, CO/CI 4.5/2.7, SVR 1368, SVO2 70% on Nipride at 1 mcg/kg/min. (Thermistor on swan broken, unable to obtain thermos)  - Daily standing weights, 2gm sodium diet, 2L fluid restriction, strict I&Os     #CAD   #NSTEMI s/p RIRI to Lcx (Jan 2016)  #Hx of Mitral Valve repair (June 2019) w/ Dr Montana  #ICD (5/2020):   # Hypertroponinemia in the setting of demand  ischemia   -29mm Epic Bioprosthetc valve   -c/w ASA 81mg daily   - Not on statin d/t allergy   - Device interrogation: [employer] ICD Check 1/25, multiple ventricular arrhythmias--> 1/23 0131 VT @190bpm successful ATP x1 to restore VS; 1/22 1146 VT @190 ATP x1 to restore VS; 1/22 0428 VF @222bpm ATP x1 unsuccessful remained in VF which spontaneously terminated to VS (no shock delivered); 1NSVT 1/22 0936 x5sec duration V rate 194bpm; 12/28/23 1episode of NSVT vs slow VT (binned as NSVT) @185bpm recorded episode captured through 31sec and unable to see termination.   -High sensitivity Trop:      Component  Ref Range & Units 1 d ago  (1/26/24) 4 d ago  (1/23/24) 5 d ago  (1/22/24) 5 d ago  (1/22/24) 5 d ago  (1/22/24) 5 d ago  (1/22/24) 5 d ago  (1/22/24)   Troponin I, High Sensitivity  0 - 34 ng/L 700 High Panic  1,230 High Panic  CM 1,257 High Panic   High Panic   High Panic   High Panic  C      Pulmonary:   #Acute hypercapnic and hypoxic respiratory failure  #C/f COPD exacerbation vs Acute decompensated HF  #COVID infection  #C/f PE(resolved)  - Requiring BiPAP again for work of breathing on 1/23; ABG pH 7.37, pCO2 36, pO2 72--> placed on Bipap on arrival to HFICU for dyspnea   - C/w remdesivir 200mg 1 day then 100mg for total 5 day and last dose 91/270  - C/w dexamethasone 6mg daily for 10 days; consider changing to IV formulation if patient not able to tolerate PO on BiPAP  - C/w doxycycline 100mg BID for 5 days for presumed COPD exacerbation   - No PFTs on file to confirm diagnosis of COPD   - CT PE on 1/24 negative for PE   - 80mg IV lasix given on arrival to HFICU---> Spot diuresis   - Hold home symbicort  - c/w Breo Ellipta daily   - c/w Albuterol inh q6hrs PRN  - supplemental O2 PRN--> wean off as tolerated   - Strep and legionella antigens--> negative   - Consult Pulm for continuity of care if needed       GI:  #Abdominal pain and nausea d/t low output state   - Tigan 200mg IM q6hrs PRN   -  PPI while on IV now      :  #CKD IIIB  -Baseline BUN/Cr: 29-41/ 1.3-1.8  -BUN/Cr daily  Results from last 7 days   Lab Units 01/27/24  0432 01/26/24  1815 01/26/24  0715 01/25/24  1600 01/25/24  0714 01/24/24  1031 01/24/24  0500 01/23/24  2252 01/23/24  0548 01/22/24  0653 01/22/24  0609 01/22/24  0521   BUN mg/dL 83* 86* 80* 77* 63* 42* 36* 30* 23  --   --  19   CREATININE mg/dL 2.26* 2.28* 2.17* 2.38* 2.34* 1.76* 1.65* 1.54* 1.51*  --   --  1.30*   EGFR mL/min/1.73m*2 23* 23* 24* 22* 22* 31* 34* 36* 37*  --   --  45*   -I/Os  -avoid hypotension and nephrotoxic agents     Heme:  #Anemia 2/2 iron deficiency   - H/H  base line 13/42   -Iron Studies 1/26: 82/243/34%  -Folate 5.3, B-12 931  - Daily CBC     Results from last 7 days   Lab Units 01/27/24  0432 01/26/24  2020 01/26/24  0715 01/25/24  0714 01/24/24  0500 01/23/24  2252 01/23/24  0548 01/22/24  0521   HEMOGLOBIN g/dL 11.7* 11.5* 13.3 13.9 13.0 13.3 13.2 13.8   HEMATOCRIT % 33.7* 34.9* 39.9 44.1 40.7 41.7 40.3 42.5     Endo:  #DM  - Euglycemic  - hgbA1c-->5.4, EST AVG GLUC 108  - SGLT2i on hold for now     # no Hx of Thyroid dysfunction   -TSH--> 0.45      ID:  #Acute hypercapnic and hypoxic respiratory failure COPD vs COVID pneumonia   #C/f COPD exacerbation vs Acute decompensated HF  #COVID infection  - See pulmonary section   -afebrile, nontoxic, no s/s infx  -WBC--No leukocytosis   -trend temps q4h     PHYSICAL AND OCCUPATIONAL THERAPY:    LINES:  PIVs   Central/[place]- 1/26      DVT: subcutaneous Heparin   VAP BUNDLE: NA  ULCER PPX: Protonix daily   GLYCEMIC CONTROL: monitoring  BOWEL CARE: luz colace and mirilax   INDWELLING CATHETER: NA  NUTRITION: Adult diet Cardiac; 70 gm fat; 2 - 3 grams Sodium with 2000 fluid restriction        EMERGENCY CONTACT: Extended Emergency Contact Information  Primary Emergency Contact: Sarah Caruso Phone: [phone]  Relation: Child  FAMILY UPDATE:  CODE STATUS: Full Code  DISPO: Remain in HFICU    Patient seen  and assessed with Dr. Graham    _________________________________________________  JASMIN Malin

## 2024-01-27 NOTE — H&P
[facility]  HFICU HISTORY AND PHYSICAL     Melissa Marinelli/[id number]    Admit Date: 1/22/2024  Hospital Length of Stay: 4   ICU Length of Stay: 5h   Primary Service:   Primary HF Cardiologist:   Referring:    HPI:   Melissa Marinelli is  67yo female with a past medical history significant for HFrEF (EF 15-20%), CAD s/p NSTEMI with RIRI to LCx (Jan 2016), s/p MVR (June 2019),  s/p ICD placement 5/2020, COPD (never formally diagnosed), HTN, HL, GERD, CVA, CKD IIIB and DMII who Initially presented via EMS from home with acute onset shortness of breath on 1/22.  In the ED, patient was found to have low pH and elevated pCO2, was started on BiPAP, given Lasix 40 mg IV push x 2, treated with DuoNebs.  Acid-base status improved  and the patient was weaned off BiPAP,  and admitted to the floor.  Patient found to be COVID-positive and started on remdesivir/dexamethasone.  Also started on doxycycline (penicillin and other allergies) for presumed COPD exacerbation.  After admission, cardiology was consulted d/t cf ADHF. Repeat TTE  was unchanged from previous and device interrogation showed multiple VT/VF episodes on the day of admit.  Her hospital course was further c/b acute hypoxic respiratory failure prompting a MICU transfer on 1/23 where she was initially managed on bipap and very quickly transitioned to NC.  CT PE on 1/24 was negative.  She continued to have continued c/o nausea with ABD pain and worsening dyspnea while in the MICU prompting a RHC in cath lab on 1/26.  She was found to have elevated right and left sided filling pressures and low CO state.  CVP 12, PAP 46/29(37), W 26, CO/CI 2.3/1.3, SVR 2886.  [place] [facility]ICU.      On arrival to HFICU patient was cold and tachycardic with a HR in the  1teens.  A mixed venous was obtained and SVO2 was 40%.  She was started on a Nipride gtt and three hours later her swan numbers were as  follows: /77(90), CVP 8, PAP 47/32 (40), W 25, CO/CI 2.94/1.74, SVR 2230, SVO2 51% on Nipride at 2mcg/kg/min.  She continued to c/o ABD pain and nausea with dry heaves and clear thin sputum.  4mg IV Zofran were given for her nausea.  80mg IV Lasix x1 was also given and she was placed on Bipap for her c/o dyspnea.  She was also started on Dobutamine 2.5mcg/kg/min.      Of note this is Ms. Marinelli's third HFICU admission in the last 5months.  She was most recently admitted to HFICU in Nov 2023 where she was treated with dobutamine, nipride and elective intubation for airway protection x24 hrs.  She was also treated with a 5day course of ABX for ASP PNA and was given gentel diuretics.    She was sent home on Farxiga, and entresto with plans to restart her MRA outpatient, however at her most recent telehealth visit on 1/17 entresto was decreased to 1/2 tab 24/26mg BID due to orthostatic symptoms, BB was held given RV dysfunction and SGLT2i was continued.  MRA was not restarted post discharge.  Patient has continued to decline LVAD as destination therapy and is still undecided regarding palliative inotrope.      She denies chest pain fevers/chills at this time, She does endorse dyspnea, Abdominal pain, nausea, and dry heaves.      Cardiac Tests:  EKG: Sinus Tachy   ICD Check 1/25:  multiple ventricular arrhythmias--> 1/23 0131 VT @190bpm successful ATP x1 to restore VS; 1/22 1146 VT @190 ATP x1 to restore VS; 1/22 0428 VF @222bpm ATP x1 unsuccessful remained in VF which spontaneously terminated to VS (no shock delivered); 1NSVT 1/22 0936 x5sec duration V rate 194bpm; 12/28/23 1episode of NSVT vs slow VT (binned as NSVT) @185bpm recorded episode captured through 31sec and unable to see termination.     CXR 1/26: mild interstitial pulmonary opacities compatible with  edema.  Echocardiogram 1/24:  severely decreased LV systolic function, EF 24541% with everely dilated LV cavity size LVIDd 6.3.  No LV thrombus mild -  moderate LA dilation.  Mildly reduced RV systolic function   RHC 1/26 (cath lab): CVP 12, PAP 46/29(37), W 26, CO/CI 2.3/1.3, SVR 2886        Past Medical History:  Past Medical History:   Diagnosis Date    Asthma     CAD (coronary artery disease)     CHF (congestive heart failure) (CMS/[facility])     CKD (chronic kidney disease)     COPD (chronic obstructive pulmonary disease) (CMS/[facility])     CVA (cerebral vascular accident) (CMS/[facility])     Diabetes mellitus (CMS/[facility])     GERD (gastroesophageal reflux disease)     Hyperlipidemia     Hypertension     NSTEMI (non-ST elevated myocardial infarction) (CMS/[facility])     Unspecified systolic (congestive) heart failure (CMS/HCC) 08/11/2022    HFrEF (heart failure with reduced ejection fraction)       Past Surgical History:  Past Surgical History:   Procedure Laterality Date    MITRAL VALVE REPLACEMENT  06/2019    OTHER SURGICAL HISTORY  08/11/2022    Tonsillectomy    OTHER SURGICAL HISTORY  08/11/2022    Right ventricular assist device placement    OTHER SURGICAL HISTORY  08/11/2022    Mitral valve replacement       Family History:  Family History   Problem Relation Name Age of Onset    Other (CVA) Brother         Social History:  Social History     Socioeconomic History    Marital status: Single     Spouse name: Not on file    Number of children: Not on file    Years of education: Not on file    Highest education level: Not on file   Occupational History    Not on file   Tobacco Use    Smoking status: Every Day     Types: Cigarettes    Smokeless tobacco: Not on file   Substance and Sexual Activity    Alcohol use: Not on file    Drug use: Yes     Types: Marijuana    Sexual activity: Not on file   Other Topics Concern    Not on file   Social History Narrative    Not on file     Social Determinants of Health     Financial Resource Strain: Low Risk  (1/26/2024)    Overall Financial Resource Strain (CARDIA)     Difficulty of Paying Living Expenses: Not hard at all   Food Insecurity: No Food  "Insecurity (1/24/2024)    Hunger Vital Sign     Worried About Running Out of Food in the Last Year: Never true     Ran Out of Food in the Last Year: Never true   Transportation Needs: No Transportation Needs (1/26/2024)    PRAPARE - Transportation     Lack of Transportation (Medical): No     Lack of Transportation (Non-Medical): No   Physical Activity: Not on file   Stress: Not on file   Social Connections: Not on file   Intimate Partner Violence: Not At Risk (1/24/2024)    Humiliation, Afraid, Rape, and Kick questionnaire     Fear of Current or Ex-Partner: No     Emotionally Abused: No     Physically Abused: No     Sexually Abused: No   Housing Stability: Low Risk  (1/26/2024)    Housing Stability Vital Sign     Unable to Pay for Housing in the Last Year: No     Number of Places Lived in the Last Year: 1     Unstable Housing in the Last Year: No       Allergies:  Allergies   Allergen Reactions    Metoprolol Shortness of breath and Other    Ticagrelor Anaphylaxis and Other     Feels a severe \"burning feeling\" in her chest    Ace Inhibitors Cough and Other     COUGH    Fentanyl Other    Gadolinium-Containing Contrast Media Hives and Other    Hydralazine Dermatitis    Iodinated Contrast Media Hives    Prednisone Other    Statins-Hmg-Coa Reductase Inhibitors Unknown    Penicillins Rash and Unknown       Prior to Admission Meds:  Medications Prior to Admission   Medication Sig Dispense Refill Last Dose    acetaminophen (Tylenol) 500 mg tablet Take 2 tablets (1,000 mg) by mouth every 8 hours if needed for mild pain (1 - 3).       albuterol 90 mcg/actuation inhaler Inhale 2 puffs every 4 hours if needed for wheezing or shortness of breath. 18 g 11     aspirin 81 mg chewable tablet Chew 1 tablet (81 mg) once daily.       bumetanide (Bumex) 0.5 mg tablet Take 1 tablet (0.5 mg) by mouth once daily as needed (for weight gain of 2 or more pounds in a day; or for weight gain of 5 or more pounds in 1 week). 30 tablet 3     " Farxiga 10 mg Take 1 tablet (10 mg) by mouth once daily.       nicotine (Nicoderm CQ) 14 mg/24 hr patch Place 1 patch over 24 hours on the skin once every 24 hours. 30 patch 3     polyethylene glycol (Glycolax, Miralax) 17 gram packet Take 17 g by mouth once daily. As needed for constipation.       sacubitriL-valsartan (Entresto) 24-26 mg tablet Take 0.5 tablets by mouth 2 times a day. 90 tablet 3     sodium chloride (Ocean) 0.65 % nasal spray Administer 1 spray into each nostril if needed for congestion.       Symbicort 80-4.5 mcg/actuation inhaler Inhale 2 puffs twice a day.       walker misc Rollator to assist with ambulation as needed 1 each 0        Current Medications:  Infusions:  DOBUTamine, Last Rate: 2.5 mcg/kg/min (01/26/24 2112)  lactated Ringer's, Last Rate: 10 mL/hr (01/26/24 1815)  nitroprusside, Last Rate: 3 mcg/kg/min (01/26/24 2241)      Scheduled:  albuterol, 2 puff, q6h  aspirin, 81 mg, Daily  [Held by provider] dapagliflozin propanediol, 10 mg, Daily  dexAMETHasone, 6 mg, Daily  doxycylcine, 100 mg, q12h AWAIS  fluticasone furoate-vilanteroL, 1 puff, Daily  heparin, 5,000 Units, q8h  insulin lispro, 0-5 Units, TID with meals  lidocaine, 5 mL, Once  perflutren protein A microsphere, 0.5 mL, Once in imaging  polyethylene glycol, 17 g, Daily  remdesivir, 100 mg, q24h  [Held by provider] sacubitriL-valsartan, 0.5 tablet, BID  sennosides-docusate sodium, 2 tablet, BID  sulfur hexafluoride microsphr, 2 mL, Once in imaging      PRN:  acetaminophen, 975 mg, q8h PRN  dextrose 10 % in water (D10W), 0.3 g/kg/hr, Once PRN  dextrose, 25 g, q15 min PRN  glucagon, 1 mg, q15 min PRN  oxygen, , Continuous PRN - O2/gases  sodium chloride, 1 spray, PRN  trimethobenzamide, 200 mg, q6h PRN          Invasive Hemodynamics:    Most Recent Range Past 24hrs   BP (Art)   No data recorded   MAP(Art)   No data recorded   RA/CVP   No data recorded   PA 53/33 PAP  Min: 43/36  Max: 53/33   PA(mean) 42 mmHg PAP (Mean)  Min: 39  "mmHg  Max: 267 mmHg   PCWP   No data recorded   CO   No data recorded   CI   No data recorded   Mixed Venous   No data recorded   SVR    No data recorded   PVR   No data recorded         Bipap:    Most Recent Range Past 24hrs   Mode      FiO2 30 % FiO2 (%)  Min: 30 %   Min taken time: 01/26/24 2049  Max: 30 %   Max taken time: 01/26/24 2049   Rate 14 Resp Rate (Set)  Min: 14   Min taken time: 01/26/24 2049  Max: 14   Max taken time: 01/26/24 2049   Vt    No data recorded   PEEP   No data recorded     PHYSICAL EXAM:   Visit Vitals  /70   Pulse (!) 113   Temp 36 °C (96.8 °F) (Temporal)   Resp 17   Ht 1.626 m (5' 4\")   Wt 64.4 kg (141 lb 15.6 oz)   SpO2 100%   BMI 24.37 kg/m²   Smoking Status Every Day   BSA 1.71 m²       Wt Readings from Last 5 Encounters:   01/26/24 64.4 kg (141 lb 15.6 oz)   12/18/23 64.9 kg (143 lb)   11/26/23 65.1 kg (143 lb 8.3 oz)   09/18/23 65.8 kg (145 lb)   06/22/23 67.1 kg (148 lb)       INTAKE/OUTPUT:  I/O last 3 completed shifts:  In: 600 (9.3 mL/kg) [P.O.:600]  Out: 280 (4.3 mL/kg) [Urine:275 (0.1 mL/kg/hr); Blood:5]  Weight: 64.4 kg      Physical Exam  Constitutional:       General: She is awake.      Appearance: Normal appearance.   HENT:      Head: Normocephalic and atraumatic.      Mouth/Throat:      Mouth: Mucous membranes are moist.   Eyes:      General: No scleral icterus.     Conjunctiva/sclera:      Right eye: Right conjunctiva is not injected.      Left eye: Left conjunctiva is not injected.      Pupils: Pupils are equal, round, and reactive to light.   Cardiovascular:      Rate and Rhythm: Tachycardia present.      Pulses: Normal pulses.      Heart sounds: S1 normal and S2 normal. No murmur heard.  Pulmonary:      Effort: Respiratory distress present.      Breath sounds: Decreased breath sounds present.   Abdominal:      General: Bowel sounds are normal. There is no distension.      Palpations: Abdomen is soft.      Tenderness: There is generalized abdominal tenderness. "   Genitourinary:     Comments: Voiding spontaneously   Musculoskeletal:      Cervical back: Normal range of motion and neck supple. No rigidity.      Right lower leg: No edema.      Left lower leg: No edema.   Skin:     General: Skin is cool.      Capillary Refill: Capillary refill takes 2 to 3 seconds.   Neurological:      Mental Status: She is alert.         Review of Systems   Constitutional:  Positive for appetite change.   HENT: Negative.     Eyes: Negative.    Respiratory:  Positive for shortness of breath.    Cardiovascular: Negative.    Gastrointestinal:  Positive for abdominal pain and nausea.   Endocrine: Negative.    Genitourinary: Negative.    Skin: Negative.    Neurological: Negative.    Hematological: Negative.    Psychiatric/Behavioral: Negative.         DATA:  CMP:  Recent Labs     01/26/24  1815 01/26/24  0715 01/25/24  1600 01/25/24  0714 01/24/24  1031 01/24/24  0500 01/23/24  2252 01/23/24  0548 01/22/24  0653 01/22/24  0609 01/22/24  0521 11/26/23  0613 11/25/23  0528   * 136 134* 137 137 134* 140 138  --   --  140 142 141   K 5.1 5.1 5.2 5.4* 4.9 7.3* 4.6 4.5  --  4.2 7.0* 4.3 4.1    102 103 101 104 104 105 107  --   --  107 108* 109*   CO2 23 19* 15* 19* 16* 12* 20* 20*  --   --  25 24 23   ANIONGAP 15 20 21* 22* 22* 25 20 16  --   --  15 14 13   BUN 86* 80* 77* 63* 42* 36* 30* 23  --   --  19 14 17   CREATININE 2.28* 2.17* 2.38* 2.34* 1.76* 1.65* 1.54* 1.51*  --   --  1.30* 1.12* 1.28*   EGFR 23* 24* 22* 22* 31* 34* 36* 37*  --   --  45* 53* 45*   MG 2.52* 2.65*  --  2.59* 2.23 2.92* 2.14 2.13 2.26  --   --  2.38 2.36     Recent Labs     01/26/24  1815 01/26/24  0715 01/25/24  1600 01/25/24  0714 01/24/24  1031 01/24/24  0500 01/23/24  2252 01/23/24  0548 01/22/24  0521 11/22/23  0525 11/21/23  0315 11/20/23  1548 08/31/23  2221 08/31/23  1041 08/31/23  0430 08/31/23  0356 03/21/23  2005 03/21/23  0203 03/20/23  2235 12/23/21  0454 12/20/21  0701 07/14/21  1234 07/13/21  0700    ALBUMIN 3.6 3.8 3.9 4.2 3.7 3.8 3.8 4.0 4.5   < > 3.6 4.0   < > 4.1  --  3.8   < > 4.3 CANCELED   < > 4.4   < > 4.0   ALT 98*  --   --   --   --   --  11  --  16  --  13 15  --  10  --  9  --  21 CANCELED   < > 10  --  10   AST 63*  --   --   --   --   --  17  --  40*  --  17 20  --  16  --  13  --  38 CANCELED   < > 19  --  21   BILITOT 0.7  --   --   --   --   --  0.4  --  0.4  --  0.6 0.4  --  0.3  --  0.3  --  0.4 CANCELED   < > 0.3  --  0.4   LIPASE  --   --   --   --   --   --   --   --   --   --   --  22  --   --  CANCELED 54  --  59 CANCELED  --  96*  --  75    < > = values in this interval not displayed.     CBC:  Recent Labs     01/26/24 2020 01/26/24  0715 01/25/24  0714 01/24/24  0500 01/23/24  2252 01/23/24  0548 01/22/24  0521 11/26/23  0613   WBC 5.7 6.4 7.8 6.7 7.1 4.0* 8.2 5.7   HGB 11.5* 13.3 13.9 13.0 13.3 13.2 13.8 11.5*   HCT 34.9* 39.9 44.1 40.7 41.7 40.3 42.5 36.7    171 89* 119* 162 198 196 157   MCV 88 88 91 90 91 88 90 92     COAG:   Recent Labs     01/23/24 2252 01/22/24  0521 11/21/23  0315 03/20/23  1604 12/20/21  0721 05/02/21  1255 05/18/20  0343 05/16/20  1400   INR 1.1 1.0 1.1 1.0 1.0 1.0 1.1 1.1     ABO:   Recent Labs     01/23/24 2252   ABO O     HEME/ENDO:  Recent Labs     01/26/24  1815 01/26/24  1805 11/22/23  0525 11/21/23  0316 11/21/23  0315   FERRITIN 720*  --  92  --   --    IRONSAT 34  --  11*  --   --    TSH  --  0.45  --  1.41  --    HGBA1C  --  5.4  --   --  5.5      CARDIAC:   Recent Labs     01/26/24  0715 01/23/24  0121 01/22/24  2154 01/22/24  1639 01/22/24  0955 01/22/24  0653 01/22/24  0609 01/22/24  0521 11/21/23  1331 11/21/23  0811 11/21/23  0316 11/21/23  0315 11/20/23  2252 11/20/23  1325 08/31/23  0501 08/31/23  0356 03/21/23  0514 03/21/23  0203 03/20/23  1520 05/02/21  1255 05/21/20  2102 12/06/19  1942 11/29/19  0937 11/22/19  0418 11/21/19  0513 11/20/19  0327 11/19/19  0411 11/18/19  0808 11/17/19  0436   LDH  --   --   --   --   --   --    --   --   --   --   --   --   --   --   --   --   --   --   --   --  293*  --  414* 481* 463* 539* 479* 528* 585*   TROPHS 700* 1,230* 1,257* 996* 484* 214* 185* 151*   < >  --    < >  --    < > 168*   < > 383*   < > 269* CANCELED   < >  --   --   --   --   --   --   --   --   --    BNP >5,000*  --   --   --   --   --   --  335*  --  3,373*  --  >5,000*  --  238*  --  655*  --  414* CANCELED   < >  --    < >  --   --   --   --   --   --   --     < > = values in this interval not displayed.     Recent Labs     01/25/24  2333 01/23/24  1121 11/22/23  1105 11/22/23  0525 11/21/23  2358 11/21/23  1619 11/21/23  1331 11/21/23  0321 08/31/23  0844   LACTATEART 1.6 2.3*  --   --   --   --   --   --  1.6   SO2MV  --   --  65 76* 79* 68 67   < >  --    O2CMX  --   --  64.0 74.0 77.9* 66.8 65.7   < >  --     < > = values in this interval not displayed.         ASSESSMENT AND PLAN:   68 y.o. female w/ Mhx HFrEF (EF 15-20%) s/p ICD placement 5/2020, CAD s/p NSTEMI with RIRI to LCx (Jan 2016), s/p MVR (June 2019), COPD (no formal PFTs), HTN, HLD, GERD, CVA, CKD IIIB and DMII presenting to MICU d/t AHRF requiring BiPAP.     Quickly weaned off BiPAP to 4 L O2 via nasal cannula on arrival to MICU, with patient in no acute distress.  Etiologies for acute hypoxic respiratory failure at time of presentation include COVID diagnosis, COPD exacerbation, ADHF related to underlying HFrEF. Transferred to HFICU on 1/26 after she was found to have high left and right filling pressures and low CO in cath lab.      Neuro:  # Anxiety. Depression  #Hx of CVA   - Serial neuro and pain assessments   - PO Tylenol PRN for pain  - PT/OT Consult, OOB to chair  - CAM ICU score every shift  - Sleep/wake cycle normalization    # Physical Status  -BMI : 24.37      #Substance abuse  -denies ETOH use  -Smokes 10 cigarettes per day, nicotine replacement therapy declined         Cardiovascular:  # Acute on chronic decompensated HFrEF, EF 15-20%  -  Echocardiogram 1/24:  severely decreased LV systolic function, EF 15314% with everely dilated LV cavity size LVIDd 6.3.  No LV thrombus mild - moderate LA dilation.  Mildly reduced RV systolic function   - admit weight: 64.4kg   - admit BNP >5000  - Lactate: 1.9  -Home Entresto and Farxiga currently on hold   - opening SGC #s: /77(90), CVP 8, PAP 47/32 (40), W 25, CO/CI 2.94/1.74, SVR 2230, SVO2 51% on Nipride at 2mcg/kg/min. (Thermistor on swan broken, unable to obtain thermos)  - Dobutamine 2.5mcg/kg/min started with intentions to wean off by morning as patient is undecided about home palliative inotrope  - Lasix 80mg IV x1   - Daily standing weights, 2gm sodium diet, 2L fluid restriction, strict I&Os     #CAD   #NSTEMI s/p RIRI to Lcx (Jan 2016)  #Hx of Mitral Valve repair (June 2019) w/ Dr Montana  #ICD (5/2020):   # Hypertroponinemia in the setting of demand ischemia   -29mm Epic Bioprosthetc valve   -c/w ASA 81mg daily   - Not on statin d/t allergy   [place] ICD Check 1/25:   multiple ventricular arrhythmias--> 1/23 0131 VT @190bpm successful ATP x1 to restore VS; 1/22 1146 VT @190 ATP x1 to restore VS; 1/22 0428 VF @222bpm ATP x1 unsuccessful remained in VF which spontaneously terminated to VS (no shock delivered); 1NSVT 1/22 0936 x5sec duration V rate 194bpm; 12/28/23 1episode of NSVT vs slow VT (binned as NSVT) @185bpm recorded episode captured through 31sec and unable to see termination.   -High sensitivity Trop: 700 (1/26), 1230 (1/23), 1257 (1/22)      Pulmonary:   #Acute hypercapnic and hypoxic respiratory failure  #C/f COPD exacerbation vs Acute decompensated HF  #COVID infection  #C/f PE(resolved)  - Requiring BiPAP again for work of breathing on 1/23; ABG pH 7.37, pCO2 36, pO2 72--> placed on Bipap on arrival to HFICU for dyspnea   - C/w remdesivir 200mg 1 day then 100mg for 4 days  - C/w dexamethasone 6mg daily for 10 days; consider changing to IV formulation if patient not able to tolerate  PO on BiPAP  - C/w doxycycline 100mg BID for 5 days for presumed COPD exacerbation   - No PFTs on file to confirm diagnosis of COPD   - CT PE on 1/24 negative for PE   -80mg IV lasix given on arrival to HFICU   - Hold home symbicort  - c/w Breo Ellipta daily   - c/w Albuterol inh q6hrs   - supplemental O2 PRN  - Strep and legionella antigens--> negative   - Consult Pulm for continuity of care       GI:  #Abdominal pain and nausea d/t low output state   - Tigan 200mg IM q6hrs PRN   - PPI while on IV AC        :  #CKD IIIB  -Baseline BUN/Cr: 29-41/ 1.3-1.8  -BUN/Cr 1/26:  86/2.28  GFR 23 (down trending)   -I/Os  -avoid hypotension and nephrotoxic agents     Heme:  #Anemia 2/2 iron deficiency   - H/H  --> 11.5/34.5   -Iron Studies 1/26: 82/243/34%  -Folate 5.3, B-12 931        Endo:  #DM  - Euglycemic  - hgbA1c-->5.4, EST AVG GLUC 108  - SGLT2i on hold      # no Hx of Thyroid dysfunction   -TSH--> 0.45        ID:  -afebrile, nontoxic, no s/s infx  -WBC--5.7  -trend temps q4h        PHYSICAL AND OCCUPATIONAL THERAPY: ordered    LINES:  PIVs   [place]- 1/26      DVT: subcutaneous Heparin   VAP BUNDLE: NA  ULCER PPX: Protonix daily   GLYCEMIC CONTROL:  BOWEL CARE: luz colace and mirilax   INDWELLING CATHETER: NA  NUTRITION: Adult diet Cardiac; 70 gm fat; 2 - 3 grams Sodium      EMERGENCY CONTACT: Extended Emergency Contact Information  Primary Emergency Contact: Sarah Caruso  Home Phone: [phone]  Relation: Child  FAMILY UPDATE:  CODE STATUS: Full Code  DISPO: remain in HFICU     Dr. Rivera aware of admission    _________________________________________________  LAWSON Szymanski-CNP

## 2024-01-27 NOTE — PROGRESS NOTES
Speech-Language Pathology                 Therapy Communication Note    Patient Name: Melissa Marinelli  MRN: [id number]  Today's Date: 1/27/2024     Discipline: Speech Language Pathology    Speech Therapy consult received.  Pt politely declined evaluation at this time.  Reports that she is tolerating a regular diet without difficulty; RN and family concur.  Will follow up and complete evaluation 1/28 or 1/29 as schedule permits.         01/27/24 at 2:30 PM - Gisele Knapp, SLP

## 2024-01-27 NOTE — CARE PLAN
Problem: Skin  Goal: Decreased wound size/increased tissue granulation at next dressing change  Outcome: Progressing  Goal: Participates in plan/prevention/treatment measures  Outcome: Progressing  Goal: Prevent/manage excess moisture  Outcome: Progressing  Goal: Prevent/minimize sheer/friction injuries  Outcome: Progressing  Goal: Promote/optimize nutrition  Outcome: Progressing  Goal: Promote skin healing  Outcome: Progressing     Problem: Heart Failure  Goal: Improved gas exchange this shift  Outcome: Progressing  Goal: Improved urinary output this shift  Outcome: Progressing  Goal: Reduction in peripheral edema within 24 hours  Outcome: Progressing  Goal: Report improvement of dyspnea/breathlessness this shift  Outcome: Progressing  Goal: Weight from fluid excess reduced over 2-3 days, then stabilize  Outcome: Progressing  Goal: Increase self care and/or family involvement in 24 hours  Outcome: Progressing     The clinical goals for the shift include pt will remain hemodynamically stable throughout the shift

## 2024-01-28 ENCOUNTER — APPOINTMENT (OUTPATIENT)
Dept: CARDIOLOGY | Facility: HOSPITAL | Age: 69
DRG: 286 | End: 2024-01-28
Payer: COMMERCIAL

## 2024-01-28 LAB
ALBUMIN SERPL BCP-MCNC: 3.3 G/DL (ref 3.4–5)
ANION GAP SERPL CALC-SCNC: 13 MMOL/L (ref 10–20)
BASE EXCESS BLDMV CALC-SCNC: -0.2 MMOL/L (ref -2–3)
BASE EXCESS BLDMV CALC-SCNC: -1.5 MMOL/L (ref -2–3)
BASE EXCESS BLDMV CALC-SCNC: 0.1 MMOL/L (ref -2–3)
BASE EXCESS BLDMV CALC-SCNC: 1.3 MMOL/L (ref -2–3)
BASOPHILS # BLD AUTO: 0.01 X10*3/UL (ref 0–0.1)
BASOPHILS NFR BLD AUTO: 0.1 %
BODY TEMPERATURE: 37 DEGREES CELSIUS
BUN SERPL-MCNC: 72 MG/DL (ref 6–23)
CALCIUM SERPL-MCNC: 8.7 MG/DL (ref 8.6–10.6)
CHLORIDE SERPL-SCNC: 104 MMOL/L (ref 98–107)
CO2 SERPL-SCNC: 24 MMOL/L (ref 21–32)
CREAT SERPL-MCNC: 1.69 MG/DL (ref 0.5–1.05)
EGFRCR SERPLBLD CKD-EPI 2021: 33 ML/MIN/1.73M*2
EOSINOPHIL # BLD AUTO: 0 X10*3/UL (ref 0–0.7)
EOSINOPHIL NFR BLD AUTO: 0 %
ERYTHROCYTE [DISTWIDTH] IN BLOOD BY AUTOMATED COUNT: 15.1 % (ref 11.5–14.5)
GLUCOSE BLD MANUAL STRIP-MCNC: 110 MG/DL (ref 74–99)
GLUCOSE BLD MANUAL STRIP-MCNC: 113 MG/DL (ref 74–99)
GLUCOSE BLD MANUAL STRIP-MCNC: 166 MG/DL (ref 74–99)
GLUCOSE SERPL-MCNC: 111 MG/DL (ref 74–99)
HCO3 BLDMV-SCNC: 23.7 MMOL/L (ref 22–26)
HCO3 BLDMV-SCNC: 25.9 MMOL/L (ref 22–26)
HCO3 BLDMV-SCNC: 26 MMOL/L (ref 22–26)
HCO3 BLDMV-SCNC: 27.2 MMOL/L (ref 22–26)
HCT VFR BLD AUTO: 32.4 % (ref 36–46)
HGB BLD-MCNC: 11.3 G/DL (ref 12–16)
IMM GRANULOCYTES # BLD AUTO: 0.08 X10*3/UL (ref 0–0.7)
IMM GRANULOCYTES NFR BLD AUTO: 1.1 % (ref 0–0.9)
INHALED O2 CONCENTRATION: 28 %
LYMPHOCYTES # BLD AUTO: 0.46 X10*3/UL (ref 1.2–4.8)
LYMPHOCYTES NFR BLD AUTO: 6.5 %
MAGNESIUM SERPL-MCNC: 2.31 MG/DL (ref 1.6–2.4)
MCH RBC QN AUTO: 28.8 PG (ref 26–34)
MCHC RBC AUTO-ENTMCNC: 34.9 G/DL (ref 32–36)
MCV RBC AUTO: 83 FL (ref 80–100)
MONOCYTES # BLD AUTO: 0.37 X10*3/UL (ref 0.1–1)
MONOCYTES NFR BLD AUTO: 5.2 %
NEUTROPHILS # BLD AUTO: 6.16 X10*3/UL (ref 1.2–7.7)
NEUTROPHILS NFR BLD AUTO: 87.1 %
NRBC BLD-RTO: 0 /100 WBCS (ref 0–0)
OXYHGB MFR BLDMV: 61.1 % (ref 45–75)
OXYHGB MFR BLDMV: 68.5 % (ref 45–75)
OXYHGB MFR BLDMV: 69.9 % (ref 45–75)
OXYHGB MFR BLDMV: 72.1 % (ref 45–75)
PCO2 BLDMV: 41 MM HG (ref 41–51)
PCO2 BLDMV: 46 MM HG (ref 41–51)
PCO2 BLDMV: 47 MM HG (ref 41–51)
PCO2 BLDMV: 47 MM HG (ref 41–51)
PH BLDMV: 7.35 PH (ref 7.33–7.43)
PH BLDMV: 7.36 PH (ref 7.33–7.43)
PH BLDMV: 7.37 PH (ref 7.33–7.43)
PH BLDMV: 7.37 PH (ref 7.33–7.43)
PHOSPHATE SERPL-MCNC: 3.8 MG/DL (ref 2.5–4.9)
PLATELET # BLD AUTO: 148 X10*3/UL (ref 150–450)
PO2 BLDMV: 38 MM HG (ref 35–45)
PO2 BLDMV: 43 MM HG (ref 35–45)
PO2 BLDMV: 45 MM HG (ref 35–45)
PO2 BLDMV: 45 MM HG (ref 35–45)
POTASSIUM SERPL-SCNC: 4.4 MMOL/L (ref 3.5–5.3)
RBC # BLD AUTO: 3.92 X10*6/UL (ref 4–5.2)
SAO2 % BLDMV: 62 % (ref 45–75)
SAO2 % BLDMV: 70 % (ref 45–75)
SAO2 % BLDMV: 71 % (ref 45–75)
SAO2 % BLDMV: 74 % (ref 45–75)
SODIUM SERPL-SCNC: 137 MMOL/L (ref 136–145)
WBC # BLD AUTO: 7.1 X10*3/UL (ref 4.4–11.3)

## 2024-01-28 PROCEDURE — 2500000001 HC RX 250 WO HCPCS SELF ADMINISTERED DRUGS (ALT 637 FOR MEDICARE OP): Performed by: STUDENT IN AN ORGANIZED HEALTH CARE EDUCATION/TRAINING PROGRAM

## 2024-01-28 PROCEDURE — 99232 SBSQ HOSP IP/OBS MODERATE 35: CPT | Performed by: NURSE PRACTITIONER

## 2024-01-28 PROCEDURE — 80069 RENAL FUNCTION PANEL: CPT

## 2024-01-28 PROCEDURE — 99291 CRITICAL CARE FIRST HOUR: CPT | Performed by: STUDENT IN AN ORGANIZED HEALTH CARE EDUCATION/TRAINING PROGRAM

## 2024-01-28 PROCEDURE — 2500000004 HC RX 250 GENERAL PHARMACY W/ HCPCS (ALT 636 FOR OP/ED)

## 2024-01-28 PROCEDURE — 93010 ELECTROCARDIOGRAM REPORT: CPT | Performed by: INTERNAL MEDICINE

## 2024-01-28 PROCEDURE — 85025 COMPLETE CBC W/AUTO DIFF WBC: CPT

## 2024-01-28 PROCEDURE — 37799 UNLISTED PX VASCULAR SURGERY: CPT

## 2024-01-28 PROCEDURE — 93005 ELECTROCARDIOGRAM TRACING: CPT

## 2024-01-28 PROCEDURE — 2500000001 HC RX 250 WO HCPCS SELF ADMINISTERED DRUGS (ALT 637 FOR MEDICARE OP): Performed by: NURSE PRACTITIONER

## 2024-01-28 PROCEDURE — 83735 ASSAY OF MAGNESIUM: CPT

## 2024-01-28 PROCEDURE — 94660 CPAP INITIATION&MGMT: CPT

## 2024-01-28 PROCEDURE — 1100000001 HC PRIVATE ROOM DAILY

## 2024-01-28 PROCEDURE — 82947 ASSAY GLUCOSE BLOOD QUANT: CPT

## 2024-01-28 PROCEDURE — 82805 BLOOD GASES W/O2 SATURATION: CPT | Mod: MUE | Performed by: NURSE PRACTITIONER

## 2024-01-28 PROCEDURE — 82805 BLOOD GASES W/O2 SATURATION: CPT | Performed by: NURSE PRACTITIONER

## 2024-01-28 PROCEDURE — 2500000001 HC RX 250 WO HCPCS SELF ADMINISTERED DRUGS (ALT 637 FOR MEDICARE OP)

## 2024-01-28 PROCEDURE — 2500000004 HC RX 250 GENERAL PHARMACY W/ HCPCS (ALT 636 FOR OP/ED): Mod: MUE | Performed by: NURSE PRACTITIONER

## 2024-01-28 RX ORDER — DAPAGLIFLOZIN 10 MG/1
10 TABLET, FILM COATED ORAL DAILY
Status: DISCONTINUED | OUTPATIENT
Start: 2024-01-28 | End: 2024-02-03 | Stop reason: HOSPADM

## 2024-01-28 RX ORDER — SPIRONOLACTONE 25 MG/1
25 TABLET ORAL DAILY
Status: DISCONTINUED | OUTPATIENT
Start: 2024-01-28 | End: 2024-02-03 | Stop reason: HOSPADM

## 2024-01-28 RX ADMIN — TRIMETHOBENZAMIDE HYDROCHLORIDE 200 MG: 100 INJECTION INTRAMUSCULAR at 11:45

## 2024-01-28 RX ADMIN — HEPARIN SODIUM 5000 UNITS: 5000 INJECTION INTRAVENOUS; SUBCUTANEOUS at 08:23

## 2024-01-28 RX ADMIN — SACUBITRIL AND VALSARTAN 1 TABLET: 49; 51 TABLET, FILM COATED ORAL at 21:00

## 2024-01-28 RX ADMIN — SODIUM NITROPRUSSIDE 2 MCG/KG/MIN: 0.5 INJECTION, SOLUTION INTRAVENOUS at 05:24

## 2024-01-28 RX ADMIN — DEXAMETHASONE 6 MG: 6 TABLET ORAL at 08:23

## 2024-01-28 RX ADMIN — ASPIRIN 81 MG CHEWABLE TABLET 81 MG: 81 TABLET CHEWABLE at 08:22

## 2024-01-28 RX ADMIN — PANTOPRAZOLE SODIUM 40 MG: 40 TABLET, DELAYED RELEASE ORAL at 06:10

## 2024-01-28 RX ADMIN — POLYETHYLENE GLYCOL 3350 17 G: 17 POWDER, FOR SOLUTION ORAL at 08:22

## 2024-01-28 RX ADMIN — HEPARIN SODIUM 5000 UNITS: 5000 INJECTION INTRAVENOUS; SUBCUTANEOUS at 01:45

## 2024-01-28 RX ADMIN — DAPAGLIFLOZIN 10 MG: 10 TABLET, FILM COATED ORAL at 09:53

## 2024-01-28 RX ADMIN — SENNOSIDES AND DOCUSATE SODIUM 1 TABLET: 8.6; 5 TABLET ORAL at 08:23

## 2024-01-28 RX ADMIN — SPIRONOLACTONE 25 MG: 25 TABLET ORAL at 09:53

## 2024-01-28 RX ADMIN — SACUBITRIL AND VALSARTAN 1 TABLET: 24; 26 TABLET, FILM COATED ORAL at 08:23

## 2024-01-28 RX ADMIN — HEPARIN SODIUM 5000 UNITS: 5000 INJECTION INTRAVENOUS; SUBCUTANEOUS at 16:27

## 2024-01-28 RX ADMIN — ACETAMINOPHEN 975 MG: 325 TABLET ORAL at 13:10

## 2024-01-28 ASSESSMENT — PAIN SCALES - GENERAL
PAINLEVEL_OUTOF10: 3
PAINLEVEL_OUTOF10: 0 - NO PAIN

## 2024-01-28 ASSESSMENT — PAIN - FUNCTIONAL ASSESSMENT
PAIN_FUNCTIONAL_ASSESSMENT: 0-10

## 2024-01-28 ASSESSMENT — PAIN DESCRIPTION - LOCATION: LOCATION: BACK

## 2024-01-28 NOTE — PROGRESS NOTES
[facility]  HFICU PROGRESS NOTE    Melissa Marinelli/[id number]    Admit Date: 1/22/2024  Hospital Length of Stay: 6   ICU Length of Stay: 2d   Primary Service: HF ICU  Primary HF Cardiologist: Dr. Graham     INTERVAL EVENTS / PERTINENT ROS:    No acute events overnight.  Patient remained hemodynamically stable on Nipride gtt at 2mcg/kg/min. She denies chest pain or discomfort. Denies SOB or palpation. Denies abd pain or abd discomfort. Denies N/V/D.      Plans:  -Entresto 24/26  -Decadron x10 days (Done 2/2)  -Muldraugh 25 mg daily  -Dapa 10 mg daily     MEDICATIONS  Infusions:  lactated Ringer's, Last Rate: 10 mL/hr (01/28/24 0500)  nitroprusside, Last Rate: Stopped (01/28/24 1600)      Scheduled:  aspirin, 81 mg, Daily  dapagliflozin propanediol, 10 mg, Daily  dexAMETHasone, 6 mg, Daily  fluticasone furoate-vilanteroL, 1 puff, Daily  heparin, 5,000 Units, q8h  insulin lispro, 0-5 Units, TID with meals  lidocaine, 5 mL, Once  pantoprazole, 40 mg, Daily before breakfast  perflutren protein A microsphere, 0.5 mL, Once in imaging  polyethylene glycol, 17 g, Daily  sacubitriL-valsartan, 1 tablet, BID  sennosides-docusate sodium, 1 tablet, Daily  spironolactone, 25 mg, Daily  sulfur hexafluoride microsphr, 2 mL, Once in imaging      PRN:  acetaminophen, 975 mg, q8h PRN  albuterol, 2 puff, q6h PRN  dextrose 10 % in water (D10W), 0.3 g/kg/hr, Once PRN  dextrose, 25 g, q15 min PRN  glucagon, 1 mg, q15 min PRN  oxygen, , Continuous PRN - O2/gases  polyethylene glycol, 17 g, Daily PRN  sodium chloride, 1 spray, PRN  trimethobenzamide, 200 mg, q6h PRN      Invasive Hemodynamics:    Most Recent Range Past 24hrs   BP (Art)   No data recorded   MAP(Art)   No data recorded   RA/CVP   No data recorded   PA 40/22 PAP  Min: 4/0  Max: 53/43   PA(mean) 30 mmHg PAP (Mean)  Min: 2 mmHg  Max: 56 mmHg   PCWP 19 mmHg PCWP (mmHg)  Min: 19 mmHg  Max: 25 mmHg   CO 4.2 L/min CO (L/min)  Min: 3.9 L/min  Max: 6.4 L/min   CI  "2.4 L/min/m2 CI (L/min/m2)  Min: 2.3 L/min/m2  Max: 3.7 L/min/m2   Mixed Venous   No data recorded   SVR  1552.88 (dyne*sec)/cm5 SVR (dyne*sec)/cm5  Min: 958 (dyne*sec)/cm5  Max: 1620 (dyne*sec)/cm5   PVR 67 (dyne*sec)/cm5 PVR (dyne*sec)/cm5  Min: 67 (dyne*sec)/cm5  Max: 267 (dyne*sec)/cm5       PHYSICAL EXAM:   Visit Vitals  /69 (BP Location: Right arm, Patient Position: Lying)   Pulse 81   Temp 36.8 °C (98.2 °F) (Temporal)   Resp 17   Ht 1.626 m (5' 4\")   Wt 68.3 kg (150 lb 9.2 oz)   SpO2 96%   BMI 25.85 kg/m²   Smoking Status Every Day   BSA 1.76 m²       Wt Readings from Last 5 Encounters:   01/28/24 68.3 kg (150 lb 9.2 oz)   12/18/23 64.9 kg (143 lb)   11/26/23 65.1 kg (143 lb 8.3 oz)   09/18/23 65.8 kg (145 lb)   06/22/23 67.1 kg (148 lb)       INTAKE/OUTPUT:  I/O last 3 completed shifts:  In: 509.6 (7.5 mL/kg) [P.O.:50; I.V.:459.6 (6.7 mL/kg)]  Out: 2050 (30 mL/kg) [Urine:2050 (0.8 mL/kg/hr)]  Weight: 68.3 kg      Physical Exam    DATA:  CMP:  Results from last 7 days   Lab Units 01/28/24  0354 01/27/24  0432 01/26/24  1815 01/26/24  0715 01/25/24  1600 01/25/24  0714 01/24/24  1031 01/24/24  0500 01/23/24  2252 01/23/24  0548 01/22/24  0653 01/22/24  0609 01/22/24  0521   SODIUM mmol/L 137 138 133* 136 134* 137 137 134* 140 138  --   --  140   POTASSIUM mmol/L 4.4 4.4 5.1 5.1 5.2 5.4* 4.9 7.3* 4.6 4.5  --    < > 7.0*   CHLORIDE mmol/L 104 101 100 102 103 101 104 104 105 107  --   --  107   CO2 mmol/L 24 25 23 19* 15* 19* 16* 12* 20* 20*  --   --  25   ANION GAP mmol/L 13 16 15 20 21* 22* 22* 25 20 16  --   --  15   BUN mg/dL 72* 83* 86* 80* 77* 63* 42* 36* 30* 23  --   --  19   CREATININE mg/dL 1.69* 2.26* 2.28* 2.17* 2.38* 2.34* 1.76* 1.65* 1.54* 1.51*  --   --  1.30*   EGFR mL/min/1.73m*2 33* 23* 23* 24* 22* 22* 31* 34* 36* 37*  --   --  45*   MAGNESIUM mg/dL 2.31 2.40 2.52* 2.65*  --  2.59* 2.23 2.92* 2.14 2.13 2.26  --   --    ALBUMIN g/dL 3.3* 3.5 3.6 3.8 3.9 4.2 3.7 3.8 3.8 4.0  --   --  4.5 " "  ALT U/L  --   --  98*  --   --   --   --   --  11  --   --   --  16   AST U/L  --   --  63*  --   --   --   --   --  17  --   --   --  40*   BILIRUBIN TOTAL mg/dL  --   --  0.7  --   --   --   --   --  0.4  --   --   --  0.4    < > = values in this interval not displayed.     CBC:  Results from last 7 days   Lab Units 01/28/24  0347 01/27/24  0432 01/26/24 2020 01/26/24  0715 01/25/24  0714 01/24/24  0500 01/23/24  2252 01/23/24  0548   WBC AUTO x10*3/uL 7.1 5.7 5.7 6.4 7.8 6.7 7.1 4.0*   HEMOGLOBIN g/dL 11.3* 11.7* 11.5* 13.3 13.9 13.0 13.3 13.2   HEMATOCRIT % 32.4* 33.7* 34.9* 39.9 44.1 40.7 41.7 40.3   PLATELETS AUTO x10*3/uL 148* 165 161 171 89* 119* 162 198   MCV fL 83 82 88 88 91 90 91 88       COAG:   Results from last 7 days   Lab Units 01/23/24 2252 01/22/24  0521   INR  1.1 1.0       ABO: No results found for: \"ABO\"  HEME/ENDO:  Results from last 7 days   Lab Units 01/26/24  1815 01/26/24  1805   FERRITIN ng/mL 720*  --    IRON SATURATION % 34  --    TSH mIU/L  --  0.45   HEMOGLOBIN A1C %  --  5.4      CARDIAC:   Results from last 7 days   Lab Units 01/26/24  0715 01/23/24  0121 01/22/24  2154 01/22/24  1639 01/22/24  0955 01/22/24  0653 01/22/24  0609 01/22/24  0521   TROPHS ng/L 700* 1,230* 1,257* 996* 484* 214* 185* 151*   BNP pg/mL >5,000*  --   --   --   --   --   --  335*       ASSESSMENT AND PLAN:   ASSESSMENT AND PLAN:   68 y.o. female w/ PMhx HFrEF (EF 15-20%) s/p ICD placement 5/2020, CAD s/p NSTEMI with RIRI to LCx (Jan 2016), s/p MVR (June 2019), COPD (no formal PFTs), HTN, HLD, GERD, CVA, CKD IIIB and DMII presenting initially to MICU d/t Acute Hypoxic Resp Failure requiring BiPAP which was quickly weaned down to NC .  Etiologies for acute hypoxic respiratory failure at time of presentation include COVID diagnosis, COPD exacerbation, ADHF related to underlying HFrEF. PE was ruled out. She was Transferred to HFICU on 1/26 after she was found to have high left and right filling pressures and " low CO in cath lab. She was treated with Lasix and Placed on BiPAP and started on dobutamine and Nipride.  Dobutamine has since been weaned off and home GDMT has been restarted.  Patient has declined LVAD and is undecided about palliative inotrope.       Neuro:  # Anxiety. Depression  #Hx of CVA   - Serial neuro and pain assessments   - PO Tylenol PRN for pain  - PT/OT Consult, OOB to chair  - CAM ICU score every shift  - Sleep/wake cycle normalization     # Physical Status  -BMI : 24.37      #Substance abuse  -denies ETOH use  -Smokes 10 cigarettes per day, nicotine replacement therapy declined        Cardiovascular:  # Acute on chronic decompensated HFrEF, EF 15-20%  # Acute cardiogenic shock  - Echocardiogram 1/24:  severely decreased LV systolic function, EF 32184% with everely dilated LV cavity size LVIDd 6.3.  No LV thrombus mild - moderate LA dilation.  Mildly reduced RV systolic function   - admit weight: 64.4kg  daily weight: 68.3kg   - admit BNP >5000  - admit Lactate: 1.9  - home farxiga restarted   - Entresto 24/2mg BID and increase dose as tolerated   - Nipride gtt for MBP 60-70 mm Hg and wean down as tolerated  - Opening SGC #s: /77(90), CVP 8, PAP 47/32 (40), W 25, CO/CI 2.94/1.74, SVR 2230, SVO2 51% on Nipride at 2mcg/kg/min. (Thermistor on swan broken, unable to obtain thermos)  - Daily SGC #s (1/28): BP 80/52(62), CVP 5, PAP 24/15 (18)(32), CO/CI 6/3.5, , SVO2 71% on Nipride at 1.5 mcg/kg/min.   -no diuretics today   -continue to wean Nipride   - Daily standing weights, 2gm sodium diet, 2L fluid restriction, strict I&Os     #CAD   #NSTEMI s/p RIRI to Lcx (Jan 2016)  #Hx of Mitral Valve repair (June 2019) w/ Dr Montana  #ICD (5/2020):   # Hypertroponinemia in the setting of demand ischemia   -29mm Epic Bioprosthetc valve   -c/w ASA 81mg daily   - Not on statin d/t allergy   - Device interrogation: [employer] ICD Check 1/25, multiple ventricular arrhythmias--> 1/23 0131 VT @190bpm  successful ATP x1 to restore VS; 1/22 1146 VT @190 ATP x1 to restore VS; 1/22 0428 VF @222bpm ATP x1 unsuccessful remained in VF which spontaneously terminated to VS (no shock delivered); 1NSVT 1/22 0936 x5sec duration V rate 194bpm; 12/28/23 1episode of NSVT vs slow VT (binned as NSVT) @185bpm recorded episode captured through 31sec and unable to see termination.   -High sensitivity Trop: 700 (1/26), 1230 (1/23), 1257 (1/22)        Pulmonary:   #Acute hypercapnic and hypoxic respiratory failure  #C/f COPD exacerbation vs Acute decompensated HF  #COVID infection  #C/f PE(resolved)  - Requiring BiPAP again for work of breathing on 1/23; ABG pH 7.37, pCO2 36, pO2 72--> placed on Bipap on arrival to HFICU for dyspnea   - C/w remdesivir 200mg 1 day then 100mg for total 5 day and last dose 91/270  - C/w dexamethasone 6mg daily for 10 days (done 2/2)  - C/w doxycycline 100mg BID for 5 days for presumed COPD exacerbation   - No PFTs on file to confirm diagnosis of COPD   - CT PE on 1/24 negative for PE   - 80mg IV lasix given on arrival to HFICU---> Spot diuresis   - Hold home symbicort  - c/w Breo Ellipta daily   - c/w Albuterol inh q6hrs PRN  - supplemental O2 PRN--> wean off as tolerated   - Strep and legionella antigens--> negative   - Consult Pulm for continuity of care PRN      GI:  #Abdominal pain and nausea d/t low output state   - Tigan 200mg IM q6hrs PRN   - PPI      :  #CKD IIIB  -Baseline BUN/Cr: 29-41/ 1.3-1.8  -BUN/Cr (1/28):  72/1.69 GFR 33  -I/Os  -avoid hypotension and nephrotoxic agents     Heme:  #Anemia 2/2 iron deficiency   - H/H  base line 13/42   -Iron Studies 1/26: 82/243/34%  -Folate 5.3, B-12 931  - Daily CBC     Endo:  #DM  - Euglycemic  - hgbA1c-->5.4, EST AVG GLUC 108  - SGLT2i  restarted    # no Hx of Thyroid dysfunction   -TSH--> 0.45      ID:  #COVID infection  - See pulmonary section   -afebrile, nontoxic, no s/s infx  -WBC--No leukocytosis   -trend temps q4h     PHYSICAL AND OCCUPATIONAL  THERAPY:    LINES:  PIVs   [place]- 1/26      DVT: subcutaneous Heparin   VAP BUNDLE: NA  ULCER PPX: Protonix daily   GLYCEMIC CONTROL: monitoring  BOWEL CARE: luz colace and mirilax   INDWELLING CATHETER: NA  NUTRITION: Adult diet Cardiac; 70 gm fat; 2 - 3 grams Sodium with 2000 fluid restriction        EMERGENCY CONTACT: Extended Emergency Contact Information  Primary Emergency Contact: ShadySarah  Home Phone: [phone]  Relation: Child  FAMILY UPDATE:  CODE STATUS: Full Code  DISPO: Remain in HFICU    Patient seen and assessed with Dr. Graham    _________________________________________________  LAWSON Szymanski-CNP

## 2024-01-28 NOTE — PROGRESS NOTES
HFICU Attending Note     68 year old woman with HFrEF ( NYHA FC 3, ACC/AHA Stage D) who has declined LVAD, and is not for OHT ( active smoker), and is considering whether she would  want palliative inotropes, COPD.  She has been admitted with COVID (being treated with Dexamethasone, s/p Remdesivir). There was concern for low output state which was confirmed on [facility] 1/26/2024.  On admission, she needed Dobutamine briefly, and had good haemodynamic improvement with SNP and diuresis. Today she is euvolemic and transition off SNP to oral vasodilator therapy continues today.     Ongoing discussion about potential role for palliative inotropes.      This critically ill patient continues to be at-risk for clinically significant deterioration / failure due to the above mentioned dysfunctional, unstable organ systems.  I have personally identified and managed all complex critical care issues to prevent aforementioned clinical deterioration.  Critical care time is spent at bedside and/or the immediate area and has included, but is not limited to, the review of diagnostic tests, labs, radiographs, serial assessments of hemodynamics, respiratory status, ventilatory management, and family updates.  Time spent in procedures and teaching are reported separately.     Critical care time: 35  minutes

## 2024-01-29 ENCOUNTER — APPOINTMENT (OUTPATIENT)
Dept: RADIOLOGY | Facility: HOSPITAL | Age: 69
DRG: 286 | End: 2024-01-29
Payer: COMMERCIAL

## 2024-01-29 LAB
ALBUMIN SERPL BCP-MCNC: 3.1 G/DL (ref 3.4–5)
ANION GAP SERPL CALC-SCNC: 12 MMOL/L (ref 10–20)
ATRIAL RATE: 91 BPM
BASE EXCESS BLDMV CALC-SCNC: -1.1 MMOL/L (ref -2–3)
BASE EXCESS BLDMV CALC-SCNC: 1.7 MMOL/L (ref -2–3)
BASE EXCESS BLDV CALC-SCNC: 0.8 MMOL/L (ref -2–3)
BASOPHILS # BLD AUTO: 0.01 X10*3/UL (ref 0–0.1)
BASOPHILS NFR BLD AUTO: 0.1 %
BODY TEMPERATURE: 37 DEGREES CELSIUS
BUN SERPL-MCNC: 62 MG/DL (ref 6–23)
CALCIUM SERPL-MCNC: 8.5 MG/DL (ref 8.6–10.6)
CHLORIDE SERPL-SCNC: 103 MMOL/L (ref 98–107)
CO2 SERPL-SCNC: 23 MMOL/L (ref 21–32)
CREAT SERPL-MCNC: 1.49 MG/DL (ref 0.5–1.05)
EGFRCR SERPLBLD CKD-EPI 2021: 38 ML/MIN/1.73M*2
EOSINOPHIL # BLD AUTO: 0 X10*3/UL (ref 0–0.7)
EOSINOPHIL NFR BLD AUTO: 0 %
ERYTHROCYTE [DISTWIDTH] IN BLOOD BY AUTOMATED COUNT: 15.1 % (ref 11.5–14.5)
GLUCOSE BLD MANUAL STRIP-MCNC: 123 MG/DL (ref 74–99)
GLUCOSE BLD MANUAL STRIP-MCNC: 177 MG/DL (ref 74–99)
GLUCOSE BLD MANUAL STRIP-MCNC: 182 MG/DL (ref 74–99)
GLUCOSE BLD MANUAL STRIP-MCNC: 98 MG/DL (ref 74–99)
GLUCOSE SERPL-MCNC: 95 MG/DL (ref 74–99)
HCO3 BLDMV-SCNC: 24.3 MMOL/L (ref 22–26)
HCO3 BLDMV-SCNC: 27.2 MMOL/L (ref 22–26)
HCO3 BLDV-SCNC: 26 MMOL/L (ref 22–26)
HCT VFR BLD AUTO: 33.8 % (ref 36–46)
HGB BLD-MCNC: 12.1 G/DL (ref 12–16)
IMM GRANULOCYTES # BLD AUTO: 0.07 X10*3/UL (ref 0–0.7)
IMM GRANULOCYTES NFR BLD AUTO: 0.8 % (ref 0–0.9)
INHALED O2 CONCENTRATION: 0 %
INHALED O2 CONCENTRATION: 28 %
INHALED O2 CONCENTRATION: 28 %
LYMPHOCYTES # BLD AUTO: 0.7 X10*3/UL (ref 1.2–4.8)
LYMPHOCYTES NFR BLD AUTO: 8.3 %
MAGNESIUM SERPL-MCNC: 2.35 MG/DL (ref 1.6–2.4)
MCH RBC QN AUTO: 29.8 PG (ref 26–34)
MCHC RBC AUTO-ENTMCNC: 35.8 G/DL (ref 32–36)
MCV RBC AUTO: 83 FL (ref 80–100)
MONOCYTES # BLD AUTO: 0.54 X10*3/UL (ref 0.1–1)
MONOCYTES NFR BLD AUTO: 6.4 %
NEUTROPHILS # BLD AUTO: 7.08 X10*3/UL (ref 1.2–7.7)
NEUTROPHILS NFR BLD AUTO: 84.4 %
NRBC BLD-RTO: 0 /100 WBCS (ref 0–0)
OXYHGB MFR BLDMV: 60.3 % (ref 45–75)
OXYHGB MFR BLDMV: 68.9 % (ref 45–75)
OXYHGB MFR BLDV: 76.5 % (ref 45–75)
P AXIS: 99 DEGREES
P OFFSET: 192 MS
P ONSET: 147 MS
PCO2 BLDMV: 42 MM HG (ref 41–51)
PCO2 BLDMV: 45 MM HG (ref 41–51)
PCO2 BLDV: 43 MM HG (ref 41–51)
PH BLDMV: 7.37 PH (ref 7.33–7.43)
PH BLDMV: 7.39 PH (ref 7.33–7.43)
PH BLDV: 7.39 PH (ref 7.33–7.43)
PHOSPHATE SERPL-MCNC: 3.3 MG/DL (ref 2.5–4.9)
PLATELET # BLD AUTO: 146 X10*3/UL (ref 150–450)
PO2 BLDMV: 36 MM HG (ref 35–45)
PO2 BLDMV: 43 MM HG (ref 35–45)
PO2 BLDV: 46 MM HG (ref 35–45)
POTASSIUM SERPL-SCNC: 4.4 MMOL/L (ref 3.5–5.3)
PR INTERVAL: 140 MS
Q ONSET: 217 MS
QRS COUNT: 15 BEATS
QRS DURATION: 122 MS
QT INTERVAL: 402 MS
QTC CALCULATION(BAZETT): 494 MS
QTC FREDERICIA: 462 MS
R AXIS: 107 DEGREES
RBC # BLD AUTO: 4.06 X10*6/UL (ref 4–5.2)
SAO2 % BLDMV: 62 % (ref 45–75)
SAO2 % BLDMV: 71 % (ref 45–75)
SAO2 % BLDV: 79 % (ref 45–75)
SODIUM SERPL-SCNC: 134 MMOL/L (ref 136–145)
T AXIS: -83 DEGREES
T OFFSET: 418 MS
VENTRICULAR RATE: 91 BPM
WBC # BLD AUTO: 8.4 X10*3/UL (ref 4.4–11.3)

## 2024-01-29 PROCEDURE — 71045 X-RAY EXAM CHEST 1 VIEW: CPT

## 2024-01-29 PROCEDURE — 99222 1ST HOSP IP/OBS MODERATE 55: CPT | Performed by: INTERNAL MEDICINE

## 2024-01-29 PROCEDURE — 2500000004 HC RX 250 GENERAL PHARMACY W/ HCPCS (ALT 636 FOR OP/ED): Performed by: NURSE PRACTITIONER

## 2024-01-29 PROCEDURE — 99291 CRITICAL CARE FIRST HOUR: CPT | Performed by: INTERNAL MEDICINE

## 2024-01-29 PROCEDURE — 99232 SBSQ HOSP IP/OBS MODERATE 35: CPT | Performed by: NURSE PRACTITIONER

## 2024-01-29 PROCEDURE — 2500000001 HC RX 250 WO HCPCS SELF ADMINISTERED DRUGS (ALT 637 FOR MEDICARE OP): Performed by: NURSE PRACTITIONER

## 2024-01-29 PROCEDURE — 2500000004 HC RX 250 GENERAL PHARMACY W/ HCPCS (ALT 636 FOR OP/ED)

## 2024-01-29 PROCEDURE — 80069 RENAL FUNCTION PANEL: CPT

## 2024-01-29 PROCEDURE — 82805 BLOOD GASES W/O2 SATURATION: CPT | Mod: MUE | Performed by: NURSE PRACTITIONER

## 2024-01-29 PROCEDURE — 82805 BLOOD GASES W/O2 SATURATION: CPT

## 2024-01-29 PROCEDURE — 82805 BLOOD GASES W/O2 SATURATION: CPT | Performed by: NURSE PRACTITIONER

## 2024-01-29 PROCEDURE — 97535 SELF CARE MNGMENT TRAINING: CPT | Mod: GO

## 2024-01-29 PROCEDURE — 2500000004 HC RX 250 GENERAL PHARMACY W/ HCPCS (ALT 636 FOR OP/ED): Mod: MUE | Performed by: NURSE PRACTITIONER

## 2024-01-29 PROCEDURE — 83735 ASSAY OF MAGNESIUM: CPT

## 2024-01-29 PROCEDURE — 97116 GAIT TRAINING THERAPY: CPT | Mod: GP

## 2024-01-29 PROCEDURE — 97530 THERAPEUTIC ACTIVITIES: CPT | Mod: GP

## 2024-01-29 PROCEDURE — 2500000004 HC RX 250 GENERAL PHARMACY W/ HCPCS (ALT 636 FOR OP/ED): Performed by: STUDENT IN AN ORGANIZED HEALTH CARE EDUCATION/TRAINING PROGRAM

## 2024-01-29 PROCEDURE — 99497 ADVNCD CARE PLAN 30 MIN: CPT | Performed by: INTERNAL MEDICINE

## 2024-01-29 PROCEDURE — 97530 THERAPEUTIC ACTIVITIES: CPT | Mod: GO

## 2024-01-29 PROCEDURE — 71045 X-RAY EXAM CHEST 1 VIEW: CPT | Performed by: RADIOLOGY

## 2024-01-29 PROCEDURE — 37799 UNLISTED PX VASCULAR SURGERY: CPT

## 2024-01-29 PROCEDURE — 1100000001 HC PRIVATE ROOM DAILY

## 2024-01-29 PROCEDURE — 82947 ASSAY GLUCOSE BLOOD QUANT: CPT

## 2024-01-29 PROCEDURE — 85025 COMPLETE CBC W/AUTO DIFF WBC: CPT

## 2024-01-29 PROCEDURE — 2500000001 HC RX 250 WO HCPCS SELF ADMINISTERED DRUGS (ALT 637 FOR MEDICARE OP)

## 2024-01-29 RX ORDER — CALCIUM CARBONATE 200(500)MG
500 TABLET,CHEWABLE ORAL 4 TIMES DAILY PRN
Status: DISCONTINUED | OUTPATIENT
Start: 2024-01-29 | End: 2024-01-30

## 2024-01-29 RX ORDER — MILRINONE LACTATE 0.2 MG/ML
0.12 INJECTION, SOLUTION INTRAVENOUS CONTINUOUS
Status: DISCONTINUED | OUTPATIENT
Start: 2024-01-29 | End: 2024-02-03 | Stop reason: HOSPADM

## 2024-01-29 RX ADMIN — TRIMETHOBENZAMIDE HYDROCHLORIDE 200 MG: 100 INJECTION INTRAMUSCULAR at 22:26

## 2024-01-29 RX ADMIN — HEPARIN SODIUM 5000 UNITS: 5000 INJECTION INTRAVENOUS; SUBCUTANEOUS at 00:39

## 2024-01-29 RX ADMIN — CALCIUM CARBONATE (ANTACID) CHEW TAB 500 MG 500 MG: 500 CHEW TAB at 15:38

## 2024-01-29 RX ADMIN — DAPAGLIFLOZIN 10 MG: 10 TABLET, FILM COATED ORAL at 08:46

## 2024-01-29 RX ADMIN — SODIUM CHLORIDE, POTASSIUM CHLORIDE, SODIUM LACTATE AND CALCIUM CHLORIDE 250 ML: 600; 310; 30; 20 INJECTION, SOLUTION INTRAVENOUS at 21:00

## 2024-01-29 RX ADMIN — DEXAMETHASONE 6 MG: 6 TABLET ORAL at 08:46

## 2024-01-29 RX ADMIN — HEPARIN SODIUM 5000 UNITS: 5000 INJECTION INTRAVENOUS; SUBCUTANEOUS at 16:45

## 2024-01-29 RX ADMIN — ASPIRIN 81 MG CHEWABLE TABLET 81 MG: 81 TABLET CHEWABLE at 08:46

## 2024-01-29 RX ADMIN — TRIMETHOBENZAMIDE HYDROCHLORIDE 200 MG: 100 INJECTION INTRAMUSCULAR at 00:39

## 2024-01-29 RX ADMIN — SACUBITRIL AND VALSARTAN 1 TABLET: 49; 51 TABLET, FILM COATED ORAL at 08:46

## 2024-01-29 RX ADMIN — SODIUM CHLORIDE, SODIUM LACTATE, POTASSIUM CHLORIDE, AND CALCIUM CHLORIDE 500 ML: 600; 310; 30; 20 INJECTION, SOLUTION INTRAVENOUS at 18:00

## 2024-01-29 RX ADMIN — PANTOPRAZOLE SODIUM 40 MG: 40 TABLET, DELAYED RELEASE ORAL at 08:46

## 2024-01-29 RX ADMIN — HEPARIN SODIUM 5000 UNITS: 5000 INJECTION INTRAVENOUS; SUBCUTANEOUS at 08:47

## 2024-01-29 RX ADMIN — INSULIN LISPRO 2 UNITS: 100 INJECTION, SOLUTION INTRAVENOUS; SUBCUTANEOUS at 12:00

## 2024-01-29 RX ADMIN — POLYETHYLENE GLYCOL 3350 17 G: 17 POWDER, FOR SOLUTION ORAL at 09:00

## 2024-01-29 RX ADMIN — MILRINONE LACTATE 0.12 MCG/KG/MIN: 200 INJECTION, SOLUTION INTRAVENOUS at 12:00

## 2024-01-29 RX ADMIN — INSULIN LISPRO 1 UNITS: 100 INJECTION, SOLUTION INTRAVENOUS; SUBCUTANEOUS at 22:30

## 2024-01-29 RX ADMIN — POLYETHYLENE GLYCOL 3350 17 G: 17 POWDER, FOR SOLUTION ORAL at 08:58

## 2024-01-29 RX ADMIN — SPIRONOLACTONE 25 MG: 25 TABLET ORAL at 08:46

## 2024-01-29 ASSESSMENT — PAIN SCALES - GENERAL
PAINLEVEL_OUTOF10: 0 - NO PAIN

## 2024-01-29 ASSESSMENT — COGNITIVE AND FUNCTIONAL STATUS - GENERAL
TOILETING: A LITTLE
WALKING IN HOSPITAL ROOM: A LOT
TURNING FROM BACK TO SIDE WHILE IN FLAT BAD: A LITTLE
MOVING TO AND FROM BED TO CHAIR: A LITTLE
MOBILITY SCORE: 16
HELP NEEDED FOR BATHING: A LITTLE
PERSONAL GROOMING: A LITTLE
DRESSING REGULAR UPPER BODY CLOTHING: A LITTLE
CLIMB 3 TO 5 STEPS WITH RAILING: TOTAL
STANDING UP FROM CHAIR USING ARMS: A LITTLE
DAILY ACTIVITIY SCORE: 19
DRESSING REGULAR LOWER BODY CLOTHING: A LITTLE

## 2024-01-29 ASSESSMENT — PAIN - FUNCTIONAL ASSESSMENT
PAIN_FUNCTIONAL_ASSESSMENT: 0-10

## 2024-01-29 ASSESSMENT — ACTIVITIES OF DAILY LIVING (ADL): HOME_MANAGEMENT_TIME_ENTRY: 13

## 2024-01-29 NOTE — PROGRESS NOTES
"Occupational Therapy    Occupational Therapy Treatment    Name: Melissa Marinelli  MRN: [id number]  : 1955  Date: 24  Time Calculation  Start Time: 1412  Stop Time: 1435  Time Calculation (min): 23 min    Assessment:  End of Session Communication: Bedside nurse  End of Session Patient Position: Bed, 3 rail up, Alarm off, not on at start of session  Plan:  Treatment Interventions: ADL retraining, Functional transfer training, Endurance training, Patient/family training  OT Frequency: 3 times per week  OT Discharge Recommendations: Moderate intensity level of continued care  OT - OK to Discharge: Yes    Subjective   Previous Visit Info:  OT Last Visit  OT Received On: 24  General:  General  Family/Caregiver Present: No  Co-Treatment: PT  Co-Treatment Reason: to maximize patient safety, mobility and therapeutic gains  Prior to Session Communication: Bedside nurse  Patient Position Received: Bed, 3 rail up, Alarm off, not on at start of session  General Comment: Patient supine in bed, pleasant and agreeable to OT; reports feeling weak due to \"I haven't been walking\"; tele, IV, 0.125 Milrinone  Precautions:  Medical Precautions: Cardiac precautions, Fall precautions, Infection precautions  Precautions Comment: Covid precautions; all appropriate PPE donned/doffed for session  Vitals:  Vital Signs  Heart Rate:  (pre 81, post 80)  Resp:  (pre 16, post 15)  SpO2:  (pre 94%, post 93%; during session SpO2 >/= 92%)  BP:  (pre 82/55, /64, post 98/71)  Pain Assessment:  Pain Assessment  Pain Assessment: 0-10  Pain Score:  (patient c/o back pain, did not rate)     Objective   Activities of Daily Living: Grooming  Grooming Comments: CGA for washing hands standing at sink    Toileting  Toileting Comments: SBA for toilet hygiene     Bed Mobility/Transfers: Bed Mobility  Bed Mobility: Yes  Bed Mobility 1  Bed Mobility 1: Supine to sitting, Sitting to supine  Level of Assistance 1: Close supervision  Bed Mobility " Comments 1: HOB elevated    Transfers  Transfer: Yes  Transfer 1  Transfer From 1: Bed to  Transfer to 1: Stand  Technique 1: Sit to stand  Transfer Level of Assistance 1: Contact guard, Arm in arm assistance, Minimal verbal cues  Trials/Comments 1: functional mobility household distances with min A x1 hand hold assist, patient with x1 LOB posteriorly requiring mod A to recover  Transfers 2  Transfer From 2: Stand to  Transfer to 2: Bed  Technique 2: Stand to sit  Transfer Level of Assistance 2: Contact guard, Minimal verbal cues, Hand held assistance    Toilet Transfers  Toilet Transfers Comments: patient performed toilet transfer in bathroom with min A x1, cues for safe alignment with toilet and to use grab bar    Outcome Measures:  [facility] Daily Activity  Putting on and taking off regular lower body clothing: A little  Bathing (including washing, rinsing, drying): A little  Putting on and taking off regular upper body clothing: A little  Toileting, which includes using toilet, bedpan or urinal: A little  Taking care of personal grooming such as brushing teeth: A little  Eating Meals: None  Daily Activity - Total Score: 19    , Confusion Assessment Method-ICU (CAM-ICU)  Feature 1: Acute Onset or Fluctuating Course: Negative  Feature 2: Inattention: Negative  Feature 3: Altered Level of Consciousness: Negative  Feature 4: Disorganized Thinking: Positive  Overall CAM-ICU: Negative  ,    , and E = Exercise and Early Mobility  Current Activity: Ambulating in room    Education Documentation  ADL Training, taught by Britany Cabrera OT at 1/29/2024  4:02 PM.  Learner: Patient  Readiness: Acceptance  Method: Explanation  Response: Needs Reinforcement    Education Comments  No comments found.      Goals:  Encounter Problems       Encounter Problems (Active)       ADLs       Patient with complete upper body dressing with independent level of assistance donning and doffing all UE clothes with no adaptive equipment while edge  of bed  (Progressing)       Start:  01/26/24    Expected End:  02/16/24            Patient with complete lower body dressing with modified independent level of assistance donning and doffing all LE clothes  with no adaptive equipment while edge of bed  (Progressing)       Start:  01/26/24    Expected End:  02/16/24            Patient will complete daily grooming tasks washing face/hair with modified independent level of assistance and PRN adaptive equipment while standing. (Progressing)       Start:  01/26/24    Expected End:  02/16/24            Patient will complete toileting including hygiene clothing management/hygiene with independent level of assistance and raised toilet seat. (Progressing)       Start:  01/26/24    Expected End:  02/16/24               BALANCE       Patient will tolerate standing for >/ = 5 minutes to modified independent level of assistance with least restrictive device in order to improve functional activity tolerance for ADL tasks. (Progressing)       Start:  01/26/24    Expected End:  02/16/24               MOBILITY       Patient will perform Functional mobility Household distances with modified independent level of assistance and least restrictive device in order to improve safety and functional mobility. (Progressing)       Start:  01/26/24    Expected End:  02/16/24               TRANSFERS       Patient will perform bed mobility independent level of assistance in order to improve safety and independence with mobility (Progressing)       Start:  01/26/24    Expected End:  02/16/24            Britany Cabrera OTR/L  Inpatient Occupational Therapist   Rehab Office: [phone]

## 2024-01-29 NOTE — PROGRESS NOTES
Physical Therapy    Physical Therapy Treatment    Patient Name: Melissa Marinelli  MRN: [id number]  Today's Date: 2024  Time Calculation  Start Time: 1411  Stop Time: 1435  Time Calculation (min): 24 min     Assessment/Plan         PT Plan  Treatment/Interventions: Bed mobility, Transfer training, Gait training, Stair training, Balance training, Strengthening, Endurance training, Range of motion, Therapeutic exercise, Therapeutic activity, Home exercise program, Positioning  PT Plan: Skilled PT  PT Frequency: 3 times per week  PT Discharge Recommendations: Moderate intensity level of continued care  PT Recommended Transfer Status: Assist x1  PT - OK to Discharge: Yes    General Visit Information:   PT  Visit  PT Received On: 24  Response to Previous Treatment:  (transferred to ICU 2/2 volume overload and intiated dobutamine (now weaned off))  General  Co-Treatment: OT  Co-Treatment Reason: to maximize safe pt mobility and participation  Prior to Session Communication: Bedside nurse  Patient Position Received: Bed, 3 rail up, Alarm off, not on at start of session  General Comment: Marana-tapan catheter; milrinone .125 mcg    Subjective     Precautions:  Precautions  Medical Precautions: Fall precautions, Cardiac precautions, Oxygen therapy device and L/min (COVID (+) precautions- droplet precautions)    Vital Signs:  Vital Signs  Heart Rate:  (PRE: 90; DURINs; POST: 80)  SpO2:  (PRE: 92%; DURING: SpO2 >/= 92%; POST: 92%)  BP:  (82/55, After amb: 118/64; POST: 98/71)    Objective     Pain:  Pain Assessment  Pain Assessment: 0-10  Pain Score:  (pt reporting back pain from being in the bed, unrated. RN aware.)    Cognition:  Cognition  Overall Cognitive Status: Within Functional Limits  Arousal/Alertness: Appropriate responses to stimuli  Orientation Level: Oriented X4  Following Commands: Follows one step commands with repetition    Activity Tolerance:  Activity Tolerance  Early Mobility/Exercise Safety  Screen: Proceed with mobilization - No exclusion criteria met    Treatments:     Bed Mobility  Bed Mobility: Yes  Bed Mobility 1  Bed Mobility 1: Supine to sitting, Sitting to supine  Level of Assistance 1: Close supervision    Ambulation/Gait Training  Ambulation/Gait Training Performed: Yes  Ambulation/Gait Training 1  Surface 1: Level tile  Device 1: No device  Assistance 1: Minimum assistance (x1 person; pt had one episode of LOB requiring modAx1 to remain upright)  Quality of Gait 1: Narrow base of support, Inconsistent stride length (having multiple episodes of increased sway requiring minAx1-modAx1; pt able to identify LOBs but did little to react and correct)  Comments/Distance (ft) 1: 2 x 12 ft with seated rest break; 20 ft x 1  Transfers  Transfer: Yes  Transfer 1  Transfer From 1: Sit to  Transfer to 1: Stand  Technique 1: Sit to stand  Transfer Level of Assistance 1:  (CGA from bed height; minAx1 from low surface height)  Trials/Comments 1: x 2 trials from bed; x 1 trial from low surface. pt taking multiple attempts to rise from lower surface and initial standing requiring minAx1  Transfers 2  Transfer From 2: Stand to  Transfer to 2: Sit  Technique 2: Stand to sit  Transfer Level of Assistance 2: Contact guard  Trials/Comments 2: x 3 trials; cues for safely aligning to surface and for eccentric control and pt tends to throw self back onto surface    Outcome Measures:  [facility]  Turning from your back to your side while in a flat bed without using bedrails: None  Moving from lying on your back to sitting on the side of a flat bed without using bedrails: A little  Moving to and from bed to chair (including a wheelchair): A little  Standing up from a chair using your arms (e.g. wheelchair or bedside chair): A little  To walk in hospital room: A lot  Climbing 3-5 steps with railing: Total  Basic Mobility - Total Score: 16    Confusion Assessment Method-ICU (CAM-ICU)  Feature 1: Acute Onset or  Fluctuating Course: Negative  Feature 2: Inattention: Negative  Feature 4: Disorganized Thinking: Negative  Overall CAM-ICU: Negative    FSS-ICU  Ambulation: Walks <50 feet with any assistance x1 or walks any distance with assistance x2 people  Rolling: Complete independence  Sitting: Supervision or set-up only  Transfer Sit-to-Stand: Minimal assistance (performs 75% or more of task)  Transfer Supine-to-Sit: Supervision or set-up only  Total Score: 22    ICU Mobility Screen  Early Mobility/Exercise Safety Screen: Proceed with mobilization - No exclusion criteria met  E = Exercise and Early Mobility  Early Mobility/Exercise Safety Screen: Proceed with mobilization - No exclusion criteria met  Current Activity: Ambulating in room    Education Documentation  Mobility Training, taught by Kristina Cardoso, PT at 1/29/2024  4:14 PM.  Learner: Patient  Readiness: Acceptance  Method: Explanation  Response: Needs Reinforcement    Education Comments  No comments found.      Encounter Problems       Encounter Problems (Active)       Balance       pt will score ./= 242/8 on Tinetti balance assessment indicating low risk for falls  (Progressing)       Start:  01/26/24    Expected End:  02/09/24               Mobility       STG - Patient will ambulate >/= 150 ft with FWW and CGA, VSS  (Progressing)       Start:  01/26/24    Expected End:  02/09/24            STG - Patient will ascend and descend a flight of stairs CGA (Progressing)       Start:  01/26/24    Expected End:  02/09/24               Transfers       STG - Transfer from bed to chair CGA And FWW (Progressing)       Start:  01/26/24    Expected End:  02/09/24            STG - Patient will transfer sit to and from stand CGA and FWW (Progressing)       Start:  01/26/24    Expected End:  02/09/24                 Signed by Kristina Cardoso DPT

## 2024-01-29 NOTE — CONSULTS
Inpatient consult to Palliative Care  Consult performed by: Corona Teixeira DO  Consult ordered by: Janice Lenz, APRN-CNP  Reason for consult: goals of care      Palliative Medicine Consult  Complex medical decision making, symptom management, patient/family support    History obtained from chart review including ED note, H&P, patient's daily progress notes, review of lab/test results, and discussion with primary team and bedside RN.    Subjective    History of Present Illness  Ms. Melissa Marinelli is a delightful 68 y.o. female with past medical history significant for HFrEF (EF 15-20%) s/p ICD placement, CAD s/p pci, s/p MVR, COPD, HTN, HLD, GERD, CVA, CKD IIIB and DMII presenting initially to MICU d/t acute hypoxic respiratory failure requiring BiPAP which was quickly weaned down to NC.  Etiologies for acute hypoxic respiratory failure at time of presentation include COVID diagnosis, COPD exacerbation, ADHF related to underlying HFrEF. She was Transferred to HFICU on 1/26 after she was found to have high left and right filling pressures and low CO in cath lab. She was treated with lasix and placed on BiPAP and started on dobutamine and Nipride. Dobutamine has since been weaned off and home GDMT has been restarted.  Patient has declined LVAD in the past. Today she is agreeable to trial Milrinone for symptom management. Palliative care was consulted for goals of care discussion.     Introduction to Palliative Care  Met with patient at bedside.   Patient alert and oriented, has capacity to make their own medical decisions at this time.   Staff present: Jason Chakraborty MD, Corona Teixeira DO, Tsering Hook MD, Jez Butler MD  Palliative Medicine was introduced as a specialty service for patients with serious illness to help with symptom management, improve quality of life, assist with goals of care conversations, navigate complex decision making, and provide support to patients and families. Support and empathy was provided  "throughout the encounter. Provided reflective listening and presence.     Symptoms  Pain: Occasional pain in her upper abdomen, sometimes indigestion pain after eating  Dyspnea: Still has some dyspnea, especially while laying flat  Fatigue: Endorses fatigue daily with current hospitalization  Insomnia: Has trouble falling and staying asleep  Drowsiness: Some drowsiness during the day  Constipation: Denies  Nausea: Denies  Appetite: Reports her appetite is improving slowly  Anxiety: has some degree of anxiety and uses marijuana and prayer to get her through.   Depression: Denies    Palliative medicine social history:  The patient is proudly single and proudly never - values her indepedence. She has 2 children and several grandchildren. The patient lives with her daughter Mary and her grandson. The patient has been on disabled for many years. The pt is economically stable. The education level is college level in applied science and pt is able to read well. Pt denies use of alcohol, she used to smoke tobacco but quite over 5 years ago. She does smoke marijuana almost daily; didn't like gummies. Coping methods are prayer. Denies any safety concerns.    Objective    Last Recorded Vitals  /67   Pulse 76   Temp 36.5 °C (97.7 °F)   Resp 20   Ht 1.626 m (5' 4\")   Wt 69.7 kg (153 lb 10.6 oz)   SpO2 91%   BMI 26.38 kg/m²    Physical Exam  Constitutional:       General: She is not in acute distress.  HENT:      Head: Normocephalic.      Mouth/Throat:      Mouth: Mucous membranes are moist.      Pharynx: No oropharyngeal exudate.   Eyes:      Extraocular Movements: Extraocular movements intact.      Conjunctiva/sclera: Conjunctivae normal.      Pupils: Pupils are equal, round, and reactive to light.   Cardiovascular:      Rate and Rhythm: Normal rate and regular rhythm.      Heart sounds: No murmur heard.  Pulmonary:      Effort: Pulmonary effort is normal. No respiratory distress.   Chest:      Chest wall: " No tenderness.   Abdominal:      General: Bowel sounds are normal. There is no distension.      Palpations: Abdomen is soft.   Skin:     General: Skin is warm.   Neurological:      General: No focal deficit present.      Mental Status: She is alert. Mental status is at baseline.   Psychiatric:      Comments: Tearful        Relevant Results  Results for orders placed or performed during the hospital encounter of 01/22/24 (from the past 24 hour(s))   BLOOD GAS MIXED VENOUS   Result Value Ref Range    POCT pH, Mixed 7.37 7.33 - 7.43 pH    POCT pCO2, Mixed 41 41 - 51 mm Hg    POCT pO2, Mixed 43 35 - 45 mm Hg    POCT SO2, Mixed 70 45 - 75 %    POCT Oxy Hemoglobin, Mixed 68.5 45.0 - 75.0 %    POCT Base Excess, Mixed -1.5 -2.0 - 3.0 mmol/L    POCT HCO3 Calculated, Mixed 23.7 22.0 - 26.0 mmol/L    Patient Temperature 37.0 degrees Celsius    FiO2 28 %   CBC and Auto Differential   Result Value Ref Range    WBC 8.4 4.4 - 11.3 x10*3/uL    nRBC 0.0 0.0 - 0.0 /100 WBCs    RBC 4.06 4.00 - 5.20 x10*6/uL    Hemoglobin 12.1 12.0 - 16.0 g/dL    Hematocrit 33.8 (L) 36.0 - 46.0 %    MCV 83 80 - 100 fL    MCH 29.8 26.0 - 34.0 pg    MCHC 35.8 32.0 - 36.0 g/dL    RDW 15.1 (H) 11.5 - 14.5 %    Platelets 146 (L) 150 - 450 x10*3/uL    Neutrophils % 84.4 40.0 - 80.0 %    Immature Granulocytes %, Automated 0.8 0.0 - 0.9 %    Lymphocytes % 8.3 13.0 - 44.0 %    Monocytes % 6.4 2.0 - 10.0 %    Eosinophils % 0.0 0.0 - 6.0 %    Basophils % 0.1 0.0 - 2.0 %    Neutrophils Absolute 7.08 1.20 - 7.70 x10*3/uL    Immature Granulocytes Absolute, Automated 0.07 0.00 - 0.70 x10*3/uL    Lymphocytes Absolute 0.70 (L) 1.20 - 4.80 x10*3/uL    Monocytes Absolute 0.54 0.10 - 1.00 x10*3/uL    Eosinophils Absolute 0.00 0.00 - 0.70 x10*3/uL    Basophils Absolute 0.01 0.00 - 0.10 x10*3/uL   Magnesium   Result Value Ref Range    Magnesium 2.35 1.60 - 2.40 mg/dL   Renal function panel   Result Value Ref Range    Glucose 95 74 - 99 mg/dL    Sodium 134 (L) 136 - 145  mmol/L    Potassium 4.4 3.5 - 5.3 mmol/L    Chloride 103 98 - 107 mmol/L    Bicarbonate 23 21 - 32 mmol/L    Anion Gap 12 10 - 20 mmol/L    Urea Nitrogen 62 (H) 6 - 23 mg/dL    Creatinine 1.49 (H) 0.50 - 1.05 mg/dL    eGFR 38 (L) >60 mL/min/1.73m*2    Calcium 8.5 (L) 8.6 - 10.6 mg/dL    Phosphorus 3.3 2.5 - 4.9 mg/dL    Albumin 3.1 (L) 3.4 - 5.0 g/dL   BLOOD GAS VENOUS   Result Value Ref Range    POCT pH, Venous 7.39 7.33 - 7.43 pH    POCT pCO2, Venous 43 41 - 51 mm Hg    POCT pO2, Venous 46 (H) 35 - 45 mm Hg    POCT SO2, Venous 79 (H) 45 - 75 %    POCT Oxy Hemoglobin, Venous 76.5 (H) 45.0 - 75.0 %    POCT Base Excess, Venous 0.8 -2.0 - 3.0 mmol/L    POCT HCO3 Calculated, Venous 26.0 22.0 - 26.0 mmol/L    Patient Temperature 37.0 degrees Celsius    FiO2 28 %   BLOOD GAS MIXED VENOUS   Result Value Ref Range    POCT pH, Mixed 7.37 7.33 - 7.43 pH    POCT pCO2, Mixed 42 41 - 51 mm Hg    POCT pO2, Mixed 43 35 - 45 mm Hg    POCT SO2, Mixed 71 45 - 75 %    POCT Oxy Hemoglobin, Mixed 68.9 45.0 - 75.0 %    POCT Base Excess, Mixed -1.1 -2.0 - 3.0 mmol/L    POCT HCO3 Calculated, Mixed 24.3 22.0 - 26.0 mmol/L    Patient Temperature 37.0 degrees Celsius    FiO2 28 %   POCT GLUCOSE   Result Value Ref Range    POCT Glucose 98 74 - 99 mg/dL   POCT GLUCOSE   Result Value Ref Range    POCT Glucose 182 (H) 74 - 99 mg/dL   BLOOD GAS MIXED VENOUS   Result Value Ref Range    POCT pH, Mixed 7.39 7.33 - 7.43 pH    POCT pCO2, Mixed 45 41 - 51 mm Hg    POCT pO2, Mixed 36 35 - 45 mm Hg    POCT SO2, Mixed 62 45 - 75 %    POCT Oxy Hemoglobin, Mixed 60.3 45.0 - 75.0 %    POCT Base Excess, Mixed 1.7 -2.0 - 3.0 mmol/L    POCT HCO3 Calculated, Mixed 27.2 (H) 22.0 - 26.0 mmol/L    Patient Temperature 37.0 degrees Celsius    FiO2 0 %      ECG 12 Lead  Normal sinus rhythm  Rightward axis  Nonspecific intraventricular conduction delay  ST & T wave abnormality, consider inferolateral ischemia  Abnormal ECG  When compared with ECG of 25-JAN-2024  17:45,  Premature atrial complexes are no longer Present  QRS axis Shifted right  Confirmed by Kofi Lorenzo ([id number]) on 1/29/2024 4:52:54 PM  Electrocardiogram, 12-lead PRN ACS symptoms  Sinus tachycardia with Premature atrial complexes  Anterior infarct (cited on or before 22-JAN-2024)  ST & T wave abnormality, consider inferolateral ischemia  Abnormal ECG  When compared with ECG of 24-JAN-2024 10:55,  Premature atrial complexes are now Present  Serial changes of Anterior infarct Present     Encounter Date: 01/22/24   ECG 12 Lead   Result Value    Ventricular Rate 91    Atrial Rate 91    VT Interval 140    QRS Duration 122    QT Interval 402    QTC Calculation(Bazett) 494    P Axis 99    R Axis 107    T Axis -83    QRS Count 15    Q Onset 217    P Onset 147    P Offset 192    T Offset 418    QTC Fredericia 462    Narrative    Normal sinus rhythm  Rightward axis  Nonspecific intraventricular conduction delay  ST & T wave abnormality, consider inferolateral ischemia  Abnormal ECG  When compared with ECG of 25-JAN-2024 17:45,  Premature atrial complexes are no longer Present  QRS axis Shifted right  Confirmed by Kofi Lorenzo ([id number]) on 1/29/2024 4:52:54 PM      Metoprolol, Ticagrelor, Ace inhibitors, Fentanyl, Gadolinium-containing contrast media, Hydralazine, Iodinated contrast media, Prednisone, Statins-hmg-coa reductase inhibitors, and Penicillins  Scheduled medications  aspirin, 81 mg, oral, Daily  dapagliflozin propanediol, 10 mg, oral, Daily  dexAMETHasone, 6 mg, oral, Daily  fluticasone furoate-vilanteroL, 1 puff, inhalation, Daily  heparin, 5,000 Units, subcutaneous, q8h  insulin lispro, 0-5 Units, subcutaneous, TID with meals  lactated Ringer's, 500 mL, intravenous, Once  lidocaine, 5 mL, infiltration, Once  pantoprazole, 40 mg, oral, Daily before breakfast  perflutren protein A microsphere, 0.5 mL, intravenous, Once in imaging  [Held by provider] sacubitriL-valsartan, 1 tablet, oral, BID  sennosides-docusate  sodium, 1 tablet, oral, Daily  spironolactone, 25 mg, oral, Daily  sulfur hexafluoride microsphr, 2 mL, intravenous, Once in imaging      Continuous medications  lactated Ringer's, 10 mL/hr, Last Rate: 10 mL/hr (01/29/24 1600)  milrinone, 0.25 mcg/kg/min, Last Rate: 0.125 mcg/kg/min (01/29/24 1600)      PRN medications  PRN medications: acetaminophen, albuterol, calcium carbonate, dextrose 10 % in water (D10W), dextrose, glucagon, oxygen, polyethylene glycol, sodium chloride, trimethobenzamide     Assessment/Plan    Ms. Melissa Marinelli is a delightful 68 y.o. female with past medical history significant for HFrEF (EF 15-20%) s/p ICD placement, CAD s/p pci, s/p MVR, COPD, HTN, HLD, GERD, CVA, CKD IIIB and DMII presenting initially to MICU d/t acute hypoxic respiratory failure requiring BiPAP which was quickly weaned down to NC.  Etiologies for acute hypoxic respiratory failure at time of presentation include COVID diagnosis, COPD exacerbation, ADHF related to underlying HFrEF. She was Transferred to HFICU on 1/26 after she was found to have high left and right filling pressures and low CO in cath lab. She was treated with lasix and placed on BiPAP and started on dobutamine and Nipride. Dobutamine has since been weaned off and home GDMT has been restarted.  Patient has declined LVAD in the past. Today she is agreeable to trial Milrinone for symptom management. Palliative care was consulted for goals of care discussion.     Dx:  #Acute on chronic HFrEF (EF 15-20%)  #CAD   #NSTEMI s/p RIRI to Lcx (Jan 2016)  #Hx of Mitral Valve repair (June 2019) w/ Dr Montana  #ICD (5/2020)  #Acute hypercapnic and hypoxic respiratory failure  #C/f COPD exacerbation vs Acute decompensated HF  #COVID infection  #PATSY on CKD IIIB  #Anemia 2/2 iron deficiency    #DM2  #SOB/Dyspnea    ----------------------------------------------------------------------------------------------------------------------------------------------------------------------------------------------------------------------------------------------------------------------------------------------------------------------------------------------------------------------  Advanced Care Planning  Patient and/or family consented to a voluntary Advanced Care Planning meeting.   Serious Illness Assessment and Counseling:  Life Limiting Disease: Severe exacerbation of heart failure posing threat to life or function.     Disease Specific Information Provided/Prognosis Discussed: Patient's current clinical condition, including diagnosis, prognosis, and management plan were discussed.   Counseling provided on guarded prognosis and what to expect with disease progression of heart failure.   Counseling provided on the irreversible and progressive nature of patient's diseases including respiratory failure, renal failure, and heart failure.     Understanding/Overall Impression: Patient expressing clear understanding of overall health status and severity of illness.     Goals/Hopes: Discussion ensued about patient's goals for their medical care going forward. Allowed patient time to talk about her current quality of life, disease course/progression, and symptom and treatment burden. Discussed care plan to continue with aggressive hospital care despite symptom and treatment burden versus choosing to transition to comfort based plan of care that focuses on symptom management and quality of life.   She would never want VAD and would not want any evaluation to be started for it either.     Fears/Worries/Concerns: Not being around for her daughter. Not being independent and able to bath herself.    Minimal Acceptable Quality of Life/Maximal [place] Tolerable for the Possibility of More Time: Counseling provided on the concept  of MAO/Maximal [place]. Patient expressing that she would never want to be in a health state where she could not be independent, not take a bath, and not get around on her own, or not have her cognition intact to have meaningful conversation with daughters and grandchildren.    Patient deems that this would not be an acceptable quality of life for the patient.       Resuscitation Assessment: will address at future visit given she has coded previously.     Advanced Directives:  Counseling provided on the importance of not crisis planning as disease burden progresses but to establish treatment limitations now so in the future medical team will be clear on what patient feels is an acceptable quality of life for the patient and what treatment limitations' patient would like set into place based on that.       Surrogate Health Care Decision Maker: Mary Caruso (Daughter, [phone])  Patient does not have a HPOA or living will. Counseling provided on benefit of completing advanced directives in order to establish person to make one's medical decision if patient were unable to speak for themselves and to make their health care wishes and treatment limitations to both hospital staff and their designated HPOA. Social work to help patient complete prior to discharge.     Decision to keep code status Full Code at this time.     I spent 20 minutes in providing separately identifiable ACP services with the patient and/or surrogate decision maker in a voluntary conversation discussing the patient's wishes and goals as detailed in the above note.   ----------------------------------------------------------------------------------------------------------------------------------------------------------------------------------------------------------------------------------------------------------------------------------------------------------------------------------------------------------------------    #Complex Medical Decision  Making  #Goals of Care  #Advanced Care Planning  -code status: Full code  -surrogate decision maker: Mary Caruso (Daughter)  - goals are mix of survival and time and improved quality of life     #psychosocial support  -music therapy  -spiritual care support    #Insomnia  -start melatonin 5 mg Q6pm AND 5 mg at bedtime    #SOB  -start dilaudid 0.2 mg IV q3 prn SOB.     Plan of Care discussed with: Updated JAMIL Janice Lenz and bedside RN on goals of care decision, medication adjustments, and code status     Medical Decision Making was high level due to high complexity of problems, extensive data review, and high risk of management/treatment.     -her disease is posing threat to life and function / severe exacerbation of heart failure with cardiogenic shock, on inotrope.   -reviews lab results which were used in decision making   -drug therapy requiring intensive monitoring for toxicity: IV inotrope  -decision regarding hospitalization or escalation of hospital-level care: currently in the HF ICU on inotropes.   -parenteral controlled substances: IV dilaudid 0.2 mg iv q3 prn dyspnea    Thank you for allowing us to participate in the care of this patient. Palliative will continue to follow as needed. Palliative medicine is available Monday-Friday, 8a-6p. Please contact team with any questions or concerns.  Team pager [phone] (weekdays)    Corona Teixeira DO PGY-4    I saw and evaluated the patient. I personally obtained the key and critical portions of the history and physical exam or was physically present for key and critical portions performed by the resident/fellow. I reviewed the resident/fellow's documentation and discussed the patient with the resident/fellow. I agree with the resident/fellow's medical decision making as documented in the note.    I made extensive edits to above note; and led the ACP conversation that was about 20 mins long.     Bubba Chakraborty MD

## 2024-01-29 NOTE — PROGRESS NOTES
[facility]  HFICU PROGRESS NOTE    Melissa Marinelli/[id number]    Admit Date: 1/22/2024  Hospital Length of Stay: 7   ICU Length of Stay: 2d 18h   Primary Service: HF ICU  Primary HF Cardiologist: Dr. Graham     INTERVAL EVENTS / PERTINENT ROS:   No acute events overnight, patient weaned off of Nipride yesterday eveing and remained off throughout the night.  Hemodynamics stable this am.  Patient remained hemodynamically stable on Entresto 49/51. She denies chest pain or discomfort. SOB or palpation. Denies abd pain or abd discomfort does endorse some GERD like symptoms this am. Also reports generalized fatigue.  Extensive discussion had with patient and daughter regarding trial on Milrinone gtt for symptome management.  Patient still hesitant about home inotrope but is willing to try it inpatient to see if her symptoms improve    Plans:  -Entresto 49/51mg BID on hold   -Milrinone 0.125mcg/kg/min     MEDICATIONS  Infusions:  lactated Ringer's, Last Rate: 10 mL/hr (01/29/24 0800)  milrinone      Scheduled:  aspirin, 81 mg, Daily  dapagliflozin propanediol, 10 mg, Daily  dexAMETHasone, 6 mg, Daily  fluticasone furoate-vilanteroL, 1 puff, Daily  heparin, 5,000 Units, q8h  insulin lispro, 0-5 Units, TID with meals  lidocaine, 5 mL, Once  pantoprazole, 40 mg, Daily before breakfast  perflutren protein A microsphere, 0.5 mL, Once in imaging  [Held by provider] sacubitriL-valsartan, 1 tablet, BID  sennosides-docusate sodium, 1 tablet, Daily  spironolactone, 25 mg, Daily  sulfur hexafluoride microsphr, 2 mL, Once in imaging      PRN:  acetaminophen, 975 mg, q8h PRN  albuterol, 2 puff, q6h PRN  calcium carbonate, 500 mg, 4x daily PRN  dextrose 10 % in water (D10W), 0.3 g/kg/hr, Once PRN  dextrose, 25 g, q15 min PRN  glucagon, 1 mg, q15 min PRN  oxygen, , Continuous PRN - O2/gases  polyethylene glycol, 17 g, Daily PRN  sodium chloride, 1 spray, PRN  trimethobenzamide, 200 mg, q6h PRN      Invasive  "Hemodynamics:    Most Recent Range Past 24hrs   BP (Art)   No data recorded   MAP(Art)   No data recorded   RA/CVP   No data recorded   PA 34/18 PAP  Min: 27/12  Max: 58/33   PA(mean) 24 mmHg PAP (Mean)  Min: 17 mmHg  Max: 70 mmHg   PCWP 20 mmHg No data recorded   CO 2.94 L/min CO (L/min)  Min: 2.94 L/min  Max: 4.2 L/min   CI 1.67 L/min/m2 CI (L/min/m2)  Min: 1.67 L/min/m2  Max: 2.4 L/min/m2   Mixed Venous   No data recorded   SVR  1552.88 (dyne*sec)/cm5 SVR (dyne*sec)/cm5  Min: 1552.88 (dyne*sec)/cm5  Max: 1552.88 (dyne*sec)/cm5   PVR 67 (dyne*sec)/cm5 No data recorded       PHYSICAL EXAM:   Visit Vitals  /73   Pulse 73   Temp 36.5 °C (97.7 °F)   Resp 14   Ht 1.626 m (5' 4\")   Wt 69.7 kg (153 lb 10.6 oz)   SpO2 95%   BMI 26.38 kg/m²   Smoking Status Every Day   BSA 1.77 m²       Wt Readings from Last 5 Encounters:   01/29/24 69.7 kg (153 lb 10.6 oz)   12/18/23 64.9 kg (143 lb)   11/26/23 65.1 kg (143 lb 8.3 oz)   09/18/23 65.8 kg (145 lb)   06/22/23 67.1 kg (148 lb)       INTAKE/OUTPUT:  I/O last 3 completed shifts:  In: 344.5 (4.9 mL/kg) [P.O.:100; I.V.:244.5 (3.5 mL/kg)]  Out: 1550 (22.2 mL/kg) [Urine:1550 (0.6 mL/kg/hr)]  Weight: 69.7 kg      Physical Exam    DATA:  CMP:  Results from last 7 days   Lab Units 01/29/24  0259 01/28/24  0354 01/27/24  0432 01/26/24  1815 01/26/24  0715 01/25/24  1600 01/25/24  0714 01/24/24  1031 01/24/24  0500 01/23/24  2252 01/23/24  0548   SODIUM mmol/L 134* 137 138 133* 136 134* 137 137 134* 140 138   POTASSIUM mmol/L 4.4 4.4 4.4 5.1 5.1 5.2 5.4* 4.9 7.3* 4.6 4.5   CHLORIDE mmol/L 103 104 101 100 102 103 101 104 104 105 107   CO2 mmol/L 23 24 25 23 19* 15* 19* 16* 12* 20* 20*   ANION GAP mmol/L 12 13 16 15 20 21* 22* 22* 25 20 16   BUN mg/dL 62* 72* 83* 86* 80* 77* 63* 42* 36* 30* 23   CREATININE mg/dL 1.49* 1.69* 2.26* 2.28* 2.17* 2.38* 2.34* 1.76* 1.65* 1.54* 1.51*   EGFR mL/min/1.73m*2 38* 33* 23* 23* 24* 22* 22* 31* 34* 36* 37*   MAGNESIUM mg/dL 2.35 2.31 2.40 2.52* " "2.65*  --  2.59* 2.23 2.92* 2.14 2.13   ALBUMIN g/dL 3.1* 3.3* 3.5 3.6 3.8 3.9 4.2 3.7 3.8 3.8 4.0   ALT U/L  --   --   --  98*  --   --   --   --   --  11  --    AST U/L  --   --   --  63*  --   --   --   --   --  17  --    BILIRUBIN TOTAL mg/dL  --   --   --  0.7  --   --   --   --   --  0.4  --        CBC:  Results from last 7 days   Lab Units 01/29/24  0259 01/28/24  0347 01/27/24  0432 01/26/24 2020 01/26/24  0715 01/25/24  0714 01/24/24  0500 01/23/24  2252   WBC AUTO x10*3/uL 8.4 7.1 5.7 5.7 6.4 7.8 6.7 7.1   HEMOGLOBIN g/dL 12.1 11.3* 11.7* 11.5* 13.3 13.9 13.0 13.3   HEMATOCRIT % 33.8* 32.4* 33.7* 34.9* 39.9 44.1 40.7 41.7   PLATELETS AUTO x10*3/uL 146* 148* 165 161 171 89* 119* 162   MCV fL 83 83 82 88 88 91 90 91       COAG:   Results from last 7 days   Lab Units 01/23/24  2252   INR  1.1       ABO: No results found for: \"ABO\"  HEME/ENDO:  Results from last 7 days   Lab Units 01/26/24  1815 01/26/24  1805   FERRITIN ng/mL 720*  --    IRON SATURATION % 34  --    TSH mIU/L  --  0.45   HEMOGLOBIN A1C %  --  5.4        CARDIAC:   Results from last 7 days   Lab Units 01/26/24  0715 01/23/24  0121 01/22/24  2154 01/22/24  1639   TROPHS ng/L 700* 1,230* 1,257* 996*   BNP pg/mL >5,000*  --   --   --          ASSESSMENT AND PLAN:   ASSESSMENT AND PLAN:   68 y.o. female w/ PMhx HFrEF (EF 15-20%) s/p ICD placement 5/2020, CAD s/p NSTEMI with RIRI to LCx (Jan 2016), s/p MVR (June 2019), COPD (no formal PFTs), HTN, HLD, GERD, CVA, CKD IIIB and DMII presenting initially to MICU d/t Acute Hypoxic Resp Failure requiring BiPAP which was quickly weaned down to NC .  Etiologies for acute hypoxic respiratory failure at time of presentation include COVID diagnosis, COPD exacerbation, ADHF related to underlying HFrEF. PE was ruled out. She was Transferred to HFICU on 1/26 after she was found to have high left and right filling pressures and low CO in cath lab. She was treated with Lasix and Placed on BiPAP and started on " dobutamine and Nipride.  Dobutamine has since been weaned off and home GDMT has been restarted.  Patient has declined LVAD in the past.  Today she is agreeable to trial Milrinone for symptome management.       Neuro:  # Anxiety. Depression  #Hx of CVA   - Serial neuro and pain assessments   - PO Tylenol PRN for pain  - PT/OT Consult, OOB to chair  - CAM ICU score every shift  - Sleep/wake cycle normalization     # Physical Status  -BMI : 24.37      #Substance abuse  -denies ETOH use  -Smokes 10 cigarettes per day, nicotine replacement therapy declined        Cardiovascular:  # Acute on chronic decompensated HFrEF, EF 15-20%  # Acute cardiogenic shock  - Echocardiogram 1/24:  severely decreased LV systolic function, EF 68831% with everely dilated LV cavity size LVIDd 6.3.  No LV thrombus mild - moderate LA dilation.  Mildly reduced RV systolic function   - admit weight: 64.4kg  daily weight: 68.3kg   - admit BNP >5000  - admit Lactate: 1.9  - home farxiga restarted   - Entresto 24/2mg BID and increase dose as tolerated   - Nipride gtt for MBP 60-70 mm Hg and wean down as tolerated  - Opening SGC #s: /77(90), CVP 8, PAP 47/32 (40), W 25, CO/CI 2.94/1.74, SVR 2230, SVO2 51% on Nipride at 2mcg/kg/min. (Thermistor on swan broken, unable to obtain thermos)  - Daily SGC #s (1/29): BP 99/71 (80), CVP 7,  PAP 31/15 (21), CO/CI 5.87/3.34 , SVO2 71% on Entresto 49/51, Farxiga 10mg daily, [place] 25mg daily.  Nipride weaned off yesterday evening.     -no diuretics today   -Holding Entresto 1/29 and starting on a trial of Milrinone 0.125mcg/kg/min for symptom management   -Daily standing weights, 2gm sodium diet, 2L fluid restriction, strict I&Os     #CAD   #NSTEMI s/p RIRI to Lcx (Jan 2016)  #Hx of Mitral Valve repair (June 2019) w/ Dr Montana  #ICD (5/2020):   # Hypertroponinemia in the setting of demand ischemia   -29mm Epic Bioprosthetc valve   -c/w ASA 81mg daily   - Not on statin d/t allergy   - Device  interrogation: [employer] ICD Check 1/25, multiple ventricular arrhythmias--> 1/23 0131 VT @190bpm successful ATP x1 to restore VS; 1/22 1146 VT @190 ATP x1 to restore VS; 1/22 0428 VF @222bpm ATP x1 unsuccessful remained in VF which spontaneously terminated to VS (no shock delivered); 1NSVT 1/22 0936 x5sec duration V rate 194bpm; 12/28/23 1episode of NSVT vs slow VT (binned as NSVT) @185bpm recorded episode captured through 31sec and unable to see termination.   -High sensitivity Trop: 700 (1/26), 1230 (1/23), 1257 (1/22)        Pulmonary:   #Acute hypercapnic and hypoxic respiratory failure  #C/f COPD exacerbation vs Acute decompensated HF  #COVID infection  #C/f PE(resolved)  - Requiring BiPAP again for work of breathing on 1/23; ABG pH 7.37, pCO2 36, pO2 72--> placed on Bipap on arrival to HFICU for dyspnea   -Now on  a couple L NC during the day and Bipap at HS.    - remdesivir 200mg 1 day then 100mg for total 5 day (completed 1/27)  - C/w dexamethasone 6mg daily for 10 days (done 2/2)  - C/w doxycycline 100mg BID for 5 days for presumed COPD exacerbation (complete 1/27)   - No PFTs on file to confirm diagnosis of COPD   - CT PE on 1/24 negative for PE   - 80mg IV lasix given on arrival to HFICU---> Spot diuresis   - Hold home symbicort  - c/w Breo Ellipta daily   - c/w Albuterol inh q6hrs PRN  - supplemental O2 PRN--> wean off as tolerated   - Strep and legionella antigens--> negative   - Consult Pulm for continuity of care PRN      GI:  #Abdominal pain and nausea d/t low output state   - Tigan 200mg IM q6hrs PRN   -Tums PRN   - PPI      :  #CKD IIIB  -Baseline BUN/Cr: 29-41/ 1.3-1.8  -BUN/Cr (1/29):  62/1.49 GFR 38  -I/Os  -avoid hypotension and nephrotoxic agents     Heme:  #Anemia 2/2 iron deficiency   - H/H  base line 13/42   -Iron Studies 1/26: 82/243/34%  -Folate 5.3, B-12 931  -No need for folate   - Daily CBC     Endo:  #DM  #hyperglycemia 2/2 steroid use for COVID treatment  - hgbA1c-->5.4, EST AVG  GLUC 108  - Farxiga 10mg daily     # no Hx of Thyroid dysfunction   -TSH--> 0.45      ID:  #COVID infection  - See pulmonary section   -afebrile, nontoxic, no s/s infx  -WBC--No leukocytosis   -trend temps q4h     PHYSICAL AND OCCUPATIONAL THERAPY: ordered    LINES:  PIVs   [place]- 1/26      DVT: subcutaneous Heparin   VAP BUNDLE: NA  ULCER PPX: Protonix daily   GLYCEMIC CONTROL: monitoring  BOWEL CARE: luz colace and mirilax   INDWELLING CATHETER: NA  NUTRITION: Adult diet Cardiac; 70 gm fat; 2 - 3 grams Sodium with 2000 fluid restriction        EMERGENCY CONTACT: Extended Emergency Contact Information  Primary Emergency Contact: Sarah Caruso  Home Phone: [phone]  Relation: Child  FAMILY UPDATE: Conversations held with patient and daughter at bedside regarding home going palliative inotrope.    CODE STATUS: Full Code  DISPO: Remain in HFICU    Patient seen and assessed with Dr. Fernandez    _________________________________________________  LAWSON Szymanski-CNP

## 2024-01-30 ENCOUNTER — APPOINTMENT (OUTPATIENT)
Dept: RADIOLOGY | Facility: HOSPITAL | Age: 69
DRG: 286 | End: 2024-01-30
Payer: COMMERCIAL

## 2024-01-30 LAB
ALBUMIN SERPL BCP-MCNC: 2.9 G/DL (ref 3.4–5)
ANION GAP SERPL CALC-SCNC: 10 MMOL/L (ref 10–20)
ATRIAL RATE: 111 BPM
BACTERIA BLD CULT: NORMAL
BACTERIA BLD CULT: NORMAL
BASE EXCESS BLDMV CALC-SCNC: 2.4 MMOL/L (ref -2–3)
BASE EXCESS BLDV CALC-SCNC: 1.7 MMOL/L (ref -2–3)
BASOPHILS # BLD AUTO: 0.01 X10*3/UL (ref 0–0.1)
BASOPHILS NFR BLD AUTO: 0.1 %
BODY TEMPERATURE: 37 DEGREES CELSIUS
BODY TEMPERATURE: 37 DEGREES CELSIUS
BUN SERPL-MCNC: 61 MG/DL (ref 6–23)
CALCIUM SERPL-MCNC: 8.3 MG/DL (ref 8.6–10.6)
CHLORIDE SERPL-SCNC: 103 MMOL/L (ref 98–107)
CO2 SERPL-SCNC: 26 MMOL/L (ref 21–32)
CREAT SERPL-MCNC: 1.4 MG/DL (ref 0.5–1.05)
EGFRCR SERPLBLD CKD-EPI 2021: 41 ML/MIN/1.73M*2
EOSINOPHIL # BLD AUTO: 0 X10*3/UL (ref 0–0.7)
EOSINOPHIL NFR BLD AUTO: 0 %
ERYTHROCYTE [DISTWIDTH] IN BLOOD BY AUTOMATED COUNT: 15.3 % (ref 11.5–14.5)
GLUCOSE BLD MANUAL STRIP-MCNC: 125 MG/DL (ref 74–99)
GLUCOSE BLD MANUAL STRIP-MCNC: 150 MG/DL (ref 74–99)
GLUCOSE BLD MANUAL STRIP-MCNC: 152 MG/DL (ref 74–99)
GLUCOSE BLD MANUAL STRIP-MCNC: 82 MG/DL (ref 74–99)
GLUCOSE SERPL-MCNC: 76 MG/DL (ref 74–99)
HCO3 BLDMV-SCNC: 27.2 MMOL/L (ref 22–26)
HCO3 BLDV-SCNC: 26.6 MMOL/L (ref 22–26)
HCT VFR BLD AUTO: 33 % (ref 36–46)
HGB BLD-MCNC: 11.4 G/DL (ref 12–16)
IMM GRANULOCYTES # BLD AUTO: 0.07 X10*3/UL (ref 0–0.7)
IMM GRANULOCYTES NFR BLD AUTO: 0.9 % (ref 0–0.9)
INHALED O2 CONCENTRATION: 21 %
INHALED O2 CONCENTRATION: 21 %
LYMPHOCYTES # BLD AUTO: 0.84 X10*3/UL (ref 1.2–4.8)
LYMPHOCYTES NFR BLD AUTO: 11 %
MAGNESIUM SERPL-MCNC: 2.28 MG/DL (ref 1.6–2.4)
MCH RBC QN AUTO: 29.5 PG (ref 26–34)
MCHC RBC AUTO-ENTMCNC: 34.5 G/DL (ref 32–36)
MCV RBC AUTO: 85 FL (ref 80–100)
MONOCYTES # BLD AUTO: 0.71 X10*3/UL (ref 0.1–1)
MONOCYTES NFR BLD AUTO: 9.3 %
NEUTROPHILS # BLD AUTO: 6 X10*3/UL (ref 1.2–7.7)
NEUTROPHILS NFR BLD AUTO: 78.7 %
NRBC BLD-RTO: 0.3 /100 WBCS (ref 0–0)
OXYHGB MFR BLDMV: 64.2 % (ref 45–75)
OXYHGB MFR BLDV: 67.9 % (ref 45–75)
P AXIS: 70 DEGREES
P OFFSET: 191 MS
P ONSET: 146 MS
PCO2 BLDMV: 42 MM HG (ref 41–51)
PCO2 BLDV: 42 MM HG (ref 41–51)
PH BLDMV: 7.42 PH (ref 7.33–7.43)
PH BLDV: 7.41 PH (ref 7.33–7.43)
PHOSPHATE SERPL-MCNC: 2.8 MG/DL (ref 2.5–4.9)
PLATELET # BLD AUTO: 138 X10*3/UL (ref 150–450)
PO2 BLDMV: 37 MM HG (ref 35–45)
PO2 BLDV: 42 MM HG (ref 35–45)
POTASSIUM SERPL-SCNC: 4.8 MMOL/L (ref 3.5–5.3)
PR INTERVAL: 140 MS
Q ONSET: 216 MS
QRS COUNT: 18 BEATS
QRS DURATION: 120 MS
QT INTERVAL: 358 MS
QTC CALCULATION(BAZETT): 486 MS
QTC FREDERICIA: 439 MS
R AXIS: 72 DEGREES
RBC # BLD AUTO: 3.87 X10*6/UL (ref 4–5.2)
SAO2 % BLDMV: 66 % (ref 45–75)
SAO2 % BLDV: 69 % (ref 45–75)
SODIUM SERPL-SCNC: 134 MMOL/L (ref 136–145)
T AXIS: -78 DEGREES
T OFFSET: 395 MS
VENTRICULAR RATE: 111 BPM
WBC # BLD AUTO: 7.6 X10*3/UL (ref 4.4–11.3)

## 2024-01-30 PROCEDURE — 37799 UNLISTED PX VASCULAR SURGERY: CPT

## 2024-01-30 PROCEDURE — 2500000004 HC RX 250 GENERAL PHARMACY W/ HCPCS (ALT 636 FOR OP/ED): Performed by: STUDENT IN AN ORGANIZED HEALTH CARE EDUCATION/TRAINING PROGRAM

## 2024-01-30 PROCEDURE — 82805 BLOOD GASES W/O2 SATURATION: CPT | Performed by: NURSE PRACTITIONER

## 2024-01-30 PROCEDURE — 2500000001 HC RX 250 WO HCPCS SELF ADMINISTERED DRUGS (ALT 637 FOR MEDICARE OP)

## 2024-01-30 PROCEDURE — 2500000001 HC RX 250 WO HCPCS SELF ADMINISTERED DRUGS (ALT 637 FOR MEDICARE OP): Performed by: NURSE PRACTITIONER

## 2024-01-30 PROCEDURE — 80069 RENAL FUNCTION PANEL: CPT

## 2024-01-30 PROCEDURE — 2500000004 HC RX 250 GENERAL PHARMACY W/ HCPCS (ALT 636 FOR OP/ED): Performed by: NURSE PRACTITIONER

## 2024-01-30 PROCEDURE — 2500000004 HC RX 250 GENERAL PHARMACY W/ HCPCS (ALT 636 FOR OP/ED)

## 2024-01-30 PROCEDURE — 83735 ASSAY OF MAGNESIUM: CPT

## 2024-01-30 PROCEDURE — 82805 BLOOD GASES W/O2 SATURATION: CPT

## 2024-01-30 PROCEDURE — 99291 CRITICAL CARE FIRST HOUR: CPT | Performed by: INTERNAL MEDICINE

## 2024-01-30 PROCEDURE — 82947 ASSAY GLUCOSE BLOOD QUANT: CPT

## 2024-01-30 PROCEDURE — 71045 X-RAY EXAM CHEST 1 VIEW: CPT | Performed by: RADIOLOGY

## 2024-01-30 PROCEDURE — 85025 COMPLETE CBC W/AUTO DIFF WBC: CPT

## 2024-01-30 PROCEDURE — 1100000001 HC PRIVATE ROOM DAILY

## 2024-01-30 PROCEDURE — 99233 SBSQ HOSP IP/OBS HIGH 50: CPT | Performed by: NURSE PRACTITIONER

## 2024-01-30 PROCEDURE — 71045 X-RAY EXAM CHEST 1 VIEW: CPT

## 2024-01-30 PROCEDURE — 2500000001 HC RX 250 WO HCPCS SELF ADMINISTERED DRUGS (ALT 637 FOR MEDICARE OP): Performed by: STUDENT IN AN ORGANIZED HEALTH CARE EDUCATION/TRAINING PROGRAM

## 2024-01-30 PROCEDURE — 99233 SBSQ HOSP IP/OBS HIGH 50: CPT | Performed by: INTERNAL MEDICINE

## 2024-01-30 RX ORDER — ACETAMINOPHEN 500 MG
5 TABLET ORAL NIGHTLY
Status: DISCONTINUED | OUTPATIENT
Start: 2024-01-30 | End: 2024-02-03 | Stop reason: HOSPADM

## 2024-01-30 RX ORDER — CALCIUM CARBONATE 200(500)MG
1000 TABLET,CHEWABLE ORAL 4 TIMES DAILY PRN
Status: DISCONTINUED | OUTPATIENT
Start: 2024-01-30 | End: 2024-02-03 | Stop reason: HOSPADM

## 2024-01-30 RX ORDER — FAMOTIDINE 20 MG/1
40 TABLET, FILM COATED ORAL DAILY
Status: DISCONTINUED | OUTPATIENT
Start: 2024-01-30 | End: 2024-02-03 | Stop reason: HOSPADM

## 2024-01-30 RX ORDER — HYDROMORPHONE HYDROCHLORIDE 1 MG/ML
0.2 INJECTION, SOLUTION INTRAMUSCULAR; INTRAVENOUS; SUBCUTANEOUS
Status: DISCONTINUED | OUTPATIENT
Start: 2024-01-30 | End: 2024-02-03 | Stop reason: HOSPADM

## 2024-01-30 RX ORDER — ACETAMINOPHEN 500 MG
5 TABLET ORAL
Status: DISCONTINUED | OUTPATIENT
Start: 2024-01-30 | End: 2024-02-02

## 2024-01-30 RX ADMIN — SPIRONOLACTONE 25 MG: 25 TABLET ORAL at 08:09

## 2024-01-30 RX ADMIN — ASPIRIN 81 MG CHEWABLE TABLET 81 MG: 81 TABLET CHEWABLE at 08:08

## 2024-01-30 RX ADMIN — SENNOSIDES AND DOCUSATE SODIUM 1 TABLET: 8.6; 5 TABLET ORAL at 08:08

## 2024-01-30 RX ADMIN — DAPAGLIFLOZIN 10 MG: 10 TABLET, FILM COATED ORAL at 08:09

## 2024-01-30 RX ADMIN — HEPARIN SODIUM 5000 UNITS: 5000 INJECTION INTRAVENOUS; SUBCUTANEOUS at 08:08

## 2024-01-30 RX ADMIN — HEPARIN SODIUM 5000 UNITS: 5000 INJECTION INTRAVENOUS; SUBCUTANEOUS at 16:30

## 2024-01-30 RX ADMIN — HEPARIN SODIUM 5000 UNITS: 5000 INJECTION INTRAVENOUS; SUBCUTANEOUS at 01:00

## 2024-01-30 RX ADMIN — Medication 5 MG: at 21:20

## 2024-01-30 RX ADMIN — CALCIUM CARBONATE (ANTACID) CHEW TAB 500 MG 500 MG: 500 CHEW TAB at 02:31

## 2024-01-30 RX ADMIN — SODIUM CHLORIDE, POTASSIUM CHLORIDE, SODIUM LACTATE AND CALCIUM CHLORIDE 250 ML: 600; 310; 30; 20 INJECTION, SOLUTION INTRAVENOUS at 20:18

## 2024-01-30 RX ADMIN — DEXAMETHASONE 6 MG: 6 TABLET ORAL at 08:09

## 2024-01-30 RX ADMIN — ACETAMINOPHEN 975 MG: 325 TABLET ORAL at 02:31

## 2024-01-30 RX ADMIN — CALCIUM CARBONATE (ANTACID) CHEW TAB 500 MG 500 MG: 500 CHEW TAB at 08:07

## 2024-01-30 RX ADMIN — FAMOTIDINE 40 MG: 20 TABLET ORAL at 17:29

## 2024-01-30 RX ADMIN — CALCIUM CARBONATE (ANTACID) CHEW TAB 500 MG 500 MG: 500 CHEW TAB at 13:22

## 2024-01-30 RX ADMIN — MILRINONE LACTATE 0.12 MCG/KG/MIN: 200 INJECTION, SOLUTION INTRAVENOUS at 14:49

## 2024-01-30 ASSESSMENT — PAIN - FUNCTIONAL ASSESSMENT
PAIN_FUNCTIONAL_ASSESSMENT: 0-10

## 2024-01-30 ASSESSMENT — PAIN SCALES - GENERAL
PAINLEVEL_OUTOF10: 0 - NO PAIN

## 2024-01-30 NOTE — PROGRESS NOTES
"Palliative Medicine following for:  Complex medical decision making, symptom management, patient/family support    History obtained from chart review including ED note, H&P, patient's daily progress notes, review of lab/test results, and discussion with primary team and bedside RN.    Subjective    Denies any pain, dyspnea, nausea.   Fair appetite.   Was able to fall asleep but couldn't maintain sleep given how uncomfortable bed was. Fatigued.     Objective    Last Recorded Vitals  BP 94/61   Pulse 80   Temp 36.3 °C (97.3 °F)   Resp 19   Ht 1.626 m (5' 4\")   Wt 72.5 kg (159 lb 13.3 oz)   SpO2 95%   BMI 27.44 kg/m²    Physical Exam  Constitutional:       Appearance: Normal appearance.   HENT:      Head: Normocephalic and atraumatic.   Eyes:      General: No scleral icterus.     Conjunctiva/sclera: Conjunctivae normal.   Cardiovascular:      Rate and Rhythm: Normal rate and regular rhythm.      Comments: Weak pulse but palpable.   Pulmonary:      Effort: Pulmonary effort is normal.   Skin:     General: Skin is warm.   Neurological:      General: No focal deficit present.        Relevant Results   Results for orders placed or performed during the hospital encounter of 01/22/24 (from the past 24 hour(s))   POCT GLUCOSE   Result Value Ref Range    POCT Glucose 123 (H) 74 - 99 mg/dL   BLOOD GAS MIXED VENOUS   Result Value Ref Range    POCT pH, Mixed 7.42 7.33 - 7.43 pH    POCT pCO2, Mixed 42 41 - 51 mm Hg    POCT pO2, Mixed 37 35 - 45 mm Hg    POCT SO2, Mixed 66 45 - 75 %    POCT Oxy Hemoglobin, Mixed 64.2 45.0 - 75.0 %    POCT Base Excess, Mixed 2.4 -2.0 - 3.0 mmol/L    POCT HCO3 Calculated, Mixed 27.2 (H) 22.0 - 26.0 mmol/L    Patient Temperature 37.0 degrees Celsius    FiO2 21 %   POCT GLUCOSE   Result Value Ref Range    POCT Glucose 177 (H) 74 - 99 mg/dL   CBC and Auto Differential   Result Value Ref Range    WBC 7.6 4.4 - 11.3 x10*3/uL    nRBC 0.3 (H) 0.0 - 0.0 /100 WBCs    RBC 3.87 (L) 4.00 - 5.20 x10*6/uL    " Hemoglobin 11.4 (L) 12.0 - 16.0 g/dL    Hematocrit 33.0 (L) 36.0 - 46.0 %    MCV 85 80 - 100 fL    MCH 29.5 26.0 - 34.0 pg    MCHC 34.5 32.0 - 36.0 g/dL    RDW 15.3 (H) 11.5 - 14.5 %    Platelets 138 (L) 150 - 450 x10*3/uL    Neutrophils % 78.7 40.0 - 80.0 %    Immature Granulocytes %, Automated 0.9 0.0 - 0.9 %    Lymphocytes % 11.0 13.0 - 44.0 %    Monocytes % 9.3 2.0 - 10.0 %    Eosinophils % 0.0 0.0 - 6.0 %    Basophils % 0.1 0.0 - 2.0 %    Neutrophils Absolute 6.00 1.20 - 7.70 x10*3/uL    Immature Granulocytes Absolute, Automated 0.07 0.00 - 0.70 x10*3/uL    Lymphocytes Absolute 0.84 (L) 1.20 - 4.80 x10*3/uL    Monocytes Absolute 0.71 0.10 - 1.00 x10*3/uL    Eosinophils Absolute 0.00 0.00 - 0.70 x10*3/uL    Basophils Absolute 0.01 0.00 - 0.10 x10*3/uL   Magnesium   Result Value Ref Range    Magnesium 2.28 1.60 - 2.40 mg/dL   Renal function panel   Result Value Ref Range    Glucose 76 74 - 99 mg/dL    Sodium 134 (L) 136 - 145 mmol/L    Potassium 4.8 3.5 - 5.3 mmol/L    Chloride 103 98 - 107 mmol/L    Bicarbonate 26 21 - 32 mmol/L    Anion Gap 10 10 - 20 mmol/L    Urea Nitrogen 61 (H) 6 - 23 mg/dL    Creatinine 1.40 (H) 0.50 - 1.05 mg/dL    eGFR 41 (L) >60 mL/min/1.73m*2    Calcium 8.3 (L) 8.6 - 10.6 mg/dL    Phosphorus 2.8 2.5 - 4.9 mg/dL    Albumin 2.9 (L) 3.4 - 5.0 g/dL   BLOOD GAS VENOUS   Result Value Ref Range    POCT pH, Venous 7.41 7.33 - 7.43 pH    POCT pCO2, Venous 42 41 - 51 mm Hg    POCT pO2, Venous 42 35 - 45 mm Hg    POCT SO2, Venous 69 45 - 75 %    POCT Oxy Hemoglobin, Venous 67.9 45.0 - 75.0 %    POCT Base Excess, Venous 1.7 -2.0 - 3.0 mmol/L    POCT HCO3 Calculated, Venous 26.6 (H) 22.0 - 26.0 mmol/L    Patient Temperature 37.0 degrees Celsius    FiO2 21 %   POCT GLUCOSE   Result Value Ref Range    POCT Glucose 82 74 - 99 mg/dL   POCT GLUCOSE   Result Value Ref Range    POCT Glucose 125 (H) 74 - 99 mg/dL      [unfilled]  Encounter Date: 01/22/24   ECG 12 Lead   Result Value    Ventricular  Rate 91    Atrial Rate 91    WY Interval 140    QRS Duration 122    QT Interval 402    QTC Calculation(Bazett) 494    P Axis 99    R Axis 107    T Axis -83    QRS Count 15    Q Onset 217    P Onset 147    P Offset 192    T Offset 418    QTC Fredericia 462    Narrative    Normal sinus rhythm  Rightward axis  Nonspecific intraventricular conduction delay  ST & T wave abnormality, consider inferolateral ischemia  Abnormal ECG  When compared with ECG of 25-JAN-2024 17:45,  Premature atrial complexes are no longer Present  QRS axis Shifted right  Confirmed by Kofi Lorenzo ([id number]) on 1/29/2024 4:52:54 PM      Metoprolol, Ticagrelor, Ace inhibitors, Fentanyl, Gadolinium-containing contrast media, Hydralazine, Iodinated contrast media, Prednisone, Statins-hmg-coa reductase inhibitors, and Penicillins  Medications  Scheduled medications  aspirin, 81 mg, oral, Daily  dapagliflozin propanediol, 10 mg, oral, Daily  dexAMETHasone, 6 mg, oral, Daily  fluticasone furoate-vilanteroL, 1 puff, inhalation, Daily  heparin, 5,000 Units, subcutaneous, q8h  insulin lispro, 0-5 Units, subcutaneous, TID with meals  lidocaine, 5 mL, infiltration, Once  melatonin, 5 mg, oral, Nightly  melatonin, 5 mg, oral, Daily  pantoprazole, 40 mg, oral, Daily before breakfast  perflutren protein A microsphere, 0.5 mL, intravenous, Once in imaging  [Held by provider] sacubitriL-valsartan, 1 tablet, oral, BID  sennosides-docusate sodium, 1 tablet, oral, Daily  spironolactone, 25 mg, oral, Daily  sulfur hexafluoride microsphr, 2 mL, intravenous, Once in imaging      Continuous medications  lactated Ringer's, 10 mL/hr, Last Rate: 10 mL/hr (01/30/24 1000)  milrinone, 0.125 mcg/kg/min, Last Rate: 0.125 mcg/kg/min (01/30/24 1449)      PRN medications  PRN medications: acetaminophen, albuterol, calcium carbonate, dextrose 10 % in water (D10W), dextrose, glucagon, HYDROmorphone, oxygen, polyethylene glycol, sodium chloride, trimethobenzamide     Assessment/Plan     Ms. Melissa Marinelli is a delightful 68 y.o. female with past medical history significant for HFrEF (EF 15-20%) s/p ICD placement, CAD s/p pci, s/p MVR, COPD, HTN, HLD, GERD, CVA, CKD IIIB and DMII presenting initially to MICU d/t acute hypoxic respiratory failure requiring BiPAP which was quickly weaned down to NC.  Etiologies for acute hypoxic respiratory failure at time of presentation include COVID diagnosis, COPD exacerbation, ADHF related to underlying HFrEF. She was Transferred to HFICU on 1/26 after she was found to have high left and right filling pressures and low CO in cath lab. She was treated with lasix and placed on BiPAP and started on dobutamine and Nipride. Dobutamine has since been weaned off and home GDMT has been restarted.  Patient has declined LVAD in the past. Today she is agreeable to trial Milrinone for symptom management. Palliative care was consulted for goals of care discussion.      Dx:  #Acute on chronic HFrEF (EF 15-20%)  #CAD   #NSTEMI s/p RIRI to Lcx (Jan 2016)  #Hx of Mitral Valve repair (June 2019) w/ Dr Montana  #ICD (5/2020)  #Acute hypercapnic and hypoxic respiratory failure  #C/f COPD exacerbation vs Acute decompensated HF  #COVID infection  #PATSY on CKD IIIB  #Anemia 2/2 iron deficiency   #DM2  #SOB/Dyspnea  #Insomnia      #Complex Medical Decision Making  #Goals of Care  #Advanced Care Planning  -code status: Full code  -surrogate decision maker: Mary Caruso (Daughter)  - goals are mix of survival and time and improved quality of life      #psychosocial support  -music therapy  -spiritual care support     #Insomnia  -continue melatonin 5 mg Q6pm AND 5 mg at bedtime  - please consider changing bed; or allowing her to sleep on couch, which is a lot firmer than bed.      #SOB- controlled   -start dilaudid 0.2 mg IV q3 prn SOB.   -continue GDMT and inotrope    Was able to talk to patient and her daughter, Mary, about current trajectory. And also reviewed code status.  Recommended DNR/DNI and I asked that they speak tonight. And asked that Mary emails me about what they communicate about. I would recommend not badgering them about this.   We also spoke about outpatient pal care via [facility]'s navigator program, which they were open to again.   Provided reflective listening and presence.      Thank you for allowing us to care for this patient. Palliative Team will continue to follow as needed. Please contact team with any questions or concerns.   Team pager [phone] (weekdays)      Jason Chakraborty MD [facility]  Palliative Medicine Physician  [email or web address]    Total time: 35 mins, including conversations with patient, daughter, and primary team.

## 2024-01-30 NOTE — PROGRESS NOTES
HFICU Attending Note     Patient still feels poorly today, we had a long discussion about risk benefit of inotropes, with her daughter as well as palliative care, she is willing to pursue a trial as test of symptom improvement, will reduce or hold entresto as needed anticipating some drop in BP keep rhc for now, she is still not interested in LVAD, I would be concerned about prior mitral valve intervention were she intrested in the future but not a strict contraindication     This critically ill patient continues to be at-risk for clinically significant deterioration / failure due to the above mentioned dysfunctional, unstable organ systems.  I have personally identified and managed all complex critical care issues to prevent aforementioned clinical deterioration.  Critical care time is spent at bedside and/or the immediate area and has included, but is not limited to, the review of diagnostic tests, labs, radiographs, serial assessments of hemodynamics, respiratory status, ventilatory management, and family updates.  Time spent in procedures and teaching are reported separately.     Critical care time: _44___ minutes

## 2024-01-30 NOTE — PROGRESS NOTES
[facility]  HFICU PROGRESS NOTE    Melissa Marinelli/[id number]    Admit Date: 1/22/2024  Hospital Length of Stay: 8   ICU Length of Stay: 3d 20h   Primary Service: HF ICU  Primary HF Cardiologist: Dr. Graham     INTERVAL EVENTS / PERTINENT ROS:     Initiated milrinone drip yesterday, MAPS low, milrinone drip reduced from 0.25 mcg/kg/min, to 0.125 mcg last night.      This morning, Maps remains on lower side, on high 50's. Asymptomatic. Patient denies chest pain or SOB.   She states overall she is  an inch better than yesterday,       Plans:  - C/w Milrinone 0.125mcg/kg/min   - May try Dobutamine if BP low and symptomatic   - Keep SG for today     MEDICATIONS  Infusions:  lactated Ringer's, Last Rate: 10 mL/hr (01/30/24 1000)  milrinone, Last Rate: 0.125 mcg/kg/min (01/30/24 0700)      Scheduled:  aspirin, 81 mg, Daily  dapagliflozin propanediol, 10 mg, Daily  dexAMETHasone, 6 mg, Daily  fluticasone furoate-vilanteroL, 1 puff, Daily  heparin, 5,000 Units, q8h  insulin lispro, 0-5 Units, TID with meals  lidocaine, 5 mL, Once  melatonin, 5 mg, Nightly  melatonin, 5 mg, Daily  pantoprazole, 40 mg, Daily before breakfast  perflutren protein A microsphere, 0.5 mL, Once in imaging  [Held by provider] sacubitriL-valsartan, 1 tablet, BID  sennosides-docusate sodium, 1 tablet, Daily  spironolactone, 25 mg, Daily  sulfur hexafluoride microsphr, 2 mL, Once in imaging      PRN:  acetaminophen, 975 mg, q8h PRN  albuterol, 2 puff, q6h PRN  calcium carbonate, 500 mg, 4x daily PRN  dextrose 10 % in water (D10W), 0.3 g/kg/hr, Once PRN  dextrose, 25 g, q15 min PRN  glucagon, 1 mg, q15 min PRN  HYDROmorphone, 0.2 mg, q3h PRN  oxygen, , Continuous PRN - O2/gases  polyethylene glycol, 17 g, Daily PRN  sodium chloride, 1 spray, PRN  trimethobenzamide, 200 mg, q6h PRN      Invasive Hemodynamics:    Most Recent Range Past 24hrs   BP (Art)   No data recorded   MAP(Art)   No data recorded   RA/CVP   No data recorded  "  PA 28/14 PAP  Min: 19/12  Max: 43/23   PA(mean) 19 mmHg PAP (Mean)  Min: 11 mmHg  Max: 31 mmHg   PCWP 11 mmHg PCWP (mmHg)  Min: 8 mmHg  Max: 11 mmHg   CO 5.61 L/min CO (L/min)  Min: 4.3 L/min  Max: 5.61 L/min   CI 3.15 L/min/m2 CI (L/min/m2)  Min: 2.46 L/min/m2  Max: 3.15 L/min/m2   Mixed Venous 69 % SVO2 (%)  Min: 62 %  Max: 69 %   SVR  770 (dyne*sec)/cm5 SVR (dyne*sec)/cm5  Min: 770 (dyne*sec)/cm5  Max: 1543 (dyne*sec)/cm5    (dyne*sec)/cm5 PVR (dyne*sec)/cm5  Min: 100 (dyne*sec)/cm5  Max: 203 (dyne*sec)/cm5       PHYSICAL EXAM:   Visit Vitals  BP (!) 88/46   Pulse 73   Temp 36.4 °C (97.5 °F)   Resp 17   Ht 1.626 m (5' 4\")   Wt 72.5 kg (159 lb 13.3 oz)   SpO2 95%   BMI 27.44 kg/m²   Smoking Status Every Day   BSA 1.81 m²       Wt Readings from Last 5 Encounters:   01/30/24 72.5 kg (159 lb 13.3 oz)   12/18/23 64.9 kg (143 lb)   11/26/23 65.1 kg (143 lb 8.3 oz)   09/18/23 65.8 kg (145 lb)   06/22/23 67.1 kg (148 lb)       INTAKE/OUTPUT:  I/O last 3 completed shifts:  In: 1268.6 (17.5 mL/kg) [P.O.:525; I.V.:743.6 (10.3 mL/kg)]  Out: 1150 (15.9 mL/kg) [Urine:1150 (0.4 mL/kg/hr)]  Weight: 72.5 kg      Physical Exam  Constitutional:       Appearance: Normal appearance.   HENT:      Head: Normocephalic.      Mouth/Throat:      Mouth: Mucous membranes are dry.   Eyes:      Pupils: Pupils are equal, round, and reactive to light.   Cardiovascular:      Rate and Rhythm: Normal rate and regular rhythm.   Pulmonary:      Effort: Pulmonary effort is normal.   Musculoskeletal:      Cervical back: Normal range of motion.   Skin:     General: Skin is warm and dry.   Neurological:      General: No focal deficit present.      Mental Status: She is alert and oriented to person, place, and time.   Psychiatric:         Mood and Affect: Mood normal.         DATA:  CMP:  Results from last 7 days   Lab Units 01/30/24  0244 01/29/24  0259 01/28/24  0354 01/27/24  0432 01/26/24  1815 01/26/24  0715 01/25/24  1600 01/25/24  0714 " "01/24/24  1031 01/24/24  0500 01/23/24  2252   SODIUM mmol/L 134* 134* 137 138 133* 136 134* 137 137 134* 140   POTASSIUM mmol/L 4.8 4.4 4.4 4.4 5.1 5.1 5.2 5.4* 4.9 7.3* 4.6   CHLORIDE mmol/L 103 103 104 101 100 102 103 101 104 104 105   CO2 mmol/L 26 23 24 25 23 19* 15* 19* 16* 12* 20*   ANION GAP mmol/L 10 12 13 16 15 20 21* 22* 22* 25 20   BUN mg/dL 61* 62* 72* 83* 86* 80* 77* 63* 42* 36* 30*   CREATININE mg/dL 1.40* 1.49* 1.69* 2.26* 2.28* 2.17* 2.38* 2.34* 1.76* 1.65* 1.54*   EGFR mL/min/1.73m*2 41* 38* 33* 23* 23* 24* 22* 22* 31* 34* 36*   MAGNESIUM mg/dL 2.28 2.35 2.31 2.40 2.52* 2.65*  --  2.59* 2.23 2.92* 2.14   ALBUMIN g/dL 2.9* 3.1* 3.3* 3.5 3.6 3.8 3.9 4.2 3.7 3.8 3.8   ALT U/L  --   --   --   --  98*  --   --   --   --   --  11   AST U/L  --   --   --   --  63*  --   --   --   --   --  17   BILIRUBIN TOTAL mg/dL  --   --   --   --  0.7  --   --   --   --   --  0.4     CBC:  Results from last 7 days   Lab Units 01/30/24  0244 01/29/24  0259 01/28/24  0347 01/27/24  0432 01/26/24 2020 01/26/24  0715 01/25/24  0714 01/24/24  0500   WBC AUTO x10*3/uL 7.6 8.4 7.1 5.7 5.7 6.4 7.8 6.7   HEMOGLOBIN g/dL 11.4* 12.1 11.3* 11.7* 11.5* 13.3 13.9 13.0   HEMATOCRIT % 33.0* 33.8* 32.4* 33.7* 34.9* 39.9 44.1 40.7   PLATELETS AUTO x10*3/uL 138* 146* 148* 165 161 171 89* 119*   MCV fL 85 83 83 82 88 88 91 90     COAG:   Results from last 7 days   Lab Units 01/23/24  2252   INR  1.1     ABO: No results found for: \"ABO\"  HEME/ENDO:  Results from last 7 days   Lab Units 01/26/24  1815 01/26/24  1805   FERRITIN ng/mL 720*  --    IRON SATURATION % 34  --    TSH mIU/L  --  0.45   HEMOGLOBIN A1C %  --  5.4      CARDIAC:   Results from last 7 days   Lab Units 01/26/24  0715   TROPHS ng/L 700*   BNP pg/mL >5,000*       ASSESSMENT AND PLAN:   ASSESSMENT AND PLAN:   68 y.o. female w/ PMhx HFrEF (EF 15-20%) s/p ICD placement 5/2020, CAD s/p NSTEMI with RIRI to LCx (Jan 2016), s/p MVR (June 2019), COPD (no formal PFTs), HTN, HLD, " GERD, CVA, CKD IIIB and DMII presenting initially to MICU d/t Acute Hypoxic Resp Failure requiring BiPAP which was quickly weaned down to NC .  Etiologies for acute hypoxic respiratory failure at time of presentation include COVID diagnosis, COPD exacerbation, ADHF related to underlying HFrEF. PE was ruled out. She was Transferred to HFICU on 1/26 after she was found to have high left and right filling pressures and low CO in cath lab. She was treated with Lasix and Placed on BiPAP and started on dobutamine and Nipride.  Dobutamine has since been weaned off and home GDMT has been restarted.  Patient has declined LVAD in the past.  Today she is agreeable to trial Milrinone for symptome management.       Neuro:  # Anxiety. Depression  #Hx of CVA   - Serial neuro and pain assessments   - PO Tylenol PRN for pain  - PT/OT Consult, OOB to chair  - CAM ICU score every shift  - Sleep/wake cycle normalization     # Physical Status  -BMI : 24.37      #Substance abuse  -denies ETOH use  -Smokes 10 cigarettes per day, nicotine replacement therapy declined        Cardiovascular:  # Acute on chronic decompensated HFrEF, EF 15-20%  # Acute cardiogenic shock  - Echocardiogram 1/24:  severely decreased LV systolic function, EF 39572% with everely dilated LV cavity size LVIDd 6.3.  No LV thrombus mild - moderate LA dilation.  Mildly reduced RV systolic function   - admit weight: 64.4kg  daily weight: 72.5 kg   - admit BNP >5000  - admit Lactate: 1.9  - home farxiga restarted   - Opening SGC #s: /77(90), CVP 8, PAP 47/32 (40), W 25, CO/CI 2.94/1.74, SVR 2230, SVO2 51% on Nipride at 2mcg/kg/min. (Thermistor on swan broken, unable to obtain thermos)  - Daily SGC #s (1/30): BP 83/46 (58),  CVP 4,  PAP 30/12 (18), CO/CI 5.61/3.15,  , SVO2 69% on milrinone drip 0.125 mcg/kg/min, Farxiga 10mg daily, Jose Luis 25mg daily.    -C/w Milrinone 0.125mcg/kg/min for symptom management   -Daily standing weights, 2gm sodium diet, 2L fluid  restriction, strict I&Os     #CAD   #NSTEMI s/p RIRI to Lcx (Jan 2016)  #Hx of Mitral Valve repair (June 2019) w/ Dr Montana  #ICD (5/2020):   # Hypertroponinemia in the setting of demand ischemia   -29mm Epic Bioprosthetc valve   -c/w ASA 81mg daily   - Not on statin d/t allergy   - Device interrogation: [employer] ICD Check 1/25, multiple ventricular arrhythmias--> 1/23 0131 VT @190bpm successful ATP x1 to restore VS; 1/22 1146 VT @190 ATP x1 to restore VS; 1/22 0428 VF @222bpm ATP x1 unsuccessful remained in VF which spontaneously terminated to VS (no shock delivered); 1NSVT 1/22 0936 x5sec duration V rate 194bpm; 12/28/23 1episode of NSVT vs slow VT (binned as NSVT) @185bpm recorded episode captured through 31sec and unable to see termination.   -High sensitivity Trop: 700 (1/26), 1230 (1/23), 1257 (1/22)        Pulmonary:   #Acute hypercapnic and hypoxic respiratory failure  #C/f COPD exacerbation vs Acute decompensated HF  #COVID infection  #C/f PE(resolved)  - Requiring BiPAP again for work of breathing on 1/23; ABG pH 7.37, pCO2 36, pO2 72--> placed on Bipap on arrival to HFICU for dyspnea   -Now 2L NC during the day and Bipap at HS.    - remdesivir 200mg 1 day then 100mg for total 5 day (completed 1/27)  - C/w dexamethasone 6mg daily for 10 days (done 2/2)  - C/w doxycycline 100mg BID for 5 days for presumed COPD exacerbation (complete 1/27)   - No PFTs on file to confirm diagnosis of COPD   - CT PE on 1/24 negative for PE   - 80mg IV lasix given on arrival to HFICU---> Spot diuresis   - Hold home symbicort  - c/w Breo Ellipta daily   - c/w Albuterol inh q6hrs PRN  - supplemental O2 PRN--> wean off as tolerated   - Strep and legionella antigens--> negative   - Consult Pulm for continuity of care PRN      GI:  #Abdominal pain and nausea d/t low output state   - Tigan 200mg IM q6hrs PRN   - PPI     # Heart Burn  - Added Pepcid 40 mg PO daily   - Tums 2 tabs  PRN  per patients requests     :  #CKD  IIIB  -Baseline BUN/Cr: 29-41/ 1.3-1.8  -BUN/Cr (1/29):  62/1.49 GFR 38  -I/Os  -avoid hypotension and nephrotoxic agents     Heme:  #Anemia 2/2 iron deficiency   - H/H  base line 13/42   -Iron Studies 1/26: 82/243/34%  -Folate 5.3, B-12 931  -No need for folate   - Daily CBC     Endo:  #DM  #hyperglycemia 2/2 steroid use for COVID treatment  - hgbA1c-->5.4, EST AVG GLUC 108  - Farxiga 10mg daily     # no Hx of Thyroid dysfunction   -TSH--> 0.45      ID:  #COVID infection  - See pulmonary section   -afebrile, nontoxic, no s/s infx  -WBC--No leukocytosis   -trend temps q4h     PHYSICAL AND OCCUPATIONAL THERAPY: ordered    LINES:  PIVs   [place]- 1/26      DVT: subcutaneous Heparin   VAP BUNDLE: NA  ULCER PPX: Protonix daily   GLYCEMIC CONTROL: monitoring  BOWEL CARE: luz colace and mirilax   INDWELLING CATHETER: NA  NUTRITION: Adult diet Cardiac; 70 gm fat; 2 - 3 grams Sodium with 2000 fluid restriction        EMERGENCY CONTACT: Extended Emergency Contact Information  Primary Emergency Contact: Sarah Caruso  Home Phone: [phone]  Relation: Child  FAMILY UPDATE: Conversations held with patient and daughter at bedside regarding home going palliative inotrope.    CODE STATUS: Full Code  DISPO: Remain in HFICU    Patient seen and assessed with Dr. Fernandez    _________________________________________________  LAWSON Da Silva-CNP

## 2024-01-30 NOTE — CARE PLAN
The patient's goals for the shift include  increase PO intake    The clinical goals for the shift include pt will remain hemodynamically stable throughout the shift

## 2024-01-31 ENCOUNTER — APPOINTMENT (OUTPATIENT)
Dept: RADIOLOGY | Facility: HOSPITAL | Age: 69
DRG: 286 | End: 2024-01-31
Payer: COMMERCIAL

## 2024-01-31 LAB
ALBUMIN SERPL BCP-MCNC: 2.7 G/DL (ref 3.4–5)
ANION GAP SERPL CALC-SCNC: 11 MMOL/L (ref 10–20)
BASE EXCESS BLDMV CALC-SCNC: 2.9 MMOL/L (ref -2–3)
BASE EXCESS BLDV CALC-SCNC: 0.8 MMOL/L (ref -2–3)
BASOPHILS # BLD AUTO: 0.01 X10*3/UL (ref 0–0.1)
BASOPHILS NFR BLD AUTO: 0.1 %
BODY TEMPERATURE: 37 DEGREES CELSIUS
BODY TEMPERATURE: 37 DEGREES CELSIUS
BUN SERPL-MCNC: 53 MG/DL (ref 6–23)
CALCIUM SERPL-MCNC: 8.4 MG/DL (ref 8.6–10.6)
CHLORIDE SERPL-SCNC: 102 MMOL/L (ref 98–107)
CO2 SERPL-SCNC: 24 MMOL/L (ref 21–32)
CREAT SERPL-MCNC: 1.31 MG/DL (ref 0.5–1.05)
EGFRCR SERPLBLD CKD-EPI 2021: 44 ML/MIN/1.73M*2
EOSINOPHIL # BLD AUTO: 0.01 X10*3/UL (ref 0–0.7)
EOSINOPHIL NFR BLD AUTO: 0.1 %
ERYTHROCYTE [DISTWIDTH] IN BLOOD BY AUTOMATED COUNT: 14.6 % (ref 11.5–14.5)
GLUCOSE BLD MANUAL STRIP-MCNC: 145 MG/DL (ref 74–99)
GLUCOSE BLD MANUAL STRIP-MCNC: 145 MG/DL (ref 74–99)
GLUCOSE BLD MANUAL STRIP-MCNC: 163 MG/DL (ref 74–99)
GLUCOSE BLD MANUAL STRIP-MCNC: 181 MG/DL (ref 74–99)
GLUCOSE BLD MANUAL STRIP-MCNC: 75 MG/DL (ref 74–99)
GLUCOSE SERPL-MCNC: 80 MG/DL (ref 74–99)
HCO3 BLDMV-SCNC: 27.9 MMOL/L (ref 22–26)
HCO3 BLDV-SCNC: 26 MMOL/L (ref 22–26)
HCT VFR BLD AUTO: 29.4 % (ref 36–46)
HGB BLD-MCNC: 10.6 G/DL (ref 12–16)
IMM GRANULOCYTES # BLD AUTO: 0.06 X10*3/UL (ref 0–0.7)
IMM GRANULOCYTES NFR BLD AUTO: 0.8 % (ref 0–0.9)
INHALED O2 CONCENTRATION: 21 %
INHALED O2 CONCENTRATION: 21 %
LYMPHOCYTES # BLD AUTO: 1.08 X10*3/UL (ref 1.2–4.8)
LYMPHOCYTES NFR BLD AUTO: 15.2 %
MAGNESIUM SERPL-MCNC: 2.44 MG/DL (ref 1.6–2.4)
MCH RBC QN AUTO: 29.3 PG (ref 26–34)
MCHC RBC AUTO-ENTMCNC: 36.1 G/DL (ref 32–36)
MCV RBC AUTO: 81 FL (ref 80–100)
MONOCYTES # BLD AUTO: 0.76 X10*3/UL (ref 0.1–1)
MONOCYTES NFR BLD AUTO: 10.7 %
NEUTROPHILS # BLD AUTO: 5.19 X10*3/UL (ref 1.2–7.7)
NEUTROPHILS NFR BLD AUTO: 73.1 %
NRBC BLD-RTO: 0 /100 WBCS (ref 0–0)
OXYHGB MFR BLDMV: 61.8 % (ref 45–75)
OXYHGB MFR BLDV: 74.9 % (ref 45–75)
PCO2 BLDMV: 43 MM HG (ref 41–51)
PCO2 BLDV: 43 MM HG (ref 41–51)
PH BLDMV: 7.42 PH (ref 7.33–7.43)
PH BLDV: 7.39 PH (ref 7.33–7.43)
PHOSPHATE SERPL-MCNC: 3.1 MG/DL (ref 2.5–4.9)
PLATELET # BLD AUTO: 112 X10*3/UL (ref 150–450)
PO2 BLDMV: 39 MM HG (ref 35–45)
PO2 BLDV: 50 MM HG (ref 35–45)
POTASSIUM SERPL-SCNC: 4.6 MMOL/L (ref 3.5–5.3)
RBC # BLD AUTO: 3.62 X10*6/UL (ref 4–5.2)
SAO2 % BLDMV: 63 % (ref 45–75)
SAO2 % BLDV: 77 % (ref 45–75)
SODIUM SERPL-SCNC: 132 MMOL/L (ref 136–145)
WBC # BLD AUTO: 7.1 X10*3/UL (ref 4.4–11.3)

## 2024-01-31 PROCEDURE — 83735 ASSAY OF MAGNESIUM: CPT

## 2024-01-31 PROCEDURE — 2500000001 HC RX 250 WO HCPCS SELF ADMINISTERED DRUGS (ALT 637 FOR MEDICARE OP)

## 2024-01-31 PROCEDURE — 85025 COMPLETE CBC W/AUTO DIFF WBC: CPT

## 2024-01-31 PROCEDURE — 2500000004 HC RX 250 GENERAL PHARMACY W/ HCPCS (ALT 636 FOR OP/ED)

## 2024-01-31 PROCEDURE — 2500000001 HC RX 250 WO HCPCS SELF ADMINISTERED DRUGS (ALT 637 FOR MEDICARE OP): Performed by: STUDENT IN AN ORGANIZED HEALTH CARE EDUCATION/TRAINING PROGRAM

## 2024-01-31 PROCEDURE — 99291 CRITICAL CARE FIRST HOUR: CPT | Performed by: INTERNAL MEDICINE

## 2024-01-31 PROCEDURE — 80069 RENAL FUNCTION PANEL: CPT

## 2024-01-31 PROCEDURE — 82805 BLOOD GASES W/O2 SATURATION: CPT

## 2024-01-31 PROCEDURE — 99233 SBSQ HOSP IP/OBS HIGH 50: CPT | Performed by: NURSE PRACTITIONER

## 2024-01-31 PROCEDURE — 2500000001 HC RX 250 WO HCPCS SELF ADMINISTERED DRUGS (ALT 637 FOR MEDICARE OP): Performed by: NURSE PRACTITIONER

## 2024-01-31 PROCEDURE — 71045 X-RAY EXAM CHEST 1 VIEW: CPT

## 2024-01-31 PROCEDURE — 82947 ASSAY GLUCOSE BLOOD QUANT: CPT

## 2024-01-31 PROCEDURE — 2500000004 HC RX 250 GENERAL PHARMACY W/ HCPCS (ALT 636 FOR OP/ED): Performed by: NURSE PRACTITIONER

## 2024-01-31 PROCEDURE — 1100000001 HC PRIVATE ROOM DAILY

## 2024-01-31 PROCEDURE — 71045 X-RAY EXAM CHEST 1 VIEW: CPT | Performed by: RADIOLOGY

## 2024-01-31 RX ORDER — DIPHENHYDRAMINE HCL 25 MG
CAPSULE ORAL
COMMUNITY
Start: 2019-04-09 | End: 2024-02-03 | Stop reason: HOSPADM

## 2024-01-31 RX ORDER — ENOXAPARIN SODIUM 100 MG/ML
40 INJECTION SUBCUTANEOUS DAILY
COMMUNITY
Start: 2023-11-21 | End: 2024-02-03 | Stop reason: HOSPADM

## 2024-01-31 RX ORDER — CHLORHEXIDINE GLUCONATE 40 MG/ML
SOLUTION TOPICAL
COMMUNITY
Start: 2019-05-23 | End: 2024-02-03 | Stop reason: HOSPADM

## 2024-01-31 RX ORDER — AMOXICILLIN 250 MG
1 CAPSULE ORAL EVERY 12 HOURS
Status: ON HOLD | COMMUNITY
Start: 2023-11-22 | End: 2024-02-17 | Stop reason: WASHOUT

## 2024-01-31 RX ORDER — LIDOCAINE HYDROCHLORIDE 10 MG/ML
5 INJECTION, SOLUTION EPIDURAL; INFILTRATION; INTRACAUDAL; PERINEURAL ONCE
Status: DISCONTINUED | OUTPATIENT
Start: 2024-01-31 | End: 2024-02-03 | Stop reason: HOSPADM

## 2024-01-31 RX ORDER — ONDANSETRON 4 MG/1
4 TABLET, FILM COATED ORAL EVERY 8 HOURS
COMMUNITY
Start: 2023-03-23 | End: 2024-02-03 | Stop reason: HOSPADM

## 2024-01-31 RX ORDER — LANCETS 33 GAUGE
EACH MISCELLANEOUS
COMMUNITY
Start: 2023-07-17 | End: 2024-03-18 | Stop reason: WASHOUT

## 2024-01-31 RX ORDER — OMEGA-3-ACID ETHYL ESTERS 1 G/1
2 CAPSULE, LIQUID FILLED ORAL DAILY
COMMUNITY
End: 2024-02-03 | Stop reason: HOSPADM

## 2024-01-31 RX ORDER — MICONAZOLE NITRATE 2 %
2 CREAM (GRAM) TOPICAL AS NEEDED
COMMUNITY
Start: 2023-09-22 | End: 2024-02-03 | Stop reason: HOSPADM

## 2024-01-31 RX ORDER — DEXTROSE 50 % IN WATER (D50W) INTRAVENOUS SYRINGE
25
COMMUNITY
Start: 2023-11-21 | End: 2024-02-03 | Stop reason: HOSPADM

## 2024-01-31 RX ORDER — HUMAN INSULIN 100 [IU]/ML
INJECTION, SOLUTION SUBCUTANEOUS
COMMUNITY
End: 2024-02-03 | Stop reason: HOSPADM

## 2024-01-31 RX ORDER — ISOSORBIDE DINITRATE 10 MG/1
10 TABLET ORAL 2 TIMES DAILY
COMMUNITY
End: 2024-02-03 | Stop reason: HOSPADM

## 2024-01-31 RX ORDER — CALCIUM CARBONATE 500 MG/1
500 TABLET, CHEWABLE ORAL EVERY 12 HOURS PRN
COMMUNITY
Start: 2024-01-21

## 2024-01-31 RX ORDER — BLOOD-GLUCOSE METER
EACH MISCELLANEOUS
COMMUNITY
End: 2024-02-03 | Stop reason: HOSPADM

## 2024-01-31 RX ORDER — LANCETS 30 GAUGE
EACH MISCELLANEOUS
COMMUNITY
Start: 2023-09-22

## 2024-01-31 RX ORDER — MULTIVITAMIN
1 TABLET ORAL
COMMUNITY
Start: 2023-08-25 | End: 2024-02-03 | Stop reason: HOSPADM

## 2024-01-31 RX ORDER — IPRATROPIUM BROMIDE AND ALBUTEROL SULFATE 2.5; .5 MG/3ML; MG/3ML
SOLUTION RESPIRATORY (INHALATION)
COMMUNITY
Start: 2024-01-17 | End: 2024-02-03 | Stop reason: HOSPADM

## 2024-01-31 RX ORDER — CLOPIDOGREL BISULFATE 75 MG/1
75 TABLET ORAL DAILY
COMMUNITY
Start: 2023-03-23 | End: 2024-02-03 | Stop reason: HOSPADM

## 2024-01-31 RX ADMIN — HEPARIN SODIUM 5000 UNITS: 5000 INJECTION INTRAVENOUS; SUBCUTANEOUS at 08:35

## 2024-01-31 RX ADMIN — HEPARIN SODIUM 5000 UNITS: 5000 INJECTION INTRAVENOUS; SUBCUTANEOUS at 02:00

## 2024-01-31 RX ADMIN — SENNOSIDES AND DOCUSATE SODIUM 1 TABLET: 8.6; 5 TABLET ORAL at 08:36

## 2024-01-31 RX ADMIN — DAPAGLIFLOZIN 10 MG: 10 TABLET, FILM COATED ORAL at 08:36

## 2024-01-31 RX ADMIN — Medication 5 MG: at 20:34

## 2024-01-31 RX ADMIN — INSULIN LISPRO 1 UNITS: 100 INJECTION, SOLUTION INTRAVENOUS; SUBCUTANEOUS at 16:48

## 2024-01-31 RX ADMIN — DEXAMETHASONE 6 MG: 6 TABLET ORAL at 08:36

## 2024-01-31 RX ADMIN — FAMOTIDINE 40 MG: 20 TABLET ORAL at 08:36

## 2024-01-31 RX ADMIN — ASPIRIN 81 MG CHEWABLE TABLET 81 MG: 81 TABLET CHEWABLE at 08:36

## 2024-01-31 RX ADMIN — INSULIN LISPRO 1 UNITS: 100 INJECTION, SOLUTION INTRAVENOUS; SUBCUTANEOUS at 12:00

## 2024-01-31 RX ADMIN — HEPARIN SODIUM 5000 UNITS: 5000 INJECTION INTRAVENOUS; SUBCUTANEOUS at 23:14

## 2024-01-31 RX ADMIN — POLYETHYLENE GLYCOL 3350 17 G: 17 POWDER, FOR SOLUTION ORAL at 08:36

## 2024-01-31 RX ADMIN — SPIRONOLACTONE 25 MG: 25 TABLET ORAL at 08:36

## 2024-01-31 RX ADMIN — HEPARIN SODIUM 5000 UNITS: 5000 INJECTION INTRAVENOUS; SUBCUTANEOUS at 16:47

## 2024-01-31 RX ADMIN — ACETAMINOPHEN 975 MG: 325 TABLET ORAL at 23:23

## 2024-01-31 SDOH — SOCIAL STABILITY: SOCIAL INSECURITY: DOES ANYONE TRY TO KEEP YOU FROM HAVING/CONTACTING OTHER FRIENDS OR DOING THINGS OUTSIDE YOUR HOME?: NO

## 2024-01-31 SDOH — SOCIAL STABILITY: SOCIAL INSECURITY: HAS ANYONE EVER THREATENED TO HURT YOUR FAMILY OR YOUR PETS?: NO

## 2024-01-31 SDOH — SOCIAL STABILITY: SOCIAL INSECURITY: ABUSE: ADULT

## 2024-01-31 SDOH — SOCIAL STABILITY: SOCIAL INSECURITY: DO YOU FEEL ANYONE HAS EXPLOITED OR TAKEN ADVANTAGE OF YOU FINANCIALLY OR OF YOUR PERSONAL PROPERTY?: NO

## 2024-01-31 SDOH — SOCIAL STABILITY: SOCIAL INSECURITY: DO YOU FEEL UNSAFE GOING BACK TO THE PLACE WHERE YOU ARE LIVING?: NO

## 2024-01-31 SDOH — SOCIAL STABILITY: SOCIAL INSECURITY: HAVE YOU HAD THOUGHTS OF HARMING ANYONE ELSE?: NO

## 2024-01-31 SDOH — SOCIAL STABILITY: SOCIAL INSECURITY: ARE THERE ANY APPARENT SIGNS OF INJURIES/BEHAVIORS THAT COULD BE RELATED TO ABUSE/NEGLECT?: NO

## 2024-01-31 SDOH — SOCIAL STABILITY: SOCIAL INSECURITY: ARE YOU OR HAVE YOU BEEN THREATENED OR ABUSED PHYSICALLY, EMOTIONALLY, OR SEXUALLY BY ANYONE?: NO

## 2024-01-31 SDOH — SOCIAL STABILITY: SOCIAL INSECURITY: WERE YOU ABLE TO COMPLETE ALL THE BEHAVIORAL HEALTH SCREENINGS?: YES

## 2024-01-31 ASSESSMENT — PATIENT HEALTH QUESTIONNAIRE - PHQ9
2. FEELING DOWN, DEPRESSED OR HOPELESS: NOT AT ALL
SUM OF ALL RESPONSES TO PHQ9 QUESTIONS 1 & 2: 0
1. LITTLE INTEREST OR PLEASURE IN DOING THINGS: NOT AT ALL

## 2024-01-31 ASSESSMENT — PAIN SCALES - GENERAL
PAINLEVEL_OUTOF10: 0 - NO PAIN
PAINLEVEL_OUTOF10: 1

## 2024-01-31 ASSESSMENT — COGNITIVE AND FUNCTIONAL STATUS - GENERAL
WALKING IN HOSPITAL ROOM: A LOT
PERSONAL GROOMING: A LITTLE
CLIMB 3 TO 5 STEPS WITH RAILING: TOTAL
PATIENT BASELINE BEDBOUND: NO
STANDING UP FROM CHAIR USING ARMS: A LITTLE
HELP NEEDED FOR BATHING: A LITTLE
TOILETING: A LITTLE
TURNING FROM BACK TO SIDE WHILE IN FLAT BAD: A LITTLE
MOBILITY SCORE: 16
MOVING TO AND FROM BED TO CHAIR: A LITTLE
DRESSING REGULAR UPPER BODY CLOTHING: A LITTLE
DRESSING REGULAR LOWER BODY CLOTHING: A LITTLE
DAILY ACTIVITIY SCORE: 19

## 2024-01-31 ASSESSMENT — LIFESTYLE VARIABLES
PRESCIPTION_ABUSE_PAST_12_MONTHS: NO
SKIP TO QUESTIONS 9-10: 0
HOW MANY STANDARD DRINKS CONTAINING ALCOHOL DO YOU HAVE ON A TYPICAL DAY: 1 OR 2
HOW OFTEN DO YOU HAVE 6 OR MORE DRINKS ON ONE OCCASION: LESS THAN MONTHLY
AUDIT-C TOTAL SCORE: 2
AUDIT-C TOTAL SCORE: 2
HOW OFTEN DO YOU HAVE A DRINK CONTAINING ALCOHOL: MONTHLY OR LESS
SUBSTANCE_ABUSE_PAST_12_MONTHS: NO

## 2024-01-31 ASSESSMENT — ACTIVITIES OF DAILY LIVING (ADL)
BATHING: INDEPENDENT
PATIENT'S MEMORY ADEQUATE TO SAFELY COMPLETE DAILY ACTIVITIES?: YES
HEARING - RIGHT EAR: FUNCTIONAL
DRESSING YOURSELF: INDEPENDENT
WALKS IN HOME: INDEPENDENT
TOILETING: INDEPENDENT
GROOMING: INDEPENDENT
FEEDING YOURSELF: INDEPENDENT
HEARING - LEFT EAR: FUNCTIONAL
ADEQUATE_TO_COMPLETE_ADL: YES
JUDGMENT_ADEQUATE_SAFELY_COMPLETE_DAILY_ACTIVITIES: YES

## 2024-01-31 ASSESSMENT — PAIN - FUNCTIONAL ASSESSMENT
PAIN_FUNCTIONAL_ASSESSMENT: 0-10

## 2024-01-31 ASSESSMENT — PAIN DESCRIPTION - LOCATION: LOCATION: GENERALIZED

## 2024-01-31 NOTE — PROGRESS NOTES
[facility]  HFICU PROGRESS NOTE    Melissa Marinelli/[id number]    Admit Date: 1/22/2024  Hospital Length of Stay: 9   ICU Length of Stay: 4d 13h   Primary Service: HF ICU  Primary HF Cardiologist: Dr. Graham     Hospital Course:     68 y.o. female w/ PMhx HFrEF (EF 15-20%) s/p ICD placement 5/2020, CAD s/p NSTEMI with RIRI to LCx (Jan 2016), s/p MVR (June 2019), COPD (no formal PFTs), HTN, HLD, GERD, CVA, CKD IIIB and DMII presenting initially to MICU d/t Acute Hypoxic Resp Failure requiring BiPAP which was quickly weaned down to NC .  Etiologies for acute hypoxic respiratory failure at time of presentation include COVID diagnosis, COPD exacerbation, ADHF related to underlying HFrEF. PE was ruled out. She was Transferred to HFICU on 1/26 after she was found to have high left and right filling pressures and low CO in cath lab. She was treated with Lasix and Placed on BiPAP and started on dobutamine and Nipride.  Dobutamine has since been weaned off and home GDMT has been restarted.  Patient has declined LVAD in the past.  patient agreeable to trial Milrinone for symptome management on 1/29. Maps low, 50's, milrinone drip had to drop from 0.25 mcg to 0.125 mcg/kg/min. With Milrinone drip . Patient's renal function slightly improved, and she feels a little better, discussed with the patient, decision was made to have Wiley, and patient will discharge home with home milrinone drip at 0.125 mcg/kg/min. SG D/c'd, the last set of SG # (1/31):  /54 (70),  CVP 1,  PAP 34/14(21), CO/CI 7/3.9,  , SVO2 77% on milrinone drip 0.125 mcg/kg/min, Farxiga 10mg daily, Jose Luis 25mg daily.  Repeated CVP : 7. Patient HD stable, will transfer to HVI service once secure PIV established.  Ordered for Wiley in IR tomorrow 1/31.       MEDICATIONS  Infusions:  lactated Ringer's, Last Rate: 10 mL/hr (01/30/24 8978)  milrinone, Last Rate: 0.125 mcg/kg/min (01/30/24 2344)      Scheduled:  aspirin, 81 mg,  "Daily  dapagliflozin propanediol, 10 mg, Daily  dexAMETHasone, 6 mg, Daily  famotidine, 40 mg, Daily  fluticasone furoate-vilanteroL, 1 puff, Daily  heparin, 5,000 Units, q8h  insulin lispro, 0-5 Units, TID with meals  lidocaine, 5 mL, Once  melatonin, 5 mg, Nightly  melatonin, 5 mg, Daily  perflutren protein A microsphere, 0.5 mL, Once in imaging  [Held by provider] sacubitriL-valsartan, 1 tablet, BID  sennosides-docusate sodium, 1 tablet, Daily  spironolactone, 25 mg, Daily  sulfur hexafluoride microsphr, 2 mL, Once in imaging      PRN:  acetaminophen, 975 mg, q8h PRN  albuterol, 2 puff, q6h PRN  calcium carbonate, 1,000 mg, 4x daily PRN  dextrose 10 % in water (D10W), 0.3 g/kg/hr, Once PRN  dextrose, 25 g, q15 min PRN  glucagon, 1 mg, q15 min PRN  HYDROmorphone, 0.2 mg, q3h PRN  oxygen, , Continuous PRN - O2/gases  polyethylene glycol, 17 g, Daily PRN  sodium chloride, 1 spray, PRN  trimethobenzamide, 200 mg, q6h PRN      Invasive Hemodynamics:    Most Recent Range Past 24hrs   BP (Art)   No data recorded   MAP(Art)   No data recorded   RA/CVP   No data recorded   PA 35/18 PAP  Min: 19/7  Max: 46/31   PA(mean) 25 mmHg PAP (Mean)  Min: 11 mmHg  Max: 36 mmHg   PCWP 11 mmHg No data recorded   CO 7 L/min CO (L/min)  Min: 4.48 L/min  Max: 7 L/min   CI 3.9 L/min/m2 CI (L/min/m2)  Min: 2.52 L/min/m2  Max: 3.9 L/min/m2   Mixed Venous 77 % SVO2 (%)  Min: 77 %  Max: 77 %   SVR  786 (dyne*sec)/cm5 SVR (dyne*sec)/cm5  Min: 786 (dyne*sec)/cm5  Max: 1107 (dyne*sec)/cm5    (dyne*sec)/cm5 No data recorded       PHYSICAL EXAM:   Visit Vitals  /58   Pulse 70   Temp 36.8 °C (98.3 °F) (Temporal)   Resp 16   Ht 1.626 m (5' 4\")   Wt 72.5 kg (159 lb 13.3 oz)   SpO2 99%   BMI 27.44 kg/m²   Smoking Status Every Day   BSA 1.81 m²       Wt Readings from Last 5 Encounters:   01/30/24 72.5 kg (159 lb 13.3 oz)   12/18/23 64.9 kg (143 lb)   11/26/23 65.1 kg (143 lb 8.3 oz)   09/18/23 65.8 kg (145 lb)   06/22/23 67.1 kg (148 lb) "       INTAKE/OUTPUT:  I/O last 3 completed shifts:  In: 1449.5 (20 mL/kg) [P.O.:815; I.V.:384.5 (5.3 mL/kg); IV Piggyback:250]  Out: 3275 (45.2 mL/kg) [Urine:3275 (1.3 mL/kg/hr)]  Weight: 72.5 kg      Physical Exam  Constitutional:       Appearance: Normal appearance.   HENT:      Head: Normocephalic.      Mouth/Throat:      Mouth: Mucous membranes are dry.   Eyes:      Pupils: Pupils are equal, round, and reactive to light.   Cardiovascular:      Rate and Rhythm: Normal rate and regular rhythm.   Pulmonary:      Effort: Pulmonary effort is normal.   Musculoskeletal:      Cervical back: Normal range of motion.   Skin:     General: Skin is warm and dry.   Neurological:      General: No focal deficit present.      Mental Status: She is alert and oriented to person, place, and time.   Psychiatric:         Mood and Affect: Mood normal.         DATA:  CMP:  Results from last 7 days   Lab Units 01/31/24  0312 01/30/24  0244 01/29/24  0259 01/28/24  0354 01/27/24  0432 01/26/24  1815 01/26/24  0715 01/25/24  1600 01/25/24  0714 01/24/24  1031   SODIUM mmol/L 132* 134* 134* 137 138 133* 136 134* 137 137   POTASSIUM mmol/L 4.6 4.8 4.4 4.4 4.4 5.1 5.1 5.2 5.4* 4.9   CHLORIDE mmol/L 102 103 103 104 101 100 102 103 101 104   CO2 mmol/L 24 26 23 24 25 23 19* 15* 19* 16*   ANION GAP mmol/L 11 10 12 13 16 15 20 21* 22* 22*   BUN mg/dL 53* 61* 62* 72* 83* 86* 80* 77* 63* 42*   CREATININE mg/dL 1.31* 1.40* 1.49* 1.69* 2.26* 2.28* 2.17* 2.38* 2.34* 1.76*   EGFR mL/min/1.73m*2 44* 41* 38* 33* 23* 23* 24* 22* 22* 31*   MAGNESIUM mg/dL 2.44* 2.28 2.35 2.31 2.40 2.52* 2.65*  --  2.59* 2.23   ALBUMIN g/dL 2.7* 2.9* 3.1* 3.3* 3.5 3.6 3.8 3.9 4.2 3.7   ALT U/L  --   --   --   --   --  98*  --   --   --   --    AST U/L  --   --   --   --   --  63*  --   --   --   --    BILIRUBIN TOTAL mg/dL  --   --   --   --   --  0.7  --   --   --   --        CBC:  Results from last 7 days   Lab Units 01/31/24  0312 01/30/24  0244 01/29/24  0259  "01/28/24  0347 01/27/24  0432 01/26/24 2020 01/26/24  0715 01/25/24  0714   WBC AUTO x10*3/uL 7.1 7.6 8.4 7.1 5.7 5.7 6.4 7.8   HEMOGLOBIN g/dL 10.6* 11.4* 12.1 11.3* 11.7* 11.5* 13.3 13.9   HEMATOCRIT % 29.4* 33.0* 33.8* 32.4* 33.7* 34.9* 39.9 44.1   PLATELETS AUTO x10*3/uL 112* 138* 146* 148* 165 161 171 89*   MCV fL 81 85 83 83 82 88 88 91       COAG:         ABO: No results found for: \"ABO\"  HEME/ENDO:  Results from last 7 days   Lab Units 01/26/24  1815 01/26/24  1805   FERRITIN ng/mL 720*  --    IRON SATURATION % 34  --    TSH mIU/L  --  0.45   HEMOGLOBIN A1C %  --  5.4        CARDIAC:   Results from last 7 days   Lab Units 01/26/24  0715   TROPHS ng/L 700*   BNP pg/mL >5,000*         ASSESSMENT AND PLAN:   ASSESSMENT AND PLAN:      68 y.o. female w/ PMhx HFrEF (EF 15-20%) s/p ICD placement 5/2020, CAD s/p NSTEMI with RIRI to LCx (Jan 2016), s/p MVR (June 2019), COPD (no formal PFTs), HTN, HLD, GERD, CVA, CKD IIIB and DMII presenting initially to MICU d/t Acute Hypoxic Resp Failure requiring BiPAP which was quickly weaned down to NC .  Etiologies for acute hypoxic respiratory failure at time of presentation include COVID diagnosis, COPD exacerbation, ADHF related to underlying HFrEF. PE was ruled out. She was Transferred to HFICU on 1/26 after she was found to have high left and right filling pressures and low CO in cath lab. She was treated with Lasix and Placed on BiPAP and started on dobutamine and Nipride.  Dobutamine has since been weaned off and home GDMT has been restarted.  Patient has declined LVAD in the past.  patient agreeable to trial Milrinone for symptome management on 1/29. Maps low, 50's, milrinone drip had to drop from 0.25 mcg to 0.125 mcg/kg/min. With Milrinone drip . Patient's renal function slightly improved, and she feels a little better, discussed with the patient, decision was made to have Wiley, and patient will discharge home with home milrinone drip at 0.125 mcg/kg/min. SG D/c'd, " the last set of SG # (1/31):  /54 (70),  CVP 1,  PAP 34/14(21), CO/CI 7/3.9,  , SVO2 77% on milrinone drip 0.125 mcg/kg/min, Farxiga 10mg daily, Jose Luis 25mg daily.  Patient HD stable, will transfer to HVI service once secure PIV established.  Ordered for Wiley in IR tomorrow 1/31.         Neuro:  # Anxiety. Depression  #Hx of CVA   - Serial neuro and pain assessments   - PO Tylenol PRN for pain  - PT/OT Consult, OOB to chair  - CAM ICU score every shift  - Sleep/wake cycle normalization     # Physical Status  -BMI : 24.37      #Substance abuse  -denies ETOH use  -Smokes 10 cigarettes per day, nicotine replacement therapy declined        Cardiovascular:  # Acute on chronic decompensated HFrEF, EF 15-20%  # Acute cardiogenic shock  - Echocardiogram 1/24:  severely decreased LV systolic function, EF 00007% with everely dilated LV cavity size LVIDd 6.3.  No LV thrombus mild - moderate LA dilation.  Mildly reduced RV systolic function   - admit weight: 64.4kg  daily weight: 72.5 kg   - admit BNP >5000  - admit Lactate: 1.9  - home farxiga restarted   - Opening SGC #s: /77(90), CVP 8, PAP 47/32 (40), W 25, CO/CI 2.94/1.74, SVR 2230, SVO2 51% on Nipride at 2mcg/kg/min. (Thermistor on swan broken, unable to obtain thermos)  - Daily SGC #s (1/31): /54 (70),  CVP 1,  PAP 34/14(21), CO/CI 7/3.9,  , SVO2 77% on milrinone drip 0.125 mcg/kg/min, Farxiga 10mg daily, Jose Luis 25mg daily.    -C/w Milrinone 0.125mcg/kg/min for symptom management   -Daily standing weights, 2gm sodium diet, 2L fluid restriction, strict I&Os     #CAD   #NSTEMI s/p RIRI to Lcx (Jan 2016)  #Hx of Mitral Valve repair (June 2019) w/ Dr Montana  #ICD (5/2020):   # Hypertroponinemia in the setting of demand ischemia   -29mm Epic Bioprosthetc valve   -c/w ASA 81mg daily   - Not on statin d/t allergy   - Device interrogation: [employer] ICD Check 1/25, multiple ventricular arrhythmias--> 1/23 0131 VT @190bpm successful ATP x1 to  restore VS; 1/22 1146 VT @190 ATP x1 to restore VS; 1/22 0428 VF @222bpm ATP x1 unsuccessful remained in VF which spontaneously terminated to VS (no shock delivered); 1NSVT 1/22 0936 x5sec duration V rate 194bpm; 12/28/23 1episode of NSVT vs slow VT (binned as NSVT) @185bpm recorded episode captured through 31sec and unable to see termination.   -High sensitivity Trop: 700 (1/26), 1230 (1/23), 1257 (1/22)        Pulmonary:   #Acute hypercapnic and hypoxic respiratory failure  #C/f COPD exacerbation vs Acute decompensated HF  #COVID infection  #C/f PE(resolved)  - Requiring BiPAP again for work of breathing on 1/23; ABG pH 7.37, pCO2 36, pO2 72--> placed on Bipap on arrival to HFICU for dyspnea   -Now 2L NC during the day and Bipap at HS.    - remdesivir 200mg 1 day then 100mg for total 5 day (completed 1/27)  - C/w dexamethasone 6mg daily for 10 days (done 2/2)  - C/w doxycycline 100mg BID for 5 days for presumed COPD exacerbation (complete 1/27)   - No PFTs on file to confirm diagnosis of COPD   - CT PE on 1/24 negative for PE   - 80mg IV lasix given on arrival to HFICU---> Spot diuresis   - Hold home symbicort  - c/w Breo Ellipta daily   - c/w Albuterol inh q6hrs PRN  - supplemental O2 PRN--> wean off as tolerated   - Strep and legionella antigens--> negative   - Consult Pulm for continuity of care PRN      GI:  #Abdominal pain and nausea d/t low output state   - Tigan 200mg IM q6hrs PRN   - PPI     # Heart Burn  - C/w Pepcid 40 mg PO daily   - Tums 2 tabs  PRN  per patients requests     :  #CKD IIIB  -Baseline BUN/Cr: 29-41/ 1.3-1.8  -BUN/Cr (1/29):  62/1.49 GFR 38  -I/Os  -avoid hypotension and nephrotoxic agents     Heme:  #Anemia 2/2 iron deficiency   - H/H  base line 13/42   -Iron Studies 1/26: 82/243/34%  -Folate 5.3, B-12 931  -No need for folate   - Daily CBC     Endo:  #DM  #hyperglycemia 2/2 steroid use for COVID treatment  - hgbA1c-->5.4, EST AVG GLUC 108  - Farxiga 10mg daily     # no Hx of Thyroid  dysfunction   -TSH--> 0.45      ID:  #COVID infection  - See pulmonary section   -afebrile, nontoxic, no s/s infx  -WBC--No leukocytosis   -trend temps q4h     PHYSICAL AND OCCUPATIONAL THERAPY: ordered    LINES:  PIVs   [place]- 1/26      DVT: subcutaneous Heparin   VAP BUNDLE: NA  ULCER PPX: Protonix daily   GLYCEMIC CONTROL: monitoring  BOWEL CARE: luz colace and mirilax   INDWELLING CATHETER: NA  NUTRITION: Adult diet Cardiac; 70 gm fat; 2 - 3 grams Sodium with 2000 fluid restriction        EMERGENCY CONTACT: Extended Emergency Contact Information  Primary Emergency Contact: Coby Carusoy  Home Phone: [phone]  Relation: Child  FAMILY UPDATE: Conversations held with patient and daughter at bedside regarding home going palliative inotrope.    CODE STATUS: Full Code  DISPO:  Transfer to floor     Patient seen and assessed with Dr. Fernandez    _________________________________________________  LAWSON Da Silva-CNP

## 2024-01-31 NOTE — NURSING NOTE
VAST consult for PIV placement. PT has had multiple PIV placed by VAST team that have infiltrated. Currently PT has a RIJ swan and has order for PICC placement.  Questioned why additional  access was needed since PT has a swan and orders for PICC placement.  Plan was for PT to be transferred to floor and PT has to go to IR for PICC placement.  Assessed with US and no sustainable veins were noted.  Attempted X1 in the R hand with 24 diffusic with partial positive blood return but while advancing vein collapsed.  Updated BS nurse and was told that no further attempts for placement is necessary since primary service will transfer her to [place] 5 in which PT can keep the RIJ MAC until PICC is placed.  Lizy Vera

## 2024-01-31 NOTE — PROGRESS NOTES
SOCIAL WORK NOTE   Per team, plan to discharge home on milrinone. SW spoke with daughter, agreeable to [facility] for HIS and PT/OT. 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 HC orders requested. Social work to follow.  UPDATE : [facility] referral placed via HC orders and epic chat.   CHRIS Paula, LISW-S ([id number])

## 2024-01-31 NOTE — PROGRESS NOTES
HFICU Attending Note     Patient started on milrinone did not tolerate 0.25 due to hypotension, seems to be tolerating the 0.125 dose but we have had to discontinue entresto completely, remains euvolemic to slightly dry based on rhc, encouraged oral intake but her major issue seems to be heartburn at the moment    Discussed with palliative care and daughter, she seems to be feeling slightly better so would favor discharge on palliative milrinone if bp remains stable tomorrow would remove rhc and plan for picc placement      This critically ill patient continues to be at-risk for clinically significant deterioration / failure due to the above mentioned dysfunctional, unstable organ systems.  I have personally identified and managed all complex critical care issues to prevent aforementioned clinical deterioration.  Critical care time is spent at bedside and/or the immediate area and has included, but is not limited to, the review of diagnostic tests, labs, radiographs, serial assessments of hemodynamics, respiratory status, ventilatory management, and family updates.  Time spent in procedures and teaching are reported separately.     Critical care time: __38__ minutes

## 2024-01-31 NOTE — SIGNIFICANT EVENT
The last set of SG #:    The last set of SG # (1/31):  /54 (70),  CVP 1,  PAP 34/14(21), CO/CI 7/3.9,  , SVO2 77% on milrinone drip 0.125 mcg/kg/min, Farxiga 10mg daily, Jose Luis 25mg daily.  Repeated CVP : 7.

## 2024-01-31 NOTE — PROGRESS NOTES
HFICU Attending Note     Blood pressure more stable she was able to tolerate 0.0125 milrinone fortunately not clear we will be able to add back Entresto at this time but her filling pressures are low and cardiac index is much improved so does not seem that she will need any higher dose of milrinone for now nor does she need any further diuresis.  We had planned to place a PICC line but she is quite anxious about this given a family member who developed sepsis from a PICC line so we will instead place a Wiley catheter tomorrow we were unable to get a peripheral IV so we will run the milrinone through her introducer until she has alternate access updated her primary cardiologist will need to initiate home health teaching     This critically ill patient continues to be at-risk for clinically significant deterioration / failure due to the above mentioned dysfunctional, unstable organ systems.  I have personally identified and managed all complex critical care issues to prevent aforementioned clinical deterioration.  Critical care time is spent at bedside and/or the immediate area and has included, but is not limited to, the review of diagnostic tests, labs, radiographs, serial assessments of hemodynamics, respiratory status, ventilatory management, and family updates.  Time spent in procedures and teaching are reported separately.     Critical care time: __44__ minutes

## 2024-01-31 NOTE — CARE PLAN
Problem: Diabetes  Goal: Achieve decreasing blood glucose levels by end of shift  Outcome: Progressing  Goal: Increase stability of blood glucose readings by end of shift  Outcome: Progressing  Goal: Maintain electrolyte levels within acceptable range throughout shift  Outcome: Progressing  Goal: Maintain glucose levels >70mg/dl to <250mg/dl throughout shift  Outcome: Progressing  Goal: Learn about and adhere to nutrition recommendations by end of shift  Outcome: Progressing  Goal: Vital signs within normal range for age by end of shift  Outcome: Progressing  Goal: Increase self care and/or family involovement by end of shift  Outcome: Progressing     Problem: Pain - Adult  Goal: Verbalizes/displays adequate comfort level or baseline comfort level  Outcome: Progressing     Problem: Safety - Adult  Goal: Free from fall injury  Outcome: Progressing     Problem: Discharge Planning  Goal: Discharge to home or other facility with appropriate resources  Outcome: Progressing     Problem: Chronic Conditions and Co-morbidities  Goal: Patient's chronic conditions and co-morbidity symptoms are monitored and maintained or improved  Outcome: Progressing     Problem: Skin  Goal: Decreased wound size/increased tissue granulation at next dressing change  Outcome: Progressing  Flowsheets (Taken 1/31/2024 1228)  Decreased wound size/increased tissue granulation at next dressing change: Protective dressings over bony prominences  Goal: Participates in plan/prevention/treatment measures  Outcome: Progressing  Flowsheets (Taken 1/31/2024 1228)  Participates in plan/prevention/treatment measures: Increase activity/out of bed for meals  Goal: Prevent/manage excess moisture  Outcome: Progressing  Flowsheets (Taken 1/31/2024 1228)  Prevent/manage excess moisture:   Cleanse incontinence/protect with barrier cream   Follow provider orders for dressing changes  Goal: Prevent/minimize sheer/friction injuries  Outcome: Progressing  Flowsheets  (Taken 1/31/2024 1228)  Prevent/minimize sheer/friction injuries:   Complete micro-shifts as needed if patient unable. Adjust patient position to relieve pressure points, not a full turn   Use pull sheet   HOB 30 degrees or less   Turn/reposition every 2 hours/use positioning/transfer devices  Goal: Promote/optimize nutrition  Outcome: Progressing  Flowsheets (Taken 1/31/2024 1228)  Promote/optimize nutrition:   Monitor/record intake including meals   Discuss with provider if NPO > 2 days  Goal: Promote skin healing  Outcome: Progressing  Flowsheets (Taken 1/31/2024 1228)  Promote skin healing: Turn/reposition every 2 hours/use positioning/transfer devices     Problem: Heart Failure  Goal: Improved gas exchange this shift  Outcome: Progressing  Goal: Improved urinary output this shift  Outcome: Progressing  Goal: Reduction in peripheral edema within 24 hours  Outcome: Progressing  Goal: Report improvement of dyspnea/breathlessness this shift  Outcome: Progressing  Goal: Weight from fluid excess reduced over 2-3 days, then stabilize  Outcome: Progressing  Goal: Increase self care and/or family involvement in 24 hours  Outcome: Progressing     Problem: Pain  Goal: Takes deep breaths with improved pain control throughout the shift  Outcome: Progressing  Goal: Turns in bed with improved pain control throughout the shift  Outcome: Progressing  Goal: Walks with improved pain control throughout the shift  Outcome: Progressing  Goal: Performs ADL's with improved pain control throughout shift  Outcome: Progressing  Goal: Participates in PT with improved pain control throughout the shift  Outcome: Progressing  Goal: Free from opioid side effects throughout the shift  Outcome: Progressing  Goal: Free from acute confusion related to pain meds throughout the shift  Outcome: Progressing

## 2024-02-01 ENCOUNTER — APPOINTMENT (OUTPATIENT)
Dept: RADIOLOGY | Facility: HOSPITAL | Age: 69
DRG: 286 | End: 2024-02-01
Payer: COMMERCIAL

## 2024-02-01 ENCOUNTER — HOME HEALTH ADMISSION (OUTPATIENT)
Dept: HOME HEALTH SERVICES | Facility: HOME HEALTH | Age: 69
End: 2024-02-01
Payer: COMMERCIAL

## 2024-02-01 LAB
ALBUMIN SERPL BCP-MCNC: 3.1 G/DL (ref 3.4–5)
ANION GAP SERPL CALC-SCNC: 10 MMOL/L (ref 10–20)
BASOPHILS # BLD AUTO: 0.01 X10*3/UL (ref 0–0.1)
BASOPHILS NFR BLD AUTO: 0.1 %
BUN SERPL-MCNC: 42 MG/DL (ref 6–23)
CALCIUM SERPL-MCNC: 8.6 MG/DL (ref 8.6–10.6)
CHLORIDE SERPL-SCNC: 104 MMOL/L (ref 98–107)
CO2 SERPL-SCNC: 26 MMOL/L (ref 21–32)
CREAT SERPL-MCNC: 1.27 MG/DL (ref 0.5–1.05)
EGFRCR SERPLBLD CKD-EPI 2021: 46 ML/MIN/1.73M*2
EOSINOPHIL # BLD AUTO: 0.01 X10*3/UL (ref 0–0.7)
EOSINOPHIL NFR BLD AUTO: 0.1 %
ERYTHROCYTE [DISTWIDTH] IN BLOOD BY AUTOMATED COUNT: 15.4 % (ref 11.5–14.5)
GLUCOSE BLD MANUAL STRIP-MCNC: 129 MG/DL (ref 74–99)
GLUCOSE BLD MANUAL STRIP-MCNC: 154 MG/DL (ref 74–99)
GLUCOSE BLD MANUAL STRIP-MCNC: 197 MG/DL (ref 74–99)
GLUCOSE BLD MANUAL STRIP-MCNC: 85 MG/DL (ref 74–99)
GLUCOSE BLD MANUAL STRIP-MCNC: 88 MG/DL (ref 74–99)
GLUCOSE SERPL-MCNC: 85 MG/DL (ref 74–99)
HCT VFR BLD AUTO: 31.7 % (ref 36–46)
HGB BLD-MCNC: 10.5 G/DL (ref 12–16)
IMM GRANULOCYTES # BLD AUTO: 0.11 X10*3/UL (ref 0–0.7)
IMM GRANULOCYTES NFR BLD AUTO: 1.5 % (ref 0–0.9)
LYMPHOCYTES # BLD AUTO: 1.26 X10*3/UL (ref 1.2–4.8)
LYMPHOCYTES NFR BLD AUTO: 17.1 %
MAGNESIUM SERPL-MCNC: 2.35 MG/DL (ref 1.6–2.4)
MCH RBC QN AUTO: 28.9 PG (ref 26–34)
MCHC RBC AUTO-ENTMCNC: 33.1 G/DL (ref 32–36)
MCV RBC AUTO: 87 FL (ref 80–100)
MONOCYTES # BLD AUTO: 0.75 X10*3/UL (ref 0.1–1)
MONOCYTES NFR BLD AUTO: 10.1 %
NEUTROPHILS # BLD AUTO: 5.25 X10*3/UL (ref 1.2–7.7)
NEUTROPHILS NFR BLD AUTO: 71.1 %
NRBC BLD-RTO: 0 /100 WBCS (ref 0–0)
PHOSPHATE SERPL-MCNC: 3 MG/DL (ref 2.5–4.9)
PLATELET # BLD AUTO: 113 X10*3/UL (ref 150–450)
POTASSIUM SERPL-SCNC: 4.5 MMOL/L (ref 3.5–5.3)
RBC # BLD AUTO: 3.63 X10*6/UL (ref 4–5.2)
SODIUM SERPL-SCNC: 135 MMOL/L (ref 136–145)
WBC # BLD AUTO: 7.4 X10*3/UL (ref 4.4–11.3)

## 2024-02-01 PROCEDURE — 05HM33Z INSERTION OF INFUSION DEVICE INTO RIGHT INTERNAL JUGULAR VEIN, PERCUTANEOUS APPROACH: ICD-10-PCS | Performed by: RADIOLOGY

## 2024-02-01 PROCEDURE — 2500000001 HC RX 250 WO HCPCS SELF ADMINISTERED DRUGS (ALT 637 FOR MEDICARE OP): Performed by: NURSE PRACTITIONER

## 2024-02-01 PROCEDURE — 2500000001 HC RX 250 WO HCPCS SELF ADMINISTERED DRUGS (ALT 637 FOR MEDICARE OP): Performed by: STUDENT IN AN ORGANIZED HEALTH CARE EDUCATION/TRAINING PROGRAM

## 2024-02-01 PROCEDURE — 36415 COLL VENOUS BLD VENIPUNCTURE: CPT

## 2024-02-01 PROCEDURE — 2500000004 HC RX 250 GENERAL PHARMACY W/ HCPCS (ALT 636 FOR OP/ED): Performed by: NURSE PRACTITIONER

## 2024-02-01 PROCEDURE — 2500000001 HC RX 250 WO HCPCS SELF ADMINISTERED DRUGS (ALT 637 FOR MEDICARE OP)

## 2024-02-01 PROCEDURE — 2500000004 HC RX 250 GENERAL PHARMACY W/ HCPCS (ALT 636 FOR OP/ED): Performed by: STUDENT IN AN ORGANIZED HEALTH CARE EDUCATION/TRAINING PROGRAM

## 2024-02-01 PROCEDURE — 2500000004 HC RX 250 GENERAL PHARMACY W/ HCPCS (ALT 636 FOR OP/ED)

## 2024-02-01 PROCEDURE — 99291 CRITICAL CARE FIRST HOUR: CPT | Performed by: INTERNAL MEDICINE

## 2024-02-01 PROCEDURE — 83735 ASSAY OF MAGNESIUM: CPT

## 2024-02-01 PROCEDURE — 76937 US GUIDE VASCULAR ACCESS: CPT | Performed by: RADIOLOGY

## 2024-02-01 PROCEDURE — 36558 INSERT TUNNELED CV CATH: CPT | Performed by: RADIOLOGY

## 2024-02-01 PROCEDURE — 97116 GAIT TRAINING THERAPY: CPT | Mod: GP

## 2024-02-01 PROCEDURE — 1100000001 HC PRIVATE ROOM DAILY

## 2024-02-01 PROCEDURE — 82947 ASSAY GLUCOSE BLOOD QUANT: CPT

## 2024-02-01 PROCEDURE — 85025 COMPLETE CBC W/AUTO DIFF WBC: CPT

## 2024-02-01 PROCEDURE — 99233 SBSQ HOSP IP/OBS HIGH 50: CPT | Performed by: NURSE PRACTITIONER

## 2024-02-01 PROCEDURE — 77001 FLUOROGUIDE FOR VEIN DEVICE: CPT | Performed by: RADIOLOGY

## 2024-02-01 PROCEDURE — 80069 RENAL FUNCTION PANEL: CPT

## 2024-02-01 PROCEDURE — 2780000003 HC OR 278 NO HCPCS

## 2024-02-01 RX ORDER — METOPROLOL SUCCINATE 25 MG/1
12.5 TABLET, EXTENDED RELEASE ORAL NIGHTLY
Status: DISCONTINUED | OUTPATIENT
Start: 2024-02-01 | End: 2024-02-01

## 2024-02-01 RX ADMIN — DEXAMETHASONE 6 MG: 6 TABLET ORAL at 09:00

## 2024-02-01 RX ADMIN — FAMOTIDINE 40 MG: 20 TABLET ORAL at 09:00

## 2024-02-01 RX ADMIN — ASPIRIN 81 MG CHEWABLE TABLET 81 MG: 81 TABLET CHEWABLE at 13:39

## 2024-02-01 RX ADMIN — HEPARIN SODIUM 5000 UNITS: 5000 INJECTION INTRAVENOUS; SUBCUTANEOUS at 17:24

## 2024-02-01 RX ADMIN — Medication 5 MG: at 22:39

## 2024-02-01 RX ADMIN — SPIRONOLACTONE 25 MG: 25 TABLET ORAL at 09:00

## 2024-02-01 RX ADMIN — CALCIUM CARBONATE (ANTACID) CHEW TAB 500 MG 1000 MG: 500 CHEW TAB at 15:11

## 2024-02-01 RX ADMIN — INSULIN LISPRO 1 UNITS: 100 INJECTION, SOLUTION INTRAVENOUS; SUBCUTANEOUS at 17:24

## 2024-02-01 RX ADMIN — MILRINONE LACTATE 0.12 MCG/KG/MIN: 200 INJECTION, SOLUTION INTRAVENOUS at 13:04

## 2024-02-01 RX ADMIN — MILRINONE LACTATE 0.12 MCG/KG/MIN: 200 INJECTION, SOLUTION INTRAVENOUS at 06:25

## 2024-02-01 RX ADMIN — DAPAGLIFLOZIN 10 MG: 10 TABLET, FILM COATED ORAL at 09:00

## 2024-02-01 ASSESSMENT — COGNITIVE AND FUNCTIONAL STATUS - GENERAL
MOBILITY SCORE: 21
STANDING UP FROM CHAIR USING ARMS: A LITTLE
DAILY ACTIVITIY SCORE: 24
MOVING TO AND FROM BED TO CHAIR: A LITTLE
CLIMB 3 TO 5 STEPS WITH RAILING: A LOT
MOBILITY SCORE: 18
CLIMB 3 TO 5 STEPS WITH RAILING: A LOT
WALKING IN HOSPITAL ROOM: A LITTLE
TURNING FROM BACK TO SIDE WHILE IN FLAT BAD: A LITTLE
WALKING IN HOSPITAL ROOM: A LITTLE

## 2024-02-01 ASSESSMENT — PAIN - FUNCTIONAL ASSESSMENT
PAIN_FUNCTIONAL_ASSESSMENT: 0-10

## 2024-02-01 ASSESSMENT — PAIN SCALES - GENERAL
PAINLEVEL_OUTOF10: 0 - NO PAIN

## 2024-02-01 NOTE — PROGRESS NOTES
[facility]  HFICU PROGRESS NOTE    Melissa Marinelli/[id number]    Admit Date: 1/22/2024  Hospital Length of Stay: 10   ICU Length of Stay: 5d 13h   Primary Service: HF ICU  Primary HF Cardiologist: Dr. Graham     Hospital Course:     68 y.o. female w/ PMhx HFrEF (EF 15-20%) s/p ICD placement 5/2020, CAD s/p NSTEMI with RIRI to LCx (Jan 2016), s/p MVR (June 2019), COPD (no formal PFTs), HTN, HLD, GERD, CVA, CKD IIIB and DMII presenting initially to MICU d/t Acute Hypoxic Resp Failure requiring BiPAP which was quickly weaned down to NC .  Etiologies for acute hypoxic respiratory failure at time of presentation include COVID diagnosis, COPD exacerbation, ADHF related to underlying HFrEF. PE was ruled out. She was Transferred to HFICU on 1/26 after she was found to have high left and right filling pressures and low CO in cath lab. She was treated with Lasix and Placed on BiPAP and started on dobutamine and Nipride.  Dobutamine has since been weaned off and home GDMT has been restarted.  Patient has declined LVAD in the past.  patient agreeable to trial Milrinone for symptome management on 1/29. Maps low, 50's, milrinone drip had to drop from 0.25 mcg to 0.125 mcg/kg/min. With Milrinone drip . Patient's renal function slightly improved, and she feels a little better, discussed with the patient, decision was made to have Wiley, and patient will discharge home with home milrinone drip at 0.125 mcg/kg/min. SG D/c'd, the last set of SG # (1/31):  /54 (70),  CVP 1,  PAP 34/14(21), CO/CI 7/3.9,  , SVO2 77% on milrinone drip 0.125 mcg/kg/min, Farxiga 10mg daily, Jose Luis 25mg daily.  Repeated CVP : 7.  Wiley placed in IR on his right chest today  1/31.  HD stable, will transfer to HVI service once bed available today. Home care, social service aware, order placed,  discharge plan for home going with milrinone drip at 0.125 mcg/kg/min.      MEDICATIONS  Infusions:  milrinone, Last Rate:  "0.125 mcg/kg/min (02/01/24 0625)      Scheduled:  aspirin, 81 mg, Daily  dapagliflozin propanediol, 10 mg, Daily  dexAMETHasone, 6 mg, Daily  famotidine, 40 mg, Daily  fluticasone furoate-vilanteroL, 1 puff, Daily  heparin, 5,000 Units, q8h  insulin lispro, 0-5 Units, TID with meals  lidocaine, 5 mL, Once  lidocaine, 5 mL, Once  melatonin, 5 mg, Nightly  melatonin, 5 mg, Daily  perflutren protein A microsphere, 0.5 mL, Once in imaging  sennosides-docusate sodium, 1 tablet, Daily  spironolactone, 25 mg, Daily  sulfur hexafluoride microsphr, 2 mL, Once in imaging      PRN:  acetaminophen, 975 mg, q8h PRN  albuterol, 2 puff, q6h PRN  calcium carbonate, 1,000 mg, 4x daily PRN  dextrose 10 % in water (D10W), 0.3 g/kg/hr, Once PRN  dextrose, 25 g, q15 min PRN  glucagon, 1 mg, q15 min PRN  HYDROmorphone, 0.2 mg, q3h PRN  oxygen, , Continuous PRN - O2/gases  polyethylene glycol, 17 g, Daily PRN  sodium chloride, 1 spray, PRN  trimethobenzamide, 200 mg, q6h PRN      Invasive Hemodynamics:    Most Recent Range Past 24hrs   BP (Art)   No data recorded   MAP(Art)   No data recorded   RA/CVP   No data recorded   PA 29/14 PAP  Min: 26/12  Max: 35/21   PA(mean) 20 mmHg PAP (Mean)  Min: 17 mmHg  Max: 26 mmHg   PCWP 11 mmHg No data recorded   CO 7 L/min No data recorded   CI 3.9 L/min/m2 No data recorded   Mixed Venous 77 % No data recorded   SVR  786 (dyne*sec)/cm5 No data recorded    (dyne*sec)/cm5 No data recorded       PHYSICAL EXAM:   Visit Vitals  BP (!) 77/44 Comment: NP stray informed of BP   Pulse 56   Temp 37 °C (98.6 °F) (Temporal)   Resp 17   Ht 1.626 m (5' 4\")   Wt 72 kg (158 lb 11.7 oz)   SpO2 97%   BMI 27.25 kg/m²   Smoking Status Every Day   BSA 1.8 m²       Wt Readings from Last 5 Encounters:   02/01/24 72 kg (158 lb 11.7 oz)   12/18/23 64.9 kg (143 lb)   11/26/23 65.1 kg (143 lb 8.3 oz)   09/18/23 65.8 kg (145 lb)   06/22/23 67.1 kg (148 lb)       INTAKE/OUTPUT:  I/O last 3 completed shifts:  In: 531.5 (7.4 " mL/kg) [P.O.:100; I.V.:181.5 (2.5 mL/kg); IV Piggyback:250]  Out: 3300 (45.8 mL/kg) [Urine:3300 (1.3 mL/kg/hr)]  Weight: 72 kg      Physical Exam  Constitutional:       Appearance: Normal appearance.   HENT:      Head: Normocephalic.      Mouth/Throat:      Mouth: Mucous membranes are dry.   Eyes:      Pupils: Pupils are equal, round, and reactive to light.   Cardiovascular:      Rate and Rhythm: Normal rate and regular rhythm.   Pulmonary:      Effort: Pulmonary effort is normal.   Musculoskeletal:      Cervical back: Normal range of motion.   Skin:     General: Skin is warm and dry.   Neurological:      General: No focal deficit present.      Mental Status: She is alert and oriented to person, place, and time.   Psychiatric:         Mood and Affect: Mood normal.         DATA:  CMP:  Results from last 7 days   Lab Units 02/01/24  0310 01/31/24  0312 01/30/24  0244 01/29/24  0259 01/28/24  0354 01/27/24  0432 01/26/24  1815 01/26/24  0715 01/25/24  1600   SODIUM mmol/L 135* 132* 134* 134* 137 138 133* 136 134*   POTASSIUM mmol/L 4.5 4.6 4.8 4.4 4.4 4.4 5.1 5.1 5.2   CHLORIDE mmol/L 104 102 103 103 104 101 100 102 103   CO2 mmol/L 26 24 26 23 24 25 23 19* 15*   ANION GAP mmol/L 10 11 10 12 13 16 15 20 21*   BUN mg/dL 42* 53* 61* 62* 72* 83* 86* 80* 77*   CREATININE mg/dL 1.27* 1.31* 1.40* 1.49* 1.69* 2.26* 2.28* 2.17* 2.38*   EGFR mL/min/1.73m*2 46* 44* 41* 38* 33* 23* 23* 24* 22*   MAGNESIUM mg/dL 2.35 2.44* 2.28 2.35 2.31 2.40 2.52* 2.65*  --    ALBUMIN g/dL 3.1* 2.7* 2.9* 3.1* 3.3* 3.5 3.6 3.8 3.9   ALT U/L  --   --   --   --   --   --  98*  --   --    AST U/L  --   --   --   --   --   --  63*  --   --    BILIRUBIN TOTAL mg/dL  --   --   --   --   --   --  0.7  --   --        CBC:  Results from last 7 days   Lab Units 02/01/24  0310 01/31/24  0312 01/30/24  0244 01/29/24  0259 01/28/24  0347 01/27/24  0432 01/26/24  2020 01/26/24  0715   WBC AUTO x10*3/uL 7.4 7.1 7.6 8.4 7.1 5.7 5.7 6.4   HEMOGLOBIN g/dL 10.5*  "10.6* 11.4* 12.1 11.3* 11.7* 11.5* 13.3   HEMATOCRIT % 31.7* 29.4* 33.0* 33.8* 32.4* 33.7* 34.9* 39.9   PLATELETS AUTO x10*3/uL 113* 112* 138* 146* 148* 165 161 171   MCV fL 87 81 85 83 83 82 88 88       COAG:         ABO: No results found for: \"ABO\"  HEME/ENDO:  Results from last 7 days   Lab Units 01/26/24  1815 01/26/24  1805   FERRITIN ng/mL 720*  --    IRON SATURATION % 34  --    TSH mIU/L  --  0.45   HEMOGLOBIN A1C %  --  5.4        CARDIAC:   Results from last 7 days   Lab Units 01/26/24  0715   TROPHS ng/L 700*   BNP pg/mL >5,000*         ASSESSMENT AND PLAN:   ASSESSMENT AND PLAN:   68 y.o. female w/ PMhx HFrEF (EF 15-20%) s/p ICD placement 5/2020, CAD s/p NSTEMI with RIRI to LCx (Jan 2016), s/p MVR (June 2019), COPD (no formal PFTs), HTN, HLD, GERD, CVA, CKD IIIB and DMII presenting initially to MICU d/t Acute Hypoxic Resp Failure requiring BiPAP which was quickly weaned down to NC .  Etiologies for acute hypoxic respiratory failure at time of presentation include COVID diagnosis, COPD exacerbation, ADHF related to underlying HFrEF. PE was ruled out. She was Transferred to HFICU on 1/26 after she was found to have high left and right filling pressures and low CO in cath lab. She was treated with Lasix and Placed on BiPAP and started on dobutamine and Nipride.  Dobutamine has since been weaned off and home GDMT has been restarted.  Patient has declined LVAD in the past.  patient agreeable to trial Milrinone for symptome management on 1/29. Maps low, 50's, milrinone drip had to drop from 0.25 mcg to 0.125 mcg/kg/min. With Milrinone drip . Patient's renal function slightly improved, and she feels a little better, discussed with the patient, decision was made to have Wiley, and patient will discharge home with home milrinone drip at 0.125 mcg/kg/min. SG D/c'd, the last set of SG # (1/31):  /54 (70),  CVP 1,  PAP 34/14(21), CO/CI 7/3.9,  , SVO2 77% on milrinone drip 0.125 mcg/kg/min, Farxiga 10mg " daily, Jose Luis 25mg daily.  Repeated CVP : 7.  Wiley placed in IR on right chest today 1/31.  HD stable, will transfer to HVI service once bed available today. Home care, social service aware, order placed,  discharge plan for home going with milrinone drip at 0.125 mcg/kg/min.       Neuro:  # Anxiety. Depression  #Hx of CVA   - Serial neuro and pain assessments   - PO Tylenol PRN for pain  - PT/OT Consult, OOB to chair  - CAM ICU score every shift  - Sleep/wake cycle normalization     # Physical Status  -BMI : 24.37      #Substance abuse  -denies ETOH use  -Smokes 10 cigarettes per day, nicotine replacement therapy declined        Cardiovascular:  # Acute on chronic decompensated HFrEF, EF 15-20%  # Acute cardiogenic shock  - Echocardiogram 1/24:  severely decreased LV systolic function, EF 49768% with everely dilated LV cavity size LVIDd 6.3.  No LV thrombus mild - moderate LA dilation.  Mildly reduced RV systolic function   - admit weight: 64.4kg  daily weight: 72 kg   - admit BNP >5000  - admit Lactate: 1.9  - home farxiga restarted   - Opening SGC #s: /77(90), CVP 8, PAP 47/32 (40), W 25, CO/CI 2.94/1.74, SVR 2230, SVO2 51% on Nipride at 2mcg/kg/min. (Thermistor on swan broken, unable to obtain thermos)  - The last set of SG # (1/31):  /54 (70),  CVP 1,  PAP 34/14(21), CO/CI 7/3.9,  , SVO2 77% on milrinone drip 0.125 mcg/kg/min, Farxiga 10mg daily, [place] 25mg daily.  Repeated CVP : 7.    -C/w Milrinone 0.125mcg/kg/min for symptom management   -C/w Dapa 10 mg daily  -[place] 25 mg daily  -Hold home Entresto as patient on milrinone drip and not much  room with blood pressure  -Daily standing weights, 2gm sodium diet, 2L fluid restriction, strict I&Os     #CAD   #NSTEMI s/p RIRI to Lcx (Jan 2016)  #Hx of Mitral Valve repair (June 2019) w/ Dr Montana  #ICD (5/2020):   # Hypertroponinemia in the setting of demand ischemia   -29mm Epic Bioprosthetc valve   -c/w ASA 81mg daily   - Not on statin d/t  allergy   - Device interrogation: [employer] ICD Check 1/25, multiple ventricular arrhythmias--> 1/23 0131 VT @190bpm successful ATP x1 to restore VS; 1/22 1146 VT @190 ATP x1 to restore VS; 1/22 0428 VF @222bpm ATP x1 unsuccessful remained in VF which spontaneously terminated to VS (no shock delivered); 1NSVT 1/22 0936 x5sec duration V rate 194bpm; 12/28/23 1episode of NSVT vs slow VT (binned as NSVT) @185bpm recorded episode captured through 31sec and unable to see termination.   -High sensitivity Trop: 700 (1/26), 1230 (1/23), 1257 (1/22)        Pulmonary:   #Acute hypercapnic and hypoxic respiratory failure  #C/f COPD exacerbation vs Acute decompensated HF  #COVID infection  #C/f PE(resolved)  - Requiring BiPAP again for work of breathing on 1/23; ABG pH 7.37, pCO2 36, pO2 72--> placed on Bipap on arrival to HFICU for dyspnea   -Now 2L NC during the day and Bipap at HS.    - remdesivir 200mg 1 day then 100mg for total 5 day (completed 1/27)  - C/w dexamethasone 6mg daily for 10 days (done 2/2)  - C/w doxycycline 100mg BID for 5 days for presumed COPD exacerbation (complete 1/27)   - No PFTs on file to confirm diagnosis of COPD   - CT PE on 1/24 negative for PE   - 80mg IV lasix given on arrival to HFICU---> Spot diuresis   - Hold home symbicort  - c/w Breo Ellipta daily   - c/w Albuterol inh q6hrs PRN  - supplemental O2 PRN--> wean off as tolerated   - Strep and legionella antigens--> negative   - Consult Pulm for continuity of care PRN      GI:  #Abdominal pain and nausea d/t low output state   - Tigan 200mg IM q6hrs PRN   - PPI     # Heart Burn  - C/w Pepcid 40 mg PO daily   - Tums 2 tabs  PRN       :  #CKD IIIB  -Baseline BUN/Cr: 29-41/ 1.3-1.8  -BUN/Cr (1/29):  62/1.49 GFR 38  -I/Os  -avoid hypotension and nephrotoxic agents     Heme:  #Anemia 2/2 iron deficiency   - H/H  base line 13/42   -Iron Studies 1/26: 82/243/34%  -Folate 5.3, B-12 931  -No need for folate   - Daily CBC     Endo:  #DM  #hyperglycemia  2/2 steroid use for COVID treatment  - hgbA1c-->5.4, EST AVG GLUC 108  - Farxiga 10mg daily     # no Hx of Thyroid dysfunction   -TSH--> 0.45      ID:  #COVID infection  - See pulmonary section   -afebrile, nontoxic, no s/s infx  -WBC--No leukocytosis   -trend temps q4h     PHYSICAL AND OCCUPATIONAL THERAPY: ordered    LINES:  PIVs   [place]- 1/26, SG D/c'd 1/31, [place] will discontinue today 2/1   Wiley Right Chest in IR: 2/1/2024       DVT: subcutaneous Heparin   VAP BUNDLE: NA  ULCER PPX: Protonix daily   GLYCEMIC CONTROL: monitoring  BOWEL CARE: luz colace and mirilax   INDWELLING CATHETER: NA  NUTRITION: Adult diet Cardiac; 70 gm fat; 2 - 3 grams Sodium with 2000 fluid restriction        EMERGENCY CONTACT: Extended Emergency Contact Information  Primary Emergency Contact: Sarah Caruso  Home Phone: [phone]  Relation: Child  FAMILY UPDATE: Conversations held with patient and daughter at bedside regarding home going palliative inotrope.    CODE STATUS: Full Code  DISPO:  Transfer to floor     Patient seen and assessed with Dr. Fernandez    _________________________________________________  JASMIN Da Silva

## 2024-02-01 NOTE — PROGRESS NOTES
SOCIAL WORK NOTE   Medicare B inotropic criteria faxed to pharmacy . Social work to follow.  CHRIS Paula, LISW-S ([id number])

## 2024-02-01 NOTE — POST-PROCEDURE NOTE
Interventional Radiology Brief Postprocedure Note    Attending: Mariusz Ortiz MD    Assistant:   Staff Role   No Staff Documented       Diagnosis:   1. Hypoxic respiratory failure (CMS/HCC)        2. Ischemic cardiomyopathy  Transthoracic Echo (TTE) Limited    Transthoracic Echo (TTE) Limited      3. History of mitral valve replacement  Transthoracic Echo (TTE) Limited    Transthoracic Echo (TTE) Limited      4. HFrEF (heart failure with reduced ejection fraction) (CMS/HCC)  Transthoracic Echo (TTE) Limited    Transthoracic Echo (TTE) Limited    Case Request Cath Lab: Right Heart Cath    Case Request Cath Lab: Right Heart Cath    Cardiac catheterization - non-coronary    Cardiac catheterization - non-coronary      5. Presence of automatic (implantable) cardiac defibrillator  Cardiac device check - Inpatient    Cardiac device check - Inpatient      6. Other forms of dyspnea  Cardiac catheterization - non-coronary          Description of procedure: Image guided placement of a 6.6F right internal jugular vein Wiley catheter.    Timeout:  Yes    Procedure Area: Procedure Area     Anesthesia:   Local/Topical    Complications: None    Estimated Blood Loss: minimal    Medications (Filter: Administrations occurring from 1217 to 1217 on 02/01/24) As of 02/01/24 1217      None          No specimens collected      See detailed result report with images in PACS.    The patient tolerated the procedure well without incident or complication and is in stable condition.

## 2024-02-01 NOTE — PROGRESS NOTES
Physical Therapy    Physical Therapy Treatment    Patient Name: Melissa Marinelli  MRN: [id number]  Today's Date: 2/1/2024  Time Calculation  Start Time: 1049  Stop Time: 1114  Time Calculation (min): 25 min       Assessment/Plan   PT Assessment  End of Session Patient Position: Up in chair, Alarm off, not on at start of session     PT Plan  Treatment/Interventions: Bed mobility, Transfer training, Gait training, Stair training, Balance training, Strengthening, Endurance training, Range of motion, Therapeutic exercise, Therapeutic activity, Home exercise program, Positioning  PT Plan: Skilled PT  PT Frequency: 3 times per week  PT Discharge Recommendations: Low intensity level of continued care  PT Recommended Transfer Status: Assist x1  PT - OK to Discharge: Yes      General Visit Information:   PT  Visit  PT Received On: 02/01/24  Response to Previous Treatment: Patient with no complaints from previous session.  General  Prior to Session Communication: Bedside nurse  Patient Position Received: Bed, 3 rail up, Alarm off, not on at start of session  General Comment: Patient supine in bed, pleasant and agreeable to PT. On tele, IV, 0.125 Milrinone. Of note, today is day 10 of covid prec, cleared to ambulate outside room, as long as pt wears mask.    Subjective   Precautions:  Precautions  Medical Precautions: Cardiac precautions, Fall precautions, Infection precautions    Vital Signs:  Vital Signs  Heart Rate:  (PRE: 73; POST: 67)  Resp:  (PRE 26; POST: 67)  SpO2:  (PRE: 100%; DURING spO2 min 92% on RA; POST: 99%)  BP:  (PRE: 104/75; POST: 113/60)    Objective   Pain:  Pain Assessment  Pain Assessment: 0-10  Pain Score: 0 - No pain  Cognition:  Cognition  Overall Cognitive Status: Within Functional Limits  Arousal/Alertness: Appropriate responses to stimuli  Orientation Level: Oriented X4  Following Commands: Follows all commands and directions without difficulty    Activity Tolerance:  Activity Tolerance  Endurance:  Tolerates 10 - 20 min exercise with multiple rests  Early Mobility/Exercise Safety Screen: Proceed with mobilization - No exclusion criteria met  Treatments:  Therapeutic Exercise  Therapeutic Exercise Performed: Yes  Therapeutic Exercise Activity 1: standing marches x 10  Therapeutic Exercise Activity 2: LAQ x 10, 2 sets    Therapeutic Activity  Therapeutic Activity Performed:  (PT assisted pt to/from bedside commode (declined  use of bathroom ), with CGA and cues for safety.)    Bed Mobility  Bed Mobility:  (SBA supine to sit)    Ambulation/Gait Training  Ambulation/Gait Training Performed: Yes  Ambulation/Gait Training 1  Surface 1: Level tile  Device 1: Rolling walker  Assistance 1: Minimum assistance  Quality of Gait 1: Narrow base of support, Inconsistent stride length  Comments/Distance (ft) 1: 120 ft (Pt amb with slowed katie, narrow DAVID cues for safety and to increase B/L hip and knee flexion for improved clearance, as pt tends to 'shuffle' feet.)  Transfers  Transfer:  (CGA sit <>stand and transfer to bedside commode)    Outcome Measures:  [facility] Basic Mobility  Turning from your back to your side while in a flat bed without using bedrails: None  Moving from lying on your back to sitting on the side of a flat bed without using bedrails: A little  Moving to and from bed to chair (including a wheelchair): A little  Standing up from a chair using your arms (e.g. wheelchair or bedside chair): A little  To walk in hospital room: A little  Climbing 3-5 steps with railing: A lot  Basic Mobility - Total Score: 18    FSS-ICU  Ambulation: Walks >/ or equal to 50 feet with any assistance x1  Rolling: Supervision or set-up only  Sitting: Supervision or set-up only  Transfer Sit-to-Stand: Minimal assistance (performs 75% or more of task)  Transfer Supine-to-Sit: Minimal assistance (performs 75% or more of task)  Total Score: 20      ICU Mobility Screen  Early Mobility/Exercise Safety Screen: Proceed with mobilization  - No exclusion criteria met  E = Exercise and Early Mobility  Early Mobility/Exercise Safety Screen: Proceed with mobilization - No exclusion criteria met  Current Activity: Ambulating in brown    Education Documentation  Mobility Training, taught by Angeline Shahid, PT at 2/1/2024  5:37 PM.  Learner: Patient  Readiness: Acceptance  Method: Explanation, Demonstration  Response: Verbalizes Understanding    Education Comments  No comments found.             Encounter Problems       Encounter Problems (Active)       Balance       pt will score ./= 242/8 on Tinetti balance assessment indicating low risk for falls  (Progressing)       Start:  01/26/24    Expected End:  02/04/24               Mobility       STG - Patient will ambulate >/= 150 ft with FWW and CGA, VSS  (Progressing)       Start:  01/26/24    Expected End:  02/04/24            STG - Patient will ascend and descend a flight of stairs CGA (Progressing)       Start:  01/26/24    Expected End:  02/04/24               Pain - Adult          Transfers       STG - Transfer from bed to chair CGA And FWW (Progressing)       Start:  01/26/24    Expected End:  02/04/24            STG - Patient will transfer sit to and from stand CGA and FWW (Progressing)       Start:  01/26/24    Expected End:  02/04/24

## 2024-02-01 NOTE — PROGRESS NOTES
Occupational Therapy                 Therapy Communication Note    Patient Name: Melissa Marinelli  MRN: [id number]  Today's Date: 2/1/2024     Discipline: Occupational Therapy    Missed Visit Reason: Missed Visit Reason:  (Patient leaving unit to get Wiley placed; will attempt OT next visit as appropriate.)    Missed Time: Attempt    Comment:  Britany Cabrera OTR/L  Inpatient Occupational Therapist   Rehab Office: [phone]

## 2024-02-01 NOTE — PRE-PROCEDURE NOTE
Interventional Radiology Preprocedure Note    Procedure: Wiley's catheter placement     Indication for procedure: The primary encounter diagnosis was Hypoxic respiratory failure (CMS/HCC). Diagnoses of Ischemic cardiomyopathy, History of mitral valve replacement, HFrEF (heart failure with reduced ejection fraction) (CMS/HCC), Presence of automatic (implantable) cardiac defibrillator, and Other forms of dyspnea were also pertinent to this visit.    Relevant review of systems: NA    Relevant Labs:   Lab Results   Component Value Date    CREATININE 1.27 (H) 02/01/2024    EGFR 46 (L) 02/01/2024    INR 1.1 01/23/2024    PROTIME 11.9 01/23/2024       Planned Sedation/Anesthesia: Moderate    Airway assessment: normal    Directed physical examination:    A&Ox3     Mallampati: II (hard and soft palate, upper portion of tonsils anduvula visible)    ASA Score: ASA 3 - Patient with moderate systemic disease with functional limitations    Benefits, risks and alternatives of procedure and planned sedation have been discussed with the patient and/or their representative. All questions answered and they agree to proceed.     Kit Shields DO, PGY-2   Diagnostic Radiology   [facility]

## 2024-02-02 ENCOUNTER — HOME INFUSION (OUTPATIENT)
Dept: INFUSION THERAPY | Age: 69
End: 2024-02-02
Payer: COMMERCIAL

## 2024-02-02 ENCOUNTER — DOCUMENTATION (OUTPATIENT)
Dept: PHARMACY | Facility: CLINIC | Age: 69
End: 2024-02-02

## 2024-02-02 ENCOUNTER — DOCUMENTATION (OUTPATIENT)
Dept: HOME HEALTH SERVICES | Facility: HOME HEALTH | Age: 69
End: 2024-02-02

## 2024-02-02 ENCOUNTER — HOME HEALTH ADMISSION (OUTPATIENT)
Dept: HOME HEALTH SERVICES | Facility: HOME HEALTH | Age: 69
End: 2024-02-02
Payer: COMMERCIAL

## 2024-02-02 DIAGNOSIS — I50.23 ACUTE ON CHRONIC SYSTOLIC HEART FAILURE (MULTI): Primary | ICD-10-CM

## 2024-02-02 LAB
ALBUMIN SERPL BCP-MCNC: 2.9 G/DL (ref 3.4–5)
ANION GAP SERPL CALC-SCNC: 8 MMOL/L (ref 10–20)
BASOPHILS # BLD AUTO: 0 X10*3/UL (ref 0–0.1)
BASOPHILS NFR BLD AUTO: 0 %
BUN SERPL-MCNC: 36 MG/DL (ref 6–23)
CALCIUM SERPL-MCNC: 8.5 MG/DL (ref 8.6–10.6)
CHLORIDE SERPL-SCNC: 105 MMOL/L (ref 98–107)
CO2 SERPL-SCNC: 26 MMOL/L (ref 21–32)
CREAT SERPL-MCNC: 1.15 MG/DL (ref 0.5–1.05)
EGFRCR SERPLBLD CKD-EPI 2021: 52 ML/MIN/1.73M*2
EOSINOPHIL # BLD AUTO: 0.06 X10*3/UL (ref 0–0.7)
EOSINOPHIL NFR BLD AUTO: 0.9 %
ERYTHROCYTE [DISTWIDTH] IN BLOOD BY AUTOMATED COUNT: 14.8 % (ref 11.5–14.5)
GLUCOSE BLD MANUAL STRIP-MCNC: 112 MG/DL (ref 74–99)
GLUCOSE BLD MANUAL STRIP-MCNC: 128 MG/DL (ref 74–99)
GLUCOSE BLD MANUAL STRIP-MCNC: 88 MG/DL (ref 74–99)
GLUCOSE BLD MANUAL STRIP-MCNC: 89 MG/DL (ref 74–99)
GLUCOSE SERPL-MCNC: 74 MG/DL (ref 74–99)
HCT VFR BLD AUTO: 27.6 % (ref 36–46)
HGB BLD-MCNC: 9.4 G/DL (ref 12–16)
IMM GRANULOCYTES # BLD AUTO: 0.08 X10*3/UL (ref 0–0.7)
IMM GRANULOCYTES NFR BLD AUTO: 1.3 % (ref 0–0.9)
LYMPHOCYTES # BLD AUTO: 1.37 X10*3/UL (ref 1.2–4.8)
LYMPHOCYTES NFR BLD AUTO: 21.5 %
MAGNESIUM SERPL-MCNC: 2.3 MG/DL (ref 1.6–2.4)
MCH RBC QN AUTO: 28.4 PG (ref 26–34)
MCHC RBC AUTO-ENTMCNC: 34.1 G/DL (ref 32–36)
MCV RBC AUTO: 83 FL (ref 80–100)
MONOCYTES # BLD AUTO: 0.84 X10*3/UL (ref 0.1–1)
MONOCYTES NFR BLD AUTO: 13.2 %
NEUTROPHILS # BLD AUTO: 4.03 X10*3/UL (ref 1.2–7.7)
NEUTROPHILS NFR BLD AUTO: 63.1 %
NRBC BLD-RTO: 0 /100 WBCS (ref 0–0)
PHOSPHATE SERPL-MCNC: 2.6 MG/DL (ref 2.5–4.9)
PLATELET # BLD AUTO: 107 X10*3/UL (ref 150–450)
POTASSIUM SERPL-SCNC: 4.3 MMOL/L (ref 3.5–5.3)
RBC # BLD AUTO: 3.31 X10*6/UL (ref 4–5.2)
SODIUM SERPL-SCNC: 135 MMOL/L (ref 136–145)
WBC # BLD AUTO: 6.4 X10*3/UL (ref 4.4–11.3)

## 2024-02-02 PROCEDURE — 37799 UNLISTED PX VASCULAR SURGERY: CPT

## 2024-02-02 PROCEDURE — 99233 SBSQ HOSP IP/OBS HIGH 50: CPT | Performed by: NURSE PRACTITIONER

## 2024-02-02 PROCEDURE — 1200000002 HC GENERAL ROOM WITH TELEMETRY DAILY

## 2024-02-02 PROCEDURE — 99291 CRITICAL CARE FIRST HOUR: CPT | Performed by: INTERNAL MEDICINE

## 2024-02-02 PROCEDURE — 2500000001 HC RX 250 WO HCPCS SELF ADMINISTERED DRUGS (ALT 637 FOR MEDICARE OP)

## 2024-02-02 PROCEDURE — 2500000004 HC RX 250 GENERAL PHARMACY W/ HCPCS (ALT 636 FOR OP/ED): Performed by: STUDENT IN AN ORGANIZED HEALTH CARE EDUCATION/TRAINING PROGRAM

## 2024-02-02 PROCEDURE — 2500000004 HC RX 250 GENERAL PHARMACY W/ HCPCS (ALT 636 FOR OP/ED)

## 2024-02-02 PROCEDURE — 2500000004 HC RX 250 GENERAL PHARMACY W/ HCPCS (ALT 636 FOR OP/ED): Performed by: NURSE PRACTITIONER

## 2024-02-02 PROCEDURE — 97535 SELF CARE MNGMENT TRAINING: CPT | Mod: GO

## 2024-02-02 PROCEDURE — 2500000001 HC RX 250 WO HCPCS SELF ADMINISTERED DRUGS (ALT 637 FOR MEDICARE OP): Performed by: NURSE PRACTITIONER

## 2024-02-02 PROCEDURE — 83735 ASSAY OF MAGNESIUM: CPT

## 2024-02-02 PROCEDURE — 80069 RENAL FUNCTION PANEL: CPT

## 2024-02-02 PROCEDURE — 97530 THERAPEUTIC ACTIVITIES: CPT | Mod: GO

## 2024-02-02 PROCEDURE — 82947 ASSAY GLUCOSE BLOOD QUANT: CPT

## 2024-02-02 PROCEDURE — 85025 COMPLETE CBC W/AUTO DIFF WBC: CPT

## 2024-02-02 RX ORDER — SPIRONOLACTONE 25 MG/1
25 TABLET ORAL DAILY
Qty: 30 TABLET | Refills: 3 | Status: ON HOLD | OUTPATIENT
Start: 2024-02-03 | End: 2024-02-18 | Stop reason: SDUPTHER

## 2024-02-02 RX ORDER — MILRINONE LACTATE 0.2 MG/ML
0.12 INJECTION, SOLUTION INTRAVENOUS CONTINUOUS
Qty: 10000 ML | Refills: 11 | Status: SHIPPED
Start: 2024-02-02 | End: 2024-04-30 | Stop reason: DRUGHIGH

## 2024-02-02 RX ORDER — FAMOTIDINE 40 MG/1
40 TABLET, FILM COATED ORAL DAILY
Qty: 30 TABLET | Refills: 3 | Status: ON HOLD | OUTPATIENT
Start: 2024-02-03 | End: 2024-02-18 | Stop reason: SDUPTHER

## 2024-02-02 RX ADMIN — SPIRONOLACTONE 25 MG: 25 TABLET ORAL at 08:26

## 2024-02-02 RX ADMIN — ASPIRIN 81 MG CHEWABLE TABLET 81 MG: 81 TABLET CHEWABLE at 08:26

## 2024-02-02 RX ADMIN — MILRINONE LACTATE 0.12 MCG/KG/MIN: 200 INJECTION, SOLUTION INTRAVENOUS at 20:00

## 2024-02-02 RX ADMIN — HEPARIN SODIUM 5000 UNITS: 5000 INJECTION INTRAVENOUS; SUBCUTANEOUS at 08:26

## 2024-02-02 RX ADMIN — HEPARIN SODIUM 5000 UNITS: 5000 INJECTION INTRAVENOUS; SUBCUTANEOUS at 00:49

## 2024-02-02 RX ADMIN — HEPARIN SODIUM 5000 UNITS: 5000 INJECTION INTRAVENOUS; SUBCUTANEOUS at 17:10

## 2024-02-02 RX ADMIN — DAPAGLIFLOZIN 10 MG: 10 TABLET, FILM COATED ORAL at 08:26

## 2024-02-02 RX ADMIN — FAMOTIDINE 40 MG: 20 TABLET ORAL at 08:26

## 2024-02-02 ASSESSMENT — COGNITIVE AND FUNCTIONAL STATUS - GENERAL
MOBILITY SCORE: 24
HELP NEEDED FOR BATHING: A LITTLE
DAILY ACTIVITIY SCORE: 19
DAILY ACTIVITIY SCORE: 24
DRESSING REGULAR LOWER BODY CLOTHING: A LITTLE
TOILETING: A LITTLE
PERSONAL GROOMING: A LITTLE
DRESSING REGULAR UPPER BODY CLOTHING: A LITTLE

## 2024-02-02 ASSESSMENT — PAIN SCALES - GENERAL
PAINLEVEL_OUTOF10: 0 - NO PAIN

## 2024-02-02 ASSESSMENT — PAIN - FUNCTIONAL ASSESSMENT
PAIN_FUNCTIONAL_ASSESSMENT: 0-10

## 2024-02-02 ASSESSMENT — ACTIVITIES OF DAILY LIVING (ADL): HOME_MANAGEMENT_TIME_ENTRY: 14

## 2024-02-02 NOTE — PROGRESS NOTES
SOCIAL WORK NOTE   Form re faxed to pharmacy, under review. Medically ready per team. [facility] to advise on insurance eligibility and staffing for hook up. Social work to follow.  UPDATE: Approved, [facility] to do hookup tomorrow (Saturday) but patient will transfer to floor. TCC handoff given   CHRIS Paula, LISW-S ([id number])

## 2024-02-02 NOTE — PROGRESS NOTES
HFICU Attending Note     She continues to tolerate low-dose milrinone appetite improved we remove Rosepine-Alex catheter but left Cordis in place until tunneled line was placed.  She still appears euvolemic we will hold on adding back any loop diuretics    Notably she did have long run of nonsustained ventricular tachycardia had plan to start her on metoprolol but she adamantly refused will attempt to discuss adding low-dose bisoprolol after line placement    Anticipate transfer to the floor if she remains stable after line placement    This critically ill patient continues to be at-risk for clinically significant deterioration / failure due to the above mentioned dysfunctional, unstable organ systems.  I have personally identified and managed all complex critical care issues to prevent aforementioned clinical deterioration.  Critical care time is spent at bedside and/or the immediate area and has included, but is not limited to, the review of diagnostic tests, labs, radiographs, serial assessments of hemodynamics, respiratory status, ventilatory management, and family updates.  Time spent in procedures and teaching are reported separately.     Critical care time: _33___ minutes

## 2024-02-02 NOTE — PROGRESS NOTES
HFICU Attending Note    She continues to tolerate low dose milrinone, lee catheter placed yesterday so we removed introducer and we continue outpatient milrinone process    She continues to have NSVT admanatnly refuses to trial milrinone will discuss a trial of bisoprolol which she may tolerate better. Does not seem to need daily loop diuretic    We again recommended smoking cessation she is precontemplative    She has a significant anemia with noraml b12/folate,iron may be a component of CKD, also has low platelets may be a primary BM process    This critically ill patient continues to be at-risk for clinically significant deterioration / failure due to the above mentioned dysfunctional, unstable organ systems.  I have personally identified and managed all complex critical care issues to prevent aforementioned clinical deterioration.  Critical care time is spent at bedside and/or the immediate area and has included, but is not limited to, the review of diagnostic tests, labs, radiographs, serial assessments of hemodynamics, respiratory status, ventilatory management, and family updates.  Time spent in procedures and teaching are reported separately.    Critical care time: 33____ minutes

## 2024-02-02 NOTE — PROGRESS NOTES
2/2/2024 Care coordination  Pt transferred from ICU.  Plan for discharge home Saturday with [facility] -Milrinone infusion.  Per [facility] in scheduling for Saturday bedside pump hookup.  Pharmacy will deliver this evening.

## 2024-02-02 NOTE — HH CARE COORDINATION
Home Care received a Referral for Nursing, Physical Therapy, and Occupational Therapy. We have processed the referral for a Start of Care on 2/3 HOSPITAL HOOK-UP FOR MILRINONE GTT. FOLLOW UP VISIT ON 2/5 FOR BAG CHANGE.    If you have any questions or concerns, please feel free to contact us at [phone]. Follow the prompts, enter your five digit zip code, and you will be directed to your care team on CENT[facility] 1.

## 2024-02-02 NOTE — PROGRESS NOTES
SPOKE W/ PT - DELIVERY IS SCHEDULED FOR FRIDAY BY 9 PM TO [place] [address] ROOM 34  ASKED PT IF THERE ARE ANY QUESTIONS TO A PHARMACIST. PT SAID: NO QUESTIONS.

## 2024-02-02 NOTE — CARE PLAN
Problem: Diabetes  Goal: Achieve decreasing blood glucose levels by end of shift  Outcome: Progressing  Goal: Increase stability of blood glucose readings by end of shift  Outcome: Progressing  Goal: Maintain electrolyte levels within acceptable range throughout shift  Outcome: Progressing  Goal: Maintain glucose levels >70mg/dl to <250mg/dl throughout shift  Outcome: Progressing  Goal: Learn about and adhere to nutrition recommendations by end of shift  Outcome: Progressing  Goal: Vital signs within normal range for age by end of shift  Outcome: Progressing  Goal: Increase self care and/or family involovement by end of shift  Outcome: Progressing

## 2024-02-02 NOTE — SIGNIFICANT EVENT
Patient admitted to I. In NAD, VSS. Plan to discharge home with palliative milrinone drip tomorrow. Patient with no complaints at this time, will prep for discharge as able tonight. Patient is appropriate for floor.     Sung Valles DNP, APRN-CNP

## 2024-02-02 NOTE — PROGRESS NOTES
Occupational Therapy    Occupational Therapy Treatment    Name: Melissa Marinelli  MRN: [id number]  : 1955  Date: 24  Time Calculation  Start Time: 929  Stop Time: 953  Time Calculation (min): 24 min    Assessment:  End of Session Communication: Bedside nurse  End of Session Patient Position: Up in chair, Alarm off, not on at start of session  Plan:  Treatment Interventions: ADL retraining, Functional transfer training, Endurance training, Patient/family training  OT Frequency: 3 times per week  OT Discharge Recommendations: Low intensity level of continued care  OT - OK to Discharge: Yes    Subjective   Previous Visit Info:  OT Last Visit  OT Received On: 24  General:  General  Family/Caregiver Present: No  Prior to Session Communication: Bedside nurse  Patient Position Received: Bed, 3 rail up, Alarm off, not on at start of session  General Comment: Patient very pleasant and agreeable to OT, reports she is feeling ready to go home. Tele, Wiley catheter, 0.125 Milrinone  Precautions:  Medical Precautions: Cardiac precautions, Fall precautions  Precautions Comment: Patient is no longer on Covid precautions  Vitals:  Vital Signs  Heart Rate:  (pre 81, post 75)  Resp:  (pre 13, post 20)  SpO2:  (pre 98%, post 100%)  BP:  (pre 93/63, post 108/60)  MAP (mmHg):  (pre 74, post 74)  Pain Assessment:  Pain Assessment  Pain Assessment: 0-10  Pain Score:  (patient did not c/o pain)     Objective   Activities of Daily Living: Grooming  Grooming Comments: SBA for hand hygiene in standing    LE Dressing  LE Dressing: Yes  Shoe Level of Assistance: Distant supervision  LE Dressing Where Assessed: Edge of bed  LE Dressing Comments: donning slippers    Toileting  Toileting Level of Assistance: Close supervision  Where Assessed:  (standing at [facility])     Bed Mobility/Transfers: Bed Mobility  Bed Mobility: Yes  Bed Mobility 1  Bed Mobility 1: Supine to sitting  Level of Assistance 1: Distant supervision  Bed Mobility  Comments 1: HOB elevated    Transfers  Transfer: Yes  Transfer 1  Transfer From 1: Bed to  Transfer to 1: Commode-standard  Technique 1: Sit to stand, Stand to sit  Transfer Level of Assistance 1: Close supervision  Transfers 2  Transfer From 2: Commode-standard to  Transfer to 2: Stand  Technique 2: Sit to stand  Transfer Level of Assistance 2: Close supervision  Trials/Comments 2: functional mobility household distances x2 with FWW and SBA    Outcome Measures:  [facility] Daily Activity  Putting on and taking off regular lower body clothing: A little  Bathing (including washing, rinsing, drying): A little  Putting on and taking off regular upper body clothing: A little  Toileting, which includes using toilet, bedpan or urinal: A little  Taking care of personal grooming such as brushing teeth: A little  Eating Meals: None  Daily Activity - Total Score: 19     and E = Exercise and Early Mobility  Current Activity: Ambulating in brown    Education Documentation  ADL Training, taught by Britany Cabrera OT at 2/2/2024 11:56 AM.  Learner: Patient  Readiness: Acceptance  Method: Explanation  Response: Verbalizes Understanding    Education Comments  No comments found.    Goals:  Encounter Problems       Encounter Problems (Active)       ADLs       Patient with complete upper body dressing with independent level of assistance donning and doffing all UE clothes with no adaptive equipment while edge of bed  (Progressing)       Start:  01/26/24    Expected End:  02/04/24            Patient with complete lower body dressing with modified independent level of assistance donning and doffing all LE clothes  with no adaptive equipment while edge of bed  (Progressing)       Start:  01/26/24    Expected End:  02/04/24            Patient will complete daily grooming tasks washing face/hair with modified independent level of assistance and PRN adaptive equipment while standing. (Progressing)       Start:  01/26/24    Expected End:  02/04/24             Patient will complete toileting including hygiene clothing management/hygiene with independent level of assistance and raised toilet seat. (Progressing)       Start:  01/26/24    Expected End:  02/04/24               BALANCE       Patient will tolerate standing for >/ = 5 minutes to modified independent level of assistance with least restrictive device in order to improve functional activity tolerance for ADL tasks. (Progressing)       Start:  01/26/24    Expected End:  02/04/24               MOBILITY       Patient will perform Functional mobility Household distances with modified independent level of assistance and least restrictive device in order to improve safety and functional mobility. (Progressing)       Start:  01/26/24    Expected End:  02/04/24               TRANSFERS       Patient will perform bed mobility independent level of assistance in order to improve safety and independence with mobility (Progressing)       Start:  01/26/24    Expected End:  02/04/24               Britany Cabrera OTR/L  Inpatient Occupational Therapist   Rehab Office: [phone]

## 2024-02-03 ENCOUNTER — HOME CARE VISIT (OUTPATIENT)
Dept: HOME HEALTH SERVICES | Facility: HOME HEALTH | Age: 69
End: 2024-02-03

## 2024-02-03 ENCOUNTER — PHARMACY VISIT (OUTPATIENT)
Dept: PHARMACY | Facility: CLINIC | Age: 69
End: 2024-02-03
Payer: COMMERCIAL

## 2024-02-03 VITALS
RESPIRATION RATE: 18 BRPM | OXYGEN SATURATION: 98 % | SYSTOLIC BLOOD PRESSURE: 109 MMHG | DIASTOLIC BLOOD PRESSURE: 49 MMHG | BODY MASS INDEX: 24.69 KG/M2 | HEART RATE: 69 BPM | TEMPERATURE: 99.1 F | HEIGHT: 64 IN | WEIGHT: 144.62 LBS

## 2024-02-03 PROBLEM — J96.91 HYPOXIC RESPIRATORY FAILURE (MULTI): Status: RESOLVED | Noted: 2024-01-22 | Resolved: 2024-02-03

## 2024-02-03 PROBLEM — R57.0 CARDIOGENIC SHOCK (MULTI): Status: RESOLVED | Noted: 2023-04-10 | Resolved: 2024-02-03

## 2024-02-03 LAB
ALBUMIN SERPL BCP-MCNC: 2.9 G/DL (ref 3.4–5)
ANION GAP SERPL CALC-SCNC: 11 MMOL/L (ref 10–20)
BASOPHILS # BLD AUTO: 0.01 X10*3/UL (ref 0–0.1)
BASOPHILS NFR BLD AUTO: 0.2 %
BUN SERPL-MCNC: 34 MG/DL (ref 6–23)
CALCIUM SERPL-MCNC: 8.4 MG/DL (ref 8.6–10.6)
CHLORIDE SERPL-SCNC: 108 MMOL/L (ref 98–107)
CO2 SERPL-SCNC: 25 MMOL/L (ref 21–32)
CREAT SERPL-MCNC: 1.45 MG/DL (ref 0.5–1.05)
EGFRCR SERPLBLD CKD-EPI 2021: 39 ML/MIN/1.73M*2
EOSINOPHIL # BLD AUTO: 0.33 X10*3/UL (ref 0–0.7)
EOSINOPHIL NFR BLD AUTO: 6.2 %
ERYTHROCYTE [DISTWIDTH] IN BLOOD BY AUTOMATED COUNT: 15.6 % (ref 11.5–14.5)
GLUCOSE BLD MANUAL STRIP-MCNC: 134 MG/DL (ref 74–99)
GLUCOSE SERPL-MCNC: 69 MG/DL (ref 74–99)
HCT VFR BLD AUTO: 31.3 % (ref 36–46)
HGB BLD-MCNC: 10.5 G/DL (ref 12–16)
IMM GRANULOCYTES # BLD AUTO: 0.07 X10*3/UL (ref 0–0.7)
IMM GRANULOCYTES NFR BLD AUTO: 1.3 % (ref 0–0.9)
LYMPHOCYTES # BLD AUTO: 1.51 X10*3/UL (ref 1.2–4.8)
LYMPHOCYTES NFR BLD AUTO: 28.2 %
MAGNESIUM SERPL-MCNC: 2.19 MG/DL (ref 1.6–2.4)
MCH RBC QN AUTO: 29.8 PG (ref 26–34)
MCHC RBC AUTO-ENTMCNC: 33.5 G/DL (ref 32–36)
MCV RBC AUTO: 89 FL (ref 80–100)
MONOCYTES # BLD AUTO: 0.55 X10*3/UL (ref 0.1–1)
MONOCYTES NFR BLD AUTO: 10.3 %
NEUTROPHILS # BLD AUTO: 2.89 X10*3/UL (ref 1.2–7.7)
NEUTROPHILS NFR BLD AUTO: 53.8 %
NRBC BLD-RTO: 0 /100 WBCS (ref 0–0)
PHOSPHATE SERPL-MCNC: 3.3 MG/DL (ref 2.5–4.9)
PLATELET # BLD AUTO: 126 X10*3/UL (ref 150–450)
POTASSIUM SERPL-SCNC: 4.5 MMOL/L (ref 3.5–5.3)
RBC # BLD AUTO: 3.52 X10*6/UL (ref 4–5.2)
SODIUM SERPL-SCNC: 139 MMOL/L (ref 136–145)
WBC # BLD AUTO: 5.4 X10*3/UL (ref 4.4–11.3)

## 2024-02-03 PROCEDURE — 80069 RENAL FUNCTION PANEL: CPT

## 2024-02-03 PROCEDURE — 85025 COMPLETE CBC W/AUTO DIFF WBC: CPT

## 2024-02-03 PROCEDURE — 2500000001 HC RX 250 WO HCPCS SELF ADMINISTERED DRUGS (ALT 637 FOR MEDICARE OP): Performed by: NURSE PRACTITIONER

## 2024-02-03 PROCEDURE — 83735 ASSAY OF MAGNESIUM: CPT

## 2024-02-03 PROCEDURE — 2500000004 HC RX 250 GENERAL PHARMACY W/ HCPCS (ALT 636 FOR OP/ED): Performed by: NURSE PRACTITIONER

## 2024-02-03 PROCEDURE — RXMED WILLOW AMBULATORY MEDICATION CHARGE

## 2024-02-03 PROCEDURE — 82947 ASSAY GLUCOSE BLOOD QUANT: CPT

## 2024-02-03 PROCEDURE — 2500000001 HC RX 250 WO HCPCS SELF ADMINISTERED DRUGS (ALT 637 FOR MEDICARE OP)

## 2024-02-03 RX ADMIN — ASPIRIN 81 MG CHEWABLE TABLET 81 MG: 81 TABLET CHEWABLE at 09:03

## 2024-02-03 RX ADMIN — DAPAGLIFLOZIN 10 MG: 10 TABLET, FILM COATED ORAL at 09:04

## 2024-02-03 RX ADMIN — SPIRONOLACTONE 25 MG: 25 TABLET ORAL at 09:04

## 2024-02-03 RX ADMIN — FAMOTIDINE 40 MG: 20 TABLET ORAL at 09:04

## 2024-02-03 ASSESSMENT — PAIN SCALES - GENERAL: PAINLEVEL_OUTOF10: 0 - NO PAIN

## 2024-02-03 NOTE — DISCHARGE SUMMARY
Discharge Diagnosis  Chronic systolic and diastolic heart failure    Issues Requiring Follow-Up  Inotropic Infusion for chronic systolic and diastolic heart failure    Hospital Course  68 y.o. female w/ PMhx HFrEF (EF 15-20%) s/p ICD placement 5/2020, CAD s/p NSTEMI with RIRI to LCx (Jan 2016), s/p MVR (June 2019), COPD (no formal PFTs), HTN, HLD, GERD, CVA, CKD IIIB and DMII presenting initially from ED to MICU d/t Acute Hypoxic Resp Failure requiring BiPAP which was quickly weaned down to NC .     Etiologies for acute hypoxic respiratory failure at time of presentation include COVID diagnosis, COPD exacerbation, ADHF related to underlying HFrEF. PE was ruled out. Discontinued remdesivir due to limited access and lack of improvement on it.  Continued 5 days of Remdesivir, Azithromycin, prednisone for COVID infection. Patient's respiratory status improved significantly. Recommended that patient follow up with PFTs outpatient, pulmonary rehab and COPD navigator. She was Transferred to HFICU on 1/26 after she was found to have high left and right filling pressures and low CO in cath lab. The initial SG # (1/26): /77(90), CVP 8, PAP 47/32 (40), W 25, CO/CI 2.94/1.74, SVR 2230, SVO2 51% on Nipride at 2mcg/kg/min. (Thermistor on swan broken, unable to obtain thermos). She was treated with Lasix and Placed on BiPAP and started on dobutamine and Nipride.  Dobutamine has since been weaned off and home GDMT has been restarted; titrated as patient tolerated (limited d/t hypotension and allergies; also, no overt needs for maintenance diuretic). Patient has declined LVAD in the past. Patient agreeable to trial Milrinone for symptome management on 1/29. Maps low, 50's, milrinone drip had to drop from 0.25 mcg to 0.125 mcg/kg/min. With Milrinone drip, patient's renal function slightly improved, and she feels a little better. SG D/c'd, the last set of SG # (1/31):  /54 (70),  CVP 1,  PAP 34/14(21), CO/CI 7/3.9,  SVR  786, SVO2 77% on milrinone drip 0.125 mcg/kg/min, Farxiga 10mg daily, Jose Luis 25mg daily.  Repeated CVP : 7. Discussed with the patient, decision was made to have Wiley tunneled line (placed in right internal jugular on 2/1). Recommended smoking cessation, to which patient is precontemplative. She has stable anemia with normal b12/folate,iron; may be a component of CKD, also has low platelets may be a primary BM process per Dr. Fernandez.    Tele reviewed, patient with occasionally run with NSVT, discussed with the patient at bedside, patient refused Metoprolol which she tried in the past and it was  listed as an allergy. May consider of trying bisoprolol on an OP basis.    Home health care referral placed, pharmacy delivered milrinone medication to bedside on 2/2, home care RN hooked patient up to milrinone gtt on home pump. Bag changes at scheduled for patient's home on 2/5 and 2/7. Patient discharged home with home milrinone drip at 0.125 mcg/kg/min. Follow-up with Dr. Rivera in March; does not regularly follow Aura Dwyer.    Discharge weight: 65.6kg    After all labs and VS were reviewed the decision was made that the patient was medically stable for discharge.  The patient was discharged in satisfactory condition.    More than 60 minutes were spent in coordinating patient discharge.     Pertinent Physical Exam At Time of Discharge  Physical Exam  General: NAD, sitting on edge of her bed  Skin: slightly cooler in bilat knees, otherwise warm and dry throughout. Right internal jugular tunneled line with dressing dry and intact  Head/ neck: no JVD seen at 90 degrees  Cardiac: RRR, S1, S2   Pulm: diminished lung sounds in BLL, room air   GI: soft, nontender, non-distended  Extremities: trace LE edema (non-pitting)  Neuro: no focal neuro deficits, a+ox4  Psych: appropriate mood and behavior      Home Medications     Medication List      START taking these medications     famotidine 40 mg tablet; Commonly known as:  Pepcid; Take 1 tablet (40   mg) by mouth once daily. Do not start before February 3, 2024.   milrinone in 5 % dextrose 40 mg/200 mL (200 mcg/mL) infusion; Commonly   known as: Primacor; Infuse 8.3125 mcg/min at 2.49 mL/hr into a venous   catheter continuously. Via R chest LEROY. Dose = 0.125mcg/kg/min most   recent weight = 66.5kg from 2/2/24   spironolactone 25 mg tablet; Commonly known as: Aldactone; Take 1 tablet   (25 mg) by mouth once daily. Do not start before February 3, 2024.     CONTINUE taking these medications     acetaminophen 500 mg tablet; Commonly known as: Tylenol   albuterol 90 mcg/actuation inhaler; Inhale 2 puffs every 4 hours if   needed for wheezing or shortness of breath.   aspirin 81 mg chewable tablet   Calcium Antacid 200 mg calcium chewable tablet; Generic drug: calcium   carbonate   Farxiga 10 mg; Generic drug: dapagliflozin propanediol   nicotine 14 mg/24 hr patch; Commonly known as: Nicoderm CQ; Place 1   patch over 24 hours on the skin once every 24 hours.   OneTouch Delica Plus Lancet 33 gauge misc; Generic drug: lancets   OneTouch Verio Flex meter misc; Generic drug: blood-glucose meter   polyethylene glycol 17 gram packet; Commonly known as: Glycolax, Miralax   sennosides-docusate sodium 8.6-50 mg tablet; Commonly known as:   Stefany-Colace   sodium chloride 0.65 % nasal spray; Commonly known as: Ocean   Symbicort 80-4.5 mcg/actuation inhaler; Generic drug:   budesonide-formoteroL   walker misc; Rollator to assist with ambulation as needed     STOP taking these medications     bumetanide 0.5 mg tablet; Commonly known as: Bumex   clopidogrel 75 mg tablet; Commonly known as: Plavix   dextrose 50 % injection   diphenhydrAMINE 25 mg capsule; Commonly known as: BENADryl   enoxaparin 40 mg/0.4 mL syringe; Commonly known as: Lovenox   glucagon 1 mg injection; Commonly known as: Glucagen   ipratropium-albuteroL 0.5-2.5 mg/3 mL nebulizer solution; Commonly known   as: Duo-Neb   isosorbide  dinitrate 10 mg tablet; Commonly known as: Isordil   multivitamin tablet   nicotine polacrilex 2 mg gum; Commonly known as: Nicorette   NovoLIN R FlexPen 100 unit/mL (3 mL) insulin pen; Generic drug: insulin   regular human   omega-3 acid ethyl esters 1 gram capsule; Commonly known as: Lovaza   ondansetron 4 mg tablet; Commonly known as: Zofran   OneTouch Verio test strips strip; Generic drug: blood sugar diagnostic   sacubitriL-valsartan 24-26 mg tablet; Commonly known as: Entresto       Outpatient Follow-Up  Future Appointments   Date Time Provider Department Center   2/5/2024 To Be Determined Rosina Mcdaniel RN [facility]   2/6/2024  4:00 PM Osito Mcintyre MD [id number] [facility]   3/18/2024 10:00 AM Kathy Rivera MD PhD [id number] [facility]       Gayle Mares, APRN-CNP

## 2024-02-05 ENCOUNTER — HOME INFUSION (OUTPATIENT)
Dept: INFUSION THERAPY | Age: 69
End: 2024-02-05

## 2024-02-05 ENCOUNTER — HOSPITAL ENCOUNTER (OUTPATIENT)
Dept: CARDIOLOGY | Facility: CLINIC | Age: 69
Discharge: HOME | End: 2024-02-05
Payer: COMMERCIAL

## 2024-02-05 ENCOUNTER — HOME CARE VISIT (OUTPATIENT)
Dept: HOME HEALTH SERVICES | Facility: HOME HEALTH | Age: 69
End: 2024-02-05
Payer: COMMERCIAL

## 2024-02-05 VITALS
DIASTOLIC BLOOD PRESSURE: 60 MMHG | SYSTOLIC BLOOD PRESSURE: 104 MMHG | HEART RATE: 81 BPM | TEMPERATURE: 98.9 F | HEIGHT: 64 IN | BODY MASS INDEX: 24.84 KG/M2 | WEIGHT: 145.5 LBS

## 2024-02-05 DIAGNOSIS — Z95.810 PRESENCE OF AUTOMATIC (IMPLANTABLE) CARDIAC DEFIBRILLATOR: ICD-10-CM

## 2024-02-05 DIAGNOSIS — I42.0 DILATED CARDIOMYOPATHY (MULTI): ICD-10-CM

## 2024-02-05 DIAGNOSIS — I50.9 HEART FAILURE (MULTI): ICD-10-CM

## 2024-02-05 PROCEDURE — 169592 NO-PAY CLAIM PROCEDURE

## 2024-02-05 PROCEDURE — 0023 HH SOC

## 2024-02-05 PROCEDURE — G0299 HHS/HOSPICE OF RN EA 15 MIN: HCPCS | Mod: HHH

## 2024-02-05 PROCEDURE — 1090000001 HH PPS REVENUE CREDIT

## 2024-02-05 PROCEDURE — 1090000002 HH PPS REVENUE DEBIT

## 2024-02-05 ASSESSMENT — ENCOUNTER SYMPTOMS
LOWEST PAIN SEVERITY IN PAST 24 HOURS: 1/10
HIGHEST PAIN SEVERITY IN PAST 24 HOURS: 4/10
PAIN: 1
APPETITE LEVEL: POOR
HYPOTENSION: 1
PERSON REPORTING PAIN: PATIENT

## 2024-02-05 ASSESSMENT — PAIN SCALES - PAIN ASSESSMENT IN ADVANCED DEMENTIA (PAINAD)
BREATHING: 0
FACIALEXPRESSION: 0 - SMILING OR INEXPRESSIVE.
NEGVOCALIZATION: 0
NEGVOCALIZATION: 0 - NONE.
FACIALEXPRESSION: 0
BODYLANGUAGE: 0
BODYLANGUAGE: 0 - RELAXED.

## 2024-02-05 ASSESSMENT — ACTIVITIES OF DAILY LIVING (ADL)
ENTERING_EXITING_HOME: INDEPENDENT
OASIS_M1830: 03

## 2024-02-05 NOTE — PROGRESS NOTES
PATIENT CONTINUES CI MILRONONE FOR CHF.  SPOKE WITH HER TODAY.  SHE IS DOING WELL.  WEIGHT IS STABLE AT 65.6KG.  SHE AGREED TO DELIVERY ON TUESDAY BETWEEN 5-7 WITH STANDARD SUPPLIES.  TO CALL WHEN ON WAY.  SHE CONFIRMED A DOSE FOR 2/7 IS IN THE HOME.  PROCESSED FILL FOR 4 48HR MILRONONR BAGS FOR MIX AND DEL. TUESDAY.  THIS FOR H/U ON 2/9, 2/11, 2/13 AND 2/15.  NF FOR 2/14 FOR STRAIGHT DEL. DSL

## 2024-02-06 ENCOUNTER — DOCUMENTATION (OUTPATIENT)
Dept: PHARMACY | Facility: CLINIC | Age: 69
End: 2024-02-06

## 2024-02-06 PROCEDURE — 1090000002 HH PPS REVENUE DEBIT

## 2024-02-06 PROCEDURE — 1090000001 HH PPS REVENUE CREDIT

## 2024-02-06 NOTE — PROGRESS NOTES
SPOKE W/ PT - DELIVERY IS SCHEDULED FOR TUESDAY BY 8 PM.  ASKED PT IF THERE ARE ANY QUESTIONS TO A PHARMACIST. PT SAID: NO QUESTIONS.

## 2024-02-07 ENCOUNTER — HOME CARE VISIT (OUTPATIENT)
Dept: HOME HEALTH SERVICES | Facility: HOME HEALTH | Age: 69
End: 2024-02-07
Payer: COMMERCIAL

## 2024-02-07 VITALS
HEART RATE: 85 BPM | DIASTOLIC BLOOD PRESSURE: 50 MMHG | RESPIRATION RATE: 18 BRPM | SYSTOLIC BLOOD PRESSURE: 92 MMHG | TEMPERATURE: 98.9 F

## 2024-02-07 PROCEDURE — 1090000002 HH PPS REVENUE DEBIT

## 2024-02-07 PROCEDURE — 1090000001 HH PPS REVENUE CREDIT

## 2024-02-07 PROCEDURE — G0151 HHCP-SERV OF PT,EA 15 MIN: HCPCS | Mod: HHH

## 2024-02-07 ASSESSMENT — ENCOUNTER SYMPTOMS
PAIN: 1
PAIN LOCATION - PAIN SEVERITY: 8/10
PAIN SEVERITY GOAL: 3/10
LOWEST PAIN SEVERITY IN PAST 24 HOURS: 5/10
PAIN LOCATION: RIGHT LEG
PERSON REPORTING PAIN: PATIENT
HIGHEST PAIN SEVERITY IN PAST 24 HOURS: 10/10

## 2024-02-07 ASSESSMENT — BALANCE ASSESSMENTS
EYES CLOSED AT MAXIMUM POSITION NUDGED: 0 - UNSTEADY
ARISES: 1 - ABLE, USES ARMS TO HELP
IMMEDIATE STANDING BALANCE FIRST 5 SECONDS: 1 - STEADY BUT USES WALKER OR OTHER SUPPORT
NUDGED: 2 - STEADY
NUDGED SCORE: 2
SITTING DOWN: 1 - USES ARMS OR NOT SMOOTH MOTION
BALANCE SCORE: 8
SITTING BALANCE: 1 - STEADY, SAFE
STANDING BALANCE: 1 - STEADY BUT WIDE STANCE AND USES CANE OR OTHER SUPPORT
ARISING SCORE: 1
ATTEMPTS TO ARISE: 1 - ABLE, REQUIRES MORE THAN ONE ATTEMPT
TURNING 360 DEGREES STEPS: 0 - DISCONTINUOUS STEPS

## 2024-02-07 ASSESSMENT — GAIT ASSESSMENTS
INITIATION OF GAIT IMMEDIATELY AFTER GO: 0 - ANY HESITANCY OR MULTIPLE ATTEMPTS TO START
STEP CONTINUITY: 0 - STOPPING OR DISCONTINUITY BETWEEN STEPS
GAIT SCORE: 4
STEP SYMMETRY: 0 - RIGHT AND LEFT STEP LENGTH NOT EQUAL
PATH SCORE: 1
BALANCE AND GAIT SCORE: 12
TRUNK: 1 - NO SWAY BUT FLEXION OF KNEES OR BACK OR SPREADS ARMS WHILE WALKING
TRUNK SCORE: 1
PATH: 1 - MILD/MODERATE DEVIATION OR USES WALKING AID
WALKING STANCE: 0 - HEELS APART

## 2024-02-07 NOTE — HOME HEALTH
S: This pt was referred to home health PT following hospitalization for COVID and CHF. She is wcurrently wearing a heart monitor. She is demonstrating increased difficulty with mobility as a result.    B: Pt lives with her daughter and grandson in a multistory house with 6 stairs to exit the building. Her bedroom is on the second floor with 14 stairs to navigate. Pt is using a rollator walker with SBA for all ambulation but was independent with ambulation with use of a cane prior to current episode of care. Pt reports no recent falls and rates current pain level at 8 /10.    A: Pt presents with B LE weakness affecting transfers, gait / stairs and balance, as well as gait and balance impairment as illustrated by Tinetti score of 12/28. Pt ambulates without assistive device within the house with SBA demonstrating shortened strides, inconsistent rhythm and instability. She rates significant fatigue after walking 20 ft and navigating 2 stairs. Pictures with written instructions of seated B LE AROM exercises issued, practiced and reviewed for HEP with good return demo and good understanding.    R: Pt will benefit from skilled PT for LE strengthening to facilitate transfers, gait / stairs and balance, as well as transfer, gait and balance training to improve functional mobility and independence.

## 2024-02-07 NOTE — Clinical Note
Kirt, I assessed Ms Marinelli for home health PT today. She is weak in her legs and has some balance deficits. We will see her Twice weekly for 4 weeks.

## 2024-02-08 PROCEDURE — 1090000002 HH PPS REVENUE DEBIT

## 2024-02-08 PROCEDURE — 1090000001 HH PPS REVENUE CREDIT

## 2024-02-09 ENCOUNTER — HOME CARE VISIT (OUTPATIENT)
Dept: HOME HEALTH SERVICES | Facility: HOME HEALTH | Age: 69
End: 2024-02-09
Payer: COMMERCIAL

## 2024-02-09 PROCEDURE — G0152 HHCP-SERV OF OT,EA 15 MIN: HCPCS | Mod: HHH

## 2024-02-09 PROCEDURE — 1090000001 HH PPS REVENUE CREDIT

## 2024-02-09 PROCEDURE — 1090000002 HH PPS REVENUE DEBIT

## 2024-02-09 ASSESSMENT — ACTIVITIES OF DAILY LIVING (ADL)
DRESSING_UB_CURRENT_FUNCTION: INDEPENDENT
GROOMING ASSESSED: 1
DRESSING_LB_CURRENT_FUNCTION: INDEPENDENT
GROOMING_CURRENT_FUNCTION: INDEPENDENT
BATHING ASSESSED: 1
BATHING_CURRENT_FUNCTION: MODERATE ASSIST

## 2024-02-09 ASSESSMENT — ENCOUNTER SYMPTOMS: DENIES PAIN: 1

## 2024-02-10 ENCOUNTER — HOME CARE VISIT (OUTPATIENT)
Dept: HOME HEALTH SERVICES | Facility: HOME HEALTH | Age: 69
End: 2024-02-10
Payer: COMMERCIAL

## 2024-02-10 VITALS
HEART RATE: 85 BPM | OXYGEN SATURATION: 99 % | DIASTOLIC BLOOD PRESSURE: 64 MMHG | SYSTOLIC BLOOD PRESSURE: 111 MMHG | TEMPERATURE: 99.4 F

## 2024-02-10 PROCEDURE — 1090000001 HH PPS REVENUE CREDIT

## 2024-02-10 PROCEDURE — 1090000002 HH PPS REVENUE DEBIT

## 2024-02-10 PROCEDURE — G0299 HHS/HOSPICE OF RN EA 15 MIN: HCPCS | Mod: HHH

## 2024-02-10 ASSESSMENT — PAIN SCALES - PAIN ASSESSMENT IN ADVANCED DEMENTIA (PAINAD)
FACIALEXPRESSION: 0 - SMILING OR INEXPRESSIVE.
BREATHING: 0
BODYLANGUAGE: 0 - RELAXED.
FACIALEXPRESSION: 0
BODYLANGUAGE: 0

## 2024-02-10 ASSESSMENT — ENCOUNTER SYMPTOMS
LOWEST PAIN SEVERITY IN PAST 24 HOURS: 0/10
PERSON REPORTING PAIN: PATIENT
LAST BOWEL MOVEMENT: 66880
PAIN: 1
HIGHEST PAIN SEVERITY IN PAST 24 HOURS: 10/10
APPETITE LEVEL: FAIR

## 2024-02-11 PROCEDURE — 1090000002 HH PPS REVENUE DEBIT

## 2024-02-11 PROCEDURE — 1090000001 HH PPS REVENUE CREDIT

## 2024-02-12 ENCOUNTER — HOSPITAL ENCOUNTER (OUTPATIENT)
Dept: CARDIOLOGY | Facility: CLINIC | Age: 69
Discharge: HOME | End: 2024-02-12
Payer: COMMERCIAL

## 2024-02-12 DIAGNOSIS — Z95.810 PRESENCE OF AUTOMATIC (IMPLANTABLE) CARDIAC DEFIBRILLATOR: ICD-10-CM

## 2024-02-12 DIAGNOSIS — I42.0 DILATED CARDIOMYOPATHY (MULTI): ICD-10-CM

## 2024-02-12 DIAGNOSIS — I50.9 HEART FAILURE (MULTI): ICD-10-CM

## 2024-02-12 PROCEDURE — 1090000002 HH PPS REVENUE DEBIT

## 2024-02-12 PROCEDURE — 1090000001 HH PPS REVENUE CREDIT

## 2024-02-13 PROCEDURE — 1090000002 HH PPS REVENUE DEBIT

## 2024-02-13 PROCEDURE — 1090000001 HH PPS REVENUE CREDIT

## 2024-02-14 ENCOUNTER — HOME INFUSION (OUTPATIENT)
Dept: INFUSION THERAPY | Age: 69
End: 2024-02-14

## 2024-02-14 ENCOUNTER — HOME CARE VISIT (OUTPATIENT)
Dept: HOME HEALTH SERVICES | Facility: HOME HEALTH | Age: 69
End: 2024-02-14
Payer: COMMERCIAL

## 2024-02-14 ENCOUNTER — DOCUMENTATION (OUTPATIENT)
Dept: PHARMACY | Facility: CLINIC | Age: 69
End: 2024-02-14

## 2024-02-14 VITALS
SYSTOLIC BLOOD PRESSURE: 118 MMHG | TEMPERATURE: 97.5 F | DIASTOLIC BLOOD PRESSURE: 54 MMHG | OXYGEN SATURATION: 99 % | HEART RATE: 87 BPM

## 2024-02-14 PROCEDURE — G0180 MD CERTIFICATION HHA PATIENT: HCPCS | Performed by: STUDENT IN AN ORGANIZED HEALTH CARE EDUCATION/TRAINING PROGRAM

## 2024-02-14 PROCEDURE — 1090000001 HH PPS REVENUE CREDIT

## 2024-02-14 PROCEDURE — 1090000002 HH PPS REVENUE DEBIT

## 2024-02-14 PROCEDURE — G0157 HHC PT ASSISTANT EA 15: HCPCS | Mod: HHH

## 2024-02-14 ASSESSMENT — ENCOUNTER SYMPTOMS
PAIN LOCATION - PAIN SEVERITY: 10/10
PAIN LOCATION - PAIN SEVERITY: 10/10
HIGHEST PAIN SEVERITY IN PAST 24 HOURS: 10/10
PAIN: 1
PAIN LOCATION - PAIN QUALITY: ACHING
PAIN LOCATION: LEFT LEG
SUBJECTIVE PAIN PROGRESSION: WAXING AND WANING
LOWEST PAIN SEVERITY IN PAST 24 HOURS: 0/10
PAIN LOCATION - PAIN QUALITY: ACHING
PAIN LOCATION: RIGHT LEG

## 2024-02-14 NOTE — PROGRESS NOTES
SPOKE W/ PT - DELIVERY IS SCHEDULED FOR WEDNESDAY BY 6-9 PM.  ASKED PT IF THERE ARE ANY QUESTIONS TO A PHARMACIST. PT SAID: NO QUESTIONS.

## 2024-02-14 NOTE — PROGRESS NOTES
Patient continues CI milrinone for CHF. Spoke with patient today. She is doing well. Weight is stable at 64.5kg as of 2/13/24.    Patient reports running bags over 56 hours because that is what the pump is programmed for. Explained to pt that each bag should be run over 48 hours, and they include some amount of overfill so that the bags are not run empty. Patient expressed understanding and will change bags every 48 hours from now on. Patient was unsure about inventory remaining in home; should have one bag remaining - if not, patient will call us so we   can get delivery sent out more urgently. Agreeable to delivery tonight 2/14 between 6-9pm.     Dispensing 2/14 with supplies and flushes to match for straight delivery:  3x milrinone 48hr CADD bags  Hookup 2/18, 2/20, 2/22    NF for 2/21 for straight delivery

## 2024-02-15 ENCOUNTER — HOME CARE VISIT (OUTPATIENT)
Dept: HOME HEALTH SERVICES | Facility: HOME HEALTH | Age: 69
End: 2024-02-15
Payer: COMMERCIAL

## 2024-02-15 PROCEDURE — 1090000002 HH PPS REVENUE DEBIT

## 2024-02-15 PROCEDURE — 1090000001 HH PPS REVENUE CREDIT

## 2024-02-16 ENCOUNTER — HOME CARE VISIT (OUTPATIENT)
Dept: HOME HEALTH SERVICES | Facility: HOME HEALTH | Age: 69
End: 2024-02-16
Payer: COMMERCIAL

## 2024-02-16 VITALS
DIASTOLIC BLOOD PRESSURE: 68 MMHG | TEMPERATURE: 98.4 F | SYSTOLIC BLOOD PRESSURE: 119 MMHG | HEART RATE: 84 BPM | OXYGEN SATURATION: 99 %

## 2024-02-16 PROCEDURE — G0299 HHS/HOSPICE OF RN EA 15 MIN: HCPCS | Mod: HHH

## 2024-02-16 PROCEDURE — 1090000001 HH PPS REVENUE CREDIT

## 2024-02-16 PROCEDURE — 1090000002 HH PPS REVENUE DEBIT

## 2024-02-16 ASSESSMENT — ENCOUNTER SYMPTOMS
HIGHEST PAIN SEVERITY IN PAST 24 HOURS: 9/10
LOWEST PAIN SEVERITY IN PAST 24 HOURS: 0/10
APPETITE LEVEL: GOOD
PERSON REPORTING PAIN: PATIENT
PAIN: 1

## 2024-02-17 ENCOUNTER — HOSPITAL ENCOUNTER (OUTPATIENT)
Facility: HOSPITAL | Age: 69
Setting detail: OBSERVATION
LOS: 1 days | Discharge: HOME | End: 2024-02-18
Attending: EMERGENCY MEDICINE | Admitting: NURSE PRACTITIONER
Payer: COMMERCIAL

## 2024-02-17 ENCOUNTER — CLINICAL SUPPORT (OUTPATIENT)
Dept: EMERGENCY MEDICINE | Facility: HOSPITAL | Age: 69
End: 2024-02-17
Payer: COMMERCIAL

## 2024-02-17 ENCOUNTER — APPOINTMENT (OUTPATIENT)
Dept: RADIOLOGY | Facility: HOSPITAL | Age: 69
End: 2024-02-17
Payer: COMMERCIAL

## 2024-02-17 DIAGNOSIS — K29.00 ACUTE GASTRITIS WITHOUT HEMORRHAGE, UNSPECIFIED GASTRITIS TYPE: ICD-10-CM

## 2024-02-17 DIAGNOSIS — I50.20 HFREF (HEART FAILURE WITH REDUCED EJECTION FRACTION) (MULTI): ICD-10-CM

## 2024-02-17 DIAGNOSIS — I50.33 ACUTE ON CHRONIC HEART FAILURE WITH NORMAL EJECTION FRACTION (MULTI): ICD-10-CM

## 2024-02-17 DIAGNOSIS — J81.0 ACUTE PULMONARY EDEMA (MULTI): Primary | ICD-10-CM

## 2024-02-17 PROBLEM — N18.9 CKD (CHRONIC KIDNEY DISEASE): Status: ACTIVE | Noted: 2020-05-14

## 2024-02-17 PROBLEM — I10 HYPERTENSION: Status: ACTIVE | Noted: 2024-02-17

## 2024-02-17 LAB
ALBUMIN SERPL BCP-MCNC: 3.5 G/DL (ref 3.4–5)
ALBUMIN SERPL BCP-MCNC: 3.7 G/DL (ref 3.4–5)
ALP SERPL-CCNC: 126 U/L (ref 33–136)
ALT SERPL W P-5'-P-CCNC: 26 U/L (ref 7–45)
ANION GAP BLDV CALCULATED.4IONS-SCNC: 11 MMOL/L (ref 10–25)
ANION GAP SERPL CALC-SCNC: 12 MMOL/L (ref 10–20)
ANION GAP SERPL CALC-SCNC: 17 MMOL/L (ref 10–20)
APTT PPP: 32 SECONDS (ref 27–38)
AST SERPL W P-5'-P-CCNC: 23 U/L (ref 9–39)
BASE EXCESS BLDV CALC-SCNC: -3 MMOL/L (ref -2–3)
BASOPHILS # BLD AUTO: 0.02 X10*3/UL (ref 0–0.1)
BASOPHILS NFR BLD AUTO: 0.4 %
BILIRUB SERPL-MCNC: 0.4 MG/DL (ref 0–1.2)
BNP SERPL-MCNC: 1139 PG/ML (ref 0–99)
BODY TEMPERATURE: 37 DEGREES CELSIUS
BUN SERPL-MCNC: 11 MG/DL (ref 6–23)
BUN SERPL-MCNC: 13 MG/DL (ref 6–23)
CA-I BLDV-SCNC: 1.16 MMOL/L (ref 1.1–1.33)
CALCIUM SERPL-MCNC: 8.8 MG/DL (ref 8.6–10.6)
CALCIUM SERPL-MCNC: 9.1 MG/DL (ref 8.6–10.6)
CARDIAC TROPONIN I PNL SERPL HS: 157 NG/L (ref 0–34)
CARDIAC TROPONIN I PNL SERPL HS: 170 NG/L (ref 0–34)
CHLORIDE BLDV-SCNC: 112 MMOL/L (ref 98–107)
CHLORIDE SERPL-SCNC: 107 MMOL/L (ref 98–107)
CHLORIDE SERPL-SCNC: 108 MMOL/L (ref 98–107)
CO2 SERPL-SCNC: 21 MMOL/L (ref 21–32)
CO2 SERPL-SCNC: 24 MMOL/L (ref 21–32)
CREAT SERPL-MCNC: 1.26 MG/DL (ref 0.5–1.05)
CREAT SERPL-MCNC: 1.28 MG/DL (ref 0.5–1.05)
EGFRCR SERPLBLD CKD-EPI 2021: 45 ML/MIN/1.73M*2
EGFRCR SERPLBLD CKD-EPI 2021: 46 ML/MIN/1.73M*2
EOSINOPHIL # BLD AUTO: 0.15 X10*3/UL (ref 0–0.7)
EOSINOPHIL NFR BLD AUTO: 3.1 %
ERYTHROCYTE [DISTWIDTH] IN BLOOD BY AUTOMATED COUNT: 15.7 % (ref 11.5–14.5)
ERYTHROCYTE [DISTWIDTH] IN BLOOD BY AUTOMATED COUNT: 15.9 % (ref 11.5–14.5)
FLUAV RNA RESP QL NAA+PROBE: NOT DETECTED
FLUBV RNA RESP QL NAA+PROBE: NOT DETECTED
GLUCOSE BLD MANUAL STRIP-MCNC: 112 MG/DL (ref 74–99)
GLUCOSE BLD MANUAL STRIP-MCNC: 118 MG/DL (ref 74–99)
GLUCOSE BLD MANUAL STRIP-MCNC: 154 MG/DL (ref 74–99)
GLUCOSE BLD MANUAL STRIP-MCNC: 94 MG/DL (ref 74–99)
GLUCOSE BLDV-MCNC: 108 MG/DL (ref 74–99)
GLUCOSE SERPL-MCNC: 108 MG/DL (ref 74–99)
GLUCOSE SERPL-MCNC: 74 MG/DL (ref 74–99)
HCO3 BLDV-SCNC: 22 MMOL/L (ref 22–26)
HCT VFR BLD AUTO: 30 % (ref 36–46)
HCT VFR BLD AUTO: 34.4 % (ref 36–46)
HCT VFR BLD EST: 30 % (ref 36–46)
HGB BLD-MCNC: 11.3 G/DL (ref 12–16)
HGB BLD-MCNC: 9.2 G/DL (ref 12–16)
HGB BLDV-MCNC: 10 G/DL (ref 12–16)
IMM GRANULOCYTES # BLD AUTO: 0.01 X10*3/UL (ref 0–0.7)
IMM GRANULOCYTES NFR BLD AUTO: 0.2 % (ref 0–0.9)
INHALED O2 CONCENTRATION: 36 %
INR PPP: 1.1 (ref 0.9–1.1)
IRON SATN MFR SERPL: 13 % (ref 25–45)
IRON SERPL-MCNC: 35 UG/DL (ref 35–150)
LACTATE BLDV-SCNC: 0.7 MMOL/L (ref 0.4–2)
LYMPHOCYTES # BLD AUTO: 0.83 X10*3/UL (ref 1.2–4.8)
LYMPHOCYTES NFR BLD AUTO: 17.1 %
MAGNESIUM SERPL-MCNC: 2.27 MG/DL (ref 1.6–2.4)
MCH RBC QN AUTO: 28.5 PG (ref 26–34)
MCH RBC QN AUTO: 28.9 PG (ref 26–34)
MCHC RBC AUTO-ENTMCNC: 30.7 G/DL (ref 32–36)
MCHC RBC AUTO-ENTMCNC: 32.8 G/DL (ref 32–36)
MCV RBC AUTO: 88 FL (ref 80–100)
MCV RBC AUTO: 93 FL (ref 80–100)
MONOCYTES # BLD AUTO: 0.24 X10*3/UL (ref 0.1–1)
MONOCYTES NFR BLD AUTO: 4.9 %
NEUTROPHILS # BLD AUTO: 3.61 X10*3/UL (ref 1.2–7.7)
NEUTROPHILS NFR BLD AUTO: 74.3 %
NRBC BLD-RTO: 0 /100 WBCS (ref 0–0)
NRBC BLD-RTO: 0 /100 WBCS (ref 0–0)
OXYHGB MFR BLDV: 82.3 % (ref 45–75)
PCO2 BLDV: 38 MM HG (ref 41–51)
PH BLDV: 7.37 PH (ref 7.33–7.43)
PHOSPHATE SERPL-MCNC: 3 MG/DL (ref 2.5–4.9)
PLATELET # BLD AUTO: 324 X10*3/UL (ref 150–450)
PLATELET # BLD AUTO: 325 X10*3/UL (ref 150–450)
PO2 BLDV: 55 MM HG (ref 35–45)
POTASSIUM BLDV-SCNC: 4.6 MMOL/L (ref 3.5–5.3)
POTASSIUM SERPL-SCNC: 4.4 MMOL/L (ref 3.5–5.3)
POTASSIUM SERPL-SCNC: 4.7 MMOL/L (ref 3.5–5.3)
PROT SERPL-MCNC: 7 G/DL (ref 6.4–8.2)
PROTHROMBIN TIME: 12.2 SECONDS (ref 9.8–12.8)
RBC # BLD AUTO: 3.23 X10*6/UL (ref 4–5.2)
RBC # BLD AUTO: 3.91 X10*6/UL (ref 4–5.2)
SAO2 % BLDV: 86 % (ref 45–75)
SARS-COV-2 RNA RESP QL NAA+PROBE: NOT DETECTED
SODIUM BLDV-SCNC: 140 MMOL/L (ref 136–145)
SODIUM SERPL-SCNC: 139 MMOL/L (ref 136–145)
SODIUM SERPL-SCNC: 141 MMOL/L (ref 136–145)
TIBC SERPL-MCNC: 261 UG/DL (ref 240–445)
UIBC SERPL-MCNC: 226 UG/DL (ref 110–370)
WBC # BLD AUTO: 4.1 X10*3/UL (ref 4.4–11.3)
WBC # BLD AUTO: 4.9 X10*3/UL (ref 4.4–11.3)

## 2024-02-17 PROCEDURE — 83735 ASSAY OF MAGNESIUM: CPT | Performed by: NURSE PRACTITIONER

## 2024-02-17 PROCEDURE — 2500000002 HC RX 250 W HCPCS SELF ADMINISTERED DRUGS (ALT 637 FOR MEDICARE OP, ALT 636 FOR OP/ED): Performed by: STUDENT IN AN ORGANIZED HEALTH CARE EDUCATION/TRAINING PROGRAM

## 2024-02-17 PROCEDURE — 1100000001 HC PRIVATE ROOM DAILY

## 2024-02-17 PROCEDURE — 83880 ASSAY OF NATRIURETIC PEPTIDE: CPT | Performed by: STUDENT IN AN ORGANIZED HEALTH CARE EDUCATION/TRAINING PROGRAM

## 2024-02-17 PROCEDURE — 1090000001 HH PPS REVENUE CREDIT

## 2024-02-17 PROCEDURE — 94640 AIRWAY INHALATION TREATMENT: CPT

## 2024-02-17 PROCEDURE — 2500000004 HC RX 250 GENERAL PHARMACY W/ HCPCS (ALT 636 FOR OP/ED): Performed by: EMERGENCY MEDICINE

## 2024-02-17 PROCEDURE — 84132 ASSAY OF SERUM POTASSIUM: CPT | Performed by: STUDENT IN AN ORGANIZED HEALTH CARE EDUCATION/TRAINING PROGRAM

## 2024-02-17 PROCEDURE — 85730 THROMBOPLASTIN TIME PARTIAL: CPT | Performed by: STUDENT IN AN ORGANIZED HEALTH CARE EDUCATION/TRAINING PROGRAM

## 2024-02-17 PROCEDURE — 84484 ASSAY OF TROPONIN QUANT: CPT | Performed by: STUDENT IN AN ORGANIZED HEALTH CARE EDUCATION/TRAINING PROGRAM

## 2024-02-17 PROCEDURE — 84132 ASSAY OF SERUM POTASSIUM: CPT | Performed by: NURSE PRACTITIONER

## 2024-02-17 PROCEDURE — 83735 ASSAY OF MAGNESIUM: CPT | Performed by: STUDENT IN AN ORGANIZED HEALTH CARE EDUCATION/TRAINING PROGRAM

## 2024-02-17 PROCEDURE — 85025 COMPLETE CBC W/AUTO DIFF WBC: CPT | Performed by: STUDENT IN AN ORGANIZED HEALTH CARE EDUCATION/TRAINING PROGRAM

## 2024-02-17 PROCEDURE — 1090000002 HH PPS REVENUE DEBIT

## 2024-02-17 PROCEDURE — 96374 THER/PROPH/DIAG INJ IV PUSH: CPT | Performed by: EMERGENCY MEDICINE

## 2024-02-17 PROCEDURE — 99285 EMERGENCY DEPT VISIT HI MDM: CPT | Performed by: EMERGENCY MEDICINE

## 2024-02-17 PROCEDURE — 2500000004 HC RX 250 GENERAL PHARMACY W/ HCPCS (ALT 636 FOR OP/ED): Performed by: STUDENT IN AN ORGANIZED HEALTH CARE EDUCATION/TRAINING PROGRAM

## 2024-02-17 PROCEDURE — 93005 ELECTROCARDIOGRAM TRACING: CPT

## 2024-02-17 PROCEDURE — 71046 X-RAY EXAM CHEST 2 VIEWS: CPT

## 2024-02-17 PROCEDURE — 83540 ASSAY OF IRON: CPT | Performed by: NURSE PRACTITIONER

## 2024-02-17 PROCEDURE — 87636 SARSCOV2 & INF A&B AMP PRB: CPT | Performed by: STUDENT IN AN ORGANIZED HEALTH CARE EDUCATION/TRAINING PROGRAM

## 2024-02-17 PROCEDURE — 99285 EMERGENCY DEPT VISIT HI MDM: CPT | Mod: 25 | Performed by: EMERGENCY MEDICINE

## 2024-02-17 PROCEDURE — 2500000001 HC RX 250 WO HCPCS SELF ADMINISTERED DRUGS (ALT 637 FOR MEDICARE OP): Performed by: STUDENT IN AN ORGANIZED HEALTH CARE EDUCATION/TRAINING PROGRAM

## 2024-02-17 PROCEDURE — 85027 COMPLETE CBC AUTOMATED: CPT | Performed by: NURSE PRACTITIONER

## 2024-02-17 PROCEDURE — 71046 X-RAY EXAM CHEST 2 VIEWS: CPT | Performed by: RADIOLOGY

## 2024-02-17 PROCEDURE — 82947 ASSAY GLUCOSE BLOOD QUANT: CPT

## 2024-02-17 RX ORDER — FAMOTIDINE 20 MG/1
40 TABLET, FILM COATED ORAL DAILY
Status: DISCONTINUED | OUTPATIENT
Start: 2024-02-17 | End: 2024-02-18 | Stop reason: HOSPADM

## 2024-02-17 RX ORDER — POLYETHYLENE GLYCOL 3350 17 G/17G
17 POWDER, FOR SOLUTION ORAL DAILY PRN
Status: DISCONTINUED | OUTPATIENT
Start: 2024-02-17 | End: 2024-02-18 | Stop reason: HOSPADM

## 2024-02-17 RX ORDER — IPRATROPIUM BROMIDE AND ALBUTEROL SULFATE 2.5; .5 MG/3ML; MG/3ML
3 SOLUTION RESPIRATORY (INHALATION)
COMMUNITY

## 2024-02-17 RX ORDER — IPRATROPIUM BROMIDE AND ALBUTEROL SULFATE 2.5; .5 MG/3ML; MG/3ML
3 SOLUTION RESPIRATORY (INHALATION) ONCE
Status: COMPLETED | OUTPATIENT
Start: 2024-02-17 | End: 2024-02-17

## 2024-02-17 RX ORDER — NAPROXEN SODIUM 220 MG/1
81 TABLET, FILM COATED ORAL DAILY
Status: DISCONTINUED | OUTPATIENT
Start: 2024-02-17 | End: 2024-02-18 | Stop reason: HOSPADM

## 2024-02-17 RX ORDER — DAPAGLIFLOZIN 10 MG/1
10 TABLET, FILM COATED ORAL DAILY
Status: DISCONTINUED | OUTPATIENT
Start: 2024-02-17 | End: 2024-02-18 | Stop reason: HOSPADM

## 2024-02-17 RX ORDER — ALBUTEROL SULFATE 90 UG/1
2 AEROSOL, METERED RESPIRATORY (INHALATION) EVERY 4 HOURS PRN
Status: DISCONTINUED | OUTPATIENT
Start: 2024-02-17 | End: 2024-02-18 | Stop reason: HOSPADM

## 2024-02-17 RX ORDER — IBUPROFEN 200 MG
1 TABLET ORAL EVERY 24 HOURS
Status: DISCONTINUED | OUTPATIENT
Start: 2024-02-17 | End: 2024-02-18 | Stop reason: HOSPADM

## 2024-02-17 RX ORDER — FLUTICASONE FUROATE AND VILANTEROL 100; 25 UG/1; UG/1
1 POWDER RESPIRATORY (INHALATION)
Status: DISCONTINUED | OUTPATIENT
Start: 2024-02-17 | End: 2024-02-18 | Stop reason: HOSPADM

## 2024-02-17 RX ORDER — CALCIUM CARBONATE 200(500)MG
500 TABLET,CHEWABLE ORAL EVERY 12 HOURS PRN
Status: DISCONTINUED | OUTPATIENT
Start: 2024-02-17 | End: 2024-02-18 | Stop reason: HOSPADM

## 2024-02-17 RX ORDER — HEPARIN SODIUM 5000 [USP'U]/ML
5000 INJECTION, SOLUTION INTRAVENOUS; SUBCUTANEOUS EVERY 8 HOURS
Status: DISCONTINUED | OUTPATIENT
Start: 2024-02-17 | End: 2024-02-18 | Stop reason: HOSPADM

## 2024-02-17 RX ORDER — DEXTROSE 50 % IN WATER (D50W) INTRAVENOUS SYRINGE
25
Status: DISCONTINUED | OUTPATIENT
Start: 2024-02-17 | End: 2024-02-18 | Stop reason: HOSPADM

## 2024-02-17 RX ORDER — ACETAMINOPHEN 325 MG/1
975 TABLET ORAL EVERY 8 HOURS PRN
Status: DISCONTINUED | OUTPATIENT
Start: 2024-02-17 | End: 2024-02-18 | Stop reason: HOSPADM

## 2024-02-17 RX ORDER — AMOXICILLIN 250 MG
1 CAPSULE ORAL EVERY 12 HOURS
Status: DISCONTINUED | OUTPATIENT
Start: 2024-02-17 | End: 2024-02-18 | Stop reason: HOSPADM

## 2024-02-17 RX ORDER — BUMETANIDE 0.5 MG/1
0.5 TABLET ORAL DAILY
Status: ON HOLD | COMMUNITY
End: 2024-02-18 | Stop reason: SDUPTHER

## 2024-02-17 RX ORDER — INSULIN LISPRO 100 [IU]/ML
0-5 INJECTION, SOLUTION INTRAVENOUS; SUBCUTANEOUS
Status: DISCONTINUED | OUTPATIENT
Start: 2024-02-17 | End: 2024-02-18 | Stop reason: HOSPADM

## 2024-02-17 RX ORDER — SPIRONOLACTONE 25 MG/1
25 TABLET ORAL DAILY
Status: DISCONTINUED | OUTPATIENT
Start: 2024-02-17 | End: 2024-02-18 | Stop reason: HOSPADM

## 2024-02-17 RX ORDER — MILRINONE LACTATE 0.2 MG/ML
0.12 INJECTION, SOLUTION INTRAVENOUS CONTINUOUS
Status: DISCONTINUED | OUTPATIENT
Start: 2024-02-17 | End: 2024-02-18 | Stop reason: HOSPADM

## 2024-02-17 RX ORDER — DEXTROSE MONOHYDRATE 100 MG/ML
0.3 INJECTION, SOLUTION INTRAVENOUS ONCE AS NEEDED
Status: DISCONTINUED | OUTPATIENT
Start: 2024-02-17 | End: 2024-02-18 | Stop reason: HOSPADM

## 2024-02-17 RX ORDER — BISMUTH SUBSALICYLATE 262 MG
1 TABLET,CHEWABLE ORAL DAILY
COMMUNITY
End: 2024-03-18 | Stop reason: WASHOUT

## 2024-02-17 RX ORDER — FUROSEMIDE 10 MG/ML
20 INJECTION INTRAMUSCULAR; INTRAVENOUS ONCE
Status: COMPLETED | OUTPATIENT
Start: 2024-02-17 | End: 2024-02-17

## 2024-02-17 RX ADMIN — FAMOTIDINE 40 MG: 20 TABLET, FILM COATED ORAL at 09:04

## 2024-02-17 RX ADMIN — ACETAMINOPHEN 975 MG: 325 TABLET ORAL at 20:14

## 2024-02-17 RX ADMIN — SENNOSIDES AND DOCUSATE SODIUM 1 TABLET: 8.6; 5 TABLET ORAL at 20:14

## 2024-02-17 RX ADMIN — SPIRONOLACTONE 25 MG: 25 TABLET, FILM COATED ORAL at 08:57

## 2024-02-17 RX ADMIN — MILRINONE LACTATE IN DEXTROSE 0.12 MCG/KG/MIN: 200 INJECTION, SOLUTION INTRAVENOUS at 09:56

## 2024-02-17 RX ADMIN — FUROSEMIDE 20 MG: 10 INJECTION, SOLUTION INTRAMUSCULAR; INTRAVENOUS at 05:03

## 2024-02-17 RX ADMIN — ASPIRIN 81 MG 81 MG: 81 TABLET ORAL at 08:57

## 2024-02-17 RX ADMIN — IPRATROPIUM BROMIDE AND ALBUTEROL SULFATE 3 ML: .5; 3 SOLUTION RESPIRATORY (INHALATION) at 03:05

## 2024-02-17 RX ADMIN — DAPAGLIFLOZIN 10 MG: 10 TABLET, FILM COATED ORAL at 08:57

## 2024-02-17 SDOH — HEALTH STABILITY: MENTAL HEALTH: HOW OFTEN DO YOU HAVE 6 OR MORE DRINKS ON ONE OCCASION?: NEVER

## 2024-02-17 SDOH — SOCIAL STABILITY: SOCIAL INSECURITY: WITHIN THE LAST YEAR, HAVE YOU BEEN AFRAID OF YOUR PARTNER OR EX-PARTNER?: NO

## 2024-02-17 SDOH — ECONOMIC STABILITY: FOOD INSECURITY: WITHIN THE PAST 12 MONTHS, THE FOOD YOU BOUGHT JUST DIDN'T LAST AND YOU DIDN'T HAVE MONEY TO GET MORE.: OFTEN TRUE

## 2024-02-17 SDOH — HEALTH STABILITY: PHYSICAL HEALTH: ON AVERAGE, HOW MANY DAYS PER WEEK DO YOU ENGAGE IN MODERATE TO STRENUOUS EXERCISE (LIKE A BRISK WALK)?: 0 DAYS

## 2024-02-17 SDOH — SOCIAL STABILITY: SOCIAL INSECURITY: WITHIN THE LAST YEAR, HAVE YOU BEEN HUMILIATED OR EMOTIONALLY ABUSED IN OTHER WAYS BY YOUR PARTNER OR EX-PARTNER?: NO

## 2024-02-17 SDOH — SOCIAL STABILITY: SOCIAL INSECURITY: HAS ANYONE EVER THREATENED TO HURT YOUR FAMILY OR YOUR PETS?: NO

## 2024-02-17 SDOH — ECONOMIC STABILITY: INCOME INSECURITY: HOW HARD IS IT FOR YOU TO PAY FOR THE VERY BASICS LIKE FOOD, HOUSING, MEDICAL CARE, AND HEATING?: NOT VERY HARD

## 2024-02-17 SDOH — ECONOMIC STABILITY: INCOME INSECURITY: IN THE LAST 12 MONTHS, WAS THERE A TIME WHEN YOU WERE NOT ABLE TO PAY THE MORTGAGE OR RENT ON TIME?: NO

## 2024-02-17 SDOH — HEALTH STABILITY: MENTAL HEALTH: HOW MANY STANDARD DRINKS CONTAINING ALCOHOL DO YOU HAVE ON A TYPICAL DAY?: PATIENT DOES NOT DRINK

## 2024-02-17 SDOH — SOCIAL STABILITY: SOCIAL INSECURITY: ARE YOU OR HAVE YOU BEEN THREATENED OR ABUSED PHYSICALLY, EMOTIONALLY, OR SEXUALLY BY ANYONE?: NO

## 2024-02-17 SDOH — SOCIAL STABILITY: SOCIAL INSECURITY: DO YOU FEEL ANYONE HAS EXPLOITED OR TAKEN ADVANTAGE OF YOU FINANCIALLY OR OF YOUR PERSONAL PROPERTY?: NO

## 2024-02-17 SDOH — ECONOMIC STABILITY: INCOME INSECURITY: IN THE PAST 12 MONTHS, HAS THE ELECTRIC, GAS, OIL, OR WATER COMPANY THREATENED TO SHUT OFF SERVICE IN YOUR HOME?: NO

## 2024-02-17 SDOH — SOCIAL STABILITY: SOCIAL INSECURITY: DOES ANYONE TRY TO KEEP YOU FROM HAVING/CONTACTING OTHER FRIENDS OR DOING THINGS OUTSIDE YOUR HOME?: NO

## 2024-02-17 SDOH — SOCIAL STABILITY: SOCIAL NETWORK
DO YOU BELONG TO ANY CLUBS OR ORGANIZATIONS SUCH AS CHURCH GROUPS UNIONS, FRATERNAL OR ATHLETIC GROUPS, OR SCHOOL GROUPS?: NO

## 2024-02-17 SDOH — HEALTH STABILITY: MENTAL HEALTH
STRESS IS WHEN SOMEONE FEELS TENSE, NERVOUS, ANXIOUS, OR CAN'T SLEEP AT NIGHT BECAUSE THEIR MIND IS TROUBLED. HOW STRESSED ARE YOU?: NOT AT ALL

## 2024-02-17 SDOH — SOCIAL STABILITY: SOCIAL NETWORK: IN A TYPICAL WEEK, HOW MANY TIMES DO YOU TALK ON THE PHONE WITH FAMILY, FRIENDS, OR NEIGHBORS?: THREE TIMES A WEEK

## 2024-02-17 SDOH — HEALTH STABILITY: MENTAL HEALTH: HOW OFTEN DO YOU HAVE A DRINK CONTAINING ALCOHOL?: NEVER

## 2024-02-17 SDOH — SOCIAL STABILITY: SOCIAL INSECURITY: ABUSE: ADULT

## 2024-02-17 SDOH — ECONOMIC STABILITY: HOUSING INSECURITY: IN THE LAST 12 MONTHS, HOW MANY PLACES HAVE YOU LIVED?: 1

## 2024-02-17 SDOH — SOCIAL STABILITY: SOCIAL INSECURITY: HAVE YOU HAD THOUGHTS OF HARMING ANYONE ELSE?: NO

## 2024-02-17 SDOH — SOCIAL STABILITY: SOCIAL NETWORK: HOW OFTEN DO YOU ATTENT MEETINGS OF THE CLUB OR ORGANIZATION YOU BELONG TO?: NEVER

## 2024-02-17 SDOH — ECONOMIC STABILITY: FOOD INSECURITY: WITHIN THE PAST 12 MONTHS, YOU WORRIED THAT YOUR FOOD WOULD RUN OUT BEFORE YOU GOT MONEY TO BUY MORE.: NEVER TRUE

## 2024-02-17 SDOH — HEALTH STABILITY: PHYSICAL HEALTH: ON AVERAGE, HOW MANY MINUTES DO YOU ENGAGE IN EXERCISE AT THIS LEVEL?: 0 MIN

## 2024-02-17 SDOH — SOCIAL STABILITY: SOCIAL NETWORK: HOW OFTEN DO YOU GET TOGETHER WITH FRIENDS OR RELATIVES?: THREE TIMES A WEEK

## 2024-02-17 SDOH — SOCIAL STABILITY: SOCIAL INSECURITY: ARE THERE ANY APPARENT SIGNS OF INJURIES/BEHAVIORS THAT COULD BE RELATED TO ABUSE/NEGLECT?: NO

## 2024-02-17 SDOH — SOCIAL STABILITY: SOCIAL INSECURITY: DO YOU FEEL UNSAFE GOING BACK TO THE PLACE WHERE YOU ARE LIVING?: NO

## 2024-02-17 SDOH — SOCIAL STABILITY: SOCIAL INSECURITY: WERE YOU ABLE TO COMPLETE ALL THE BEHAVIORAL HEALTH SCREENINGS?: YES

## 2024-02-17 ASSESSMENT — COGNITIVE AND FUNCTIONAL STATUS - GENERAL
CLIMB 3 TO 5 STEPS WITH RAILING: A LITTLE
DAILY ACTIVITIY SCORE: 24
PATIENT BASELINE BEDBOUND: NO
WALKING IN HOSPITAL ROOM: A LITTLE
DAILY ACTIVITIY SCORE: 24
WALKING IN HOSPITAL ROOM: A LITTLE
MOBILITY SCORE: 22
MOBILITY SCORE: 22
CLIMB 3 TO 5 STEPS WITH RAILING: A LITTLE
MOBILITY SCORE: 24

## 2024-02-17 ASSESSMENT — ACTIVITIES OF DAILY LIVING (ADL)
TOILETING: INDEPENDENT
ASSISTIVE_DEVICE: CANE;WALKER
HEARING - RIGHT EAR: FUNCTIONAL
PATIENT'S MEMORY ADEQUATE TO SAFELY COMPLETE DAILY ACTIVITIES?: YES
BATHING: INDEPENDENT
WALKS IN HOME: INDEPENDENT
GROOMING: INDEPENDENT
DRESSING YOURSELF: INDEPENDENT
ADEQUATE_TO_COMPLETE_ADL: YES
JUDGMENT_ADEQUATE_SAFELY_COMPLETE_DAILY_ACTIVITIES: YES
FEEDING YOURSELF: INDEPENDENT
LACK_OF_TRANSPORTATION: NO
HEARING - LEFT EAR: FUNCTIONAL

## 2024-02-17 ASSESSMENT — LIFESTYLE VARIABLES
HOW MANY STANDARD DRINKS CONTAINING ALCOHOL DO YOU HAVE ON A TYPICAL DAY: PATIENT DOES NOT DRINK
SKIP TO QUESTIONS 9-10: 1
AUDIT-C TOTAL SCORE: 0
SKIP TO QUESTIONS 9-10: 1
AUDIT-C TOTAL SCORE: 0
HOW OFTEN DO YOU HAVE A DRINK CONTAINING ALCOHOL: NEVER
HOW OFTEN DO YOU HAVE 6 OR MORE DRINKS ON ONE OCCASION: NEVER
AUDIT-C TOTAL SCORE: 0

## 2024-02-17 ASSESSMENT — PAIN - FUNCTIONAL ASSESSMENT
PAIN_FUNCTIONAL_ASSESSMENT: 0-10
PAIN_FUNCTIONAL_ASSESSMENT: 0-10

## 2024-02-17 ASSESSMENT — PAIN SCALES - GENERAL
PAINLEVEL_OUTOF10: 5 - MODERATE PAIN
PAINLEVEL_OUTOF10: 0 - NO PAIN

## 2024-02-17 ASSESSMENT — PAIN DESCRIPTION - LOCATION: LOCATION: LEG

## 2024-02-17 ASSESSMENT — ENCOUNTER SYMPTOMS
NEUROLOGICAL NEGATIVE: 1
WHEEZING: 1
PALPITATIONS: 1
GASTROINTESTINAL NEGATIVE: 1
SHORTNESS OF BREATH: 1
FATIGUE: 1

## 2024-02-17 ASSESSMENT — PATIENT HEALTH QUESTIONNAIRE - PHQ9
SUM OF ALL RESPONSES TO PHQ9 QUESTIONS 1 & 2: 0
1. LITTLE INTEREST OR PLEASURE IN DOING THINGS: NOT AT ALL
2. FEELING DOWN, DEPRESSED OR HOPELESS: NOT AT ALL

## 2024-02-17 NOTE — PROGRESS NOTES
Pharmacy Medication History Review    Melissa Marinelli is a 68 y.o. female admitted for Acute pulmonary edema (CMS/[facility]). Pharmacy reviewed the patient's [id number] medications and allergies for accuracy.    Medications ADDED:  Multivitamin  Bumetanide 0.5 mg  Duo-Neb  Medications CHANGED:  N/A  Medications REMOVED:   Miralax  Senna      The list below reflects the updated PTA list. Comments regarding how patient may be taking medications differently can be found in the Admit Orders Activity  Prior to Admission Medications   Prescriptions Last Dose Informant   Calcium Antacid 200 mg calcium (500 mg) chewable tablet Past Week Self   Sig: Chew 1 tablet (500 mg) every 12 hours if needed.   Farxiga 10 mg Past Week Self   Sig: Take 1 tablet (10 mg) by mouth once daily.   OneTouch Delica Plus Lancet 33 gauge misc  Self   Sig: USE AS DIRECTED TO TEST BLOOD SUGAR THREE TIMES A DAY   OneTouch Verio Flex meter misc  Self   Sig: USE AS DIRECTED THREE TIMES DAILY   Symbicort 80-4.5 mcg/actuation inhaler Past Week Self   Sig: Inhale 2 puffs twice a day.   acetaminophen (Tylenol) 500 mg tablet Past Week Self   Sig: Take 2 tablets (1,000 mg) by mouth every 8 hours if needed for mild pain (1 - 3).   albuterol 90 mcg/actuation inhaler Past Week Self   Sig: Inhale 2 puffs every 4 hours if needed for wheezing or shortness of breath.   aspirin 81 mg chewable tablet Past Week Self   Sig: Chew 1 tablet (81 mg) once daily.   bumetanide (Bumex) 0.5 mg tablet Past Week    Sig: Take 1 tablet (0.5 mg) by mouth once daily.   famotidine (Pepcid) 40 mg tablet Past Week Self   Sig: Take 1 tablet (40 mg) by mouth once daily. Do not start before February 3, 2024.   heparin flush,porcine,-0.9NaCl 100 unit/mL kit  Self   Si mL by central venous line infusion route 1 (one) time per week. Indications: prevent clot from blocking an intravenous catheter   ipratropium-albuteroL (Duo-Neb) 0.5-2.5 mg/3 mL nebulizer solution More than a month   "  Sig: Take 3 mL by nebulization every 6 hours.   milrinone in 5 % dextrose (Primacor) 40 mg/200 mL (200 mcg/mL) infusion Past Week Self   Sig: Infuse 8.3125 mcg/min at 2.49 mL/hr into a venous catheter continuously. Via R chest LEROY. Dose = 0.125mcg/kg/min most recent weight = 66.5kg from 2/2/24   multivitamin tablet Past Week Self   Sig: Take 1 tablet by mouth once daily.   nicotine (Nicoderm CQ) 14 mg/24 hr patch Past Week Self   Sig: Place 1 patch over 24 hours on the skin once every 24 hours.   sodium chloride (Ocean) 0.65 % nasal spray Past Month Self   Sig: Administer 1 spray into each nostril if needed for congestion.   sodium chloride 0.9% (sodium chloride) flush  Self   Sig: Infuse 10 mL into a venous catheter 1 (one) time per week.   spironolactone (Aldactone) 25 mg tablet Past Week Self   Sig: Take 1 tablet (25 mg) by mouth once daily. Do not start before February 3, 2024.   walker misc  Self   Sig: Rollator to assist with ambulation as needed      Facility-Administered Medications: None             The list below reflects the updated allergy list. Please review each documented allergy for additional clarification and justification.  Allergies  Reviewed by Satnam Uriostegui, PharmD on 2/17/2024        Severity Reactions Comments    Metoprolol High Other, Shortness of breath, Respiratory depression     Ticagrelor High Anaphylaxis, Other Feels a severe \"burning feeling\" in her chest    Gadolinium-containing Contrast Media Medium Hives, Other     Iodinated Contrast Media Medium Hives, Other     Statins-hmg-coa Reductase Inhibitors Medium Other, Myalgia Atorvastatin - hair loss    Prednisone Not Specified Other Increased blood sugar    Ace Inhibitors Low Other, Cough COUGH    Fentanyl Low Dizziness, Drowsiness     Hydralazine Low Dermatitis, Rash, Nausea/vomiting     Nicorette [nicotine (polacrilex)] Low Nausea/vomiting Nicorette gums and lozenges only. Okay with nicotine patches    Penicillins Low Rash, Unknown  "            Patient declines M2B at discharge. Pharmacy has been updated to [facility] in [place].    Sources used to complete the med history include   - Prior to admission med grid  - Medication dispense history  [facility]  - Progress note 1/30/2024 by Dr. Feranndez  - Patient interview - patient is a reliable historian. Knew medications well    Below additional concerns with the patient's PTA list.  - Per patient, provider discontinued her Entresto since she is on the milrinone drip and euvolemic  - Patient reported needing to use Duo-Neb but her nebulizer is broken, requesting prescription for nebulizer at discharge    Lesly Uriostegui, Arnel   Meds PGY1 Pharmacy Resident    Meds [facility]  Please reach out via [employer] Secure Chat for questions, or if no response call [phone] or [employer] “MedRec”

## 2024-02-17 NOTE — CARE PLAN
Patient arrived to LT5 today safe and HDS. Milrinone gtt switched to alaris pump. Tolerating ambulation well. Possible discharge tomorrow.

## 2024-02-17 NOTE — ED TRIAGE NOTES
Per EMS, Pt family called EMS due to increased SOB. Pt reported that she went to restroom around 10 pm and came back SOB and SOB never got better. Pt positive for increased respiratory effort. Med HF CHF ( on milrinone pump), COPD. Denies any pain, chest pain, nausea vomiting.

## 2024-02-17 NOTE — ED PROVIDER NOTES
CC: Shortness of Breath     HPI:  Patient is a 68-year-old female with PMH of HFrEF on home milrinone, CAD status post NSTEMI, MVR, ICD placement, COPD, HTN, CKD, and DM, presenting to the ED with acute onset shortness of breath.  Patient states she was feeling well since her discharge home on 2/3 until last night when she noted she was more short of breath and not urinating as much is normal.  States daughter encouraged her to come to the hospital tonight when breathing continued to worsen.  Endorses bilateral feet edema.  No associated chest pain.  No fevers or chills.  No new cough.  States was taken off of her diuretic last hospitalization.      Limitations to History: None    Additional History Obtained from: Documentation and EMS    Records Reviewed: Recent available ED and inpatient notes reviewed in EMR.    PMHx/PSHx:  Per HPI.   - has a past medical history of Asthma, CAD (coronary artery disease), CHF (congestive heart failure) (CMS/[facility]), CKD (chronic kidney disease), COPD (chronic obstructive pulmonary disease) (CMS/[facility]), CVA (cerebral vascular accident) (CMS/[facility]), Diabetes mellitus (CMS/[facility]), GERD (gastroesophageal reflux disease), Hyperlipidemia, Hypertension, NSTEMI (non-ST elevated myocardial infarction) (CMS/[facility]), and Unspecified systolic (congestive) heart failure (CMS/[facility]) (08/11/2022).  - has a past surgical history that includes Other surgical history (08/11/2022); Other surgical history (08/11/2022); Other surgical history (08/11/2022); Mitral valve replacement (06/2019); and Cardiac catheterization (N/A, 1/26/2024).    Medications: Reviewed in EMR. See EMR for complete list of medications and doses.    Allergies:  Metoprolol, Ticagrelor, Gadolinium-containing contrast media, Iodinated contrast media, Statins-hmg-coa reductase inhibitors, Prednisone, Ace inhibitors, Fentanyl, Hydralazine, Nicorette [nicotine (polacrilex)], and Penicillins    Social History:  - Tobacco:  reports that she has been  smoking cigarettes. She does not have any smokeless tobacco history on file.   - Alcohol:  has no history on file for alcohol use.   - Illicit Drugs:  reports current drug use. Drug: Marijuana.   ???????????????????????????????????????????????????????????????  Triage Vitals:  T 36.6 °C (97.9 °F)  HR (!) 105  BP (!) 142/95  RR (!) 22  O2 100 % None (Room air)    PHYSICAL EXAM:   VS: As documented in the triage note and EMR flowsheet from this visit were reviewed.  Gen: Well developed. Well nourished. NAD.   Eyes: PERRL, EOMI. Clear scerla.  HENT: NC/AT, Mucosal membranes moist.   Neck: Supple, no cervical LAD  Resp: normal WOB on 2L NC, diminished air flow throughout  CV: tachycardic, +JVD  Abd: Soft, non-distended, non-tender, no rebound or guarding  Ext: pedal edema, pulses full and equal  Skin: WWP. No systemic rashes or lesions.  MSK: normal muscle bulk, no obvious joint swelling in extremities  Neuro:  AAOx3, speech fluent, MAEx4, no focal deficit  Psych: Maintains eye contact, Appropriate mood and affect  ???????????????????????????????????????????????????????????????    ED Labs/Imaging:   Labs Reviewed   CBC WITH AUTO DIFFERENTIAL - Abnormal       Result Value    WBC 4.9      nRBC 0.0      RBC 3.91 (*)     Hemoglobin 11.3 (*)     Hematocrit 34.4 (*)     MCV 88      MCH 28.9      MCHC 32.8      RDW 15.7 (*)     Platelets 324      Neutrophils % 74.3      Immature Granulocytes %, Automated 0.2      Lymphocytes % 17.1      Monocytes % 4.9      Eosinophils % 3.1      Basophils % 0.4      Neutrophils Absolute 3.61      Immature Granulocytes Absolute, Automated 0.01      Lymphocytes Absolute 0.83 (*)     Monocytes Absolute 0.24      Eosinophils Absolute 0.15      Basophils Absolute 0.02     COMPREHENSIVE METABOLIC PANEL - Abnormal    Glucose 108 (*)     Sodium 141      Potassium 4.7      Chloride 108 (*)     Bicarbonate 21      Anion Gap 17      Urea Nitrogen 11      Creatinine 1.26 (*)     eGFR 46 (*)      Calcium 9.1      Albumin 3.7      Alkaline Phosphatase 126      Total Protein 7.0      AST 23      Bilirubin, Total 0.4      ALT 26     SERIAL TROPONIN-INITIAL - Abnormal    Troponin I, High Sensitivity 170 (*)     Narrative:     Less than 99th percentile of normal range cutoff-  Female and children under 18 years old <35 ng/L; Male <54 ng/L: Negative  Repeat testing should be performed if clinically indicated.     Female and children under 18 years old  ng/L; Male  ng/L:  Consistent with possible cardiac damage and possible increased clinical   risk. Serial measurements may help to assess extent of myocardial damage.     >120 ng/L: Consistent with cardiac damage, increased clinical risk and  myocardial infarction. Serial measurements may help assess extent of   myocardial damage.      NOTE: Children less than 1 year old may have higher baseline troponin   levels and results should be interpreted in conjunction with the overall   clinical context.    NOTE: Troponin I testing is performed using a different   testing methodology at [facility] than at other   [facility]. Direct result comparisons should only   be made within the same method.     SERIAL TROPONIN, 1 HOUR - Abnormal    Troponin I, High Sensitivity 157 (*)     Narrative:     Less than 99th percentile of normal range cutoff-  Female and children under 18 years old <35 ng/L; Male <54 ng/L: Negative  Repeat testing should be performed if clinically indicated.     Female and children under 18 years old  ng/L; Male  ng/L:  Consistent with possible cardiac damage and possible increased clinical   risk. Serial measurements may help to assess extent of myocardial damage.     >120 ng/L: Consistent with cardiac damage, increased clinical risk and  myocardial infarction. Serial measurements may help assess extent of   myocardial damage.      NOTE: Children less than 1 year old may have higher baseline troponin   levels and  results should be interpreted in conjunction with the overall   clinical context.    NOTE: Troponin I testing is performed using a different   testing methodology at [facility] than at other   [facility] hospitals. Direct result comparisons should only   be made within the same method.     B-TYPE NATRIURETIC PEPTIDE - Abnormal    BNP 1,139 (*)     Narrative:        <100 pg/mL - Heart failure unlikely  100-299 pg/mL - Intermediate probability of acute heart                  failure exacerbation. Correlate with clinical                  context and patient history.    >=300 pg/mL - Heart Failure likely. Correlate with clinical                  context and patient history.     Biotin interference may cause falsely decreased results. Patients taking a Biotin dose of up to 5 mg/day should refrain from taking Biotin for 24 hours before sample  collection. Providers may contact their local laboratory for further information.   BLOOD GAS VENOUS FULL PANEL - Abnormal    POCT pH, Venous 7.37      POCT pCO2, Venous 38 (*)     POCT pO2, Venous 55 (*)     POCT SO2, Venous 86 (*)     POCT Oxy Hemoglobin, Venous 82.3 (*)     POCT Hematocrit Calculated, Venous 30.0 (*)     POCT Sodium, Venous 140      POCT Potassium, Venous 4.6      POCT Chloride, Venous 112 (*)     POCT Ionized Calicum, Venous 1.16      POCT Glucose, Venous 108 (*)     POCT Lactate, Venous 0.7      POCT Base Excess, Venous -3.0 (*)     POCT HCO3 Calculated, Venous 22.0      POCT Hemoglobin, Venous 10.0 (*)     POCT Anion Gap, Venous 11.0      Patient Temperature 37.0      FiO2 36     IRON AND TIBC - Abnormal    Iron 35      UIBC 226      TIBC 261      % Saturation 13 (*)    CBC - Abnormal    WBC 4.1 (*)     nRBC 0.0      RBC 3.23 (*)     Hemoglobin 9.2 (*)     Hematocrit 30.0 (*)     MCV 93      MCH 28.5      MCHC 30.7 (*)     RDW 15.9 (*)     Platelets 325     RENAL FUNCTION PANEL - Abnormal    Glucose 74      Sodium 139      Potassium 4.4      Chloride  107      Bicarbonate 24      Anion Gap 12      Urea Nitrogen 13      Creatinine 1.28 (*)     eGFR 45 (*)     Calcium 8.8      Phosphorus 3.0      Albumin 3.5     POCT GLUCOSE - Abnormal    POCT Glucose 118 (*)    POCT GLUCOSE - Abnormal    POCT Glucose 112 (*)    POCT GLUCOSE - Abnormal    POCT Glucose 154 (*)    POCT GLUCOSE - Abnormal    POCT Glucose 129 (*)    MAGNESIUM - Normal    Magnesium 2.27     SARS-COV-2 AND INFLUENZA A/B PCR - Normal    Flu A Result Not Detected      Flu B Result Not Detected      Coronavirus 2019, PCR Not Detected      Narrative:     This assay has received FDA Emergency Use Authorization (EUA) and  is only authorized for the duration of time that circumstances exist to justify the authorization of the emergency use of in vitro diagnostic tests for the detection of SARS-CoV-2 virus and/or diagnosis of COVID-19 infection under section 564(b)(1) of the Act, 21 U.S.C. 360bbb-3(b)(1). Testing for SARS-CoV-2 is only recommended for patients who meet current clinical and/or epidemiological criteria as defined by federal, state, or local public health directives. This assay is an in vitro diagnostic nucleic acid amplification test for the qualitative detection of SARS-CoV-2, Influenza A, and Influenza B from nasopharyngeal specimens and has been validated for use at [facility]. Negative results do not preclude COVID-19 infections or Influenza A/B infections, and should not be used as the sole basis for diagnosis, treatment, or other management decisions. If Influenza A/B and RSV PCR results are negative, testing for Parainfluenza virus, Adenovirus and Metapneumovirus is routinely performed for [facility] pediatric oncology and intensive care inpatients, and is available on other patients by placing an add-on request.    COAGULATION SCREEN - Normal    Protime 12.2      INR 1.1      aPTT 32      Narrative:     The APTT is no longer used for monitoring Unfractionated Heparin  Therapy. For monitoring Heparin Therapy, use the Heparin Assay.   MAGNESIUM - Normal    Magnesium 2.05     POCT GLUCOSE - Normal    POCT Glucose 94     POCT GLUCOSE - Normal    POCT Glucose 77     POCT GLUCOSE - Normal    POCT Glucose 91     TROPONIN SERIES- (INITIAL, 1 HR)    Narrative:     The following orders were created for panel order Troponin I Series, High Sensitivity (0, 1 HR).  Procedure                               Abnormality         Status                     ---------                               -----------         ------                     Troponin I, High Sensiti...[[id number]]  Abnormal            Final result               Troponin, High Sensitivi...[[id number]]  Abnormal            Final result                 Please view results for these tests on the individual orders.   CBC   RENAL FUNCTION PANEL   POCT GLUCOSE   POCT GLUCOSE   POCT GLUCOSE   POCT GLUCOSE     XR chest 2 views   Final Result   1. Medical devices as above.   2. Interval development of severe pulmonary edema with a small left   pleural effusion and adjacent atelectasis/consolidation, superimposed   infection not excluded.             I personally reviewed the images/study and I agree with the findings   as stated by Resident Tristan Rojas. This study was interpreted at   [facility], [place], [place].        MACRO:   None        Signed by: Duran Lyles 2/17/2024 3:45 AM   Dictation workstation:   [id number]          ED Course & MDM   ED Course as of 02/18/24 1549   Sat Feb 17, 2024   0706 EKG c/w prior [AM]      ED Course User Index  [AM] Arash Norris MD         Diagnoses as of 02/18/24 1549   Acute pulmonary edema (CMS/HCC)       Medical Decision Making:  This is a 68-year-old female with history of advanced heart failure on continuous milrinone and COPD as well as above-stated history presenting to the ED with shortness of breath x 1 day.  Patient arrives hemodynamically stable not in  acute distress.  Patient was given DuoNeb which had minimal relief in her symptoms.  Chest x-ray with pulmonary edema increased from previous hospitalization.  Patient was given Lasix and began urinating with almost immediate relief in her symptoms.  Patient's heart failure exacerbation likely in the setting of being taken off her diuretic.  EKG shows no signs of acute ischemic change.  Patient will be admitted to the hospital for further management of diuresis and medication changes possible.  Patient agreeable to plan was admitted in stable condition.      Social Determinants Limiting Care:  None identified    Disposition:  As a result of their workup, the patient will require admission to the hospital.  The patient was informed of their diagnosis.  Patient was given the opportunity to ask questions and answered them.  Patient agreed to be admitted to the hospital.    Patient seen and discussed with attending physician.    Margaret Beltran MD PGY3  Emergency Medicine      Procedures ? SmartLinks last updated 2/18/2024 3:49 PM          Margaret Beltran MD  Resident  02/18/24 4513

## 2024-02-17 NOTE — HOSPITAL COURSE
Melissa Marinelli is a 68 y.o. female with PMH significant for HFrEF (EF 15-20%) on home milrinone 0.125mcg.kg/min, CAD s/p NSTEMI with RIRI to LCx (Jan 2016), MVR (June 2019), ICD placement 5/2020, COPD (never formally diagnosed), HTN, HL, GERD, CVA, CKD and DM who presents from the ED for acute shortness of breath. Per patient, she has been feeling well since her discharge on 2/3 until yesterday when she noticed she was short of breath and not urinating a sufficient amount. Her breathing worsened throughout the day and her daughter encouraged her to come to the hospital. Daughter reports she thinks the shortness of breath started two days prior but was initially relieved by her mom taking a dose of Farxiga. The patient endorses wheezing, palpitations, bilateral feet edema, and one episode of emesis during the shortness of breath which she reports resolved almost immediately after receiving lasix in the ED. At the time of interview she denies all previous symptoms.      In the ED, BNP 1139 (last admit >5,000), HStroponin 170 -->157, EKG with no acute ischemic changes, CXR with severe pulmonary edema.      She was given a duoneb treatment and 20mg IV lasix once.      Of note she was recently hospitalized 1/22/24-2/3/24 for COVID, COPD exacerbation, and ADHF. Milrinone initiated for symptom management, pt agreed to home infusion and had Wiley placed. She was discharged on spironolactone and dapagliflozin but not restarted on her oral bumex. On review, she had maintained euvolemia off diuretics for 7 days prior to discharge. Back in December she was taking 0.5mg PO bumex as needed for weight gain, she reports taking once every three days at that time.      Floor Course  Symptoms resolved prior to arrival to floor. Pt euvolemic, required no further diuresis. Ambulating the halls with some baseline shortness of breath. Oral bumex reintroduced with twice weekly dosing.     [facility]     Discharge wt 61.2kg    After all labs and VS were reviewed the decision was made that the patient was medically stable for discharge.  The patient was discharged in satisfactory condition.    More than 30 minutes were spent in coordinating patient discharge.

## 2024-02-17 NOTE — H&P
History Of Present Illness:    Melissa Marinelli is a 68 y.o. female with PMH significant for HFrEF (EF 15-20%) on home milrinone 0.125mcg.kg/min, CAD s/p NSTEMI with RIRI to LCx (Jan 2016), MVR (June 2019), ICD placement 5/2020, COPD (never formally diagnosed), HTN, HL, GERD, CVA, CKD and DM who presents from the ED for acute shortness of breath. Per patient, she has been feeling well since her discharge on 2/3 until yesterday when she noticed she was short of breath and not urinating a sufficient amount. Her breathing worsened throughout the day and her daughter encouraged her to come to the hospital. Daughter reports she thinks the shortness of breath started two days prior but was initially relieved by her mom taking a dose of Farxiga. The patient endorses wheezing, palpitations, bilateral feet edema, and one episode of emesis during the shortness of breath which she reports resolved almost immediately after receiving lasix in the ED. At the time of interview she denies all previous symptoms.     In the ED, BNP 1139 (last admit >5,000), HStroponin 170 -->157, EKG with no acute ischemic changes, CXR with severe pulmonary edema.     She was given a duoneb treatment and 20mg IV lasix once.     Of note she was recently hospitalized 1/22/24-2/3/24 for COVID, COPD exacerbation, and ADHF. Milrinone initiated for symptom management, pt agreed to home infusion and had Wiley placed. She was discharged on spironolactone and dapagliflozin but not restarted on her oral bumex. On review, she had maintained euvolemia off diuretics for 7 days prior to discharge. Back in December she was taking 0.5mg PO bumex as needed for weight gain, she reports taking once every three days at that time.     Confirmed full code on admission.     Emergency contact daughter Sarah [phone]     Last Recorded Vitals:  Vitals:    02/17/24 0402 02/17/24 0502 02/17/24 0603 02/17/24 0808   BP: 139/72 122/60 107/71 131/75   BP Location:    Right arm    Patient Position:    Lying   Pulse: 95 83 98 98   Resp: 18 (!) 30 (!) 50 20   Temp:    36.8 °C (98.2 °F)   TempSrc:    Temporal   SpO2: 94% (!) 93% 97% 97%   Weight:       Height:           Last Labs:  CBC - 2/17/2024:  2:52 AM  4.9 11.3 324    34.4      CMP - 2/17/2024:  2:52 AM  9.1 7.0 23 --- 0.4   3.3 3.7 26 126      PTT - 2/17/2024:  2:52 AM  1.1   12.2 32     Troponin I, High Sensitivity   Date/Time Value Ref Range Status   02/17/2024 03:47  (HH) 0 - 34 ng/L Final     Comment:     Previous result verified on 2/17/2024 0325 on specimen/case [id number] called with component [facility] for procedure Troponin I, High Sensitivity, Initial with value 170 ng/L.   02/17/2024 02:52  (HH) 0 - 34 ng/L Final   01/26/2024 07:15  (HH) 0 - 34 ng/L Final     BNP   Date/Time Value Ref Range Status   02/17/2024 02:52 AM 1,139 (H) 0 - 99 pg/mL Final   01/26/2024 07:15 AM >5,000 (H) 0 - 99 pg/mL Final     Hemoglobin A1C   Date/Time Value Ref Range Status   01/26/2024 06:05 PM 5.4 see below % Final   11/21/2023 03:15 AM 5.5 see below % Final     VLDL   Date/Time Value Ref Range Status   09/01/2023 05:24 AM 21 0 - 40 mg/dL Final   05/21/2020 09:02 PM 31 0 - 40 mg/dL Final   11/07/2019 07:57 PM 21 0 - 40 mg/dL Final      Last I/O:  No intake/output data recorded.    Past Cardiology Tests (Last 3 Years):  EKG:  ECG 12 Lead 01/28/2024      Electrocardiogram, 12-lead PRN ACS symptoms 01/25/2024      ECG 12 Lead 01/24/2024      ECG 12 lead 01/23/2024      ECG 12 lead 01/23/2024      ECG 12 lead 01/22/2024      ECG 12 Lead 01/22/2024      ECG 12 Lead 12/01/2023      ECG 12 Lead       ECG 12 lead 11/21/2023      ECG 12 lead 11/21/2023    Echo:  Transthoracic Echo (TTE) Limited 01/24/2024      Transthoracic Echo (TTE) Complete 11/21/2023    Ejection Fractions:  EF   Date/Time Value Ref Range Status   01/24/2024 08:40 AM 20 %    11/21/2023 04:03 PM 23       Cath:  Cardiac catheterization - non-coronary  01/26/2024    Stress Test:  No results found for this or any previous visit from the past 1095 days.    Cardiac Imaging:  No results found for this or any previous visit from the past 1095 days.      Past Medical History:  She has a past medical history of Asthma, CAD (coronary artery disease), CHF (congestive heart failure) (CMS/[facility]), CKD (chronic kidney disease), COPD (chronic obstructive pulmonary disease) (CMS/[facility]), CVA (cerebral vascular accident) (CMS/[facility]), Diabetes mellitus (CMS/[facility]), GERD (gastroesophageal reflux disease), Hyperlipidemia, Hypertension, NSTEMI (non-ST elevated myocardial infarction) (CMS/[facility]), and Unspecified systolic (congestive) heart failure (CMS/[facility]) (08/11/2022).    Past Surgical History:  She has a past surgical history that includes Other surgical history (08/11/2022); Other surgical history (08/11/2022); Other surgical history (08/11/2022); Mitral valve replacement (06/2019); and Cardiac catheterization (N/A, 1/26/2024).      Social History:  She reports that she has been smoking cigarettes. She does not have any smokeless tobacco history on file. She reports current drug use. Drug: Marijuana. No history on file for alcohol use.    Family History:  Family History   Problem Relation Name Age of Onset    Other (CVA) Brother          Allergies:  Metoprolol, Ticagrelor, Ace inhibitors, Fentanyl, Gadolinium-containing contrast media, Iodinated contrast media, Prednisone, Statins-hmg-coa reductase inhibitors, Hydralazine, and Penicillins    Inpatient Medications:  Scheduled medications   Medication Dose Route Frequency    aspirin  81 mg oral Daily    dapagliflozin propanediol  10 mg oral Daily    famotidine  40 mg oral Daily    fluticasone furoate-vilanteroL  1 puff inhalation Daily    heparin (porcine)  5,000 Units subcutaneous q8h    nicotine  1 patch transdermal q24h    sennosides-docusate sodium  1 tablet oral q12h    spironolactone  25 mg oral Daily     PRN medications   Medication     acetaminophen    albuterol    calcium carbonate    polyethylene glycol    sodium chloride     Continuous Medications   Medication Dose Last Rate    milrinone in 5 % dextrose  0.125 mcg/kg/min 0.125 mcg/kg/min (02/17/24 0956)     Outpatient Medications:  Current Outpatient Medications   Medication Instructions    acetaminophen (TYLENOL) 1,000 mg, oral, Every 8 hours PRN    albuterol 90 mcg/actuation inhaler 2 puffs, inhalation, Every 4 hours PRN    aspirin 81 mg, oral, Daily    Calcium Antacid 500 mg, oral, Every 12 hours PRN    famotidine (Pepcid) 40 mg tablet Take 1 tablet (40 mg) by mouth once daily. Do not start before February 3, 2024.    Farxiga 10 mg, oral, Daily    heparin flush,porcine,-0.9NaCl 100 unit/mL kit 5 mL, central venous line infusion, Weekly    milrinone in 5 % dextrose (Primacor) 40 mg/200 mL (200 mcg/mL) infusion 0.125 mcg/kg/min, intravenous, Continuous, Via R chest LEROY. Dose = 0.125mcg/kg/min most recent weight = 66.5kg from 2/2/24    nicotine (Nicoderm CQ) 14 mg/24 hr patch 1 patch, transdermal, Every 24 hours    OneTouch Delica Plus Lancet 33 gauge misc USE AS DIRECTED TO TEST BLOOD SUGAR THREE TIMES A DAY    OneTouch Verio Flex meter misc USE AS DIRECTED THREE TIMES DAILY    polyethylene glycol (GLYCOLAX, MIRALAX) 17 g, oral, Daily, As needed for constipation.    sennosides-docusate sodium (Stefany-Colace) 8.6-50 mg tablet 1 tablet, oral, Every 12 hours    sodium chloride (Ocean) 0.65 % nasal spray 1 spray, Each Nostril, As needed    sodium chloride 0.9% (sodium chloride) flush 10 mL, intravenous, Weekly    spironolactone (Aldactone) 25 mg tablet Take 1 tablet (25 mg) by mouth once daily. Do not start before February 3, 2024.    Symbicort 80-4.5 mcg/actuation inhaler 2 puffs, inhalation, 2 times daily    walker misc Rollator to assist with ambulation as needed   Review of Systems   Constitutional:  Positive for fatigue.   HENT: Negative.     Respiratory:  Positive for shortness of breath  and wheezing.    Cardiovascular:  Positive for palpitations and leg swelling.   Gastrointestinal: Negative.    Genitourinary: Negative.    Skin: Negative.    Neurological: Negative.         Physical Exam  General: laying in bed, NAD  Skin: warm and dry   Head/neck: JVD above clavicle at 45 degrees  Cardiac: S1S2  Pulm: CTA  GI: soft, nontender   Extremities: no LE edema   Neuro: A/Ox3, no focal neuro deficits   Psych: appropriate mod and behavior       Assessment/Plan   Melissa Marinelli is a 68 y.o. female with PMH significant for HFrEF (EF 15-20%) on home milrinone 0.125mcg.kg/min, CAD s/p NSTEMI with RIRI to LCx (Jan 2016), MVR (June 2019), ICD placement 5/2020, COPD (never formally diagnosed), HTN, HL, GERD, CVA, CKD and DM who presents from the ED for acute shortness of breath.    Acute on chronic systolic and diastolic heart failure  - NICM  - TTE 1/24/24 EF 15-20%, LV sev dilated, mild conc LVH, LA mild-mod dilated, mild red RV fxn, prosthetic mitral valve, trace MR  - CXR severe pulmonary edema with a small left pleural effusion and adjacent atelectasis/consolidation  - BNP 1139  - s/p ICD placed 2020   - last dc wt 65.6kg  - admit wt 63.5kg   - s/p 20mg IV lasix in ED  - appears euvolemic on exam with no current symptoms, will hold further diuresis today   - cont home dapa, angeli   - cont home milrinone .125mcg/kg/min  - daily standing wts, strict I/Os, 2g Na diet, 2L fluid restriction     CAD  Hx of Mitral Valve repair (June 2019) w/ Dr Montana  - 29mm Epic bioprosthetic valve   - s/p RIRI to LCx 2016   - last [facility] 2019 with unchanged CAD, widely patent stent in LCx-OM  - cont ASA, no statin d/t allergy     COPD   - cont PRN albuterol  - cont Breo Ellipta here (formulary replacement for home Symbicort)     CKD  - baseline Cr 1.3-1.8  - admit Cr 1.26    DM  - HgbA1c 5.4%  - accuchecks, SSI, hypoglycemia protocol     ROBERT  - baseline hgb ~13  - admit hgb 11.3  - iron studies pending     GERD  - cont famotidine        Dispo  Full code  Likely discharge tomorrow      Seen and discussed with Dr. Scarlet Mccall, APRN-CNP

## 2024-02-18 ENCOUNTER — HOME CARE VISIT (OUTPATIENT)
Dept: HOME HEALTH SERVICES | Facility: HOME HEALTH | Age: 69
End: 2024-02-18
Payer: COMMERCIAL

## 2024-02-18 VITALS
RESPIRATION RATE: 19 BRPM | OXYGEN SATURATION: 96 % | SYSTOLIC BLOOD PRESSURE: 134 MMHG | BODY MASS INDEX: 23.03 KG/M2 | DIASTOLIC BLOOD PRESSURE: 74 MMHG | WEIGHT: 134.92 LBS | TEMPERATURE: 97.7 F | HEIGHT: 64 IN | HEART RATE: 102 BPM

## 2024-02-18 PROBLEM — J81.0 ACUTE PULMONARY EDEMA (MULTI): Status: RESOLVED | Noted: 2023-04-10 | Resolved: 2024-02-18

## 2024-02-18 PROBLEM — I50.33: Status: ACTIVE | Noted: 2024-02-18

## 2024-02-18 LAB
GLUCOSE BLD MANUAL STRIP-MCNC: 129 MG/DL (ref 74–99)
GLUCOSE BLD MANUAL STRIP-MCNC: 77 MG/DL (ref 74–99)
GLUCOSE BLD MANUAL STRIP-MCNC: 91 MG/DL (ref 74–99)
MAGNESIUM SERPL-MCNC: 2.05 MG/DL (ref 1.6–2.4)

## 2024-02-18 PROCEDURE — 1090000002 HH PPS REVENUE DEBIT

## 2024-02-18 PROCEDURE — G0378 HOSPITAL OBSERVATION PER HR: HCPCS

## 2024-02-18 PROCEDURE — 82947 ASSAY GLUCOSE BLOOD QUANT: CPT

## 2024-02-18 PROCEDURE — 2500000001 HC RX 250 WO HCPCS SELF ADMINISTERED DRUGS (ALT 637 FOR MEDICARE OP): Performed by: STUDENT IN AN ORGANIZED HEALTH CARE EDUCATION/TRAINING PROGRAM

## 2024-02-18 PROCEDURE — 2500000004 HC RX 250 GENERAL PHARMACY W/ HCPCS (ALT 636 FOR OP/ED): Performed by: STUDENT IN AN ORGANIZED HEALTH CARE EDUCATION/TRAINING PROGRAM

## 2024-02-18 PROCEDURE — S4991 NICOTINE PATCH NONLEGEND: HCPCS | Performed by: STUDENT IN AN ORGANIZED HEALTH CARE EDUCATION/TRAINING PROGRAM

## 2024-02-18 PROCEDURE — 2500000002 HC RX 250 W HCPCS SELF ADMINISTERED DRUGS (ALT 637 FOR MEDICARE OP, ALT 636 FOR OP/ED): Performed by: STUDENT IN AN ORGANIZED HEALTH CARE EDUCATION/TRAINING PROGRAM

## 2024-02-18 PROCEDURE — 1090000001 HH PPS REVENUE CREDIT

## 2024-02-18 RX ORDER — FAMOTIDINE 40 MG/1
40 TABLET, FILM COATED ORAL DAILY
Qty: 30 TABLET | Refills: 2 | Status: SHIPPED | OUTPATIENT
Start: 2024-02-18 | End: 2024-06-10

## 2024-02-18 RX ORDER — SPIRONOLACTONE 25 MG/1
25 TABLET ORAL DAILY
Qty: 30 TABLET | Refills: 2 | Status: SHIPPED | OUTPATIENT
Start: 2024-02-18 | End: 2024-03-18 | Stop reason: SDUPTHER

## 2024-02-18 RX ORDER — BUMETANIDE 0.5 MG/1
0.5 TABLET ORAL 2 TIMES WEEKLY
Qty: 8 TABLET | Refills: 2 | Status: SHIPPED | OUTPATIENT
Start: 2024-02-19 | End: 2024-06-10 | Stop reason: SDUPTHER

## 2024-02-18 RX ADMIN — DAPAGLIFLOZIN 10 MG: 10 TABLET, FILM COATED ORAL at 09:39

## 2024-02-18 RX ADMIN — FAMOTIDINE 40 MG: 20 TABLET, FILM COATED ORAL at 09:37

## 2024-02-18 RX ADMIN — SPIRONOLACTONE 25 MG: 25 TABLET, FILM COATED ORAL at 09:37

## 2024-02-18 RX ADMIN — ASPIRIN 81 MG 81 MG: 81 TABLET ORAL at 09:39

## 2024-02-18 ASSESSMENT — COGNITIVE AND FUNCTIONAL STATUS - GENERAL
DAILY ACTIVITIY SCORE: 24
MOBILITY SCORE: 24

## 2024-02-18 ASSESSMENT — PAIN - FUNCTIONAL ASSESSMENT: PAIN_FUNCTIONAL_ASSESSMENT: 0-10

## 2024-02-18 ASSESSMENT — PAIN SCALES - GENERAL: PAINLEVEL_OUTOF10: 0 - NO PAIN

## 2024-02-18 NOTE — DISCHARGE SUMMARY
Discharge Diagnosis  Acute pulmonary edema (CMS/HCC)    Issues Requiring Follow-Up  N/A    Test Results Pending At Discharge  Pending Labs       No current pending labs.            Hospital Course  Melissa Marinelli is a 68 y.o. female with PMH significant for HFrEF (EF 15-20%) on home milrinone 0.125mcg.kg/min, CAD s/p NSTEMI with RIRI to LCx (Jan 2016), MVR (June 2019), ICD placement 5/2020, COPD (never formally diagnosed), HTN, HL, GERD, CVA, CKD and DM who presents from the ED for acute shortness of breath. Per patient, she has been feeling well since her discharge on 2/3 until yesterday when she noticed she was short of breath and not urinating a sufficient amount. Her breathing worsened throughout the day and her daughter encouraged her to come to the hospital. Daughter reports she thinks the shortness of breath started two days prior but was initially relieved by her mom taking a dose of Farxiga. The patient endorses wheezing, palpitations, bilateral feet edema, and one episode of emesis during the shortness of breath which she reports resolved almost immediately after receiving lasix in the ED. At the time of interview she denies all previous symptoms.      In the ED, BNP 1139 (last admit >5,000), HStroponin 170 -->157, EKG with no acute ischemic changes, CXR with severe pulmonary edema.      She was given a duoneb treatment and 20mg IV lasix once.      Of note she was recently hospitalized 1/22/24-2/3/24 for COVID, COPD exacerbation, and ADHF. Milrinone initiated for symptom management, pt agreed to home infusion and had Wiley placed. She was discharged on spironolactone and dapagliflozin but not restarted on her oral bumex. On review, she had maintained euvolemia off diuretics for 7 days prior to discharge. Back in December she was taking 0.5mg PO bumex as needed for weight gain, she reports taking once every three days at that time.      Floor Course  Symptoms resolved prior to arrival to floor. Pt  euvolemic, required no further diuresis. Ambulating the halls with some baseline shortness of breath. Oral bumex reintroduced with twice weekly dosing.     Medications e-submitted to pt's preferred [facility].     Discharge wt 61.2kg    After all labs and VS were reviewed the decision was made that the patient was medically stable for discharge.  The patient was discharged in satisfactory condition.    More than 30 minutes were spent in coordinating patient discharge.    Pertinent Physical Exam At Time of Discharge  Physical Exam    Home Medications     Medication List      CHANGE how you take these medications     bumetanide 0.5 mg tablet; Commonly known as: Bumex; Take 1 tablet (0.5   mg) by mouth 2 times a week. On Tuesdays and Saturdays; Start taking on:   February 19, 2024; What changed: when to take this, additional   instructions     CONTINUE taking these medications     acetaminophen 500 mg tablet; Commonly known as: Tylenol   albuterol 90 mcg/actuation inhaler; Inhale 2 puffs every 4 hours if   needed for wheezing or shortness of breath.   aspirin 81 mg chewable tablet   Calcium Antacid 200 mg calcium chewable tablet; Generic drug: calcium   carbonate   famotidine 40 mg tablet; Commonly known as: Pepcid; Take 1 tablet (40   mg) by mouth once daily. Do not start before February 3, 2024.   Farxiga 10 mg; Generic drug: dapagliflozin propanediol   heparin flush(porcine)-0.9NaCl 100 unit/mL kit   ipratropium-albuteroL 0.5-2.5 mg/3 mL nebulizer solution; Commonly known   as: Duo-Neb   milrinone in 5 % dextrose 40 mg/200 mL (200 mcg/mL) infusion; Commonly   known as: Primacor; Infuse 8.3125 mcg/min at 2.49 mL/hr into a venous   catheter continuously. Via R chest LEROY. Dose = 0.125mcg/kg/min most   recent weight = 66.5kg from 2/2/24   multivitamin tablet   OneTouch Delica Plus Lancet 33 gauge misc; Generic drug: lancets   OneTouch Verio Flex meter misc; Generic drug: blood-glucose meter   sodium chloride 0.65 %  nasal spray; Commonly known as: Ocean   sodium chloride 0.9% flush   spironolactone 25 mg tablet; Commonly known as: Aldactone; Take 1 tablet   (25 mg) by mouth once daily. Do not start before February 3, 2024.   Symbicort 80-4.5 mcg/actuation inhaler; Generic drug:   budesonide-formoteroL   walker misc; Rollator to assist with ambulation as needed       Outpatient Follow-Up  Future Appointments   Date Time Provider Department Center   2/20/2024 To Be Determined Haleigh Roberto [facility]   2/22/2024 To Be Determined Gabriela Reddy RN [facility]   2/22/2024 To Be Determined Nicki Headley OT [facility]   2/23/2024 To Be Determined Haleigh Roberto [facility]   2/26/2024 To Be Determined Haleigh Roberto [facility]   2/29/2024 To Be Determined Rosina Mcdaniel RN [facility]   2/29/2024 To Be Determined Chadwick Torres, PT [facility]   3/1/2024 To Be Determined Nicki Headley OT [facility]   3/18/2024 10:00 AM Kathy Rivera MD PhD [address]       LAWSON Freitas-CNP

## 2024-02-18 NOTE — PROGRESS NOTES
02/18/24 1028   Current Planned Discharge Disposition   Current Planned Discharge Disposition [facility]  ([facility])     Writer notified by team that patient will return home today and resume Milrinone. [facility] team notified and resume care at discharge. Patient has all supplies in home including pump.  Writer spoke with patient, she confirmed she has equipment and states her daughter will take her home at discharge or she will ask for Lyft ride at discharge.     Josefa Gooden RN  (Weekend Transitional Care Coordinator-T[facility], send Epic message)

## 2024-02-18 NOTE — DISCHARGE INSTRUCTIONS
An appointment request has been made on your behalf for a follow up visit with Aura Dwyer. You will be called with an appointment time. If you are not called by Tuesday, please reach out to [phone].     We have restarted you on bumex to help keep your fluid levels down. Please take this twice a week as prescribed.

## 2024-02-19 PROCEDURE — 1090000001 HH PPS REVENUE CREDIT

## 2024-02-19 PROCEDURE — 1090000002 HH PPS REVENUE DEBIT

## 2024-02-20 ENCOUNTER — HOME CARE VISIT (OUTPATIENT)
Dept: HOME HEALTH SERVICES | Facility: HOME HEALTH | Age: 69
End: 2024-02-20
Payer: COMMERCIAL

## 2024-02-20 VITALS — DIASTOLIC BLOOD PRESSURE: 49 MMHG | SYSTOLIC BLOOD PRESSURE: 90 MMHG | HEART RATE: 95 BPM

## 2024-02-20 PROCEDURE — G0152 HHCP-SERV OF OT,EA 15 MIN: HCPCS | Mod: HHH

## 2024-02-20 PROCEDURE — 1090000002 HH PPS REVENUE DEBIT

## 2024-02-20 PROCEDURE — 1090000001 HH PPS REVENUE CREDIT

## 2024-02-20 ASSESSMENT — ENCOUNTER SYMPTOMS
DENIES PAIN: 1
PERSON REPORTING PAIN: PATIENT

## 2024-02-21 ENCOUNTER — HOME CARE VISIT (OUTPATIENT)
Dept: HOME HEALTH SERVICES | Facility: HOME HEALTH | Age: 69
End: 2024-02-21
Payer: COMMERCIAL

## 2024-02-21 ENCOUNTER — DOCUMENTATION (OUTPATIENT)
Dept: PHARMACY | Facility: CLINIC | Age: 69
End: 2024-02-21

## 2024-02-21 ENCOUNTER — HOME INFUSION (OUTPATIENT)
Dept: INFUSION THERAPY | Age: 69
End: 2024-02-21
Payer: COMMERCIAL

## 2024-02-21 VITALS
DIASTOLIC BLOOD PRESSURE: 62 MMHG | HEART RATE: 94 BPM | OXYGEN SATURATION: 98 % | SYSTOLIC BLOOD PRESSURE: 120 MMHG | TEMPERATURE: 98.4 F

## 2024-02-21 PROCEDURE — 1090000002 HH PPS REVENUE DEBIT

## 2024-02-21 PROCEDURE — G0157 HHC PT ASSISTANT EA 15: HCPCS | Mod: HHH

## 2024-02-21 PROCEDURE — 1090000001 HH PPS REVENUE CREDIT

## 2024-02-21 ASSESSMENT — ENCOUNTER SYMPTOMS
PAIN LOCATION: LEFT LEG
PAIN LOCATION - PAIN QUALITY: ACHING
PAIN LOCATION - PAIN QUALITY: ACHING
HIGHEST PAIN SEVERITY IN PAST 24 HOURS: 10/10
PAIN LOCATION - PAIN SEVERITY: 10/10
PERSON REPORTING PAIN: PATIENT
PAIN LOCATION - PAIN SEVERITY: 10/10
SUBJECTIVE PAIN PROGRESSION: UNCHANGED
PAIN LOCATION: RIGHT LEG
PAIN: 1
LOWEST PAIN SEVERITY IN PAST 24 HOURS: 0/10

## 2024-02-21 NOTE — PROGRESS NOTES
Patient continues CI milrinone for CHF. Spoke with patient today. She is doing well. Weight is stable at 62kg as of 2/21/24.     [facility] call to patient, confirmed inventory in home. Hooked up last bag 2/20 in the evening, agreeable to delivery 2/21 of another week pf medication and supplies.    Pharmacy to mix and deliver straight 2/21:  3x milrinone 48hr CADD bags  Hookup 2/22, 2/24, and 2/26    NF 2/26 straight

## 2024-02-22 ENCOUNTER — HOME CARE VISIT (OUTPATIENT)
Dept: HOME HEALTH SERVICES | Facility: HOME HEALTH | Age: 69
End: 2024-02-22
Payer: COMMERCIAL

## 2024-02-22 VITALS
TEMPERATURE: 98.7 F | HEART RATE: 78 BPM | RESPIRATION RATE: 20 BRPM | SYSTOLIC BLOOD PRESSURE: 100 MMHG | DIASTOLIC BLOOD PRESSURE: 60 MMHG

## 2024-02-22 PROCEDURE — G0299 HHS/HOSPICE OF RN EA 15 MIN: HCPCS | Mod: HHH

## 2024-02-22 PROCEDURE — 1090000001 HH PPS REVENUE CREDIT

## 2024-02-22 PROCEDURE — 1090000002 HH PPS REVENUE DEBIT

## 2024-02-22 ASSESSMENT — ENCOUNTER SYMPTOMS
CHANGE IN APPETITE: UNCHANGED
APPETITE LEVEL: GOOD

## 2024-02-23 ENCOUNTER — HOME CARE VISIT (OUTPATIENT)
Dept: HOME HEALTH SERVICES | Facility: HOME HEALTH | Age: 69
End: 2024-02-23
Payer: COMMERCIAL

## 2024-02-23 VITALS
HEART RATE: 94 BPM | DIASTOLIC BLOOD PRESSURE: 52 MMHG | SYSTOLIC BLOOD PRESSURE: 96 MMHG | TEMPERATURE: 98.1 F | OXYGEN SATURATION: 98 %

## 2024-02-23 PROCEDURE — 1090000001 HH PPS REVENUE CREDIT

## 2024-02-23 PROCEDURE — G0157 HHC PT ASSISTANT EA 15: HCPCS | Mod: HHH

## 2024-02-23 PROCEDURE — 1090000002 HH PPS REVENUE DEBIT

## 2024-02-23 ASSESSMENT — ENCOUNTER SYMPTOMS
PAIN LOCATION - PAIN SEVERITY: 5/10
PAIN LOCATION - PAIN SEVERITY: 5/10
PAIN LOCATION - PAIN QUALITY: ACHING
HIGHEST PAIN SEVERITY IN PAST 24 HOURS: 10/10
PAIN LOCATION: LEFT LEG
PAIN LOCATION - PAIN QUALITY: ACHING
LOWEST PAIN SEVERITY IN PAST 24 HOURS: 5/10
PAIN: 1
SUBJECTIVE PAIN PROGRESSION: UNCHANGED
PAIN LOCATION: RIGHT LEG

## 2024-02-24 PROCEDURE — 1090000002 HH PPS REVENUE DEBIT

## 2024-02-24 PROCEDURE — 1090000001 HH PPS REVENUE CREDIT

## 2024-02-25 PROCEDURE — 1090000002 HH PPS REVENUE DEBIT

## 2024-02-25 PROCEDURE — 1090000001 HH PPS REVENUE CREDIT

## 2024-02-26 ENCOUNTER — HOME INFUSION (OUTPATIENT)
Dept: INFUSION THERAPY | Age: 69
End: 2024-02-26
Payer: COMMERCIAL

## 2024-02-26 ENCOUNTER — DOCUMENTATION (OUTPATIENT)
Dept: PHARMACY | Facility: CLINIC | Age: 69
End: 2024-02-26

## 2024-02-26 PROCEDURE — 1090000001 HH PPS REVENUE CREDIT

## 2024-02-26 PROCEDURE — 1090000002 HH PPS REVENUE DEBIT

## 2024-02-26 NOTE — PROGRESS NOTES
Review of chart. Patient infuses milrinone continuously for CHF. Dr Rivera following for cardiology at home. Patient due for bag change today and should have bag in the home for this.    Review of rn visit notes. No problems noted with infusions or line. Last documented weight 61.2kg.    Tried calling patient to check progress and confirm changing last bag in the home today. No answer. Left message letting patient know that another delivery is scheduled for today. Asked her to call if above bag change schedule and inventory as above is not correct. Let her know  will call on the way. Will send bags today without reaching patient so that she has spare bag in the home for bag change today.    Processed fill for 4 x 48hr bags for mix and straight delivery 2/26 to cover bag changes 2/28 3/1 3/3 and 3/5/24.    Followup 3/4/24 for next fill straight. Check inventory and weight if able.

## 2024-02-27 ENCOUNTER — HOME CARE VISIT (OUTPATIENT)
Dept: HOME HEALTH SERVICES | Facility: HOME HEALTH | Age: 69
End: 2024-02-27
Payer: COMMERCIAL

## 2024-02-27 PROCEDURE — 1090000001 HH PPS REVENUE CREDIT

## 2024-02-27 PROCEDURE — 1090000002 HH PPS REVENUE DEBIT

## 2024-02-27 PROCEDURE — G0157 HHC PT ASSISTANT EA 15: HCPCS | Mod: HHH

## 2024-02-27 ASSESSMENT — ENCOUNTER SYMPTOMS
PAIN LOCATION: RIGHT LEG
HIGHEST PAIN SEVERITY IN PAST 24 HOURS: 5/10
PAIN LOCATION: LEFT LEG
SUBJECTIVE PAIN PROGRESSION: WAXING AND WANING
PAIN: 1
LOWEST PAIN SEVERITY IN PAST 24 HOURS: 0/10
PAIN LOCATION - PAIN SEVERITY: 0/10
PAIN LOCATION - PAIN SEVERITY: 0/10

## 2024-02-28 PROCEDURE — 1090000001 HH PPS REVENUE CREDIT

## 2024-02-28 PROCEDURE — 1090000002 HH PPS REVENUE DEBIT

## 2024-02-29 ENCOUNTER — HOME CARE VISIT (OUTPATIENT)
Dept: HOME HEALTH SERVICES | Facility: HOME HEALTH | Age: 69
End: 2024-02-29
Payer: COMMERCIAL

## 2024-02-29 VITALS
RESPIRATION RATE: 18 BRPM | DIASTOLIC BLOOD PRESSURE: 50 MMHG | HEART RATE: 83 BPM | SYSTOLIC BLOOD PRESSURE: 82 MMHG | TEMPERATURE: 98.1 F

## 2024-02-29 PROCEDURE — 1090000002 HH PPS REVENUE DEBIT

## 2024-02-29 PROCEDURE — 1090000001 HH PPS REVENUE CREDIT

## 2024-02-29 PROCEDURE — G0151 HHCP-SERV OF PT,EA 15 MIN: HCPCS | Mod: HHH

## 2024-02-29 ASSESSMENT — ENCOUNTER SYMPTOMS
SUBJECTIVE PAIN PROGRESSION: UNCHANGED
LOWEST PAIN SEVERITY IN PAST 24 HOURS: 0/10
PAIN LOCATION: BACK
HIGHEST PAIN SEVERITY IN PAST 24 HOURS: 10/10
PAIN SEVERITY GOAL: 0/10
PAIN: 1
PERSON REPORTING PAIN: PATIENT

## 2024-02-29 ASSESSMENT — BALANCE ASSESSMENTS
NUDGED SCORE: 2
STANDING BALANCE: 2 - NARROW STANCE WITHOUT SUPPORT
BALANCE SCORE: 14
TURNING 360 DEGREES STEPS: 1 - CONTINUOUS STEPS
ARISING SCORE: 1
SITTING DOWN: 1 - USES ARMS OR NOT SMOOTH MOTION
ARISES: 1 - ABLE, USES ARMS TO HELP
SITTING BALANCE: 1 - STEADY, SAFE
IMMEDIATE STANDING BALANCE FIRST 5 SECONDS: 2 - STEADY WITHOUT WALKER OR OTHER SUPPORT
ATTEMPTS TO ARISE: 2 - ABLE TO RISE, ONE ATTEMPT
NUDGED: 2 - STEADY
EYES CLOSED AT MAXIMUM POSITION NUDGED: 1 - STEADY

## 2024-02-29 ASSESSMENT — GAIT ASSESSMENTS
STEP CONTINUITY: 1 - STEPS APPEAR CONTINUOUS
GAIT SCORE: 11
STEP SYMMETRY: 1 - RIGHT AND LEFT STEP LENGTH APPEAR EQUAL
PATH: 2 - STRAIGHT WITHOUT WALKING AID
WALKING STANCE: 1 - HEELS ALMOST TOUCHING WHILE WALKING
TRUNK SCORE: 2
INITIATION OF GAIT IMMEDIATELY AFTER GO: 0 - ANY HESITANCY OR MULTIPLE ATTEMPTS TO START
TRUNK: 2 - NO SWAY, NO FLEXION, NO USE OF ARMS, NO WALKING AID
PATH SCORE: 2
BALANCE AND GAIT SCORE: 25

## 2024-02-29 NOTE — HOME HEALTH
S: PT reassessment for discharge. Pt being seen for decreased mobility related to hospitalization for COVID.    B: Pt reports she is feeling better overall and has been working on her HEP. She reports no falls, no change in medications, and rates current pain level at 0/10. Pt has pictoral handouts for HEP.    A: Pt demonstrates good improvement in functional mobility as illustrated by improved gait technique, independence and endurance and improved Tinetti score from 12/28 on SOC to 25/28 today. Pt ambulates safely and independently without assistive device within the house, including navigating stairs using 1 railing with reciprocal pattern. Pt is comfortable and independent with HEP and appropriate for discharge from PT today.    P: Pt has met goals and is in agreement with discharge from home health PT today.

## 2024-03-01 ENCOUNTER — HOME CARE VISIT (OUTPATIENT)
Dept: HOME HEALTH SERVICES | Facility: HOME HEALTH | Age: 69
End: 2024-03-01
Payer: COMMERCIAL

## 2024-03-01 VITALS
BODY MASS INDEX: 24.03 KG/M2 | WEIGHT: 140 LBS | RESPIRATION RATE: 16 BRPM | DIASTOLIC BLOOD PRESSURE: 56 MMHG | HEART RATE: 86 BPM | OXYGEN SATURATION: 98 % | TEMPERATURE: 98.6 F | SYSTOLIC BLOOD PRESSURE: 90 MMHG

## 2024-03-01 PROCEDURE — G0299 HHS/HOSPICE OF RN EA 15 MIN: HCPCS | Mod: HHH

## 2024-03-01 PROCEDURE — 1090000001 HH PPS REVENUE CREDIT

## 2024-03-01 PROCEDURE — 1090000002 HH PPS REVENUE DEBIT

## 2024-03-02 PROCEDURE — 1090000001 HH PPS REVENUE CREDIT

## 2024-03-02 PROCEDURE — 1090000002 HH PPS REVENUE DEBIT

## 2024-03-03 PROCEDURE — 1090000001 HH PPS REVENUE CREDIT

## 2024-03-03 PROCEDURE — 1090000002 HH PPS REVENUE DEBIT

## 2024-03-03 ASSESSMENT — PAIN SCALES - PAIN ASSESSMENT IN ADVANCED DEMENTIA (PAINAD)
BODYLANGUAGE: 0 - RELAXED.
CONSOLABILITY: 0
TOTALSCORE: 0
BREATHING: 0
NEGVOCALIZATION: 0 - NONE.
NEGVOCALIZATION: 0
CONSOLABILITY: 0 - NO NEED TO CONSOLE.
BODYLANGUAGE: 0
FACIALEXPRESSION: 0
FACIALEXPRESSION: 0 - SMILING OR INEXPRESSIVE.

## 2024-03-03 ASSESSMENT — ENCOUNTER SYMPTOMS
LAST BOWEL MOVEMENT: 66900
CHANGE IN APPETITE: UNCHANGED
FATIGUES EASILY: 1
DENIES PAIN: 1
OCCASIONAL FEELINGS OF UNSTEADINESS: 1
DEPRESSION: 0
APPETITE LEVEL: FAIR
LOSS OF SENSATION IN FEET: 0
FORGETFULNESS: 1

## 2024-03-04 ENCOUNTER — DOCUMENTATION (OUTPATIENT)
Dept: PHARMACY | Facility: CLINIC | Age: 69
End: 2024-03-04

## 2024-03-04 ENCOUNTER — HOME INFUSION (OUTPATIENT)
Dept: INFUSION THERAPY | Age: 69
End: 2024-03-04
Payer: COMMERCIAL

## 2024-03-04 PROCEDURE — 1090000002 HH PPS REVENUE DEBIT

## 2024-03-04 PROCEDURE — 1090000001 HH PPS REVENUE CREDIT

## 2024-03-04 NOTE — PROGRESS NOTES
Health Maintenance Due   Topic Date Due   • HPV (Female) Vaccine (1 - Female 3-dose series) 09/25/2007   • DTaP/Tdap/Td Vaccine (1 - Tdap) 09/25/2011       Patient is due for topics as listed above but is not proceeding with Immunization(s)  at this time.              Review of chart. Patient infuses milrinone continuously for CHF. Dr Rivera following for cardiology at home.     Review of RN visit notes. No problems noted with infusions or line.     [facility] ED spoke with pt, confirmed bag in home for change tomorrow. She is agreeable to delivery today for 4 more bags of meds with supplies/flushes to match. States weight today is 140 lbs.     Processed fill for 4x 48hr bags for mix and straight delivery 3/4 to cover bag changes 3/7, 3/9, 3/11, and 3/13/24.     Followup 3/12/24 for next fill straight. Check inventory and weight if able.

## 2024-03-05 PROCEDURE — 1090000002 HH PPS REVENUE DEBIT

## 2024-03-05 PROCEDURE — 1090000001 HH PPS REVENUE CREDIT

## 2024-03-06 PROCEDURE — 1090000002 HH PPS REVENUE DEBIT

## 2024-03-06 PROCEDURE — 1090000001 HH PPS REVENUE CREDIT

## 2024-03-07 PROCEDURE — 1090000002 HH PPS REVENUE DEBIT

## 2024-03-07 PROCEDURE — 1090000001 HH PPS REVENUE CREDIT

## 2024-03-08 ENCOUNTER — HOME CARE VISIT (OUTPATIENT)
Dept: HOME HEALTH SERVICES | Facility: HOME HEALTH | Age: 69
End: 2024-03-08
Payer: COMMERCIAL

## 2024-03-08 PROCEDURE — 1090000002 HH PPS REVENUE DEBIT

## 2024-03-08 PROCEDURE — G0299 HHS/HOSPICE OF RN EA 15 MIN: HCPCS | Mod: HHH

## 2024-03-08 PROCEDURE — 0023 HH SOC

## 2024-03-08 PROCEDURE — 1090000001 HH PPS REVENUE CREDIT

## 2024-03-08 SDOH — ECONOMIC STABILITY: GENERAL

## 2024-03-08 ASSESSMENT — ACTIVITIES OF DAILY LIVING (ADL): MONEY MANAGEMENT (EXPENSES/BILLS): INDEPENDENT

## 2024-03-08 ASSESSMENT — PAIN SCALES - PAIN ASSESSMENT IN ADVANCED DEMENTIA (PAINAD)
FACIALEXPRESSION: 0
BODYLANGUAGE: 0 - RELAXED.
NEGVOCALIZATION: 0 - NONE.
BODYLANGUAGE: 0
CONSOLABILITY: 0 - NO NEED TO CONSOLE.
BREATHING: 0
TOTALSCORE: 0
CONSOLABILITY: 0
NEGVOCALIZATION: 0
FACIALEXPRESSION: 0 - SMILING OR INEXPRESSIVE.

## 2024-03-08 ASSESSMENT — ENCOUNTER SYMPTOMS
DEPRESSION: 0
OCCASIONAL FEELINGS OF UNSTEADINESS: 1
LOSS OF SENSATION IN FEET: 0

## 2024-03-09 PROCEDURE — 1090000001 HH PPS REVENUE CREDIT

## 2024-03-09 PROCEDURE — 1090000002 HH PPS REVENUE DEBIT

## 2024-03-10 PROCEDURE — 1090000002 HH PPS REVENUE DEBIT

## 2024-03-10 PROCEDURE — 1090000001 HH PPS REVENUE CREDIT

## 2024-03-11 ENCOUNTER — HOME INFUSION (OUTPATIENT)
Dept: INFUSION THERAPY | Age: 69
End: 2024-03-11
Payer: COMMERCIAL

## 2024-03-11 PROCEDURE — 1090000002 HH PPS REVENUE DEBIT

## 2024-03-11 PROCEDURE — 1090000001 HH PPS REVENUE CREDIT

## 2024-03-11 NOTE — PROGRESS NOTES
Review of chart. Patient infuses milrinone continuously for CHF. Dr Rivera following for cardiology at home.     Review of RN visit notes. No problems noted with infusions or line.     [facility] spoke with pt, confirmed bag change today and bag in home for change Wednesday. She is agreeable to delivery Tuesday for 4 more bags of meds with supplies/flushes to match. States weight is unchanged with no change in symptoms.     Processed refill for 4x 48hr bags for mix and straight delivery 3/12 to cover bag changes 3/15, 3/17, 3/19, and 3/21/24.     Followup 3/20/24 for next fill straight. Check inventory and weight if able.

## 2024-03-12 ENCOUNTER — DOCUMENTATION (OUTPATIENT)
Dept: PHARMACY | Facility: CLINIC | Age: 69
End: 2024-03-12

## 2024-03-12 PROCEDURE — 1090000002 HH PPS REVENUE DEBIT

## 2024-03-12 PROCEDURE — 1090000001 HH PPS REVENUE CREDIT

## 2024-03-13 PROCEDURE — 1090000002 HH PPS REVENUE DEBIT

## 2024-03-13 PROCEDURE — 1090000001 HH PPS REVENUE CREDIT

## 2024-03-14 PROCEDURE — 1090000002 HH PPS REVENUE DEBIT

## 2024-03-14 PROCEDURE — 1090000001 HH PPS REVENUE CREDIT

## 2024-03-15 ENCOUNTER — HOME CARE VISIT (OUTPATIENT)
Dept: HOME HEALTH SERVICES | Facility: HOME HEALTH | Age: 69
End: 2024-03-15
Payer: COMMERCIAL

## 2024-03-15 PROCEDURE — G0299 HHS/HOSPICE OF RN EA 15 MIN: HCPCS | Mod: HHH

## 2024-03-15 PROCEDURE — 1090000002 HH PPS REVENUE DEBIT

## 2024-03-15 PROCEDURE — 1090000001 HH PPS REVENUE CREDIT

## 2024-03-16 VITALS
RESPIRATION RATE: 14 BRPM | BODY MASS INDEX: 24.2 KG/M2 | TEMPERATURE: 98.6 F | OXYGEN SATURATION: 96 % | SYSTOLIC BLOOD PRESSURE: 100 MMHG | WEIGHT: 141 LBS | HEART RATE: 86 BPM | DIASTOLIC BLOOD PRESSURE: 78 MMHG

## 2024-03-16 PROCEDURE — 1090000001 HH PPS REVENUE CREDIT

## 2024-03-16 PROCEDURE — 1090000002 HH PPS REVENUE DEBIT

## 2024-03-16 ASSESSMENT — PAIN SCALES - PAIN ASSESSMENT IN ADVANCED DEMENTIA (PAINAD)
BREATHING: 0
CONSOLABILITY: 0 - NO NEED TO CONSOLE.
BODYLANGUAGE: 0
FACIALEXPRESSION: 0
NEGVOCALIZATION: 0
CONSOLABILITY: 0
NEGVOCALIZATION: 0 - NONE.
BODYLANGUAGE: 0 - RELAXED.
TOTALSCORE: 0
FACIALEXPRESSION: 0 - SMILING OR INEXPRESSIVE.

## 2024-03-16 ASSESSMENT — ENCOUNTER SYMPTOMS
CHANGE IN APPETITE: UNCHANGED
APPETITE LEVEL: GOOD
DENIES PAIN: 1
LAST BOWEL MOVEMENT: 66914
OCCASIONAL FEELINGS OF UNSTEADINESS: 0

## 2024-03-17 PROCEDURE — 1090000001 HH PPS REVENUE CREDIT

## 2024-03-17 PROCEDURE — 1090000002 HH PPS REVENUE DEBIT

## 2024-03-18 ENCOUNTER — OFFICE VISIT (OUTPATIENT)
Dept: CARDIOLOGY | Facility: CLINIC | Age: 69
End: 2024-03-18
Payer: COMMERCIAL

## 2024-03-18 ENCOUNTER — APPOINTMENT (OUTPATIENT)
Dept: CARDIOLOGY | Facility: CLINIC | Age: 69
End: 2024-03-18
Payer: COMMERCIAL

## 2024-03-18 VITALS
SYSTOLIC BLOOD PRESSURE: 103 MMHG | BODY MASS INDEX: 22.71 KG/M2 | HEIGHT: 64 IN | OXYGEN SATURATION: 100 % | HEART RATE: 93 BPM | WEIGHT: 133 LBS | DIASTOLIC BLOOD PRESSURE: 64 MMHG

## 2024-03-18 DIAGNOSIS — F32.9 MAJOR DEPRESSIVE DISORDER, REMISSION STATUS UNSPECIFIED, UNSPECIFIED WHETHER RECURRENT: Primary | ICD-10-CM

## 2024-03-18 DIAGNOSIS — I50.20 HFREF (HEART FAILURE WITH REDUCED EJECTION FRACTION) (MULTI): ICD-10-CM

## 2024-03-18 PROCEDURE — 99215 OFFICE O/P EST HI 40 MIN: CPT | Performed by: STUDENT IN AN ORGANIZED HEALTH CARE EDUCATION/TRAINING PROGRAM

## 2024-03-18 PROCEDURE — 1090000002 HH PPS REVENUE DEBIT

## 2024-03-18 PROCEDURE — 1111F DSCHRG MED/CURRENT MED MERGE: CPT | Performed by: STUDENT IN AN ORGANIZED HEALTH CARE EDUCATION/TRAINING PROGRAM

## 2024-03-18 PROCEDURE — 3044F HG A1C LEVEL LT 7.0%: CPT | Performed by: STUDENT IN AN ORGANIZED HEALTH CARE EDUCATION/TRAINING PROGRAM

## 2024-03-18 PROCEDURE — 3078F DIAST BP <80 MM HG: CPT | Performed by: STUDENT IN AN ORGANIZED HEALTH CARE EDUCATION/TRAINING PROGRAM

## 2024-03-18 PROCEDURE — 1090000001 HH PPS REVENUE CREDIT

## 2024-03-18 PROCEDURE — 3074F SYST BP LT 130 MM HG: CPT | Performed by: STUDENT IN AN ORGANIZED HEALTH CARE EDUCATION/TRAINING PROGRAM

## 2024-03-18 PROCEDURE — 1159F MED LIST DOCD IN RCRD: CPT | Performed by: STUDENT IN AN ORGANIZED HEALTH CARE EDUCATION/TRAINING PROGRAM

## 2024-03-18 RX ORDER — ASPIRIN 81 MG/1
81 TABLET ORAL DAILY
COMMUNITY
Start: 2024-03-12 | End: 2024-06-10 | Stop reason: SDUPTHER

## 2024-03-18 RX ORDER — SPIRONOLACTONE 25 MG/1
12.5 TABLET ORAL DAILY
Qty: 15 TABLET | Refills: 11 | Status: SHIPPED | OUTPATIENT
Start: 2024-03-18 | End: 2024-06-10 | Stop reason: SDUPTHER

## 2024-03-18 ASSESSMENT — ENCOUNTER SYMPTOMS
LOSS OF SENSATION IN FEET: 0
DEPRESSION: 0
OCCASIONAL FEELINGS OF UNSTEADINESS: 0

## 2024-03-18 NOTE — PROGRESS NOTES
" Patient ID: Melissa Marinelli   Primary Cardiologist:  Primary Care Provider: Jeremiah Champion MD   Visit Type:  Follow Up   Accompanid by: daughter  Sarah    Chief Complaint  Ambulatory heart failure care      History of Present Illness     68 year old woman who presents for advanced heart failure care. She has a past medical history significant for HrEF (EF 15-20%, multiple RWMA on TTE 5/2020) CAD s/p NSTEMI s/p RIRI to LCX (Jan 2016), MVR s/p mitral valve repair in (June 2019; 29 mm Epic bioprosthetic valve with Dr. Montana), COPD, HTN, HL, GERD, hx of CVA, DM.  On multiple occasions in the past durable LVAD therapy has been discussed and has been declined on multiple occasions.  Ultimately, Ms. Marinelli was initiated on milrinone 2/2024 and this therapy has been well-tolerated.    She is not sure that she enjoys having milrinone as it prevents her from \"washing my hair and having a long shower\".  She and her daughter do endorse that there has been something of a reduction in her symptoms she has less exertional dyspnea.  Moreover she does not have orthopnea, leg swelling, chest pain, syncope, presyncope.      She has been declining to join her daughter on walks.  She concedes to some degree of depression and was reluctant to see a mental health specialist about this.  She reports that she has worked with them in the past and has not found it helpful.  She is adamant about avoiding psychotropic medications.  We discussed the potential utility of psychotherapy, and she agreed to be referred    She has been adherent with prescribed medications.    She has not had any defibrillator shocks.     She continues to smoke approximately 10 cigarettes a day.  She stopped at the end of December 2023 but has resumed.     Surgical Hx:  - MVR     Social Hx;  - Marijuana   -daily cigarettes      Fam Hx:  Brother- CVA      The electronic medical record has been reviewed by me for salient history. All cardiovascular imaging " and testing available in the electronic medical record, and [employer] has been reviewed.     Medication Documentation Review Audit       Reviewed by Nayely Headley RN (Registered Nurse) on 03/18/24 at 1042      Medication Order Taking? Sig Documenting Provider Last Dose Status   acetaminophen (Tylenol) 500 mg tablet [id number] Yes Take 2 tablets (1,000 mg) by mouth every 8 hours if needed for mild pain (1 - 3). Historical Provider, MD Taking Active   albuterol 90 mcg/actuation inhaler [id number] Yes Inhale 2 puffs every 4 hours if needed for wheezing or shortness of breath. JASMIN Burt Taking Active   aspirin 81 mg EC tablet [id number] Yes Take 1 tablet (81 mg) by mouth once daily. Historical Provider, MD Taking Active   bumetanide (Bumex) 0.5 mg tablet [id number] Yes Take 1 tablet (0.5 mg) by mouth 2 times a week. On Tuesdays and Saturdays   Patient taking differently: Take 1 tablet (0.5 mg) by mouth once daily. On Tuesdays and Saturdays    JASMIN Freitas Taking Active   Calcium Antacid 200 mg calcium (500 mg) chewable tablet [id number] Yes Chew 1 tablet (500 mg) every 12 hours if needed. Historical Provider, MD Taking Active   famotidine (Pepcid) 40 mg tablet [id number] Yes Take 1 tablet (40 mg) by mouth once daily. Do not start before February 3, 2024. JASMIN Freitas Taking Active   Farxiga 10 mg [id number] Yes Take 1 tablet (10 mg) by mouth once daily. Historical Provider, MD Taking Active   heparin flush,porcine,-0.9NaCl 100 unit/mL kit [id number] Yes 5 mL by central venous line infusion route 1 (one) time per week. Indications: prevent clot from blocking an intravenous catheter Historical Provider, MD Taking Active   ipratropium-albuteroL (Duo-Neb) 0.5-2.5 mg/3 mL nebulizer solution [id number] Yes Take 3 mL by nebulization every 6 hours. Historical Provider, MD Taking Active   milrinone in 5 % dextrose (Primacor) 40 mg/200 mL (200 mcg/mL) infusion [id number] Yes Infuse 8.3125 mcg/min at 2.49 mL/hr  "into a venous catheter continuously. Via R chest LEROY. Dose = 0.125mcg/kg/min most recent weight = 66.5kg from 2/2/24 Sonia Lawrence MD Taking Active     Discontinued 03/18/24 1040     Discontinued 03/18/24 1040   OneTouch Verio Flex meter misc [id number] Yes USE AS DIRECTED THREE TIMES DAILY Historical Provider, MD Taking Active   sodium chloride (Ocean) 0.65 % nasal spray [id number] Yes Administer 1 spray into each nostril if needed for congestion. Historical Provider, MD Taking Active   sodium chloride 0.9% (sodium chloride) flush [id number] Yes Infuse 10 mL into a venous catheter 1 (one) time per week. Historical Provider, MD Taking Active   spironolactone (Aldactone) 25 mg tablet [id number] Yes Take 1 tablet (25 mg) by mouth once daily. Do not start before February 3, 2024. Ana Mccall, APRN-CNP Taking Active   Symbicort 80-4.5 mcg/actuation inhaler [id number] Yes Inhale 2 puffs twice a day. Historical Provider, MD Taking Active     Discontinued 03/18/24 1041                      Allergies   Allergen Reactions    Metoprolol Other, Shortness of breath and Respiratory depression    Ticagrelor Anaphylaxis and Other     Feels a severe \"burning feeling\" in her chest    Gadolinium-Containing Contrast Media Hives and Other    Iodinated Contrast Media Hives and Other    Statins-Hmg-Coa Reductase Inhibitors Other and Myalgia     Atorvastatin - hair loss    Prednisone Other     Increased blood sugar    Ace Inhibitors Other and Cough     COUGH    Fentanyl Dizziness and Drowsiness    Hydralazine Dermatitis, Rash and Nausea/vomiting    Nicorette [Nicotine (Polacrilex)] Nausea/vomiting     Nicorette gums and lozenges only. Okay with nicotine patches    Penicillins Rash and Unknown       Visit Vitals  /64   Pulse 93   Ht 1.626 m (5' 4\")   Wt 60.3 kg (133 lb)   SpO2 100%   BMI 22.83 kg/m²   Smoking Status Every Day   BSA 1.65 m²          Review of Systems   Constitutional: Negative for decreased appetite, weight " "gain and weight loss.   HENT:  Negative for hearing loss.    Eyes:  Negative for visual disturbance.   Cardiovascular: Negative for chest pain, dyspnea on exertion, irregular heartbeat, leg swelling, near-syncope, paroxysmal nocturnal dyspnea and syncope.   Respiratory:  Negative for cough, hemoptysis and shortness of breath.    Hematologic/Lymphatic: Does not bruise/bleed easily.   Skin:  Negative for rash.   Musculoskeletal:  Negative for arthritis and back pain.   Gastrointestinal:  Negative for hematochezia and melena.   Genitourinary:  Negative for hematuria.   Neurological:  Negative for focal weakness and weakness.   Psychiatric/Behavioral:  Negative for altered mental status and substance abuse.       Visit Vitals  /64   Pulse 93   Ht 1.626 m (5' 4\")   Wt 60.3 kg (133 lb)   SpO2 100%   BMI 22.83 kg/m²   Smoking Status Every Day   BSA 1.65 m²         Physical Exam    Very pleasant thin elderly AA woman in no apparent CP or painful distress  Well groomed   Neck: No JVD or HJR today  Chest wall: Right infraclavicular Wiley line covered in clean transparent bandages, c/d/i  CVS: HS 1,2. Gde 1-2 ESM at [facility]  Resp: CTA bilaterally  Abdomen:Mildly obese, SNT, BS wnl  Extremities: No pedal oedema  Skin: warm and dry  CNS: No gross deficits, AO x 4    Lab Results   Component Value Date    GLUCOSE 74 02/17/2024    CALCIUM 8.8 02/17/2024     02/17/2024    K 4.4 02/17/2024    CO2 24 02/17/2024     02/17/2024    BUN 13 02/17/2024    CREATININE 1.28 (H) 02/17/2024     Lab Results   Component Value Date    WBC 4.1 (L) 02/17/2024    HGB 9.2 (L) 02/17/2024    HCT 30.0 (L) 02/17/2024    MCV 93 02/17/2024     02/17/2024     IMPRESSION:     68 year old woman who presents for continued advanced heart failure care. She has a past medical history significant for HrEF (LVEF 15-20%, multiple RWMA on TTE 5/2020) CAD s/p NSTEMI s/p RIRI to LCX (Jan 2016), MVR s/p mitral valve repair in 6/2019; 29 mm Epic " bioprosthetic valve with Dr. Montana), COPD, HTN, HL, GERD, hx of CVA, DM.Ms Marinelli has declined durable LVAD therapies in the past and again today.  She was initiated on milrinone (milrinone dependent) 2/2024.    NYHA Functional Class: 2  ACC/AHA Stage D heart failure  Volume status: Euvolaemic  Perfusion status: Warm to touch     PLAN:     # Stage D HFrEF  -Continue milrinone     #Active smoking history  She does not want to quit.  Nicotine replacement therapy has been prescribed in the past    #Depression  Referred to psychology today    This note was transcribed using the Dragon Dictation system. There may be grammatical, punctuation, or verbiage errors that can occur with voice recognition programs.      Kathy Rivera MD PhD

## 2024-03-18 NOTE — PATIENT INSTRUCTIONS
Thank you for coming in today. If you have any questions or concerns, you may call the Heart Failure Office at [phone] option [phone], or [phone].  You may also contact our heart failure nursing team via email on [email or web address].    For quicker results set-up your  [employer] account to receive results and other correspondence directly to your phone.    Please bring all your pills/medications to your Cardiology appointments.    **  - Please make the following medication changes:  1. DECREASE Spironolactone 12.5 mg once a day     - Please have the following tests done:  1.Blood tests just before your next visit (RFP, BNP, CBC, iron, TIBC, ferritin)    You will be referred to the following teams, CALL  [phone] to schedule your appointments with:  1. Psychology ( depression)    2. Cardiac rehabilitation    - Please make an appointment to be seen in 2-3 months

## 2024-03-19 ENCOUNTER — HOME INFUSION (OUTPATIENT)
Dept: INFUSION THERAPY | Age: 69
End: 2024-03-19
Payer: COMMERCIAL

## 2024-03-19 PROCEDURE — 1090000001 HH PPS REVENUE CREDIT

## 2024-03-19 PROCEDURE — 1090000002 HH PPS REVENUE DEBIT

## 2024-03-19 NOTE — PROGRESS NOTES
Review of chart. Patient with order for continuous milrinone for CHF. Dr Rivera following for cardiology at home.    Review of rn visit notes. No problems noted with infusions or line.    Tel call with patient. Her weight today is 133lbs. She states infusions are going well and bag was just changed yesterday. She was unable to say if she has one or two bags left in the home. (Per last weeks note, bags were sent for hookup 3/15 3/17 3/19 and 3/21). She states she will have her daughter check tonight when she gets home from work and okay for [facility] to call back tomorrow to check inventory to determine when next delivery should be made.    Follow up 3/20/24 with patient for milrinone bag inventory and send further bags straight as appropriate.

## 2024-03-20 ENCOUNTER — DOCUMENTATION (OUTPATIENT)
Dept: PHARMACY | Facility: CLINIC | Age: 69
End: 2024-03-20

## 2024-03-20 ENCOUNTER — HOME INFUSION (OUTPATIENT)
Dept: INFUSION THERAPY | Age: 69
End: 2024-03-20
Payer: COMMERCIAL

## 2024-03-20 PROCEDURE — 1090000001 HH PPS REVENUE CREDIT

## 2024-03-20 PROCEDURE — 1090000002 HH PPS REVENUE DEBIT

## 2024-03-20 NOTE — PROGRESS NOTES
Review of chart. Patient infuses milrinone continuously for CHF. Dr Rivera following for cardiology at home.     Review of RN visit notes. No problems noted with infusions or line.     Tel call with patient. Weight 3/19 was 133lbs, confirmed weight today and confirmed no change in symptoms. She states bag will be changed tonight around 5pm, at which point her daughter will be home. Pt was unable to say how many bags she had remaining; she thinks she could have 3 bags. Last delivery bags were made 3/12; 9 day stability means that today should be the last bag she can use for hookup. Agreeable to delivery of 3 more bags today by 5pm with supplies/flushes to match.    Dispensing 3/20 with supplies and flushes to match for straight delivery:  3x milrinone 28/140mL CADD bags  DOS 3/22, 3/24, 3/26     Followup 3/25/24 for next fill straight. Check inventory and weight if able.

## 2024-03-21 PROCEDURE — 1090000002 HH PPS REVENUE DEBIT

## 2024-03-21 PROCEDURE — 1090000001 HH PPS REVENUE CREDIT

## 2024-03-22 ENCOUNTER — HOME CARE VISIT (OUTPATIENT)
Dept: HOME HEALTH SERVICES | Facility: HOME HEALTH | Age: 69
End: 2024-03-22
Payer: COMMERCIAL

## 2024-03-22 PROCEDURE — 1090000002 HH PPS REVENUE DEBIT

## 2024-03-22 PROCEDURE — 1090000001 HH PPS REVENUE CREDIT

## 2024-03-22 PROCEDURE — G0299 HHS/HOSPICE OF RN EA 15 MIN: HCPCS | Mod: HHH

## 2024-03-23 VITALS
SYSTOLIC BLOOD PRESSURE: 98 MMHG | DIASTOLIC BLOOD PRESSURE: 60 MMHG | HEART RATE: 66 BPM | RESPIRATION RATE: 16 BRPM | OXYGEN SATURATION: 98 % | TEMPERATURE: 98.6 F

## 2024-03-23 PROCEDURE — 1090000001 HH PPS REVENUE CREDIT

## 2024-03-23 PROCEDURE — 1090000002 HH PPS REVENUE DEBIT

## 2024-03-23 ASSESSMENT — ENCOUNTER SYMPTOMS
OCCASIONAL FEELINGS OF UNSTEADINESS: 1
CHANGE IN APPETITE: UNCHANGED
DEPRESSION: 0
LOSS OF SENSATION IN FEET: 0
LAST BOWEL MOVEMENT: 66920
DENIES PAIN: 1
APPETITE LEVEL: FAIR

## 2024-03-23 ASSESSMENT — PAIN SCALES - PAIN ASSESSMENT IN ADVANCED DEMENTIA (PAINAD)
BREATHING: 0
FACIALEXPRESSION: 0
BODYLANGUAGE: 0 - RELAXED.
BODYLANGUAGE: 0
CONSOLABILITY: 0
FACIALEXPRESSION: 0 - SMILING OR INEXPRESSIVE.
CONSOLABILITY: 0 - NO NEED TO CONSOLE.
TOTALSCORE: 0
NEGVOCALIZATION: 0
NEGVOCALIZATION: 0 - NONE.

## 2024-03-24 PROCEDURE — 1090000002 HH PPS REVENUE DEBIT

## 2024-03-24 PROCEDURE — 1090000001 HH PPS REVENUE CREDIT

## 2024-03-25 ENCOUNTER — HOME INFUSION (OUTPATIENT)
Dept: INFUSION THERAPY | Age: 69
End: 2024-03-25
Payer: COMMERCIAL

## 2024-03-25 PROCEDURE — 1090000002 HH PPS REVENUE DEBIT

## 2024-03-25 PROCEDURE — 1090000001 HH PPS REVENUE CREDIT

## 2024-03-25 NOTE — PROGRESS NOTES
Review of chart. Patient infuses milrinone continuously for CHF. Dr Rivera following for cardiology at home.     Review of RN visit notes. No problems noted with infusions or line. Noted dressing was loose but no other issues.     Tel call with patient. Weight from 3/20 was 133lbs. States bag was changed this morning 3/25, and has one bag remaining for 3/27. Agreeable to delivery of 3 more bags tomorrow 3/26 afternoon or evening with standard supplies/flushes to match.     Dispensing 3/26 with supplies and flushes to match for straight delivery:  3x milrinone 28/140mL CADD bags  DOS 3/29, 3/31, 4/2     Followup 4/1/24 for next fill straight. Check weight. Verify hookup dates

## 2024-03-26 ENCOUNTER — DOCUMENTATION (OUTPATIENT)
Dept: PHARMACY | Facility: CLINIC | Age: 69
End: 2024-03-26

## 2024-03-26 PROCEDURE — 1090000002 HH PPS REVENUE DEBIT

## 2024-03-26 PROCEDURE — 1090000001 HH PPS REVENUE CREDIT

## 2024-03-26 NOTE — PROGRESS NOTES
SPOKE W/ PT - DELIVERY IS SCHEDULED FOR TODAY BY 9 PM.  ASKED PT IF THERE ARE ANY QUESTIONS TO A PHARMACIST. PT SAID: NO QUESTIONS.

## 2024-03-27 PROCEDURE — 1090000001 HH PPS REVENUE CREDIT

## 2024-03-27 PROCEDURE — 1090000002 HH PPS REVENUE DEBIT

## 2024-03-28 ENCOUNTER — HOME CARE VISIT (OUTPATIENT)
Dept: HOME HEALTH SERVICES | Facility: HOME HEALTH | Age: 69
End: 2024-03-28
Payer: COMMERCIAL

## 2024-03-28 PROCEDURE — 1090000001 HH PPS REVENUE CREDIT

## 2024-03-28 PROCEDURE — 1090000002 HH PPS REVENUE DEBIT

## 2024-03-29 PROCEDURE — 1090000002 HH PPS REVENUE DEBIT

## 2024-03-29 PROCEDURE — 1090000001 HH PPS REVENUE CREDIT

## 2024-03-30 PROCEDURE — 1090000002 HH PPS REVENUE DEBIT

## 2024-03-30 PROCEDURE — 1090000001 HH PPS REVENUE CREDIT

## 2024-03-31 PROCEDURE — 1090000001 HH PPS REVENUE CREDIT

## 2024-03-31 PROCEDURE — 1090000002 HH PPS REVENUE DEBIT

## 2024-04-01 ENCOUNTER — HOME INFUSION (OUTPATIENT)
Dept: INFUSION THERAPY | Age: 69
End: 2024-04-01
Payer: COMMERCIAL

## 2024-04-01 ENCOUNTER — DOCUMENTATION (OUTPATIENT)
Dept: PHARMACY | Facility: CLINIC | Age: 69
End: 2024-04-01

## 2024-04-01 PROCEDURE — 1090000002 HH PPS REVENUE DEBIT

## 2024-04-01 PROCEDURE — 1090000001 HH PPS REVENUE CREDIT

## 2024-04-01 NOTE — PROGRESS NOTES
Review of chart. Patient infuses milrinone continuously for CHF. Dr Rivera following for cardiology at home.     Review of RN visit notes. No problems noted with infusions or line. Noted dressing was loose but no other issues.    [facility] call to patient, spoke with daughter. Infusions tolerated well, weight stable at 135 lbs last checked 3/31. Confirmed 1 bag remaining in home for 4/2 hook up. Agreeable to delivery of 4x milrinone bags this evening.    Pharmacy to mix 4/1 and deliver straight with supplies to match:  4x milrinone 48hr CADD bags  Hook up: 4/4, 4/6, 4/8, 4/10    Follow up 4/9 - check weight deliver straight

## 2024-04-02 PROCEDURE — 1090000002 HH PPS REVENUE DEBIT

## 2024-04-02 PROCEDURE — 1090000001 HH PPS REVENUE CREDIT

## 2024-04-03 PROCEDURE — 1090000001 HH PPS REVENUE CREDIT

## 2024-04-03 PROCEDURE — 1090000002 HH PPS REVENUE DEBIT

## 2024-04-04 ENCOUNTER — HOME CARE VISIT (OUTPATIENT)
Dept: HOME HEALTH SERVICES | Facility: HOME HEALTH | Age: 69
End: 2024-04-04
Payer: COMMERCIAL

## 2024-04-04 PROCEDURE — 1090000002 HH PPS REVENUE DEBIT

## 2024-04-04 PROCEDURE — G0162 HHC RN E&M PLAN SVS, 15 MIN: HCPCS | Mod: HHH

## 2024-04-04 PROCEDURE — 1090000001 HH PPS REVENUE CREDIT

## 2024-04-05 PROCEDURE — 400014 HH F/U

## 2024-04-05 PROCEDURE — 1090000002 HH PPS REVENUE DEBIT

## 2024-04-05 PROCEDURE — 1090000001 HH PPS REVENUE CREDIT

## 2024-04-05 ASSESSMENT — ACTIVITIES OF DAILY LIVING (ADL)
OASIS_M1830: 05
ENTERING_EXITING_HOME: NEEDS ASSISTANCE

## 2024-04-05 ASSESSMENT — PAIN SCALES - PAIN ASSESSMENT IN ADVANCED DEMENTIA (PAINAD)
TOTALSCORE: 0
BODYLANGUAGE: 0
NEGVOCALIZATION: 0 - NONE.
FACIALEXPRESSION: 0 - SMILING OR INEXPRESSIVE.
CONSOLABILITY: 0
FACIALEXPRESSION: 0
BREATHING: 0
BODYLANGUAGE: 0 - RELAXED.
CONSOLABILITY: 0 - NO NEED TO CONSOLE.
NEGVOCALIZATION: 0

## 2024-04-05 ASSESSMENT — ENCOUNTER SYMPTOMS
OCCASIONAL FEELINGS OF UNSTEADINESS: 0
DENIES PAIN: 1
APPETITE LEVEL: FAIR
CHANGE IN APPETITE: UNCHANGED
DEPRESSION: 0
LOSS OF SENSATION IN FEET: 0

## 2024-04-06 PROCEDURE — 1090000001 HH PPS REVENUE CREDIT

## 2024-04-06 PROCEDURE — 1090000002 HH PPS REVENUE DEBIT

## 2024-04-07 PROCEDURE — 1090000001 HH PPS REVENUE CREDIT

## 2024-04-07 PROCEDURE — 1090000002 HH PPS REVENUE DEBIT

## 2024-04-08 PROCEDURE — 1090000001 HH PPS REVENUE CREDIT

## 2024-04-08 PROCEDURE — 1090000002 HH PPS REVENUE DEBIT

## 2024-04-09 ENCOUNTER — HOME INFUSION (OUTPATIENT)
Dept: INFUSION THERAPY | Age: 69
End: 2024-04-09
Payer: COMMERCIAL

## 2024-04-09 ENCOUNTER — DOCUMENTATION (OUTPATIENT)
Dept: PHARMACY | Facility: CLINIC | Age: 69
End: 2024-04-09

## 2024-04-09 PROCEDURE — 1090000002 HH PPS REVENUE DEBIT

## 2024-04-09 PROCEDURE — G0179 MD RECERTIFICATION HHA PT: HCPCS | Performed by: STUDENT IN AN ORGANIZED HEALTH CARE EDUCATION/TRAINING PROGRAM

## 2024-04-09 PROCEDURE — 1090000001 HH PPS REVENUE CREDIT

## 2024-04-09 NOTE — PROGRESS NOTES
SPOKE W/ PT - DELIVERY IS SCHEDULED FOR TUESDAY BY 9 PM.  ASKED PT IF THERE ARE ANY QUESTIONS TO A PHARMACIST. PT SAID: NO QUESTIONS.

## 2024-04-09 NOTE — PROGRESS NOTES
Review of chart. Patient infuses milrinone continuously for CHF. Dr Rivera following for cardiology at home.      4/4 RN notes unremarkable      h call to patient, confirmed 1 bag remaining in home for 4/10 hook up. Agreeable to delivery of 3x milrinone bags today with supplies/flushes to match. Most recent weight 135 lbs.     Pharmacy to mix 4/9 and deliver straight with supplies to match:  3x milrinone 48hr CADD bags  Hook up: 4/12, 4/14, 4/16     Follow up 4/15 - check weight, deliver straight

## 2024-04-10 PROCEDURE — 1090000002 HH PPS REVENUE DEBIT

## 2024-04-10 PROCEDURE — 1090000001 HH PPS REVENUE CREDIT

## 2024-04-11 PROCEDURE — 1090000002 HH PPS REVENUE DEBIT

## 2024-04-11 PROCEDURE — 1090000001 HH PPS REVENUE CREDIT

## 2024-04-12 ENCOUNTER — HOME CARE VISIT (OUTPATIENT)
Dept: HOME HEALTH SERVICES | Facility: HOME HEALTH | Age: 69
End: 2024-04-12
Payer: COMMERCIAL

## 2024-04-12 VITALS
TEMPERATURE: 98.6 F | RESPIRATION RATE: 16 BRPM | HEART RATE: 86 BPM | SYSTOLIC BLOOD PRESSURE: 100 MMHG | OXYGEN SATURATION: 98 % | BODY MASS INDEX: 22.83 KG/M2 | DIASTOLIC BLOOD PRESSURE: 70 MMHG | WEIGHT: 133 LBS

## 2024-04-12 PROCEDURE — 400014 HH F/U

## 2024-04-12 PROCEDURE — G0299 HHS/HOSPICE OF RN EA 15 MIN: HCPCS | Mod: HHH

## 2024-04-12 PROCEDURE — 1090000002 HH PPS REVENUE DEBIT

## 2024-04-12 PROCEDURE — 1090000001 HH PPS REVENUE CREDIT

## 2024-04-12 RX ADMIN — Medication 10 ML: at 09:51

## 2024-04-13 PROCEDURE — 1090000001 HH PPS REVENUE CREDIT

## 2024-04-13 PROCEDURE — 1090000002 HH PPS REVENUE DEBIT

## 2024-04-14 PROCEDURE — 1090000001 HH PPS REVENUE CREDIT

## 2024-04-14 PROCEDURE — 1090000002 HH PPS REVENUE DEBIT

## 2024-04-14 ASSESSMENT — ENCOUNTER SYMPTOMS
OCCASIONAL FEELINGS OF UNSTEADINESS: 0
DENIES PAIN: 1
FATIGUES EASILY: 1
DEPRESSION: 0
APPETITE LEVEL: GOOD
HYPOTENSION: 1
CHANGE IN APPETITE: UNCHANGED
LOSS OF SENSATION IN FEET: 1

## 2024-04-14 ASSESSMENT — PAIN SCALES - PAIN ASSESSMENT IN ADVANCED DEMENTIA (PAINAD)
NEGVOCALIZATION: 0 - NONE.
BREATHING: 0
FACIALEXPRESSION: 0 - SMILING OR INEXPRESSIVE.
CONSOLABILITY: 0
BODYLANGUAGE: 0
FACIALEXPRESSION: 0
NEGVOCALIZATION: 0
BODYLANGUAGE: 0 - RELAXED.
TOTALSCORE: 0
CONSOLABILITY: 0 - NO NEED TO CONSOLE.

## 2024-04-15 ENCOUNTER — DOCUMENTATION (OUTPATIENT)
Dept: PHARMACY | Facility: CLINIC | Age: 69
End: 2024-04-15

## 2024-04-15 ENCOUNTER — HOME INFUSION (OUTPATIENT)
Dept: INFUSION THERAPY | Age: 69
End: 2024-04-15
Payer: COMMERCIAL

## 2024-04-15 PROCEDURE — 1090000001 HH PPS REVENUE CREDIT

## 2024-04-15 PROCEDURE — 1090000002 HH PPS REVENUE DEBIT

## 2024-04-15 NOTE — PROGRESS NOTES
Review of chart. Patient infuses milrinone continuously for CHF. Dr Rivera following for cardiology at home.      4/12 RN notes unremarkable     h call to daughter, confirmed hooked up a bag 4/14 and has one bag for 4/16. Infusions going well with no issues. Agreeable to delivery of 4x milrinone bags today with a dressing change kit to match. Does not need any flushes with this delivery, or tubing (tubing attached to bags in clean room). No update to pt weight but no symptoms.     Pharmacy to mix 4/15 and deliver straight with supplies to match:  4x milrinone 48hr CADD bags  Hook up: 4/18, 4/20, 4/22, 4/24     Follow up 4/23 - check weight, deliver straight

## 2024-04-15 NOTE — PROGRESS NOTES
SPOKE W/ PT - DELIVERY IS SCHEDULED FOR MONDAY BY 9 PM.  ASKED PT IF THERE ARE ANY QUESTIONS TO A PHARMACIST. PT SAID: NO QUESTIONS.

## 2024-04-16 PROCEDURE — 1090000001 HH PPS REVENUE CREDIT

## 2024-04-16 PROCEDURE — 1090000002 HH PPS REVENUE DEBIT

## 2024-04-17 PROCEDURE — 1090000002 HH PPS REVENUE DEBIT

## 2024-04-17 PROCEDURE — 1090000001 HH PPS REVENUE CREDIT

## 2024-04-18 ENCOUNTER — HOME CARE VISIT (OUTPATIENT)
Dept: HOME HEALTH SERVICES | Facility: HOME HEALTH | Age: 69
End: 2024-04-18
Payer: COMMERCIAL

## 2024-04-18 VITALS
SYSTOLIC BLOOD PRESSURE: 104 MMHG | OXYGEN SATURATION: 100 % | TEMPERATURE: 99.2 F | DIASTOLIC BLOOD PRESSURE: 64 MMHG | HEART RATE: 82 BPM

## 2024-04-18 PROCEDURE — 1090000002 HH PPS REVENUE DEBIT

## 2024-04-18 PROCEDURE — 1090000001 HH PPS REVENUE CREDIT

## 2024-04-18 PROCEDURE — G0299 HHS/HOSPICE OF RN EA 15 MIN: HCPCS | Mod: HHH

## 2024-04-18 RX ADMIN — Medication 10 ML: at 10:05

## 2024-04-18 ASSESSMENT — ENCOUNTER SYMPTOMS
LAST BOWEL MOVEMENT: 66944
DENIES PAIN: 1
HYPOTENSION: 1
PERSON REPORTING PAIN: PATIENT
APPETITE LEVEL: GOOD

## 2024-04-19 PROCEDURE — 1090000001 HH PPS REVENUE CREDIT

## 2024-04-19 PROCEDURE — 1090000002 HH PPS REVENUE DEBIT

## 2024-04-20 PROCEDURE — 1090000001 HH PPS REVENUE CREDIT

## 2024-04-20 PROCEDURE — 1090000002 HH PPS REVENUE DEBIT

## 2024-04-21 PROCEDURE — 1090000002 HH PPS REVENUE DEBIT

## 2024-04-21 PROCEDURE — 1090000001 HH PPS REVENUE CREDIT

## 2024-04-22 ENCOUNTER — HOSPITAL ENCOUNTER (OUTPATIENT)
Dept: CARDIOLOGY | Facility: CLINIC | Age: 69
Discharge: HOME | End: 2024-04-22
Payer: COMMERCIAL

## 2024-04-22 ENCOUNTER — HOME INFUSION (OUTPATIENT)
Dept: INFUSION THERAPY | Age: 69
End: 2024-04-22
Payer: COMMERCIAL

## 2024-04-22 DIAGNOSIS — Z95.810 PRESENCE OF AUTOMATIC (IMPLANTABLE) CARDIAC DEFIBRILLATOR: ICD-10-CM

## 2024-04-22 DIAGNOSIS — I42.0 DILATED CARDIOMYOPATHY (MULTI): ICD-10-CM

## 2024-04-22 DIAGNOSIS — I50.9 HEART FAILURE (MULTI): ICD-10-CM

## 2024-04-22 PROCEDURE — 1090000001 HH PPS REVENUE CREDIT

## 2024-04-22 PROCEDURE — 1090000002 HH PPS REVENUE DEBIT

## 2024-04-22 NOTE — PROGRESS NOTES
Review of chart. Patient infuses milrinone continuously for CHF. Dosed at 0.125mcg/kg per minute with dosing weight of 66.5kg (range of 138-153 lbs) Dr Rivera following for cardiology at home.     Review of RN visit notes. No problems noted with infusions or line. Patient retaught to not flush with heparin at bag changes.     [facility] spoke with pt, current weight down to 130 lbs. Was maintaining at 133 lbs and was palliative so not adjusted. Patient had bout of diarrhea to explain, but is feeling well now. Confirmed bag change for tomorrow and Thursday with bags in home. Is infusing bags until empty in home. She is agreeable to delivery Wednesday for 3 more bags of meds with supplies/flushes to match.      Processed refill for 3x 48hr bags for 04/23 mix and OVN delivery to cover bag changes 4/27, 4/29, and 5/1/24.     Followup 4/30/24 for next fill straight. Check inventory and weight if able. If further decrease, may need to update rate.

## 2024-04-23 ENCOUNTER — DOCUMENTATION (OUTPATIENT)
Dept: PHARMACY | Facility: CLINIC | Age: 69
End: 2024-04-23

## 2024-04-23 PROCEDURE — 1090000001 HH PPS REVENUE CREDIT

## 2024-04-23 PROCEDURE — 1090000002 HH PPS REVENUE DEBIT

## 2024-04-24 PROCEDURE — 1090000001 HH PPS REVENUE CREDIT

## 2024-04-24 PROCEDURE — 1090000002 HH PPS REVENUE DEBIT

## 2024-04-25 PROCEDURE — 1090000001 HH PPS REVENUE CREDIT

## 2024-04-25 PROCEDURE — 1090000002 HH PPS REVENUE DEBIT

## 2024-04-26 ENCOUNTER — HOME CARE VISIT (OUTPATIENT)
Dept: HOME HEALTH SERVICES | Facility: HOME HEALTH | Age: 69
End: 2024-04-26
Payer: COMMERCIAL

## 2024-04-26 VITALS
OXYGEN SATURATION: 96 % | WEIGHT: 129 LBS | HEART RATE: 86 BPM | TEMPERATURE: 98.6 F | DIASTOLIC BLOOD PRESSURE: 68 MMHG | BODY MASS INDEX: 22.14 KG/M2 | SYSTOLIC BLOOD PRESSURE: 112 MMHG | RESPIRATION RATE: 16 BRPM

## 2024-04-26 PROCEDURE — 1090000001 HH PPS REVENUE CREDIT

## 2024-04-26 PROCEDURE — 1090000002 HH PPS REVENUE DEBIT

## 2024-04-26 PROCEDURE — G0299 HHS/HOSPICE OF RN EA 15 MIN: HCPCS | Mod: HHH

## 2024-04-26 RX ADMIN — Medication 10 ML: at 10:00

## 2024-04-26 RX ADMIN — Medication 10 ML: at 09:45

## 2024-04-27 PROCEDURE — 1090000001 HH PPS REVENUE CREDIT

## 2024-04-27 PROCEDURE — 1090000002 HH PPS REVENUE DEBIT

## 2024-04-28 PROCEDURE — 1090000001 HH PPS REVENUE CREDIT

## 2024-04-28 PROCEDURE — 1090000002 HH PPS REVENUE DEBIT

## 2024-04-28 ASSESSMENT — PAIN SCALES - PAIN ASSESSMENT IN ADVANCED DEMENTIA (PAINAD)
NEGVOCALIZATION: 0 - NONE.
CONSOLABILITY: 0
BODYLANGUAGE: 0
NEGVOCALIZATION: 0
TOTALSCORE: 0
BREATHING: 0
FACIALEXPRESSION: 0
CONSOLABILITY: 0 - NO NEED TO CONSOLE.
BODYLANGUAGE: 0 - RELAXED.
FACIALEXPRESSION: 0 - SMILING OR INEXPRESSIVE.

## 2024-04-28 ASSESSMENT — ENCOUNTER SYMPTOMS
APPETITE LEVEL: FAIR
LAST BOWEL MOVEMENT: 66956
PAIN: 1
HIGHEST PAIN SEVERITY IN PAST 24 HOURS: 4/10
DEPRESSION: 0
LOSS OF SENSATION IN FEET: 0
CHANGE IN APPETITE: UNCHANGED
PAIN SEVERITY GOAL: 0/10
SUBJECTIVE PAIN PROGRESSION: UNCHANGED
OCCASIONAL FEELINGS OF UNSTEADINESS: 1
LOWEST PAIN SEVERITY IN PAST 24 HOURS: 2/10

## 2024-04-29 PROCEDURE — 1090000002 HH PPS REVENUE DEBIT

## 2024-04-29 PROCEDURE — 1090000001 HH PPS REVENUE CREDIT

## 2024-04-30 ENCOUNTER — DOCUMENTATION (OUTPATIENT)
Dept: PHARMACY | Facility: CLINIC | Age: 69
End: 2024-04-30

## 2024-04-30 ENCOUNTER — HOME INFUSION (OUTPATIENT)
Dept: INFUSION THERAPY | Age: 69
End: 2024-04-30
Payer: COMMERCIAL

## 2024-04-30 DIAGNOSIS — I50.23 ACUTE ON CHRONIC SYSTOLIC HEART FAILURE (MULTI): ICD-10-CM

## 2024-04-30 PROCEDURE — 1090000002 HH PPS REVENUE DEBIT

## 2024-04-30 PROCEDURE — 1090000001 HH PPS REVENUE CREDIT

## 2024-04-30 RX ORDER — MILRINONE LACTATE 0.2 MG/ML
0.12 INJECTION, SOLUTION INTRAVENOUS CONTINUOUS
Qty: 10000 ML | Refills: 11 | Status: SHIPPED
Start: 2024-04-30 | End: 2025-04-30

## 2024-04-30 NOTE — PROGRESS NOTES
Review of chart. Patient infuses milrinone continuously for CHF. Dosed at 0.125mcg/kg per minute with dosing weight of 66.5kg (range of 138-153 lbs) Dr Rivera following for cardiology at home.     Review of RN visit notes. No problems noted with infusions or line. Patient retaught to not flush with heparin at bag changes.     [facility] spoke with pt, current weight still down to 129 lbs. Was maintaining at 133 lbs and was palliative so previously not adjusted. [facility] contacted Dr Rivera and she agreed that rate needs to be adjusted to reflect current weight. New rate will be 2.2ml/hr. Patient will use bag in home on 05/01 and start new bags and new pumps with 05/03/24 bag exchange. Orders placed in EPIC.    [facility] relayed orders to patient. Agrees with plan to start new bags and pumps on Friday 05/03/24. Agrees to delivery tonight of 3 bags milrinone and 2 CADD pumps.     Formula checked by second pharmacist. New dosing range will be 122-134 lbs.     Processed fill for 3x 48hr bags with tubing attached and 2 CADD pumps for 04/30 mix and atraight delivery to cover bag changes 5/3, 5/5, and 5/7/24.     Followup 5/06/24 for next fill straight. Check inventory and weight if able.

## 2024-05-01 PROCEDURE — 1090000001 HH PPS REVENUE CREDIT

## 2024-05-01 PROCEDURE — 1090000002 HH PPS REVENUE DEBIT

## 2024-05-02 PROCEDURE — 1090000001 HH PPS REVENUE CREDIT

## 2024-05-02 PROCEDURE — 1090000002 HH PPS REVENUE DEBIT

## 2024-05-03 ENCOUNTER — HOME CARE VISIT (OUTPATIENT)
Dept: HOME HEALTH SERVICES | Facility: HOME HEALTH | Age: 69
End: 2024-05-03
Payer: COMMERCIAL

## 2024-05-03 PROCEDURE — 1090000002 HH PPS REVENUE DEBIT

## 2024-05-03 PROCEDURE — G0299 HHS/HOSPICE OF RN EA 15 MIN: HCPCS | Mod: HHH

## 2024-05-03 PROCEDURE — 1090000001 HH PPS REVENUE CREDIT

## 2024-05-03 RX ADMIN — Medication 10 ML: at 09:51

## 2024-05-04 PROCEDURE — 1090000001 HH PPS REVENUE CREDIT

## 2024-05-04 PROCEDURE — 1090000002 HH PPS REVENUE DEBIT

## 2024-05-04 ASSESSMENT — ENCOUNTER SYMPTOMS
APPETITE LEVEL: FAIR
LOWEST PAIN SEVERITY IN PAST 24 HOURS: 4/10
PAIN: 1
PAIN SEVERITY GOAL: 0/10
FATIGUES EASILY: 1
OCCASIONAL FEELINGS OF UNSTEADINESS: 0
HIGHEST PAIN SEVERITY IN PAST 24 HOURS: 8/10
CHANGE IN APPETITE: UNCHANGED

## 2024-05-04 ASSESSMENT — PAIN SCALES - PAIN ASSESSMENT IN ADVANCED DEMENTIA (PAINAD)
TOTALSCORE: 1
BODYLANGUAGE: 0 - RELAXED.
NEGVOCALIZATION: 1
FACIALEXPRESSION: 0
FACIALEXPRESSION: 0 - SMILING OR INEXPRESSIVE.
CONSOLABILITY: 0 - NO NEED TO CONSOLE.
BREATHING: 0
BODYLANGUAGE: 0
NEGVOCALIZATION: 1 - OCCASIONAL MOAN OR GROAN. LOW-LEVEL SPEECH WITH A NEGATIVE OR DISAPPROVING QUALITY.
CONSOLABILITY: 0

## 2024-05-05 PROCEDURE — 1090000001 HH PPS REVENUE CREDIT

## 2024-05-05 PROCEDURE — 1090000002 HH PPS REVENUE DEBIT

## 2024-05-06 ENCOUNTER — DOCUMENTATION (OUTPATIENT)
Dept: PHARMACY | Facility: CLINIC | Age: 69
End: 2024-05-06

## 2024-05-06 ENCOUNTER — HOME INFUSION (OUTPATIENT)
Dept: INFUSION THERAPY | Age: 69
End: 2024-05-06
Payer: COMMERCIAL

## 2024-05-06 ENCOUNTER — HOSPITAL ENCOUNTER (OUTPATIENT)
Dept: CARDIOLOGY | Facility: CLINIC | Age: 69
Discharge: HOME | End: 2024-05-06
Payer: COMMERCIAL

## 2024-05-06 DIAGNOSIS — I50.9 HEART FAILURE (MULTI): ICD-10-CM

## 2024-05-06 DIAGNOSIS — Z95.810 PRESENCE OF AUTOMATIC (IMPLANTABLE) CARDIAC DEFIBRILLATOR: ICD-10-CM

## 2024-05-06 DIAGNOSIS — I42.0 DILATED CARDIOMYOPATHY (MULTI): ICD-10-CM

## 2024-05-06 PROCEDURE — 93296 REM INTERROG EVL PM/IDS: CPT

## 2024-05-06 PROCEDURE — 1090000001 HH PPS REVENUE CREDIT

## 2024-05-06 PROCEDURE — 1090000002 HH PPS REVENUE DEBIT

## 2024-05-06 NOTE — PROGRESS NOTES
Review of chart. Patient with order for milrinone continuously for CHF. Dose was adjusted last week d/t decreased weight of 129#. Dr Rivera follows for cardiology at home.    Review of RN visit notes. No problems with infusions or line noted. No new weight documented.    Tel call with patient. She confirmed infusions going well with new infusion rate. No new symptoms. Weight remains at 129#. Last bag change was yesterday and she has a bag in the home for bag change tomorrow. Patient is agreeable to delivery of further bags and supplies later today with  calling on the way.    Processed fill for 4 x 48hr milrinone bags for mix and straight delivery 5/6 to cover hookup 5/9 5/11 5/13 and 5/15/24.    Follow up 5/14/24 with next fill straight.

## 2024-05-07 ENCOUNTER — HOME CARE VISIT (OUTPATIENT)
Dept: HOME HEALTH SERVICES | Facility: HOME HEALTH | Age: 69
End: 2024-05-07
Payer: COMMERCIAL

## 2024-05-07 PROCEDURE — G0270 MNT SUBS TX FOR CHANGE DX: HCPCS | Mod: HHH

## 2024-05-07 PROCEDURE — 1090000002 HH PPS REVENUE DEBIT

## 2024-05-07 PROCEDURE — 400014 HH F/U

## 2024-05-07 PROCEDURE — 1090000001 HH PPS REVENUE CREDIT

## 2024-05-08 PROCEDURE — 1090000002 HH PPS REVENUE DEBIT

## 2024-05-08 PROCEDURE — 1090000001 HH PPS REVENUE CREDIT

## 2024-05-08 SDOH — HEALTH STABILITY: MENTAL HEALTH: NUTRITION HISTORY: PT HX CARB CONTROLLED DIET, PICKY EATER.

## 2024-05-09 PROCEDURE — 1090000001 HH PPS REVENUE CREDIT

## 2024-05-09 PROCEDURE — 1090000002 HH PPS REVENUE DEBIT

## 2024-05-10 ENCOUNTER — HOME CARE VISIT (OUTPATIENT)
Dept: HOME HEALTH SERVICES | Facility: HOME HEALTH | Age: 69
End: 2024-05-10
Payer: COMMERCIAL

## 2024-05-10 VITALS
DIASTOLIC BLOOD PRESSURE: 69 MMHG | SYSTOLIC BLOOD PRESSURE: 121 MMHG | OXYGEN SATURATION: 100 % | HEART RATE: 67 BPM | TEMPERATURE: 98.7 F

## 2024-05-10 PROCEDURE — G0299 HHS/HOSPICE OF RN EA 15 MIN: HCPCS | Mod: HHH

## 2024-05-10 PROCEDURE — 1090000002 HH PPS REVENUE DEBIT

## 2024-05-10 PROCEDURE — 1090000001 HH PPS REVENUE CREDIT

## 2024-05-10 ASSESSMENT — PAIN SCALES - PAIN ASSESSMENT IN ADVANCED DEMENTIA (PAINAD)
BODYLANGUAGE: 0
NEGVOCALIZATION: 0
NEGVOCALIZATION: 0 - NONE.
FACIALEXPRESSION: 0
BODYLANGUAGE: 0 - RELAXED.
FACIALEXPRESSION: 0 - SMILING OR INEXPRESSIVE.
BREATHING: 0

## 2024-05-10 ASSESSMENT — ENCOUNTER SYMPTOMS
PERSON REPORTING PAIN: PATIENT
PAIN LOCATION: RIGHT HIP
APPETITE LEVEL: GOOD
LOWEST PAIN SEVERITY IN PAST 24 HOURS: 0/10
LAST BOWEL MOVEMENT: 66969
PAIN LOCATION - RELIEVING FACTORS: TYLENOL
PAIN LOCATION - PAIN FREQUENCY: INTERMITTENT
PAIN: 1
HIGHEST PAIN SEVERITY IN PAST 24 HOURS: 10/10
PAIN LOCATION - PAIN SEVERITY: 9/10

## 2024-05-11 PROCEDURE — 1090000001 HH PPS REVENUE CREDIT

## 2024-05-11 PROCEDURE — 1090000002 HH PPS REVENUE DEBIT

## 2024-05-12 PROCEDURE — 1090000001 HH PPS REVENUE CREDIT

## 2024-05-12 PROCEDURE — 1090000002 HH PPS REVENUE DEBIT

## 2024-05-13 ENCOUNTER — HOME INFUSION (OUTPATIENT)
Dept: INFUSION THERAPY | Age: 69
End: 2024-05-13
Payer: COMMERCIAL

## 2024-05-13 PROCEDURE — 1090000002 HH PPS REVENUE DEBIT

## 2024-05-13 PROCEDURE — 1090000001 HH PPS REVENUE CREDIT

## 2024-05-13 NOTE — PROGRESS NOTES
Review of chart. Patient with order for milrinone continuously for CHF. Dose was adjusted two weeks ago d/t decreased weight of 129#. Dr Rivera follows for cardiology at home.     Review of RN visit notes. No problems with infusions or line noted. No new weight documented.    Tel call with pt's daughter. Confirmed infusions going well with new rate. No new symptoms. No updated weight. Pt still feels tired, sleeps a lot. Appetite has been up and down. Has been doing two glucerna shakes a day. Daughter will try to get updated weight tomorrow evening. Unsure how many bags are remaining - states she's been able to get to a regular hook up schedule based around her work schedule - Sunday, Tuesday evening, and Friday before work. Based on previous delivery, likely has two bags remaining for hookup Tuesday and Friday. Agreeable to delivery of three bags and supplies to match tomorrow evening. Ok to leave on front porch. Call daughter before delivering [phone]. Daughter usually gets home around 7pm, pt is home all day but sleeps a lot.    Pharmacy to mix 5/14 for straight delivery:  3x 48hr milrinone bags  Hookups 5/19, 5/21, 5/23    Follow up 5/20 check inventory, weight, OVN or forward as necessary

## 2024-05-14 ENCOUNTER — DOCUMENTATION (OUTPATIENT)
Dept: PHARMACY | Facility: CLINIC | Age: 69
End: 2024-05-14

## 2024-05-14 PROCEDURE — 1090000001 HH PPS REVENUE CREDIT

## 2024-05-14 PROCEDURE — 1090000002 HH PPS REVENUE DEBIT

## 2024-05-15 PROCEDURE — 1090000002 HH PPS REVENUE DEBIT

## 2024-05-15 PROCEDURE — 1090000001 HH PPS REVENUE CREDIT

## 2024-05-16 ENCOUNTER — HOME CARE VISIT (OUTPATIENT)
Dept: HOME HEALTH SERVICES | Facility: HOME HEALTH | Age: 69
End: 2024-05-16
Payer: COMMERCIAL

## 2024-05-16 PROCEDURE — G0299 HHS/HOSPICE OF RN EA 15 MIN: HCPCS | Mod: HHH

## 2024-05-16 PROCEDURE — 1090000001 HH PPS REVENUE CREDIT

## 2024-05-16 PROCEDURE — 1090000002 HH PPS REVENUE DEBIT

## 2024-05-16 RX ADMIN — Medication 10 ML: at 13:35

## 2024-05-17 VITALS
RESPIRATION RATE: 16 BRPM | DIASTOLIC BLOOD PRESSURE: 76 MMHG | WEIGHT: 127.06 LBS | TEMPERATURE: 97.6 F | HEART RATE: 68 BPM | BODY MASS INDEX: 21.81 KG/M2 | OXYGEN SATURATION: 98 % | SYSTOLIC BLOOD PRESSURE: 98 MMHG

## 2024-05-17 PROCEDURE — 1090000001 HH PPS REVENUE CREDIT

## 2024-05-17 PROCEDURE — 1090000002 HH PPS REVENUE DEBIT

## 2024-05-17 ASSESSMENT — PAIN SCALES - PAIN ASSESSMENT IN ADVANCED DEMENTIA (PAINAD)
FACIALEXPRESSION: 0 - SMILING OR INEXPRESSIVE.
NEGVOCALIZATION: 0
BODYLANGUAGE: 0 - RELAXED.
BREATHING: 0
TOTALSCORE: 0
CONSOLABILITY: 0
CONSOLABILITY: 0 - NO NEED TO CONSOLE.
FACIALEXPRESSION: 0
NEGVOCALIZATION: 0 - NONE.
BODYLANGUAGE: 0

## 2024-05-17 ASSESSMENT — ENCOUNTER SYMPTOMS
CHANGE IN APPETITE: UNCHANGED
PAIN: 1
LOWEST PAIN SEVERITY IN PAST 24 HOURS: 2/10
DEPRESSION: 0
SUBJECTIVE PAIN PROGRESSION: GRADUALLY IMPROVING
FATIGUES EASILY: 1
APPETITE LEVEL: FAIR
LAST BOWEL MOVEMENT: 66976
HIGHEST PAIN SEVERITY IN PAST 24 HOURS: 5/10
PAIN SEVERITY GOAL: 0/10
LOSS OF SENSATION IN FEET: 0
OCCASIONAL FEELINGS OF UNSTEADINESS: 1

## 2024-05-18 PROCEDURE — 1090000002 HH PPS REVENUE DEBIT

## 2024-05-18 PROCEDURE — 1090000001 HH PPS REVENUE CREDIT

## 2024-05-19 PROCEDURE — 1090000002 HH PPS REVENUE DEBIT

## 2024-05-19 PROCEDURE — 1090000001 HH PPS REVENUE CREDIT

## 2024-05-20 ENCOUNTER — DOCUMENTATION (OUTPATIENT)
Dept: PHARMACY | Facility: CLINIC | Age: 69
End: 2024-05-20

## 2024-05-20 ENCOUNTER — HOME INFUSION (OUTPATIENT)
Dept: INFUSION THERAPY | Age: 69
End: 2024-05-20
Payer: COMMERCIAL

## 2024-05-20 PROCEDURE — 1090000001 HH PPS REVENUE CREDIT

## 2024-05-20 PROCEDURE — 1090000002 HH PPS REVENUE DEBIT

## 2024-05-20 NOTE — PROGRESS NOTES
SPOKE W/ PT - DELIVERY IS SCHEDULED FOR WEDNESDAY BY 9 PM.  ASKED PT IF THERE ARE ANY QUESTIONS TO A PHARMACIST. PT SAID: NO QUESTIONS.

## 2024-05-20 NOTE — PROGRESS NOTES
Review of chart. Patient with order for milrinone continuously for CHF. Dose was adjusted three weeks ago d/t decreased weight of 129#. Dr Rivera follows for cardiology at home.     Review of RN visit notes. No problems with infusions or line noted. No new weight documented.     Tel call with patient and pt's daughter. Confirmed infusions going well with new rate. No new symptoms. Updated weight of 127 lbs. Patient has bags in home for Tuesday night and Friday am in home. Regular hook up schedule based around her work schedule - Sunday, Tuesday evening, and Friday before work. Agreeable to delivery of three bags and supplies to match Wednesday. Ok to leave on front porch. Call daughter before delivering [phone]. Daughter usually gets home around 7pm, pt is home all day but sleeps a lot.     Pharmacy to mix 5/22 for straight delivery:  3x 48hr milrinone bags with tubing attachedd  Hookups 5/26, 5/28, 5/31     Follow up 5/29 check inventory, weight, send milrinone straight

## 2024-05-21 PROCEDURE — 1090000001 HH PPS REVENUE CREDIT

## 2024-05-21 PROCEDURE — 1090000002 HH PPS REVENUE DEBIT

## 2024-05-22 PROCEDURE — 1090000002 HH PPS REVENUE DEBIT

## 2024-05-22 PROCEDURE — 1090000001 HH PPS REVENUE CREDIT

## 2024-05-23 ENCOUNTER — HOME CARE VISIT (OUTPATIENT)
Dept: HOME HEALTH SERVICES | Facility: HOME HEALTH | Age: 69
End: 2024-05-23
Payer: COMMERCIAL

## 2024-05-23 ENCOUNTER — HOME INFUSION (OUTPATIENT)
Dept: INFUSION THERAPY | Age: 69
End: 2024-05-23
Payer: COMMERCIAL

## 2024-05-23 VITALS
HEART RATE: 78 BPM | SYSTOLIC BLOOD PRESSURE: 98 MMHG | WEIGHT: 127 LBS | BODY MASS INDEX: 21.8 KG/M2 | RESPIRATION RATE: 16 BRPM | OXYGEN SATURATION: 96 % | TEMPERATURE: 96.6 F | DIASTOLIC BLOOD PRESSURE: 68 MMHG

## 2024-05-23 PROCEDURE — 1090000002 HH PPS REVENUE DEBIT

## 2024-05-23 PROCEDURE — 1090000001 HH PPS REVENUE CREDIT

## 2024-05-23 PROCEDURE — G0299 HHS/HOSPICE OF RN EA 15 MIN: HCPCS | Mod: HHH

## 2024-05-23 RX ADMIN — Medication 10 ML: at 11:00

## 2024-05-23 ASSESSMENT — PAIN SCALES - PAIN ASSESSMENT IN ADVANCED DEMENTIA (PAINAD)
BODYLANGUAGE: 0 - RELAXED.
BREATHING: 0
FACIALEXPRESSION: 0
CONSOLABILITY: 0 - NO NEED TO CONSOLE.
NEGVOCALIZATION: 0
FACIALEXPRESSION: 0 - SMILING OR INEXPRESSIVE.
BODYLANGUAGE: 0
CONSOLABILITY: 0
NEGVOCALIZATION: 0 - NONE.
TOTALSCORE: 0

## 2024-05-23 ASSESSMENT — ENCOUNTER SYMPTOMS
FATIGUES EASILY: 1
DENIES PAIN: 1
LAST BOWEL MOVEMENT: 66983
OCCASIONAL FEELINGS OF UNSTEADINESS: 0
APPETITE LEVEL: FAIR

## 2024-05-24 PROCEDURE — 1090000002 HH PPS REVENUE DEBIT

## 2024-05-24 PROCEDURE — 1090000001 HH PPS REVENUE CREDIT

## 2024-05-25 PROCEDURE — 1090000001 HH PPS REVENUE CREDIT

## 2024-05-25 PROCEDURE — 1090000002 HH PPS REVENUE DEBIT

## 2024-05-26 PROCEDURE — 1090000001 HH PPS REVENUE CREDIT

## 2024-05-26 PROCEDURE — 1090000002 HH PPS REVENUE DEBIT

## 2024-05-27 PROCEDURE — 1090000001 HH PPS REVENUE CREDIT

## 2024-05-27 PROCEDURE — 1090000002 HH PPS REVENUE DEBIT

## 2024-05-28 ENCOUNTER — HOME INFUSION (OUTPATIENT)
Dept: INFUSION THERAPY | Age: 69
End: 2024-05-28
Payer: COMMERCIAL

## 2024-05-28 ENCOUNTER — HOSPITAL ENCOUNTER (OUTPATIENT)
Dept: CARDIOLOGY | Facility: CLINIC | Age: 69
Discharge: HOME | End: 2024-05-28
Payer: COMMERCIAL

## 2024-05-28 DIAGNOSIS — I42.0 DILATED CARDIOMYOPATHY (MULTI): ICD-10-CM

## 2024-05-28 DIAGNOSIS — I50.9 HEART FAILURE (MULTI): ICD-10-CM

## 2024-05-28 DIAGNOSIS — Z95.810 PRESENCE OF AUTOMATIC (IMPLANTABLE) CARDIAC DEFIBRILLATOR: ICD-10-CM

## 2024-05-28 PROCEDURE — 1090000002 HH PPS REVENUE DEBIT

## 2024-05-28 PROCEDURE — 1090000001 HH PPS REVENUE CREDIT

## 2024-05-28 NOTE — PROGRESS NOTES
Review of chart. Patient with order for milrinone continuously for CHF. Dose was adjusted four weeks ago d/t decreased weight of 129#. Dr Rivera follows for cardiology at home.     Review of RN visit notes. No problems with infusions or line noted. New weight documented of 127 lbs.     Tel call with pt's daughter. Confirmed infusions going well. No new symptoms. Weight of 127 lbs confirmed. Patient has bags in home for Tuesday night and Friday am in home. Regular hook up schedule based around her work schedule - Sunday, Tuesday evening, and Friday before work. Agreeable to delivery of three bags and supplies to match Wednesday. Needs all supplies plus 2 dressing kits. Ok to leave on front porch. Call daughter before delivering [phone]. Daughter usually gets home around 7pm, pt is home all day but sleeps a lot.     Pharmacy to mix 5/29 for straight delivery:  3x 48hr milrinone bags with tubing attachedd  Hookups 6/2, 6/4, 6/7     Follow up 6/5 check inventory, weight, send 3 bags of milrinone straight

## 2024-05-29 ENCOUNTER — HOME CARE VISIT (OUTPATIENT)
Dept: HOME HEALTH SERVICES | Facility: HOME HEALTH | Age: 69
End: 2024-05-29
Payer: COMMERCIAL

## 2024-05-29 ENCOUNTER — DOCUMENTATION (OUTPATIENT)
Dept: PHARMACY | Facility: CLINIC | Age: 69
End: 2024-05-29

## 2024-05-29 VITALS
DIASTOLIC BLOOD PRESSURE: 80 MMHG | TEMPERATURE: 98.6 F | RESPIRATION RATE: 16 BRPM | BODY MASS INDEX: 21.63 KG/M2 | OXYGEN SATURATION: 99 % | SYSTOLIC BLOOD PRESSURE: 120 MMHG | HEART RATE: 68 BPM | WEIGHT: 126 LBS

## 2024-05-29 PROCEDURE — 1090000002 HH PPS REVENUE DEBIT

## 2024-05-29 PROCEDURE — 1090000001 HH PPS REVENUE CREDIT

## 2024-05-29 PROCEDURE — G0299 HHS/HOSPICE OF RN EA 15 MIN: HCPCS | Mod: HHH

## 2024-05-29 ASSESSMENT — ENCOUNTER SYMPTOMS
CHANGE IN APPETITE: UNCHANGED
OCCASIONAL FEELINGS OF UNSTEADINESS: 1
LAST BOWEL MOVEMENT: 66989
FATIGUES EASILY: 1
APPETITE LEVEL: FAIR

## 2024-05-29 NOTE — PROGRESS NOTES
SPOKE W/ PT - DELIVERY IS SCHEDULED FOR WEDNESDAY BY 9 PM.  ASKED PT IF THERE ARE ANY QUESTIONS TO A PHARMACIST. PT SAID: NO QUESTIONS.   opsite sent

## 2024-05-30 PROCEDURE — 1090000001 HH PPS REVENUE CREDIT

## 2024-05-30 PROCEDURE — 1090000002 HH PPS REVENUE DEBIT

## 2024-05-31 ENCOUNTER — HOME CARE VISIT (OUTPATIENT)
Dept: HOME HEALTH SERVICES | Facility: HOME HEALTH | Age: 69
End: 2024-05-31
Payer: COMMERCIAL

## 2024-05-31 PROCEDURE — G0299 HHS/HOSPICE OF RN EA 15 MIN: HCPCS | Mod: HHH

## 2024-05-31 PROCEDURE — 1090000002 HH PPS REVENUE DEBIT

## 2024-05-31 PROCEDURE — 1090000001 HH PPS REVENUE CREDIT

## 2024-05-31 ASSESSMENT — PAIN SCALES - PAIN ASSESSMENT IN ADVANCED DEMENTIA (PAINAD)
FACIALEXPRESSION: 0
BODYLANGUAGE: 0
TOTALSCORE: 0
NEGVOCALIZATION: 0 - NONE.
BODYLANGUAGE: 0 - RELAXED.
CONSOLABILITY: 0 - NO NEED TO CONSOLE.
CONSOLABILITY: 0
FACIALEXPRESSION: 0 - SMILING OR INEXPRESSIVE.
NEGVOCALIZATION: 0
BREATHING: 0

## 2024-05-31 ASSESSMENT — ENCOUNTER SYMPTOMS
DENIES PAIN: 1
LAST BOWEL MOVEMENT: 66991
DEPRESSION: 0
FATIGUES EASILY: 1
OCCASIONAL FEELINGS OF UNSTEADINESS: 1
LOSS OF SENSATION IN FEET: 0
CHANGE IN APPETITE: UNCHANGED
FORGETFULNESS: 1
APPETITE LEVEL: FAIR

## 2024-05-31 ASSESSMENT — ACTIVITIES OF DAILY LIVING (ADL)
OASIS_M1830: 05
ENTERING_EXITING_HOME: NEEDS ASSISTANCE

## 2024-06-01 PROCEDURE — 1090000001 HH PPS REVENUE CREDIT

## 2024-06-01 PROCEDURE — 1090000002 HH PPS REVENUE DEBIT

## 2024-06-02 ENCOUNTER — HOSPITAL ENCOUNTER (EMERGENCY)
Facility: HOSPITAL | Age: 69
Discharge: HOME | End: 2024-06-02
Attending: STUDENT IN AN ORGANIZED HEALTH CARE EDUCATION/TRAINING PROGRAM
Payer: COMMERCIAL

## 2024-06-02 VITALS
RESPIRATION RATE: 18 BRPM | OXYGEN SATURATION: 98 % | HEIGHT: 64 IN | DIASTOLIC BLOOD PRESSURE: 69 MMHG | SYSTOLIC BLOOD PRESSURE: 118 MMHG | HEART RATE: 92 BPM | WEIGHT: 127 LBS | TEMPERATURE: 98.2 F | BODY MASS INDEX: 21.68 KG/M2

## 2024-06-02 DIAGNOSIS — R04.0 EPISTAXIS: Primary | ICD-10-CM

## 2024-06-02 LAB
APTT PPP: 25 SECONDS (ref 27–38)
BASOPHILS # BLD AUTO: 0.04 X10*3/UL (ref 0–0.1)
BASOPHILS NFR BLD AUTO: 0.6 %
EOSINOPHIL # BLD AUTO: 1.42 X10*3/UL (ref 0–0.7)
EOSINOPHIL NFR BLD AUTO: 19.8 %
ERYTHROCYTE [DISTWIDTH] IN BLOOD BY AUTOMATED COUNT: 15.8 % (ref 11.5–14.5)
HCT VFR BLD AUTO: 41.9 % (ref 36–46)
HGB BLD-MCNC: 13.5 G/DL (ref 12–16)
HOLD SPECIMEN: NORMAL
IMM GRANULOCYTES # BLD AUTO: 0.02 X10*3/UL (ref 0–0.7)
IMM GRANULOCYTES NFR BLD AUTO: 0.3 % (ref 0–0.9)
INR PPP: 1 (ref 0.9–1.1)
LYMPHOCYTES # BLD AUTO: 1.45 X10*3/UL (ref 1.2–4.8)
LYMPHOCYTES NFR BLD AUTO: 20.3 %
MCH RBC QN AUTO: 29.2 PG (ref 26–34)
MCHC RBC AUTO-ENTMCNC: 32.2 G/DL (ref 32–36)
MCV RBC AUTO: 91 FL (ref 80–100)
MONOCYTES # BLD AUTO: 0.38 X10*3/UL (ref 0.1–1)
MONOCYTES NFR BLD AUTO: 5.3 %
NEUTROPHILS # BLD AUTO: 3.85 X10*3/UL (ref 1.2–7.7)
NEUTROPHILS NFR BLD AUTO: 53.7 %
NRBC BLD-RTO: 0 /100 WBCS (ref 0–0)
PLATELET # BLD AUTO: 181 X10*3/UL (ref 150–450)
PROTHROMBIN TIME: 11 SECONDS (ref 9.8–12.8)
RBC # BLD AUTO: 4.62 X10*6/UL (ref 4–5.2)
WBC # BLD AUTO: 7.2 X10*3/UL (ref 4.4–11.3)

## 2024-06-02 PROCEDURE — 99284 EMERGENCY DEPT VISIT MOD MDM: CPT | Performed by: STUDENT IN AN ORGANIZED HEALTH CARE EDUCATION/TRAINING PROGRAM

## 2024-06-02 PROCEDURE — 99283 EMERGENCY DEPT VISIT LOW MDM: CPT

## 2024-06-02 PROCEDURE — 2500000001 HC RX 250 WO HCPCS SELF ADMINISTERED DRUGS (ALT 637 FOR MEDICARE OP)

## 2024-06-02 PROCEDURE — 1090000002 HH PPS REVENUE DEBIT

## 2024-06-02 PROCEDURE — 85610 PROTHROMBIN TIME: CPT

## 2024-06-02 PROCEDURE — 85025 COMPLETE CBC W/AUTO DIFF WBC: CPT

## 2024-06-02 PROCEDURE — 2500000005 HC RX 250 GENERAL PHARMACY W/O HCPCS

## 2024-06-02 PROCEDURE — 1090000001 HH PPS REVENUE CREDIT

## 2024-06-02 RX ORDER — OXYMETAZOLINE HCL 0.05 %
2 SPRAY, NON-AEROSOL (ML) NASAL ONCE
Status: COMPLETED | OUTPATIENT
Start: 2024-06-02 | End: 2024-06-02

## 2024-06-02 RX ORDER — LIDOCAINE HYDROCHLORIDE 20 MG/ML
JELLY TOPICAL
Status: COMPLETED
Start: 2024-06-02 | End: 2024-06-02

## 2024-06-02 RX ORDER — LIDOCAINE HYDROCHLORIDE 20 MG/ML
1 JELLY TOPICAL ONCE
Status: COMPLETED | OUTPATIENT
Start: 2024-06-02 | End: 2024-06-02

## 2024-06-02 RX ADMIN — NASAL DECONGESTANT 2 SPRAY: 0.05 SPRAY NASAL at 01:59

## 2024-06-02 RX ADMIN — LIDOCAINE HYDROCHLORIDE 1 APPLICATION: 20 JELLY TOPICAL at 05:35

## 2024-06-02 ASSESSMENT — COLUMBIA-SUICIDE SEVERITY RATING SCALE - C-SSRS
6. HAVE YOU EVER DONE ANYTHING, STARTED TO DO ANYTHING, OR PREPARED TO DO ANYTHING TO END YOUR LIFE?: NO
2. HAVE YOU ACTUALLY HAD ANY THOUGHTS OF KILLING YOURSELF?: NO
1. IN THE PAST MONTH, HAVE YOU WISHED YOU WERE DEAD OR WISHED YOU COULD GO TO SLEEP AND NOT WAKE UP?: NO

## 2024-06-02 ASSESSMENT — LIFESTYLE VARIABLES
HAVE YOU EVER FELT YOU SHOULD CUT DOWN ON YOUR DRINKING: NO
HAVE PEOPLE ANNOYED YOU BY CRITICIZING YOUR DRINKING: NO
EVER FELT BAD OR GUILTY ABOUT YOUR DRINKING: NO
TOTAL SCORE: 0
EVER HAD A DRINK FIRST THING IN THE MORNING TO STEADY YOUR NERVES TO GET RID OF A HANGOVER: NO

## 2024-06-02 ASSESSMENT — PAIN SCALES - GENERAL: PAINLEVEL_OUTOF10: 0 - NO PAIN

## 2024-06-02 ASSESSMENT — PAIN - FUNCTIONAL ASSESSMENT: PAIN_FUNCTIONAL_ASSESSMENT: 0-10

## 2024-06-02 ASSESSMENT — PAIN DESCRIPTION - LOCATION: LOCATION: NOSE

## 2024-06-02 NOTE — Clinical Note
Melissa Marinelli was seen and treated in our emergency department on 6/2/2024.  She may return to work on 06/03/2024.       If you have any questions or concerns, please don't hesitate to call.      Milana Jean MD

## 2024-06-02 NOTE — ED PROVIDER NOTES
CC: Epistaxis (Nose Bleed)     HPI: Melissa Marinelli is an 68 y.o. female presenting to the emergency department for epistaxis.  Patient reports that her nosebleed has been going on for about 90 minutes prior to arrival.  She reports that her primary care physician prescribes her Afrin.  She has a history of nosebleeds but it did not help at home.  She reports that pressure did not help.  She reports not being on any blood thinners.    Triage note was reviewed and  agree with it  Limitations to History:  none  Additional History Obtained from: outpatient visit from 5/16/2024    PMHx/PSHx:  Per HPI.   - has a past medical history of Asthma ([facility]), CAD (coronary artery disease), CHF (congestive heart failure) (Multi), CKD (chronic kidney disease), COPD (chronic obstructive pulmonary disease) (Multi), CVA (cerebral vascular accident) (Multi), Diabetes mellitus (Multi), GERD (gastroesophageal reflux disease), Hyperlipidemia, Hypertension, NSTEMI (non-ST elevated myocardial infarction) (Multi), and Unspecified systolic (congestive) heart failure (Multi) (08/11/2022).  - has a past surgical history that includes Other surgical history (08/11/2022); Other surgical history (08/11/2022); Other surgical history (08/11/2022); Mitral valve replacement (06/2019); and Cardiac catheterization (N/A, 1/26/2024).    Social History:  - Tobacco:  reports that she has been smoking cigarettes. She has never used smokeless tobacco.   - Alcohol:  reports no history of alcohol use.   - Drugs:  reports current drug use. Drug: Marijuana.     Medications: Reviewed in EMR.     Allergies:  Metoprolol, Ticagrelor, Gadolinium-containing contrast media, Iodinated contrast media, Statins-hmg-coa reductase inhibitors, Prednisone, Ace inhibitors, Fentanyl, Hydralazine, Nicorette [nicotine (polacrilex)], Nicotine, and Penicillins    ???????????????????????????????????????????????????????????????  Triage Vitals:  T 36.8 °C (98.2 °F)  HR 92  /63   RR 18  O2 99 %      Physical Exam  Constitutional:       General: She is not in acute distress.  HENT:      Head: Normocephalic.      Nose:      Comments: Nasal bleeding. No obvious foci of bleeding. Septum midline  Cardiovascular:      Rate and Rhythm: Normal rate.   Pulmonary:      Effort: Pulmonary effort is normal. No respiratory distress.   Abdominal:      General: Abdomen is flat.   Musculoskeletal:         General: Normal range of motion.   Skin:     General: Skin is dry.   Neurological:      Mental Status: She is alert and oriented to person, place, and time. Mental status is at baseline.       ???????????????????????????????????????????????????????????????      ED Course/Medical Decision Making:    Diagnoses as of 06/02/24 2117   Epistaxis        Patient is a 68-year-old female who is presenting with epistaxis.  On evaluation, patient is bleeding extensively..  Given the duration and mixed blood we will obtain lab work to assess for a CBC or a platelet count for etiology for her nosebleeds.  When she does chronically get them.  Pressure was applied.  Afrin was applied.  Hemostasis was achieved on reevaluation.  Patient was can be discharged with ENT follow-up.  At time of discharge patient rebled.  Pressure was reapplied.  Considered additional packing.  Attempts to place a mucinex were unsuccessful because of meeting resistance. Additional pressure was held. Hemostasis was achieved. Advised to follow up with ENT. Patient was discharged home in stable condition. Patient was advised to return if symptoms worsen or don’t improve. Patient was advised to follow up with their PCP as needed.       External records reviewed: recent inpatient, clinic, and prior ED notes  Diagnostic imaging independently reviewed/interpreted by me (as reflected in MDM) includes: cbc, coag  Social Determinants Affecting Care: None identified  Discussion of management with other providers: ED attending,    Prescription Drug  Consideration: afrin  Escalation of Care: considered packing    Pt was seen and discussed with ED attending     Impression:   epistaxis    Disposition: discharge        Procedures ? SmartLinks last updated 6/2/2024 4:50 AM        Milana Jean MD  Resident  06/02/24 9127

## 2024-06-02 NOTE — DISCHARGE INSTRUCTIONS
Please follow-up with your primary care physician.  Please return if the bleeding restarts.  You can hold pressure consistently to help with the nosebleed.  Please return if any shortness of breath dizziness confusion or lightheadedness.

## 2024-06-02 NOTE — ED TRIAGE NOTES
Pt to ED c/o nosebleed x 90 minutes. Pt denies any chest pain or shortness of breath, denies any nausea or vomiting. Pt has PMH of DM, CHF, MI, CVA, no residual, pt not complaint with medications.

## 2024-06-03 ENCOUNTER — APPOINTMENT (OUTPATIENT)
Dept: HOME HEALTH SERVICES | Facility: HOME HEALTH | Age: 69
End: 2024-06-03
Payer: COMMERCIAL

## 2024-06-03 PROCEDURE — 1090000002 HH PPS REVENUE DEBIT

## 2024-06-03 PROCEDURE — 1090000001 HH PPS REVENUE CREDIT

## 2024-06-04 ENCOUNTER — HOME INFUSION (OUTPATIENT)
Dept: INFUSION THERAPY | Age: 69
End: 2024-06-04
Payer: COMMERCIAL

## 2024-06-04 ENCOUNTER — DOCUMENTATION (OUTPATIENT)
Dept: PHARMACY | Facility: CLINIC | Age: 69
End: 2024-06-04

## 2024-06-04 PROCEDURE — 1090000002 HH PPS REVENUE DEBIT

## 2024-06-04 PROCEDURE — 1090000001 HH PPS REVENUE CREDIT

## 2024-06-05 PROCEDURE — 1090000001 HH PPS REVENUE CREDIT

## 2024-06-05 PROCEDURE — 1090000002 HH PPS REVENUE DEBIT

## 2024-06-06 ENCOUNTER — HOME CARE VISIT (OUTPATIENT)
Dept: HOME HEALTH SERVICES | Facility: HOME HEALTH | Age: 69
End: 2024-06-06
Payer: COMMERCIAL

## 2024-06-06 VITALS
BODY MASS INDEX: 21.8 KG/M2 | TEMPERATURE: 96.8 F | OXYGEN SATURATION: 98 % | DIASTOLIC BLOOD PRESSURE: 68 MMHG | HEART RATE: 68 BPM | RESPIRATION RATE: 16 BRPM | SYSTOLIC BLOOD PRESSURE: 98 MMHG | WEIGHT: 127 LBS

## 2024-06-06 PROCEDURE — G0299 HHS/HOSPICE OF RN EA 15 MIN: HCPCS | Mod: HHH

## 2024-06-06 PROCEDURE — G0179 MD RECERTIFICATION HHA PT: HCPCS | Performed by: STUDENT IN AN ORGANIZED HEALTH CARE EDUCATION/TRAINING PROGRAM

## 2024-06-06 PROCEDURE — 1090000002 HH PPS REVENUE DEBIT

## 2024-06-06 PROCEDURE — 1090000001 HH PPS REVENUE CREDIT

## 2024-06-06 PROCEDURE — 400014 HH F/U

## 2024-06-06 RX ADMIN — Medication 10 ML: at 09:45

## 2024-06-06 ASSESSMENT — ENCOUNTER SYMPTOMS
FATIGUES EASILY: 1
DENIES PAIN: 1
OCCASIONAL FEELINGS OF UNSTEADINESS: 0
APPETITE LEVEL: FAIR
LAST BOWEL MOVEMENT: 66997
CHANGE IN APPETITE: UNCHANGED

## 2024-06-06 ASSESSMENT — PAIN SCALES - PAIN ASSESSMENT IN ADVANCED DEMENTIA (PAINAD)
FACIALEXPRESSION: 0
BODYLANGUAGE: 0
FACIALEXPRESSION: 0 - SMILING OR INEXPRESSIVE.
TOTALSCORE: 0
BODYLANGUAGE: 0 - RELAXED.
CONSOLABILITY: 0
NEGVOCALIZATION: 0
BREATHING: 0
NEGVOCALIZATION: 0 - NONE.
CONSOLABILITY: 0 - NO NEED TO CONSOLE.

## 2024-06-07 PROCEDURE — 1090000001 HH PPS REVENUE CREDIT

## 2024-06-07 PROCEDURE — 1090000002 HH PPS REVENUE DEBIT

## 2024-06-08 PROCEDURE — 1090000001 HH PPS REVENUE CREDIT

## 2024-06-08 PROCEDURE — 1090000002 HH PPS REVENUE DEBIT

## 2024-06-09 PROCEDURE — 1090000002 HH PPS REVENUE DEBIT

## 2024-06-09 PROCEDURE — 1090000001 HH PPS REVENUE CREDIT

## 2024-06-10 ENCOUNTER — DOCUMENTATION (OUTPATIENT)
Dept: PHARMACY | Facility: CLINIC | Age: 69
End: 2024-06-10

## 2024-06-10 ENCOUNTER — HOME INFUSION (OUTPATIENT)
Dept: INFUSION THERAPY | Age: 69
End: 2024-06-10

## 2024-06-10 ENCOUNTER — OFFICE VISIT (OUTPATIENT)
Dept: CARDIOLOGY | Facility: CLINIC | Age: 69
End: 2024-06-10
Payer: COMMERCIAL

## 2024-06-10 VITALS
BODY MASS INDEX: 21.85 KG/M2 | RESPIRATION RATE: 20 BRPM | WEIGHT: 128 LBS | DIASTOLIC BLOOD PRESSURE: 75 MMHG | OXYGEN SATURATION: 99 % | HEIGHT: 64 IN | SYSTOLIC BLOOD PRESSURE: 143 MMHG | HEART RATE: 95 BPM

## 2024-06-10 DIAGNOSIS — I50.33 ACUTE ON CHRONIC HEART FAILURE WITH NORMAL EJECTION FRACTION (MULTI): ICD-10-CM

## 2024-06-10 DIAGNOSIS — I50.22 CHRONIC SYSTOLIC HEART FAILURE (MULTI): ICD-10-CM

## 2024-06-10 DIAGNOSIS — I50.20 HFREF (HEART FAILURE WITH REDUCED EJECTION FRACTION) (MULTI): Primary | ICD-10-CM

## 2024-06-10 PROCEDURE — 3044F HG A1C LEVEL LT 7.0%: CPT | Performed by: STUDENT IN AN ORGANIZED HEALTH CARE EDUCATION/TRAINING PROGRAM

## 2024-06-10 PROCEDURE — 99215 OFFICE O/P EST HI 40 MIN: CPT | Performed by: STUDENT IN AN ORGANIZED HEALTH CARE EDUCATION/TRAINING PROGRAM

## 2024-06-10 PROCEDURE — 1090000001 HH PPS REVENUE CREDIT

## 2024-06-10 PROCEDURE — 1159F MED LIST DOCD IN RCRD: CPT | Performed by: STUDENT IN AN ORGANIZED HEALTH CARE EDUCATION/TRAINING PROGRAM

## 2024-06-10 PROCEDURE — 3078F DIAST BP <80 MM HG: CPT | Performed by: STUDENT IN AN ORGANIZED HEALTH CARE EDUCATION/TRAINING PROGRAM

## 2024-06-10 PROCEDURE — 1090000002 HH PPS REVENUE DEBIT

## 2024-06-10 PROCEDURE — 3077F SYST BP >= 140 MM HG: CPT | Performed by: STUDENT IN AN ORGANIZED HEALTH CARE EDUCATION/TRAINING PROGRAM

## 2024-06-10 PROCEDURE — G2211 COMPLEX E/M VISIT ADD ON: HCPCS | Performed by: STUDENT IN AN ORGANIZED HEALTH CARE EDUCATION/TRAINING PROGRAM

## 2024-06-10 RX ORDER — ASPIRIN 81 MG/1
81 TABLET ORAL DAILY
Qty: 30 TABLET | Refills: 11 | Status: SHIPPED | OUTPATIENT
Start: 2024-06-10 | End: 2025-06-10

## 2024-06-10 RX ORDER — SPIRONOLACTONE 25 MG/1
12.5 TABLET ORAL DAILY
Qty: 15 TABLET | Refills: 11 | Status: SHIPPED | OUTPATIENT
Start: 2024-06-10 | End: 2025-06-10

## 2024-06-10 RX ORDER — BUMETANIDE 0.5 MG/1
0.5 TABLET ORAL 2 TIMES WEEKLY
Qty: 8 TABLET | Refills: 2 | Status: SHIPPED | OUTPATIENT
Start: 2024-06-10 | End: 2024-09-08

## 2024-06-10 RX ORDER — DAPAGLIFLOZIN 10 MG/1
10 TABLET, FILM COATED ORAL DAILY
Qty: 30 TABLET | Refills: 11 | Status: SHIPPED | OUTPATIENT
Start: 2024-06-10 | End: 2025-06-10

## 2024-06-10 ASSESSMENT — ENCOUNTER SYMPTOMS: DEPRESSION: 0

## 2024-06-10 NOTE — PROGRESS NOTES
Review of chart. Patient with order for milrinone continuously for CHF. Dr Rivera follows for cardiology at home.    Pt had appt today with cardiology. No changes to milrinone at this time.     Per 6/6 RN notes, pt with severe nosebleed requiring intervention at ED. Pt to follow up with hematology. Current weight 128 lbs.     Tel call with pt. Confirmed infusions going well. Patient has bags in home for 6/10 and 6/12. Agreeable to delivery of 4 bags and supplies to match Tues 6/11. Per pt, she states that she has had redness and itching around IV site. Pharmacy to send opsites with this delivery in addition to regular supplies.     Pharmacy to mix 6/11 for straight delivery:  4x 48hr milrinone bags with tubing attached  Hookups 6/14, 6/16, 6/18, 6/20     Follow up 6/19 check inventory, weight, send straight

## 2024-06-10 NOTE — PATIENT INSTRUCTIONS
Thank you for coming in today. If you have any questions or concerns, you may call the Heart Failure Office at [phone] option [phone], or [phone].  You may also contact our heart failure nursing team via email on [email or web address].    For quicker results set-up your  [facility] account to receive results and other correspondence directly to your phone.    Please bring all your pills/medications to your Cardiology appointments.    **  - No new medication changes today    -We will renew your heart medications today    - Please have the following tests done:  1.Blood tests  (RFP, BNP, CBC, iron, TIBC, ferritin)    - To arrange Psychology for depression care, CALL [phone]    - We will refer you to the Hematology doctors ( severe epistaxis with clots). CALL [phone] to schedule    - Please make an appointment to be seen in 4 months

## 2024-06-10 NOTE — PROGRESS NOTES
"Patient ID: Melissa Marinelli   Primary Care Provider: Jeremiah Champion MD   Visit Type:  Follow Up   Accompanied by: daughter  Sarah     Chief Complaint  Ambulatory heart failure care      History of Present Illness     68 year old woman who presents for advanced heart failure care. She has a past medical history significant for HrEF (EF 15-20%, multiple RWMA on TTE 5/2020) CAD s/p NSTEMI s/p RIRI to LCX (Jan 2016), MVR s/p mitral valve repair in (June 2019; 29 mm Epic bioprosthetic valve with Dr. Montana), COPD, HTN, HL, GERD, hx of CVA, DM.  On multiple occasions in the past durable LVAD therapy has been discussed and has been declined on multiple occasions.  Ultimately, Ms. Marinelli was initiated on milrinone 2/2024 and this therapy has been well-tolerated.    She reports that she has been scratching at the bandaging around her Wiley line (image captured today 6/10/2024 and uploaded to [facility], see Media).  She has not had a fever, chills, or seen in the evidence that this area has been infected fortunately.  She denies other voice having any cardiovascular symptoms.    Her daughter notes that she has not needed to go to the emergency room for any cardiovascular related symptoms/events and she considers this a marked improvement to before.    Both Ms. Marinelli and her daughter note that she spends most of her day in her bedroom, she relates that she is depressed.  \"I cry all the time\".  She has been referred to our mental health colleagues in the past but does not have routine follow-up.     She has been adherent with prescribed medications.     She has not had any defibrillator shocks.     She continues to smoke approximately 10 cigarettes a day.  She stopped at the end of December 2023 but has resumed.     Surgical Hx:  - MVR     Social Hx;  - Marijuana   -daily cigarettes      Fam Hx:  Brother- CVA      The electronic medical record has been reviewed by me for salient history. All cardiovascular imaging and " testing available in the electronic medical record, and [facility] has been reviewed.    Medication Documentation Review Audit       Reviewed by Katharine Ellington RN (Registered Nurse) on 06/10/24 at 0955      Medication Order Taking? Sig Documenting Provider Last Dose Status   acetaminophen (Tylenol) 500 mg tablet [id number] Yes Take 2 tablets (1,000 mg) by mouth every 8 hours if needed for mild pain (1 - 3). Historical Provider, MD Taking Active   albuterol 90 mcg/actuation inhaler [id number] Yes Inhale 2 puffs every 4 hours if needed for wheezing or shortness of breath. JASMIN Burt Taking Active   aspirin 81 mg EC tablet [id number] Yes Take 1 tablet (81 mg) by mouth once daily. Historical Provider, MD Taking Active   bumetanide (Bumex) 0.5 mg tablet [id number] Yes Take 1 tablet (0.5 mg) by mouth 2 times a week. On Tuesdays and Saturdays   Patient taking differently: Take 1 tablet (0.5 mg) by mouth once daily. On Tuesdays and Saturdays    JASMIN Freitas Taking Active   Calcium Antacid 200 mg calcium (500 mg) chewable tablet [id number] Yes Chew 1 tablet (500 mg) every 12 hours if needed. Historical Provider, MD Taking Active   cholecalciferol (Vitamin D3) 50 MCG (2000 UT) tablet [id number] Yes Take 1 tablet (2,000 Units) by mouth once daily. Historical Provider, MD Taking Active   famotidine (Pepcid) 40 mg tablet [id number] Yes Take 1 tablet (40 mg) by mouth once daily. Do not start before February 3, 2024. JASMIN Freitas Taking Active   Farxiga 10 mg [id number] Yes Take 1 tablet (10 mg) by mouth once daily. Historical Provider, MD Taking Active   heparin flush,porcine,-0.9NaCl 100 unit/mL kit [id number] Yes 5 mL by central venous line infusion route 1 (one) time per week. Indications: prevent clot from blocking an intravenous catheter Historical Provider, MD Taking Active   ipratropium-albuteroL (Duo-Neb) 0.5-2.5 mg/3 mL nebulizer solution [id number] Yes Take 3 mL by nebulization every 6 hours. Historical  "Provider, MD Taking Active   milrinone in 5 % dextrose (Primacor) 40 mg/200 mL (200 mcg/mL) infusion [id number] Yes Infuse 7.325 mcg/min at 2.2 mL/hr into a venous catheter continuously. Via R chest LEROY. Dose = 0.125mcg/kg/min most recent weight = 58.6kg from 4/30/24 Kathy Rivera MD PhD Taking Active   OneTouch Verio Flex meter misc [id number] Yes USE AS DIRECTED THREE TIMES DAILY Historical Provider, MD Taking Active   sodium chloride (Ocean) 0.65 % nasal spray [id number] Yes Administer 1 spray into each nostril if needed for congestion. Historical Provider, MD Taking Active   sodium chloride 0.9% (sodium chloride) flush [id number] Yes Infuse 10 mL into a venous catheter 1 (one) time per week. Historical Provider, MD Taking Active   spironolactone (Aldactone) 25 mg tablet [id number] Yes Take 0.5 tablets (12.5 mg) by mouth once daily. Kathy Rivera MD PhD Taking Active   Symbicort 80-4.5 mcg/actuation inhaler [id number] Yes Inhale 2 puffs twice a day. Historical Provider, MD Taking Active                      Allergies   Allergen Reactions    Metoprolol Other, Shortness of breath and Respiratory depression    Ticagrelor Anaphylaxis and Other       Feels a severe \"burning feeling\" in her chest    Gadolinium-Containing Contrast Media Hives and Other    Iodinated Contrast Media Hives and Other    Statins-Hmg-Coa Reductase Inhibitors Other and Myalgia       Atorvastatin - hair loss    Prednisone Other       Increased blood sugar    Ace Inhibitors Other and Cough       COUGH    Fentanyl Dizziness and Drowsiness    Hydralazine Dermatitis, Rash and Nausea/vomiting    Nicorette [Nicotine (Polacrilex)] Nausea/vomiting       Nicorette gums and lozenges only. Okay with nicotine patches    Penicillins Rash and Unknown       Visit Vitals  /75 (BP Location: Left arm, Patient Position: Sitting, BP Cuff Size: Adult)   Pulse 95   Resp 20   Ht 1.626 m (5' 4\")   Wt 58.1 kg (128 lb)   SpO2 99%   BMI 21.97 kg/m² "   Smoking Status Every Day   BSA 1.62 m²          Review of Systems   Constitutional: Negative for decreased appetite, weight gain and weight loss.   HENT:  Negative for hearing loss.    Eyes:  Negative for visual disturbance.   Cardiovascular: Negative for chest pain, dyspnea on exertion, irregular heartbeat, leg swelling, near-syncope, paroxysmal nocturnal dyspnea and syncope.   Respiratory:  Negative for cough, hemoptysis and shortness of breath.    Hematologic/Lymphatic: Does not bruise/bleed easily.   Skin:  Negative for rash.   Musculoskeletal:  Negative for arthritis and back pain.   Gastrointestinal:  Negative for hematochezia and melena.   Genitourinary:  Negative for hematuria.   Neurological:  Negative for focal weakness and weakness.   Psychiatric/Behavioral:  Negative for altered mental status and substance abuse.        Physical Exam     Very pleasant thin elderly AA woman in no apparent CP or painful distress  Well groomed   Neck: No JVD or HJR today  Chest wall: Right infraclavicular Wiley line covered in clean transparent bandages, c/d/i.  Some scratches around the dressing site.  See Media in EPIC 6/10/2024 image captured  CVS: HS 1,2. Gde 1-2 ESM at [facility]  Resp: CTA bilaterally  Abdomen:Mildly obese, SNT, BS wnl  Extremities: No pedal oedema  Skin: warm and dry  CNS: No gross deficits, AO x 4    Lab Results   Component Value Date    WBC 7.2 06/02/2024    HGB 13.5 06/02/2024    HCT 41.9 06/02/2024    MCV 91 06/02/2024     06/02/2024        Chemistry    Lab Results   Component Value Date/Time     02/17/2024 1748    K 4.4 02/17/2024 1748     02/17/2024 1748    CO2 24 02/17/2024 1748    BUN 13 02/17/2024 1748    CREATININE 1.28 (H) 02/17/2024 1748    Lab Results   Component Value Date/Time    CALCIUM 8.8 02/17/2024 1748    ALKPHOS 126 02/17/2024 0252    AST 23 02/17/2024 0252    ALT 26 02/17/2024 0252    BILITOT 0.4 02/17/2024 0252            IMPRESSION:     68 year old woman who  presents for continued advanced heart failure care. She has a past medical history significant for HrEF (LVEF 15-20%, multiple RWMA on TTE 5/2020) CAD s/p NSTEMI s/p RIRI to LCX (Jan 2016), MVR s/p mitral valve repair in 6/2019; 29 mm Epic bioprosthetic valve with Dr. Montana), COPD, HTN, HL, GERD, hx of CVA, DM.Ms Marinelli has declined durable LVAD therapies in the past and again today.  She was initiated on milrinone (milrinone dependent) 2/2024.     NYHA Functional Class: 2  ACC/AHA Stage D heart failure  Volume status: Euvolaemic  Perfusion status: Warm to touch     PLAN:     # Stage D HFrEF  -Continue milrinone     #Active smoking history  She does not want to quit.  Nicotine replacement therapy has been prescribed in the past     #Depression  Referred to psychology today     This note was transcribed using the Dragon Dictation system. There may be grammatical, punctuation, or verbiage errors that can occur with voice recognition programs.     Kathy Rivera MD PhD

## 2024-06-12 ENCOUNTER — APPOINTMENT (OUTPATIENT)
Dept: OTOLARYNGOLOGY | Facility: CLINIC | Age: 69
End: 2024-06-12
Payer: COMMERCIAL

## 2024-06-13 ENCOUNTER — HOME CARE VISIT (OUTPATIENT)
Dept: HOME HEALTH SERVICES | Facility: HOME HEALTH | Age: 69
End: 2024-06-13
Payer: COMMERCIAL

## 2024-06-13 PROCEDURE — G0299 HHS/HOSPICE OF RN EA 15 MIN: HCPCS | Mod: HHH

## 2024-06-14 VITALS
SYSTOLIC BLOOD PRESSURE: 90 MMHG | DIASTOLIC BLOOD PRESSURE: 60 MMHG | WEIGHT: 127 LBS | BODY MASS INDEX: 21.8 KG/M2 | TEMPERATURE: 97.6 F | RESPIRATION RATE: 16 BRPM | OXYGEN SATURATION: 98 % | HEART RATE: 68 BPM

## 2024-06-14 ASSESSMENT — ENCOUNTER SYMPTOMS
DENIES PAIN: 1
FATIGUES EASILY: 1
LAST BOWEL MOVEMENT: 67004
CHANGE IN APPETITE: UNCHANGED
OCCASIONAL FEELINGS OF UNSTEADINESS: 0
APPETITE LEVEL: FAIR

## 2024-06-14 ASSESSMENT — PAIN SCALES - PAIN ASSESSMENT IN ADVANCED DEMENTIA (PAINAD)
NEGVOCALIZATION: 0 - NONE.
CONSOLABILITY: 0 - NO NEED TO CONSOLE.
NEGVOCALIZATION: 0
BODYLANGUAGE: 0
BREATHING: 0
FACIALEXPRESSION: 0
BODYLANGUAGE: 0 - RELAXED.
CONSOLABILITY: 0
TOTALSCORE: 0
FACIALEXPRESSION: 0 - SMILING OR INEXPRESSIVE.

## 2024-06-18 ENCOUNTER — HOME INFUSION (OUTPATIENT)
Dept: INFUSION THERAPY | Age: 69
End: 2024-06-18
Payer: COMMERCIAL

## 2024-06-18 NOTE — PROGRESS NOTES
Review of chart. Patient with order for continuous milrinone for CHF. Dr Rivera following at home.    Review of RN visit notes. No problems noted with infusions or line.    Tel call with patient. She states infusions are going well. Weight today is 127.1lbs. She has not been changing her bags every 48 hrs. Instead she has been waiting for bag to empty around 56hrs. She states she just changed bag yesterday and is due for bag change tomorrow. She states she will start changing bags routinely at the same time every 48hr instead of waiting for bag to empty so that her bag change times are more consistent.  She states she only has one bag left. However if she is changing bags longer than every 48hrs she should have at least 2 bags left. Will send delivery early tomorrow in case she does not have back up bags in the home. Patient agreeable to same supplies as last week.  to call on the way.    Processed fill for 3 x 48hr milrinone bags for mix and straight delivery 6/19 to cover bag changes 6/21 6/23 and 6/25/24. - please deliver bags by 2pm-    Follow up 6/24/24. Check inventory of bags in the home. Check weight. Send further bags straight as appropriate.

## 2024-06-19 ENCOUNTER — DOCUMENTATION (OUTPATIENT)
Dept: PHARMACY | Facility: CLINIC | Age: 69
End: 2024-06-19

## 2024-06-20 ENCOUNTER — DOCUMENTATION (OUTPATIENT)
Dept: HEMATOLOGY/ONCOLOGY | Facility: HOSPITAL | Age: 69
End: 2024-06-20
Payer: COMMERCIAL

## 2024-06-20 ENCOUNTER — HOME CARE VISIT (OUTPATIENT)
Dept: HOME HEALTH SERVICES | Facility: HOME HEALTH | Age: 69
End: 2024-06-20
Payer: COMMERCIAL

## 2024-06-20 PROCEDURE — G0299 HHS/HOSPICE OF RN EA 15 MIN: HCPCS | Mod: HHH

## 2024-06-20 NOTE — PROGRESS NOTES
6/20/24 1540  Received referral for this patient from Dr. Rivera. Patient has a hx of HF with EF of 15-20% and is referred for c/o amyloidosis. I spoke with patient's daughter, Sarah to arrange appointment. She just started a new job and only has 3 scheduled days off so we were trying to coordinate a day that worked. We have scheduled patient for 8/23/24 with Dr. Alanna Pan. I am going to have patient get myeloma labs in the beginning of August and will call Sarah so she can get patient in for labs. She has my phone number for further concerns. I have also updated Dr. Rivera with appointment details. GWEN Abraham

## 2024-06-21 VITALS
TEMPERATURE: 98.6 F | SYSTOLIC BLOOD PRESSURE: 100 MMHG | WEIGHT: 127 LBS | DIASTOLIC BLOOD PRESSURE: 60 MMHG | OXYGEN SATURATION: 98 % | BODY MASS INDEX: 21.8 KG/M2 | RESPIRATION RATE: 14 BRPM | HEART RATE: 68 BPM

## 2024-06-21 ASSESSMENT — PAIN SCALES - PAIN ASSESSMENT IN ADVANCED DEMENTIA (PAINAD)
NEGVOCALIZATION: 0 - NONE.
TOTALSCORE: 0
BREATHING: 0
CONSOLABILITY: 0 - NO NEED TO CONSOLE.
NEGVOCALIZATION: 0
FACIALEXPRESSION: 0 - SMILING OR INEXPRESSIVE.
CONSOLABILITY: 0
FACIALEXPRESSION: 0
BODYLANGUAGE: 0
BODYLANGUAGE: 0 - RELAXED.

## 2024-06-21 ASSESSMENT — ENCOUNTER SYMPTOMS: DENIES PAIN: 1

## 2024-06-21 NOTE — CASE COMMUNICATION
Patient has been scratching around the lee site see photo I am using the hypoallergenic dressing to site and will increase the frequency of iv dressing to 2 times a week wondering if applying hydrocortosone cream to area around the insertion site? Please advise will need follow up with you for assessment .

## 2024-06-24 ENCOUNTER — HOME INFUSION (OUTPATIENT)
Dept: INFUSION THERAPY | Age: 69
End: 2024-06-24
Payer: COMMERCIAL

## 2024-06-24 ENCOUNTER — HOME CARE VISIT (OUTPATIENT)
Dept: HOME HEALTH SERVICES | Facility: HOME HEALTH | Age: 69
End: 2024-06-24
Payer: COMMERCIAL

## 2024-06-24 ENCOUNTER — DOCUMENTATION (OUTPATIENT)
Dept: PHARMACY | Facility: CLINIC | Age: 69
End: 2024-06-24

## 2024-06-24 VITALS
SYSTOLIC BLOOD PRESSURE: 110 MMHG | TEMPERATURE: 98 F | DIASTOLIC BLOOD PRESSURE: 68 MMHG | BODY MASS INDEX: 21.8 KG/M2 | HEART RATE: 68 BPM | OXYGEN SATURATION: 98 % | WEIGHT: 127 LBS | RESPIRATION RATE: 16 BRPM

## 2024-06-24 PROCEDURE — G0299 HHS/HOSPICE OF RN EA 15 MIN: HCPCS | Mod: HHH

## 2024-06-24 SDOH — ECONOMIC STABILITY: GENERAL

## 2024-06-24 ASSESSMENT — PAIN SCALES - PAIN ASSESSMENT IN ADVANCED DEMENTIA (PAINAD)
BREATHING: 0
FACIALEXPRESSION: 0
FACIALEXPRESSION: 0 - SMILING OR INEXPRESSIVE.
TOTALSCORE: 0
BODYLANGUAGE: 0 - RELAXED.
BODYLANGUAGE: 0
CONSOLABILITY: 0
NEGVOCALIZATION: 0 - NONE.
NEGVOCALIZATION: 0
CONSOLABILITY: 0 - NO NEED TO CONSOLE.

## 2024-06-24 ASSESSMENT — ENCOUNTER SYMPTOMS
LAST BOWEL MOVEMENT: 67015
OCCASIONAL FEELINGS OF UNSTEADINESS: 0
CHANGE IN APPETITE: UNCHANGED
DENIES PAIN: 1
APPETITE LEVEL: FAIR

## 2024-06-24 ASSESSMENT — ACTIVITIES OF DAILY LIVING (ADL): MONEY MANAGEMENT (EXPENSES/BILLS): NEEDS ASSISTANCE

## 2024-06-24 NOTE — PROGRESS NOTES
Review of chart. Patient with order for continuous milrinone for CHF. Dr Rivera following at home.    Per RN notes, patient reported some itching/irritation around Wiley site, Rn sent message to MD for orders    Rph call to pt's daughter, reported weight: 127.1 lbs. Patient has still been changing bags every 56 hrs instead of 48 hrs. Rph counseled daughter on overfill in bag and need for q48 hr bag change to ensure the bag never runs dry. Daughter verbalized understanding. Product Rph to write bag hookup dates on each bag to assist with compliance. Patient has bags in home for change on 6/25 and 6/27, pharmacy will plan to deliver 6/26.    Please send [facility]    Pharmacy to mix 6/26 and deliver straight with supplies to match:  3x milrinone 48 hr CADD bags  DOS: 6/26 - 7/4  H/U: 6/29, 7/1, 7/3    Follow up 7/2 - check inventory and deliver straight

## 2024-06-27 ENCOUNTER — HOME CARE VISIT (OUTPATIENT)
Dept: HOME HEALTH SERVICES | Facility: HOME HEALTH | Age: 69
End: 2024-06-27
Payer: COMMERCIAL

## 2024-06-27 VITALS
BODY MASS INDEX: 21.8 KG/M2 | DIASTOLIC BLOOD PRESSURE: 68 MMHG | SYSTOLIC BLOOD PRESSURE: 98 MMHG | OXYGEN SATURATION: 98 % | HEART RATE: 96 BPM | TEMPERATURE: 98.6 F | RESPIRATION RATE: 16 BRPM | WEIGHT: 127 LBS

## 2024-06-27 PROCEDURE — G0299 HHS/HOSPICE OF RN EA 15 MIN: HCPCS | Mod: HHH

## 2024-06-28 ASSESSMENT — PAIN SCALES - PAIN ASSESSMENT IN ADVANCED DEMENTIA (PAINAD)
BODYLANGUAGE: 0
BODYLANGUAGE: 0 - RELAXED.
FACIALEXPRESSION: 0
BREATHING: 0
TOTALSCORE: 0
CONSOLABILITY: 0
CONSOLABILITY: 0 - NO NEED TO CONSOLE.
FACIALEXPRESSION: 0 - SMILING OR INEXPRESSIVE.
NEGVOCALIZATION: 0
NEGVOCALIZATION: 0 - NONE.

## 2024-06-28 ASSESSMENT — ENCOUNTER SYMPTOMS
LAST BOWEL MOVEMENT: 67018
CHANGE IN APPETITE: UNCHANGED
OCCASIONAL FEELINGS OF UNSTEADINESS: 0
DENIES PAIN: 1
APPETITE LEVEL: FAIR

## 2024-07-01 ENCOUNTER — HOME INFUSION (OUTPATIENT)
Dept: INFUSION THERAPY | Age: 69
End: 2024-07-01
Payer: COMMERCIAL

## 2024-07-01 ENCOUNTER — HOME CARE VISIT (OUTPATIENT)
Dept: HOME HEALTH SERVICES | Facility: HOME HEALTH | Age: 69
End: 2024-07-01
Payer: COMMERCIAL

## 2024-07-01 VITALS
HEART RATE: 86 BPM | BODY MASS INDEX: 21.8 KG/M2 | SYSTOLIC BLOOD PRESSURE: 100 MMHG | RESPIRATION RATE: 16 BRPM | DIASTOLIC BLOOD PRESSURE: 64 MMHG | WEIGHT: 127 LBS | OXYGEN SATURATION: 99 % | TEMPERATURE: 100 F

## 2024-07-01 PROCEDURE — G0299 HHS/HOSPICE OF RN EA 15 MIN: HCPCS | Mod: HHH

## 2024-07-01 ASSESSMENT — PAIN SCALES - PAIN ASSESSMENT IN ADVANCED DEMENTIA (PAINAD)
TOTALSCORE: 0
FACIALEXPRESSION: 0
BODYLANGUAGE: 0 - RELAXED.
NEGVOCALIZATION: 0 - NONE.
BODYLANGUAGE: 0
CONSOLABILITY: 0 - NO NEED TO CONSOLE.
NEGVOCALIZATION: 0
FACIALEXPRESSION: 0 - SMILING OR INEXPRESSIVE.
CONSOLABILITY: 0
BREATHING: 0

## 2024-07-01 ASSESSMENT — ENCOUNTER SYMPTOMS
CHANGE IN APPETITE: UNCHANGED
OCCASIONAL FEELINGS OF UNSTEADINESS: 0
DENIES PAIN: 1
APPETITE LEVEL: FAIR
LAST BOWEL MOVEMENT: 67022

## 2024-07-01 NOTE — PROGRESS NOTES
Review of chart. Patient with order for continuous milrinone for CHF at 0.125 mcg/kg/min.. Dr Rivera following at home.  Seen bt Dr Rivera ob 06/10/24 with no changes in milrinone therapy.     Rph call to patient, reported infusions going well and inventory correct (Is changing bags every 48 hours). Tegaderm has improved Wiley site and patient states to keep sending current supplies with this delivery. Product Rph to write bag hookup dates on each bag to assist with compliance. Patient has bag in home for change on 7/3, pharmacy will plan to deliver 7/2 and can arrive anytime.     Pharmacy to mix 7/2 and deliver straight with supplies to match:  4x milrinone 48 hr CADD bags  DOS: 7/5- 7/12  H/U: 7/5, 7/7, 7/9, 7/11     Follow up 7/10 - check inventory and weight, deliver straight

## 2024-07-02 ENCOUNTER — APPOINTMENT (OUTPATIENT)
Dept: BEHAVIORAL HEALTH | Facility: CLINIC | Age: 69
End: 2024-07-02
Payer: COMMERCIAL

## 2024-07-02 ENCOUNTER — DOCUMENTATION (OUTPATIENT)
Dept: INFUSION THERAPY | Age: 69
End: 2024-07-02

## 2024-07-02 NOTE — PROGRESS NOTES
PER [facility] PATIENT AGREED TO ANOTHER 4 OF 48HRS MILRINONE BAGS FOR DELIVERY TODAY... AND OK FOR SUPPLIES TO MATCH...

## 2024-07-08 ENCOUNTER — APPOINTMENT (OUTPATIENT)
Dept: CARDIOLOGY | Facility: HOSPITAL | Age: 69
End: 2024-07-08
Payer: COMMERCIAL

## 2024-07-08 ENCOUNTER — APPOINTMENT (OUTPATIENT)
Dept: RADIOLOGY | Facility: HOSPITAL | Age: 69
End: 2024-07-08
Payer: COMMERCIAL

## 2024-07-08 ENCOUNTER — HOSPITAL ENCOUNTER (INPATIENT)
Facility: HOSPITAL | Age: 69
LOS: 3 days | Discharge: HOME | End: 2024-07-11
Attending: EMERGENCY MEDICINE | Admitting: STUDENT IN AN ORGANIZED HEALTH CARE EDUCATION/TRAINING PROGRAM
Payer: COMMERCIAL

## 2024-07-08 ENCOUNTER — CLINICAL SUPPORT (OUTPATIENT)
Dept: EMERGENCY MEDICINE | Facility: HOSPITAL | Age: 69
End: 2024-07-08
Payer: COMMERCIAL

## 2024-07-08 DIAGNOSIS — I50.41 ACUTE COMBINED SYSTOLIC (CONGESTIVE) AND DIASTOLIC (CONGESTIVE) HEART FAILURE (MULTI): ICD-10-CM

## 2024-07-08 DIAGNOSIS — I50.43 ACUTE ON CHRONIC COMBINED SYSTOLIC (CONGESTIVE) AND DIASTOLIC (CONGESTIVE) HEART FAILURE (MULTI): ICD-10-CM

## 2024-07-08 DIAGNOSIS — R00.0 TACHYCARDIA: ICD-10-CM

## 2024-07-08 DIAGNOSIS — I50.23 ACUTE ON CHRONIC SYSTOLIC HEART FAILURE (MULTI): ICD-10-CM

## 2024-07-08 DIAGNOSIS — I50.33 ACUTE ON CHRONIC HEART FAILURE WITH NORMAL EJECTION FRACTION (MULTI): ICD-10-CM

## 2024-07-08 DIAGNOSIS — R06.02 SHORTNESS OF BREATH: Primary | ICD-10-CM

## 2024-07-08 LAB
ABO GROUP (TYPE) IN BLOOD: NORMAL
ALBUMIN SERPL BCP-MCNC: 4 G/DL (ref 3.4–5)
ALBUMIN SERPL BCP-MCNC: 4.1 G/DL (ref 3.4–5)
ALP SERPL-CCNC: 92 U/L (ref 33–136)
ALP SERPL-CCNC: 96 U/L (ref 33–136)
ALT SERPL W P-5'-P-CCNC: 11 U/L (ref 7–45)
ALT SERPL W P-5'-P-CCNC: 13 U/L (ref 7–45)
AMPHETAMINES UR QL SCN: ABNORMAL
ANION GAP BLDMV CALCULATED.4IONS-SCNC: 12 MMO/L (ref 10–25)
ANION GAP BLDV CALCULATED.4IONS-SCNC: 12 MMOL/L (ref 10–25)
ANION GAP BLDV CALCULATED.4IONS-SCNC: 15 MMOL/L (ref 10–25)
ANION GAP SERPL CALC-SCNC: 17 MMOL/L (ref 10–20)
ANION GAP SERPL CALC-SCNC: 18 MMOL/L (ref 10–20)
ANTIBODY SCREEN: NORMAL
AORTIC VALVE PEAK VELOCITY: 0.88 M/S
APPEARANCE UR: CLEAR
APTT PPP: 29 SECONDS (ref 27–38)
APTT PPP: 31 SECONDS (ref 27–38)
AST SERPL W P-5'-P-CCNC: 23 U/L (ref 9–39)
AST SERPL W P-5'-P-CCNC: 29 U/L (ref 9–39)
ATRIAL RATE: 117 BPM
AV PEAK GRADIENT: 3.1 MMHG
AVA (PEAK VEL): 1.63 CM2
BARBITURATES UR QL SCN: ABNORMAL
BASE EXCESS BLDMV CALC-SCNC: -1.2 MMOL/L (ref -2–3)
BASE EXCESS BLDV CALC-SCNC: -0.7 MMOL/L (ref -2–3)
BASE EXCESS BLDV CALC-SCNC: -1.6 MMOL/L (ref -2–3)
BASOPHILS # BLD AUTO: 0.05 X10*3/UL (ref 0–0.1)
BASOPHILS # BLD AUTO: 0.1 X10*3/UL (ref 0–0.1)
BASOPHILS NFR BLD AUTO: 0.4 %
BASOPHILS NFR BLD AUTO: 0.9 %
BENZODIAZ UR QL SCN: ABNORMAL
BILIRUB SERPL-MCNC: 0.3 MG/DL (ref 0–1.2)
BILIRUB SERPL-MCNC: 0.4 MG/DL (ref 0–1.2)
BILIRUB UR STRIP.AUTO-MCNC: NEGATIVE MG/DL
BNP SERPL-MCNC: 2095 PG/ML (ref 0–99)
BNP SERPL-MCNC: 509 PG/ML (ref 0–99)
BODY TEMPERATURE: 37 DEGREES CELSIUS
BUN SERPL-MCNC: 24 MG/DL (ref 6–23)
BUN SERPL-MCNC: 26 MG/DL (ref 6–23)
BZE UR QL SCN: ABNORMAL
CA-I BLDMV-SCNC: 1.25 MMOL/L (ref 1.1–1.33)
CA-I BLDV-SCNC: 1.17 MMOL/L (ref 1.1–1.33)
CA-I BLDV-SCNC: 1.24 MMOL/L (ref 1.1–1.33)
CALCIUM SERPL-MCNC: 10.3 MG/DL (ref 8.6–10.6)
CALCIUM SERPL-MCNC: 9.4 MG/DL (ref 8.6–10.6)
CANNABINOIDS UR QL SCN: ABNORMAL
CARDIAC TROPONIN I PNL SERPL HS: 1340 NG/L (ref 0–34)
CARDIAC TROPONIN I PNL SERPL HS: 238 NG/L (ref 0–34)
CARDIAC TROPONIN I PNL SERPL HS: 283 NG/L (ref 0–34)
CARDIAC TROPONIN I PNL SERPL HS: 628 NG/L (ref 0–34)
CHLORIDE BLD-SCNC: 107 MMOL/L (ref 98–107)
CHLORIDE BLDV-SCNC: 108 MMOL/L (ref 98–107)
CHLORIDE BLDV-SCNC: 110 MMOL/L (ref 98–107)
CHLORIDE SERPL-SCNC: 107 MMOL/L (ref 98–107)
CHLORIDE SERPL-SCNC: 107 MMOL/L (ref 98–107)
CO2 SERPL-SCNC: 21 MMOL/L (ref 21–32)
CO2 SERPL-SCNC: 23 MMOL/L (ref 21–32)
COLOR UR: ABNORMAL
CREAT SERPL-MCNC: 1.52 MG/DL (ref 0.5–1.05)
CREAT SERPL-MCNC: 1.7 MG/DL (ref 0.5–1.05)
CRP SERPL-MCNC: 1.76 MG/DL
EGFRCR SERPLBLD CKD-EPI 2021: 32 ML/MIN/1.73M*2
EGFRCR SERPLBLD CKD-EPI 2021: 37 ML/MIN/1.73M*2
EJECTION FRACTION APICAL 4 CHAMBER: 18.8
EJECTION FRACTION: 18 %
EOSINOPHIL # BLD AUTO: 0.12 X10*3/UL (ref 0–0.7)
EOSINOPHIL # BLD AUTO: 1.39 X10*3/UL (ref 0–0.7)
EOSINOPHIL NFR BLD AUTO: 1 %
EOSINOPHIL NFR BLD AUTO: 12.2 %
ERYTHROCYTE [DISTWIDTH] IN BLOOD BY AUTOMATED COUNT: 16.1 % (ref 11.5–14.5)
ERYTHROCYTE [DISTWIDTH] IN BLOOD BY AUTOMATED COUNT: 16.6 % (ref 11.5–14.5)
ERYTHROCYTE [SEDIMENTATION RATE] IN BLOOD BY WESTERGREN METHOD: 92 MM/H (ref 0–30)
EST. AVERAGE GLUCOSE BLD GHB EST-MCNC: 103 MG/DL
FENTANYL+NORFENTANYL UR QL SCN: ABNORMAL
FERRITIN SERPL-MCNC: 74 NG/ML (ref 8–150)
FOLATE SERPL-MCNC: 16.3 NG/ML
GLUCOSE BLD MANUAL STRIP-MCNC: 122 MG/DL (ref 74–99)
GLUCOSE BLD-MCNC: 150 MG/DL (ref 74–99)
GLUCOSE BLDV-MCNC: 254 MG/DL (ref 74–99)
GLUCOSE BLDV-MCNC: 292 MG/DL (ref 74–99)
GLUCOSE SERPL-MCNC: 129 MG/DL (ref 74–99)
GLUCOSE SERPL-MCNC: 263 MG/DL (ref 74–99)
GLUCOSE UR STRIP.AUTO-MCNC: ABNORMAL MG/DL
HBA1C MFR BLD: 5.2 %
HCO3 BLDMV-SCNC: 22.7 MMOL/L (ref 22–26)
HCO3 BLDV-SCNC: 23.7 MMOL/L (ref 22–26)
HCO3 BLDV-SCNC: 26.9 MMOL/L (ref 22–26)
HCT VFR BLD AUTO: 40.8 % (ref 36–46)
HCT VFR BLD AUTO: 42.1 % (ref 36–46)
HCT VFR BLD EST: 40 % (ref 36–46)
HCT VFR BLD EST: 42 % (ref 36–46)
HCT VFR BLD EST: 42 % (ref 36–46)
HGB BLD-MCNC: 13.2 G/DL (ref 12–16)
HGB BLD-MCNC: 13.5 G/DL (ref 12–16)
HGB BLDMV-MCNC: 14.1 G/DL (ref 12–16)
HGB BLDV-MCNC: 13.3 G/DL (ref 12–16)
HGB BLDV-MCNC: 14 G/DL (ref 12–16)
HYALINE CASTS #/AREA URNS AUTO: ABNORMAL /LPF
IMM GRANULOCYTES # BLD AUTO: 0.03 X10*3/UL (ref 0–0.7)
IMM GRANULOCYTES # BLD AUTO: 0.05 X10*3/UL (ref 0–0.7)
IMM GRANULOCYTES NFR BLD AUTO: 0.3 % (ref 0–0.9)
IMM GRANULOCYTES NFR BLD AUTO: 0.4 % (ref 0–0.9)
INHALED O2 CONCENTRATION: 40 %
INR PPP: 0.9 (ref 0.9–1.1)
INR PPP: 1 (ref 0.9–1.1)
IRON SATN MFR SERPL: 18 % (ref 25–45)
IRON SERPL-MCNC: 58 UG/DL (ref 35–150)
KETONES UR STRIP.AUTO-MCNC: ABNORMAL MG/DL
LACTATE BLDMV-SCNC: 3.1 MMOL/L (ref 0.4–2)
LACTATE BLDV-SCNC: 2 MMOL/L (ref 0.4–2)
LACTATE BLDV-SCNC: 2.8 MMOL/L (ref 0.4–2)
LACTATE SERPL-SCNC: 2.4 MMOL/L (ref 0.4–2)
LACTATE SERPL-SCNC: 2.6 MMOL/L (ref 0.4–2)
LACTATE SERPL-SCNC: 2.8 MMOL/L (ref 0.4–2)
LEFT VENTRICLE INTERNAL DIMENSION DIASTOLE: 6.9 CM (ref 3.5–6)
LEFT VENTRICULAR OUTFLOW TRACT DIAMETER: 1.6 CM
LEUKOCYTE ESTERASE UR QL STRIP.AUTO: NEGATIVE
LYMPHOCYTES # BLD AUTO: 1.17 X10*3/UL (ref 1.2–4.8)
LYMPHOCYTES # BLD AUTO: 3.2 X10*3/UL (ref 1.2–4.8)
LYMPHOCYTES NFR BLD AUTO: 28 %
LYMPHOCYTES NFR BLD AUTO: 9.4 %
MAGNESIUM SERPL-MCNC: 2.67 MG/DL (ref 1.6–2.4)
MCH RBC QN AUTO: 28.8 PG (ref 26–34)
MCH RBC QN AUTO: 29.7 PG (ref 26–34)
MCHC RBC AUTO-ENTMCNC: 31.4 G/DL (ref 32–36)
MCHC RBC AUTO-ENTMCNC: 33.1 G/DL (ref 32–36)
MCV RBC AUTO: 90 FL (ref 80–100)
MCV RBC AUTO: 92 FL (ref 80–100)
METHADONE UR QL SCN: ABNORMAL
MONOCYTES # BLD AUTO: 0.42 X10*3/UL (ref 0.1–1)
MONOCYTES # BLD AUTO: 0.87 X10*3/UL (ref 0.1–1)
MONOCYTES NFR BLD AUTO: 3.7 %
MONOCYTES NFR BLD AUTO: 7 %
MUCOUS THREADS #/AREA URNS AUTO: ABNORMAL /LPF
NEUTROPHILS # BLD AUTO: 10.17 X10*3/UL (ref 1.2–7.7)
NEUTROPHILS # BLD AUTO: 6.29 X10*3/UL (ref 1.2–7.7)
NEUTROPHILS NFR BLD AUTO: 54.9 %
NEUTROPHILS NFR BLD AUTO: 81.8 %
NITRITE UR QL STRIP.AUTO: NEGATIVE
NRBC BLD-RTO: 0 /100 WBCS (ref 0–0)
NRBC BLD-RTO: 0 /100 WBCS (ref 0–0)
OPIATES UR QL SCN: ABNORMAL
OXYCODONE+OXYMORPHONE UR QL SCN: ABNORMAL
OXYHGB MFR BLDMV: 81.8 % (ref 45–75)
OXYHGB MFR BLDV: 86.7 % (ref 45–75)
OXYHGB MFR BLDV: 88.1 % (ref 45–75)
P AXIS: 74 DEGREES
P OFFSET: 186 MS
P ONSET: 148 MS
PCO2 BLDMV: 35 MM HG (ref 41–51)
PCO2 BLDV: 41 MM HG (ref 41–51)
PCO2 BLDV: 56 MM HG (ref 41–51)
PCP UR QL SCN: ABNORMAL
PH BLDMV: 7.42 PH (ref 7.33–7.43)
PH BLDV: 7.29 PH (ref 7.33–7.43)
PH BLDV: 7.37 PH (ref 7.33–7.43)
PH UR STRIP.AUTO: 7 [PH]
PLATELET # BLD AUTO: 185 X10*3/UL (ref 150–450)
PLATELET # BLD AUTO: 207 X10*3/UL (ref 150–450)
PO2 BLDMV: 53 MM HG (ref 35–45)
PO2 BLDV: 61 MM HG (ref 35–45)
PO2 BLDV: 65 MM HG (ref 35–45)
POTASSIUM BLDMV-SCNC: 6.8 MMOL/L (ref 3.5–5.3)
POTASSIUM BLDV-SCNC: 6.3 MMOL/L (ref 3.5–5.3)
POTASSIUM BLDV-SCNC: 6.7 MMOL/L (ref 3.5–5.3)
POTASSIUM SERPL-SCNC: 5.4 MMOL/L (ref 3.5–5.3)
POTASSIUM SERPL-SCNC: 6.2 MMOL/L (ref 3.5–5.3)
PR INTERVAL: 134 MS
PROCALCITONIN SERPL-MCNC: 2.6 NG/ML
PROT SERPL-MCNC: 7.8 G/DL (ref 6.4–8.2)
PROT SERPL-MCNC: 8.1 G/DL (ref 6.4–8.2)
PROT UR STRIP.AUTO-MCNC: ABNORMAL MG/DL
PROTHROMBIN TIME: 10.4 SECONDS (ref 9.8–12.8)
PROTHROMBIN TIME: 11 SECONDS (ref 9.8–12.8)
Q ONSET: 215 MS
QRS COUNT: 19 BEATS
QRS DURATION: 132 MS
QT INTERVAL: 344 MS
QTC CALCULATION(BAZETT): 479 MS
QTC FREDERICIA: 430 MS
R AXIS: 74 DEGREES
RBC # BLD AUTO: 4.55 X10*6/UL (ref 4–5.2)
RBC # BLD AUTO: 4.59 X10*6/UL (ref 4–5.2)
RBC # UR STRIP.AUTO: NEGATIVE /UL
RBC #/AREA URNS AUTO: ABNORMAL /HPF
RH FACTOR (ANTIGEN D): NORMAL
RIGHT VENTRICLE FREE WALL PEAK S': 5.77 CM/S
RIGHT VENTRICLE PEAK SYSTOLIC PRESSURE: 47.9 MMHG
SAO2 % BLDMV: 84 % (ref 45–75)
SAO2 % BLDV: 92 % (ref 45–75)
SAO2 % BLDV: 93 % (ref 45–75)
SODIUM BLDMV-SCNC: 135 MMOL/L (ref 136–145)
SODIUM BLDV-SCNC: 139 MMOL/L (ref 136–145)
SODIUM BLDV-SCNC: 144 MMOL/L (ref 136–145)
SODIUM SERPL-SCNC: 140 MMOL/L (ref 136–145)
SODIUM SERPL-SCNC: 142 MMOL/L (ref 136–145)
SP GR UR STRIP.AUTO: 1.02
SQUAMOUS #/AREA URNS AUTO: ABNORMAL /HPF
T AXIS: 118 DEGREES
T OFFSET: 387 MS
TIBC SERPL-MCNC: 319 UG/DL (ref 240–445)
TSH SERPL-ACNC: 2.87 MIU/L (ref 0.44–3.98)
UIBC SERPL-MCNC: 261 UG/DL (ref 110–370)
UROBILINOGEN UR STRIP.AUTO-MCNC: NORMAL MG/DL
VENTRICULAR RATE: 117 BPM
VIT B12 SERPL-MCNC: 420 PG/ML (ref 211–911)
WBC # BLD AUTO: 11.4 X10*3/UL (ref 4.4–11.3)
WBC # BLD AUTO: 12.4 X10*3/UL (ref 4.4–11.3)
WBC #/AREA URNS AUTO: ABNORMAL /HPF

## 2024-07-08 PROCEDURE — 71250 CT THORAX DX C-: CPT

## 2024-07-08 PROCEDURE — 93005 ELECTROCARDIOGRAM TRACING: CPT

## 2024-07-08 PROCEDURE — 83880 ASSAY OF NATRIURETIC PEPTIDE: CPT

## 2024-07-08 PROCEDURE — 83880 ASSAY OF NATRIURETIC PEPTIDE: CPT | Performed by: EMERGENCY MEDICINE

## 2024-07-08 PROCEDURE — 94002 VENT MGMT INPAT INIT DAY: CPT

## 2024-07-08 PROCEDURE — 93306 TTE W/DOPPLER COMPLETE: CPT

## 2024-07-08 PROCEDURE — 85652 RBC SED RATE AUTOMATED: CPT

## 2024-07-08 PROCEDURE — 2500000005 HC RX 250 GENERAL PHARMACY W/O HCPCS

## 2024-07-08 PROCEDURE — 2500000004 HC RX 250 GENERAL PHARMACY W/ HCPCS (ALT 636 FOR OP/ED): Performed by: EMERGENCY MEDICINE

## 2024-07-08 PROCEDURE — 87205 SMEAR GRAM STAIN: CPT

## 2024-07-08 PROCEDURE — 2500000001 HC RX 250 WO HCPCS SELF ADMINISTERED DRUGS (ALT 637 FOR MEDICARE OP)

## 2024-07-08 PROCEDURE — 84132 ASSAY OF SERUM POTASSIUM: CPT

## 2024-07-08 PROCEDURE — 2500000004 HC RX 250 GENERAL PHARMACY W/ HCPCS (ALT 636 FOR OP/ED)

## 2024-07-08 PROCEDURE — 96374 THER/PROPH/DIAG INJ IV PUSH: CPT | Mod: 59

## 2024-07-08 PROCEDURE — 86140 C-REACTIVE PROTEIN: CPT

## 2024-07-08 PROCEDURE — 84484 ASSAY OF TROPONIN QUANT: CPT

## 2024-07-08 PROCEDURE — 85730 THROMBOPLASTIN TIME PARTIAL: CPT

## 2024-07-08 PROCEDURE — 99291 CRITICAL CARE FIRST HOUR: CPT | Performed by: STUDENT IN AN ORGANIZED HEALTH CARE EDUCATION/TRAINING PROGRAM

## 2024-07-08 PROCEDURE — 36415 COLL VENOUS BLD VENIPUNCTURE: CPT

## 2024-07-08 PROCEDURE — 31500 INSERT EMERGENCY AIRWAY: CPT | Performed by: EMERGENCY MEDICINE

## 2024-07-08 PROCEDURE — 99291 CRITICAL CARE FIRST HOUR: CPT | Performed by: EMERGENCY MEDICINE

## 2024-07-08 PROCEDURE — 87040 BLOOD CULTURE FOR BACTERIA: CPT

## 2024-07-08 PROCEDURE — 37799 UNLISTED PX VASCULAR SURGERY: CPT

## 2024-07-08 PROCEDURE — 84443 ASSAY THYROID STIM HORMONE: CPT

## 2024-07-08 PROCEDURE — 93010 ELECTROCARDIOGRAM REPORT: CPT | Performed by: EMERGENCY MEDICINE

## 2024-07-08 PROCEDURE — 94640 AIRWAY INHALATION TREATMENT: CPT

## 2024-07-08 PROCEDURE — 83036 HEMOGLOBIN GLYCOSYLATED A1C: CPT

## 2024-07-08 PROCEDURE — 81001 URINALYSIS AUTO W/SCOPE: CPT | Performed by: EMERGENCY MEDICINE

## 2024-07-08 PROCEDURE — 84484 ASSAY OF TROPONIN QUANT: CPT | Performed by: EMERGENCY MEDICINE

## 2024-07-08 PROCEDURE — 93282 PRGRMG EVAL IMPLANTABLE DFB: CPT

## 2024-07-08 PROCEDURE — 84132 ASSAY OF SERUM POTASSIUM: CPT | Performed by: EMERGENCY MEDICINE

## 2024-07-08 PROCEDURE — 83605 ASSAY OF LACTIC ACID: CPT

## 2024-07-08 PROCEDURE — 82607 VITAMIN B-12: CPT

## 2024-07-08 PROCEDURE — 82728 ASSAY OF FERRITIN: CPT

## 2024-07-08 PROCEDURE — 2500000002 HC RX 250 W HCPCS SELF ADMINISTERED DRUGS (ALT 637 FOR MEDICARE OP, ALT 636 FOR OP/ED)

## 2024-07-08 PROCEDURE — 82746 ASSAY OF FOLIC ACID SERUM: CPT

## 2024-07-08 PROCEDURE — 71045 X-RAY EXAM CHEST 1 VIEW: CPT

## 2024-07-08 PROCEDURE — 83550 IRON BINDING TEST: CPT

## 2024-07-08 PROCEDURE — 82947 ASSAY GLUCOSE BLOOD QUANT: CPT

## 2024-07-08 PROCEDURE — 80307 DRUG TEST PRSMV CHEM ANLYZR: CPT

## 2024-07-08 PROCEDURE — 87632 RESP VIRUS 6-11 TARGETS: CPT

## 2024-07-08 PROCEDURE — 85025 COMPLETE CBC W/AUTO DIFF WBC: CPT

## 2024-07-08 PROCEDURE — 2020000001 HC ICU ROOM DAILY

## 2024-07-08 PROCEDURE — 31500 INSERT EMERGENCY AIRWAY: CPT

## 2024-07-08 PROCEDURE — 93282 PRGRMG EVAL IMPLANTABLE DFB: CPT | Performed by: INTERNAL MEDICINE

## 2024-07-08 PROCEDURE — 76937 US GUIDE VASCULAR ACCESS: CPT

## 2024-07-08 PROCEDURE — 93306 TTE W/DOPPLER COMPLETE: CPT | Performed by: INTERNAL MEDICINE

## 2024-07-08 PROCEDURE — 4B02XSZ MEASUREMENT OF CARDIAC PACEMAKER, EXTERNAL APPROACH: ICD-10-PCS | Performed by: INTERNAL MEDICINE

## 2024-07-08 PROCEDURE — 86901 BLOOD TYPING SEROLOGIC RH(D): CPT

## 2024-07-08 PROCEDURE — C9113 INJ PANTOPRAZOLE SODIUM, VIA: HCPCS

## 2024-07-08 PROCEDURE — 2500000005 HC RX 250 GENERAL PHARMACY W/O HCPCS: Performed by: EMERGENCY MEDICINE

## 2024-07-08 PROCEDURE — 84145 PROCALCITONIN (PCT): CPT

## 2024-07-08 PROCEDURE — 96375 TX/PRO/DX INJ NEW DRUG ADDON: CPT | Mod: 59

## 2024-07-08 PROCEDURE — 87635 SARS-COV-2 COVID-19 AMP PRB: CPT | Performed by: NURSE PRACTITIONER

## 2024-07-08 PROCEDURE — 85730 THROMBOPLASTIN TIME PARTIAL: CPT | Performed by: EMERGENCY MEDICINE

## 2024-07-08 PROCEDURE — 83735 ASSAY OF MAGNESIUM: CPT

## 2024-07-08 PROCEDURE — 71045 X-RAY EXAM CHEST 1 VIEW: CPT | Mod: FOREIGN READ | Performed by: RADIOLOGY

## 2024-07-08 PROCEDURE — 85025 COMPLETE CBC W/AUTO DIFF WBC: CPT | Performed by: EMERGENCY MEDICINE

## 2024-07-08 RX ORDER — ALBUTEROL SULFATE 90 UG/1
2 AEROSOL, METERED RESPIRATORY (INHALATION) EVERY 4 HOURS PRN
Status: DISCONTINUED | OUTPATIENT
Start: 2024-07-08 | End: 2024-07-11 | Stop reason: HOSPADM

## 2024-07-08 RX ORDER — PROPOFOL 10 MG/ML
INJECTION, EMULSION INTRAVENOUS
Status: COMPLETED
Start: 2024-07-08 | End: 2024-07-08

## 2024-07-08 RX ORDER — HYDROMORPHONE HYDROCHLORIDE 1 MG/ML
0.2 INJECTION, SOLUTION INTRAMUSCULAR; INTRAVENOUS; SUBCUTANEOUS EVERY 2 HOUR PRN
Status: DISCONTINUED | OUTPATIENT
Start: 2024-07-08 | End: 2024-07-11 | Stop reason: HOSPADM

## 2024-07-08 RX ORDER — ACETAMINOPHEN 325 MG/1
650 TABLET ORAL EVERY 6 HOURS PRN
Status: DISCONTINUED | OUTPATIENT
Start: 2024-07-08 | End: 2024-07-11 | Stop reason: HOSPADM

## 2024-07-08 RX ORDER — CALCIUM CARBONATE 200(500)MG
500 TABLET,CHEWABLE ORAL EVERY 12 HOURS PRN
Status: DISCONTINUED | OUTPATIENT
Start: 2024-07-08 | End: 2024-07-11 | Stop reason: HOSPADM

## 2024-07-08 RX ORDER — DAPAGLIFLOZIN 10 MG/1
10 TABLET, FILM COATED ORAL DAILY
Status: DISCONTINUED | OUTPATIENT
Start: 2024-07-08 | End: 2024-07-11 | Stop reason: HOSPADM

## 2024-07-08 RX ORDER — ONDANSETRON HYDROCHLORIDE 2 MG/ML
INJECTION, SOLUTION INTRAVENOUS
Status: COMPLETED
Start: 2024-07-08 | End: 2024-07-08

## 2024-07-08 RX ORDER — CHOLECALCIFEROL (VITAMIN D3) 25 MCG
2000 TABLET ORAL DAILY
Status: DISCONTINUED | OUTPATIENT
Start: 2024-07-08 | End: 2024-07-11 | Stop reason: HOSPADM

## 2024-07-08 RX ORDER — PANTOPRAZOLE SODIUM 40 MG/10ML
40 INJECTION, POWDER, LYOPHILIZED, FOR SOLUTION INTRAVENOUS DAILY
Status: DISCONTINUED | OUTPATIENT
Start: 2024-07-08 | End: 2024-07-11 | Stop reason: HOSPADM

## 2024-07-08 RX ORDER — FENTANYL CITRATE-0.9 % NACL/PF 10 MCG/ML
25-200 PLASTIC BAG, INJECTION (ML) INTRAVENOUS CONTINUOUS
Status: DISCONTINUED | OUTPATIENT
Start: 2024-07-08 | End: 2024-07-08

## 2024-07-08 RX ORDER — HEPARIN SODIUM 5000 [USP'U]/ML
5000 INJECTION, SOLUTION INTRAVENOUS; SUBCUTANEOUS EVERY 8 HOURS
Status: DISCONTINUED | OUTPATIENT
Start: 2024-07-08 | End: 2024-07-11 | Stop reason: HOSPADM

## 2024-07-08 RX ORDER — POLYETHYLENE GLYCOL 3350 17 G/17G
17 POWDER, FOR SOLUTION ORAL DAILY PRN
Status: DISCONTINUED | OUTPATIENT
Start: 2024-07-08 | End: 2024-07-11 | Stop reason: HOSPADM

## 2024-07-08 RX ORDER — FLUTICASONE FUROATE AND VILANTEROL 100; 25 UG/1; UG/1
1 POWDER RESPIRATORY (INHALATION)
Status: DISCONTINUED | OUTPATIENT
Start: 2024-07-08 | End: 2024-07-08

## 2024-07-08 RX ORDER — BUDESONIDE 0.5 MG/2ML
0.5 INHALANT ORAL
Status: DISCONTINUED | OUTPATIENT
Start: 2024-07-08 | End: 2024-07-11 | Stop reason: HOSPADM

## 2024-07-08 RX ORDER — IPRATROPIUM BROMIDE AND ALBUTEROL SULFATE 2.5; .5 MG/3ML; MG/3ML
3 SOLUTION RESPIRATORY (INHALATION)
Status: DISCONTINUED | OUTPATIENT
Start: 2024-07-08 | End: 2024-07-11 | Stop reason: HOSPADM

## 2024-07-08 RX ORDER — ROCURONIUM BROMIDE 10 MG/ML
100 INJECTION, SOLUTION INTRAVENOUS ONCE
Status: COMPLETED | OUTPATIENT
Start: 2024-07-08 | End: 2024-07-08

## 2024-07-08 RX ORDER — CALCIUM GLUCONATE 20 MG/ML
2 INJECTION, SOLUTION INTRAVENOUS ONCE
Status: COMPLETED | OUTPATIENT
Start: 2024-07-08 | End: 2024-07-08

## 2024-07-08 RX ORDER — SENNOSIDES 8.6 MG/1
2 TABLET ORAL 2 TIMES DAILY
Status: DISCONTINUED | OUTPATIENT
Start: 2024-07-08 | End: 2024-07-11 | Stop reason: HOSPADM

## 2024-07-08 RX ORDER — MILRINONE LACTATE 0.2 MG/ML
0.38 INJECTION, SOLUTION INTRAVENOUS CONTINUOUS
Status: DISCONTINUED | OUTPATIENT
Start: 2024-07-08 | End: 2024-07-11 | Stop reason: HOSPADM

## 2024-07-08 RX ORDER — BUMETANIDE 0.25 MG/ML
3 INJECTION INTRAMUSCULAR; INTRAVENOUS ONCE
Status: COMPLETED | OUTPATIENT
Start: 2024-07-08 | End: 2024-07-08

## 2024-07-08 RX ORDER — ACETAMINOPHEN 160 MG/5ML
650 SOLUTION ORAL EVERY 6 HOURS PRN
Status: DISCONTINUED | OUTPATIENT
Start: 2024-07-08 | End: 2024-07-11 | Stop reason: HOSPADM

## 2024-07-08 RX ORDER — ONDANSETRON HYDROCHLORIDE 2 MG/ML
4 INJECTION, SOLUTION INTRAVENOUS ONCE
Status: COMPLETED | OUTPATIENT
Start: 2024-07-08 | End: 2024-07-08

## 2024-07-08 RX ORDER — ASPIRIN 81 MG/1
81 TABLET ORAL DAILY
Status: DISCONTINUED | OUTPATIENT
Start: 2024-07-08 | End: 2024-07-11 | Stop reason: HOSPADM

## 2024-07-08 RX ORDER — KETAMINE HYDROCHLORIDE 100 MG/ML
100 INJECTION, SOLUTION INTRAMUSCULAR; INTRAVENOUS ONCE
Status: COMPLETED | OUTPATIENT
Start: 2024-07-08 | End: 2024-07-08

## 2024-07-08 RX ORDER — PROPOFOL 10 MG/ML
5-50 INJECTION, EMULSION INTRAVENOUS CONTINUOUS
Status: DISCONTINUED | OUTPATIENT
Start: 2024-07-08 | End: 2024-07-08

## 2024-07-08 RX ORDER — ROCURONIUM BROMIDE 10 MG/ML
INJECTION, SOLUTION INTRAVENOUS
Status: COMPLETED
Start: 2024-07-08 | End: 2024-07-08

## 2024-07-08 RX ORDER — OXYCODONE HYDROCHLORIDE 5 MG/1
5 TABLET ORAL EVERY 6 HOURS PRN
Status: DISCONTINUED | OUTPATIENT
Start: 2024-07-08 | End: 2024-07-11 | Stop reason: HOSPADM

## 2024-07-08 RX ORDER — DEXMEDETOMIDINE HYDROCHLORIDE 4 UG/ML
0-1.5 INJECTION, SOLUTION INTRAVENOUS CONTINUOUS
Status: DISCONTINUED | OUTPATIENT
Start: 2024-07-08 | End: 2024-07-08

## 2024-07-08 RX ORDER — ACETAMINOPHEN 650 MG/1
650 SUPPOSITORY RECTAL EVERY 6 HOURS PRN
Status: DISCONTINUED | OUTPATIENT
Start: 2024-07-08 | End: 2024-07-11 | Stop reason: HOSPADM

## 2024-07-08 RX ORDER — DEXMEDETOMIDINE HYDROCHLORIDE 4 UG/ML
INJECTION, SOLUTION INTRAVENOUS
Status: COMPLETED
Start: 2024-07-08 | End: 2024-07-08

## 2024-07-08 RX ORDER — BUMETANIDE 0.25 MG/ML
0.5 INJECTION INTRAMUSCULAR; INTRAVENOUS ONCE
Status: COMPLETED | OUTPATIENT
Start: 2024-07-08 | End: 2024-07-08

## 2024-07-08 RX ORDER — DEXTROSE 50 % IN WATER (D50W) INTRAVENOUS SYRINGE
25 ONCE
Status: COMPLETED | OUTPATIENT
Start: 2024-07-08 | End: 2024-07-08

## 2024-07-08 RX ORDER — ONDANSETRON HYDROCHLORIDE 2 MG/ML
4 INJECTION, SOLUTION INTRAVENOUS EVERY 4 HOURS PRN
Status: DISCONTINUED | OUTPATIENT
Start: 2024-07-08 | End: 2024-07-11 | Stop reason: HOSPADM

## 2024-07-08 SDOH — ECONOMIC STABILITY: HOUSING INSECURITY
IN THE LAST 12 MONTHS, WAS THERE A TIME WHEN YOU DID NOT HAVE A STEADY PLACE TO SLEEP OR SLEPT IN A SHELTER (INCLUDING NOW)?: PATIENT UNABLE TO ANSWER

## 2024-07-08 SDOH — SOCIAL STABILITY: SOCIAL INSECURITY: ARE YOU OR HAVE YOU BEEN THREATENED OR ABUSED PHYSICALLY, EMOTIONALLY, OR SEXUALLY BY ANYONE?: UNABLE TO ASSESS

## 2024-07-08 SDOH — ECONOMIC STABILITY: TRANSPORTATION INSECURITY
IN THE PAST 12 MONTHS, HAS LACK OF TRANSPORTATION KEPT YOU FROM MEETINGS, WORK, OR FROM GETTING THINGS NEEDED FOR DAILY LIVING?: PATIENT UNABLE TO ANSWER

## 2024-07-08 SDOH — ECONOMIC STABILITY: TRANSPORTATION INSECURITY
IN THE PAST 12 MONTHS, HAS THE LACK OF TRANSPORTATION KEPT YOU FROM MEDICAL APPOINTMENTS OR FROM GETTING MEDICATIONS?: PATIENT UNABLE TO ANSWER

## 2024-07-08 SDOH — SOCIAL STABILITY: SOCIAL INSECURITY: HAS ANYONE EVER THREATENED TO HURT YOUR FAMILY OR YOUR PETS?: UNABLE TO ASSESS

## 2024-07-08 SDOH — ECONOMIC STABILITY: INCOME INSECURITY
HOW HARD IS IT FOR YOU TO PAY FOR THE VERY BASICS LIKE FOOD, HOUSING, MEDICAL CARE, AND HEATING?: PATIENT UNABLE TO ANSWER

## 2024-07-08 SDOH — SOCIAL STABILITY: SOCIAL INSECURITY: DOES ANYONE TRY TO KEEP YOU FROM HAVING/CONTACTING OTHER FRIENDS OR DOING THINGS OUTSIDE YOUR HOME?: UNABLE TO ASSESS

## 2024-07-08 SDOH — ECONOMIC STABILITY: INCOME INSECURITY
IN THE LAST 12 MONTHS, WAS THERE A TIME WHEN YOU WERE NOT ABLE TO PAY THE MORTGAGE OR RENT ON TIME?: PATIENT UNABLE TO ANSWER

## 2024-07-08 SDOH — SOCIAL STABILITY: SOCIAL INSECURITY: WERE YOU ABLE TO COMPLETE ALL THE BEHAVIORAL HEALTH SCREENINGS?: NO

## 2024-07-08 SDOH — SOCIAL STABILITY: SOCIAL INSECURITY: ARE THERE ANY APPARENT SIGNS OF INJURIES/BEHAVIORS THAT COULD BE RELATED TO ABUSE/NEGLECT?: UNABLE TO ASSESS

## 2024-07-08 SDOH — ECONOMIC STABILITY: HOUSING INSECURITY: IN THE LAST 12 MONTHS, HOW MANY PLACES HAVE YOU LIVED?: 1

## 2024-07-08 SDOH — SOCIAL STABILITY: SOCIAL INSECURITY: DO YOU FEEL UNSAFE GOING BACK TO THE PLACE WHERE YOU ARE LIVING?: UNABLE TO ASSESS

## 2024-07-08 SDOH — SOCIAL STABILITY: SOCIAL INSECURITY: HAVE YOU HAD ANY THOUGHTS OF HARMING ANYONE ELSE?: UNABLE TO ASSESS

## 2024-07-08 SDOH — SOCIAL STABILITY: SOCIAL INSECURITY: DO YOU FEEL ANYONE HAS EXPLOITED OR TAKEN ADVANTAGE OF YOU FINANCIALLY OR OF YOUR PERSONAL PROPERTY?: UNABLE TO ASSESS

## 2024-07-08 SDOH — SOCIAL STABILITY: SOCIAL INSECURITY: ABUSE: ADULT

## 2024-07-08 SDOH — SOCIAL STABILITY: SOCIAL INSECURITY: HAVE YOU HAD THOUGHTS OF HARMING ANYONE ELSE?: UNABLE TO ASSESS

## 2024-07-08 ASSESSMENT — ACTIVITIES OF DAILY LIVING (ADL)
WALKS IN HOME: UNABLE TO ASSESS
PATIENT'S MEMORY ADEQUATE TO SAFELY COMPLETE DAILY ACTIVITIES?: UNABLE TO ASSESS
PATIENT'S MEMORY ADEQUATE TO SAFELY COMPLETE DAILY ACTIVITIES?: YES
ADEQUATE_TO_COMPLETE_ADL: YES
TOILETING: INDEPENDENT
HEARING - LEFT EAR: FUNCTIONAL
ADEQUATE_TO_COMPLETE_ADL: UNABLE TO ASSESS
DRESSING YOURSELF: INDEPENDENT
ASSISTIVE_DEVICE: CANE;WALKER
GROOMING: UNABLE TO ASSESS
DRESSING YOURSELF: UNABLE TO ASSESS
JUDGMENT_ADEQUATE_SAFELY_COMPLETE_DAILY_ACTIVITIES: UNABLE TO ASSESS
HEARING - LEFT EAR: FUNCTIONAL
JUDGMENT_ADEQUATE_SAFELY_COMPLETE_DAILY_ACTIVITIES: YES
ASSISTIVE_DEVICE: CANE;WALKER
FEEDING YOURSELF: UNABLE TO ASSESS
HEARING - RIGHT EAR: FUNCTIONAL
BATHING: UNABLE TO ASSESS
TOILETING: UNABLE TO ASSESS
HEARING - RIGHT EAR: FUNCTIONAL
GROOMING: INDEPENDENT
WALKS IN HOME: INDEPENDENT
FEEDING YOURSELF: INDEPENDENT

## 2024-07-08 ASSESSMENT — COGNITIVE AND FUNCTIONAL STATUS - GENERAL
STANDING UP FROM CHAIR USING ARMS: A LITTLE
WALKING IN HOSPITAL ROOM: A LOT
CLIMB 3 TO 5 STEPS WITH RAILING: TOTAL
MOBILITY SCORE: 18
PATIENT BASELINE BEDBOUND: UNABLE TO ASSESS AT THIS TIME

## 2024-07-08 ASSESSMENT — PAIN - FUNCTIONAL ASSESSMENT
PAIN_FUNCTIONAL_ASSESSMENT: 0-10
PAIN_FUNCTIONAL_ASSESSMENT: CPOT (CRITICAL CARE PAIN OBSERVATION TOOL)

## 2024-07-08 ASSESSMENT — LIFESTYLE VARIABLES
HAVE YOU EVER FELT YOU SHOULD CUT DOWN ON YOUR DRINKING: NO
EVER HAD A DRINK FIRST THING IN THE MORNING TO STEADY YOUR NERVES TO GET RID OF A HANGOVER: NO
HOW OFTEN DO YOU HAVE A DRINK CONTAINING ALCOHOL: NEVER
TOTAL SCORE: 0
AUDIT-C TOTAL SCORE: 0
HOW OFTEN DO YOU HAVE 6 OR MORE DRINKS ON ONE OCCASION: NEVER
EVER FELT BAD OR GUILTY ABOUT YOUR DRINKING: NO
HAVE PEOPLE ANNOYED YOU BY CRITICIZING YOUR DRINKING: NO
AUDIT-C TOTAL SCORE: 0
SKIP TO QUESTIONS 9-10: 1
HOW MANY STANDARD DRINKS CONTAINING ALCOHOL DO YOU HAVE ON A TYPICAL DAY: PATIENT DOES NOT DRINK

## 2024-07-08 ASSESSMENT — PAIN SCALES - GENERAL
PAINLEVEL_OUTOF10: 0 - NO PAIN
PAINLEVEL_OUTOF10: 0 - NO PAIN
PAINLEVEL_OUTOF10: 5 - MODERATE PAIN

## 2024-07-08 ASSESSMENT — PAIN DESCRIPTION - PROGRESSION: CLINICAL_PROGRESSION: NOT CHANGED

## 2024-07-08 NOTE — H&P
[facility]  HFICU HISTORY AND PHYSICAL     Melissa Marinelli/[id number]    Admit Date: 7/8/2024  Hospital Length of Stay: 0   ICU Length of Stay: 2h   Primary Service: HFICU  Primary HF Cardiologist: Dr Rivera  Referring: Dr Rivera     HPI:   68 year old woman who presents for advanced heart failure care. She has a past medical history significant for HrEF (EF 15-20%, multiple RWMA on TTE 5/2020) CAD s/p NSTEMI s/p RIRI to LCX (Jan 2016), MVR s/p mitral valve repair in (June 2019; 29 mm Epic bioprosthetic valve with Dr. Montana), COPD, HTN, HL, GERD, hx of CVA, DM. On multiple occasions in the past durable LVAD therapy has been discussed and has been declined on multiple occasions.  Ultimately, Ms. Marinelli was initiated on milrinone at a rate of 0.125 mcg/kg/min in 2/2024 and this therapy has been well-tolerated. She presented to the emergency room on 7/8 with complaints of shortness of breath, nausea and vomiting. Unclear what preceded this as the patient is intubated and sedated and unable to give proper history of illness. Daughter is at bedside and states Ms Marinelli woke up short of breath and came to the ED shortly after.  While in the ED patient was tachycardic and short of breath with some episodes of vomiting. She was intubated for increased work of breathing and airway protection. A CT C/A/P was ordered and patient was then transferred to HFICU for further management.     On arrival to HFICU patient is intubated and sedated on propofol and fentanyl. She is cool to touch on exam and volume overloaded. Patients sedation was changed over to precedex to help with the weaning process off the ventilator, and milrinone infusion was increased to 0.25 mcg/kg/min with a 1x dose of bumex 3 mg. Labs were sent and infectious workup was initiated. Plans to wean to extubate today.       Cardiac Tests:  EKG: Sinus tachycardia     ICD Check: 5/28 - 1 episode VT/VF - ATP delivered     Chest  Radiograph - completed in ER 7/8    CT: completed in ER 7/8 - NEW lung nodule     Echocardiogram: 1/23  CONCLUSIONS:   1. Left ventricular cavity size is severely dilated.   2. Left ventricular systolic function is severely decreased with a 15-20% estimated ejection fraction.   3. There is global hypokinesis of the left ventricle with minor regional variations.   4. No left ventricular thrombus visualized.   5. There is mildly reduced right ventricular systolic function.   6. The left atrium is mild to moderately dilated.   7. Mitral valve bioprosthesis.    Cardiac Catheterization: 1/26  CONCLUSIONS:   1. Elevated right_[RA 12, RVEDP 13] and left_[PCWP 26 mmHg] sided filling pressure, PA pressures of 46/29 with a mean of 37 mmHg, and low assumed Chirag cardiac output at 2.3 L/min and cardiac index of 1.3 L/min/mï¿½. SVR 2,886 dynes/seconds/cm-5.      Past Medical History:  Past Medical History:   Diagnosis Date    Asthma ([facility])     CAD (coronary artery disease)     CHF (congestive heart failure) (Multi)     CKD (chronic kidney disease)     COPD (chronic obstructive pulmonary disease) (Multi)     CVA (cerebral vascular accident) (Multi)     Diabetes mellitus (Multi)     GERD (gastroesophageal reflux disease)     Hyperlipidemia     Hypertension     NSTEMI (non-ST elevated myocardial infarction) (Multi)     Unspecified systolic (congestive) heart failure (Multi) 08/11/2022    HFrEF (heart failure with reduced ejection fraction)       Past Surgical History:  Past Surgical History:   Procedure Laterality Date    CARDIAC CATHETERIZATION N/A 1/26/2024    Procedure: Right Heart Cath;  Surgeon: Cuate Dodson MD;  Location: [place] [address] Cardiac Cath Lab;  Service: Cardiovascular;  Laterality: N/A;    MITRAL VALVE REPLACEMENT  06/2019    OTHER SURGICAL HISTORY  08/11/2022    Tonsillectomy    OTHER SURGICAL HISTORY  08/11/2022    Right ventricular assist device placement    OTHER SURGICAL HISTORY  08/11/2022    Mitral valve  replacement       Family History:  Family History   Problem Relation Name Age of Onset    Other (CVA) Brother         Social History:  Social History     Socioeconomic History    Marital status: Single     Spouse name: Not on file    Number of children: Not on file    Years of education: Not on file    Highest education level: Not on file   Occupational History    Not on file   Tobacco Use    Smoking status: Every Day     Current packs/day: 0.50     Types: Cigarettes    Smokeless tobacco: Never   Substance and Sexual Activity    Alcohol use: Never    Drug use: Yes     Types: Marijuana    Sexual activity: Not on file   Other Topics Concern    Not on file   Social History Narrative    Not on file     Social Determinants of Health     Financial Resource Strain: Patient Unable To Answer (7/8/2024)    Overall Financial Resource Strain (CARDIA)     Difficulty of Paying Living Expenses: Patient unable to answer   Food Insecurity: Food Insecurity Present (2/17/2024)    Hunger Vital Sign     Worried About Running Out of Food in the Last Year: Never true     Ran Out of Food in the Last Year: Often true   Transportation Needs: Patient Unable To Answer (7/8/2024)    PRAPARE - Transportation     Lack of Transportation (Medical): Patient unable to answer     Lack of Transportation (Non-Medical): Patient unable to answer   Physical Activity: Inactive (2/17/2024)    Exercise Vital Sign     Days of Exercise per Week: 0 days     Minutes of Exercise per Session: 0 min   Stress: No Stress Concern Present (2/17/2024)    [employer] - Occupational Stress Questionnaire     Feeling of Stress : Not at all   Social Connections: Unknown (2/17/2024)    Social Connection and Isolation Panel [NHANES]     Frequency of Communication with Friends and Family: Three times a week     Frequency of Social Gatherings with Friends and Family: Three times a week     Attends Restoration Services: Not on file     Active Member of  "Clubs or Organizations: No     Attends Club or Organization Meetings: Never     Marital Status: Not on file   Intimate Partner Violence: Not At Risk (2/17/2024)    Humiliation, Afraid, Rape, and Kick questionnaire     Fear of Current or Ex-Partner: No     Emotionally Abused: No     Physically Abused: No     Sexually Abused: No   Housing Stability: Patient Unable To Answer (7/8/2024)    Housing Stability Vital Sign     Unable to Pay for Housing in the Last Year: Patient unable to answer     Number of Places Lived in the Last Year: 1     Unstable Housing in the Last Year: Patient unable to answer       Allergies:  Allergies   Allergen Reactions    Metoprolol Other, Shortness of breath and Respiratory depression    Ticagrelor Anaphylaxis and Other     Feels a severe \"burning feeling\" in her chest    Gadolinium-Containing Contrast Media Hives and Other    Iodinated Contrast Media Hives and Other    Statins-Hmg-Coa Reductase Inhibitors Myalgia, Other and Unknown     Atorvastatin - hair loss    Prednisone Other     Increased blood sugar    Ace Inhibitors Other and Cough     COUGH    Fentanyl Dizziness and Drowsiness    Hydralazine Dermatitis, Rash and Nausea/vomiting    Nicorette [Nicotine (Polacrilex)] Nausea/vomiting     Nicorette gums and lozenges only. Okay with nicotine patches    Nicotine Nausea/vomiting    Penicillins Rash and Unknown       Prior to Admission Meds:  Medications Prior to Admission   Medication Sig Dispense Refill Last Dose    acetaminophen (Tylenol) 500 mg tablet Take 2 tablets (1,000 mg) by mouth every 8 hours if needed for mild pain (1 - 3).       albuterol 90 mcg/actuation inhaler Inhale 2 puffs every 4 hours if needed for wheezing or shortness of breath. 18 g 11     aspirin 81 mg EC tablet Take 1 tablet (81 mg) by mouth once daily. 30 tablet 11     bumetanide (Bumex) 0.5 mg tablet Take 1 tablet (0.5 mg) by mouth 2 times a week. On Tuesdays and Saturdays 8 tablet 2     Calcium Antacid 200 mg " calcium (500 mg) chewable tablet Chew 1 tablet (500 mg) every 12 hours if needed.       cholecalciferol (Vitamin D3) 50 MCG (2000 UT) tablet Take 1 tablet (2,000 Units) by mouth once daily.       famotidine (Pepcid) 40 mg tablet Take 1 tablet (40 mg) by mouth once daily. Do not start before February 3, 2024. 30 tablet 2     Farxiga 10 mg Take 1 tablet (10 mg) by mouth once daily. 30 tablet 11     heparin flush,porcine,-0.9NaCl 100 unit/mL kit 5 mL by central venous line infusion route 1 (one) time per week. Indications: prevent clot from blocking an intravenous catheter       ipratropium-albuteroL (Duo-Neb) 0.5-2.5 mg/3 mL nebulizer solution Take 3 mL by nebulization every 6 hours.       milrinone in 5 % dextrose (Primacor) 40 mg/200 mL (200 mcg/mL) infusion Infuse 7.325 mcg/min at 2.2 mL/hr into a venous catheter continuously. Via R chest LEROY. Dose = 0.125mcg/kg/min most recent weight = 58.6kg from 4/30/24 87349 mL 11     OneTouch Verio Flex meter misc USE AS DIRECTED THREE TIMES DAILY       sodium chloride (Ocean) 0.65 % nasal spray Administer 1 spray into each nostril if needed for congestion.       sodium chloride 0.9% (sodium chloride) flush Infuse 10 mL into a venous catheter 1 (one) time per week.       spironolactone (Aldactone) 25 mg tablet Take 0.5 tablets (12.5 mg) by mouth once daily. 15 tablet 11     Symbicort 80-4.5 mcg/actuation inhaler Inhale 2 puffs twice a day.          Current Medications:  Infusions:  dexmedeTOMIDine, Last Rate: 0.6 mcg/kg/hr (07/08/24 1300)  milrinone, Last Rate: 0.25 mcg/kg/min (07/08/24 1146)      Scheduled:  aspirin, 81 mg, Daily  budesonide, 0.5 mg, BID  cholecalciferol, 2,000 Units, Daily  dapagliflozin propanediol, 10 mg, Daily  ipratropium-albuteroL, 3 mL, q6h  pantoprazole, 40 mg, Daily  perflutren lipid microspheres, 0.5-10 mL of dilution, Once in imaging  sennosides, 2 tablet, BID      PRN:  acetaminophen, 650 mg, q6h PRN   Or  acetaminophen, 650 mg, q6h PRN    "Or  acetaminophen, 650 mg, q6h PRN  albuterol, 2 puff, q4h PRN  calcium carbonate, 500 mg, q12h PRN  oxygen, , Continuous PRN - O2/gases  polyethylene glycol, 17 g, Daily PRN  sodium chloride, 1 spray, PRN        PHYSICAL EXAM:   Visit Vitals  /75   Pulse (!) 112   Temp 35.8 °C (96.4 °F)   Resp 18   Ht 1.626 m (5' 4\")   Wt 58.1 kg (128 lb 1.4 oz)   SpO2 98%   BMI 21.99 kg/m²   Smoking Status Every Day   BSA 1.62 m²       Wt Readings from Last 5 Encounters:   07/08/24 58.1 kg (128 lb 1.4 oz)   07/01/24 57.6 kg (127 lb)   06/27/24 57.6 kg (127 lb)   06/24/24 57.6 kg (127 lb)   06/20/24 57.6 kg (127 lb)       INTAKE/OUTPUT:  No intake/output data recorded.     Physical Exam  Constitutional:       Appearance: She is ill-appearing.   HENT:      Mouth/Throat:      Mouth: Mucous membranes are moist.   Eyes:      Pupils: Pupils are equal, round, and reactive to light.   Cardiovascular:      Rate and Rhythm: Regular rhythm. Tachycardia present.      Pulses: Normal pulses.      Heart sounds: Normal heart sounds.   Pulmonary:      Effort: Pulmonary effort is normal.      Breath sounds: Normal breath sounds.      Comments: Intubated and sedated   Abdominal:      General: Abdomen is flat. Bowel sounds are normal. There is no distension.      Palpations: Abdomen is soft.   Musculoskeletal:         General: Swelling present.      Right lower leg: Edema present.      Left lower leg: Edema present.   Skin:     Capillary Refill: Capillary refill takes less than 2 seconds.      Comments: Cool to touch    Neurological:      Comments: Unable to assess        Review of Systems   Unable to perform ROS: Intubated       DATA:  CMP:  Recent Labs     07/08/24  0643 02/17/24  1748 02/17/24  0252 02/03/24  0648 02/02/24  0459 02/01/24  0310 01/31/24  0312 01/30/24  0244 01/29/24  0259 01/28/24  0354 01/27/24  0432    139 141 139 135* 135* 132* 134* 134* 137 138   K 6.2* 4.4 4.7 4.5 4.3 4.5 4.6 4.8 4.4 4.4 4.4    107 108* 108* " 105 104 102 103 103 104 101   CO2 21 24 21 25 26 26 24 26 23 24 25   ANIONGAP 18 12 17 11 8* 10 11 10 12 13 16   BUN 24* 13 11 34* 36* 42* 53* 61* 62* 72* 83*   CREATININE 1.52* 1.28* 1.26* 1.45* 1.15* 1.27* 1.31* 1.40* 1.49* 1.69* 2.26*   EGFR 37* 45* 46* 39* 52* 46* 44* 41* 38* 33* 23*   MG  --  2.05 2.27 2.19 2.30 2.35 2.44* 2.28 2.35 2.31 2.40     Recent Labs     07/08/24  0643 02/17/24  1748 02/17/24  0252 02/03/24  0648 02/02/24  0459 02/01/24  0310 01/31/24  0312 01/30/24  0244 01/27/24  0432 01/26/24  1815 01/24/24  0500 01/23/24  2252 01/23/24  0548 01/22/24  0521 11/22/23  0525 11/21/23  0315 11/20/23  1548 08/31/23  2221 08/31/23  1041 08/31/23  0430 08/31/23  0356 03/21/23  2005 03/21/23  0203 03/20/23  2235 12/23/21  0454 12/20/21  0701 07/14/21  1234 07/13/21  0700   ALBUMIN 4.0 3.5 3.7 2.9* 2.9* 3.1* 2.7* 2.9*   < > 3.6   < > 3.8   < > 4.5   < > 3.6 4.0   < > 4.1  --  3.8   < > 4.3 CANCELED   < > 4.4   < > 4.0   ALT 11  --  26  --   --   --   --   --   --  98*  --  11  --  16  --  13 15  --  10  --  9  --  21 CANCELED   < > 10  --  10   AST 29  --  23  --   --   --   --   --   --  63*  --  17  --  40*  --  17 20  --  16  --  13  --  38 CANCELED   < > 19  --  21   BILITOT 0.3  --  0.4  --   --   --   --   --   --  0.7  --  0.4  --  0.4  --  0.6 0.4  --  0.3  --  0.3  --  0.4 CANCELED   < > 0.3  --  0.4   LIPASE  --   --   --   --   --   --   --   --   --   --   --   --   --   --   --   --  22  --   --  CANCELED 54  --  59 CANCELED  --  96*  --  75    < > = values in this interval not displayed.     CBC:  Recent Labs     07/08/24  0643 06/02/24  0329 02/17/24  1748 02/17/24  0252 02/03/24  0647 02/02/24  0459 02/01/24  0310 01/31/24  0312   WBC 11.4* 7.2 4.1* 4.9 5.4 6.4 7.4 7.1   HGB 13.5 13.5 9.2* 11.3* 10.5* 9.4* 10.5* 10.6*   HCT 40.8 41.9 30.0* 34.4* 31.3* 27.6* 31.7* 29.4*    181 325 324 126* 107* 113* 112*   MCV 90 91 93 88 89 83 87 81     COAG:   Recent Labs     07/08/24  1137  07/08/24  0643 06/02/24  0329 02/17/24  0252 01/23/24  2252 01/22/24  0521 11/21/23  0315 03/20/23  1604   INR 1.0 0.9 1.0 1.1 1.1 1.0 1.1 1.0     ABO:   Recent Labs     01/23/24 2252   ABO O     HEME/ENDO:  Recent Labs     07/08/24  1137 04/24/24  1640 02/17/24  0252 01/26/24  1815 01/26/24  1805 11/22/23  0525 11/21/23  0316 11/21/23  0315   FERRITIN  --   --   --  720*  --  92  --   --    IRONSAT  --   --  13* 34  --  11*  --    < >   TSH  --   --   --   --  0.45  --  1.41  --    HGBA1C 5.2 5.3  --   --  5.4  --   --   --     < > = values in this interval not displayed.      CARDIAC:   Recent Labs     07/08/24  0758 07/08/24  0643 02/17/24  0347 02/17/24  0252 01/26/24  0715 01/23/24  0121 01/22/24  2154 01/22/24  1639 01/22/24  0609 01/22/24  0521 11/21/23  1331 11/21/23  0811 11/21/23  0316 11/21/23  0315 11/20/23  2252 11/20/23  1325 08/31/23  0501 08/31/23  0356 05/02/21  1255 05/21/20  2102 12/06/19  1942 11/29/19  0937 11/22/19  0418 11/21/19  0513 11/20/19  0327 11/19/19  0411 11/18/19  0808 11/17/19  0436   LDH  --   --   --   --   --   --   --   --   --   --   --   --   --   --   --   --   --   --   --  293*  --  414* 481* 463* 539* 479* 528* 585*   TROPHS 283* 238* 157* 170* 700* 1,230* 1,257* 996*   < > 151*   < >  --    < >  --    < > 168*   < > 383*   < >  --   --   --   --   --   --   --   --   --    BNP  --  509*  --  1,139* >5,000*  --   --   --   --  335*  --  3,373*  --  >5,000*  --  238*  --  655*   < >  --    < >  --   --   --   --   --   --   --     < > = values in this interval not displayed.     Recent Labs     01/30/24  1637 01/29/24  2152 01/29/24  1542 01/29/24  0615 01/28/24  2150 01/27/24  0432 01/25/24  2333 01/23/24  1121 11/21/23  0321 08/31/23  0844   LACTATEART  --   --   --   --   --   --  1.6 2.3*  --  1.6   SO2MV 63 66 62 71 70   < >  --   --    < >  --    O2CMX 61.8 64.2 60.3 68.9 68.5   < >  --   --    < >  --     < > = values in this interval not displayed.     No results  "for input(s): \"TACROLIMUS\", \"SIROLIMUS\", \"CYCLOSPORINE\" in the last 68738 hours.      ASSESSMENT AND PLAN:   68 year old woman who presents for advanced heart failure care. She has a past medical history significant for HrEF (EF 15-20%, multiple RWMA on TTE 5/2020) CAD s/p NSTEMI s/p RIRI to LCX (Jan 2016), MVR s/p mitral valve repair in (June 2019; 29 mm Epic bioprosthetic valve with Dr. Montana), COPD, HTN, HL, GERD, hx of CVA, DM. On multiple occasions in the past durable LVAD therapy has been discussed and has been declined on multiple occasions.  Ultimately, Ms. Marinelli was initiated on milrinone at a rate of 0.125 mcg/kg/min in 2/2024 and this therapy has been well-tolerated. She presented to the emergency room on 7/8 with complaints of shortness of breath, nausea and vomiting. She was intubated in the ED and transferred to HFICU for further assessment and evaluation.     Neuro:  #Anxiety  #Depression   - Not on any medications at home   - Referred to outpatient psychology at last OP appt with Dr Rivera   - Serial neuro and pain assessments   - PO Tylenol PRN for pain  - PT/OT Consult, OOB to chair  - CAM ICU score every shift  - Sleep/wake cycle normalization     #Substance abuse  - Alcohol abuse/Alcohol dependence: denies  - Tobacco use/Nicotine Dependence: Marijuana and daily cigarette use   - Drug screen ordered 7/8     #Mini-Cog Assessment:  - Unable to complete on admission patient is intubated and sedated     Cardiovascular:  #Acute on chronic decompensated HFrEF 15-20%   #Cardiogenic shock  #Milrinone dependant   - TTE 1/24: severely decreased LV systolic function, EF 15-20% with severely dilated LV cavity size LVIDd 6.3.  No LV thrombus mild - moderate LA dilation.  Mildly reduced RV systolic function   - Complete TTE ordered 7/8  - admit weight 58.1 kg  - admit BNP 2095  - C/w milrinone infusion 0.25 mcg/kg/min (home dose 0.125)   - Bumex 3 mg x1 for diuresis, will redose as needed   - C/w dapa " 10, holding home angeli 25 mg d/t elevated K+   - Unable to tolerate additional GDMT   - Daily standing weights, 2gm sodium diet, 2L fluid restriction, strict I&Os    #CAD   #NSTEMI s/p RIRI to Lcx (Jan 2016)  #Hx of Mitral Valve repair (June 2019) w/ Dr Montana  #ICD (5/2020)  #Demand ischemia/ hypertroponemia  - 29mm Epic Bioprosthetc valve   - C/w ASA 81mg daily   - Not on statin d/t allergy   - Last device check 5/28- episode of VT/VF with proper ATP on 5/27   - Device check ordered 7/8    #Electrolyte Disturbances  #Hyperkalemia  - Maintain K>4, Mg >2   - Holding spironolactone d/t elevated K+    Pulmonary:   #Acute hypoxic respiratory failure requiring intubation  #COPD exacerbation vs ADHF  - Intubated in the ED 7/8 due to increased WOB   - Currently weaning to extubate   - C/w albuterol PRN  - C/w scheduled Duo-nebs and Pulmicort   - Aggressive pulmonary hygiene   - Send respiratory culture and RVP 7/8  - Consider pulmonary consult   - Monitor and maintain SpO2 > 92%    GI:  #Nausea and vomiting   - Bowel regimen: luz-colace BID and miralax PRN   - PPI daily   - Consider Tigan IM q6 hrs PRN (was on this previously for N/V)     :  #CKD IIIB  - Baseline BUN/Cr: 29-41/ 1.3-1.8   - Admit BUN/Cr: 26/1.7   - I/Os  - Avoid hypotension and nephrotoxic agents    Heme:  #Iron deficiency anemia   - Labs: CBC, TIBC, ferritin, serum Fe, folate, B12  pending    - If %sat low, order venofer     Endo:  #DM  - Euglycemic  - hgbA1c 5.2%    #Thyroid  - TSH 2.87     ID:  #?infection  - Afebrile, nontoxic   - Trend temps q4h  - BC x2 7/8  - Urinalysis + reflex microscopic 7/8  - Resp culture and RVP 7/8    PHYSICAL AND OCCUPATIONAL THERAPY: ordered and following    LINES:  PIVs   R subclavian lee 2/2024    DVT: heparin subq  VAP BUNDLE: working to extubation  CENTRAL LINE BUNDLE: ordered  ULCER PPX: PPI  GLYCEMIC CONTROL: NA  BOWEL CARE: luz-colace/miralax PRN  INDWELLING CATHETER: 7/8 - remove when able   NUTRITION: NPO  Diet; Effective now      EMERGENCY CONTACT: Extended Emergency Contact Information  Primary Emergency Contact: Sarah Caruso  Home Phone: [phone]  Relation: Child  FAMILY UPDATE: given to daughter at bedside   CODE STATUS: Full Code  DISPO: admit to HFICU     Patient seen and assessed with Dr. Rivera     I personally spent 60 minutes of critical care time directly and personally managing the patient exclusive of separately billable procedures   _________________________________________________  Teri Zuleta, APRN-CNP

## 2024-07-08 NOTE — PROGRESS NOTES
I received this patient during signout from Dr. Schumacher @ 0700   Please see previous provider's note for detailed H&P, labs and imaging.    Briefly, this is a 68-year-old female with history of end-stage heart failure on milrinone pump, CVA, seizure ED, hypertension, CAD who presented as a critical activation in the setting of difficulty breathing.  Patient required intubation prior to my shift due to increased work of breathing and airway protection.  Initial blood gas notable for pH 7.27, normal CO2, slightly elevated lactate of 2.8 and potassium 6.7.  Chest x-ray pending to evaluate for cardiopulmonary abnormality and endotracheal tube placement    Plan at the time of signout was to review labs and admit patient to heart failure ICU.    Under my care, patient reassessed and remains clinically stable. See ED course below.    @  ED Course as of 07/08/24 0839   Mon Jul 08, 2024 0724 I reviewed patient's medication history and ordered 0.5 mg of Bumex for diuresis, per prior team B lines were seen on ultrasound [SA]   0736 EKG reviewed sinus tachycardia rate 117, NM interval 134 ms,  ms, QTc 479 ms, appears similar to prior, no obvious peaked T waves, no obvious ST segment elevations or depressions, T wave abnormalities in lateral leads are present on prior [SA]   0744 Fentanyl drip ordered for pain control and sedation [SA]   0756 On repeat blood gas potassium remains elevated at 6.3, 2 g of calcium ordered, will follow-up metabolic panel for hyperkalemia [SA]   0757 Vent setting changed after respiratory acidosis on VBG [SA]   0810 CT ordered per HFICU recs [SA]   0835 Initial troponin 238, no overt EKG changes reflective of ischemia, likely demand ischemia from known heart failure, has been similar to prior [SA]   0836 Will preemptively treat for hyperkalemia with 5 units of insulin and dextrose, calcium given.  Patient to go from CT to heart failure ICU.  I discussed with heart failure ICUattending who  accepts patient [SA]   0838 X-ray reviewed and there is bilateral interstitial edema, endotracheal tube appears to be in appropriate position [SA]      ED Course User Index  [SA] Jemima Palomino DO         Diagnoses as of 07/08/24 0839   Shortness of breath   @    Disposition: Admitted        Patient seen and staffed with attending physician.     Jemima Palomino DO   EM PGY3

## 2024-07-08 NOTE — PROGRESS NOTES
HFICU Attending Note    Principal Problem:    Shortness of breath  Active Problems:    Acute on chronic combined systolic (congestive) and diastolic (congestive) heart failure (Multi)    Daughter at bedside    68-year-old lady with inotrope dependent HFrEF, COPD/emphysema, active smoking history.  Her daughter relates that earlier this morning she complained of sudden onset of dyspnea at rest.  In the emergency room she is described as coughing severely, and vomiting.  She was thought to be tiring, and was intubated for airway protection.    She does have sinus tachycardia, no acute ischemic changes noted, despite rising troponin.  Case discussed with on-call interventional cardiology and in the absence of known viability, will opt to hold off.    Clinically, and on CT concern for pulmonary edema and IV diuresis has been initiated.    Inotrope advanced to milrinone 0.25.  Will trend lactates initially, low threshold for [place] catheter placement.    She has also been pan cultured, results pending.  No empiric antibiotics.    There is documentation on her CT (7/8/2024) of possible perennial cellulitis.  This will be evaluated clinically.    Working toward extubation    This critically ill patient continues to be at-risk for clinically significant deterioration / failure due to the above mentioned dysfunctional, unstable organ systems.  I have personally identified and managed all complex critical care issues to prevent aforementioned clinical deterioration.  Critical care time is spent at bedside and/or the immediate area and has included, but is not limited to, the review of diagnostic tests, labs, radiographs, serial assessments of hemodynamics, respiratory status, ventilatory management, and family updates.  Time spent in procedures and teaching are reported separately.    Critical care time: 60  minutes

## 2024-07-09 ENCOUNTER — APPOINTMENT (OUTPATIENT)
Dept: RADIOLOGY | Facility: HOSPITAL | Age: 69
End: 2024-07-09
Payer: COMMERCIAL

## 2024-07-09 LAB
ALBUMIN SERPL BCP-MCNC: 3.6 G/DL (ref 3.4–5)
ANION GAP BLDMV CALCULATED.4IONS-SCNC: 13 MMO/L (ref 10–25)
ANION GAP BLDV CALCULATED.4IONS-SCNC: 19 MMOL/L (ref 10–25)
ANION GAP SERPL CALC-SCNC: 22 MMOL/L (ref 10–20)
ATRIAL RATE: 131 BPM
BASE EXCESS BLDMV CALC-SCNC: -0.6 MMOL/L (ref -2–3)
BASE EXCESS BLDMV CALC-SCNC: 0.7 MMOL/L (ref -2–3)
BASE EXCESS BLDMV CALC-SCNC: 1 MMOL/L (ref -2–3)
BASE EXCESS BLDV CALC-SCNC: -3.5 MMOL/L (ref -2–3)
BASOPHILS # BLD AUTO: 0.03 X10*3/UL (ref 0–0.1)
BASOPHILS NFR BLD AUTO: 0.2 %
BODY TEMPERATURE: 37 DEGREES CELSIUS
BUN SERPL-MCNC: 39 MG/DL (ref 6–23)
CA-I BLDMV-SCNC: 1.18 MMOL/L (ref 1.1–1.33)
CA-I BLDV-SCNC: 1.21 MMOL/L (ref 1.1–1.33)
CALCIUM SERPL-MCNC: 9.3 MG/DL (ref 8.6–10.6)
CARDIAC TROPONIN I PNL SERPL HS: 2782 NG/L (ref 0–34)
CARDIAC TROPONIN I PNL SERPL HS: 2820 NG/L (ref 0–34)
CHLORIDE BLD-SCNC: 102 MMOL/L (ref 98–107)
CHLORIDE BLDV-SCNC: 103 MMOL/L (ref 98–107)
CHLORIDE SERPL-SCNC: 102 MMOL/L (ref 98–107)
CO2 SERPL-SCNC: 22 MMOL/L (ref 21–32)
CREAT SERPL-MCNC: 2.57 MG/DL (ref 0.5–1.05)
EGFRCR SERPLBLD CKD-EPI 2021: 20 ML/MIN/1.73M*2
EOSINOPHIL # BLD AUTO: 0.01 X10*3/UL (ref 0–0.7)
EOSINOPHIL NFR BLD AUTO: 0.1 %
ERYTHROCYTE [DISTWIDTH] IN BLOOD BY AUTOMATED COUNT: 16 % (ref 11.5–14.5)
GLUCOSE BLD-MCNC: 141 MG/DL (ref 74–99)
GLUCOSE BLDV-MCNC: 160 MG/DL (ref 74–99)
GLUCOSE SERPL-MCNC: 152 MG/DL (ref 74–99)
HCO3 BLDMV-SCNC: 24.9 MMOL/L (ref 22–26)
HCO3 BLDMV-SCNC: 25.4 MMOL/L (ref 22–26)
HCO3 BLDMV-SCNC: 26 MMOL/L (ref 22–26)
HCO3 BLDV-SCNC: 22.7 MMOL/L (ref 22–26)
HCT VFR BLD AUTO: 37.6 % (ref 36–46)
HCT VFR BLD EST: 38 % (ref 36–46)
HCT VFR BLD EST: 40 % (ref 36–46)
HGB BLD-MCNC: 12.5 G/DL (ref 12–16)
HGB BLDMV-MCNC: 12.6 G/DL (ref 12–16)
HGB BLDV-MCNC: 13.4 G/DL (ref 12–16)
IMM GRANULOCYTES # BLD AUTO: 0.11 X10*3/UL (ref 0–0.7)
IMM GRANULOCYTES NFR BLD AUTO: 0.8 % (ref 0–0.9)
INHALED O2 CONCENTRATION: 21 %
INHALED O2 CONCENTRATION: 21 %
INHALED O2 CONCENTRATION: 36 %
LACTATE BLDMV-SCNC: 1.4 MMOL/L (ref 0.4–2)
LACTATE BLDV-SCNC: 2.7 MMOL/L (ref 0.4–2)
LACTATE SERPL-SCNC: 2.6 MMOL/L (ref 0.4–2)
LACTATE SERPL-SCNC: 3.3 MMOL/L (ref 0.4–2)
LYMPHOCYTES # BLD AUTO: 1.04 X10*3/UL (ref 1.2–4.8)
LYMPHOCYTES NFR BLD AUTO: 7.6 %
MAGNESIUM SERPL-MCNC: 2.41 MG/DL (ref 1.6–2.4)
MCH RBC QN AUTO: 29.1 PG (ref 26–34)
MCHC RBC AUTO-ENTMCNC: 33.2 G/DL (ref 32–36)
MCV RBC AUTO: 87 FL (ref 80–100)
MONOCYTES # BLD AUTO: 0.75 X10*3/UL (ref 0.1–1)
MONOCYTES NFR BLD AUTO: 5.5 %
NEUTROPHILS # BLD AUTO: 11.69 X10*3/UL (ref 1.2–7.7)
NEUTROPHILS NFR BLD AUTO: 85.8 %
NRBC BLD-RTO: 0 /100 WBCS (ref 0–0)
OXYHGB MFR BLDMV: 59.3 % (ref 45–75)
OXYHGB MFR BLDMV: 59.9 % (ref 45–75)
OXYHGB MFR BLDMV: 71.5 % (ref 45–75)
OXYHGB MFR BLDV: 57 % (ref 45–75)
P AXIS: 73 DEGREES
P OFFSET: 187 MS
P ONSET: 149 MS
PCO2 BLDMV: 40 MM HG (ref 41–51)
PCO2 BLDMV: 42 MM HG (ref 41–51)
PCO2 BLDMV: 43 MM HG (ref 41–51)
PCO2 BLDV: 44 MM HG (ref 41–51)
PH BLDMV: 7.37 PH (ref 7.33–7.43)
PH BLDMV: 7.4 PH (ref 7.33–7.43)
PH BLDMV: 7.41 PH (ref 7.33–7.43)
PH BLDV: 7.32 PH (ref 7.33–7.43)
PHOSPHATE SERPL-MCNC: 4.8 MG/DL (ref 2.5–4.9)
PLATELET # BLD AUTO: 202 X10*3/UL (ref 150–450)
PO2 BLDMV: 38 MM HG (ref 35–45)
PO2 BLDMV: 39 MM HG (ref 35–45)
PO2 BLDMV: 47 MM HG (ref 35–45)
PO2 BLDV: 41 MM HG (ref 35–45)
POTASSIUM BLDMV-SCNC: 5.1 MMOL/L (ref 3.5–5.3)
POTASSIUM BLDV-SCNC: 5.7 MMOL/L (ref 3.5–5.3)
POTASSIUM SERPL-SCNC: 5.4 MMOL/L (ref 3.5–5.3)
PR INTERVAL: 132 MS
Q ONSET: 215 MS
QRS COUNT: 19 BEATS
QRS DURATION: 126 MS
QT INTERVAL: 354 MS
QTC CALCULATION(BAZETT): 489 MS
QTC FREDERICIA: 439 MS
R AXIS: 71 DEGREES
RBC # BLD AUTO: 4.3 X10*6/UL (ref 4–5.2)
SAO2 % BLDMV: 61 % (ref 45–75)
SAO2 % BLDMV: 61 % (ref 45–75)
SAO2 % BLDMV: 73 % (ref 45–75)
SAO2 % BLDV: 58 % (ref 45–75)
SARS-COV-2 RNA RESP QL NAA+PROBE: NOT DETECTED
SODIUM BLDMV-SCNC: 135 MMOL/L (ref 136–145)
SODIUM BLDV-SCNC: 139 MMOL/L (ref 136–145)
SODIUM SERPL-SCNC: 141 MMOL/L (ref 136–145)
T AXIS: 215 DEGREES
T OFFSET: 392 MS
VENTRICULAR RATE: 115 BPM
WBC # BLD AUTO: 13.6 X10*3/UL (ref 4.4–11.3)

## 2024-07-09 PROCEDURE — 2500000004 HC RX 250 GENERAL PHARMACY W/ HCPCS (ALT 636 FOR OP/ED): Performed by: NURSE PRACTITIONER

## 2024-07-09 PROCEDURE — 99291 CRITICAL CARE FIRST HOUR: CPT | Performed by: STUDENT IN AN ORGANIZED HEALTH CARE EDUCATION/TRAINING PROGRAM

## 2024-07-09 PROCEDURE — 87449 NOS EACH ORGANISM AG IA: CPT

## 2024-07-09 PROCEDURE — 37799 UNLISTED PX VASCULAR SURGERY: CPT

## 2024-07-09 PROCEDURE — 85025 COMPLETE CBC W/AUTO DIFF WBC: CPT

## 2024-07-09 PROCEDURE — 84132 ASSAY OF SERUM POTASSIUM: CPT

## 2024-07-09 PROCEDURE — 2500000002 HC RX 250 W HCPCS SELF ADMINISTERED DRUGS (ALT 637 FOR MEDICARE OP, ALT 636 FOR OP/ED)

## 2024-07-09 PROCEDURE — 84484 ASSAY OF TROPONIN QUANT: CPT | Performed by: STUDENT IN AN ORGANIZED HEALTH CARE EDUCATION/TRAINING PROGRAM

## 2024-07-09 PROCEDURE — 2500000001 HC RX 250 WO HCPCS SELF ADMINISTERED DRUGS (ALT 637 FOR MEDICARE OP)

## 2024-07-09 PROCEDURE — 2500000004 HC RX 250 GENERAL PHARMACY W/ HCPCS (ALT 636 FOR OP/ED)

## 2024-07-09 PROCEDURE — 83605 ASSAY OF LACTIC ACID: CPT | Performed by: NURSE PRACTITIONER

## 2024-07-09 PROCEDURE — 02HP32Z INSERTION OF MONITORING DEVICE INTO PULMONARY TRUNK, PERCUTANEOUS APPROACH: ICD-10-PCS | Performed by: INTERNAL MEDICINE

## 2024-07-09 PROCEDURE — 82810 BLOOD GASES O2 SAT ONLY: CPT

## 2024-07-09 PROCEDURE — C9113 INJ PANTOPRAZOLE SODIUM, VIA: HCPCS

## 2024-07-09 PROCEDURE — 2500000005 HC RX 250 GENERAL PHARMACY W/O HCPCS: Performed by: EMERGENCY MEDICINE

## 2024-07-09 PROCEDURE — 2020000001 HC ICU ROOM DAILY

## 2024-07-09 PROCEDURE — 93503 INSERT/PLACE HEART CATHETER: CPT

## 2024-07-09 PROCEDURE — 84484 ASSAY OF TROPONIN QUANT: CPT

## 2024-07-09 PROCEDURE — 94640 AIRWAY INHALATION TREATMENT: CPT

## 2024-07-09 PROCEDURE — 71045 X-RAY EXAM CHEST 1 VIEW: CPT

## 2024-07-09 PROCEDURE — 2500000004 HC RX 250 GENERAL PHARMACY W/ HCPCS (ALT 636 FOR OP/ED): Performed by: STUDENT IN AN ORGANIZED HEALTH CARE EDUCATION/TRAINING PROGRAM

## 2024-07-09 PROCEDURE — 71045 X-RAY EXAM CHEST 1 VIEW: CPT | Performed by: RADIOLOGY

## 2024-07-09 PROCEDURE — 83735 ASSAY OF MAGNESIUM: CPT

## 2024-07-09 RX ORDER — METOCLOPRAMIDE HYDROCHLORIDE 5 MG/ML
5 INJECTION INTRAMUSCULAR; INTRAVENOUS EVERY 6 HOURS PRN
Status: DISCONTINUED | OUTPATIENT
Start: 2024-07-09 | End: 2024-07-11 | Stop reason: HOSPADM

## 2024-07-09 RX ORDER — SODIUM CHLORIDE, SODIUM LACTATE, POTASSIUM CHLORIDE, CALCIUM CHLORIDE 600; 310; 30; 20 MG/100ML; MG/100ML; MG/100ML; MG/100ML
10 INJECTION, SOLUTION INTRAVENOUS CONTINUOUS
Status: DISCONTINUED | OUTPATIENT
Start: 2024-07-09 | End: 2024-07-11 | Stop reason: HOSPADM

## 2024-07-09 RX ORDER — METOCLOPRAMIDE 10 MG/1
5 TABLET ORAL EVERY 6 HOURS PRN
Status: DISCONTINUED | OUTPATIENT
Start: 2024-07-09 | End: 2024-07-11 | Stop reason: HOSPADM

## 2024-07-09 RX ORDER — SODIUM CHLORIDE, SODIUM LACTATE, POTASSIUM CHLORIDE, CALCIUM CHLORIDE 600; 310; 30; 20 MG/100ML; MG/100ML; MG/100ML; MG/100ML
250 INJECTION, SOLUTION INTRAVENOUS ONCE
Status: COMPLETED | OUTPATIENT
Start: 2024-07-09 | End: 2024-07-09

## 2024-07-09 SDOH — HEALTH STABILITY: MENTAL HEALTH
HOW OFTEN DO YOU NEED TO HAVE SOMEONE HELP YOU WHEN YOU READ INSTRUCTIONS, PAMPHLETS, OR OTHER WRITTEN MATERIAL FROM YOUR DOCTOR OR PHARMACY?: NEVER

## 2024-07-09 SDOH — ECONOMIC STABILITY: INCOME INSECURITY: HOW HARD IS IT FOR YOU TO PAY FOR THE VERY BASICS LIKE FOOD, HOUSING, MEDICAL CARE, AND HEATING?: NOT VERY HARD

## 2024-07-09 SDOH — ECONOMIC STABILITY: INCOME INSECURITY: IN THE LAST 12 MONTHS, WAS THERE A TIME WHEN YOU WERE NOT ABLE TO PAY THE MORTGAGE OR RENT ON TIME?: YES

## 2024-07-09 SDOH — ECONOMIC STABILITY: INCOME INSECURITY: IN THE PAST 12 MONTHS, HAS THE ELECTRIC, GAS, OIL, OR WATER COMPANY THREATENED TO SHUT OFF SERVICE IN YOUR HOME?: NO

## 2024-07-09 SDOH — ECONOMIC STABILITY: FOOD INSECURITY: WITHIN THE PAST 12 MONTHS, THE FOOD YOU BOUGHT JUST DIDN'T LAST AND YOU DIDN'T HAVE MONEY TO GET MORE.: NEVER TRUE

## 2024-07-09 SDOH — ECONOMIC STABILITY: FOOD INSECURITY: WITHIN THE PAST 12 MONTHS, YOU WORRIED THAT YOUR FOOD WOULD RUN OUT BEFORE YOU GOT MONEY TO BUY MORE.: NEVER TRUE

## 2024-07-09 SDOH — ECONOMIC STABILITY: HOUSING INSECURITY: IN THE LAST 12 MONTHS, HOW MANY PLACES HAVE YOU LIVED?: 1

## 2024-07-09 ASSESSMENT — COGNITIVE AND FUNCTIONAL STATUS - GENERAL
WALKING IN HOSPITAL ROOM: A LOT
PERSONAL GROOMING: A LITTLE
MOVING TO AND FROM BED TO CHAIR: A LOT
MOBILITY SCORE: 16
TOILETING: A LITTLE
STANDING UP FROM CHAIR USING ARMS: A LOT
DRESSING REGULAR LOWER BODY CLOTHING: A LOT
DRESSING REGULAR UPPER BODY CLOTHING: A LITTLE
HELP NEEDED FOR BATHING: A LOT
CLIMB 3 TO 5 STEPS WITH RAILING: A LOT
EATING MEALS: A LITTLE
DAILY ACTIVITIY SCORE: 16

## 2024-07-09 ASSESSMENT — PAIN - FUNCTIONAL ASSESSMENT
PAIN_FUNCTIONAL_ASSESSMENT: 0-10

## 2024-07-09 ASSESSMENT — PAIN SCALES - GENERAL
PAINLEVEL_OUTOF10: 0 - NO PAIN

## 2024-07-09 NOTE — H&P
"[facility]  HFICU PROGRESS NOTE    Melissa Marinelli/[id number]    Admit Date: 7/8/2024  Hospital Length of Stay: 1   ICU Length of Stay: 1d 1h   Primary Service: HFICU  Primary HF Cardiologist: Dr Rivera  Referring: Dr Rivera     INTERVAL EVENTS / PERTINENT ROS:   -Patient's CVP 0 overnight,m w/ HR in 140s givne 500 ml LR. Given amio bolus w/ amio gtt.   -Patient's procal 2.6, CRP 1.76, ESR 92  -HST uptrending 2782/1340/628/283  -Patient cool to the touch this AM, Lactate uptrending    Plan:  -increased milrinone 0.375 w/ repeat lactate acid around 2pm to follow up  -SGC place w/ RA 2, RV 32/2 (13), PA 34/22 (29), PAWP 17 , [place] CO 4.55/ CI 2.82, mixed venous 73   -Rcx and Bcx pending   -Cont. To trend HST   -d/sandra amio gtt     MEDICATIONS  Infusions:  milrinone, Last Rate: 0.375 mcg/kg/min (07/09/24 1047)      Scheduled:  aspirin, 81 mg, Daily  budesonide, 0.5 mg, BID  cholecalciferol, 2,000 Units, Daily  dapagliflozin propanediol, 10 mg, Daily  heparin (porcine), 5,000 Units, q8h  ipratropium-albuteroL, 3 mL, q6h  pantoprazole, 40 mg, Daily  sennosides, 2 tablet, BID      PRN:  acetaminophen, 650 mg, q6h PRN   Or  acetaminophen, 650 mg, q6h PRN   Or  acetaminophen, 650 mg, q6h PRN  albuterol, 2 puff, q4h PRN  calcium carbonate, 500 mg, q12h PRN  HYDROmorphone, 0.2 mg, q2h PRN  ondansetron, 4 mg, q4h PRN  oxyCODONE, 5 mg, q6h PRN  oxygen, , Continuous PRN - O2/gases  polyethylene glycol, 17 g, Daily PRN  sodium chloride, 1 spray, PRN      PHYSICAL EXAM:   Visit Vitals  /71   Pulse (!) 111   Temp 36.1 °C (97 °F) (Temporal)   Resp 17   Ht 1.626 m (5' 4\")   Wt 57.6 kg (126 lb 15.8 oz)   SpO2 100%   BMI 21.80 kg/m²   Smoking Status Every Day   BSA 1.61 m²       Wt Readings from Last 5 Encounters:   07/09/24 57.6 kg (126 lb 15.8 oz)   07/01/24 57.6 kg (127 lb)   06/27/24 57.6 kg (127 lb)   06/24/24 57.6 kg (127 lb)   06/20/24 57.6 kg (127 lb)       INTAKE/OUTPUT:  I/O last 3 completed " shifts:  In: 576.7 (10 mL/kg) [I.V.:326.7 (5.7 mL/kg); IV Piggyback:250]  Out: 1050 (18.2 mL/kg) [Urine:925 (0.4 mL/kg/hr); Emesis/NG output:125]  Weight: 57.6 kg      Physical Exam  Constitutional:       Appearance: She is ill-appearing.   HENT:      Head: Atraumatic.   Eyes:      Extraocular Movements: Extraocular movements intact.   Cardiovascular:      Rate and Rhythm: Regular rhythm. Tachycardia present.   Pulmonary:      Effort: Pulmonary effort is normal.   Abdominal:      Tenderness: There is abdominal tenderness.   Musculoskeletal:      Right lower leg: No edema.      Left lower leg: No edema.   Skin:     General: Skin is cool.   Neurological:      General: No focal deficit present.      Mental Status: She is alert.       DATA:  CMP:  Results from last 7 days   Lab Units 07/09/24 0527 07/08/24  1137 07/08/24  0643   SODIUM mmol/L 141 142 140   POTASSIUM mmol/L 5.4* 5.4* 6.2*   CHLORIDE mmol/L 102 107 107   CO2 mmol/L 22 23 21   ANION GAP mmol/L 22* 17 18   BUN mg/dL 39* 26* 24*   CREATININE mg/dL 2.57* 1.70* 1.52*   EGFR mL/min/1.73m*2 20* 32* 37*   MAGNESIUM mg/dL 2.41* 2.67*  --    ALBUMIN g/dL 3.6 4.1 4.0   ALT U/L  --  13 11   AST U/L  --  23 29   BILIRUBIN TOTAL mg/dL  --  0.4 0.3     CBC:  Results from last 7 days   Lab Units 07/09/24  0527 07/08/24  1137 07/08/24  0643   WBC AUTO x10*3/uL 13.6* 12.4* 11.4*   HEMOGLOBIN g/dL 12.5 13.2 13.5   HEMATOCRIT % 37.6 42.1 40.8   PLATELETS AUTO x10*3/uL 202 207 185   MCV fL 87 92 90     COAG:   Results from last 7 days   Lab Units 07/08/24  1137 07/08/24  0643   INR  1.0 0.9     ABO:   ABO TYPE   Date Value Ref Range Status   07/08/2024 O  Final     HEME/ENDO:  Results from last 7 days   Lab Units 07/08/24  1137   FERRITIN ng/mL 74   IRON SATURATION % 18*   TSH mIU/L 2.87   HEMOGLOBIN A1C % 5.2      CARDIAC:   Results from last 7 days   Lab Units 07/09/24  0927 07/08/24 2007 07/08/24  1137 07/08/24  0758 07/08/24  0643   TROPHSCMC ng/L 2,782* 1,340* 628*  283* 238*   BNP pg/mL  --   --  2,095*  --  509*       ASSESSMENT AND PLAN:   68 year old woman who presents for advanced heart failure care. She has a past medical history significant for HrEF (EF 15-20%, multiple RWMA on TTE 5/2020) CAD s/p NSTEMI s/p RIRI to LCX (Jan 2016), MVR s/p mitral valve repair in (June 2019; 29 mm Epic bioprosthetic valve with Dr. Montana), COPD, HTN, HL, GERD, hx of CVA, DM. On multiple occasions in the past durable LVAD therapy has been discussed and has been declined on multiple occasions.  Ultimately, Ms. Marinelli was initiated on milrinone at a rate of 0.125 mcg/kg/min in 2/2024 and this therapy has been well-tolerated. She presented to the emergency room on 7/8 with complaints of shortness of breath, nausea and vomiting. She was intubated in the ED and transferred to HFICU for further assessment and evaluation.      Neuro:  #Anxiety  #Depression   - Not on any medications at home   - Referred to outpatient psychology at last OP appt with Dr Rivera   - Serial neuro and pain assessments   - PO Tylenol PRN for pain  - PT/OT Consult, OOB to chair  - Sleep/wake cycle normalization     #Substance abuse  - Alcohol abuse/Alcohol dependence: denies  - Tobacco use/Nicotine Dependence: Marijuana and daily cigarette use   - Drug screen 7/8 positive for cannabis and fentanyl      Cardiovascular:  #Acute on chronic decompensated HFrEF 15-20%   #Cardiogenic shock  #Milrinone dependant   Patient w/ worsening lactate, uptrending HST, cool extremities suspect worsening cardiogenic shock. We have increased the milrinone and will place a SGC to further evaluate hemodynamics.   - TTE 1/24: severely decreased LV systolic function, EF 15-20% with severely dilated LV cavity size LVIDd 6.3.  No LV thrombus mild - moderate LA dilation.  Mildly reduced RV systolic function   - TTE 7/8 w/ EF 15-20%, LV/LA dilation, reduced RV systolic function, mid-mod RVSP   - admit weight 58.1 kg  - admit BNP 2095  -  Increase milrinone infusion 0.375 mcg/kg/min (home dose 0.125)   - s/p on 7/8 Bumex 3 mg x1 for diuresis, holding off on further diuresis at this time   - C/w dapa 10, holding home angeli 25 mg d/t elevated K+   - Unable to tolerate additional GDMT   - Daily standing weights, 2gm sodium diet, 2L fluid restriction, strict I&Os     #CAD   #NSTEMI s/p RIRI to Lcx (Jan 2016)  #Hx of Mitral Valve repair (June 2019) w/ Dr Montana  #ICD (5/2020)  #Demand ischemia/ hypertroponemia  - 29mm Epic Bioprosthetc valve   - C/w ASA 81mg daily   - Not on statin d/t allergy   - Last device check 5/28- episode of VT/VF with proper ATP on 5/27   - Device check 7/8 - episode of VT w/ proper ATP, no shocks   - Uptrending HST, will cont. To trend until downtrending. If any CP or worsening symptoms please update interventional fellow. Holding off on LHC at this time. Rec. Viability study when able. Patient will require two downtrending HST prior to test.   - d/sandra amio gtt on 7/9, patient w/ Sinus tachycardia      #Electrolyte Disturbances  #Hyperkalemia  - Maintain K>4, Mg >2   - Holding spironolactone d/t elevated K+     Pulmonary:   #Acute hypoxic respiratory failure requiring intubation  #COPD exacerbation vs ADHF  - Intubated in the ED 7/8 due to increased WOB. Extubated on 7/8.   - Currently weaning NC  - C/w albuterol PRN  - C/w scheduled Duo-nebs and Pulmicort   - Aggressive pulmonary hygiene   - f/up on Resp Culture   - Monitor and maintain SpO2 > 92%     GI:  #Nausea and vomiting   - Bowel regimen: luz-colace BID and miralax PRN   - PPI daily   - Consider Tigan IM q6 hrs PRN (was on this previously for N/V)     :  #CKD IIIB  - Baseline BUN/Cr: 29-41/ 1.3-1.8   - Admit BUN/Cr: 26/1.7   - I/Os  - Avoid hypotension and nephrotoxic agents     Heme:  #Iron deficiency anemia   - Labs: iron 58, ferritin 74, TIBC 319, %Tsat 18      Endo:  #DM  - Euglycemic  - hgbA1c 5.2%     #Thyroid  - TSH 2.87      ID:  #?infection  - Afebrile,  nontoxic   - Trend temps q4h  - BC x2 7/8  - UA neg for infection   - Resp culture and RVP 7/8     PHYSICAL AND OCCUPATIONAL THERAPY: ordered and following     LINES:  PIVs   R subclavian lee 2/2024  RIJ SGC      DVT: heparin subq  CENTRAL LINE BUNDLE: ordered  ULCER PPX: PPI  GLYCEMIC CONTROL: NA  BOWEL CARE: luz-colace/miralax PRN  NUTRITION: cardiac diet     EMERGENCY CONTACT: Extended Emergency Contact Information  Primary Emergency Contact: ShadySarah  Home Phone: [phone]  Relation: Child  FAMILY UPDATE:  CODE STATUS: Full Code  DISPO:  HF ICU    Patient seen and assessed with Dr. Miguel Moya MD  PGY-5 Cardiovascular Medicine Fellow

## 2024-07-09 NOTE — PROGRESS NOTES
Physical Therapy                 Therapy Communication Note    Patient Name: Melissa Marinelli  MRN: [id number]  Today's Date: 7/9/2024     Discipline: Physical Therapy    Missed Visit Reason: Missed Visit Reason: Patient in a medical procedure (Medical team placing line at bedside)    Missed Time: Attempt    Comment:

## 2024-07-09 NOTE — DISCHARGE INSTRUCTIONS
CARDIAC CATHETERIZATION DISCHARGE INSTRUCTIONS (procedure done on 7/9/24)     FOR SUDDEN AND SEVERE CHEST PAIN, SHORTNESS OF BREATH, EXCESSIVE BLEEDING, SIGNS OF STROKE, OR CHANGES IN MENTAL STATUS YOU SHOULD CALL 911 IMMEDIATELY.     If your provider has prescribed aspirin and/or clopidogrel (Plavix), or prasugrel (Effient), or ticagrelor (Brilinta), DO NOT STOP THESE MEDICATIONS for any reason without talking to your cardiologist first. If any of these were prescribed, you must take them every day without missing a single dose. If you are getting low on these medications, contact your provider immediately for a refill.     FOR NEXT 24 HOURS  - Upon discharge, you should return home and rest for the remainder of the day and evening. You do not have to stay on bed rest but should not be very active.  It is recommended a responsible adult be with you for the first 24 hours after the procedure.    - No driving for 24 hours after procedure. Please arrange for someone to drive you home from the hospital today.     - Do not drive, operate machinery, or use power tools for 24 hours after your procedure.     - Do not make any legal decisions for 24 hours after your procedure.     - Do not drink alcoholic beverages for 24 hours after your procedure.    WOUND CARE   *FOR FEMORAL (LEG) ACCESS*  ·      Avoid heavy lifting (over 10 pounds) for 7 days, squatting or excessive bending for 2 days, and strenuous exercise for 7 days.  ·      No submerged bathing, swimming, or hot tubs for the next 7 days, or until fully healed.  ·      Avoid sexual activity for 3-4 days until any groin discomfort has ceased.     *FOR RADIAL (WRIST) ACCESS*  ·      No lifting more than 5 pounds or excessive use of the wrist for 24 hours - for example, treat your wrist as if it is sprained.  ·      Do not engage in vigorous activities (tennis, golf, bowling, weights) for at least 48 hours after the procedure.  ·      Do not submerge the wrist  for 7 days after the procedure.  ·      You should expect mild tingling in your hand and tenderness at the puncture site for up to 3 days.    - The transparent dressing should be removed from the site 24 hours after the procedure.  Wash the site gently with soap and water. Rinse well and pat dry. Keep the area clean and dry. You may apply a Band-Aid to the site. Avoid lotions, ointments, or powders until fully healed.     - You may shower the day after your procedure.      - It is normal to notice a small bruise around the puncture site and/or a small grape sized or smaller lump. Any large bruising or large lump warrants a call to the office.     - If bleeding should occur, lay down and apply pressure to the affected area for 10 minutes.  If the bleeding stops notify your physician.  If there is a large amount of bleeding or spurting of blood CALL 911 immediately.  DO NOT drive yourself to the hospital.    - You may experience some tenderness, bruising or minimal inflammation.  If you have any concerns, you may contact the Cath Lab or if any of these symptoms become excessive, contact your cardiologist or go to the emergency room.     OTHER INSTRUCTIONS  - You may take acetaminophen (Tylenol) as directed for discomfort.  If pain is not relieved with acetaminophen (Tylenol), contact your doctor.    - If you notice or experience any of the following, you should notify your doctor or seek medical attention  Chest pain or discomfort  Change in mental status or weakness in extremities.  Dizziness, light headedness, or feeling faint.  Change in the site where the procedure was performed, such as bleeding or an increased area of bruising or swelling.  Tingling, numbness, pain, or coolness in the leg/arm beyond the site where the procedure was performed.  Signs of infection (i.e. shaking chills, temperature > 100 degrees Fahrenheit, warmth, redness) in the leg/arm area where the procedure was performed.  Changes in  urination   Bloody or black stools  Vomiting blood  Severe nose bleeds  Any excessive bleeding    - If you DO NOT have an appointment with your cardiologist within 2-4 weeks following your procedure, please contact their office.

## 2024-07-09 NOTE — CONSULTS
Consults   Consulting team: Psychiatry  Reason for consult: c/o depression    HISTORY OF PRESENT ILLNESS:  68 year old woman who presents for advanced heart failure care. She has a past medical history significant for HrEF (EF 15-20%, multiple RWMA on TTE 5/2020) CAD s/p NSTEMI s/p RIRI to LCX (Jan 2016), MVR s/p mitral valve repair in (June 2019; 29 mm Epic bioprosthetic valve with Dr. Montana), COPD, HTN, HL, GERD, hx of CVA, DM who is admitted for heart failure exacerbation, currently in end stage heart failure. Psychiatry consulted on 7/9/2024 for concern of depression.     On assessment, Ms. Marinelli made it clear in the beginning of the interview that she was wary and disinterested with psychiatrists, as they are “pill pushers” and she does not want any pills. She does endorse feelings of depression surrounding her diagnoses and family dynamics, namely conflict with her daughter. She states her heart failed because doctors missed it earlier and her daughter blames her for her heart condition. She denies changes in sleep, energy levels, interest levels, feelings of guilt or suicidal ideation at this time. She also denies hallucinations, thought broadcasting, or feelings of paranoia. She states “I just feel like I gave everything to everyone and everyone left me.” She is interested in seeing a  while she is admitted and is willing to speak with social work about resources once she is discharged.    She states she has a counselor through , [facility] but has not yet gone to the intake appointment-which was scheduled while she  was admitted to the hospital. (Per review of chart, pt does not have any upcoming behavioral health appointments. She no-showed for a behavioral health appointment on 7/2/24).     PSYCHIATRIC REVIEW OF SYSTEMS  Depression: depressed mood and tearfulness  Anxiety: negative  Cari: negative  Psychosis: negative  Delirium: negative   Trauma: negative    PSYCHIATRIC HISTORY  Prior  "diagnoses: depression  Prior hospitalizations: [facility] after threatening to kill herself by taking all her pills after an argument with a family member. Patient states she was discharged the following day because \"the doctor knew I didn't mean it.\"  History of suicide attempts: 6 before 2011-vague about methods but says she tried to kill herself by self discontinuing meds and in 2007, laid down and turned on the gas in the house-self aborted. Also [place].”  History of self-harm: denies  Trauma/abuse/loss: denies  History of violence: denies    Current psychiatrist: none, has complained in the past that she does not want psychiatrist because they are pill pushers.    Current psychiatric medications: none  Past psychiatric medications: denies ever taking psychiatric meds    Family psychiatric history:      - Psychiatric disorders: depression in brother     - Suicide: brother attempted suicide.     SUBSTANCE USE HISTORY   She reports that she has been smoking cigarettes. She has never used smokeless tobacco. She reports current drug use. Drug: Marijuana. She reports that she does not drink alcohol.    Tobacco: says she smokes daily but has not smoked since she has been admitted  Alcohol: denies     - History of severe withdrawal: denies     - Last use: denies  Cannabis: endorsed smoking marijuana daily prior to admission  Other substances: denies    SOCIAL HISTORY  Social History     Socioeconomic History    Marital status: Single     Spouse name: None    Number of children: None    Years of education: None    Highest education level: None   Occupational History    None   Tobacco Use    Smoking status: Every Day     Current packs/day: 0.50     Types: Cigarettes    Smokeless tobacco: Never   Substance and Sexual Activity    Alcohol use: Never    Drug use: Yes     Types: Marijuana    Sexual activity: None   Other Topics Concern    None   Social History " Narrative    None     Social Determinants of Health     Financial Resource Strain: Low Risk  (7/9/2024)    Overall Financial Resource Strain (CARDIA)     Difficulty of Paying Living Expenses: Not very hard   Food Insecurity: No Food Insecurity (7/9/2024)    Hunger Vital Sign     Worried About Running Out of Food in the Last Year: Never true     Ran Out of Food in the Last Year: Never true   Transportation Needs: No Transportation Needs (7/9/2024)    PRAPARE - Transportation     Lack of Transportation (Medical): No     Lack of Transportation (Non-Medical): No   Physical Activity: Inactive (2/17/2024)    Exercise Vital Sign     Days of Exercise per Week: 0 days     Minutes of Exercise per Session: 0 min   Stress: No Stress Concern Present (2/17/2024)    [employer] - Occupational Stress Questionnaire     Feeling of Stress : Not at all   Social Connections: Unknown (2/17/2024)    Social Connection and Isolation Panel [NHANES]     Frequency of Communication with Friends and Family: Three times a week     Frequency of Social Gatherings with Friends and Family: Three times a week     Attends Latter-day Services: Not on file     Active Member of Clubs or Organizations: No     Attends Club or Organization Meetings: Never     Marital Status: Not on file   Intimate Partner Violence: Not At Risk (2/17/2024)    Humiliation, Afraid, Rape, and Kick questionnaire     Fear of Current or Ex-Partner: No     Emotionally Abused: No     Physically Abused: No     Sexually Abused: No   Housing Stability: High Risk (7/9/2024)    Housing Stability Vital Sign     Unable to Pay for Housing in the Last Year: Yes     Number of Places Lived in the Last Year: 1     Unstable Housing in the Last Year: No      -Current living situation: lives at home with daughter and grandson.  -Born and raised: [place], [place]  Family: daughter and grandsoen  -Marital status: Single with 2 kids  Income: on social security.   -Children: 2  kids (one bio and one is her brother's daughter she raised)  -Social support: denies any support network, lives with daughter who takes care of her.   -Temple/Spirituality: Restorationism    PAST MEDICAL HISTORY  Past Medical History:   Diagnosis Date    Asthma ([facility])     CAD (coronary artery disease)     CHF (congestive heart failure) ([facility])     CKD (chronic kidney disease)     COPD (chronic obstructive pulmonary disease) ([facility])     CVA (cerebral vascular accident) ([facility])     Diabetes mellitus ([facility])     GERD (gastroesophageal reflux disease)     Hyperlipidemia     Hypertension     NSTEMI (non-ST elevated myocardial infarction) ([facility])     Unspecified systolic (congestive) heart failure ([facility]) 08/11/2022    HFrEF (heart failure with reduced ejection fraction)        PAST SURGICAL HISTORY  Past Surgical History:   Procedure Laterality Date    CARDIAC CATHETERIZATION N/A 1/26/2024    Procedure: Right Heart Cath;  Surgeon: Cuate Dodson MD;  Location: [place] [address] Cardiac Cath Lab;  Service: Cardiovascular;  Laterality: N/A;    MITRAL VALVE REPLACEMENT  06/2019    OTHER SURGICAL HISTORY  08/11/2022    Tonsillectomy    OTHER SURGICAL HISTORY  08/11/2022    Right ventricular assist device placement    OTHER SURGICAL HISTORY  08/11/2022    Mitral valve replacement      FAMILY HISTORY  Family History   Problem Relation Name Age of Onset    Other (CVA) Brother          ALLERGIES  Metoprolol, Ticagrelor, Gadolinium-containing contrast media, Iodinated contrast media, Statins-hmg-coa reductase inhibitors, Prednisone, Ace inhibitors, Fentanyl, Hydralazine, Nicorette [nicotine (polacrilex)], Nicotine, and Penicillins    OARRS REVIEW  OARRS checked: reviewed    OBJECTIVE    VITALS      7/9/2024     6:30 AM 7/9/2024     7:00 AM 7/9/2024     7:40 AM 7/9/2024     8:00 AM 7/9/2024     9:00 AM 7/9/2024    10:00 AM 7/9/2024    12:00 PM   Vitals   Systolic 92 95  106 93 117    Diastolic 67 60  63 56 71    Heart Rate 97 93 102 104  "103 111    Temp    36.1 °C (97 °F)   36.7 °C (98.1 °F)   Resp 19 22 12 8 22 17         MENTAL STATUS EXAM  Appearance: NAD, sitting up in bed  Attitude: initially irritable with daughter in the room but became calm and cooperative once engaged with interview  Behavior: appropriate to situation  Motor Activity: no abnormal movements or tremors noted  Speech: normal rate rhythm and volume  Mood: \"depressed, down\"  Affect: intermittently tearful  Thought Process: linear, logical, coherent  Thought Content:  denies SI/HI/AVH, perseverates on heart failure diagnosis  Thought Perception: does not appear internally stimulated, no evidence of delusions  Cognition: in tact, AOx4  Insight: adequate  Judgement: fair    HOME MEDICATIONS  Medication Documentation Review Audit       Reviewed by Alisia Campos RN (Registered Nurse) on 07/08/24 at 1217      Medication Order Taking? Sig Documenting Provider Last Dose Status   acetaminophen (Tylenol) 500 mg tablet [id number]  Take 2 tablets (1,000 mg) by mouth every 8 hours if needed for mild pain (1 - 3). Historical Provider, MD  Active   albuterol 90 mcg/actuation inhaler [id number]  Inhale 2 puffs every 4 hours if needed for wheezing or shortness of breath. Bel Chilel, APRN-CNP  Active   aspirin 81 mg EC tablet [id number]  Take 1 tablet (81 mg) by mouth once daily. Kathy Rivera MD PhD  Active   bumetanide (Bumex) 0.5 mg tablet [id number]  Take 1 tablet (0.5 mg) by mouth 2 times a week. On Tuesdays and Saturdays Kathy Rivera MD PhD  Active   Calcium Antacid 200 mg calcium (500 mg) chewable tablet [id number]  Chew 1 tablet (500 mg) every 12 hours if needed. Historical Provider, MD  Active   cholecalciferol (Vitamin D3) 50 MCG (2000 UT) tablet [id number]  Take 1 tablet (2,000 Units) by mouth once daily. Historical Provider, MD  Active   famotidine (Pepcid) 40 mg tablet [id number]  Take 1 tablet (40 mg) by mouth once daily. Do not start before February 3, 2024. Ana Mccall, " APRN-CNP   06/10/24 2359   Farxiga 10 mg [id number]  Take 1 tablet (10 mg) by mouth once daily. Kathy Rivera MD PhD  Active   heparin flush,porcine,-0.9NaCl 100 unit/mL kit [id number]  5 mL by central venous line infusion route 1 (one) time per week. Indications: prevent clot from blocking an intravenous catheter Michael Arellano MD  Active   ipratropium-albuteroL (Duo-Neb) 0.5-2.5 mg/3 mL nebulizer solution [id number]  Take 3 mL by nebulization every 6 hours. Michael Arellano MD  Active   milrinone in 5 % dextrose (Primacor) 40 mg/200 mL (200 mcg/mL) infusion [id number]  Infuse 7.325 mcg/min at 2.2 mL/hr into a venous catheter continuously. Via R chest LEROY. Dose = 0.125mcg/kg/min most recent weight = 58.6kg from 24 Kathy Rivera MD PhD  Active   OneTouch Verio Flex meter misc [id number]  USE AS DIRECTED THREE TIMES DAILY Michael Arellano MD  Active   sodium chloride (Ocean) 0.65 % nasal spray [id number]  Administer 1 spray into each nostril if needed for congestion. Michael Arellano MD  Active   sodium chloride 0.9% (sodium chloride) flush [id number]  Infuse 10 mL into a venous catheter 1 (one) time per week. Michael Arellano MD  Active   spironolactone (Aldactone) 25 mg tablet [id number]  Take 0.5 tablets (12.5 mg) by mouth once daily. Kathy Rivera MD PhD  Active   Symbicort 80-4.5 mcg/actuation inhaler [id number]  Inhale 2 puffs twice a day. Michael Arellano MD  Active                     CURRENT MEDICATIONS  Scheduled medications  aspirin, 81 mg, oral, Daily  budesonide, 0.5 mg, nebulization, BID  cholecalciferol, 2,000 Units, oral, Daily  dapagliflozin propanediol, 10 mg, oral, Daily  heparin (porcine), 5,000 Units, subcutaneous, q8h  ipratropium-albuteroL, 3 mL, nebulization, q6h  pantoprazole, 40 mg, intravenous, Daily  sennosides, 2 tablet, oral, BID        Continuous medications  milrinone, 0.375 mcg/kg/min, Last Rate: 0.375 mcg/kg/min (24  8979)        PRN medications  PRN medications: acetaminophen **OR** acetaminophen **OR** acetaminophen, albuterol, calcium carbonate, HYDROmorphone, metoclopramide **OR** metoclopramide, ondansetron, oxyCODONE, oxygen, polyethylene glycol, sodium chloride     LABS  Results for orders placed or performed during the hospital encounter of 07/08/24 (from the past 24 hour(s))   Transthoracic Echo (TTE) Complete   Result Value Ref Range    AV pk abigail 0.88 m/s    LVOT diam 1.60 cm    LV EF 18 %    RV free wall pk S' 5.77 cm/s    LVIDd 6.90 cm    RVSP 47.9 mmHg    Aortic Valve Area by Continuity of Peak Velocity 1.63 cm2    AV pk grad 3.1 mmHg    LV A4C EF 18.8    Procalcitonin   Result Value Ref Range    Procalcitonin 2.60 (H) <=0.07 ng/mL   POCT GLUCOSE   Result Value Ref Range    POCT Glucose 122 (H) 74 - 99 mg/dL   Lactate   Result Value Ref Range    Lactate 2.4 (H) 0.4 - 2.0 mmol/L   Troponin I, High Sensitivity   Result Value Ref Range    Troponin I, High Sensitivity (CMC) 1,340 (HH) 0 - 34 ng/L   Electrocardiogram, 12-lead PRN ACS symtpoms   Result Value Ref Range    Ventricular Rate 141 BPM    Atrial Rate 141 BPM    CT Interval 124 ms    QRS Duration 128 ms    QT Interval 326 ms    QTC Calculation(Bazett) 499 ms    P Axis 63 degrees    R Axis 80 degrees    T Axis 168 degrees    QRS Count 23 beats    Q Onset 216 ms    P Onset 154 ms    P Offset 184 ms    T Offset 379 ms    QTC Fredericia 433 ms   Sars-CoV-2 PCR   Result Value Ref Range    Coronavirus 2019, PCR Not Detected Not Detected   Magnesium   Result Value Ref Range    Magnesium 2.41 (H) 1.60 - 2.40 mg/dL   Renal function panel   Result Value Ref Range    Glucose 152 (H) 74 - 99 mg/dL    Sodium 141 136 - 145 mmol/L    Potassium 5.4 (H) 3.5 - 5.3 mmol/L    Chloride 102 98 - 107 mmol/L    Bicarbonate 22 21 - 32 mmol/L    Anion Gap 22 (H) 10 - 20 mmol/L    Urea Nitrogen 39 (H) 6 - 23 mg/dL    Creatinine 2.57 (H) 0.50 - 1.05 mg/dL    eGFR 20 (L) >60 mL/min/1.73m*2     Calcium 9.3 8.6 - 10.6 mg/dL    Phosphorus 4.8 2.5 - 4.9 mg/dL    Albumin 3.6 3.4 - 5.0 g/dL   CBC and Auto Differential   Result Value Ref Range    WBC 13.6 (H) 4.4 - 11.3 x10*3/uL    nRBC 0.0 0.0 - 0.0 /100 WBCs    RBC 4.30 4.00 - 5.20 x10*6/uL    Hemoglobin 12.5 12.0 - 16.0 g/dL    Hematocrit 37.6 36.0 - 46.0 %    MCV 87 80 - 100 fL    MCH 29.1 26.0 - 34.0 pg    MCHC 33.2 32.0 - 36.0 g/dL    RDW 16.0 (H) 11.5 - 14.5 %    Platelets 202 150 - 450 x10*3/uL    Neutrophils % 85.8 40.0 - 80.0 %    Immature Granulocytes %, Automated 0.8 0.0 - 0.9 %    Lymphocytes % 7.6 13.0 - 44.0 %    Monocytes % 5.5 2.0 - 10.0 %    Eosinophils % 0.1 0.0 - 6.0 %    Basophils % 0.2 0.0 - 2.0 %    Neutrophils Absolute 11.69 (H) 1.20 - 7.70 x10*3/uL    Immature Granulocytes Absolute, Automated 0.11 0.00 - 0.70 x10*3/uL    Lymphocytes Absolute 1.04 (L) 1.20 - 4.80 x10*3/uL    Monocytes Absolute 0.75 0.10 - 1.00 x10*3/uL    Eosinophils Absolute 0.01 0.00 - 0.70 x10*3/uL    Basophils Absolute 0.03 0.00 - 0.10 x10*3/uL   Lactate   Result Value Ref Range    Lactate 2.6 (H) 0.4 - 2.0 mmol/L   Troponin I, High Sensitivity   Result Value Ref Range    Troponin I, High Sensitivity (CMC) 2,782 (HH) 0 - 34 ng/L   Lactate   Result Value Ref Range    Lactate 3.3 (H) 0.4 - 2.0 mmol/L   BLOOD GAS MIXED VENOUS FULL PANEL   Result Value Ref Range    POCT pH, Mixed 7.37 7.33 - 7.43 pH    POCT pCO2, Mixed 43 41 - 51 mm Hg    POCT pO2, Mixed 47 (H) 35 - 45 mm Hg    POCT SO2, Mixed 73 45 - 75 %    POCT Oxy Hemoglobin, Mixed 71.5 45.0 - 75.0 %    POCT Hematocrit Calculated, Mixed 38.0 36.0 - 46.0 %    POCT Sodium, Mixed 135 (L) 136 - 145 mmol/L    POCT Potassium, Mixed 5.1 3.5 - 5.3 mmol/L    POCT Chloride, Mixed 102 98 - 107 mmol/L    POCT Ionized Calcium, Mixed 1.18 1.10 - 1.33 mmol/L    POCT Glucose, Mixed 141 (H) 74 - 99 mg/dL    POCT Lactate, Mixed 1.4 0.4 - 2.0 mmol/L    POCT Base Excess, Mixed -0.6 -2.0 - 3.0 mmol/L    POCT HCO3 Calculated, Mixed 24.9  22.0 - 26.0 mmol/L    POCT Hemoglobin, Mixed 12.6 12.0 - 16.0 g/dL    POCT Anion Gap, Mixed 13 10 - 25 mmo/L    Patient Temperature 37.0 degrees Celsius    FiO2 36 %        IMAGING  XR chest 1 view    Result Date: 7/9/2024  Interpreted By:  Roly Conti and Gupta Jayesh STUDY: XR CHEST 1 VIEW;  7/9/2024 1:55 pm   INDICATION: Signs/Symptoms:swan placement.   COMPARISON: Comparison radiograph 07/20/2024   ACCESSION NUMBER(S): [id number]   ORDERING CLINICIAN: TOBI HUGGINS   FINDINGS: AP radiograph of the chest was provided.   Medical devices: Pacemaker with 1 lead to the right ventricle. Crocker-Alex catheter in place with distal tip over the distal right main pulmonary artery. Right CVC with tip over the distal SVC. Median sternotomy wires present.   CARDIOMEDIASTINAL SILHOUETTE: Cardiomediastinal silhouette is enlarged but stable in size and configuration.   LUNGS: Bilateral pulmonary vasculature congestion. Right lower zone atelectasis, stable. No pleural effusions consolidations or pneumothorax.   ABDOMEN: No remarkable upper abdominal findings.   BONES: No acute osseous changes.       1.  Crocker-Alex catheter in place with distal tip in the distal right main pulmonary artery. No pneumothorax noted. 2. Stable pulmonary findings include bilateral vasculature congestion and right lower lobe atelectasis.   MACRO: I personally reviewed the images/study and I agree with the findings as stated by Jarek Mansfield MD. This study was interpreted at [facility], [place], [place].   Signed by: Roly Conti 7/9/2024 2:35 PM Dictation workstation:   [id number]    ECG 12 lead    Result Date: 7/9/2024  Sinus tachycardia Nonspecific intraventricular block Cannot rule out Anterior infarct , age undetermined T wave abnormality, consider inferolateral ischemia Abnormal ECG When compared with ECG of 08-JUL-2024 06:36, Inverted T waves have replaced nonspecific T wave abnormality in Inferior  leads    Electrocardiogram, 12-lead PRN ACS symtpoms    Result Date: 7/9/2024  Sinus tachycardia Nonspecific intraventricular block Cannot rule out Anterior infarct (cited on or before 08-JUL-2024) T wave abnormality, consider inferolateral ischemia Abnormal ECG When compared with ECG of 08-JUL-2024 11:16, No significant change was found    ECG 12 lead    Result Date: 7/8/2024  Sinus tachycardia Nonspecific intraventricular block T wave abnormality, consider lateral ischemia Abnormal ECG When compared with ECG of 17-FEB-2024 02:27, QRS duration has increased ST no longer depressed in Inferior leads See ED provider note for full interpretation and clinical correlation Confirmed by Alycia Joyner ([id number]) on 7/8/2024 11:43:15 PM    Transthoracic Echo (TTE) Complete    Result Date: 7/8/2024   [facility], [address] [place] [address]                Tel [phone] and Fax [phone] TRANSTHORACIC ECHOCARDIOGRAM REPORT  Patient Name:      FANNIE Cornelia       Reading Physician:    [id number] Kory Rizo MD Study Date:        7/8/2024             Ordering Provider:    [id number] TOBI HUGGINS MRN/PID:           [id number]             Fellow: Accession#:        [id number]         Nurse: Date of Birth/Age: 1955 / 68 years Sonographer:          Kary Tran RDCS Gender:            F                    Additional Staff: Height:            162.56 cm            Admit Date:           7/8/2024 Weight:            58.06 kg             Admission Status:     Inpatient - STAT BSA / BMI:         1.62 m2 / 21.97      Encounter#:           [id number]                    kg/m2 Blood Pressure:    142/98 mmHg          Department Location:  [address] Study Type:    TRANSTHORACIC ECHO (TTE) COMPLETE Diagnosis/ICD: Acute combined systolic (congestive) and diastolic (congestive)                 heart failure (CHF)-I50.41 Indication:    Congestive Heart Failure CPT Code:      Echo Complete w Full Doppler-22522 Patient History: Pertinent History: CAD, Chest Pain, HTN and COPD. MVR 2019 29mm Epic, CHF, Low                    EF, H/O MI,CKD. Study Detail: The following Echo studies were performed: 2D, M-Mode, Doppler and               color flow. Technically challenging study due to poor acoustic               windows. Definity used as a contrast agent for endocardial border               definition. Total contrast used for this procedure was 1.0 mL via               IV push.  PHYSICIAN INTERPRETATION: Left Ventricle: The left ventricular systolic function is severely decreased, with a visually estimated ejection fraction of 15-20%. There is global hypokinesis of the left ventricle with minor regional variations. The left ventricular cavity size is severely dilated. Spectral Doppler shows an abnormal pattern of left ventricular diastolic filling. There is no definite left ventricular thrombus visualized. Left Atrium: The left atrium is severely dilated. Right Ventricle: The right ventricle is normal in size. There is reduced right ventricular systolic function. A device is visualized in the right ventricle. Right Atrium: The right atrium is normal in size. Aortic Valve: The aortic valve is trileaflet. There is mild aortic valve regurgitation. The peak instantaneous gradient of the aortic valve is 3.1 mmHg. There is nodular calcium on the noncoronary cusp. Mitral Valve: The mitral valve is abnormal. There is a Epic mitral valve bioprosthesis with a 29 mm reported size. There is no prosthetic mitral valve regurgitaion. There is trace to mild mitral valve regurgitation. The DI is normal, consistent with normal prosthetic mitral valve function. The mean diastolic pressure is 8 mmHg at a heart rate of 136 bpm. Tricuspid Valve: The tricuspid valve is structurally normal. There is mild tricuspid regurgitation. The  Doppler estimated RVSP is mild to moderately elevated at 47.9 mmHg. Pulmonic Valve: The pulmonic valve is structurally normal. The pulmonic valve regurgitation was not well visualized. Pericardium: There is no pericardial effusion noted. Aorta: The aortic root is normal. Systemic Veins: The inferior vena cava appears to be of normal size. There is IVC inspiratory collapse greater than 50%. In comparison to the previous echocardiogram(s): Compared with study dated 1/24/2024, no significant change.  CONCLUSIONS:  1. The left ventricular systolic function is severely decreased, with a visually estimated ejection fraction of 15-20%.  2. There is global hypokinesis of the left ventricle with minor regional variations.  3. Spectral Doppler shows an abnormal pattern of left ventricular diastolic filling.  4. Left ventricular cavity size is severely dilated.  5. No left ventricular thrombus visualized.  6. There is reduced right ventricular systolic function.  7. The left atrium is severely dilated.  8. The DI is normal, consistent with normal prosthetic mitral valve function. The mean diastolic pressure is 8 mmHg at a heart rate of 136 bpm.  9. Mild to moderately elevated right ventricular systolic pressure. 10. Mild aortic valve regurgitation. QUANTITATIVE DATA SUMMARY: 2D MEASUREMENTS:                           Normal Ranges: Ao Root d:     2.60 cm    (2.0-3.7cm) IVSd:          0.80 cm    (0.6-1.1cm) LVPWd:         0.80 cm    (0.6-1.1cm) LVIDd:         6.90 cm    (3.9-5.9cm) LVIDs:         6.40 cm LV Mass Index: 147.2 g/m2 LV % FS        7.2 % LA VOLUME:                             Normal Ranges: LA Volume Index: 68.9 ml/m2 RA VOLUME BY A/L METHOD:                       Normal Ranges: RA Area A4C: 12.9 cm2 LV SYSTOLIC FUNCTION BY 2D PLANIMETRY (MOD):                      Normal Ranges: EF-A4C View:    19 % (>=55%) EF-A2C View:    17 % EF-Biplane:     17 % EF-Visual:      18 % LV EF Reported: 18 % MITRAL VALVE:                        Normal Ranges: MV Vmax:    2.28 m/s  (<=1.3m/s) MV peak P.8 mmHg (<5mmHg) MV mean P.0 mmHg  (<2mmHg) AORTIC VALVE:                         Normal Ranges: AoV Vmax:      0.88 m/s (<=1.7m/s) AoV Peak PG:   3.1 mmHg (<20mmHg) LVOT Max Edvin:  0.71 m/s (<=1.1m/s) LVOT VTI:      9.09 cm LVOT Diameter: 1.60 cm  (1.8-2.4cm) AoV Area,Vmax: 1.63 cm2 (2.5-4.5cm2)  RIGHT VENTRICLE: RV Basal 2.49 cm RV Mid   1.86 cm RV Major 6.6 cm RV s'    0.06 m/s TRICUSPID VALVE/RVSP:                             Normal Ranges: Peak TR Velocity: 3.35 m/s RV Syst Pressure: 47.9 mmHg (< 30mmHg)  [id number] Kory Rizo MD Electronically signed on 2024 at 5:38:02 PM  ** Final **     CT chest abdomen pelvis wo IV contrast    Result Date: 2024  STUDY: CT Chest, Abdomen, and Pelvis without IV Contrast; 24 at 10:24 AM INDICATION: Shortness of breath.  CHF. Vomiting. COMPARISON: Chest XR same date. CT CAP 23. ACCESSION NUMBER(S): [id number] ORDERING CLINICIAN: OPAL CASTRO TECHNIQUE: CT of the chest, abdomen, and pelvis was performed.  Contiguous axial images were obtained at 3 mm slice thickness through the chest, abdomen, and pelvis.  Coronal and sagittal reconstructions at 3 mm slice thickness were performed.  No intravenous contrast was administered.   FINDINGS: Please note that the evaluation of vessels, lymph nodes and organs is limited without intravenous contrast. ET tube is present within the distal tip located 4.7 cm proximal to the bifurcation of the lena in satisfactory position.  Nasogastric tube courses along the esophagus with the distal tip in the body of the stomach. CHEST: MEDIASTINUM: The heart is top normal in size without pericardial effusion. Moderate coronary artery calcifications.    LUNGS/PLEURA: There is a mild right pleural effusion.  No  pneumothorax.  The airways are patent. Mild pulmonary vascular congestion with smooth thickening of the interstitial lung pattern.  There is underlying  centrilobular emphysema.  There is an underlying posterior left lower lung nodule of 1.4 cm.  Series #301 slice 61.  Mild atelectasis noted within the lingula.  LYMPH NODES: Thoracic lymph nodes are not enlarged. ABDOMEN:  LIVER: No hepatomegaly.  Smooth surface contour.  Normal attenuation.  BILE DUCTS: No intrahepatic or extrahepatic biliary ductal dilatation.  GALLBLADDER: The gallbladder demonstrate two gallstones measuring up to 1.5 cm within the gallbladder fundus. No acute cholecystitis. STOMACH: No abnormalities identified.  PANCREAS: No masses or ductal dilatation.  SPLEEN: No splenomegaly or focal splenic lesion.  ADRENAL GLANDS: No thickening or nodules.  KIDNEYS AND URETERS: Kidneys are normal in size and location.  No renal or ureteral calculi.  PELVIS:  BLADDER: Willis catheter is present within the bladder which is decompressed, limiting evaluation.  No abnormalities identified.  REPRODUCTIVE ORGANS: Uterus is anteverted.  No abnormalities identified.  BOWEL: Normal appendix.  Scattered stool visualized throughout the colon.   VESSELS: No abnormalities identified.  Abdominal aorta is normal in caliber.  PERITONEUM/RETROPERITONEUM/LYMPH NODES: No free fluid.  No pneumoperitoneum. No lymphadenopathy.  ABDOMINAL WALL: There is skin thickening along the left perineum concerning for cellulitis.  Clinical correlation is advised.  No discrete abscess.  SOFT TISSUES: No abnormalities identified.  BONES: No acute fracture or aggressive osseous lesion.    1. ET tube and nasogastric tube in satisfactory positions. 2. Mild right pleural effusion. 3. Mild pulmonary vascular congestion with smooth thickening of the interstitial lung pattern. 4. Underlying centrilobular emphysema. 5. There is an underlying posterior left lower lung nodule of 1.4 cm. See recommendations below.  6. Cholelithiasis without evidence of acute cholecystitis. 7. Willis catheter is present within the bladder which is decompressed, limiting  evaluation. 8. Skin thickening along the left perineum concerning for cellulitis. No discrete abscess. Fleischner Society 2017 Guidelines for Management of Incidentally Detected Pulmonary Nodules in Adults: A. SOLID NODULES* Nodule Type and Size: Single: · Low Risk** < 6 mm - No routine follow-up 6-8 mm - CT at 6-12 months, then consider CT at 18-24 months > 8 mm - Consider CT at 3 months, PET/CT, or tissue sampling · High Risk** < 6 mm - Optional CT at 12 months 6-8 mm - CT at 6-12 months, then CT at 18-24 months > 8 mm - Consider CT at 3 months, PET/CT, or tissue sampling Multiple: · Low Risk** < 6 mm - No routine follow-up 6-8 mm - CT at 3-6 months, then consider CT at 18-24 months > 8 mm - CT at 3-6 months, then consider CT at 18-24 months · High Risk** < 6 mm - Optional CT at 12 months 6-8 mm - CT at 3-6 months, then at 18-24 months > 8 mm - CT at 3-6 months, then at 18-24 months B. SUBSOLID NODULES* Nodule Type and Size: Single: · Ground glass < 6 mm - No routine follow-up > 6 mm - CT at 6-12 months to confirm persistence, then CT every 2 years until 5 years · Part Solid < 6 mm - No routine follow-up > 6 mm - CT at 3-6 months to confirm persistence. If unchanged and solid component remains < 6 mm, annual CT should be performed for 5 years. Multiple: < 6 mm - CT at 3-6 months. If stable, consider CT at 2 and 4 years. > 6 mm - CT at 3-6 months. Subsequent management based on the most suspicious nodule(s). Note - These recommendations do not apply to lung cancer screening, patients with immunosuppression, or patients with known primary cancer. * Dimensions are average of long and short axes, rounded to the nearest millimeter. ** Consider all relevant risk factors. Signed by Darío Thomas MD    XR chest 1 view    Result Date: 7/8/2024  STUDY: Chest Radiograph;  7/8/2024 7:26AM INDICATION: Shortness of breath, end stage congestive heart failure. COMPARISON: 1/30/2024 XR Chest. ACCESSION NUMBER(S): [id number]  ORDERING CLINICIAN: OPAL CASTRO TECHNIQUE:  Frontal chest (two images) was obtained at 8:20 hours. FINDINGS: CARDIOMEDIASTINAL SILHOUETTE: Cardiomediastinal silhouette is normal in size and configuration. Left-sided cardiac AICD.  Endotracheal tube present with tip projected 5 cm above the lena.  NG tube present with tip in proximal stomach. Right IJ central venous catheter with tip in the superior vena cava. Mild vascular congestion.  LUNGS: Emphysematous changes noted.  Small hazy opacities in the medial aspects of the upper lobes  ABDOMEN: No remarkable upper abdominal findings.  BONES: No acute osseous changes.    Mild vascular congestion.  Small hazy opacities in the medial aspects of the upper lobes. Correlate clinically for possible small areas of pneumonia. Signed by Bill Arias MD      PSYCHIATRIC RISK ASSESSMENT  Violence Risk Factors:  lower socioeconomic status  Acute Risk of Harm to Others is Considered: Low  Suicide Risk Factors: prior suicide attempts , chronic medical illness, feelings of hopelessness, and anxious ruminations  Protective Factors: [employer] affiliation/spirituality and fear of suicide or death  Acute Risk of Harm to Self is Considered: Low    ASSESSMENT AND PLAN  68 year old woman who presents for advanced heart failure care. She has a past medical history significant for HrEF (EF 15-20%, multiple RWMA on TTE 5/2020) CAD s/p NSTEMI s/p RIRI to LCX (Jan 2016), MVR s/p mitral valve repair in (June 2019; 29 mm Epic bioprosthetic valve with Dr. Montana), COPD, HTN, HL, GERD, hx of CVA, DM who is admitted for heart failure exacerbation, currently in end stage heart failure. Psychiatry consulted on 7/9/2024 for concern of depression.     On initial assessment, patient endorses feelings of depression and appears tearful, citing stressors that include family dynamic with daughter and recent diagnosis of end stage heart failure. She denies SI and does not appear to be a danger to  "herself or others at this time. She is also not interested in medication but is interested in speaking to the .     EKG (7/9/24): QTc of 499    IMPRESSION  Other specified depression vs. Demoralization  Cluster B traits  Tobacco use disorder    RECOMMENDATIONS  -Recommend arranging  and social work to come see for supportive services.  Safety:  - Patient does not currently meet criteria for inpatient psychiatric admission. Once patient is deemed medically cleared, please document in note that patient is MEDICALLY CLEARED and contact [facility] for referral at [phone], pager [phone]. Issue Application for Emergency Admission (pink slip) only after patient is accepted to an inpatient psychiatric unit and is ready to be discharged. Search “Application for Emergency Admission” under SmartText.”  - To evaluate decision-making capacity, recommend use of the Capacity Evaluation Tool. Search “ IP Capacity Evaluation under SmartText\" unless the patient has a legal guardian, in which case all decisions per the legal guardian.  - Patient does not require a 1:1 sitter from a psychiatric perspective at this time.  - Defer to primary team decision for 1:1 sitter.  - As with all hospitalized patients, would recommend delirium precautions, as below.  DELIRIUM GUIDELINES  Non-Pharmacologic:  - Assess visual and hearing impairments and provide aids and communication boards.  - Assess immobility and advocate for early evaluation and intervention by physical therapy, out of bed when medically indicated, and expeditious removal of tethers.  - Promote physiologic sleep and maintenance of sleep/wake cycle by ensuring blinds are open during the day, maintaining dark/quiet room at night with minimal interruptions, and minimizing daytime naps.  - Minimize room and staff changes.  - Engage the patient in cognitively stimulating activities and provide frequent reorientation.   - Minimize use of restraints to situations where necessary " to keep patient and staff safe and to prevent from removing lines, tubes, medical devices, dressings, etc.      Pharmacologic:  - Minimize use of deliriogenic medications such as benzodiazepines, anticholinergic medications, and opiates (while ensuring adequate treatment of pain).  - Assess and treat disruption in bowel and bladder function.   - Assess and treat abnormalities in nutrition and hydration status.      Work-up:  None at this time    Medications:  -None recommended at this time    Ancillary Services:  - Recommend  and SW services.    Follow-up:  - Outpatient therapy    ==========  - Discussed recommendations with primary team.  - Psychiatry will continue to follow/but not daily. Plan to staff tomorrow morning.    Thank you for allowing us to participate in the care of this patient. Please page [phone] with any questions or concerns.    Patient to be seen and staffed with Dr. Alvarez 7/10/24 AM.    Nettie Woods MD  Psychiatry PGY2    Medication Consent  Medication Consent: n/a; consult service

## 2024-07-09 NOTE — PROCEDURES
"Thermodilution PA Catheter    Date/Time: 7/9/2024 1:05 PM    Performed by: Janine Yu MD  Authorized by: LAWSON Chaudhry-CNP  Consent: Verbal consent obtained. Written consent obtained.  Risks and benefits: risks, benefits and alternatives were discussed  Consent given by: patient  Patient understanding: patient states understanding of the procedure being performed  Patient consent: the patient's understanding of the procedure matches consent given  Procedure consent: procedure consent matches procedure scheduled  Relevant documents: relevant documents present and verified  Test results: test results available and properly labeled  Site marked: the operative site was marked  Imaging studies: imaging studies available  Required items: required blood products, implants, devices, and special equipment available  Patient identity confirmed: verbally with patient and arm band  Time out: Immediately prior to procedure a \"time out\" was called to verify the correct patient, procedure, equipment, support staff and site/side marked as required.  Preparation: Patient was prepped and draped in the usual sterile fashion.  Local anesthesia used: yes    Anesthesia:  Local anesthesia used: yes  Local Anesthetic: lidocaine 1% without epinephrine    Sedation:  Patient sedated: no    Patient tolerance: patient tolerated the procedure well with no immediate complications  Comments: RA 2, RV 32/2 (13), PA 34/22 (29), PAWP 17     A time out was preformed. The patients right neck region was prepped and draped in a sterile fashion using chlorhexidine scrub. Anesthia was achieved with 1% lidocaine. The right internal jugular vein was accessed using a micropuncture needle and sheath. Venous blood was withdrawn and the sheath was advanced into the vein and the needle was withdrawn. A larger guidewire was advanced through the sheath. A small incision was made with a 10 blade scalpel and the sheath was exchanged for a dilator with " overlying [place] #8 catheter over the guidewire until appropriate dilation was obtained. Dilator was flushed appropriately, taking care to first withdraw any free air in system. We manipulated a [place]lake-Alex catheter through the venous system, to the pulmonary capillary wedge position monitoring for appropriate RA and RV wave-forms. No ectopy was noted on the monitor. A Wedge pressure was obtained. We reviewed the data and felt that it was adequate. We cleaned and dressed the access site. Post procedure chest x-ray was ordered and will be reviewed for correct placement and signs of pneumothorax. Patient tolerated the procedure well.     [place]lake was locked at: 45 cm

## 2024-07-09 NOTE — PROGRESS NOTES
Social Work Transitional Care Note   - ICU TREATMENT PLAN: Patient was admitted to HFICU for heart failure.  - Payer: [facility].  -Support System: Daughter Sarah is listed as NOK.   - Planned Disposition: Pending medical outcome and rehab recommendations  - Additional Information: Patient's daughter reports that they are three months behind on rent. SW sent a message to [facility] program to see if they have any resources available.  - Barriers to discharge: None at this time. SW will continue to follow.   PARESH VANESSA

## 2024-07-09 NOTE — PROGRESS NOTES
"[facility]  HFICU PROGRESS NOTE    Melissa Marinelli/[id number]    Admit Date: 7/8/2024  Hospital Length of Stay: 1   ICU Length of Stay: 1d 3h   Primary Service: HFICU  Primary HF Cardiologist: Dr Rivera  Referring: Dr Rivera     INTERVAL EVENTS / PERTINENT ROS:   -Patient's CVP 0 overnight,m w/ HR in 140s givne 500 ml LR. Given amio bolus w/ amio gtt.   -Patient's procal 2.6, CRP 1.76, ESR 92  -HST uptrending 2782/1340/628/283  -Patient cool to the touch this AM, Lactate uptrending    Plan:  -increased milrinone 0.375 w/ repeat lactate acid around 2pm to follow up  -SGC place w/ RA 2, RV 32/2 (13), PA 34/22 (29), PAWP 17 , Chirag CO 4.55/ CI 2.82, mixed venous 73   -Rcx and Bcx pending   -Cont. To trend HST   -d/sandra amio gtt     MEDICATIONS  Infusions:  milrinone, Last Rate: 0.375 mcg/kg/min (07/09/24 1259)      Scheduled:  aspirin, 81 mg, Daily  budesonide, 0.5 mg, BID  cholecalciferol, 2,000 Units, Daily  dapagliflozin propanediol, 10 mg, Daily  heparin (porcine), 5,000 Units, q8h  ipratropium-albuteroL, 3 mL, q6h  pantoprazole, 40 mg, Daily  sennosides, 2 tablet, BID      PRN:  acetaminophen, 650 mg, q6h PRN   Or  acetaminophen, 650 mg, q6h PRN   Or  acetaminophen, 650 mg, q6h PRN  albuterol, 2 puff, q4h PRN  calcium carbonate, 500 mg, q12h PRN  HYDROmorphone, 0.2 mg, q2h PRN  ondansetron, 4 mg, q4h PRN  oxyCODONE, 5 mg, q6h PRN  oxygen, , Continuous PRN - O2/gases  polyethylene glycol, 17 g, Daily PRN  sodium chloride, 1 spray, PRN      PHYSICAL EXAM:   Visit Vitals  /71   Pulse (!) 111   Temp 36.7 °C (98.1 °F) (Temporal)   Resp 17   Ht 1.626 m (5' 4\")   Wt 57.6 kg (126 lb 15.8 oz)   SpO2 100%   BMI 21.80 kg/m²   Smoking Status Every Day   BSA 1.61 m²       Wt Readings from Last 5 Encounters:   07/09/24 57.6 kg (126 lb 15.8 oz)   07/01/24 57.6 kg (127 lb)   06/27/24 57.6 kg (127 lb)   06/24/24 57.6 kg (127 lb)   06/20/24 57.6 kg (127 lb)       INTAKE/OUTPUT:  I/O last 3 " completed shifts:  In: 576.7 (10 mL/kg) [I.V.:326.7 (5.7 mL/kg); IV Piggyback:250]  Out: 1050 (18.2 mL/kg) [Urine:925 (0.4 mL/kg/hr); Emesis/NG output:125]  Weight: 57.6 kg      Physical Exam  Constitutional:       Appearance: She is ill-appearing.   HENT:      Head: Atraumatic.   Eyes:      Extraocular Movements: Extraocular movements intact.   Cardiovascular:      Rate and Rhythm: Regular rhythm. Tachycardia present.   Pulmonary:      Effort: Pulmonary effort is normal.   Abdominal:      Tenderness: There is abdominal tenderness.   Musculoskeletal:      Right lower leg: No edema.      Left lower leg: No edema.   Skin:     General: Skin is cool.   Neurological:      General: No focal deficit present.      Mental Status: She is alert.       DATA:  CMP:  Results from last 7 days   Lab Units 07/09/24 0527 07/08/24  1137 07/08/24  0643   SODIUM mmol/L 141 142 140   POTASSIUM mmol/L 5.4* 5.4* 6.2*   CHLORIDE mmol/L 102 107 107   CO2 mmol/L 22 23 21   ANION GAP mmol/L 22* 17 18   BUN mg/dL 39* 26* 24*   CREATININE mg/dL 2.57* 1.70* 1.52*   EGFR mL/min/1.73m*2 20* 32* 37*   MAGNESIUM mg/dL 2.41* 2.67*  --    ALBUMIN g/dL 3.6 4.1 4.0   ALT U/L  --  13 11   AST U/L  --  23 29   BILIRUBIN TOTAL mg/dL  --  0.4 0.3     CBC:  Results from last 7 days   Lab Units 07/09/24  0527 07/08/24  1137 07/08/24  0643   WBC AUTO x10*3/uL 13.6* 12.4* 11.4*   HEMOGLOBIN g/dL 12.5 13.2 13.5   HEMATOCRIT % 37.6 42.1 40.8   PLATELETS AUTO x10*3/uL 202 207 185   MCV fL 87 92 90     COAG:   Results from last 7 days   Lab Units 07/08/24  1137 07/08/24  0643   INR  1.0 0.9     ABO:   ABO TYPE   Date Value Ref Range Status   07/08/2024 O  Final     HEME/ENDO:  Results from last 7 days   Lab Units 07/08/24  1137   FERRITIN ng/mL 74   IRON SATURATION % 18*   TSH mIU/L 2.87   HEMOGLOBIN A1C % 5.2      CARDIAC:   Results from last 7 days   Lab Units 07/09/24  0927 07/08/24 2007 07/08/24  1137 07/08/24  0758 07/08/24  0643   TROPHSCMC ng/L 2,782*  1,340* 628* 283* 238*   BNP pg/mL  --   --  2,095*  --  509*       ASSESSMENT AND PLAN:   68 year old woman who presents for advanced heart failure care. She has a past medical history significant for HrEF (EF 15-20%, multiple RWMA on TTE 5/2020) CAD s/p NSTEMI s/p RIRI to LCX (Jan 2016), MVR s/p mitral valve repair in (June 2019; 29 mm Epic bioprosthetic valve with Dr. Montana), COPD, HTN, HL, GERD, hx of CVA, DM. On multiple occasions in the past durable LVAD therapy has been discussed and has been declined on multiple occasions.  Ultimately, Ms. Marinelli was initiated on milrinone at a rate of 0.125 mcg/kg/min in 2/2024 and this therapy has been well-tolerated. She presented to the emergency room on 7/8 with complaints of shortness of breath, nausea and vomiting. She was intubated in the ED and transferred to HFICU for further assessment and evaluation.      Neuro:  #Anxiety  #Depression   - Not on any medications at home   - Referred to outpatient psychology at last OP appt with Dr Rivera   - Serial neuro and pain assessments   - PO Tylenol PRN for pain  - PT/OT Consult, OOB to chair  - Sleep/wake cycle normalization     #Substance abuse  - Alcohol abuse/Alcohol dependence: denies  - Tobacco use/Nicotine Dependence: Marijuana and daily cigarette use   - Drug screen 7/8 positive for cannabis and fentanyl      Cardiovascular:  #Acute on chronic decompensated HFrEF 15-20%   #Cardiogenic shock  #Milrinone dependant   Patient w/ worsening lactate, uptrending HST, cool extremities suspect worsening cardiogenic shock. We have increased the milrinone and will place a SGC to further evaluate hemodynamics.   - TTE 1/24: severely decreased LV systolic function, EF 15-20% with severely dilated LV cavity size LVIDd 6.3.  No LV thrombus mild - moderate LA dilation.  Mildly reduced RV systolic function   - TTE 7/8 w/ EF 15-20%, LV/LA dilation, reduced RV systolic function, mid-mod RVSP   - admit weight 58.1 kg  - admit BNP  2095  - Increase milrinone infusion 0.375 mcg/kg/min (home dose 0.125)   - s/p on 7/8 Bumex 3 mg x1 for diuresis, holding off on further diuresis at this time   - C/w dapa 10, holding home angeli 25 mg d/t elevated K+   - Unable to tolerate additional GDMT   - Daily standing weights, 2gm sodium diet, 2L fluid restriction, strict I&Os     #CAD   #NSTEMI s/p RIRI to Lcx (Jan 2016)  #Hx of Mitral Valve repair (June 2019) w/ Dr Montana  #ICD (5/2020)  #Demand ischemia/ hypertroponemia  - 29mm Epic Bioprosthetc valve   - C/w ASA 81mg daily   - Not on statin d/t allergy   - Last device check 5/28- episode of VT/VF with proper ATP on 5/27   - Device check 7/8 - episode of VT w/ proper ATP, no shocks   - Uptrending HST, will cont. To trend until downtrending. If any CP or worsening symptoms please update interventional fellow. Holding off on LHC at this time. Rec. Viability study when able. Patient will require two downtrending HST prior to test.   - d/sandra amio gtt on 7/9, patient w/ Sinus tachycardia      #Electrolyte Disturbances  #Hyperkalemia  - Maintain K>4, Mg >2   - Holding spironolactone d/t elevated K+     Pulmonary:   #Acute hypoxic respiratory failure requiring intubation  #COPD exacerbation vs ADHF  - Intubated in the ED 7/8 due to increased WOB. Extubated on 7/8.   - Currently weaning NC  - C/w albuterol PRN  - C/w scheduled Duo-nebs and Pulmicort   - Aggressive pulmonary hygiene   - f/up on Resp Culture   - Monitor and maintain SpO2 > 92%     GI:  #Nausea and vomiting   - Bowel regimen: luz-colace BID and miralax PRN   - PPI daily   - Consider Tigan IM q6 hrs PRN (was on this previously for N/V)     :  #CKD IIIB  - Baseline BUN/Cr: 29-41/ 1.3-1.8   - Admit BUN/Cr: 26/1.7   - I/Os  - Avoid hypotension and nephrotoxic agents     Heme:  #Iron deficiency anemia   - Labs: iron 58, ferritin 74, TIBC 319, %Tsat 18      Endo:  #DM  - Euglycemic  - hgbA1c 5.2%     #Thyroid  - TSH 2.87      ID:  #?infection  -  Afebrile, nontoxic   - Trend temps q4h  - BC x2 7/8  - UA neg for infection   - Resp culture and RVP 7/8     PHYSICAL AND OCCUPATIONAL THERAPY: ordered and following     LINES:  PIVs   R subclavian lee 2/2024  RIJ SGC      DVT: heparin subq  CENTRAL LINE BUNDLE: ordered  ULCER PPX: PPI  GLYCEMIC CONTROL: NA  BOWEL CARE: luz-colace/miralax PRN  NUTRITION: cardiac diet     EMERGENCY CONTACT: Extended Emergency Contact Information  Primary Emergency Contact: ShadySarah  Home Phone: [phone]  Relation: Child  FAMILY UPDATE:  CODE STATUS: Full Code  DISPO:  HF ICU    Patient seen and assessed with Dr. Miguel Moya MD  PGY-5 Cardiovascular Medicine Fellow

## 2024-07-09 NOTE — ED PROVIDER NOTES
"HPI   Chief Complaint   Patient presents with    Shortness of Breath       68-year-old female past medical history significant for HrEF (EF 15-20%, multiple RWMA on TTE 5/2020) CAD s/p NSTEMI s/p RIRI to LCX (Jan 2016), MVR s/p mitral valve repair in (June 2019; 29 mm Epic bioprosthetic valve with Dr. Montana), COPD, HTN, HL, GERD, hx of CVA, DM who presents today for respiratory distress.  Per EMS, the patient was endorsing shortness of breath and tripoding, but was also having vomiting, so they were unable to place her on BiPAP.  She is currently on 4 L nasal cannula with a respiratory rate in the 30s.  Patient is able to nod her head yes or no, but is unable to say more than 1 single word at a time.  She keeps repeating \"I cannot breathe\".  Further history is unable to be obtained secondary to respiratory distress.  Per chart review, the patient has a significant past medical history including HFrEF multiple intubations, and previous LVAD candidate, who is not currently interested in LVAD.      History provided by:  Patient, EMS personnel and medical records  History limited by:  Severe respiratory distress                      Grover Coma Scale Score: 15                     Patient History   Past Medical History:   Diagnosis Date    Asthma ([facility])     CAD (coronary artery disease)     CHF (congestive heart failure) (Multi)     CKD (chronic kidney disease)     COPD (chronic obstructive pulmonary disease) (Multi)     CVA (cerebral vascular accident) (Multi)     Diabetes mellitus (Multi)     GERD (gastroesophageal reflux disease)     Hyperlipidemia     Hypertension     NSTEMI (non-ST elevated myocardial infarction) (Multi)     Unspecified systolic (congestive) heart failure (Multi) 08/11/2022    HFrEF (heart failure with reduced ejection fraction)     Past Surgical History:   Procedure Laterality Date    CARDIAC CATHETERIZATION N/A 1/26/2024    Procedure: Right Heart Cath;  Surgeon: Cuate Dodson MD;  Location: " [facility] Cardiac Cath Lab;  Service: Cardiovascular;  Laterality: N/A;    MITRAL VALVE REPLACEMENT  06/2019    OTHER SURGICAL HISTORY  08/11/2022    Tonsillectomy    OTHER SURGICAL HISTORY  08/11/2022    Right ventricular assist device placement    OTHER SURGICAL HISTORY  08/11/2022    Mitral valve replacement     Family History   Problem Relation Name Age of Onset    Other (CVA) Brother       Social History     Tobacco Use    Smoking status: Every Day     Current packs/day: 0.50     Types: Cigarettes    Smokeless tobacco: Never   Substance Use Topics    Alcohol use: Never    Drug use: Yes     Types: Marijuana       Physical Exam   ED Triage Vitals   Temp Heart Rate Resp BP   07/08/24 0705 07/08/24 0631 07/08/24 0631 07/08/24 0631   35.9 °C (96.6 °F) (!) 107 (!) 32 (!) 150/105      SpO2 Temp Source Heart Rate Source Patient Position   07/08/24 0631 07/08/24 0705 -- 07/08/24 1200   99 % Rectal  Lying      BP Location FiO2 (%)     07/08/24 1200 07/08/24 0705     Left arm 40 %       Physical Exam  Constitutional:       General: She is in acute distress.      Appearance: She is diaphoretic.   HENT:      Head: Normocephalic and atraumatic.      Mouth/Throat:      Comments: Clear to pink vomit around mouth, in airway  Eyes:      Extraocular Movements: Extraocular movements intact.      Pupils: Pupils are equal, round, and reactive to light.   Neck:      Vascular: JVD present.   Cardiovascular:      Rate and Rhythm: Regular rhythm. Tachycardia present.      Heart sounds: No murmur heard.     No friction rub. No gallop.   Pulmonary:      Effort: Tachypnea, accessory muscle usage and respiratory distress present.      Breath sounds: Examination of the right-upper field reveals rales. Examination of the left-upper field reveals rales. Examination of the right-middle field reveals rales. Examination of the left-middle field reveals rales. Examination of the right-lower field reveals rales. Examination of the left-lower  field reveals rales. Rales present.   Chest:      Chest wall: No mass or tenderness.      Comments: Right tunneled cath with milrinone pump, currently operating appropriately  Abdominal:      Palpations: There is no mass.      Tenderness: There is no abdominal tenderness. There is no guarding or rebound.   Musculoskeletal:         General: Normal range of motion.      Right lower leg: No tenderness. No edema.      Left lower leg: No tenderness. No edema.   Skin:     Capillary Refill: Capillary refill takes 2 to 3 seconds.      Coloration: Skin is pale.      Comments: Extremities cool to touch   Neurological:      General: No focal deficit present.      Mental Status: She is alert. She is disoriented.   Psychiatric:      Comments: Anxious appearing         ED Course & MDM   ED Course as of 07/08/24 2204   Mon Jul 08, 2024 0724 I reviewed patient's medication history and ordered 0.5 mg of Bumex for diuresis, per prior team B lines were seen on ultrasound [SA]   0736 EKG reviewed sinus tachycardia rate 117, KS interval 134 ms,  ms, QTc 479 ms, appears similar to prior, no obvious peaked T waves, no obvious ST segment elevations or depressions, T wave abnormalities in lateral leads are present on prior [SA]   0744 Fentanyl drip ordered for pain control and sedation [SA]   0756 On repeat blood gas potassium remains elevated at 6.3, 2 g of calcium ordered, will follow-up metabolic panel for hyperkalemia [SA]   0757 Vent setting changed after respiratory acidosis on VBG [SA]   0810 CT ordered per HFICU recs [SA]   0835 Initial troponin 238, no overt EKG changes reflective of ischemia, likely demand ischemia from known heart failure, has been similar to prior [SA]   0836 Will preemptively treat for hyperkalemia with 5 units of insulin and dextrose, calcium given.  Patient to go from CT to heart failure ICU.  I discussed with heart failure ICUattending who accepts patient [SA]   0838 X-ray reviewed and there is  bilateral interstitial edema, endotracheal tube appears to be in appropriate position [SA]      ED Course User Index  [SA] Jemima Palomino DO         Diagnoses as of 07/08/24 2204   Shortness of breath       Medical Decision Making  68-year-old female past medical history significant for HrEF (EF 15-20%, multiple RWMA on TTE 5/2020) CAD s/p NSTEMI s/p RIRI to LCX (Jan 2016), MVR s/p mitral valve repair in (June 2019; 29 mm Epic bioprosthetic valve with Dr. Montana), COPD, HTN, HL, GERD, hx of CVA, DM who presents today for respiratory distress.  Patient began to get more tachypneic with increasing oxygen demand, and given the fact the patient was vomiting, decision was made to intubate the patient for airway protection and hypoxic respiratory failure.  We did initially escalate her oxygen from 2 to 4 to 6 L nasal cannula, we did have a lengthy discussion with the patient regarding BiPAP and the possibility of it causing her aspirate.  Patient did agree to intubation.  Patient was successfully intubated using rock and ketamine.  We did also give the patient a dose of Bumex.  Patient was signed out to the oncoming team pending labs as well as x-ray to confirm OG and ET tube placement.  I did message the on-call ICU attending prior to handoff.  Please see the following providers note for final disposition and further evaluation.    Amount and/or Complexity of Data Reviewed  ECG/medicine tests: ordered.     Details: EKG demonstrated sinus tachycardia with a rate of 130, no T wave inversion, ST depression, ST elevation suggestive of MI, similar to previous EKGs.        Procedure  Procedures     Joe Aj MD  Resident  07/08/24 2210

## 2024-07-09 NOTE — CONSULTS
"Nutrition Initial Assessment:   Nutrition Assessment    Reason for Assessment: Admission nursing screening  MST= 2, unsure of weight loss and not eating poorly PTA.    Patient is a 68 y.o. female presenting for advanced heart failure therapy treatment.  She is on a milrinone drip.  Had initially been intubated but was able to be extubated and is now on a PO diet.    Past medical history includes HrEF (EF 15-20%, multiple RWMA on TTE 5/2020) CAD s/p NSTEMI s/p RIRI to LCX (Jan 2016), MVR s/p mitral valve repair in (June 2019; 29 mm Epic bioprosthetic valve with Dr. Montana), COPD, HTN, HL, GERD, hx of CVA, DM; noted that LVAD has been discussed in the past but pt has been declined    Nutrition History:  Food and Nutrient History: Met with patient's daughter, Sarah.  Patient was not feeling well and laying on her side with her eyes closed.  She reports that pt is nauseated today and does not think she has had anything to eat.  Occasionally has nausea at home but \"nothing like this\".  Overall intake at home sounds fair as Sarah reports pt being a picky eater.  She does take up to 2 Glucerna Shakes daily at home as well to help with her overall nutrition.  Daughter reports that the patient has lost weight but uncertain how much.  Primary team entered to visit with patient so unable to do a full NFPE.       Anthropometrics:  Height: 162.6 cm (5' 4\")   Weight: 57.6 kg (126 lb 15.8 oz)   BMI (Calculated): 21.79  IBW/kg (Dietitian Calculated): 54.5 kg  Percent of IBW: 106 %     Weight History:     7/9/24: 57.6kg  7/8/24: two weights listed-- 58.1kg and 63.5kg  6/6/24: 57.6kg  4/12/24: 60.3kg  2/5/24: 66kg  12/18/23: 64.9kg  9/18/23: 65.8kg  6/2/23: 67.1kg    Daughter does not provide a weight history but thinks she has lost a lot of weight \"since getting sicker\".  She appears to be down in weight by 4% in 3 months and 14% in about one year.  These are not technically significant losses.     Weight Change %:   "     Nutrition Focused Physical Exam Findings:    Subcutaneous Fat Loss:   Orbital Fat Pads: Mild-Moderate (slight dark circles and slight hollowing)  Buccal Fat Pads: Mild-Moderate (flat cheeks, minimal bounce)  Triceps: Severe (negligible fat tissue)  Muscle Wasting:  Temporalis: Mild-Moderate (slight depression)  Pectoralis (Clavicular Region): Mild-Moderate (some protrusion of clavicle)  Deltoid/Trapezius: Severe (squared shoulders, acromion process prominent)  Edema:  Edema: none  Physical Findings:  Skin: Negative    Nutrition Significant Labs:  BMP Trend:   Results from last 7 days   Lab Units 07/09/24  0527 07/08/24  1137 07/08/24  0643   GLUCOSE mg/dL 152* 129* 263*   CALCIUM mg/dL 9.3 10.3 9.4   SODIUM mmol/L 141 142 140   POTASSIUM mmol/L 5.4* 5.4* 6.2*   CO2 mmol/L 22 23 21   CHLORIDE mmol/L 102 107 107   BUN mg/dL 39* 26* 24*   CREATININE mg/dL 2.57* 1.70* 1.52*    , A1C:  Lab Results   Component Value Date    HGBA1C 5.2 07/08/2024   , BG POCT trend:   Results from last 7 days   Lab Units 07/08/24  1947   POCT GLUCOSE mg/dL 122*    , Renal Lab Trend:   Results from last 7 days   Lab Units 07/09/24  0527 07/08/24  1137 07/08/24  0643 07/08/24  0643   POTASSIUM mmol/L 5.4* 5.4*  --  6.2*   PHOSPHORUS mg/dL 4.8  --   --   --    SODIUM mmol/L 141 142  --  140   MAGNESIUM mg/dL 2.41* 2.67*   < >  --    EGFR mL/min/1.73m*2 20* 32*  --  37*   BUN mg/dL 39* 26*  --  24*   CREATININE mg/dL 2.57* 1.70*  --  1.52*    < > = values in this interval not displayed.    , Vit D:   Lab Results   Component Value Date    VITD25 21 (A) 11/19/2019        Nutrition Specific Medications:  Scheduled medications  aspirin, 81 mg, oral, Daily  budesonide, 0.5 mg, nebulization, BID  cholecalciferol, 2,000 Units, oral, Daily  dapagliflozin propanediol, 10 mg, oral, Daily  heparin (porcine), 5,000 Units, subcutaneous, q8h  ipratropium-albuteroL, 3 mL, nebulization, q6h  pantoprazole, 40 mg, intravenous, Daily  sennosides, 2 tablet,  oral, BID      Continuous medications  milrinone, 0.375 mcg/kg/min, Last Rate: 0.375 mcg/kg/min (07/09/24 1259)      PRN medications  PRN medications: acetaminophen **OR** acetaminophen **OR** acetaminophen, albuterol, calcium carbonate, HYDROmorphone, metoclopramide **OR** metoclopramide, ondansetron, oxyCODONE, oxygen, polyethylene glycol, sodium chloride     I/O:    ;          Dietary Orders (From admission, onward)       Start     Ordered    07/09/24 1319  Adult diet Cardiac; 70 gm fat; 2 - 3 grams Sodium  Diet effective now        Question Answer Comment   Diet type Cardiac    Fat restriction: 70 gm fat    Sodium restriction: 2 - 3 grams Sodium        07/09/24 1318                     Estimated Needs:   Total Energy Estimated Needs (kCal):  (0438-4861)  Method for Estimating Needs: MSJ= 1091  Total Protein Estimated Needs (g):  (65-75)  Method for Estimating Needs: 54.5kg x 1.2-1.4              Nutrition Diagnosis   Malnutrition Diagnosis  Patient has Malnutrition Diagnosis: Yes  Diagnosis Status: New  Malnutrition Diagnosis: Moderate malnutrition related to chronic disease or condition  As Evidenced by: reports of a fair PO intake PTA with noted 4% weight loss in 3 months and 14% weight loss in one year; she has mild to severe areas of muscle and fat loss on physical exam          Nutrition Interventions/Recommendations         Nutrition Prescription:   RDN will change her diet to Regular.  Check Vitamin D level as she was deficient a few years ago without a recheck.           Nutrition Interventions:    Glucerna Shake BID= 220kcals, 10 grams protein each         Nutrition Education:   Not appropriate       Nutrition Monitoring and Evaluation   Food/Nutrient Related History Monitoring  Monitoring and Evaluation Plan: Energy intake  Criteria: Pt to meet >50% estimated needs via meals, snacks and supplements         Time Spent/Follow-up Reminder:   Time Spent (min): 60 minutes  Last Date of Nutrition Visit:  07/09/24  Nutrition Follow-Up Needed?: Dietitian to reassess per policy  Follow up Comment: PO diet, supps, mod malnutr

## 2024-07-09 NOTE — INTERVAL H&P NOTE
H&P reviewed. The patient was examined and there are no changes to the H&P. Pt cool, c/o shortness of breath at rest. Currently on milrinone drip. Plan for RHC with leave in [place].

## 2024-07-10 ENCOUNTER — APPOINTMENT (OUTPATIENT)
Dept: RADIOLOGY | Facility: HOSPITAL | Age: 69
End: 2024-07-10
Payer: COMMERCIAL

## 2024-07-10 ENCOUNTER — DOCUMENTATION (OUTPATIENT)
Dept: PHARMACY | Facility: CLINIC | Age: 69
End: 2024-07-10

## 2024-07-10 ENCOUNTER — HOME CARE VISIT (OUTPATIENT)
Dept: HOME HEALTH SERVICES | Facility: HOME HEALTH | Age: 69
End: 2024-07-10
Payer: COMMERCIAL

## 2024-07-10 ENCOUNTER — DOCUMENTATION (OUTPATIENT)
Dept: HOME HEALTH SERVICES | Facility: HOME HEALTH | Age: 69
End: 2024-07-10
Payer: COMMERCIAL

## 2024-07-10 ENCOUNTER — HOME INFUSION (OUTPATIENT)
Dept: INFUSION THERAPY | Age: 69
End: 2024-07-10
Payer: COMMERCIAL

## 2024-07-10 LAB
ADENOVIRUS RVP, VIRC: NOT DETECTED
ALBUMIN SERPL BCP-MCNC: 2.9 G/DL (ref 3.4–5)
ANION GAP SERPL CALC-SCNC: 13 MMOL/L (ref 10–20)
ATRIAL RATE: 141 BPM
BACTERIA SPEC RESP CULT: NORMAL
BASE EXCESS BLDMV CALC-SCNC: 1 MMOL/L (ref -2–3)
BASE EXCESS BLDMV CALC-SCNC: 1 MMOL/L (ref -2–3)
BODY TEMPERATURE: 37 DEGREES CELSIUS
BODY TEMPERATURE: 37 DEGREES CELSIUS
BUN SERPL-MCNC: 42 MG/DL (ref 6–23)
CALCIUM SERPL-MCNC: 8.1 MG/DL (ref 8.6–10.6)
CARDIAC TROPONIN I PNL SERPL HS: 2723 NG/L (ref 0–34)
CHLORIDE SERPL-SCNC: 102 MMOL/L (ref 98–107)
CO2 SERPL-SCNC: 26 MMOL/L (ref 21–32)
CREAT SERPL-MCNC: 2.11 MG/DL (ref 0.5–1.05)
EGFRCR SERPLBLD CKD-EPI 2021: 25 ML/MIN/1.73M*2
ENTEROVIRUS/RHINOVIRUS RVP, VIRC: NOT DETECTED
ERYTHROCYTE [DISTWIDTH] IN BLOOD BY AUTOMATED COUNT: 15.9 % (ref 11.5–14.5)
ERYTHROCYTE [DISTWIDTH] IN BLOOD BY AUTOMATED COUNT: 16 % (ref 11.5–14.5)
GLUCOSE SERPL-MCNC: 79 MG/DL (ref 74–99)
GRAM STN SPEC: NORMAL
GRAM STN SPEC: NORMAL
HCO3 BLDMV-SCNC: 26 MMOL/L (ref 22–26)
HCO3 BLDMV-SCNC: 26 MMOL/L (ref 22–26)
HCT VFR BLD AUTO: 27.1 % (ref 36–46)
HCT VFR BLD AUTO: 30.5 % (ref 36–46)
HGB BLD-MCNC: 9.1 G/DL (ref 12–16)
HGB BLD-MCNC: 9.9 G/DL (ref 12–16)
HUMAN BOCAVIRUS RVP, VIRC: NOT DETECTED
HUMAN CORONAVIRUS RVP, VIRC: NOT DETECTED
INFLUENZA A , VIRC: NOT DETECTED
INFLUENZA A H1N1-09 , VIRC: NOT DETECTED
INFLUENZA B PCR, VIRC: NOT DETECTED
INHALED O2 CONCENTRATION: 21 %
INHALED O2 CONCENTRATION: 21 %
LEGIONELLA AG UR QL: NEGATIVE
MAGNESIUM SERPL-MCNC: 2.12 MG/DL (ref 1.6–2.4)
MCH RBC QN AUTO: 29.5 PG (ref 26–34)
MCH RBC QN AUTO: 30 PG (ref 26–34)
MCHC RBC AUTO-ENTMCNC: 32.5 G/DL (ref 32–36)
MCHC RBC AUTO-ENTMCNC: 33.6 G/DL (ref 32–36)
MCV RBC AUTO: 89 FL (ref 80–100)
MCV RBC AUTO: 91 FL (ref 80–100)
METAPNEUMOVIRUS , VIRC: NOT DETECTED
NRBC BLD-RTO: 0 /100 WBCS (ref 0–0)
NRBC BLD-RTO: 0 /100 WBCS (ref 0–0)
OXYHGB MFR BLDMV: 59.8 % (ref 45–75)
OXYHGB MFR BLDMV: 63.2 % (ref 45–75)
P AXIS: 63 DEGREES
P OFFSET: 184 MS
P ONSET: 154 MS
PARAINFLUENZA PCR, VIRC: NOT DETECTED
PCO2 BLDMV: 42 MM HG (ref 41–51)
PCO2 BLDMV: 42 MM HG (ref 41–51)
PH BLDMV: 7.4 PH (ref 7.33–7.43)
PH BLDMV: 7.4 PH (ref 7.33–7.43)
PHOSPHATE SERPL-MCNC: 3.4 MG/DL (ref 2.5–4.9)
PLATELET # BLD AUTO: 106 X10*3/UL (ref 150–450)
PLATELET # BLD AUTO: 114 X10*3/UL (ref 150–450)
PO2 BLDMV: 37 MM HG (ref 35–45)
PO2 BLDMV: 40 MM HG (ref 35–45)
POTASSIUM SERPL-SCNC: 4.4 MMOL/L (ref 3.5–5.3)
PR INTERVAL: 124 MS
Q ONSET: 216 MS
QRS COUNT: 23 BEATS
QRS DURATION: 128 MS
QT INTERVAL: 326 MS
QTC CALCULATION(BAZETT): 499 MS
QTC FREDERICIA: 433 MS
R AXIS: 80 DEGREES
RBC # BLD AUTO: 3.03 X10*6/UL (ref 4–5.2)
RBC # BLD AUTO: 3.36 X10*6/UL (ref 4–5.2)
RSV PCR, RVP, VIRC: NOT DETECTED
SAO2 % BLDMV: 61 % (ref 45–75)
SAO2 % BLDMV: 65 % (ref 45–75)
SODIUM SERPL-SCNC: 137 MMOL/L (ref 136–145)
T AXIS: 168 DEGREES
T OFFSET: 379 MS
VENTRICULAR RATE: 141 BPM
WBC # BLD AUTO: 6.3 X10*3/UL (ref 4.4–11.3)
WBC # BLD AUTO: 6.9 X10*3/UL (ref 4.4–11.3)

## 2024-07-10 PROCEDURE — 83735 ASSAY OF MAGNESIUM: CPT

## 2024-07-10 PROCEDURE — 97166 OT EVAL MOD COMPLEX 45 MIN: CPT | Mod: GO

## 2024-07-10 PROCEDURE — 2500000001 HC RX 250 WO HCPCS SELF ADMINISTERED DRUGS (ALT 637 FOR MEDICARE OP)

## 2024-07-10 PROCEDURE — 90791 PSYCH DIAGNOSTIC EVALUATION: CPT | Performed by: PSYCHOLOGIST

## 2024-07-10 PROCEDURE — 2020000001 HC ICU ROOM DAILY

## 2024-07-10 PROCEDURE — 82810 BLOOD GASES O2 SAT ONLY: CPT

## 2024-07-10 PROCEDURE — 71045 X-RAY EXAM CHEST 1 VIEW: CPT | Performed by: RADIOLOGY

## 2024-07-10 PROCEDURE — 99497 ADVNCD CARE PLAN 30 MIN: CPT

## 2024-07-10 PROCEDURE — 71045 X-RAY EXAM CHEST 1 VIEW: CPT

## 2024-07-10 PROCEDURE — 97161 PT EVAL LOW COMPLEX 20 MIN: CPT | Mod: GP

## 2024-07-10 PROCEDURE — 84484 ASSAY OF TROPONIN QUANT: CPT | Performed by: STUDENT IN AN ORGANIZED HEALTH CARE EDUCATION/TRAINING PROGRAM

## 2024-07-10 PROCEDURE — 37799 UNLISTED PX VASCULAR SURGERY: CPT

## 2024-07-10 PROCEDURE — 37799 UNLISTED PX VASCULAR SURGERY: CPT | Performed by: STUDENT IN AN ORGANIZED HEALTH CARE EDUCATION/TRAINING PROGRAM

## 2024-07-10 PROCEDURE — 85027 COMPLETE CBC AUTOMATED: CPT

## 2024-07-10 PROCEDURE — 2500000004 HC RX 250 GENERAL PHARMACY W/ HCPCS (ALT 636 FOR OP/ED)

## 2024-07-10 PROCEDURE — 80069 RENAL FUNCTION PANEL: CPT

## 2024-07-10 PROCEDURE — 2500000004 HC RX 250 GENERAL PHARMACY W/ HCPCS (ALT 636 FOR OP/ED): Performed by: STUDENT IN AN ORGANIZED HEALTH CARE EDUCATION/TRAINING PROGRAM

## 2024-07-10 PROCEDURE — 5A1935Z RESPIRATORY VENTILATION, LESS THAN 24 CONSECUTIVE HOURS: ICD-10-PCS

## 2024-07-10 PROCEDURE — 99291 CRITICAL CARE FIRST HOUR: CPT | Performed by: STUDENT IN AN ORGANIZED HEALTH CARE EDUCATION/TRAINING PROGRAM

## 2024-07-10 PROCEDURE — 2500000002 HC RX 250 W HCPCS SELF ADMINISTERED DRUGS (ALT 637 FOR MEDICARE OP, ALT 636 FOR OP/ED): Performed by: STUDENT IN AN ORGANIZED HEALTH CARE EDUCATION/TRAINING PROGRAM

## 2024-07-10 PROCEDURE — 94640 AIRWAY INHALATION TREATMENT: CPT

## 2024-07-10 PROCEDURE — 99223 1ST HOSP IP/OBS HIGH 75: CPT

## 2024-07-10 PROCEDURE — 31500 INSERT EMERGENCY AIRWAY: CPT

## 2024-07-10 PROCEDURE — 85027 COMPLETE CBC AUTOMATED: CPT | Performed by: STUDENT IN AN ORGANIZED HEALTH CARE EDUCATION/TRAINING PROGRAM

## 2024-07-10 PROCEDURE — C9113 INJ PANTOPRAZOLE SODIUM, VIA: HCPCS

## 2024-07-10 PROCEDURE — 0BH17EZ INSERTION OF ENDOTRACHEAL AIRWAY INTO TRACHEA, VIA NATURAL OR ARTIFICIAL OPENING: ICD-10-PCS

## 2024-07-10 PROCEDURE — 2500000002 HC RX 250 W HCPCS SELF ADMINISTERED DRUGS (ALT 637 FOR MEDICARE OP, ALT 636 FOR OP/ED)

## 2024-07-10 PROCEDURE — 97116 GAIT TRAINING THERAPY: CPT | Mod: GP

## 2024-07-10 RX ORDER — MILRINONE LACTATE 0.2 MG/ML
0.38 INJECTION, SOLUTION INTRAVENOUS CONTINUOUS
Qty: 250 ML | Refills: 11 | Status: SHIPPED | OUTPATIENT
Start: 2024-07-10 | End: 2024-07-10

## 2024-07-10 RX ORDER — MILRINONE LACTATE 0.2 MG/ML
0.38 INJECTION, SOLUTION INTRAVENOUS CONTINUOUS
Qty: 250 ML | Refills: 11 | Status: SHIPPED | OUTPATIENT
Start: 2024-07-10

## 2024-07-10 RX ORDER — SPIRONOLACTONE 25 MG/1
12.5 TABLET ORAL DAILY
Status: DISCONTINUED | OUTPATIENT
Start: 2024-07-10 | End: 2024-07-11 | Stop reason: HOSPADM

## 2024-07-10 ASSESSMENT — COGNITIVE AND FUNCTIONAL STATUS - GENERAL
CLIMB 3 TO 5 STEPS WITH RAILING: A LOT
STANDING UP FROM CHAIR USING ARMS: A LITTLE
STANDING UP FROM CHAIR USING ARMS: A LOT
MOVING TO AND FROM BED TO CHAIR: A LOT
WALKING IN HOSPITAL ROOM: A LOT
MOVING TO AND FROM BED TO CHAIR: A LITTLE
HELP NEEDED FOR BATHING: A LITTLE
DAILY ACTIVITIY SCORE: 16
WALKING IN HOSPITAL ROOM: A LOT
TOILETING: A LITTLE
PERSONAL GROOMING: A LITTLE
STANDING UP FROM CHAIR USING ARMS: A LOT
DRESSING REGULAR UPPER BODY CLOTHING: A LITTLE
PERSONAL GROOMING: A LITTLE
MOVING TO AND FROM BED TO CHAIR: A LOT
EATING MEALS: A LITTLE
TOILETING: A LITTLE
CLIMB 3 TO 5 STEPS WITH RAILING: A LOT
DAILY ACTIVITIY SCORE: 21
MOBILITY SCORE: 16
DRESSING REGULAR LOWER BODY CLOTHING: A LOT
DRESSING REGULAR UPPER BODY CLOTHING: A LITTLE
DRESSING REGULAR LOWER BODY CLOTHING: A LITTLE
CLIMB 3 TO 5 STEPS WITH RAILING: A LOT
MOBILITY SCORE: 16
HELP NEEDED FOR BATHING: A LOT
TOILETING: A LITTLE
DRESSING REGULAR LOWER BODY CLOTHING: A LITTLE
HELP NEEDED FOR BATHING: A LITTLE
WALKING IN HOSPITAL ROOM: A LITTLE
DAILY ACTIVITIY SCORE: 19
MOBILITY SCORE: 19

## 2024-07-10 ASSESSMENT — PAIN - FUNCTIONAL ASSESSMENT
PAIN_FUNCTIONAL_ASSESSMENT: 0-10

## 2024-07-10 ASSESSMENT — PAIN SCALES - GENERAL
PAINLEVEL_OUTOF10: 0 - NO PAIN

## 2024-07-10 ASSESSMENT — ACTIVITIES OF DAILY LIVING (ADL)
BATHING_ASSISTANCE: MINIMAL
ADL_ASSISTANCE: INDEPENDENT

## 2024-07-10 NOTE — PROGRESS NOTES
Spiritual Care Visit     People who were present: Patient and Palliative CNP    Topics discussed: Patient's condition, living situation, loves music but not interested in Music Therapy services, has frequent conflicts with family members but says daughterMary, has advocated successfully for her in past medical situation. Patient reported distressing experiences in the ER but says her care in the HFICU has been very good.     Interventions: Provided non-anxious, supportive presence, reflective listening, naming of emotions, affirmation of experience    Plan of Care: Palliative  to provide ongoing spiritual care

## 2024-07-10 NOTE — PROGRESS NOTES
SPOKE W/ PT - DELIVERY IS SCHEDULED FOR THURSDAY BY 12 PM.  ASKED PT IF THERE ARE ANY QUESTIONS TO A PHARMACIST. PT SAID: NO QUESTIONS.

## 2024-07-10 NOTE — CARE PLAN
CHW received a referral from , Stating patient lives with daughter, and they are behind on rent payment. CHW reached and to daughter Mary ([phone]). CHW captured email and sent over Rent assistance programs from varies organization that may have funding to assist. [email or web address] Community Resource Name:   Phone Number:   Staff Member:      Discussed the following topics on behalf of the patient:  []  Behavioral Health Assistance     []  Case Management  []   Assistance  []  Digital Equity Assistance  []  Dental Health Assistance  []  Education Assistance  []  Employment Assistance  [x]  Financial Strain Relief Assistance  []  Food Insecurity Assistance  []  Healthcare Coverage Assistance  []  Housing Stability Assistance  []  IP Violence Relief Assistance  []  Legal Assistance  []  Physical Activity Assistance  []  Social Connection Assistance  []  Stress Relief Assistance   []  Substance Abuse Assistance  []  Transportation Assistance  []  Utility Assistance  []  Other: [insert comment here]    Next Steps:         Prasanna Garcia, LARRYW

## 2024-07-10 NOTE — PROGRESS NOTES
Occupational Therapy    Evaluation    Patient Name: Melissa Marinelli  MRN: [id number]  Today's Date: 7/10/2024  Room: 13/13  Time Calculation  Start Time: 1104  Stop Time: 1129  Time Calculation (min): 25 min    Assessment  IP OT Assessment  OT Assessment: Melissa is a 67yo female presenting with deficits in ADLs, IADLs and transfers. Pt would benefit from skilled OTintervention to return to PLOF safely  Prognosis: Good  Barriers to Discharge: None  Evaluation/Treatment Tolerance: Patient tolerated treatment well  Medical Staff Made Aware: Yes  End of Session Communication: Bedside nurse  End of Session Patient Position: Up in chair, Alarm off, not on at start of session  Plan:  Inpatient Plan  Treatment Interventions: ADL retraining, Functional transfer training, Endurance training, Compensatory technique education, Patient/family training  OT Frequency: 2 times per week  OT Discharge Recommendations: Low intensity level of continued care  OT Recommended Transfer Status: Assist of 1  OT - OK to Discharge: Yes  OT Assessment  OT Assessment Results: Decreased ADL status, Decreased endurance, Decreased IADLs, Decreased functional mobility  Prognosis: Good  Barriers to Discharge: None  Evaluation/Treatment Tolerance: Patient tolerated treatment well  Medical Staff Made Aware: Yes  Strengths: Ability to acquire knowledge, Capable of completing ADLs semi/independent, Support of Caregivers, Premorbid level of function  Barriers to Participation: Housing layout, Comorbidities    Subjective   Current Problem:  1. Shortness of breath        2. Acute on chronic heart failure with normal ejection fraction (Multi)  Transthoracic Echo (TTE) Complete    Transthoracic Echo (TTE) Complete    Cardiac Device Check - Inpatient    Cardiac Device Check - Inpatient      3. Acute on chronic combined systolic (congestive) and diastolic (congestive) heart failure (Multi)  Referral to Home Health    milrinone in 5 % dextrose (Primacor) 20  mg/100 mL (200 mcg/mL) infusion    DISCONTINUED: milrinone in 5 % dextrose (Primacor) 20 mg/100 mL (200 mcg/mL) infusion    CANCELED: Case Request Cath Lab: Left Heart Cath    CANCELED: Case Request Cath Lab: Left Heart Cath    CANCELED: Cardiac Catheterization Procedure    CANCELED: Cardiac Catheterization Procedure    CANCELED: Case Request Cath Lab: Right Heart Cath    CANCELED: Case Request Cath Lab: Right Heart Cath    CANCELED: Cardiac Catheterization Procedure    CANCELED: Cardiac Catheterization Procedure      4. Acute combined systolic (congestive) and diastolic (congestive) heart failure (Multi)  Transthoracic Echo (TTE) Complete      5. Tachycardia  Thermodilution PA Catheter    Thermodilution PA Catheter      6. Acute on chronic systolic heart failure (Multi)          General:  Reason for Referral: presents for advanced heart failure care.  presented to the emergency room on 7/8 with complaints of shortness of breath, nausea and vomiting. She was intubated in the ED and transferred to HFICU , extubated  Past Medical History Relevant to Rehab: HrEF (EF 15-20%, multiple RWMA on TTE 5/2020) CAD s/p NSTEMI s/p RIRI to LCX (Jan 2016), MVR s/p mitral valve repair in (June 2019; 29 mm Epic bioprosthetic valve with Dr. Montana), COPD, HTN, HL, GERD, hx of CVA  Co-Treatment:  (utilized rehab aide)  Prior to Session Communication: Bedside nurse  Patient Position Received: Bed, 3 rail up, Alarm off, not on at start of session  General Comment: Pt supine in bed upon entry to room. Pleasant and willing to work with OT. Pt with APOLLO dawn subclavian rosa, tele. On .375 milirinone   Precautions:  Medical Precautions: Fall precautions  Vital Signs:  Heart Rate: (!) 113 (108 post)  Resp: 23 (21 post)  SpO2: 99 % (94 post)  BP: 102/53  Pain:  Pain Assessment  Pain Assessment: 0-10  0-10 (Numeric) Pain Score: 0 - No pain  Lines/Tubes/Drains:  CVC 02/01/24 Single lumen Tunneled Right Subclavian (Active)   Number of  days: 160       Introducer 07/09/24 Internal jugular Right (Active)   Number of days: 1       Pulmonary Artery Catheter Internal jugular (Active)   Number of days: 1         Objective   Cognition:  Overall Cognitive Status: Within Functional Limits  Arousal/Alertness: Appropriate responses to stimuli  Orientation Level: Oriented X4  Following Commands: Follows all commands and directions without difficulty  Insight: Mild  Impulsive: Within functional limits  Processing Speed: Within funtional limits  Cognition Test Scores  Cognition Tests: Cognition Test Performed  SBT Test Score: 0/28(0-4 considered normal)        Home Living:  Type of Home: House  Lives With: Adult children (dtr)  Home Adaptive Equipment: Walker rolling or standard (rollator)  Home Layout: Multi-level, Bed/bath upstairs  Alternate Level Stairs-Rails:  (one railing 1/2way up the stairs, can use wall for stbility before that.)  Alternate Level Stairs-Number of Steps: 10  Home Access: Stairs to enter with rails  Entrance Stairs-Rails:  (unilateral)  Entrance Stairs-Number of Steps: 4  Bathroom Shower/Tub: Tub/shower unit  Bathroom Toilet: Standard  Bathroom Equipment: Grab bars in shower, Built-in shower seat   Prior Function:  Level of [place]: Independent with ADLs and functional transfers, Needs assistance with homemaking  Receives Help From:  (dtr A with IADLs,Has nurse that comes to the house 2x/week)  ADL Assistance: Independent  Homemaking Assistance: Needs assistance  Ambulatory Assistance: Independent  Vocational: Retired  Leisure: enjoys singing and dancing  Hand Dominance: Right  Prior Function Comments: does not drivce, reports x1 fall  IADL History:     ADL:  Eating Assistance: Independent (anticipated)  Grooming Assistance: Independent (anticipated)  Bathing Assistance: Minimal (anticipated)  UE Dressing Assistance: Independent (anticipated)  LE Dressing Assistance: Minimal (anticipated)  Toileting Assistance with Device: Stand by  (anticipated)  Activity Tolerance:  Endurance: Tolerates 10 - 20 min exercise with multiple rests  Early Mobility/Exercise Safety Screen: Proceed with mobilization - No exclusion criteria met  Balance:     Bed Mobility/Transfers: Bed Mobility  Bed Mobility: Yes  Bed Mobility 1  Bed Mobility 1: Supine to sitting  Level of Assistance 1: Close supervision  Bed Mobility Comments 1: HOB elevated slightly   and Transfers  Transfer: Yes  Transfer 1  Transfer From 1: Bed to  Transfer to 1: Stand  Technique 1: Sit to stand  Transfer Device 1: Walker  Transfer Level of Assistance 1: Contact guard  Transfers 2  Transfer From 2: Stand to  Transfer to 2: Chair with arms  Technique 2: Stand to sit  Transfer Device 2: Walker  Transfer Level of Assistance 2: Contact guard  IADL's:      Vision: Vision - Basic Assessment  Current Vision: No visual deficits   and    Sensation:  Light Touch: No apparent deficits  Strength:  Strength Comments: BUE >/.= 3+/5  Perception:     Coordination:  Movements are Fluid and Coordinated: Yes   Hand Function:  Hand Function  Gross Grasp: Functional  Extremities: RUE   RUE : Within Functional Limits, LUE   LUE: Within Functional Limits, RLE   RLE : Within Functional Limits, and LLE   LLE : Within Functional Limits    Outcome Measures: [facility] Daily Activity  Putting on and taking off regular lower body clothing: A little  Bathing (including washing, rinsing, drying): A little  Putting on and taking off regular upper body clothing: None  Toileting, which includes using toilet, bedpan or urinal: A little  Taking care of personal grooming such as brushing teeth: None  Eating Meals: None  Daily Activity - Total Score: 21    Confusion Assessment Method-ICU (CAM-ICU)  Feature 1: Acute Onset or Fluctuating Course: Negative  Feature 2: Inattention: Negative  Feature 4: Disorganized Thinking: Negative  Overall CAM-ICU: Negative   ICU Mobility Screen  Early Mobility/Exercise Safety Screen: Proceed with  mobilization - No exclusion criteria met  ICU Mobility Scale: Walking with assistance of 1 person,          Education Documentation  ADL Training, taught by Reshma Warren OT at 7/10/2024  3:47 PM.  Learner: Patient  Readiness: Acceptance  Method: Explanation  Response: Verbalizes Understanding    Education Comments  No comments found.        Goals:     Encounter Problems       Encounter Problems (Active)       ADLs       Patient with complete lower body dressing with independent level of assistance donning and doffing all LE clothes  with no adaptive equipment while edge of bed  (Progressing)       Start:  07/10/24    Expected End:  07/31/24            Patient will complete toileting including hygiene clothing management/hygiene with independent level of assistance. (Progressing)       Start:  07/10/24    Expected End:  07/31/24               EXERCISE/STRENGTHENING       Pt will demonstrate I with energy conservation techniques to increase functional activity tolerance to x15+ min without rest breaks/while maintaining VSS (Progressing)       Start:  07/10/24    Expected End:  07/31/24                 07/10/24 at 3:47 PM   Reshma Warren OT   Rehab Office: [phone]

## 2024-07-10 NOTE — PROGRESS NOTES
[facility]  HFICU PROGRESS NOTE    Melissa Marinelli/[id number]    Admit Date: 7/8/2024  Hospital Length of Stay: 1   ICU Length of Stay: 1d 3h   Primary Service: HFICU  Primary HF Cardiologist: Dr Rivera  Referring: Dr Rivera     INTERVAL EVENTS / PERTINENT ROS:   -SGC placed yesterday, w/ RA 2, RV 32/2 (13), PA 34/22 (29), PAWP 17 , Chirag CO 4.55/ CI 2.82, mixed venous 73   -Yesterday given lower CVP patient given 1.2 L IVF  -HST stable at 2723/2820/2782  -Consulted psychiatry and palliative     Plan:  -cont. W/ milrinone 0.375, patient warm this AM w/ improved lactate yesterday at 1.4   -Rcx w/ normal oral alethea, Bcx NGTD   -GDMT: c/w dapa 10, restart home angeli 12.5 mg daily   -Psychiatry saw patient: rec  and SW consult to assist   -Palliative following   - Will obtain closing SGC numbers and discontinue today   - PT/OT consulted   - Will contact SW to help with discharge planning given increased dose of milrinone     MEDICATIONS  Infusions:  lactated Ringer's, Last Rate: 10 mL/hr (07/10/24 0700)  milrinone, Last Rate: 0.375 mcg/kg/min (07/10/24 0700)      Scheduled:  aspirin, 81 mg, Daily  budesonide, 0.5 mg, BID  cholecalciferol, 2,000 Units, Daily  dapagliflozin propanediol, 10 mg, Daily  heparin (porcine), 5,000 Units, q8h  ipratropium-albuteroL, 3 mL, q6h  pantoprazole, 40 mg, Daily  sennosides, 2 tablet, BID  spironolactone, 12.5 mg, Daily      PRN:  acetaminophen, 650 mg, q6h PRN   Or  acetaminophen, 650 mg, q6h PRN   Or  acetaminophen, 650 mg, q6h PRN  albuterol, 2 puff, q4h PRN  calcium carbonate, 500 mg, q12h PRN  HYDROmorphone, 0.2 mg, q2h PRN  metoclopramide, 5 mg, q6h PRN   Or  metoclopramide, 5 mg, q6h PRN  ondansetron, 4 mg, q4h PRN  oxyCODONE, 5 mg, q6h PRN  oxygen, , Continuous PRN - O2/gases  polyethylene glycol, 17 g, Daily PRN  sodium chloride, 1 spray, PRN        PHYSICAL EXAM:   Visit Vitals  BP 91/53   Pulse 103   Temp 36.8 °C (98.2 °F)   Resp (!) 3   Ht  "1.626 m (5' 4\")   Wt 57.6 kg (126 lb 15.8 oz)   SpO2 99%   BMI 21.80 kg/m²   Smoking Status Every Day   BSA 1.61 m²       Wt Readings from Last 5 Encounters:   07/09/24 57.6 kg (126 lb 15.8 oz)   07/01/24 57.6 kg (127 lb)   06/27/24 57.6 kg (127 lb)   06/24/24 57.6 kg (127 lb)   06/20/24 57.6 kg (127 lb)       INTAKE/OUTPUT:  I/O last 3 completed shifts:  In: 1150.3 (20 mL/kg) [I.V.:650.3 (11.3 mL/kg); IV Piggyback:500]  Out: 915 (15.9 mL/kg) [Urine:790 (0.4 mL/kg/hr); Emesis/NG output:125]  Weight: 57.6 kg      Physical Exam  Constitutional:       Appearance: She is ill-appearing.   HENT:      Head: Atraumatic.   Eyes:      Extraocular Movements: Extraocular movements intact.   Cardiovascular:      Rate and Rhythm: Regular rhythm. Tachycardia present.   Pulmonary:      Effort: Pulmonary effort is normal.   Abdominal:      No abd tenderness    Musculoskeletal:      Right lower leg: No edema.      Left lower leg: No edema.   Skin:     General: Skin is warm.   Neurological:      General: No focal deficit present.      Mental Status: She is alert.     DATA:  CMP:  Results from last 7 days   Lab Units 07/10/24  0547 07/09/24  0527 07/08/24  1137 07/08/24  0643   SODIUM mmol/L 137 141 142 140   POTASSIUM mmol/L 4.4 5.4* 5.4* 6.2*   CHLORIDE mmol/L 102 102 107 107   CO2 mmol/L 26 22 23 21   ANION GAP mmol/L 13 22* 17 18   BUN mg/dL 42* 39* 26* 24*   CREATININE mg/dL 2.11* 2.57* 1.70* 1.52*   EGFR mL/min/1.73m*2 25* 20* 32* 37*   MAGNESIUM mg/dL 2.12 2.41* 2.67*  --    ALBUMIN g/dL 2.9* 3.6 4.1 4.0   ALT U/L  --   --  13 11   AST U/L  --   --  23 29   BILIRUBIN TOTAL mg/dL  --   --  0.4 0.3     CBC:  Results from last 7 days   Lab Units 07/10/24  0858 07/10/24  0547 07/09/24  0527 07/08/24  1137 07/08/24  0643   WBC AUTO x10*3/uL 6.3 6.9 13.6* 12.4* 11.4*   HEMOGLOBIN g/dL 9.1* 9.9* 12.5 13.2 13.5   HEMATOCRIT % 27.1* 30.5* 37.6 42.1 40.8   PLATELETS AUTO x10*3/uL 106* 114* 202 207 185   MCV fL 89 91 87 92 90     COAG: "   Results from last 7 days   Lab Units 07/08/24  1137 07/08/24  0643   INR  1.0 0.9     ABO:   ABO TYPE   Date Value Ref Range Status   07/08/2024 O  Final     HEME/ENDO:  Results from last 7 days   Lab Units 07/08/24  1137   FERRITIN ng/mL 74   IRON SATURATION % 18*   TSH mIU/L 2.87   HEMOGLOBIN A1C % 5.2      CARDIAC:   Results from last 7 days   Lab Units 07/10/24  0547 07/09/24  1723 07/09/24  0927 07/08/24 2007 07/08/24  1137 07/08/24  0758 07/08/24  0643   TROPHSCMC ng/L 2,723* 2,820* 2,782* 1,340* 628* 283* 238*   BNP pg/mL  --   --   --   --  2,095*  --  509*         Physical Exam  Constitutional:       Appearance: She is ill-appearing.   HENT:      Head: Atraumatic.   Eyes:      Extraocular Movements: Extraocular movements intact.   Cardiovascular:      Rate and Rhythm: Regular rhythm. Tachycardia present.   Pulmonary:      Effort: Pulmonary effort is normal.   Abdominal:      No abd tenderness    Musculoskeletal:      Right lower leg: No edema.      Left lower leg: No edema.   Skin:     General: Skin is warm.   Neurological:      General: No focal deficit present.      Mental Status: She is alert.     ASSESSMENT AND PLAN:   68 year old woman who presents for advanced heart failure care. She has a past medical history significant for HrEF (EF 15-20%, multiple RWMA on TTE 5/2020) CAD s/p NSTEMI s/p RIRI to LCX (Jan 2016), MVR s/p mitral valve repair in (June 2019; 29 mm Epic bioprosthetic valve with Dr. Montana), COPD, HTN, HL, GERD, hx of CVA, DM. On multiple occasions in the past durable LVAD therapy has been discussed and has been declined on multiple occasions.  Ultimately, Ms. Marinelli was initiated on milrinone at a rate of 0.125 mcg/kg/min in 2/2024 and this therapy has been well-tolerated. She presented to the emergency room on 7/8 with complaints of shortness of breath, nausea and vomiting. She was intubated in the ED and transferred to HFICU for further assessment and evaluation.       Neuro:  #Anxiety  #Depression   - Not on any medications at home   - Referred to outpatient psychology at last OP appt with Dr Rivera   - Serial neuro and pain assessments   - PO Tylenol PRN for pain  - PT/OT Consult, OOB to chair  - Sleep/wake cycle normalization  - Palliative and psychiatry consulted      #Substance abuse  - Alcohol abuse/Alcohol dependence: denies  - Tobacco use/Nicotine Dependence: Marijuana and daily cigarette use   - Drug screen 7/8 positive for cannabis and fentanyl      Cardiovascular:  #Acute on chronic decompensated HFrEF 15-20%   #Cardiogenic shock  #Milrinone dependant   Patient w/ worsening lactate, uptrending HST, cool extremities suspect worsening cardiogenic shock. We have increased the milrinone and will place a SGC to further evaluate hemodynamics.   - TTE 1/24: severely decreased LV systolic function, EF 15-20% with severely dilated LV cavity size LVIDd 6.3.  No LV thrombus mild - moderate LA dilation.  Mildly reduced RV systolic function   - TTE 7/8 w/ EF 15-20%, LV/LA dilation, reduced RV systolic function, mid-mod RVSP   - admit weight 58.1 kg  - admit BNP 2095  - Increase milrinone infusion 0.375 mcg/kg/min (home dose 0.125)   - s/p on 7/8 Bumex 3 mg x1 for diuresis, holding off on further diuresis at this time   - C/w dapa 10, restart home angeli 12.5 mg daily given improved K   - Unable to tolerate additional GDMT   - Daily standing weights, 2gm sodium diet, 2L fluid restriction, strict I&Os     #CAD   #NSTEMI s/p RIRI to Lcx (Jan 2016)  #Hx of Mitral Valve repair (June 2019) w/ Dr Montana  #ICD (5/2020)  #Demand ischemia/ hypertroponemia  - 29mm Epic Bioprosthetc valve   - C/w ASA 81mg daily   - Not on statin d/t allergy   - Last device check 5/28- episode of VT/VF with proper ATP on 5/27   - Device check 7/8 - episode of VT w/ proper ATP, no shocks   - Uptrending HST, will cont. To trend until downtrending. If any CP or worsening symptoms please update interventional  fellow. Holding off on [facility] at this time. Rec. Viability study when able. Patient will require two downtrending HST prior to test.   - d/sandra amio gtt on 7/9, patient w/ Sinus tachycardia      #Electrolyte Disturbances  #Hyperkalemia  - Maintain K>4, Mg >2   - Holding spironolactone d/t elevated K+     Pulmonary:   #Acute hypoxic respiratory failure requiring intubation  #COPD exacerbation vs ADHF  - Intubated in the ED 7/8 due to increased WOB. Extubated on 7/8.   - C/w albuterol PRN  - C/w scheduled Duo-nebs and Pulmicort   - Aggressive pulmonary hygiene   - Rcx normal oral alethea   - Monitor and maintain SpO2 > 92%     GI:  #Nausea and vomiting   - Bowel regimen: luz-colace BID and miralax PRN   - PPI daily   - Consider Tigan IM q6 hrs PRN (was on this previously for N/V)     :  #CKD IIIB  - Baseline BUN/Cr: 29-41/ 1.3-1.8   - Admit BUN/Cr: 26/1.7   - I/Os  - Avoid hypotension and nephrotoxic agents     Heme:  #Iron deficiency anemia   - Labs: iron 58, ferritin 74, TIBC 319, %Tsat 18      Endo:  #DM  - Euglycemic  - hgbA1c 5.2%     #Thyroid  - TSH 2.87      ID:  #?infection  - Afebrile, nontoxic   - Trend temps q4h  - BC x2 7/8 NGTD  - UA neg for infection   - Resp culture and RVP 7/8     PHYSICAL AND OCCUPATIONAL THERAPY: ordered and following     LINES:  PIVs   R subclavian lee 2/2024  RIJ SGC - plan to remove on 7/10 s/p closing numbers      DVT: heparin subq  CENTRAL LINE BUNDLE: ordered  ULCER PPX: PPI  GLYCEMIC CONTROL: NA  BOWEL CARE: luz-colace/miralax PRN  NUTRITION: cardiac diet     EMERGENCY CONTACT: Extended Emergency Contact Information  Primary Emergency Contact: Sarah Caruso  Home Phone: [phone]  Relation: Child  FAMILY UPDATE:  CODE STATUS: Full Code  DISPO:  HF ICU    Patient seen and assessed with Dr. Miguel Moya MD  PGY-5 Cardiovascular Medicine Fellow

## 2024-07-10 NOTE — PROGRESS NOTES
Patient is a current [facility] patient on service for continuous milrinone for advanced heart failure. Patient presented to ER on 7/8 with complaints of SOB, nausea and vomiting.      Patient admitted to HFICU. Milrinone rate has been increased to 0.375mcg/kg/min and patient is planned to discharge tomorrow, 7/11/24. New pumps and milrinone bags will be delivered to patient bedside by 12N Thurs 7/11/24. Homecare RN will be there by 12N tomorrow to hook patient up to home care pumps and milrinone bag.    Patient room is [facility] HFICU 13-A.    Processed fill for 4 x 48hr milrinone bags for mix 7/10/24 and delivery 7/11/24 by 12n to cover hook up 7/11 7/13 7/15 and 7/17/24. **SEND 2 NEW PUMPS**    Follow up 7/16/24 with patient progress, weight and refill straight. (Old pumps will need picked up if not already done)

## 2024-07-10 NOTE — HH CARE COORDINATION
Home Care received a Referral to Resume Care for Infusion, Nursing, Physical Therapy, and Occupational Therapy. We have processed the referral for a Resumption of Care on Hospital Hookup 7/11 12P & RICHIE 7/13.     If you have any questions or concerns, please feel free to contact us at [phone]. Follow the prompts, enter your five digit zip code, and you will be directed to your care team on C[facility] 1.

## 2024-07-10 NOTE — SIGNIFICANT EVENT
Closing SGC numbers: BP 96/77, MAP 83, CVP 8, PAP 29/19/23, SVR 1209, Chirag CO 4.96/CI 3.06. on milrinone 0.375 mcg/kg/min, dapa 10, angeli 12.5 mg daily.

## 2024-07-10 NOTE — ED PROCEDURE NOTE
Procedure  Critical Care    Performed by: Bijan Ovalles MD  Authorized by: Bijan Ovalles MD    Critical care provider statement:     Critical care time (minutes):  35    Critical care time was exclusive of:  Separately billable procedures and treating other patients    Critical care was necessary to treat or prevent imminent or life-threatening deterioration of the following conditions:  Respiratory failure and cardiac failure    Critical care was time spent personally by me on the following activities:  Ordering and performing treatments and interventions, ordering and review of laboratory studies, ordering and review of radiographic studies, pulse oximetry, re-evaluation of patient's condition, evaluation of patient's response to treatment, review of old charts, examination of patient, interpretation of cardiac output measurements and ventilator management               Bijan Ovalles MD  07/10/24 0583

## 2024-07-10 NOTE — PROGRESS NOTES
Physical Therapy    Physical Therapy Evaluation & Treatment    Patient Name: Melissa Marinelli  MRN: [id number]  Today's Date: 7/10/2024   Time Calculation  Start Time: 1154  Stop Time: 1221  Time Calculation (min): 27 min    Assessment/Plan   PT Assessment  PT Assessment Results: Decreased strength, Decreased endurance, Impaired balance, Decreased mobility  Rehab Prognosis: Good  End of Session Communication: Bedside nurse  End of Session Patient Position: Up in chair, Alarm off, not on at start of session   IP OR SWING BED PT PLAN  Inpatient or Swing Bed: Inpatient  PT Plan  Treatment/Interventions: Bed mobility, Transfer training, Gait training, Stair training, Balance training, Strengthening, Endurance training, Therapeutic activity, Therapeutic exercise  PT Plan: Ongoing PT  PT Frequency: 4 times per week  PT Discharge Recommendations: Low intensity level of continued care  PT Recommended Transfer Status: Contact guard  PT - OK to Discharge: Yes    Subjective     General Visit Information:  General  Reason for Referral: SOB and vomiting, intubated for airway protection; Extubated  Past Medical History Relevant to Rehab: HFrEF, CAD s/p RIRI to LCX, MVR s/p mitral valve repair, COPD, HTN, HL, GERD, hx of CVA, DM  Prior to Session Communication: Bedside nurse  Patient Position Received: Up in chair, Alarm off, not on at start of session  General Comment: Pt pleasant and cooperative for therapy. Kittrell-Alex Catheter, R subclavian lee, Telemetry, [facility] .375mcg    Home Living:  Home Living  Type of Home: House  Lives With: Adult children  Home Adaptive Equipment: Walker rolling or standard (Rollator)  Home Layout: Multi-level, Bed/bath upstairs  Alternate Level Stairs-Rails:  (one railing 1/2way up the stairs, can use wall for stability before that.)  Alternate Level Stairs-Number of Steps: 10  Home Access: Stairs to enter with rails  Entrance Stairs-Rails:  (1 railing)  Entrance Stairs-Number of Steps: 4  Bathroom  Shower/Tub: Tub/shower unit    Prior Level of Function:  Prior Function Per Pt/Caregiver Report  Level of [place]: Independent with ADLs and functional transfers  Receives Help From: Family, Home health (Has nurse that comes to the house 2x/week)  Ambulatory Assistance: Independent  Prior Function Comments: Walks short distances (1 block) before having to take a break due to side pain. Has a rollator but does not use unless going long distances. (-) drives; 1 fall in the past month due to tripping.    Precautions:  Precautions  Medical Precautions: Fall precautions, Cardiac precautions    Vital Signs:  Vital Signs  Heart Rate:  (PRE: 101 POST: 108)  Resp:  (PRE: 16 POST: 17)  SpO2:  (PRE: 99 POST: 100)  BP:  (PRE: 88/56 POST: 106/63)    Objective     Pain:  Pain Assessment  Pain Assessment: 0-10  0-10 (Numeric) Pain Score: 0 - No pain    Cognition:  Cognition  Overall Cognitive Status: Within Functional Limits  Arousal/Alertness: Appropriate responses to stimuli  Orientation Level: Oriented X4  Following Commands: Follows all commands and directions without difficulty    General Assessments:   Activity Tolerance  Early Mobility/Exercise Safety Screen: Proceed with mobilization - No exclusion criteria met    Sensation  Light Touch: No apparent deficits    Strength  Strength Comments: BLE at least 3/5 shown through functional activities  Strength  Strength Comments: BLE at least 3/5 shown through functional activities    Static Sitting Balance  Static Sitting-Balance Support: Feet supported  Static Sitting-Level of Assistance: Close supervision  Dynamic Sitting Balance  Dynamic Sitting-Balance Support: Feet supported  Dynamic Sitting-Comments: Close supervision    Static Standing Balance  Static Standing-Balance Support: Right upper extremity supported  Static Standing-Level of Assistance: Contact guard  Dynamic Standing Balance  Dynamic Standing-Balance Support: Right upper extremity supported  Dynamic  Standing-Comments: CGA    Functional Assessments:  Transfers  Transfer: Yes  Transfer 1  Technique 1: Sit to stand, Stand to sit  Transfer Level of Assistance 1: Contact guard  Trials/Comments 1: x1    Ambulation/Gait Training  Ambulation/Gait Training Performed: Yes  Ambulation/Gait Training 1  Surface 1: Level tile  Device 1: IV Pole  Assistance 1: Contact guard  Quality of Gait 1: Diminished heel strike, Decreased step length  Comments/Distance (ft) 1: 10ft    Extremity/Trunk Assessments:  RLE   RLE : Within Functional Limits  LLE   LLE : Within Functional Limits    Treatments:  Therapeutic Activity  Therapeutic Activity Performed: Yes  Therapeutic Activity 1: Pt ambulated 10ft without an AD, then walked with a FWW, then 10ft without an AD. CGA. Decreased step length, diminished heel strike, was unsteady and grabbing items to stabilize self throughout walks  Therapeutic Activity 2: Pt ambulated 350ft with a FWW and CGA. Decreased step length, diminished heel strike.  Therapeutic Activity 3: Pt performed sit <-> stand with CGA and no assistive device. Verbal cues for hand placement. Little to no eccentric control.    Outcome Measures:  [facility] Basic Mobility  Turning from your back to your side while in a flat bed without using bedrails: None  Moving from lying on your back to sitting on the side of a flat bed without using bedrails: None  Moving to and from bed to chair (including a wheelchair): A little  Standing up from a chair using your arms (e.g. wheelchair or bedside chair): A little  To walk in hospital room: A little  Climbing 3-5 steps with railing: A lot  Basic Mobility - Total Score: 19    Confusion Assessment Method-ICU (CAM-ICU)  Feature 1: Acute Onset or Fluctuating Course: Negative  Overall CAM-ICU: Negative    FSS-ICU  Ambulation: Walks >/ or equal to 150 feet with minimal assistance x1  Rolling: Unable to perform  Sitting: Supervision or set-up only  Transfer Sit-to-Stand: Minimal assistance  (performs 75% or more of task)  Transfer Supine-to-Sit: Unable to perform  Total Score: 13    Early Mobility/Exercise Safety Screen: Proceed with mobilization - No exclusion criteria met  ICU Mobility Scale: Walking with assistance of 1 person [8]  E = Exercise and Early Mobility  Early Mobility/Exercise Safety Screen: Proceed with mobilization - No exclusion criteria met  ICU Mobility Scale: Walking with assistance of 1 person    Encounter Problems       Encounter Problems (Active)       Balance       Pt will score >/= 22/24 on the Dynamic Gait Index for safe ambulation       Start:  07/10/24    Expected End:  07/24/24               Mobility       Patient will ambulate >/= 750ft with LRAD and Estevan       Start:  07/10/24    Expected End:  07/24/24            Patient will ascend and descend a flight of stairs with LRAD and Estevan        Start:  07/10/24    Expected End:  07/24/24               PT Transfers       Patient will perform bed mobility independently        Start:  07/10/24    Expected End:  07/24/24            Patient will transfer sit to and from stand with LRAD and Estevan        Start:  07/10/24    Expected End:  07/24/24               Pain - Adult              Education Documentation  Mobility Training, taught by EDUARDA GreenPT at 7/10/2024  2:45 PM.  Learner: Patient  Readiness: Acceptance  Method: Explanation  Response: Needs Reinforcement    Education Comments  No comments found.

## 2024-07-10 NOTE — CONSULTS
"Inpatient consult to Palliative Care  Consult performed by: JASMIN Samayoa  Consult ordered by: JASMIN Álvarez        Palliative Medicine Consult  Complex medical decision making, symptom management, patient/family support    History obtained from chart review including ED note, H&P, patient's daily progress notes, review of lab/test results, and discussion with primary team and bedside RN.    Subjective    History of Present Illness  Melissa Marinelli is a 68y woman with a pmh HFrEF (EF 15-20%), CAD s/p NSTEMI, MVR s/p repair, COPD, HTN, HL, GERD, CVA, and T2DM who presented for ADHF. She comes from home where she has been on milrinone. Pyshciatry consulted for depression. She is well-known to palliative team from previous admissions, has denied LVAD placement in the past and not candidate for OHT.     Introduction to Palliative Care  Met with pt at bedside.   Patient alert and oriented, has capacity to make their own medical decisions at this time.   Staff present: Edie BERMEO, Rev. Carrie Dunham, pall care    Palliative Medicine was introduced as a specialty service for patients with serious illness to help with symptom management, improve quality of life, assist with goals of care conversations, navigate complex decision making, and provide support to patients and families. Support and empathy was provided throughout the encounter. Provided reflective listening and presence.     Symptoms  Pain: patient states she has intermittent chest pain that feels like \"gas pain\" does not radiate anywhere, states she has been feeling this pain for several weeks   Insomnia: states she sleep well at home  Drowsiness: somewhat drowsy during conversation, reports that she has not been sleeping well in the hospital   Constipation: states she has not had a BM since admission   Nausea: denies  Appetite: reports that she eats well at home, does not enjoy the taste of hospital food  Anxiety: " "denies  Depression: reports she feels depressed regarding her \"family situation\" she states that she does not trust anyone. Also feels depressed regarding her illness and hospitalization     Palliative Medicine Social History:  Patient states she has 1 daughter who she lives with. She also raised her brother's daughter, her niece, who she often refers to as her daughter. Denies alcohol use. States that she smokes cigarettes daily. She has weaned down her amount, but that has been difficult to maintain due to stressors. Enjoys all kinds of music and watching tv. States she uses a cane and a rollator to ambulate.     Objective    Last Recorded Vitals  BP 91/53   Pulse 103   Temp 36.8 °C (98.2 °F)   Resp (!) 3   Ht 1.626 m (5' 4\")   Wt 57.6 kg (126 lb 15.8 oz)   SpO2 99%   BMI 21.80 kg/m²      Physical Exam  HENT:      Head: Normocephalic.      Mouth/Throat:      Mouth: Mucous membranes are moist.   Cardiovascular:      Rate and Rhythm: Tachycardia present.   Pulmonary:      Effort: Pulmonary effort is normal.   Neurological:      Mental Status: She is alert and oriented to person, place, and time.          Relevant Results  Results for orders placed or performed during the hospital encounter of 07/08/24 (from the past 24 hour(s))   BLOOD GAS MIXED VENOUS FULL PANEL   Result Value Ref Range    POCT pH, Mixed 7.37 7.33 - 7.43 pH    POCT pCO2, Mixed 43 41 - 51 mm Hg    POCT pO2, Mixed 47 (H) 35 - 45 mm Hg    POCT SO2, Mixed 73 45 - 75 %    POCT Oxy Hemoglobin, Mixed 71.5 45.0 - 75.0 %    POCT Hematocrit Calculated, Mixed 38.0 36.0 - 46.0 %    POCT Sodium, Mixed 135 (L) 136 - 145 mmol/L    POCT Potassium, Mixed 5.1 3.5 - 5.3 mmol/L    POCT Chloride, Mixed 102 98 - 107 mmol/L    POCT Ionized Calcium, Mixed 1.18 1.10 - 1.33 mmol/L    POCT Glucose, Mixed 141 (H) 74 - 99 mg/dL    POCT Lactate, Mixed 1.4 0.4 - 2.0 mmol/L    POCT Base Excess, Mixed -0.6 -2.0 - 3.0 mmol/L    POCT HCO3 Calculated, Mixed 24.9 22.0 - 26.0 " mmol/L    POCT Hemoglobin, Mixed 12.6 12.0 - 16.0 g/dL    POCT Anion Gap, Mixed 13 10 - 25 mmo/L    Patient Temperature 37.0 degrees Celsius    FiO2 36 %   Troponin I, High Sensitivity   Result Value Ref Range    Troponin I, High Sensitivity (CMC) 2,820 (HH) 0 - 34 ng/L   BLOOD GAS MIXED VENOUS   Result Value Ref Range    POCT pH, Mixed 7.40 7.33 - 7.43 pH    POCT pCO2, Mixed 42 41 - 51 mm Hg    POCT pO2, Mixed 38 35 - 45 mm Hg    POCT SO2, Mixed 61 45 - 75 %    POCT Oxy Hemoglobin, Mixed 59.9 45.0 - 75.0 %    POCT Base Excess, Mixed 1.0 -2.0 - 3.0 mmol/L    POCT HCO3 Calculated, Mixed 26.0 22.0 - 26.0 mmol/L    Patient Temperature 37.0 degrees Celsius    FiO2 21 %   BLOOD GAS MIXED VENOUS   Result Value Ref Range    POCT pH, Mixed 7.41 7.33 - 7.43 pH    POCT pCO2, Mixed 40 (L) 41 - 51 mm Hg    POCT pO2, Mixed 39 35 - 45 mm Hg    POCT SO2, Mixed 61 45 - 75 %    POCT Oxy Hemoglobin, Mixed 59.3 45.0 - 75.0 %    POCT Base Excess, Mixed 0.7 -2.0 - 3.0 mmol/L    POCT HCO3 Calculated, Mixed 25.4 22.0 - 26.0 mmol/L    Patient Temperature 37.0 degrees Celsius    FiO2 21 %   Magnesium   Result Value Ref Range    Magnesium 2.12 1.60 - 2.40 mg/dL   Renal function panel   Result Value Ref Range    Glucose 79 74 - 99 mg/dL    Sodium 137 136 - 145 mmol/L    Potassium 4.4 3.5 - 5.3 mmol/L    Chloride 102 98 - 107 mmol/L    Bicarbonate 26 21 - 32 mmol/L    Anion Gap 13 10 - 20 mmol/L    Urea Nitrogen 42 (H) 6 - 23 mg/dL    Creatinine 2.11 (H) 0.50 - 1.05 mg/dL    eGFR 25 (L) >60 mL/min/1.73m*2    Calcium 8.1 (L) 8.6 - 10.6 mg/dL    Phosphorus 3.4 2.5 - 4.9 mg/dL    Albumin 2.9 (L) 3.4 - 5.0 g/dL   Troponin I, High Sensitivity   Result Value Ref Range    Troponin I, High Sensitivity (CMC) 2,723 (HH) 0 - 34 ng/L   CBC   Result Value Ref Range    WBC 6.9 4.4 - 11.3 x10*3/uL    nRBC 0.0 0.0 - 0.0 /100 WBCs    RBC 3.36 (L) 4.00 - 5.20 x10*6/uL    Hemoglobin 9.9 (L) 12.0 - 16.0 g/dL    Hematocrit 30.5 (L) 36.0 - 46.0 %    MCV 91 80 - 100  fL    MCH 29.5 26.0 - 34.0 pg    MCHC 32.5 32.0 - 36.0 g/dL    RDW 15.9 (H) 11.5 - 14.5 %    Platelets 114 (L) 150 - 450 x10*3/uL   BLOOD GAS MIXED VENOUS   Result Value Ref Range    POCT pH, Mixed 7.40 7.33 - 7.43 pH    POCT pCO2, Mixed 42 41 - 51 mm Hg    POCT pO2, Mixed 40 35 - 45 mm Hg    POCT SO2, Mixed 65 45 - 75 %    POCT Oxy Hemoglobin, Mixed 63.2 45.0 - 75.0 %    POCT Base Excess, Mixed 1.0 -2.0 - 3.0 mmol/L    POCT HCO3 Calculated, Mixed 26.0 22.0 - 26.0 mmol/L    Patient Temperature 37.0 degrees Celsius    FiO2 21 %   CBC   Result Value Ref Range    WBC 6.3 4.4 - 11.3 x10*3/uL    nRBC 0.0 0.0 - 0.0 /100 WBCs    RBC 3.03 (L) 4.00 - 5.20 x10*6/uL    Hemoglobin 9.1 (L) 12.0 - 16.0 g/dL    Hematocrit 27.1 (L) 36.0 - 46.0 %    MCV 89 80 - 100 fL    MCH 30.0 26.0 - 34.0 pg    MCHC 33.6 32.0 - 36.0 g/dL    RDW 16.0 (H) 11.5 - 14.5 %    Platelets 106 (L) 150 - 450 x10*3/uL      ECG 12 lead  Sinus tachycardia  Nonspecific intraventricular block  Cannot rule out Anterior infarct , age undetermined  T wave abnormality, consider inferolateral ischemia  Abnormal ECG  When compared with ECG of 08-JUL-2024 06:36,  Inverted T waves have replaced nonspecific T wave abnormality in Inferior leads  Confirmed by Kofi Lorenzo ([id number]) on 7/9/2024 9:01:41 PM  XR chest 1 view  Narrative: Interpreted By:  Roly Conti and Gupta Jayesh   STUDY:  XR CHEST 1 VIEW;  7/9/2024 1:55 pm      INDICATION:  Signs/Symptoms:swan placement.      COMPARISON:  Comparison radiograph 07/20/2024      ACCESSION NUMBER(S):  [id number]      ORDERING CLINICIAN:  TOBI HUGGINS      FINDINGS:  AP radiograph of the chest was provided.      Medical devices: Pacemaker with 1 lead to the right ventricle.  [place]-Alex catheter in place with distal tip over the distal right  main pulmonary artery. Right CVC with tip over the distal SVC. Median  sternotomy wires present.      CARDIOMEDIASTINAL SILHOUETTE:  Cardiomediastinal silhouette is enlarged but  stable in size and  configuration.      LUNGS:  Bilateral pulmonary vasculature congestion. Right lower zone  atelectasis, stable. No pleural effusions consolidations or  pneumothorax.      ABDOMEN:  No remarkable upper abdominal findings.      BONES:  No acute osseous changes.      Impression: 1.  [place]-Alex catheter in place with distal tip in the distal right  main pulmonary artery. No pneumothorax noted.  2. Stable pulmonary findings include bilateral vasculature congestion  and right lower lobe atelectasis.      MACRO:  I personally reviewed the images/study and I agree with the findings  as stated by Jarek Mansfield MD. This study was interpreted at  [facility], [place], [place].      Signed by: Roly Conti 7/9/2024 2:35 PM  Dictation workstation:   [id number]  Electrocardiogram, 12-lead PRN ACS symtpoms  Sinus tachycardia  Nonspecific intraventricular block  Cannot rule out Anterior infarct (cited on or before 08-JUL-2024)  T wave abnormality, consider inferolateral ischemia  Abnormal ECG  When compared with ECG of 08-JUL-2024 11:16,  No significant change was found     Encounter Date: 07/08/24   Electrocardiogram, 12-lead PRN ACS symtpoms   Result Value    Ventricular Rate 141    Atrial Rate 141    AK Interval 124    QRS Duration 128    QT Interval 326    QTC Calculation(Bazett) 499    P Axis 63    R Axis 80    T Axis 168    QRS Count 23    Q Onset 216    P Onset 154    P Offset 184    T Offset 379    QTC Fredericia 433    Narrative    Sinus tachycardia  Nonspecific intraventricular block  Cannot rule out Anterior infarct (cited on or before 08-JUL-2024)  T wave abnormality, consider inferolateral ischemia  Abnormal ECG  When compared with ECG of 08-JUL-2024 11:16,  No significant change was found        Allergies  Metoprolol, Ticagrelor, Gadolinium-containing contrast media, Iodinated contrast media, Statins-hmg-coa reductase inhibitors, Prednisone, Ace inhibitors, Fentanyl,  Hydralazine, Nicorette [nicotine (polacrilex)], Nicotine, and Penicillins    Scheduled medications  aspirin, 81 mg, oral, Daily  budesonide, 0.5 mg, nebulization, BID  cholecalciferol, 2,000 Units, oral, Daily  dapagliflozin propanediol, 10 mg, oral, Daily  heparin (porcine), 5,000 Units, subcutaneous, q8h  ipratropium-albuteroL, 3 mL, nebulization, q6h  pantoprazole, 40 mg, intravenous, Daily  sennosides, 2 tablet, oral, BID  spironolactone, 12.5 mg, oral, Daily      Continuous medications  lactated Ringer's, 10 mL/hr, Last Rate: 10 mL/hr (07/10/24 0700)  milrinone, 0.375 mcg/kg/min, Last Rate: 0.375 mcg/kg/min (07/10/24 0700)      PRN medications  PRN medications: acetaminophen **OR** acetaminophen **OR** acetaminophen, albuterol, calcium carbonate, HYDROmorphone, metoclopramide **OR** metoclopramide, ondansetron, oxyCODONE, oxygen, polyethylene glycol, sodium chloride     Assessment/Plan    Melissa Marinelli is a 68y woman with a pmh HFrEF (EF 15-20%), CAD s/p NSTEMI, MVR s/p repair, COPD, HTN, HL, GERD, CVA, and T2DM who presented for ADHF. She comes from home where she has been on milrinone. Pyshciatry consulted for depression. She is well-known to palliative team from previous admissions, has denied LVAD placement in the past and not candidate for OHT.     ----------------------------------------------------------------------------------------------------------------------------------------------------------------------------------------------------------------------------------------------------------------------  Advanced Care Planning  Patient and/or family consented to a voluntary Advanced Care Planning meeting.   Serious Illness Assessment and Counseling:  Life Limiting Disease: Severe exacerbation of HF.     Disease Specific Information Provided/Prognosis Discussed: Patient's current clinical condition, including diagnosis, and management plan were discussed.     Understanding/Overall Impression: Patient  expressing limited understanding of overall health status and severity of illness. Limited understanding of milrinone, states she has been managing it independently at home     Goals/Hopes: Discussion ensued about patient's goals for their medical care going forward. Allowed patient time to talk about his/her current quality of life, disease course/progression, and symptom and treatment burden. Patient wishes to return home to her previous QOL, continuing milrinone. She states that she used to follow with outpatient palliative care with navigator program, which she enjoyed     Advanced Directives:  Counseling provided on the importance of not crisis planning as disease burden progresses but to establish treatment limitations now so in the future medical team will be clear on what patient feels is an acceptable quality of life for the patient and what treatment limitations' patient would like set into place based on that.       Surrogate Health Care Decision Maker: Sarah Caruso, daughter   Patient does not have a HPOA or living will. Counseling provided on benefit of completing advanced directives in order to establish person to make one's medical decision if patient were unable to speak for themselves and to make their health care wishes and treatment limitations to both hospital staff and their designated HPOA. Social work to help patient complete prior to discharge.     All questions and concerns were addressed during encounter.     I spent 30 minutes in providing separately identifiable ACP services with the patient and/or surrogate decision maker in a voluntary conversation discussing the patient's wishes and goals as detailed in the above note.    ----------------------------------------------------------------------------------------------------------------------------------------------------------------------------------------------------------------------------------------------------------------------    #Complex Medical Decision Making  #Goals of Care  #Advanced Care Planning  - Code status: FULL code. Due to advanced heart failure, treatment limitations recommended, can address in subsequent visits as able  - Surrogate decision maker: dinesh Caruso [phone]  - Goals are mix of survival and time and improved quality of life  - Plan for patient to complete Advanced Directives with social work prior to discharge   -Social work to place referral for outpatient palliative care (Navigator program [facility])    #depression   -psychiatry following, per their notes patient is not interested in pharmacologic therapy  -patient states she does feel somewhat depressed, also notable for some objective signs of depression   -palliative team to continue providing conservative management and support    #Psychosocial Support  - pt declined music therapy, states she listens to music on her cell phone   - Spiritual Care Support, Rev. Carrie Dunham is following     Plan of Care discussed with: Updated MD and bedside RN on goals of care decision, medication adjustments, and code status     Medical Decision Making was high level due to high complexity of problems, extensive data review, and high risk of management/treatment.     - ADHF posing threat to life and function  - Drug therapy requiring intensive monitoring for toxicity: IV milrinone    Thank you for allowing us to participate in the care of this patient. Palliative will continue to follow as needed. Palliative medicine is available Monday-Friday, 8a-6p. Please contact team with any questions or concerns.  Team pager [phone] (weekdays)  Edie Vasquez DNP, CNP

## 2024-07-11 VITALS
RESPIRATION RATE: 17 BRPM | SYSTOLIC BLOOD PRESSURE: 113 MMHG | TEMPERATURE: 98.8 F | DIASTOLIC BLOOD PRESSURE: 75 MMHG | BODY MASS INDEX: 21.68 KG/M2 | WEIGHT: 126.98 LBS | OXYGEN SATURATION: 100 % | HEIGHT: 64 IN | HEART RATE: 109 BPM

## 2024-07-11 DIAGNOSIS — I50.22 CHRONIC SYSTOLIC HEART FAILURE (MULTI): Primary | ICD-10-CM

## 2024-07-11 PROBLEM — I50.1 PULMONARY EDEMA CARDIAC CAUSE (MULTI): Status: ACTIVE | Noted: 2023-04-10

## 2024-07-11 PROBLEM — I50.1 PULMONARY EDEMA CARDIAC CAUSE (MULTI): Status: RESOLVED | Noted: 2023-04-10 | Resolved: 2024-07-11

## 2024-07-11 LAB
ALBUMIN SERPL BCP-MCNC: 3.1 G/DL (ref 3.4–5)
ANION GAP SERPL CALC-SCNC: 12 MMOL/L (ref 10–20)
BUN SERPL-MCNC: 33 MG/DL (ref 6–23)
CALCIUM SERPL-MCNC: 8 MG/DL (ref 8.6–10.6)
CHLORIDE SERPL-SCNC: 104 MMOL/L (ref 98–107)
CO2 SERPL-SCNC: 26 MMOL/L (ref 21–32)
CREAT SERPL-MCNC: 1.59 MG/DL (ref 0.5–1.05)
EGFRCR SERPLBLD CKD-EPI 2021: 35 ML/MIN/1.73M*2
ERYTHROCYTE [DISTWIDTH] IN BLOOD BY AUTOMATED COUNT: 15.4 % (ref 11.5–14.5)
GLUCOSE SERPL-MCNC: 100 MG/DL (ref 74–99)
HCT VFR BLD AUTO: 25.3 % (ref 36–46)
HGB BLD-MCNC: 8.5 G/DL (ref 12–16)
LACTATE SERPL-SCNC: 2.7 MMOL/L (ref 0.4–2)
MAGNESIUM SERPL-MCNC: 2.23 MG/DL (ref 1.6–2.4)
MCH RBC QN AUTO: 29 PG (ref 26–34)
MCHC RBC AUTO-ENTMCNC: 33.6 G/DL (ref 32–36)
MCV RBC AUTO: 86 FL (ref 80–100)
NRBC BLD-RTO: 0 /100 WBCS (ref 0–0)
PHOSPHATE SERPL-MCNC: 2.8 MG/DL (ref 2.5–4.9)
PLATELET # BLD AUTO: 103 X10*3/UL (ref 150–450)
POTASSIUM SERPL-SCNC: 4 MMOL/L (ref 3.5–5.3)
RBC # BLD AUTO: 2.93 X10*6/UL (ref 4–5.2)
SODIUM SERPL-SCNC: 138 MMOL/L (ref 136–145)
WBC # BLD AUTO: 5.5 X10*3/UL (ref 4.4–11.3)

## 2024-07-11 PROCEDURE — 2500000004 HC RX 250 GENERAL PHARMACY W/ HCPCS (ALT 636 FOR OP/ED): Performed by: STUDENT IN AN ORGANIZED HEALTH CARE EDUCATION/TRAINING PROGRAM

## 2024-07-11 PROCEDURE — 99233 SBSQ HOSP IP/OBS HIGH 50: CPT

## 2024-07-11 PROCEDURE — 2500000002 HC RX 250 W HCPCS SELF ADMINISTERED DRUGS (ALT 637 FOR MEDICARE OP, ALT 636 FOR OP/ED)

## 2024-07-11 PROCEDURE — 37799 UNLISTED PX VASCULAR SURGERY: CPT | Performed by: NURSE PRACTITIONER

## 2024-07-11 PROCEDURE — 2500000001 HC RX 250 WO HCPCS SELF ADMINISTERED DRUGS (ALT 637 FOR MEDICARE OP)

## 2024-07-11 PROCEDURE — 85027 COMPLETE CBC AUTOMATED: CPT

## 2024-07-11 PROCEDURE — 2500000002 HC RX 250 W HCPCS SELF ADMINISTERED DRUGS (ALT 637 FOR MEDICARE OP, ALT 636 FOR OP/ED): Performed by: STUDENT IN AN ORGANIZED HEALTH CARE EDUCATION/TRAINING PROGRAM

## 2024-07-11 PROCEDURE — 37799 UNLISTED PX VASCULAR SURGERY: CPT

## 2024-07-11 PROCEDURE — 80069 RENAL FUNCTION PANEL: CPT

## 2024-07-11 PROCEDURE — 94640 AIRWAY INHALATION TREATMENT: CPT

## 2024-07-11 PROCEDURE — 83605 ASSAY OF LACTIC ACID: CPT | Performed by: NURSE PRACTITIONER

## 2024-07-11 PROCEDURE — 99291 CRITICAL CARE FIRST HOUR: CPT | Performed by: STUDENT IN AN ORGANIZED HEALTH CARE EDUCATION/TRAINING PROGRAM

## 2024-07-11 PROCEDURE — 83735 ASSAY OF MAGNESIUM: CPT

## 2024-07-11 RX ORDER — DEXTROSE 50 % IN WATER (D50W) INTRAVENOUS SYRINGE
25
Status: CANCELLED | OUTPATIENT
Start: 2024-07-11

## 2024-07-11 RX ORDER — DEXTROSE 50 % IN WATER (D50W) INTRAVENOUS SYRINGE
12.5
Status: CANCELLED | OUTPATIENT
Start: 2024-07-11

## 2024-07-11 ASSESSMENT — PAIN SCALES - GENERAL
PAINLEVEL_OUTOF10: 0 - NO PAIN

## 2024-07-11 ASSESSMENT — PAIN - FUNCTIONAL ASSESSMENT
PAIN_FUNCTIONAL_ASSESSMENT: 0-10

## 2024-07-11 NOTE — DISCHARGE SUMMARY
Discharge Diagnosis  Acute on chronic HF, acute pulmonary edema due to HF    Issues Requiring Follow-Up    68 year old woman who presents for advanced heart failure care. She has a past medical history significant for HrEF (EF 15-20%, multiple RWMA on TTE 5/2020), CAD s/p NSTEMI, s/p RIRI to LCX (Jan 2016), MVR s/p mitral valve repair in (June 2019; 29 mm Epic bioprosthetic valve with Dr. Montana), COPD, HTN, HL, GERD, hx of CVA, DM, and hope inotropic dependent.      In the hospital, Pt had RHC and showed RA 2, RV 32/2 (13), PA 34/22 (29), PAWP 17. Chirag CO 4.55/ CI 2.82, mixed venous 73 and Increase milrinone infusion 0.375 mcg/kg/min (home dose 0.125). Pt has also agressive diuresis since the admission. The Closing SGC numbers: BP 96/77, MAP 83, CVP 8, PAP 29/19/23, SVR 1209, Chirag CO 4.96/CI 3.06. on milrinone 0.375 mcg/kg/min, dapa 10, angeli 12.5 mg daily. After reviewing the hemodynamic status and the clinical conditions, the decision is made to discharge pt home with higher dose of milrinone gtt  0.375 mcg/kg/min, home care with skilled nursing care for IV meds management and PT/OT.    Pt will up with PCP and do the stress test as outpatient:     Future Appointments   Date Time Provider Department Center   7/13/2024 To Be Determined Gabriela Reddy RN [facility]   8/23/2024 10:00 AM Kamari Collier MD PhD [id number] [facility]   10/14/2024 10:00 AM Kathy Rivera MD PhD [id number]       Test Results Pending At Discharge  Pending Labs       Order Current Status    Potassium Collected (07/11/24 0420)    Blood Culture Preliminary result    Blood Culture Preliminary result            Hospital Course   68 year old woman who presents for advanced heart failure care. She has a past medical history significant for HrEF (EF 15-20%, multiple RWMA on TTE 5/2020) CAD s/p NSTEMI s/p RIRI to LCX (Jan 2016), MVR s/p mitral valve repair in (June 2019; 29 mm Epic bioprosthetic valve with Dr. Montana), COPD, HTN, HL,  GERD, hx of CVA, DM. On multiple occasions in the past durable LVAD therapy has been discussed and has been declined on multiple occasions.  Ultimately, Ms. Marinelli was initiated on milrinone at a rate of 0.125 mcg/kg/min in 2/2024 and this therapy has been well-tolerated. She presented to the emergency room on 7/8 with complaints of shortness of breath, nausea and vomiting. She was intubated in the ED and transferred to HFICU for further assessment and evaluation.     On arrival to HFICU patient is intubated and sedated on propofol and fentanyl. She is cool to touch on exam and volume overloaded. Patients sedation was changed over to precedex to help with the weaning process off the ventilator, and milrinone infusion was increased to 0.25 mcg/kg/min with a 1x dose of bumex 3 mg and on admission pt had lactate 2.2, BNP 2095, and Elevated high sensitive trop > 2000 s due to demanding ischemia, Chest Xray shows acuate pulmonary edema.     Pt had RHC and showed RA 2, RV 32/2 (13), PA 34/22 (29), PAWP 17. Chirag CO 4.55/ CI 2.82, mixed venous 73 and Increase milrinone infusion 0.375 mcg/kg/min (home dose 0.125). Pt has also agressive diuresis since the admission. The Closing SGC numbers: BP 96/77, MAP 83, CVP 8, PAP 29/19/23, SVR 1209, Chirag CO 4.96/CI 3.06. on milrinone 0.375 mcg/kg/min, dapa 10, angeli 12.5 mg daily. After reviewing the hemodynamic status and the clinical conditions, the decision is made to discharge pt home with higher dose of milrinone gtt  0.375 mcg/kg/min, home care with skilled nursing care for IV meds management and PT/OT.       Neuro:  #Anxiety  #Depression   - Not on any medications at home   - Referred to outpatient psychology at last OP appt with Dr Rivera   - Serial neuro and pain assessments   - PO Tylenol PRN for pain  - PT/OT Consult, OOB to chair  - Sleep/wake cycle normalization  - Palliative and psychiatry consulted      #Substance abuse  - Alcohol abuse/Alcohol dependence: denies  -  Tobacco use/Nicotine Dependence: Marijuana and daily cigarette use   - Drug screen 7/8 positive for cannabis and fentanyl      Cardiovascular:  #Acute on chronic decompensated HFrEF 15-20%   #Cardiogenic shock  #Milrinone dependant   Patient w/ worsening lactate, uptrending HST, cool extremities suspect worsening cardiogenic shock. We have increased the milrinone and will place a SGC to further evaluate hemodynamics.   - TTE 1/24: severely decreased LV systolic function, EF 15-20% with severely dilated LV cavity size LVIDd 6.3.  No LV thrombus mild - moderate LA dilation.  Mildly reduced RV systolic function   - TTE 7/8 w/ EF 15-20%, LV/LA dilation, reduced RV systolic function, mid-mod RVSP   - admit weight 58.1 kg  - admit BNP 2095  - Increase milrinone infusion 0.375 mcg/kg/min (home dose 0.125)   - s/p on 7/8 Bumex 3 mg x1 for diuresis, holding off on further diuresis at this time   - C/w dapa 10, restart home angeli 12.5 mg daily given improved K   - Unable to tolerate additional GDMT   - Daily standing weights, 2gm sodium diet, 2L fluid restriction, strict I&Os     #CAD   #NSTEMI s/p RIRI to Lcx (Jan 2016)  #Hx of Mitral Valve repair (June 2019) w/ Dr Montana  #ICD (5/2020)  #Demand ischemia/ hypertroponemia  - 29mm Epic Bioprosthetc valve   - C/w ASA 81mg daily   - Not on statin d/t allergy   - Last device check 5/28- episode of VT/VF with proper ATP on 5/27   - Device check 7/8 - episode of VT w/ proper ATP, no shocks   - Uptrending HST, will cont. To trend until downtrending. If any CP or worsening symptoms please update interventional fellow. Holding off on [facility] at this time. Rec. Viability study when able. Patient will require two downtrending HST prior to test.   - d/sandra amio gtt on 7/9, patient w/ Sinus tachycardia      #Electrolyte Disturbances  #Hyperkalemia  - Maintain K>4, Mg >2   - Holding spironolactone d/t elevated K+     Pulmonary:   #Acute hypoxic respiratory failure requiring intubation  #COPD  exacerbation vs ADHF  - Intubated in the ED 7/8 due to increased WOB. Extubated on 7/8.   - C/w albuterol PRN  - C/w scheduled Duo-nebs and Pulmicort   - Aggressive pulmonary hygiene   - Rcx normal oral alethea   - Monitor and maintain SpO2 > 92%     GI:  #Nausea and vomiting   - Bowel regimen: luz-colace BID and miralax PRN   - PPI daily   - Consider Tigan IM q6 hrs PRN (was on this previously for N/V)     :  #CKD IIIB  - Baseline BUN/Cr: 29-41/ 1.3-1.8   - Admit BUN/Cr: 26/1.7   - I/Os  - Avoid hypotension and nephrotoxic agents     Heme:  #Iron deficiency anemia   - Labs: iron 58, ferritin 74, TIBC 319, % Tsat 18      Endo:  #DM  - Euglycemic  - hgbA1c 5.2%     #Thyroid  - TSH 2.87      ID:  #?infection  - Afebrile, nontoxic   - Trend temps q4h  - BC x2 7/8 NGTD  - UA neg for infection   - Resp culture and RVP 7/8     PHYSICAL AND OCCUPATIONAL THERAPY: ordered and following     LINES:  PIVs   R subclavian lee 2/2024  RIJ SGC - plan to remove on 7/10 s/p closing numbers      DVT: heparin subq  CENTRAL LINE BUNDLE: ordered  ULCER PPX: PPI  GLYCEMIC CONTROL: NA  BOWEL CARE: luz-colace/miralax PRN  NUTRITION: cardiac diet    Pertinent Physical Exam At Time of Discharge  Constitutional:       Appearance: She is ill-appearing.   HENT:      Head: Atraumatic.   Eyes:      Extraocular Movements: Extraocular movements intact.   Cardiovascular:      Rate and Rhythm: Regular rhythm. Tachycardia present.   Pulmonary:      Effort: Pulmonary effort is normal.   Abdominal:      No abd tenderness    Musculoskeletal:      Right lower leg: No edema.      Left lower leg: No edema.   Skin:     General: Skin is warm.   Neurological:      General: No focal deficit present.      Mental Status: She is alert.   Home Medications     Medication List      START taking these medications     milrinone in 5 % dextrose 20 mg/100 mL (200 mcg/mL) infusion; Commonly   known as: Primacor; Infuse 23.8125 mcg/min at 7.14 mL/hr into a venous    catheter continuously.; Replaces: milrinone in 5 % dextrose 40 mg/200 mL   (200 mcg/mL) infusion     CONTINUE taking these medications     acetaminophen 500 mg tablet; Commonly known as: Tylenol   albuterol 90 mcg/actuation inhaler; Inhale 2 puffs every 4 hours if   needed for wheezing or shortness of breath.   aspirin 81 mg EC tablet; Take 1 tablet (81 mg) by mouth once daily.   bumetanide 0.5 mg tablet; Commonly known as: Bumex; Take 1 tablet (0.5   mg) by mouth 2 times a week. On Tuesdays and Saturdays   Calcium Antacid 200 mg calcium (500 mg) chewable tablet; Generic drug:   calcium carbonate   famotidine 40 mg tablet; Commonly known as: Pepcid; Take 1 tablet (40   mg) by mouth once daily. Do not start before February 3, 2024.   Farxiga 10 mg; Generic drug: dapagliflozin propanediol; Take 1 tablet   (10 mg) by mouth once daily.   heparin flush(porcine)-0.9NaCl 100 unit/mL kit   ipratropium-albuteroL 0.5-2.5 mg/3 mL nebulizer solution; Commonly known   as: Duo-Neb   OneTouch Verio Flex meter misc; Generic drug: blood-glucose meter   sodium chloride 0.65 % nasal spray; Commonly known as: Ocean   sodium chloride 0.9% flush   spironolactone 25 mg tablet; Commonly known as: Aldactone; Take 0.5   tablets (12.5 mg) by mouth once daily.   Symbicort 80-4.5 mcg/actuation inhaler; Generic drug:   budesonide-formoteroL   Vitamin D3 50 MCG (2000 UT) tablet; Generic drug: cholecalciferol     STOP taking these medications     milrinone in 5 % dextrose 40 mg/200 mL (200 mcg/mL) infusion; Commonly   known as: Primacor; Replaced by: milrinone in 5 % dextrose 20 mg/100 mL   (200 mcg/mL) infusion     Outpatient Follow-Up  Future Appointments   Date Time Provider Department Center   7/13/2024 To Be Determined Gabriela Reddy RN [facility]   8/23/2024 10:00 AM Kamari Collier MD PhD [id number] [facility]   10/14/2024 10:00 AM Kathy Rivera MD PhD [email or web address] [facility]       LAWSON Malin-CNP

## 2024-07-11 NOTE — PROGRESS NOTES
Social Work Discharge Planning Note.  Patient is scheduled to be discharged today. Per RN, [facility] Nurse should be performing hospital hook up of milrinone between 12 pm and 2 pm today. After that patient can be discharged. DALE called patient's daughter Sarah and offered to set a transportation for the patient to come home. Sarah said that she preferred to  patient from the hospital herself. DALE checked with team SANTY Minor who said that it would be OK for patient's daughter to transport the patient home. Sarah had further questions regarding dressing change, etc. DALE notified SANTY Minor. Sarah will be picking up patient from the hospital around 5:30 pm.   PARESH VANESSA

## 2024-07-11 NOTE — ED PROCEDURE NOTE
Procedure  Intubation    Performed by: Jhonatan Bacon MD  Authorized by: Bijan Ovalles MD    Consent:     Consent obtained:  Verbal    Consent given by:  Patient    Risks, benefits, and alternatives were discussed: yes      Risks discussed:  Bleeding, hypoxia and pneumothorax    Alternatives discussed:  No treatment  Universal protocol:     Procedure explained and questions answered to patient or proxy's satisfaction: yes      Relevant documents present and verified: yes      Immediately prior to procedure, a time out was called: yes      Patient identity confirmed:  Verbally with patient  Pre-procedure details:     Indications: respiratory distress and respiratory failure      Patient status:  Awake    Look externally: no concerns      Mouth opening - incisor distance:  2 finger widths    Hyoid-mental distance: 2 finger widths      Hyoid-thyroid distance: 2 or more finger widths      Mallampati score:  II    Obstruction: none      Neck mobility: normal      Pharmacologic strategy: RSI      Induction agents:  Ketamine    Paralytics:  Rocuronium  Procedure details:     Preoxygenation:  Bag valve mask    CPR in progress: no      Number of attempts:  2  Successful intubation attempt details:     Intubation method:  Oral    Intubation technique: video assisted      Laryngoscope blade:  Hypercurved    Bougie used: no      Grade view: II      Tube size (mm):  7.5    Tube type:  Cuffed    Tube visualized through cords: yes    First unsuccessful intubation attempt details:     Intubation method:  Oral    Intubation technique:  Video assisted    Laryngoscope blade:  Hypercurved    Bougie used: no      Grade view: II      Tube size (mm):  7.5    Tube type:  Cuffed    Ventilation between 1st and 2nd attempt: no      Tube visualized through cords: yes    Placement assessment:     Tube secured with:  ETT main    Placement verification: chest rise, CXR verification and numeric ETCO2      CXR findings:  Appropriate  position  Post-procedure details:     Procedure completion:  Tolerated               Jhonatan Bacon MD  Resident  07/10/24 1177

## 2024-07-11 NOTE — PROGRESS NOTES
An outpatient palliative care referral placed by SW - navigator program through [facility].   BUTCH Anderson

## 2024-07-11 NOTE — PROGRESS NOTES
[facility]  HFICU PROGRESS NOTE    Melissa Marinelli/[id number]    Admit Date: 7/8/2024  Hospital Length of Stay: 3   ICU Length of Stay: 2d 21h   Primary Service: HF ICU  Primary HF Cardiologist: Dr. Rivera   Referring: Dr. Rivera     INTERVAL EVENTS / PERTINENT ROS:     -SGC placed yesterday, w/ RA 2, RV 32/2 (13), PA 34/22 (29), PAWP 17 , Chirag CO 4.55/ CI 2.82, mixed venous 73   -Yesterday given lower CVP patient given 1.2 L IVF  -HST stable at 2723/2820/2782  -Consulted psychiatry and palliative      Plan:  -cont. W/ milrinone 0.375, patient warm this AM w/ improved lactate yesterday at 1.4   -Rcx w/ normal oral alethea, Bcx NGTD   -GDMT: c/w dapa 10, restart home angeli 12.5 mg daily   -Psychiatry saw patient: rec  and SW consult to assist   -Palliative following   - Will obtain closing SGC numbers and discontinue today   - PT/OT consulted   - Will contact SW to help with discharge planning given increased dose of milrinone        MEDICATIONS  Infusions:  lactated Ringer's, Last Rate: 10 mL/hr (07/11/24 0600)  milrinone, Last Rate: 0.375 mcg/kg/min (07/11/24 0600)      Scheduled:  aspirin, 81 mg, Daily  budesonide, 0.5 mg, BID  cholecalciferol, 2,000 Units, Daily  dapagliflozin propanediol, 10 mg, Daily  heparin (porcine), 5,000 Units, q8h  ipratropium-albuteroL, 3 mL, q6h  pantoprazole, 40 mg, Daily  sennosides, 2 tablet, BID  spironolactone, 12.5 mg, Daily      PRN:  acetaminophen, 650 mg, q6h PRN   Or  acetaminophen, 650 mg, q6h PRN   Or  acetaminophen, 650 mg, q6h PRN  albuterol, 2 puff, q4h PRN  calcium carbonate, 500 mg, q12h PRN  HYDROmorphone, 0.2 mg, q2h PRN  metoclopramide, 5 mg, q6h PRN   Or  metoclopramide, 5 mg, q6h PRN  ondansetron, 4 mg, q4h PRN  oxyCODONE, 5 mg, q6h PRN  oxygen, , Continuous PRN - O2/gases  polyethylene glycol, 17 g, Daily PRN  sodium chloride, 1 spray, PRN      Invasive Hemodynamics:    Most Recent Range Past 24hrs   BP (Art)   No data recorded  "  MAP(Art)   No data recorded   RA/CVP   No data recorded   PA 38/29 PAP  Min: 29/19  Max: 47/33   PA(mean) 33 mmHg PAP (Mean)  Min: 23 mmHg  Max: 39 mmHg   PCWP   No data recorded   CO 5 L/min CO (L/min)  Min: 5 L/min  Max: 5 L/min   CI 3.1 L/min/m2 CI (L/min/m2)  Min: 3.1 L/min/m2  Max: 3.1 L/min/m2   Mixed Venous   No data recorded   SVR  1209 (dyne*sec)/cm5 SVR (dyne*sec)/cm5  Min: 1209 (dyne*sec)/cm5  Max: 1209 (dyne*sec)/cm5   PVR   No data recorded     MCS:   Heart Mate III:     Most Recent Range Past 24hrs   Flow   No data recorded   Speed   No data recorded   Power   *** HELP TEXT ***     Flowsheet data - This SmartLink gives one or more specific piece(s) of flowsheet data.       Usage - FLOW[FlowsheetRecordIDs:Time:HideLabel:MyData:Disciplines:FiledOnly       This SmartLink has the following user-entered parameters:     1. FlowsheetRecordIDs - (Required) A comma- list of the flowsheet row/group IDs you want to see data for.     2. Time - Determines which time column to find data from. You may enter \"LAST\" for the last filed data, \"FIRST\" to see the first filed data, or a specific time such as \"0700\" entered in 24-hour format. Entering a specific time will show both filed and pended values regardless of what the FiledOnly parameter is set to. The default value is \"LAST\".     3. HideLabel - Determines whether or not row labels are displayed. Leave this parameter blank or set it to \"0\" to display the row labels. Set this parameter to anything other than \"0\" to hide the labels. This parameter is ignored if a flowsheet group ID or more than one flowsheet row ID is listed in the first user-entered parameter.     4. MyData - Determines whose data is pulled in. Set this parameter to \"1\" to pull in data from providers that have the same discipline as the current user. Enter \"2\" to pull in only the current user's documentation. Parameter 4 and 5 are cumulative, so data appears if it meets a condition in " "either parameter. However, if parameter 4 is set to \"0\" or left blank and the current user's discipline is not one specified in parameter 5, the current user's data is excluded.     5. Disciplines - A comma- list of provider discipline record IDs. Only pulls in data documented by the providers with disciplines listed in this parameter.     6. FiledOnly - Determines whether or not pended data is displayed. Set this parameter to \"1\" if you wish to hide pended data. If parameter 2 is set to a fixed time, pended data is included regardless of this setting.       Examples:     .FLOW[1 outputs the entry from flowsheet group 1.     .FLOW[5:LAST outputs the latest entry for flowsheet row 5.     .FLOW[5:FIRST outputs the earliest entry for flowsheet row 5.     .FLOW[5,6:0800 outputs the flowsheet entries for rows 5 and 6 at 8 AM.     .FLOW[5::1 outputs the flowsheet entry for row 5 without a label and linebreak.     .FLOW[5:LAST::1 outputs the latest entry for row 5 from a provider with the same discipline as you.     .FLOW[5:FIRST::2 outputs the earliest entry for row 5 from the current user.     .FLOW[5:LAST:::1 outputs the latest entry for row 5 from a provider with a discipline of 1.     .FLOW[5:FIRST::1:1 outputs the earliest flowsheet entry for row 5 from any provider with the discipline of the current user or any provider with a discipline of 1.     .FLOW[1:::::1 outputs the last filed entry from flowsheet group 1.          PI   No data recorded     ECMO:     Most Recent Range Past 24hrs   Flow   No data recorded   Speed   No data recorded   Sweep   No data recorded     Impella:      Most Recent Range Past 24hrs   Performance Level   No data recorded   Flow (L/min)   No data recorded   Motor Current   No data recorded   Placement Signal    Placement OK could not be evaluated. This SmartLink does not work with rows of the type: Custom List   Purge (mmHg)   No data recorded   Purge rate (mL/hr)   No data " "recorded     VENT:    Most Recent Range Past 24hrs   Mode Pressure support    FiO2 28 % No data recorded   Rate 18 No data recorded   Vt 450 mL  No data recorded   PEEP 5 cm H20 No data recorded     PHYSICAL EXAM:   Visit Vitals  BP (!) 103/49 (BP Location: Left arm, Patient Position: Lying)   Pulse 88   Temp 37.6 °C (99.7 °F) (Temporal)   Resp 18   Ht 1.626 m (5' 4\")   Wt 57.6 kg (126 lb 15.8 oz)   SpO2 97%   BMI 21.80 kg/m²   Smoking Status Every Day   BSA 1.61 m²       Wt Readings from Last 5 Encounters:   07/09/24 57.6 kg (126 lb 15.8 oz)   07/01/24 57.6 kg (127 lb)   06/27/24 57.6 kg (127 lb)   06/24/24 57.6 kg (127 lb)   06/20/24 57.6 kg (127 lb)       INTAKE/OUTPUT:  I/O last 3 completed shifts:  In: 1297.9 (22.5 mL/kg) [P.O.:240; I.V.:807.9 (14 mL/kg); IV Piggyback:250]  Out: 550 (9.5 mL/kg) [Urine:550 (0.3 mL/kg/hr)]  Weight: 57.6 kg      Physical Exam    DATA:  CMP:  Results from last 7 days   Lab Units 07/11/24  0420 07/10/24  0547 07/09/24  0527 07/08/24  1137 07/08/24  0643   SODIUM mmol/L 138 137 141 142 140   POTASSIUM mmol/L 4.0 4.4 5.4* 5.4* 6.2*   CHLORIDE mmol/L 104 102 102 107 107   CO2 mmol/L 26 26 22 23 21   ANION GAP mmol/L 12 13 22* 17 18   BUN mg/dL 33* 42* 39* 26* 24*   CREATININE mg/dL 1.59* 2.11* 2.57* 1.70* 1.52*   EGFR mL/min/1.73m*2 35* 25* 20* 32* 37*   MAGNESIUM mg/dL 2.23 2.12 2.41* 2.67*  --    ALBUMIN g/dL 3.1* 2.9* 3.6 4.1 4.0   ALT U/L  --   --   --  13 11   AST U/L  --   --   --  23 29   BILIRUBIN TOTAL mg/dL  --   --   --  0.4 0.3     CBC:  Results from last 7 days   Lab Units 07/11/24  0420 07/10/24  0858 07/10/24  0547 07/09/24  0527 07/08/24  1137 07/08/24  0643   WBC AUTO x10*3/uL 5.5 6.3 6.9 13.6* 12.4* 11.4*   HEMOGLOBIN g/dL 8.5* 9.1* 9.9* 12.5 13.2 13.5   HEMATOCRIT % 25.3* 27.1* 30.5* 37.6 42.1 40.8   PLATELETS AUTO x10*3/uL 103* 106* 114* 202 207 185   MCV fL 86 89 91 87 92 90     COAG:   Results from last 7 days   Lab Units 07/08/24  1137 07/08/24  0643   INR  1.0 " 0.9     ABO:   ABO TYPE   Date Value Ref Range Status   07/08/2024 O  Final     HEME/ENDO:  Results from last 7 days   Lab Units 07/08/24  1137   FERRITIN ng/mL 74   IRON SATURATION % 18*   TSH mIU/L 2.87   HEMOGLOBIN A1C % 5.2      CARDIAC:   Results from last 7 days   Lab Units 07/10/24  0547 07/09/24  1723 07/09/24  0927 07/08/24 2007 07/08/24  1137 07/08/24  0758 07/08/24  0643   TROPHSC ng/L 2,723* 2,820* 2,782* 1,340* 628* 283* 238*   BNP pg/mL  --   --   --   --  2,095*  --  509*       ASSESSMENT AND PLAN:     68 year old woman who presents for advanced heart failure care. She has a past medical history significant for HrEF (EF 15-20%, multiple RWMA on TTE 5/2020) CAD s/p NSTEMI s/p RIRI to LCX (Jan 2016), MVR s/p mitral valve repair in (June 2019; 29 mm Epic bioprosthetic valve with Dr. Montana), COPD, HTN, HL, GERD, hx of CVA, DM. On multiple occasions in the past durable LVAD therapy has been discussed and has been declined on multiple occasions.  Ultimately, Ms. Marinelli was initiated on milrinone at a rate of 0.125 mcg/kg/min in 2/2024 and this therapy has been well-tolerated. She presented to the emergency room on 7/8 with complaints of shortness of breath, nausea and vomiting. She was intubated in the ED and transferred to HFICU for further assessment and evaluation.      Neuro:  #Anxiety  #Depression   - Not on any medications at home   - Referred to outpatient psychology at last OP appt with Dr Rivera   - Serial neuro and pain assessments   - PO Tylenol PRN for pain  - PT/OT Consult, OOB to chair  - CAM ICU score every shift  - Sleep/wake cycle normalization     #Substance abuse  - Alcohol abuse/Alcohol dependence: denies  - Tobacco use/Nicotine Dependence: Marijuana and daily cigarette use   - Drug screen ordered 7/8      #Mini-Cog Assessment:  - Unable to complete on admission patient is intubated and sedated      Cardiovascular:  #Acute on chronic decompensated HFrEF 15-20%   #Cardiogenic  shock  #Milrinone dependant   - TTE 1/24: severely decreased LV systolic function, EF 15-20% with severely dilated LV cavity size LVIDd 6.3.  No LV thrombus mild - moderate LA dilation.  Mildly reduced RV systolic function   - Complete TTE ordered 7/8  - admit weight 58.1 kg  - admit BNP 2095  - C/w milrinone infusion 0.25 mcg/kg/min (home dose 0.125)   - Bumex 3 mg x1 for diuresis, will redose as needed   - C/w dapa 10, holding home angeli 25 mg d/t elevated K+   - Unable to tolerate additional GDMT   - Daily standing weights, 2gm sodium diet, 2L fluid restriction, strict I&Os     #CAD   #NSTEMI s/p RIRI to Lcx (Jan 2016)  #Hx of Mitral Valve repair (June 2019) w/ Dr Montana  #ICD (5/2020)  #Demand ischemia/ hypertroponemia  - 29mm Epic Bioprosthetc valve   - C/w ASA 81mg daily   - Not on statin d/t allergy   - Last device check 5/28- episode of VT/VF with proper ATP on 5/27   - Device check ordered 7/8     #Electrolyte Disturbances  #Hyperkalemia  - Maintain K>4, Mg >2   - Holding spironolactone d/t elevated K+     Pulmonary:   #Acute hypoxic respiratory failure requiring intubation  #COPD exacerbation vs ADHF  - Intubated in the ED 7/8 due to increased WOB   - Currently weaning to extubate   - C/w albuterol PRN  - C/w scheduled Duo-nebs and Pulmicort   - Aggressive pulmonary hygiene   - Send respiratory culture and RVP 7/8  - Consider pulmonary consult   - Monitor and maintain SpO2 > 92%     GI:  #Nausea and vomiting   - Bowel regimen: luz-colace BID and miralax PRN   - PPI daily   - Consider Tigan IM q6 hrs PRN (was on this previously for N/V)     :  #CKD IIIB  - Baseline BUN/Cr: 29-41/ 1.3-1.8   - Admit BUN/Cr: 26/1.7   - I/Os  - Avoid hypotension and nephrotoxic agents     Heme:  #Iron deficiency anemia   - Labs: CBC, TIBC, ferritin, serum Fe, folate, B12  pending    - If %sat low, order venofer     Endo:  #DM  - Euglycemic  - hgbA1c 5.2%     #Thyroid  - TSH 2.87      ID:  #?infection  - Afebrile, nontoxic    - Trend temps q4h  - BC x2 7/8  - Urinalysis + reflex microscopic 7/8  - Resp culture and RVP 7/8     PHYSICAL AND OCCUPATIONAL THERAPY: ordered and following     LINES:  PIVs   R subclavian lee 2/2024     DVT: heparin subq  VAP BUNDLE: working to extubation  CENTRAL LINE BUNDLE: ordered  ULCER PPX: PPI  GLYCEMIC CONTROL: NA  BOWEL CARE: luz-colace/miralax PRN  INDWELLING CATHETER: 7/8 - remove when able   NUTRITION: NPO Diet; Effective now        EMERGENCY CONTACT: Extended Emergency Contact Information  Primary Emergency Contact: Sarah Caruso  Home Phone: [phone]  Relation: Child  FAMILY UPDATE: given to daughter at bedside   CODE STATUS: Full Code  DISPO: admit to HFICU      Patient seen and assessed with Dr. Rivera     I personally spent 60 minutes of critical care time directly and personally managing the patient exclusive of separately billable procedures   _________________________________________________  LAWSON Malin-CNP

## 2024-07-11 NOTE — PROGRESS NOTES
HFICU Attending Note    Principal Problem:    Acute on chronic combined systolic (congestive) and diastolic (congestive) heart failure (Multi)  Active Problems:    Anxiety    Cardiomyopathy (Multi)    Chronic systolic heart failure (Multi)    COPD (chronic obstructive pulmonary disease) (Multi)    HFrEF (heart failure with reduced ejection fraction) (Multi)    Shortness of breath    Melissa Marinelli is a 68-year-old woman with inotrope dependent HFrEF.  She was admitted with decompensation that has responded to increase dose of milrinone.  Fortunately she has been tolerating milrinone 0.375 well, no sustained arrhythmias.  She also had elevated troponins on the time of admission, and will arrange for outpatient nuclear pharmacological stress test .    She is clinically ready for discharge home today, arranging discharge logistics.    Kathy Rivera MD PhD    This critically ill patient continues to be at-risk for clinically significant deterioration / failure due to the above mentioned dysfunctional, unstable organ systems.  I have personally identified and managed all complex critical care issues to prevent aforementioned clinical deterioration.  Critical care time is spent at bedside and/or the immediate area and has included, but is not limited to, the review of diagnostic tests, labs, radiographs, serial assessments of hemodynamics, respiratory status, ventilatory management, and family updates.  Time spent in procedures and teaching are reported separately.    Critical care time: 35 minutes

## 2024-07-11 NOTE — PROGRESS NOTES
"Palliative Medicine following for:  Complex medical decision making, symptom management, patient/family support    History obtained from chart review including ED note, H&P, patient's daily progress notes, review of lab/test results, and discussion with primary team and bedside RN.    Subjective    History of Present Illness  Melissa Marinelli is a 68y woman with a pmh HFrEF (EF 15-20%), CAD s/p NSTEMI, MVR s/p repair, COPD, HTN, HL, GERD, CVA, and T2DM who presented for ADHF. She comes from home where she has been on milrinone. Pyshciatry consulted for depression. She is well-known to palliative team from previous admissions, has denied LVAD placement in the past and not candidate for OHT.     Symptoms  Pain: denies  Dyspnea: denies  Fatigue: denies  Insomnia: denies  Drowsiness: denies  Depression: reports feeling very depressed regarding her family dynamics    Objective    Last Recorded Vitals  /59   Pulse 102   Temp 36.9 °C (98.4 °F) (Temporal)   Resp (!) 28   Ht 1.626 m (5' 4\")   Wt 57.6 kg (126 lb 15.8 oz)   SpO2 100%   BMI 21.80 kg/m²      Physical Exam  HENT:      Head: Normocephalic.      Mouth/Throat:      Mouth: Mucous membranes are moist.   Cardiovascular:      Rate and Rhythm: Normal rate.   Pulmonary:      Effort: Pulmonary effort is normal.   Abdominal:      Palpations: Abdomen is soft.   Skin:     General: Skin is warm and dry.   Neurological:      Mental Status: She is alert and oriented to person, place, and time.   Psychiatric:         Mood and Affect: Mood normal.      Comments: depressed          Relevant Results   Results for orders placed or performed during the hospital encounter of 07/08/24 (from the past 24 hour(s))   Magnesium   Result Value Ref Range    Magnesium 2.23 1.60 - 2.40 mg/dL   Renal function panel   Result Value Ref Range    Glucose 100 (H) 74 - 99 mg/dL    Sodium 138 136 - 145 mmol/L    Potassium 4.0 3.5 - 5.3 mmol/L    Chloride 104 98 - 107 mmol/L    Bicarbonate 26 " 21 - 32 mmol/L    Anion Gap 12 10 - 20 mmol/L    Urea Nitrogen 33 (H) 6 - 23 mg/dL    Creatinine 1.59 (H) 0.50 - 1.05 mg/dL    eGFR 35 (L) >60 mL/min/1.73m*2    Calcium 8.0 (L) 8.6 - 10.6 mg/dL    Phosphorus 2.8 2.5 - 4.9 mg/dL    Albumin 3.1 (L) 3.4 - 5.0 g/dL   CBC   Result Value Ref Range    WBC 5.5 4.4 - 11.3 x10*3/uL    nRBC 0.0 0.0 - 0.0 /100 WBCs    RBC 2.93 (L) 4.00 - 5.20 x10*6/uL    Hemoglobin 8.5 (L) 12.0 - 16.0 g/dL    Hematocrit 25.3 (L) 36.0 - 46.0 %    MCV 86 80 - 100 fL    MCH 29.0 26.0 - 34.0 pg    MCHC 33.6 32.0 - 36.0 g/dL    RDW 15.4 (H) 11.5 - 14.5 %    Platelets 103 (L) 150 - 450 x10*3/uL   Lactate   Result Value Ref Range    Lactate 2.7 (H) 0.4 - 2.0 mmol/L        Allergies  Metoprolol, Ticagrelor, Gadolinium-containing contrast media, Iodinated contrast media, Statins-hmg-coa reductase inhibitors, Prednisone, Ace inhibitors, Fentanyl, Hydralazine, Nicorette [nicotine (polacrilex)], Nicotine, and Penicillins  Medications  Scheduled medications  aspirin, 81 mg, oral, Daily  budesonide, 0.5 mg, nebulization, BID  cholecalciferol, 2,000 Units, oral, Daily  dapagliflozin propanediol, 10 mg, oral, Daily  heparin (porcine), 5,000 Units, subcutaneous, q8h  ipratropium-albuteroL, 3 mL, nebulization, q6h  pantoprazole, 40 mg, intravenous, Daily  sennosides, 2 tablet, oral, BID  spironolactone, 12.5 mg, oral, Daily      Continuous medications  lactated Ringer's, 10 mL/hr, Last Rate: 10 mL/hr (07/11/24 0900)  milrinone, 0.375 mcg/kg/min, Last Rate: 0.375 mcg/kg/min (07/11/24 1200)      PRN medications  PRN medications: acetaminophen **OR** acetaminophen **OR** acetaminophen, albuterol, calcium carbonate, HYDROmorphone, metoclopramide **OR** metoclopramide, ondansetron, oxyCODONE, oxygen, polyethylene glycol, sodium chloride     Assessment/Plan    Melissa Marinelli is a 68y woman with a pmh HFrEF (EF 15-20%), CAD s/p NSTEMI, MVR s/p repair, COPD, HTN, HL, GERD, CVA, and T2DM who presented for ADHF. She comes  from home where she has been on milrinone. Pyshciatry consulted for depression. She is well-known to palliative team from previous admissions, has denied LVAD placement in the past and not candidate for OHT.     7/11- Patient very tearful during our visit today, expressing her distress regarding family dynamics. Reflective listening and emotional support provided. Code status conversation deferred at this time due to pt's distress    #Complex Medical Decision Making   #Goals of Care  #Advance Care Planning   - Code status: Full code   - Surrogate decision maker: daughter Sarah Caruso [phone]  - Goals are mix of survival and time and improved quality of life  - outpatient palliative care referral placed by  - navigator program through R. To be discharged on IV milrinone     #depression   -psychiatry following, per their notes patient is not interested in pharmacologic therapy  -patient states she does feel somewhat depressed, also notable for some objective signs of depression   -palliative team to continue providing conservative management and support     #Psychosocial Support  - pt declined music therapy, states she listens to music on her cell phone   - Spiritual Care Support, Rev. Carrie Dunham is following      Plan of Care discussed with: Updated MD and bedside RN on goals of care decision, medication adjustments, and code status      Medical Decision Making was high level due to high complexity of problems, extensive data review, and high risk of management/treatment.     - ADHF posing threat to life and function  - Drug therapy requiring intensive monitoring for toxicity: IV milrinone     Thank you for allowing us to participate in the care of this patient. Palliative will continue to follow as needed. Palliative medicine is available Monday-Friday, 8a-6p. Please contact team with any questions or concerns.  Team pager [phone] (weekdays)

## 2024-07-12 ENCOUNTER — PATIENT OUTREACH (OUTPATIENT)
Dept: CARE COORDINATION | Facility: CLINIC | Age: 69
End: 2024-07-12
Payer: COMMERCIAL

## 2024-07-12 LAB
BACTERIA BLD CULT: NORMAL
BACTERIA BLD CULT: NORMAL

## 2024-07-12 NOTE — PROGRESS NOTES
Discharge facility: [facility]  Discharge diagnosis: Acute on chronic HF, acute pulmonary edema due to HF   Admission date: 7/8/24  Discharge date: 7/11/24  PCP Appointment Date: 8/12/24  Specialist Appointment Date: ENT 7/29/24, Hem Onc 8/23/24, Cardio 10/14/24    Hospital Encounter and Summary: Linked     Care Gaps:  MCR Annual Visit   Bone Density Scan  Colorectal Cancer Screening  DM Screenings- eye/foot exam  Mammogram  Lipid Panel   Vaccines   HgA1C was 5.2 on 7/8/24     Outreach call to patient to support a smooth transition of care from recent admission.  Unable to reach patient at listed contact number due to no answer. Enrolled patient in [employer] for additional support and patient education through transition period.  Will continue to monitor through transition period.     Vandana Kaplan RN, [facility] [place]  Accountable Care Organization Operations Office  [facility] [place] [address]  Phone [phone]

## 2024-07-12 NOTE — PROGRESS NOTES
Spiritual Care Visit     Visited patient with Palliative CNP to assess condition and comfort. Patient being readied for discharge. Provided non-anxious, supportive presence, reflective listening, affirmation and normalization of experience, facilitation of grief for loss of independence, suggestions for self-soothing exercises.

## 2024-07-13 ENCOUNTER — HOME CARE VISIT (OUTPATIENT)
Dept: HOME HEALTH SERVICES | Facility: HOME HEALTH | Age: 69
End: 2024-07-13
Payer: COMMERCIAL

## 2024-07-13 VITALS
DIASTOLIC BLOOD PRESSURE: 78 MMHG | SYSTOLIC BLOOD PRESSURE: 100 MMHG | BODY MASS INDEX: 21.8 KG/M2 | WEIGHT: 127 LBS | HEART RATE: 86 BPM | OXYGEN SATURATION: 98 % | RESPIRATION RATE: 16 BRPM | TEMPERATURE: 98.6 F

## 2024-07-13 PROCEDURE — G0162 HHC RN E&M PLAN SVS, 15 MIN: HCPCS | Mod: HHH

## 2024-07-13 RX ADMIN — Medication 10 ML: at 10:45

## 2024-07-13 ASSESSMENT — ENCOUNTER SYMPTOMS
OCCASIONAL FEELINGS OF UNSTEADINESS: 0
LOWEST PAIN SEVERITY IN PAST 24 HOURS: 2/10
LAST BOWEL MOVEMENT: 67033
LOSS OF SENSATION IN FEET: 0
CHANGE IN APPETITE: UNCHANGED
PAIN: 1
HIGHEST PAIN SEVERITY IN PAST 24 HOURS: 4/10
DEPRESSION: 0
SUBJECTIVE PAIN PROGRESSION: UNCHANGED
APPETITE LEVEL: FAIR
PAIN SEVERITY GOAL: 0/10

## 2024-07-13 ASSESSMENT — PAIN SCALES - PAIN ASSESSMENT IN ADVANCED DEMENTIA (PAINAD)
CONSOLABILITY: 0
BODYLANGUAGE: 0
FACIALEXPRESSION: 0
FACIALEXPRESSION: 0 - SMILING OR INEXPRESSIVE.
BREATHING: 0
BODYLANGUAGE: 0 - RELAXED.
CONSOLABILITY: 0 - NO NEED TO CONSOLE.
NEGVOCALIZATION: 0 - NONE.
TOTALSCORE: 0
NEGVOCALIZATION: 0

## 2024-07-13 ASSESSMENT — ACTIVITIES OF DAILY LIVING (ADL)
ENTERING_EXITING_HOME: NEEDS ASSISTANCE
OASIS_M1830: 05

## 2024-07-16 ENCOUNTER — DOCUMENTATION (OUTPATIENT)
Dept: PHARMACY | Facility: CLINIC | Age: 69
End: 2024-07-16

## 2024-07-16 ENCOUNTER — HOME INFUSION (OUTPATIENT)
Dept: INFUSION THERAPY | Age: 69
End: 2024-07-16
Payer: COMMERCIAL

## 2024-07-16 NOTE — PROGRESS NOTES
Review of chart. Patient with order for continuous milrinone for advance heart failure. Dr Rivera follows outpatient. Patient was admitted to hospital last week and discharged home with order for increased rate of 0.375mcg/kg/min based on weight of 63.5kg.    Tel call with patient. She states infusions are going well. Her weight today is 129lbs. She confirmed bag change yesterday but wasn't sure if she has a bag for change tomorrow. We reviewed days that she changed bags this past week and it seems that patient should have the last bag in the home. She state she had diarrhea when she first came home that has improved. She also states that she has had pain on her left side between her heart and lungs that she feels when she moves. She states that the pain has improved but is not completely gone. Has been taking tylenol to help with the pain. Instructed her to let MD or RN or Rph know if pain does not continue to improve or get worse. Patient verbalized understanding.    Patient is agreeable to delivery of further doses today.  to call on the way. Patient will have the 2 old pumps ready to be picked up with this delivery. She is requesting to sign a ticket so there is record that they were picked up. Regarding supplies patient wants the usual supplies which include the new Tegaderm that we have been sending.     Processed fill for 3 x 48hr milrinone bags for mix and straight delivery 7/16 to cover bag changes 7/19 7/21 and 7/23/24.    Follow up 7/22/24 with next fill straight. Check progress.

## 2024-07-19 ENCOUNTER — HOME CARE VISIT (OUTPATIENT)
Dept: HOME HEALTH SERVICES | Facility: HOME HEALTH | Age: 69
End: 2024-07-19
Payer: COMMERCIAL

## 2024-07-19 PROCEDURE — G0299 HHS/HOSPICE OF RN EA 15 MIN: HCPCS | Mod: HHH

## 2024-07-20 VITALS
WEIGHT: 129 LBS | HEART RATE: 100 BPM | TEMPERATURE: 97.6 F | DIASTOLIC BLOOD PRESSURE: 68 MMHG | BODY MASS INDEX: 22.14 KG/M2 | RESPIRATION RATE: 16 BRPM | SYSTOLIC BLOOD PRESSURE: 100 MMHG | OXYGEN SATURATION: 98 %

## 2024-07-20 ASSESSMENT — ENCOUNTER SYMPTOMS
DEPRESSION: 0
FORGETFULNESS: 1
DENIES PAIN: 1
CHANGE IN APPETITE: UNCHANGED
APPETITE LEVEL: FAIR
DESCRIPTION OF MEMORY LOSS: SHORT TERM
LAST BOWEL MOVEMENT: 67040
LOSS OF SENSATION IN FEET: 0
OCCASIONAL FEELINGS OF UNSTEADINESS: 0

## 2024-07-20 ASSESSMENT — PAIN SCALES - PAIN ASSESSMENT IN ADVANCED DEMENTIA (PAINAD)
FACIALEXPRESSION: 0 - SMILING OR INEXPRESSIVE.
CONSOLABILITY: 0
BREATHING: 0
BODYLANGUAGE: 0
NEGVOCALIZATION: 0
NEGVOCALIZATION: 0 - NONE.
BODYLANGUAGE: 0 - RELAXED.
FACIALEXPRESSION: 0
TOTALSCORE: 0
CONSOLABILITY: 0 - NO NEED TO CONSOLE.

## 2024-07-22 ENCOUNTER — DOCUMENTATION (OUTPATIENT)
Dept: PHARMACY | Facility: CLINIC | Age: 69
End: 2024-07-22

## 2024-07-22 ENCOUNTER — HOME INFUSION (OUTPATIENT)
Dept: INFUSION THERAPY | Age: 69
End: 2024-07-22
Payer: COMMERCIAL

## 2024-07-22 NOTE — PROGRESS NOTES
Review of chart. Patient with order for continuous milrinone for advance heart failure. Dr Rivera follows outpatient. Current dosing of 0.375mcg/kg/min based on weight of 63.5kg.     Tel call with patient. She states infusions are going well. Her weight today is 129lbs (confirmed by RN). She confirmed bag change of 07/23 bag already last night with no additional bags on hand. She state she still has some diarrhea.   Patient is agreeable to delivery of further doses today before 5pm.  to call on the way. Patient still will have the 2 old pumps ready to be picked up with this delivery. She is requesting to sign a ticket so there is record that they were picked up.  did not call last week and doses sat outside until 10pm. Patient states she prefers Jarrell  as he would not do this. Regarding supplies patient wants the usual supplies which include the new Tegaderm that we have been sending.      Processed fill for 4 x 48hr milrinone bags for mix and straight delivery 7/22 before 5pm to cover 07/25 through 07/31/24   (Actual bag changes 07/24 07/26 07/28 and 07/30)    Follow up 7/29/24 with next fill straight. Check progress and inventory. Confirm pumps picked up.

## 2024-07-22 NOTE — PROGRESS NOTES
SPOKE W/ PT - DELIVERY IS SCHEDULED FOR TODAY BY 5 PM/pu.  ASKED PT IF THERE ARE ANY QUESTIONS TO A PHARMACIST. PT SAID: NO QUESTIONS.

## 2024-07-23 ENCOUNTER — PATIENT OUTREACH (OUTPATIENT)
Dept: CARE COORDINATION | Facility: CLINIC | Age: 69
End: 2024-07-23
Payer: COMMERCIAL

## 2024-07-23 ENCOUNTER — HOME CARE VISIT (OUTPATIENT)
Dept: HOME HEALTH SERVICES | Facility: HOME HEALTH | Age: 69
End: 2024-07-23
Payer: COMMERCIAL

## 2024-07-23 PROCEDURE — G0299 HHS/HOSPICE OF RN EA 15 MIN: HCPCS | Mod: HHH

## 2024-07-23 NOTE — PROGRESS NOTES
Attempt made to reach patient for ELIZABETH after provider follow up appointment outreach. Unable to reach patient at listed contact number due to no answer. Will continue to monitor through transition period.    Vandana Kalpan RN, [facility]  Accountable Care Organization Operations Office  [facility] [place] [id number]  Phone [phone]

## 2024-07-24 VITALS
SYSTOLIC BLOOD PRESSURE: 100 MMHG | DIASTOLIC BLOOD PRESSURE: 68 MMHG | OXYGEN SATURATION: 96 % | RESPIRATION RATE: 16 BRPM | HEART RATE: 96 BPM | TEMPERATURE: 98.6 F

## 2024-07-24 ASSESSMENT — PAIN SCALES - PAIN ASSESSMENT IN ADVANCED DEMENTIA (PAINAD)
CONSOLABILITY: 0
BODYLANGUAGE: 0
FACIALEXPRESSION: 0
FACIALEXPRESSION: 0 - SMILING OR INEXPRESSIVE.
BREATHING: 0
CONSOLABILITY: 0 - NO NEED TO CONSOLE.
BODYLANGUAGE: 0 - RELAXED.

## 2024-07-24 ASSESSMENT — ENCOUNTER SYMPTOMS
DENIES PAIN: 1
OCCASIONAL FEELINGS OF UNSTEADINESS: 0
FATIGUES EASILY: 1
CHANGE IN APPETITE: UNCHANGED
LAST BOWEL MOVEMENT: 67043
DESCRIPTION OF MEMORY LOSS: SHORT TERM
APPETITE LEVEL: FAIR

## 2024-07-26 ENCOUNTER — HOME CARE VISIT (OUTPATIENT)
Dept: HOME HEALTH SERVICES | Facility: HOME HEALTH | Age: 69
End: 2024-07-26
Payer: COMMERCIAL

## 2024-07-28 ENCOUNTER — HOME CARE VISIT (OUTPATIENT)
Dept: HOME HEALTH SERVICES | Facility: HOME HEALTH | Age: 69
End: 2024-07-28
Payer: COMMERCIAL

## 2024-07-29 ENCOUNTER — HOME CARE VISIT (OUTPATIENT)
Dept: HOME HEALTH SERVICES | Facility: HOME HEALTH | Age: 69
End: 2024-07-29
Payer: COMMERCIAL

## 2024-07-29 ENCOUNTER — APPOINTMENT (OUTPATIENT)
Dept: OTOLARYNGOLOGY | Facility: CLINIC | Age: 69
End: 2024-07-29
Payer: COMMERCIAL

## 2024-07-29 ENCOUNTER — HOME INFUSION (OUTPATIENT)
Dept: INFUSION THERAPY | Age: 69
End: 2024-07-29

## 2024-07-29 ENCOUNTER — DOCUMENTATION (OUTPATIENT)
Dept: PHARMACY | Facility: CLINIC | Age: 69
End: 2024-07-29

## 2024-07-29 NOTE — PROGRESS NOTES
Melissa Marinelli is a 68 y.o. patient receiving milrinone continuously.   Dr. Rivera following    [facility] call to patient's daughter, patient tolerating infusions well. Bag change done 7/28 and one bag remaining for 7/30.  picked up old pumps last delivery. Weight has been stable.    Patient does not need any flushes with this delivery.  to call on the way.    Pharmacy to mix 7/29 and deliver straight with supplies to match:  4x milrinone 48 hr CADD bags  [facility] - 8/1, 8/3, 8/5, 8/7    Next fill - 8/6 check weight, deliver straight.

## 2024-08-01 ENCOUNTER — HOME CARE VISIT (OUTPATIENT)
Dept: HOME HEALTH SERVICES | Facility: HOME HEALTH | Age: 69
End: 2024-08-01
Payer: COMMERCIAL

## 2024-08-01 DIAGNOSIS — E85.9 AMYLOIDOSIS, UNSPECIFIED TYPE (MULTI): ICD-10-CM

## 2024-08-01 PROCEDURE — G0162 HHC RN E&M PLAN SVS, 15 MIN: HCPCS | Mod: HHH

## 2024-08-02 VITALS
RESPIRATION RATE: 16 BRPM | BODY MASS INDEX: 20.94 KG/M2 | TEMPERATURE: 98 F | DIASTOLIC BLOOD PRESSURE: 60 MMHG | SYSTOLIC BLOOD PRESSURE: 110 MMHG | HEART RATE: 60 BPM | WEIGHT: 122 LBS | OXYGEN SATURATION: 96 %

## 2024-08-02 ASSESSMENT — ENCOUNTER SYMPTOMS
OCCASIONAL FEELINGS OF UNSTEADINESS: 0
LAST BOWEL MOVEMENT: 67053
DENIES PAIN: 1
LOSS OF SENSATION IN FEET: 0
APPETITE LEVEL: FAIR
FATIGUES EASILY: 1
DEPRESSION: 0
CHANGE IN APPETITE: UNCHANGED

## 2024-08-02 ASSESSMENT — PAIN SCALES - PAIN ASSESSMENT IN ADVANCED DEMENTIA (PAINAD)
TOTALSCORE: 0
NEGVOCALIZATION: 0 - NONE.
BODYLANGUAGE: 0
CONSOLABILITY: 0 - NO NEED TO CONSOLE.
FACIALEXPRESSION: 0 - SMILING OR INEXPRESSIVE.
BODYLANGUAGE: 0 - RELAXED.
NEGVOCALIZATION: 0
CONSOLABILITY: 0
BREATHING: 0
FACIALEXPRESSION: 0

## 2024-08-02 ASSESSMENT — ACTIVITIES OF DAILY LIVING (ADL)
ENTERING_EXITING_HOME: NEEDS ASSISTANCE
OASIS_M1830: 05

## 2024-08-05 ENCOUNTER — DOCUMENTATION (OUTPATIENT)
Dept: PHARMACY | Facility: CLINIC | Age: 69
End: 2024-08-05

## 2024-08-05 ENCOUNTER — HOME INFUSION (OUTPATIENT)
Dept: INFUSION THERAPY | Age: 69
End: 2024-08-05
Payer: COMMERCIAL

## 2024-08-05 NOTE — PROGRESS NOTES
Melissa Marinelli is a 68 y.o. patient receiving milrinone continuously.   Dr. Rivera following     [facility] rec'd call from  RN - patient hooking up last bag today, no back up bag in home. Pharmacy will deliver 4 bags today with hook up dates listed on the label to assist patient. Call to patient, agreeable to delivery plan with standard supplies.      to call on the way.     Pharmacy to mix 8/5 and deliver straight with supplies to match:  4x milrinone 48 hr CADD bags  [facility] - 8/7, 8/9, 8/11, 8/13     Next fill - 8/12 check weight, deliver straight.

## 2024-08-08 ENCOUNTER — HOME CARE VISIT (OUTPATIENT)
Dept: HOME HEALTH SERVICES | Facility: HOME HEALTH | Age: 69
End: 2024-08-08
Payer: COMMERCIAL

## 2024-08-08 VITALS
DIASTOLIC BLOOD PRESSURE: 68 MMHG | HEART RATE: 102 BPM | TEMPERATURE: 97.7 F | BODY MASS INDEX: 21.82 KG/M2 | OXYGEN SATURATION: 99 % | SYSTOLIC BLOOD PRESSURE: 110 MMHG | WEIGHT: 127.1 LBS | RESPIRATION RATE: 18 BRPM

## 2024-08-08 PROCEDURE — G0299 HHS/HOSPICE OF RN EA 15 MIN: HCPCS | Mod: HHH

## 2024-08-08 SDOH — ECONOMIC STABILITY: GENERAL

## 2024-08-08 ASSESSMENT — PAIN SCALES - PAIN ASSESSMENT IN ADVANCED DEMENTIA (PAINAD)
NEGVOCALIZATION: 0
NEGVOCALIZATION: 0 - NONE.
FACIALEXPRESSION: 0
BREATHING: 0
CONSOLABILITY: 0
BODYLANGUAGE: 0
FACIALEXPRESSION: 0 - SMILING OR INEXPRESSIVE.
BODYLANGUAGE: 0 - RELAXED.
TOTALSCORE: 0
CONSOLABILITY: 0 - NO NEED TO CONSOLE.

## 2024-08-08 ASSESSMENT — ENCOUNTER SYMPTOMS
CHANGE IN APPETITE: UNCHANGED
FORGETFULNESS: 1
DENIES PAIN: 1
LAST BOWEL MOVEMENT: 67060
OCCASIONAL FEELINGS OF UNSTEADINESS: 0
FATIGUES EASILY: 1
APPETITE LEVEL: FAIR

## 2024-08-08 ASSESSMENT — ACTIVITIES OF DAILY LIVING (ADL): MONEY MANAGEMENT (EXPENSES/BILLS): INDEPENDENT

## 2024-08-12 ENCOUNTER — HOME INFUSION (OUTPATIENT)
Dept: INFUSION THERAPY | Age: 69
End: 2024-08-12
Payer: COMMERCIAL

## 2024-08-12 ENCOUNTER — HOSPITAL ENCOUNTER (OUTPATIENT)
Dept: CARDIOLOGY | Facility: CLINIC | Age: 69
Discharge: HOME | End: 2024-08-12
Payer: COMMERCIAL

## 2024-08-12 DIAGNOSIS — I50.1 LEFT VENTRICULAR FAILURE, UNSPECIFIED (MULTI): ICD-10-CM

## 2024-08-12 DIAGNOSIS — Z95.810 PRESENCE OF AUTOMATIC (IMPLANTABLE) CARDIAC DEFIBRILLATOR: ICD-10-CM

## 2024-08-12 NOTE — PROGRESS NOTES
Review of chart. Patient with order for continuous milrinone for CHF. Dr Rivera follows at home.    Tel call with patient. Her weight remains stable (127# today). Infusions continue to go well. She confirmed that last bag change was yesterday and that she has one bag in the home for hook up tomorrow. Patient is agreeable to delivery later today with supplies to match.  to call on the way.    Processed fill for 4 x 48hr milrinone bags for mix and straight delivery 8/12/24 to cover bag changes 8/15 8/17 8/19 and 8/21/14. (DOS 8/15 thru 8/22/24)    Follow up 8/20/24 with next fill straight. Check weight and inventory .

## 2024-08-13 ENCOUNTER — PATIENT OUTREACH (OUTPATIENT)
Dept: CARE COORDINATION | Facility: CLINIC | Age: 69
End: 2024-08-13
Payer: COMMERCIAL

## 2024-08-13 NOTE — PROGRESS NOTES
Attempts made to reach patient for ELIZABETH outreach and no contact made.  LVM w/ my name and contact information.    Check in 30 days after hospital discharge to support smooth transition of care.  No recent hospital admissions noted upon chart review. Will close this ELIZABETH encounter at this time due to unable to reach patient upon 30 days.    Vandana Kaplan RN, [facility]  Accountable Care Organization Operations Office  [facility] [place] [address]  Phone [phone]

## 2024-08-15 ENCOUNTER — HOME CARE VISIT (OUTPATIENT)
Dept: HOME HEALTH SERVICES | Facility: HOME HEALTH | Age: 69
End: 2024-08-15
Payer: COMMERCIAL

## 2024-08-15 VITALS
TEMPERATURE: 98.3 F | DIASTOLIC BLOOD PRESSURE: 68 MMHG | WEIGHT: 127 LBS | BODY MASS INDEX: 21.8 KG/M2 | SYSTOLIC BLOOD PRESSURE: 118 MMHG | RESPIRATION RATE: 16 BRPM | OXYGEN SATURATION: 99 % | HEART RATE: 68 BPM

## 2024-08-15 PROCEDURE — G0299 HHS/HOSPICE OF RN EA 15 MIN: HCPCS | Mod: HHH

## 2024-08-15 SDOH — ECONOMIC STABILITY: GENERAL

## 2024-08-15 ASSESSMENT — PAIN SCALES - PAIN ASSESSMENT IN ADVANCED DEMENTIA (PAINAD)
FACIALEXPRESSION: 0 - SMILING OR INEXPRESSIVE.
NEGVOCALIZATION: 0 - NONE.
BODYLANGUAGE: 0 - RELAXED.
CONSOLABILITY: 0 - NO NEED TO CONSOLE.
NEGVOCALIZATION: 0
BREATHING: 0
CONSOLABILITY: 0
TOTALSCORE: 0
FACIALEXPRESSION: 0
BODYLANGUAGE: 0

## 2024-08-15 ASSESSMENT — ENCOUNTER SYMPTOMS
HIGHEST PAIN SEVERITY IN PAST 24 HOURS: 5/10
FATIGUES EASILY: 1
APPETITE LEVEL: FAIR
PAIN: 1
CHANGE IN APPETITE: UNCHANGED
ARTHRALGIAS: 1
OCCASIONAL FEELINGS OF UNSTEADINESS: 0
SUBJECTIVE PAIN PROGRESSION: UNCHANGED
FORGETFULNESS: 1
LAST BOWEL MOVEMENT: 67066
LOWEST PAIN SEVERITY IN PAST 24 HOURS: 4/10
PAIN SEVERITY GOAL: 0/10

## 2024-08-15 ASSESSMENT — ACTIVITIES OF DAILY LIVING (ADL): MONEY MANAGEMENT (EXPENSES/BILLS): NEEDS ASSISTANCE

## 2024-08-19 ENCOUNTER — LAB (OUTPATIENT)
Dept: LAB | Facility: LAB | Age: 69
End: 2024-08-19
Payer: COMMERCIAL

## 2024-08-19 ENCOUNTER — DOCUMENTATION (OUTPATIENT)
Dept: PHARMACY | Facility: CLINIC | Age: 69
End: 2024-08-19

## 2024-08-19 ENCOUNTER — HOME INFUSION (OUTPATIENT)
Dept: INFUSION THERAPY | Age: 69
End: 2024-08-19

## 2024-08-19 DIAGNOSIS — I50.20 HFREF (HEART FAILURE WITH REDUCED EJECTION FRACTION) (MULTI): ICD-10-CM

## 2024-08-19 DIAGNOSIS — I50.22 CHRONIC SYSTOLIC HEART FAILURE (MULTI): ICD-10-CM

## 2024-08-19 DIAGNOSIS — E85.9 AMYLOIDOSIS, UNSPECIFIED TYPE (MULTI): ICD-10-CM

## 2024-08-19 DIAGNOSIS — I50.33 ACUTE ON CHRONIC HEART FAILURE WITH NORMAL EJECTION FRACTION (MULTI): ICD-10-CM

## 2024-08-19 LAB
ALBUMIN SERPL BCP-MCNC: 4.4 G/DL (ref 3.4–5)
ALP SERPL-CCNC: 84 U/L (ref 33–136)
ALT SERPL W P-5'-P-CCNC: 10 U/L (ref 7–45)
ANION GAP SERPL CALC-SCNC: 16 MMOL/L (ref 10–20)
AST SERPL W P-5'-P-CCNC: 19 U/L (ref 9–39)
BASOPHILS # BLD AUTO: 0.04 X10*3/UL (ref 0–0.1)
BASOPHILS NFR BLD AUTO: 0.7 %
BILIRUB SERPL-MCNC: 0.3 MG/DL (ref 0–1.2)
BNP SERPL-MCNC: 297 PG/ML (ref 0–99)
BUN SERPL-MCNC: 25 MG/DL (ref 6–23)
CALCIUM SERPL-MCNC: 9.6 MG/DL (ref 8.6–10.6)
CHLORIDE SERPL-SCNC: 103 MMOL/L (ref 98–107)
CO2 SERPL-SCNC: 25 MMOL/L (ref 21–32)
CREAT SERPL-MCNC: 1.74 MG/DL (ref 0.5–1.05)
EGFRCR SERPLBLD CKD-EPI 2021: 31 ML/MIN/1.73M*2
EOSINOPHIL # BLD AUTO: 1.17 X10*3/UL (ref 0–0.7)
EOSINOPHIL NFR BLD AUTO: 20 %
ERYTHROCYTE [DISTWIDTH] IN BLOOD BY AUTOMATED COUNT: 15.5 % (ref 11.5–14.5)
FERRITIN SERPL-MCNC: 63 NG/ML (ref 8–150)
GLUCOSE SERPL-MCNC: 97 MG/DL (ref 74–99)
HCT VFR BLD AUTO: 43.4 % (ref 36–46)
HGB BLD-MCNC: 13.3 G/DL (ref 12–16)
IGA SERPL-MCNC: 725 MG/DL (ref 70–400)
IGG SERPL-MCNC: 950 MG/DL (ref 700–1600)
IGM SERPL-MCNC: 48 MG/DL (ref 40–230)
IMM GRANULOCYTES # BLD AUTO: 0.02 X10*3/UL (ref 0–0.7)
IMM GRANULOCYTES NFR BLD AUTO: 0.3 % (ref 0–0.9)
IRON SATN MFR SERPL: 28 % (ref 25–45)
IRON SERPL-MCNC: 104 UG/DL (ref 35–150)
LYMPHOCYTES # BLD AUTO: 1.49 X10*3/UL (ref 1.2–4.8)
LYMPHOCYTES NFR BLD AUTO: 25.4 %
MCH RBC QN AUTO: 29.5 PG (ref 26–34)
MCHC RBC AUTO-ENTMCNC: 30.6 G/DL (ref 32–36)
MCV RBC AUTO: 96 FL (ref 80–100)
MONOCYTES # BLD AUTO: 0.41 X10*3/UL (ref 0.1–1)
MONOCYTES NFR BLD AUTO: 7 %
NEUTROPHILS # BLD AUTO: 2.73 X10*3/UL (ref 1.2–7.7)
NEUTROPHILS NFR BLD AUTO: 46.6 %
NRBC BLD-RTO: 0 /100 WBCS (ref 0–0)
PHOSPHATE SERPL-MCNC: 5.1 MG/DL (ref 2.5–4.9)
PLATELET # BLD AUTO: 262 X10*3/UL (ref 150–450)
POTASSIUM SERPL-SCNC: 5 MMOL/L (ref 3.5–5.3)
PROT SERPL-MCNC: 7.9 G/DL (ref 6.4–8.2)
PROT SERPL-MCNC: 7.9 G/DL (ref 6.4–8.2)
RBC # BLD AUTO: 4.51 X10*6/UL (ref 4–5.2)
SODIUM SERPL-SCNC: 139 MMOL/L (ref 136–145)
TIBC SERPL-MCNC: 367 UG/DL (ref 240–445)
UIBC SERPL-MCNC: 263 UG/DL (ref 110–370)
WBC # BLD AUTO: 5.9 X10*3/UL (ref 4.4–11.3)

## 2024-08-19 PROCEDURE — 83540 ASSAY OF IRON: CPT

## 2024-08-19 PROCEDURE — 83550 IRON BINDING TEST: CPT

## 2024-08-19 PROCEDURE — 80053 COMPREHEN METABOLIC PANEL: CPT

## 2024-08-19 PROCEDURE — 86334 IMMUNOFIX E-PHORESIS SERUM: CPT

## 2024-08-19 PROCEDURE — 82728 ASSAY OF FERRITIN: CPT

## 2024-08-19 PROCEDURE — 82784 ASSAY IGA/IGD/IGG/IGM EACH: CPT

## 2024-08-19 PROCEDURE — 83880 ASSAY OF NATRIURETIC PEPTIDE: CPT

## 2024-08-19 PROCEDURE — 84155 ASSAY OF PROTEIN SERUM: CPT

## 2024-08-19 PROCEDURE — 84100 ASSAY OF PHOSPHORUS: CPT

## 2024-08-19 PROCEDURE — 84165 PROTEIN E-PHORESIS SERUM: CPT

## 2024-08-19 PROCEDURE — 83521 IG LIGHT CHAINS FREE EACH: CPT

## 2024-08-19 PROCEDURE — 85025 COMPLETE CBC W/AUTO DIFF WBC: CPT

## 2024-08-19 NOTE — PROGRESS NOTES
Review of chart. Patient with order for continuous milrinone for CHF. Dr Rivera follows at home.     Tel call with patient. Her weight remains stable (127.1# today). Infusions continue to go well. She has about 3 hours left on her current bag and should have one more bag remaining for hookup Wednesday. Agreeable to delivery Tuesday 8/20 with supplies to match.  to call on the way and knock/ring doorbell when dropping off package.     Processed fill for 4 x 48hr milrinone bags for mix and straight delivery 8/20/24 to cover bag changes 8/23 8/25 8/27 and 8/29/14. (DOS 8/23 thru 8/30/24)     Follow up 8/28/24 with next fill straight. Check weight and inventory .

## 2024-08-20 LAB
KAPPA LC SERPL-MCNC: 6.72 MG/DL (ref 0.33–1.94)
KAPPA LC/LAMBDA SER: 2.07 {RATIO} (ref 0.26–1.65)
LAMBDA LC SERPL-MCNC: 3.24 MG/DL (ref 0.57–2.63)

## 2024-08-21 ENCOUNTER — TELEPHONE (OUTPATIENT)
Dept: ADMISSION | Facility: HOSPITAL | Age: 69
End: 2024-08-21
Payer: COMMERCIAL

## 2024-08-21 ENCOUNTER — DOCUMENTATION (OUTPATIENT)
Dept: HEMATOLOGY/ONCOLOGY | Facility: HOSPITAL | Age: 69
End: 2024-08-21
Payer: COMMERCIAL

## 2024-08-21 ENCOUNTER — HOSPITAL ENCOUNTER (EMERGENCY)
Facility: HOSPITAL | Age: 69
Discharge: HOME | End: 2024-08-22
Payer: COMMERCIAL

## 2024-08-21 ENCOUNTER — CLINICAL SUPPORT (OUTPATIENT)
Dept: EMERGENCY MEDICINE | Facility: HOSPITAL | Age: 69
End: 2024-08-21
Payer: COMMERCIAL

## 2024-08-21 VITALS
RESPIRATION RATE: 18 BRPM | BODY MASS INDEX: 21.7 KG/M2 | HEART RATE: 106 BPM | HEIGHT: 64 IN | OXYGEN SATURATION: 100 % | DIASTOLIC BLOOD PRESSURE: 72 MMHG | WEIGHT: 127.1 LBS | SYSTOLIC BLOOD PRESSURE: 127 MMHG | TEMPERATURE: 97.7 F

## 2024-08-21 PROCEDURE — 93005 ELECTROCARDIOGRAM TRACING: CPT

## 2024-08-21 PROCEDURE — 4500999001 HC ED NO CHARGE

## 2024-08-21 PROCEDURE — 99281 EMR DPT VST MAYX REQ PHY/QHP: CPT

## 2024-08-21 ASSESSMENT — COLUMBIA-SUICIDE SEVERITY RATING SCALE - C-SSRS
1. IN THE PAST MONTH, HAVE YOU WISHED YOU WERE DEAD OR WISHED YOU COULD GO TO SLEEP AND NOT WAKE UP?: NO
6. HAVE YOU EVER DONE ANYTHING, STARTED TO DO ANYTHING, OR PREPARED TO DO ANYTHING TO END YOUR LIFE?: NO
2. HAVE YOU ACTUALLY HAD ANY THOUGHTS OF KILLING YOURSELF?: NO

## 2024-08-21 ASSESSMENT — PAIN SCALES - GENERAL: PAINLEVEL_OUTOF10: 8

## 2024-08-21 ASSESSMENT — LIFESTYLE VARIABLES
HAVE PEOPLE ANNOYED YOU BY CRITICIZING YOUR DRINKING: NO
TOTAL SCORE: 0
HAVE YOU EVER FELT YOU SHOULD CUT DOWN ON YOUR DRINKING: NO
EVER HAD A DRINK FIRST THING IN THE MORNING TO STEADY YOUR NERVES TO GET RID OF A HANGOVER: NO
EVER FELT BAD OR GUILTY ABOUT YOUR DRINKING: NO

## 2024-08-21 ASSESSMENT — PAIN - FUNCTIONAL ASSESSMENT: PAIN_FUNCTIONAL_ASSESSMENT: 0-10

## 2024-08-21 NOTE — PROGRESS NOTES
8/21/24 1230  Received referral from Dr. Kathy Rivera for this patient with CHF. (See my note from 6/20/24) .Patient was referred for concern for myeloma. I had put in for patient to have labs done for myeloma. In the meantime, patient was scheduled with Dr. Alanna Pan and he reviewed her info and determined she does not need to be seen by him as there is no indication of cardiac amyloidosis. Daughter though had already taken mom to get labs done. We are still awaiting the SPEP results. If this is WNL, patient will not need to be seen by hematology as Dr. Rivera confirmed her referral was an error. However, if SPEP is abnormal, we will keep her appointment in place for Friday. I have spoken to patient's daughter Mary and she is aware of this. GWEN Abraham

## 2024-08-21 NOTE — TELEPHONE ENCOUNTER
Patient daughter is wondering if her mom still needs to keep her appt on Friday. She said they were waiting on lab work to determine if it was necessary or not.

## 2024-08-22 LAB
ATRIAL RATE: 101 BPM
P AXIS: 71 DEGREES
P OFFSET: 189 MS
P ONSET: 152 MS
PR INTERVAL: 132 MS
Q ONSET: 218 MS
QRS COUNT: 16 BEATS
QRS DURATION: 118 MS
QT INTERVAL: 370 MS
QTC CALCULATION(BAZETT): 479 MS
QTC FREDERICIA: 440 MS
R AXIS: 55 DEGREES
T AXIS: 201 DEGREES
T OFFSET: 403 MS
VENTRICULAR RATE: 101 BPM

## 2024-08-22 NOTE — ED TRIAGE NOTES
Pt to the ED after changing her milrinone infusion, incorrectly unhooked the pump causing her to bleed. Pt now endorsing headache with shortness of breath. Additional complaint of a bump on the back of her ear, noticed after the pump issue. No dizziness.

## 2024-08-23 ENCOUNTER — HOME CARE VISIT (OUTPATIENT)
Dept: HOME HEALTH SERVICES | Facility: HOME HEALTH | Age: 69
End: 2024-08-23
Payer: COMMERCIAL

## 2024-08-23 ENCOUNTER — APPOINTMENT (OUTPATIENT)
Dept: HEMATOLOGY/ONCOLOGY | Facility: HOSPITAL | Age: 69
End: 2024-08-23
Payer: COMMERCIAL

## 2024-08-23 VITALS
TEMPERATURE: 98.6 F | HEART RATE: 118 BPM | RESPIRATION RATE: 14 BRPM | DIASTOLIC BLOOD PRESSURE: 68 MMHG | SYSTOLIC BLOOD PRESSURE: 118 MMHG | OXYGEN SATURATION: 97 %

## 2024-08-23 LAB
ALBUMIN: 4.2 G/DL (ref 3.4–5)
ALPHA 1 GLOBULIN: 0.4 G/DL (ref 0.2–0.6)
ALPHA 2 GLOBULIN: 1.1 G/DL (ref 0.4–1.1)
BETA GLOBULIN: 1.3 G/DL (ref 0.5–1.2)
GAMMA GLOBULIN: 0.9 G/DL (ref 0.5–1.4)
IMMUNOFIXATION COMMENT: NORMAL
PATH REVIEW - SERUM IMMUNOFIXATION: NORMAL
PATH REVIEW-SERUM PROTEIN ELECTROPHORESIS: ABNORMAL
PROTEIN ELECTROPHORESIS COMMENT: ABNORMAL

## 2024-08-23 PROCEDURE — G0299 HHS/HOSPICE OF RN EA 15 MIN: HCPCS | Mod: HHH

## 2024-08-23 ASSESSMENT — PAIN SCALES - PAIN ASSESSMENT IN ADVANCED DEMENTIA (PAINAD)
NEGVOCALIZATION: 0
FACIALEXPRESSION: 0 - SMILING OR INEXPRESSIVE.
CONSOLABILITY: 0 - NO NEED TO CONSOLE.
NEGVOCALIZATION: 0 - NONE.
BODYLANGUAGE: 0 - RELAXED.
BODYLANGUAGE: 0
BREATHING: 0
CONSOLABILITY: 0
TOTALSCORE: 0
FACIALEXPRESSION: 0

## 2024-08-23 ASSESSMENT — ENCOUNTER SYMPTOMS
FATIGUE: 1
OCCASIONAL FEELINGS OF UNSTEADINESS: 0
LAST BOWEL MOVEMENT: 67075
DENIES PAIN: 1
APPETITE LEVEL: FAIR
CHANGE IN APPETITE: UNCHANGED

## 2024-08-27 ENCOUNTER — HOME INFUSION (OUTPATIENT)
Dept: INFUSION THERAPY | Age: 69
End: 2024-08-27
Payer: COMMERCIAL

## 2024-08-27 ENCOUNTER — DOCUMENTATION (OUTPATIENT)
Dept: PHARMACY | Facility: CLINIC | Age: 69
End: 2024-08-27

## 2024-08-27 NOTE — PROGRESS NOTES
Review of chart. Patient with order for continuous milrinone for CHF. Dr Rivera follows at home.     Tel call with patient. Her weight remains stable around 127 lbs. She had to make a trip to ED after hooking up milrinone incorrectly and is thus short a bag - hooking up her last one today.  Agreeable to delivery today for 4 bags with supplies to match.  to call on the way and knock/ring doorbell when dropping off package.     Processed fill for 4x 48hr milrinone bags for mix and straight delivery 8/27/24 to cover bag changes 8/29 8/31 9/2 and 9/4.     Follow up 9/3/24 with next fill straight. Check weight and inventory .

## 2024-08-29 ENCOUNTER — HOME CARE VISIT (OUTPATIENT)
Dept: HOME HEALTH SERVICES | Facility: HOME HEALTH | Age: 69
End: 2024-08-29
Payer: COMMERCIAL

## 2024-08-29 VITALS
OXYGEN SATURATION: 99 % | SYSTOLIC BLOOD PRESSURE: 110 MMHG | HEART RATE: 93 BPM | RESPIRATION RATE: 16 BRPM | TEMPERATURE: 97.7 F | DIASTOLIC BLOOD PRESSURE: 70 MMHG

## 2024-08-29 PROCEDURE — G0299 HHS/HOSPICE OF RN EA 15 MIN: HCPCS | Mod: HHH

## 2024-08-29 ASSESSMENT — PAIN SCALES - PAIN ASSESSMENT IN ADVANCED DEMENTIA (PAINAD)
NEGVOCALIZATION: 0
CONSOLABILITY: 0 - NO NEED TO CONSOLE.
TOTALSCORE: 0
FACIALEXPRESSION: 0
BREATHING: 0
FACIALEXPRESSION: 0 - SMILING OR INEXPRESSIVE.
BODYLANGUAGE: 0 - RELAXED.
CONSOLABILITY: 0
NEGVOCALIZATION: 0 - NONE.
BODYLANGUAGE: 0

## 2024-08-29 ASSESSMENT — ENCOUNTER SYMPTOMS
DEPRESSION: 0
DENIES PAIN: 1
HIGHEST PAIN SEVERITY IN PAST 24 HOURS: 5/10
SUBJECTIVE PAIN PROGRESSION: UNCHANGED
APPETITE LEVEL: FAIR
LOSS OF SENSATION IN FEET: 0
OCCASIONAL FEELINGS OF UNSTEADINESS: 1
PERSON REPORTING PAIN: PATIENT
PAIN SEVERITY GOAL: 0/10
LOWEST PAIN SEVERITY IN PAST 24 HOURS: 3/10
LAST BOWEL MOVEMENT: 67081

## 2024-09-03 ENCOUNTER — DOCUMENTATION (OUTPATIENT)
Dept: INFUSION THERAPY | Age: 69
End: 2024-09-03
Payer: COMMERCIAL

## 2024-09-03 ENCOUNTER — HOME INFUSION (OUTPATIENT)
Dept: INFUSION THERAPY | Age: 69
End: 2024-09-03
Payer: COMMERCIAL

## 2024-09-03 NOTE — PROGRESS NOTES
Review of chart. Patient with order for continuous milrinone for CHF. Dr Rivera follows at home.     Tel call with patient. Her weight remains stable around 125 lbs. She confirmed dose in home for 9/4. Agreeable to delivery today for 3 bags with supplies to match.  to call on the way and knock/ring doorbell when dropping off package.     Processed fill for 3x 48hr milrinone bags for mix and straight delivery 9/3/24 to cover bag changes 9/6, 9/8, 9/10.     Follow up 9/9/24 with next fill straight. Check weight and inventory .

## 2024-09-03 NOTE — PROGRESS NOTES
PER [facility] PT AGREED TO DELIVERY TOMORROW... WITH THREE OF 48HRS MILRINONE BAGS SHE ASKED TO SEND ALL USUAL SUPPLIES...  TO CALL ON THE WAY AND KNOCK/RING DOORBELL WHEN DROPPING OFF PACKAGE...

## 2024-09-05 ENCOUNTER — HOME CARE VISIT (OUTPATIENT)
Dept: HOME HEALTH SERVICES | Facility: HOME HEALTH | Age: 69
End: 2024-09-05
Payer: COMMERCIAL

## 2024-09-05 VITALS
WEIGHT: 125 LBS | HEART RATE: 80 BPM | RESPIRATION RATE: 16 BRPM | OXYGEN SATURATION: 99 % | TEMPERATURE: 98.1 F | DIASTOLIC BLOOD PRESSURE: 68 MMHG | BODY MASS INDEX: 21.46 KG/M2 | SYSTOLIC BLOOD PRESSURE: 118 MMHG

## 2024-09-05 PROCEDURE — G0299 HHS/HOSPICE OF RN EA 15 MIN: HCPCS | Mod: HHH

## 2024-09-05 ASSESSMENT — PAIN SCALES - PAIN ASSESSMENT IN ADVANCED DEMENTIA (PAINAD)
BODYLANGUAGE: 0 - RELAXED.
CONSOLABILITY: 0 - NO NEED TO CONSOLE.
NEGVOCALIZATION: 0
BREATHING: 0
FACIALEXPRESSION: 0 - SMILING OR INEXPRESSIVE.
TOTALSCORE: 0
CONSOLABILITY: 0
FACIALEXPRESSION: 0
NEGVOCALIZATION: 0 - NONE.
BODYLANGUAGE: 0

## 2024-09-05 ASSESSMENT — ENCOUNTER SYMPTOMS
FORGETFULNESS: 1
APPETITE LEVEL: FAIR
DENIES PAIN: 1
OCCASIONAL FEELINGS OF UNSTEADINESS: 0
LAST BOWEL MOVEMENT: 67087
CHANGE IN APPETITE: UNCHANGED

## 2024-09-07 ENCOUNTER — HOSPITAL ENCOUNTER (OUTPATIENT)
Facility: HOSPITAL | Age: 69
Setting detail: OBSERVATION
End: 2024-09-07
Attending: STUDENT IN AN ORGANIZED HEALTH CARE EDUCATION/TRAINING PROGRAM | Admitting: INTERNAL MEDICINE
Payer: COMMERCIAL

## 2024-09-07 ENCOUNTER — APPOINTMENT (OUTPATIENT)
Dept: RADIOLOGY | Facility: HOSPITAL | Age: 69
End: 2024-09-07
Payer: COMMERCIAL

## 2024-09-07 DIAGNOSIS — I50.9 CHRONIC HEART FAILURE, UNSPECIFIED HEART FAILURE TYPE (MULTI): ICD-10-CM

## 2024-09-07 DIAGNOSIS — I50.43 ACUTE ON CHRONIC COMBINED SYSTOLIC (CONGESTIVE) AND DIASTOLIC (CONGESTIVE) HEART FAILURE: ICD-10-CM

## 2024-09-07 DIAGNOSIS — Z78.9 PROBLEM WITH VASCULAR ACCESS: Primary | ICD-10-CM

## 2024-09-07 LAB
ALBUMIN SERPL BCP-MCNC: 4.3 G/DL (ref 3.4–5)
ALP SERPL-CCNC: 82 U/L (ref 33–136)
ALT SERPL W P-5'-P-CCNC: 13 U/L (ref 7–45)
ANION GAP BLDV CALCULATED.4IONS-SCNC: 12 MMOL/L (ref 10–25)
ANION GAP SERPL CALC-SCNC: 15 MMOL/L (ref 10–20)
AST SERPL W P-5'-P-CCNC: 21 U/L (ref 9–39)
BASE EXCESS BLDV CALC-SCNC: -3.2 MMOL/L (ref -2–3)
BASOPHILS # BLD AUTO: 0.05 X10*3/UL (ref 0–0.1)
BASOPHILS NFR BLD AUTO: 0.9 %
BILIRUB SERPL-MCNC: 0.4 MG/DL (ref 0–1.2)
BNP SERPL-MCNC: 195 PG/ML (ref 0–99)
BODY TEMPERATURE: 37 DEGREES CELSIUS
BUN SERPL-MCNC: 23 MG/DL (ref 6–23)
CA-I BLDV-SCNC: 1.3 MMOL/L (ref 1.1–1.33)
CALCIUM SERPL-MCNC: 9.6 MG/DL (ref 8.6–10.6)
CARDIAC TROPONIN I PNL SERPL HS: 240 NG/L (ref 0–34)
CHLORIDE BLDV-SCNC: 110 MMOL/L (ref 98–107)
CHLORIDE SERPL-SCNC: 107 MMOL/L (ref 98–107)
CO2 SERPL-SCNC: 23 MMOL/L (ref 21–32)
CREAT SERPL-MCNC: 1.44 MG/DL (ref 0.5–1.05)
EGFRCR SERPLBLD CKD-EPI 2021: 39 ML/MIN/1.73M*2
EOSINOPHIL # BLD AUTO: 0.85 X10*3/UL (ref 0–0.7)
EOSINOPHIL NFR BLD AUTO: 15.4 %
ERYTHROCYTE [DISTWIDTH] IN BLOOD BY AUTOMATED COUNT: 14.8 % (ref 11.5–14.5)
ERYTHROCYTE [DISTWIDTH] IN BLOOD BY AUTOMATED COUNT: 15 % (ref 11.5–14.5)
GLUCOSE BLDV-MCNC: 92 MG/DL (ref 74–99)
GLUCOSE SERPL-MCNC: 86 MG/DL (ref 74–99)
HCO3 BLDV-SCNC: 23.6 MMOL/L (ref 22–26)
HCT VFR BLD AUTO: 36.5 % (ref 36–46)
HCT VFR BLD AUTO: 38 % (ref 36–46)
HCT VFR BLD EST: 41 % (ref 36–46)
HGB BLD-MCNC: 11.7 G/DL (ref 12–16)
HGB BLD-MCNC: 12.6 G/DL (ref 12–16)
HGB BLDV-MCNC: 13.5 G/DL (ref 12–16)
IMM GRANULOCYTES # BLD AUTO: 0.01 X10*3/UL (ref 0–0.7)
IMM GRANULOCYTES NFR BLD AUTO: 0.2 % (ref 0–0.9)
LACTATE BLDV-SCNC: 1.5 MMOL/L (ref 0.4–2)
LYMPHOCYTES # BLD AUTO: 1.92 X10*3/UL (ref 1.2–4.8)
LYMPHOCYTES NFR BLD AUTO: 34.8 %
MCH RBC QN AUTO: 29.6 PG (ref 26–34)
MCH RBC QN AUTO: 29.8 PG (ref 26–34)
MCHC RBC AUTO-ENTMCNC: 32.1 G/DL (ref 32–36)
MCHC RBC AUTO-ENTMCNC: 33.2 G/DL (ref 32–36)
MCV RBC AUTO: 89 FL (ref 80–100)
MCV RBC AUTO: 93 FL (ref 80–100)
MONOCYTES # BLD AUTO: 0.49 X10*3/UL (ref 0.1–1)
MONOCYTES NFR BLD AUTO: 8.9 %
NEUTROPHILS # BLD AUTO: 2.19 X10*3/UL (ref 1.2–7.7)
NEUTROPHILS NFR BLD AUTO: 39.8 %
NRBC BLD-RTO: 0 /100 WBCS (ref 0–0)
NRBC BLD-RTO: 0 /100 WBCS (ref 0–0)
OXYHGB MFR BLDV: 44.4 % (ref 45–75)
PCO2 BLDV: 48 MM HG (ref 41–51)
PH BLDV: 7.3 PH (ref 7.33–7.43)
PLATELET # BLD AUTO: 193 X10*3/UL (ref 150–450)
PLATELET # BLD AUTO: 204 X10*3/UL (ref 150–450)
PO2 BLDV: 31 MM HG (ref 35–45)
POTASSIUM BLDV-SCNC: 5.5 MMOL/L (ref 3.5–5.3)
POTASSIUM SERPL-SCNC: 4.7 MMOL/L (ref 3.5–5.3)
PROT SERPL-MCNC: 8 G/DL (ref 6.4–8.2)
RBC # BLD AUTO: 3.93 X10*6/UL (ref 4–5.2)
RBC # BLD AUTO: 4.25 X10*6/UL (ref 4–5.2)
SAO2 % BLDV: 48 % (ref 45–75)
SODIUM BLDV-SCNC: 140 MMOL/L (ref 136–145)
SODIUM SERPL-SCNC: 140 MMOL/L (ref 136–145)
WBC # BLD AUTO: 5.3 X10*3/UL (ref 4.4–11.3)
WBC # BLD AUTO: 5.5 X10*3/UL (ref 4.4–11.3)

## 2024-09-07 PROCEDURE — 2500000002 HC RX 250 W HCPCS SELF ADMINISTERED DRUGS (ALT 637 FOR MEDICARE OP, ALT 636 FOR OP/ED): Performed by: NURSE PRACTITIONER

## 2024-09-07 PROCEDURE — 96366 THER/PROPH/DIAG IV INF ADDON: CPT

## 2024-09-07 PROCEDURE — 85025 COMPLETE CBC W/AUTO DIFF WBC: CPT | Performed by: STUDENT IN AN ORGANIZED HEALTH CARE EDUCATION/TRAINING PROGRAM

## 2024-09-07 PROCEDURE — 80053 COMPREHEN METABOLIC PANEL: CPT

## 2024-09-07 PROCEDURE — 2500000004 HC RX 250 GENERAL PHARMACY W/ HCPCS (ALT 636 FOR OP/ED)

## 2024-09-07 PROCEDURE — 71046 X-RAY EXAM CHEST 2 VIEWS: CPT

## 2024-09-07 PROCEDURE — 94640 AIRWAY INHALATION TREATMENT: CPT

## 2024-09-07 PROCEDURE — 1200000002 HC GENERAL ROOM WITH TELEMETRY DAILY

## 2024-09-07 PROCEDURE — 84132 ASSAY OF SERUM POTASSIUM: CPT

## 2024-09-07 PROCEDURE — 93010 ELECTROCARDIOGRAM REPORT: CPT | Performed by: STUDENT IN AN ORGANIZED HEALTH CARE EDUCATION/TRAINING PROGRAM

## 2024-09-07 PROCEDURE — 85027 COMPLETE CBC AUTOMATED: CPT | Performed by: NURSE PRACTITIONER

## 2024-09-07 PROCEDURE — 36415 COLL VENOUS BLD VENIPUNCTURE: CPT | Performed by: STUDENT IN AN ORGANIZED HEALTH CARE EDUCATION/TRAINING PROGRAM

## 2024-09-07 PROCEDURE — 2500000001 HC RX 250 WO HCPCS SELF ADMINISTERED DRUGS (ALT 637 FOR MEDICARE OP): Performed by: NURSE PRACTITIONER

## 2024-09-07 PROCEDURE — 96365 THER/PROPH/DIAG IV INF INIT: CPT

## 2024-09-07 PROCEDURE — 99285 EMERGENCY DEPT VISIT HI MDM: CPT | Performed by: STUDENT IN AN ORGANIZED HEALTH CARE EDUCATION/TRAINING PROGRAM

## 2024-09-07 PROCEDURE — 82330 ASSAY OF CALCIUM: CPT

## 2024-09-07 PROCEDURE — 84132 ASSAY OF SERUM POTASSIUM: CPT | Performed by: STUDENT IN AN ORGANIZED HEALTH CARE EDUCATION/TRAINING PROGRAM

## 2024-09-07 PROCEDURE — 2500000004 HC RX 250 GENERAL PHARMACY W/ HCPCS (ALT 636 FOR OP/ED): Performed by: STUDENT IN AN ORGANIZED HEALTH CARE EDUCATION/TRAINING PROGRAM

## 2024-09-07 PROCEDURE — 71046 X-RAY EXAM CHEST 2 VIEWS: CPT | Mod: FOREIGN READ | Performed by: RADIOLOGY

## 2024-09-07 PROCEDURE — 83880 ASSAY OF NATRIURETIC PEPTIDE: CPT | Performed by: STUDENT IN AN ORGANIZED HEALTH CARE EDUCATION/TRAINING PROGRAM

## 2024-09-07 PROCEDURE — 99285 EMERGENCY DEPT VISIT HI MDM: CPT | Mod: 25

## 2024-09-07 PROCEDURE — 82435 ASSAY OF BLOOD CHLORIDE: CPT | Performed by: STUDENT IN AN ORGANIZED HEALTH CARE EDUCATION/TRAINING PROGRAM

## 2024-09-07 PROCEDURE — 84484 ASSAY OF TROPONIN QUANT: CPT | Performed by: STUDENT IN AN ORGANIZED HEALTH CARE EDUCATION/TRAINING PROGRAM

## 2024-09-07 RX ORDER — FLUTICASONE FUROATE AND VILANTEROL 100; 25 UG/1; UG/1
1 POWDER RESPIRATORY (INHALATION)
Status: DISCONTINUED | OUTPATIENT
Start: 2024-09-07 | End: 2024-09-08

## 2024-09-07 RX ORDER — ASPIRIN 81 MG/1
81 TABLET ORAL DAILY
Status: DISPENSED | OUTPATIENT
Start: 2024-09-07

## 2024-09-07 RX ORDER — SPIRONOLACTONE 25 MG/1
12.5 TABLET ORAL DAILY
Status: DISPENSED | OUTPATIENT
Start: 2024-09-07

## 2024-09-07 RX ORDER — BUMETANIDE 1 MG/1
0.5 TABLET ORAL 2 TIMES WEEKLY
Status: ACTIVE | OUTPATIENT
Start: 2024-09-09

## 2024-09-07 RX ORDER — ENOXAPARIN SODIUM 100 MG/ML
40 INJECTION SUBCUTANEOUS EVERY 24 HOURS
Status: DISPENSED | OUTPATIENT
Start: 2024-09-08

## 2024-09-07 RX ORDER — CALCIUM CARBONATE 200(500)MG
500 TABLET,CHEWABLE ORAL EVERY 12 HOURS PRN
Status: ACTIVE | OUTPATIENT
Start: 2024-09-07

## 2024-09-07 RX ORDER — CHOLECALCIFEROL (VITAMIN D3) 25 MCG
2000 TABLET ORAL DAILY
Status: DISPENSED | OUTPATIENT
Start: 2024-09-07

## 2024-09-07 RX ORDER — IPRATROPIUM BROMIDE AND ALBUTEROL SULFATE 2.5; .5 MG/3ML; MG/3ML
3 SOLUTION RESPIRATORY (INHALATION)
Status: DISPENSED | OUTPATIENT
Start: 2024-09-08

## 2024-09-07 RX ORDER — MAG HYDROX/ALUMINUM HYD/SIMETH 200-200-20
SUSPENSION, ORAL (FINAL DOSE FORM) ORAL 2 TIMES DAILY
Status: DISPENSED | OUTPATIENT
Start: 2024-09-07

## 2024-09-07 RX ORDER — DAPAGLIFLOZIN 10 MG/1
10 TABLET, FILM COATED ORAL DAILY
Status: DISPENSED | OUTPATIENT
Start: 2024-09-07

## 2024-09-07 RX ORDER — ALBUTEROL SULFATE 90 UG/1
2 INHALANT RESPIRATORY (INHALATION) EVERY 4 HOURS PRN
Status: DISPENSED | OUTPATIENT
Start: 2024-09-07

## 2024-09-07 RX ORDER — IPRATROPIUM BROMIDE AND ALBUTEROL SULFATE 2.5; .5 MG/3ML; MG/3ML
3 SOLUTION RESPIRATORY (INHALATION)
Status: DISCONTINUED | OUTPATIENT
Start: 2024-09-07 | End: 2024-09-07

## 2024-09-07 RX ORDER — ACETAMINOPHEN 325 MG/1
1000 TABLET ORAL EVERY 8 HOURS PRN
Status: ACTIVE | OUTPATIENT
Start: 2024-09-07

## 2024-09-07 RX ORDER — MILRINONE LACTATE 0.2 MG/ML
0.38 INJECTION, SOLUTION INTRAVENOUS CONTINUOUS
Status: DISPENSED | OUTPATIENT
Start: 2024-09-07

## 2024-09-07 RX ADMIN — SPIRONOLACTONE 12.5 MG: 25 TABLET, FILM COATED ORAL at 19:06

## 2024-09-07 RX ADMIN — DAPAGLIFLOZIN 10 MG: 10 TABLET, FILM COATED ORAL at 19:06

## 2024-09-07 RX ADMIN — MILRINONE LACTATE 0.38 MCG/KG/MIN: 200 INJECTION, SOLUTION INTRAVENOUS at 20:16

## 2024-09-07 RX ADMIN — ASPIRIN 81 MG: 81 TABLET, COATED ORAL at 19:06

## 2024-09-07 RX ADMIN — IPRATROPIUM BROMIDE AND ALBUTEROL SULFATE 3 ML: .5; 3 SOLUTION RESPIRATORY (INHALATION) at 23:19

## 2024-09-07 RX ADMIN — DEXTROSE MONOHYDRATE 0.38 MCG/KG/MIN: 50 INJECTION, SOLUTION INTRAVENOUS at 10:39

## 2024-09-07 SDOH — HEALTH STABILITY: MENTAL HEALTH: HOW OFTEN DO YOU HAVE A DRINK CONTAINING ALCOHOL?: NEVER

## 2024-09-07 SDOH — HEALTH STABILITY: MENTAL HEALTH: HOW MANY STANDARD DRINKS CONTAINING ALCOHOL DO YOU HAVE ON A TYPICAL DAY?: PATIENT DOES NOT DRINK

## 2024-09-07 SDOH — SOCIAL STABILITY: SOCIAL INSECURITY: WERE YOU ABLE TO COMPLETE ALL THE BEHAVIORAL HEALTH SCREENINGS?: YES

## 2024-09-07 SDOH — HEALTH STABILITY: MENTAL HEALTH: HOW OFTEN DO YOU HAVE 6 OR MORE DRINKS ON ONE OCCASION?: NEVER

## 2024-09-07 SDOH — SOCIAL STABILITY: SOCIAL INSECURITY: DO YOU FEEL ANYONE HAS EXPLOITED OR TAKEN ADVANTAGE OF YOU FINANCIALLY OR OF YOUR PERSONAL PROPERTY?: UNABLE TO ASSESS

## 2024-09-07 SDOH — SOCIAL STABILITY: SOCIAL INSECURITY: HAVE YOU HAD THOUGHTS OF HARMING ANYONE ELSE?: NO

## 2024-09-07 ASSESSMENT — COGNITIVE AND FUNCTIONAL STATUS - GENERAL
WALKING IN HOSPITAL ROOM: A LITTLE
PATIENT BASELINE BEDBOUND: NO
CLIMB 3 TO 5 STEPS WITH RAILING: A LITTLE
MOBILITY SCORE: 22
DAILY ACTIVITIY SCORE: 24

## 2024-09-07 ASSESSMENT — LIFESTYLE VARIABLES
AUDIT-C TOTAL SCORE: 0
AUDIT-C TOTAL SCORE: 0
EVER HAD A DRINK FIRST THING IN THE MORNING TO STEADY YOUR NERVES TO GET RID OF A HANGOVER: NO
HOW OFTEN DO YOU HAVE 6 OR MORE DRINKS ON ONE OCCASION: NEVER
EVER FELT BAD OR GUILTY ABOUT YOUR DRINKING: NO
HAVE PEOPLE ANNOYED YOU BY CRITICIZING YOUR DRINKING: NO
HOW MANY STANDARD DRINKS CONTAINING ALCOHOL DO YOU HAVE ON A TYPICAL DAY: PATIENT DOES NOT DRINK
AUDIT-C TOTAL SCORE: 0
SKIP TO QUESTIONS 9-10: 1
HOW OFTEN DO YOU HAVE A DRINK CONTAINING ALCOHOL: NEVER
TOTAL SCORE: 0
SKIP TO QUESTIONS 9-10: 1
HAVE YOU EVER FELT YOU SHOULD CUT DOWN ON YOUR DRINKING: NO

## 2024-09-07 ASSESSMENT — ENCOUNTER SYMPTOMS
NEUROLOGICAL NEGATIVE: 1
ENDOCRINE NEGATIVE: 1
GASTROINTESTINAL NEGATIVE: 1
ALLERGIC/IMMUNOLOGIC NEGATIVE: 1
RESPIRATORY NEGATIVE: 1
MUSCULOSKELETAL NEGATIVE: 1
CONSTITUTIONAL NEGATIVE: 1
PSYCHIATRIC NEGATIVE: 1
WOUND: 1
EYES NEGATIVE: 1
CARDIOVASCULAR NEGATIVE: 1
HEMATOLOGIC/LYMPHATIC NEGATIVE: 1

## 2024-09-07 ASSESSMENT — PAIN - FUNCTIONAL ASSESSMENT
PAIN_FUNCTIONAL_ASSESSMENT: 0-10

## 2024-09-07 ASSESSMENT — PATIENT HEALTH QUESTIONNAIRE - PHQ9
2. FEELING DOWN, DEPRESSED OR HOPELESS: NEARLY EVERY DAY
1. LITTLE INTEREST OR PLEASURE IN DOING THINGS: NEARLY EVERY DAY
SUM OF ALL RESPONSES TO PHQ9 QUESTIONS 1 & 2: 6

## 2024-09-07 ASSESSMENT — ACTIVITIES OF DAILY LIVING (ADL)
DRESSING YOURSELF: INDEPENDENT
FEEDING YOURSELF: INDEPENDENT
GROOMING: INDEPENDENT
TOILETING: INDEPENDENT
WALKS IN HOME: INDEPENDENT
ASSISTIVE_DEVICE: OTHER (COMMENT)
JUDGMENT_ADEQUATE_SAFELY_COMPLETE_DAILY_ACTIVITIES: YES
PATIENT'S MEMORY ADEQUATE TO SAFELY COMPLETE DAILY ACTIVITIES?: YES
LACK_OF_TRANSPORTATION: NO
HEARING - LEFT EAR: FUNCTIONAL
HEARING - RIGHT EAR: FUNCTIONAL
ADEQUATE_TO_COMPLETE_ADL: YES
BATHING: INDEPENDENT

## 2024-09-07 ASSESSMENT — PAIN SCALES - GENERAL
PAINLEVEL_OUTOF10: 0 - NO PAIN
PAINLEVEL_OUTOF10: 7
PAINLEVEL_OUTOF10: 0 - NO PAIN

## 2024-09-07 ASSESSMENT — COLUMBIA-SUICIDE SEVERITY RATING SCALE - C-SSRS
1. IN THE PAST MONTH, HAVE YOU WISHED YOU WERE DEAD OR WISHED YOU COULD GO TO SLEEP AND NOT WAKE UP?: NO
2. HAVE YOU ACTUALLY HAD ANY THOUGHTS OF KILLING YOURSELF?: NO
6. HAVE YOU EVER DONE ANYTHING, STARTED TO DO ANYTHING, OR PREPARED TO DO ANYTHING TO END YOUR LIFE?: NO

## 2024-09-07 ASSESSMENT — PAIN DESCRIPTION - LOCATION: LOCATION: CHEST

## 2024-09-07 ASSESSMENT — PAIN DESCRIPTION - PAIN TYPE: TYPE: ACUTE PAIN

## 2024-09-07 NOTE — PROGRESS NOTES
Pharmacy Medication History Review    Melissa Marinelli is a 69 y.o. female admitted for Problem with vascular access. Pharmacy reviewed the patient's vpiuk-qq-uqcwkzxnd medications for accuracy.    The list below reflects the updated PTA list. Comments regarding how patient may be taking medications differently can be found in the Admit Orders Activity  Prior to Admission Medications   Prescriptions Last Dose Informant   Calcium Antacid 200 mg calcium (500 mg) chewable tablet  Self   Sig: Chew 1 tablet (500 mg) every 12 hours if needed.   Farxiga 10 mg  Self   Sig: Take 1 tablet (10 mg) by mouth once daily.   OneTouch Verio Flex meter misc  Self   Sig: USE AS DIRECTED THREE TIMES DAILY   Symbicort 80-4.5 mcg/actuation inhaler  Self   Sig: Inhale 2 puffs twice a day.   acetaminophen (Tylenol) 500 mg tablet  Self   Sig: Take 2 tablets (1,000 mg) by mouth every 8 hours if needed for mild pain (1 - 3).   albuterol 90 mcg/actuation inhaler  Self   Sig: Inhale 2 puffs every 4 hours if needed for wheezing or shortness of breath.   aspirin 81 mg EC tablet  Self   Sig: Take 1 tablet (81 mg) by mouth once daily.   bumetanide (Bumex) 0.5 mg tablet  Self   Sig: Take 1 tablet (0.5 mg) by mouth 2 times a week. On  and    cholecalciferol (Vitamin D3) 50 MCG (2000 UT) tablet  Self   Sig: Take 1 tablet (2,000 Units) by mouth once daily.   famotidine (Pepcid) 40 mg tablet  Self   Sig: Take 1 tablet (40 mg) by mouth once daily. Do not start before February 3, 2024.   Patient taking differently: Take 1 tablet (40 mg) by mouth once daily as needed for indigestion or heartburn.   heparin flush,porcine,-0.9NaCl 100 unit/mL kit  Self   Si mL by central venous line infusion route 1 (one) time per week. Indications: prevent clot from blocking an intravenous catheter   ipratropium-albuteroL (Duo-Neb) 0.5-2.5 mg/3 mL nebulizer solution  Self   Sig: Take 3 mL by nebulization every 6 hours.   milrinone in 5 % dextrose  (Primacor) 20 mg/100 mL (200 mcg/mL) infusion  Self   Sig: Infuse 23.8125 mcg/min at 7.14 mL/hr into a venous catheter continuously.   sodium chloride (Ocean) 0.65 % nasal spray  Self   Sig: Administer 1 spray into each nostril if needed for congestion.   sodium chloride 0.9% (sodium chloride) flush  Self   Sig: Infuse 10 mL into a venous catheter 1 (one) time per week.   spironolactone (Aldactone) 25 mg tablet  Self   Sig: Take 0.5 tablets (12.5 mg) by mouth once daily.      Facility-Administered Medications: None          Patient declines M2B at discharge. Pharmacy has been updated to [facility] [address], [place], [place].    Sources:    -patient interview (good historian, knew details of home meds well including details such as which 2 days of week she takes bumex and that she only takes 1/2 tablet of spironolactone daily)  -outpatient pharmacy dispense history  -Care Everywhere medication lists  -OARRS  -[facility] discharge summary from 7/11/24    Below are additional concerns with the patient's PTA list:    -ferrous sulfate 325mg daily-  filled at pharmacy on 9/6/24 but patient states she cannot take due to allergy/intolerance to red dye    Alan Gallagher, PharmD  Transitions of Care Pharmacist  [facility]  Please reach out via Secure Chat for questions, or if no response call [employer] or [facility]

## 2024-09-07 NOTE — ED TRIAGE NOTES
Dr Kothari, please see Pt portal message below.  Routing to MD for advisement.   Pt to ed after waking up this morning and realizing her lee catheter fell out. States that she is on continuous milranone for hypertension and heart failure

## 2024-09-07 NOTE — ED PROVIDER NOTES
Emergency Department Provider Note        History of Present Illness     CC: Vascular Access Problem     HPI:   68y woman with a pmh HFrEF (EF 15-20%, on milrinone), CAD s/p NSTEMI, MVR s/p repair, COPD, HTN, HL, GERD, CVA, and T2DM who presented vascular access issue.   Her Wiley catheter fell out this morning.  Otherwise she has no complaints and is in her usual state of health.  No fever, chills,  shortness of breath, abdominal pain, nausea, vomiting, dysuria, hematuria    On review of symptoms does note that she gets occasional chest pain positionally.  Nonexertional, nonpleuritic.  Not associated with shortness of breath, nausea, vomiting, diaphoresis    Limitations to history: None  Independent historian(s): None  Records Reviewed: Recent available ED and inpatient notes reviewed in EMR.    PMHx/PSHx:  Per HPI.   - has a past medical history of Asthma ([facility]), CAD (coronary artery disease), CHF (congestive heart failure) (Multi), CKD (chronic kidney disease), COPD (chronic obstructive pulmonary disease) (Multi), CVA (cerebral vascular accident) (Multi), Diabetes mellitus (Multi), GERD (gastroesophageal reflux disease), Hyperlipidemia, Hypertension, NSTEMI (non-ST elevated myocardial infarction) (Multi), and Unspecified systolic (congestive) heart failure (Multi) (08/11/2022).  - has a past surgical history that includes Other surgical history (08/11/2022); Other surgical history (08/11/2022); Other surgical history (08/11/2022); Mitral valve replacement (06/2019); and Cardiac catheterization (N/A, 1/26/2024).    Medications:  Reviewed in EMR. See EMR for complete list of medications and doses.    Allergies:  Metoprolol, Ticagrelor, Gadolinium-containing contrast media, Iodinated contrast media, Statins-hmg-coa reductase inhibitors, Prednisone, Ace inhibitors, Fentanyl, Hydralazine, Nicorette [nicotine (polacrilex)], Nicotine, and Penicillins    Social History:  - Tobacco:  reports that she has been smoking  cigarettes. She has never used smokeless tobacco.   - Alcohol:  reports no history of alcohol use.   - Illicit Drugs:  reports current drug use. Drug: Marijuana.     ROS:  Per HPI.       Physical Exam     Triage Vitals:  T 37 °C (98.6 °F)  HR 57  /86  RR 20  O2 95 %      General: Patient resting comfortably in bed, no acute distress, breathing easily, well appearing, and appropriately conversational without confusion or gross mental status changes.  CV: Regular rhythm. No murmurs, rubs, gallops appreciated. 2+ radial pulses bilaterally.  Resp: Clear to auscultation bilaterally. No respiratory distress.  Wiley site noted right sided chest without any bleeding.  Wiley is not in place  GI: Soft, non-distended. No tenderness with palpation.  EXT: No peripheral edema, contusions, or wounds.  Skin: Warm and dry, no rashes or lesions.      Medical Decision Making & ED Course     Labs:   Labs Reviewed   CBC WITH AUTO DIFFERENTIAL - Abnormal       Result Value    WBC 5.5      nRBC 0.0      RBC 4.25      Hemoglobin 12.6      Hematocrit 38.0      MCV 89      MCH 29.6      MCHC 33.2      RDW 14.8 (*)     Platelets 193      Neutrophils % 39.8      Immature Granulocytes %, Automated 0.2      Lymphocytes % 34.8      Monocytes % 8.9      Eosinophils % 15.4      Basophils % 0.9      Neutrophils Absolute 2.19      Immature Granulocytes Absolute, Automated 0.01      Lymphocytes Absolute 1.92      Monocytes Absolute 0.49      Eosinophils Absolute 0.85 (*)     Basophils Absolute 0.05     COMPREHENSIVE METABOLIC PANEL - Abnormal    Glucose 86      Sodium 140      Potassium 4.7      Chloride 107      Bicarbonate 23      Anion Gap 15      Urea Nitrogen 23      Creatinine 1.44 (*)     eGFR 39 (*)     Calcium 9.6      Albumin 4.3      Alkaline Phosphatase 82      Total Protein 8.0      AST 21      Bilirubin, Total 0.4      ALT 13     B-TYPE NATRIURETIC PEPTIDE - Abnormal     (*)     Narrative:        <100 pg/mL - Heart  failure unlikely  100-299 pg/mL - Intermediate probability of acute heart                  failure exacerbation. Correlate with clinical                  context and patient history.    >=300 pg/mL - Heart Failure likely. Correlate with clinical                  context and patient history.     Biotin interference may cause falsely decreased results. Patients taking a Biotin dose of up to 5 mg/day should refrain from taking Biotin for 24 hours before sample  collection. Providers may contact their local laboratory for further information.   BLOOD GAS VENOUS FULL PANEL UNSOLICITED - Abnormal    POCT pH, Venous 7.30 (*)     POCT pCO2, Venous 48      POCT pO2, Venous 31 (*)     POCT SO2, Venous 48      POCT Oxy Hemoglobin, Venous 44.4 (*)     POCT Hematocrit Calculated, Venous 41.0      POCT Sodium, Venous 140      POCT Potassium, Venous 5.5 (*)     POCT Chloride, Venous 110 (*)     POCT Ionized Calicum, Venous 1.30      POCT Glucose, Venous 92      POCT Lactate, Venous 1.5      POCT Base Excess, Venous -3.2 (*)     POCT HCO3 Calculated, Venous 23.6      POCT Hemoglobin, Venous 13.5      POCT Anion Gap, Venous 12.0      Patient Temperature 37.0     TROPONIN I, HIGH SENSITIVITY        Imaging:   XR chest 2 views   Final Result   Mild vascular congestion without overt edema.   Signed by Bill Arias MD           EKG:  I independently interpreted the EKG:  Regular rate. Regular rhythm. Normal axis   Normal VT, Qtc intervals   No new ST segment elevations, depressions, or T wave inversions   Similar to prior ECG on 8/21/24  EKG without clear evidence of WPW, Brugada, Wellen's, complete heart block, asystole, V-fib.    MDM:  69-year-old female history of heart failure on milrinone presenting with Wiley catheter falling out.  No trauma or recent manipulation of the Wiley catheter.  Does report occasional positional chest pain that is not exertional or pleuritic.  No associated shortness of breath or other changes in  her usual state of health.  Currently asymptomatic.  Peripheral IV placed to give milrinone.  Patient tolerating well.  No bleeding at the Wiley site.  Will get labs to evaluate for electrolyte metabolic abnormalities.  Low suspicion for ACS however given her heart failure history and complaint of occasional chest pain will get screening ECG and troponin.  Will get screening chest x-ray to evaluate for pulmonary edema or retained catheter   Patient will likely require admission for vascular access      ED Course:  ED Course as of 09/08/24 1017   Sat Sep 07, 2024   1335 Troponin I, High Sensitivity(!!)  Lower than prior  [FN]   1500 Discussed with admitted provider   Will admit to heart failure team for vasc access  Spoke with vascular team - unable to complete procedure this weekend  [FN]      ED Course User Index  [FN] Vishal Harrington MD         Diagnoses as of 09/08/24 1017   Problem with vascular access   Chronic heart failure, unspecified heart failure type (Multi)       Independent Result Review and Interpretation: Relevant laboratory and radiographic results were reviewed and independently interpreted by myself.  As necessary, they are commented on in the ED Course.    Social Determinants Limiting Care:  None identified      Patient seen by and discussed with the attending emergency medicine physician.       Disposition    Admit    Admit to medicine to obtain vascular access.  Discussed with vascular surgery who will be unable to replace the Wiley catheter over the weekend.  Discussed with admitting providers who accepted the patient.    Mo Saxena, DO   Emergency Medicine PGY-3  [facility]      Procedures      Procedures ? SmartLinks last updated 9/7/2024 12:51 PM        Vishal Harrington MD  09/08/24 1020

## 2024-09-07 NOTE — Clinical Note
R subclavian 9.6F Wiley catheter placed, sutured. Port aspirated, flushed, locked, and capped. Dressing CDI. Total 1mg versed given ivp. VSS t/o procedure.

## 2024-09-07 NOTE — CARE PLAN
Problem: Skin  Goal: Promote/optimize nutrition  Outcome: Progressing       Problem: Fall/Injury  Goal: Be free from injury by end of the shift  Outcome: Progressing     Patient admitted to LT5 due to Wiley removal per patient.

## 2024-09-07 NOTE — ASSESSMENT & PLAN NOTE
Inotropic dependence   Peripheral IV   IR consulted for new lee placement    Chronic HFrEF 15 - 20%  Milrinone dependent   ECHO 7/8/2024 EF 15 - 20%, LV/LA dilation, reduced RV systolic function, mid-mod RVSP  Admit BNP  ICD   Home meds bumex, spirolactone and dapa    CAD  S/p RIRI to Lcx Jan 2016  S/p MV repair June 2019 dr. [place]  Asa, not on statin for allergies     COPD  Albuterol and Pulmicort     DVT prophaysis

## 2024-09-07 NOTE — H&P
History Of Present Illness  Melissa Marinelli is a 69 y.o. female presenting with malfunctioning wiley .     ED HPI:  HPI:   68y woman with a pmh HFrEF (EF 15-20%, on milrinone), CAD s/p NSTEMI, MVR s/p repair, COPD, HTN, HL, GERD, CVA, and T2DM who presented vascular access issue.   Her Wiley catheter fell out this morning.  Otherwise she has no complaints and is in her usual state of health.  No fever, chills,  shortness of breath, abdominal pain, nausea, vomiting, dysuria, hematuria     On review of symptoms does note that she gets occasional chest pain positionally.  Nonexertional, nonpleuritic.  Not associated with shortness of breath, nausea, vomiting, diaphoresis    On interview in the ED patient appears euvolemic with no complaints. She is here with her daughter. She has been doing well on the 0.375mcg/kg/min milrinone at home. She woke up and the wiley was on the floor. She does not know how this happened. She has been taking her home medications with no difficulty.  She denies any complaints, fever, chills, shortness of breath, chest pain, palpitations, nausea, vomiting, diarrhea, swelling of the legs.     Past Medical History  Past Medical History:   Diagnosis Date    Asthma ([facility])     CAD (coronary artery disease)     CHF (congestive heart failure) (Multi)     CKD (chronic kidney disease)     COPD (chronic obstructive pulmonary disease) (Multi)     CVA (cerebral vascular accident) (Multi)     Diabetes mellitus (Multi)     GERD (gastroesophageal reflux disease)     Hyperlipidemia     Hypertension     NSTEMI (non-ST elevated myocardial infarction) (Multi)     Unspecified systolic (congestive) heart failure (Multi) 08/11/2022    HFrEF (heart failure with reduced ejection fraction)       Surgical History  Past Surgical History:   Procedure Laterality Date    CARDIAC CATHETERIZATION N/A 1/26/2024    Procedure: Right Heart Cath;  Surgeon: Cuate Dodson MD;  Location: [place] [address] Cardiac Cath Lab;   Service: Cardiovascular;  Laterality: N/A;    MITRAL VALVE REPLACEMENT  06/2019    OTHER SURGICAL HISTORY  08/11/2022    Tonsillectomy    OTHER SURGICAL HISTORY  08/11/2022    Right ventricular assist device placement    OTHER SURGICAL HISTORY  08/11/2022    Mitral valve replacement        Social History  She reports that she has been smoking cigarettes. She has never used smokeless tobacco. She reports current drug use. Drug: Marijuana. She reports that she does not drink alcohol.  Lives with her daughter     Family History  Family History   Problem Relation Name Age of Onset    Other (CVA) Brother          Allergies  Metoprolol, Ticagrelor, Gadolinium-containing contrast media, Iodinated contrast media, Statins-hmg-coa reductase inhibitors, Ace inhibitors, Fentanyl, Hydralazine, Nicorette [nicotine (polacrilex)], Nicotine, Penicillins, and Prednisone    Review of Systems   Constitutional: Negative.    HENT: Negative.     Eyes: Negative.    Respiratory: Negative.     Cardiovascular: Negative.    Gastrointestinal: Negative.    Endocrine: Negative.    Genitourinary: Negative.    Musculoskeletal: Negative.    Skin:  Positive for wound.        Skin irritation at tunnel cath site    Allergic/Immunologic: Negative.    Neurological: Negative.    Hematological: Negative.    Psychiatric/Behavioral: Negative.          Physical Exam  Constitutional:       Appearance: Normal appearance.   HENT:      Mouth/Throat:      Mouth: Mucous membranes are moist.   Cardiovascular:      Rate and Rhythm: Regular rhythm.   Pulmonary:      Effort: Pulmonary effort is normal.      Breath sounds: Normal breath sounds.   Abdominal:      Palpations: Abdomen is soft.   Musculoskeletal:         General: Normal range of motion.   Skin:     General: Skin is warm and dry.      Comments: Rash itch area tunnel catheter site    Neurological:      General: No focal deficit present.      Mental Status: She is alert and oriented to person, place, and time.  "         Last Recorded Vitals  Blood pressure 111/73, pulse 94, temperature 37 °C (98.6 °F), temperature source Oral, resp. rate 14, height 1.626 m (5' 4\"), weight 58.5 kg (129 lb), SpO2 96%.    Relevant Results  Scheduled medications  aspirin, 81 mg, oral, Daily  [START ON 9/9/2024] bumetanide, 0.5 mg, oral, Once per day on Monday Thursday  cholecalciferol, 2,000 Units, oral, Daily  dapagliflozin propanediol, 10 mg, oral, Daily  [START ON 9/8/2024] enoxaparin, 40 mg, subcutaneous, q24h  fluticasone furoate-vilanteroL, 1 puff, inhalation, Daily  hydrocortisone, , Topical, BID  ipratropium-albuteroL, 3 mL, nebulization, q6h  spironolactone, 12.5 mg, oral, Daily      Continuous medications  milrinone, 0.375 mcg/kg/min, Last Rate: Stopped (09/07/24 1242)      PRN medications  PRN medications: acetaminophen, albuterol, calcium carbonate     Results for orders placed or performed during the hospital encounter of 09/07/24 (from the past 24 hour(s))   Blood Gas Venous Full Panel Unsolicited   Result Value Ref Range    POCT pH, Venous 7.30 (L) 7.33 - 7.43 pH    POCT pCO2, Venous 48 41 - 51 mm Hg    POCT pO2, Venous 31 (L) 35 - 45 mm Hg    POCT SO2, Venous 48 45 - 75 %    POCT Oxy Hemoglobin, Venous 44.4 (L) 45.0 - 75.0 %    POCT Hematocrit Calculated, Venous 41.0 36.0 - 46.0 %    POCT Sodium, Venous 140 136 - 145 mmol/L    POCT Potassium, Venous 5.5 (H) 3.5 - 5.3 mmol/L    POCT Chloride, Venous 110 (H) 98 - 107 mmol/L    POCT Ionized Calicum, Venous 1.30 1.10 - 1.33 mmol/L    POCT Glucose, Venous 92 74 - 99 mg/dL    POCT Lactate, Venous 1.5 0.4 - 2.0 mmol/L    POCT Base Excess, Venous -3.2 (L) -2.0 - 3.0 mmol/L    POCT HCO3 Calculated, Venous 23.6 22.0 - 26.0 mmol/L    POCT Hemoglobin, Venous 13.5 12.0 - 16.0 g/dL    POCT Anion Gap, Venous 12.0 10.0 - 25.0 mmol/L    Patient Temperature 37.0 degrees Celsius   CBC and Auto Differential   Result Value Ref Range    WBC 5.5 4.4 - 11.3 x10*3/uL    nRBC 0.0 0.0 - 0.0 /100 WBCs    " RBC 4.25 4.00 - 5.20 x10*6/uL    Hemoglobin 12.6 12.0 - 16.0 g/dL    Hematocrit 38.0 36.0 - 46.0 %    MCV 89 80 - 100 fL    MCH 29.6 26.0 - 34.0 pg    MCHC 33.2 32.0 - 36.0 g/dL    RDW 14.8 (H) 11.5 - 14.5 %    Platelets 193 150 - 450 x10*3/uL    Neutrophils % 39.8 40.0 - 80.0 %    Immature Granulocytes %, Automated 0.2 0.0 - 0.9 %    Lymphocytes % 34.8 13.0 - 44.0 %    Monocytes % 8.9 2.0 - 10.0 %    Eosinophils % 15.4 0.0 - 6.0 %    Basophils % 0.9 0.0 - 2.0 %    Neutrophils Absolute 2.19 1.20 - 7.70 x10*3/uL    Immature Granulocytes Absolute, Automated 0.01 0.00 - 0.70 x10*3/uL    Lymphocytes Absolute 1.92 1.20 - 4.80 x10*3/uL    Monocytes Absolute 0.49 0.10 - 1.00 x10*3/uL    Eosinophils Absolute 0.85 (H) 0.00 - 0.70 x10*3/uL    Basophils Absolute 0.05 0.00 - 0.10 x10*3/uL   Comprehensive metabolic panel   Result Value Ref Range    Glucose 86 74 - 99 mg/dL    Sodium 140 136 - 145 mmol/L    Potassium 4.7 3.5 - 5.3 mmol/L    Chloride 107 98 - 107 mmol/L    Bicarbonate 23 21 - 32 mmol/L    Anion Gap 15 10 - 20 mmol/L    Urea Nitrogen 23 6 - 23 mg/dL    Creatinine 1.44 (H) 0.50 - 1.05 mg/dL    eGFR 39 (L) >60 mL/min/1.73m*2    Calcium 9.6 8.6 - 10.6 mg/dL    Albumin 4.3 3.4 - 5.0 g/dL    Alkaline Phosphatase 82 33 - 136 U/L    Total Protein 8.0 6.4 - 8.2 g/dL    AST 21 9 - 39 U/L    Bilirubin, Total 0.4 0.0 - 1.2 mg/dL    ALT 13 7 - 45 U/L   B-Type Natriuretic Peptide   Result Value Ref Range     (H) 0 - 99 pg/mL   Troponin I, High Sensitivity   Result Value Ref Range    Troponin I, High Sensitivity (CMC) 240 (HH) 0 - 34 ng/L   CBC   Result Value Ref Range    WBC 5.3 4.4 - 11.3 x10*3/uL    nRBC 0.0 0.0 - 0.0 /100 WBCs    RBC 3.93 (L) 4.00 - 5.20 x10*6/uL    Hemoglobin 11.7 (L) 12.0 - 16.0 g/dL    Hematocrit 36.5 36.0 - 46.0 %    MCV 93 80 - 100 fL    MCH 29.8 26.0 - 34.0 pg    MCHC 32.1 32.0 - 36.0 g/dL    RDW 15.0 (H) 11.5 - 14.5 %    Platelets 204 150 - 450 x10*3/uL      XR chest 2 views   Final Result    Mild vascular congestion without overt edema.   Signed by Bill Arias MD      Consult to Interventional Radiology    (Results Pending)        68-year-old lady with inotrope dependent HFrEF, COPD/emphysema, active smoking history.      Assessment/Plan   Assessment & Plan  Problem with vascular access  Inotropic dependence   Peripheral IV   IR consulted for new lee placement    Chronic HFrEF 15 - 20%  Milrinone dependent   ECHO 7/8/2024 EF 15 - 20%, LV/LA dilation, reduced RV systolic function, mid-mod RVSP  Admit BNP  ICD   Home meds bumex, spirolactone and dapa    CAD  S/p RIRI to Lcx Jan 2016  S/p MV repair June 2019 dr. Montana  Asa, not on statin for allergies     COPD  Albuterol and Pulmicort     DVT prophaysis     Brittney Nunez CNP  To be staffed in the am with [facility]VI attending

## 2024-09-08 VITALS
DIASTOLIC BLOOD PRESSURE: 62 MMHG | RESPIRATION RATE: 18 BRPM | TEMPERATURE: 98.1 F | OXYGEN SATURATION: 98 % | SYSTOLIC BLOOD PRESSURE: 100 MMHG | WEIGHT: 129.85 LBS | HEIGHT: 64 IN | HEART RATE: 102 BPM | BODY MASS INDEX: 22.17 KG/M2

## 2024-09-08 PROBLEM — T82.518A: Status: ACTIVE | Noted: 2024-09-08

## 2024-09-08 LAB
ANION GAP SERPL CALC-SCNC: 13 MMOL/L (ref 10–20)
APTT PPP: 30 SECONDS (ref 27–38)
BUN SERPL-MCNC: 24 MG/DL (ref 6–23)
CALCIUM SERPL-MCNC: 8.9 MG/DL (ref 8.6–10.6)
CHLORIDE SERPL-SCNC: 110 MMOL/L (ref 98–107)
CO2 SERPL-SCNC: 23 MMOL/L (ref 21–32)
CREAT SERPL-MCNC: 1.35 MG/DL (ref 0.5–1.05)
EGFRCR SERPLBLD CKD-EPI 2021: 42 ML/MIN/1.73M*2
ERYTHROCYTE [DISTWIDTH] IN BLOOD BY AUTOMATED COUNT: 15.1 % (ref 11.5–14.5)
GLUCOSE SERPL-MCNC: 81 MG/DL (ref 74–99)
HCT VFR BLD AUTO: 36.9 % (ref 36–46)
HGB BLD-MCNC: 11.6 G/DL (ref 12–16)
INR PPP: 1 (ref 0.9–1.1)
MCH RBC QN AUTO: 30.1 PG (ref 26–34)
MCHC RBC AUTO-ENTMCNC: 31.4 G/DL (ref 32–36)
MCV RBC AUTO: 96 FL (ref 80–100)
NRBC BLD-RTO: 0 /100 WBCS (ref 0–0)
PLATELET # BLD AUTO: 205 X10*3/UL (ref 150–450)
POTASSIUM SERPL-SCNC: 4.8 MMOL/L (ref 3.5–5.3)
PROTHROMBIN TIME: 11 SECONDS (ref 9.8–12.8)
RBC # BLD AUTO: 3.86 X10*6/UL (ref 4–5.2)
SODIUM SERPL-SCNC: 141 MMOL/L (ref 136–145)
WBC # BLD AUTO: 5 X10*3/UL (ref 4.4–11.3)

## 2024-09-08 PROCEDURE — 96374 THER/PROPH/DIAG INJ IV PUSH: CPT | Mod: 59

## 2024-09-08 PROCEDURE — 99233 SBSQ HOSP IP/OBS HIGH 50: CPT | Performed by: INTERNAL MEDICINE

## 2024-09-08 PROCEDURE — 80048 BASIC METABOLIC PNL TOTAL CA: CPT | Performed by: NURSE PRACTITIONER

## 2024-09-08 PROCEDURE — 85027 COMPLETE CBC AUTOMATED: CPT | Performed by: NURSE PRACTITIONER

## 2024-09-08 PROCEDURE — G0378 HOSPITAL OBSERVATION PER HR: HCPCS

## 2024-09-08 PROCEDURE — 2500000004 HC RX 250 GENERAL PHARMACY W/ HCPCS (ALT 636 FOR OP/ED): Performed by: NURSE PRACTITIONER

## 2024-09-08 PROCEDURE — 36415 COLL VENOUS BLD VENIPUNCTURE: CPT | Performed by: NURSE PRACTITIONER

## 2024-09-08 PROCEDURE — 2500000001 HC RX 250 WO HCPCS SELF ADMINISTERED DRUGS (ALT 637 FOR MEDICARE OP): Performed by: NURSE PRACTITIONER

## 2024-09-08 PROCEDURE — 2500000004 HC RX 250 GENERAL PHARMACY W/ HCPCS (ALT 636 FOR OP/ED)

## 2024-09-08 PROCEDURE — 2500000002 HC RX 250 W HCPCS SELF ADMINISTERED DRUGS (ALT 637 FOR MEDICARE OP, ALT 636 FOR OP/ED): Performed by: INTERNAL MEDICINE

## 2024-09-08 PROCEDURE — 2500000002 HC RX 250 W HCPCS SELF ADMINISTERED DRUGS (ALT 637 FOR MEDICARE OP, ALT 636 FOR OP/ED): Performed by: NURSE PRACTITIONER

## 2024-09-08 PROCEDURE — 94760 N-INVAS EAR/PLS OXIMETRY 1: CPT

## 2024-09-08 PROCEDURE — 94640 AIRWAY INHALATION TREATMENT: CPT

## 2024-09-08 PROCEDURE — 85610 PROTHROMBIN TIME: CPT | Performed by: NURSE PRACTITIONER

## 2024-09-08 RX ADMIN — ASPIRIN 81 MG: 81 TABLET, COATED ORAL at 09:58

## 2024-09-08 RX ADMIN — IPRATROPIUM BROMIDE AND ALBUTEROL SULFATE 3 ML: .5; 3 SOLUTION RESPIRATORY (INHALATION) at 11:42

## 2024-09-08 RX ADMIN — DAPAGLIFLOZIN 10 MG: 10 TABLET, FILM COATED ORAL at 09:57

## 2024-09-08 RX ADMIN — MILRINONE LACTATE 0.38 MCG/KG/MIN: 200 INJECTION, SOLUTION INTRAVENOUS at 09:13

## 2024-09-08 RX ADMIN — SPIRONOLACTONE 12.5 MG: 25 TABLET, FILM COATED ORAL at 09:58

## 2024-09-08 RX ADMIN — IPRATROPIUM BROMIDE AND ALBUTEROL SULFATE 3 ML: .5; 3 SOLUTION RESPIRATORY (INHALATION) at 22:20

## 2024-09-08 RX ADMIN — MILRINONE LACTATE 0.38 MCG/KG/MIN: 200 INJECTION, SOLUTION INTRAVENOUS at 23:44

## 2024-09-08 RX ADMIN — HYDROCORTISONE 1 APPLICATION: 1 OINTMENT TOPICAL at 21:26

## 2024-09-08 ASSESSMENT — COGNITIVE AND FUNCTIONAL STATUS - GENERAL
WALKING IN HOSPITAL ROOM: A LITTLE
DAILY ACTIVITIY SCORE: 24
MOBILITY SCORE: 22
CLIMB 3 TO 5 STEPS WITH RAILING: A LITTLE

## 2024-09-08 ASSESSMENT — PAIN SCALES - GENERAL: PAINLEVEL_OUTOF10: 0 - NO PAIN

## 2024-09-08 ASSESSMENT — PAIN - FUNCTIONAL ASSESSMENT: PAIN_FUNCTIONAL_ASSESSMENT: 0-10

## 2024-09-08 NOTE — PROGRESS NOTES
"Melissa Marinelli is a 69 y.o. female on day 1 of admission presenting with Problem with vascular access.    Subjective   Admitted from the ED yesterday for lee falling out at home  Milrinone dependent   Euvolemic  Needs new line on Monday        Objective     Physical Exam  Constitutional:       Appearance: Normal appearance.   HENT:      Mouth/Throat:      Mouth: Mucous membranes are moist.   Cardiovascular:      Rate and Rhythm: Regular rhythm.   Pulmonary:      Effort: Pulmonary effort is normal.      Breath sounds: Normal breath sounds.   Abdominal:      General: Abdomen is flat.      Palpations: Abdomen is soft.   Musculoskeletal:         General: Normal range of motion.   Skin:     General: Skin is warm and dry.      Comments: Right chest skin tunnel site dry patches    Neurological:      General: No focal deficit present.      Mental Status: She is alert and oriented to person, place, and time.         Last Recorded Vitals  Blood pressure 124/73, pulse 94, temperature 36.5 °C (97.7 °F), temperature source Temporal, resp. rate 17, height 1.626 m (5' 4\"), weight 58.9 kg (129 lb 13.6 oz), SpO2 97%.  Intake/Output last 3 Shifts:  I/O last 3 completed shifts:  In: 21.6 (0.4 mL/kg) [I.V.:21.6 (0.4 mL/kg)]  Out: - (0 mL/kg)   Weight: 58.9 kg     Relevant Results  Scheduled medications  aspirin, 81 mg, oral, Daily  [START ON 9/9/2024] bumetanide, 0.5 mg, oral, Once per day on Monday Thursday  cholecalciferol, 2,000 Units, oral, Daily  dapagliflozin propanediol, 10 mg, oral, Daily  enoxaparin, 40 mg, subcutaneous, q24h  fluticasone furoate-vilanteroL, 1 puff, inhalation, Daily  hydrocortisone, , Topical, BID  ipratropium-albuteroL, 3 mL, nebulization, TID  spironolactone, 12.5 mg, oral, Daily      Continuous medications  milrinone in 5 % dextrose, 0.375 mcg/kg/min, Last Rate: 0.375 mcg/kg/min (09/08/24 0913)      PRN medications  PRN medications: acetaminophen, albuterol, calcium carbonate     Results for orders " placed or performed during the hospital encounter of 09/07/24 (from the past 24 hour(s))   CBC   Result Value Ref Range    WBC 5.3 4.4 - 11.3 x10*3/uL    nRBC 0.0 0.0 - 0.0 /100 WBCs    RBC 3.93 (L) 4.00 - 5.20 x10*6/uL    Hemoglobin 11.7 (L) 12.0 - 16.0 g/dL    Hematocrit 36.5 36.0 - 46.0 %    MCV 93 80 - 100 fL    MCH 29.8 26.0 - 34.0 pg    MCHC 32.1 32.0 - 36.0 g/dL    RDW 15.0 (H) 11.5 - 14.5 %    Platelets 204 150 - 450 x10*3/uL   Basic metabolic panel   Result Value Ref Range    Glucose 81 74 - 99 mg/dL    Sodium 141 136 - 145 mmol/L    Potassium 4.8 3.5 - 5.3 mmol/L    Chloride 110 (H) 98 - 107 mmol/L    Bicarbonate 23 21 - 32 mmol/L    Anion Gap 13 10 - 20 mmol/L    Urea Nitrogen 24 (H) 6 - 23 mg/dL    Creatinine 1.35 (H) 0.50 - 1.05 mg/dL    eGFR 42 (L) >60 mL/min/1.73m*2    Calcium 8.9 8.6 - 10.6 mg/dL   CBC   Result Value Ref Range    WBC 5.0 4.4 - 11.3 x10*3/uL    nRBC 0.0 0.0 - 0.0 /100 WBCs    RBC 3.86 (L) 4.00 - 5.20 x10*6/uL    Hemoglobin 11.6 (L) 12.0 - 16.0 g/dL    Hematocrit 36.9 36.0 - 46.0 %    MCV 96 80 - 100 fL    MCH 30.1 26.0 - 34.0 pg    MCHC 31.4 (L) 32.0 - 36.0 g/dL    RDW 15.1 (H) 11.5 - 14.5 %    Platelets 205 150 - 450 x10*3/uL   Coagulation Screen   Result Value Ref Range    Protime 11.0 9.8 - 12.8 seconds    INR 1.0 0.9 - 1.1    aPTT 30 27 - 38 seconds          Assessment/Plan   Assessment & Plan  Problem with vascular access  Inotropic dependence   Peripheral IV   IR consulted for new lee placement > tomorrow     Chronic HFrEF 15 - 20%  Milrinone dependent   ECHO 7/8/2024 EF 15 - 20%, LV/LA dilation, reduced RV systolic function, mid-mod RVSP  Admit    ICD   Home meds bumex, spirolactone and dapa    CAD  S/p RIRI to Lcx Jan 2016  S/p MV repair June 2019 dr. Montana  Asa, not on statin for allergies     COPD  Albuterol and Pulmicort     DVT prophaysis         Brittney Nunez, APRN-CNP    Seen and discussed with Dr. Kelly

## 2024-09-08 NOTE — CARE PLAN
The patient's goals for the shift include      The clinical goals for the shift include safety    Over the shift, the patient did make progress toward the following goals.    Problem: Safety - Adult  Goal: Free from fall injury  Outcome: Progressing  Flowsheets (Taken 9/7/2024 2129)  Free from fall injury:   Instruct family/caregiver on patient safety   Based on caregiver fall risk screen, instruct family/caregiver to ask for assistance with transferring infant if caregiver noted to have fall risk factors     Problem: Chronic Conditions and Co-morbidities  Goal: Patient's chronic conditions and co-morbidity symptoms are monitored and maintained or improved  Outcome: Progressing  Flowsheets (Taken 9/7/2024 2129)  Care Plan - Patient's Chronic Conditions and Co-Morbidity Symptoms are Monitored and Maintained or Improved: Monitor and assess patient's chronic conditions and comorbid symptoms for stability, deterioration, or improvement     Problem: Pain - Adult  Goal: Verbalizes/displays adequate comfort level or baseline comfort level  Outcome: Progressing  Flowsheets (Taken 9/7/2024 2129)  Verbalizes/displays adequate comfort level or baseline comfort level: Assess pain using appropriate pain scale

## 2024-09-09 ENCOUNTER — APPOINTMENT (OUTPATIENT)
Dept: RADIOLOGY | Facility: HOSPITAL | Age: 69
End: 2024-09-09
Payer: COMMERCIAL

## 2024-09-09 ENCOUNTER — HOME INFUSION (OUTPATIENT)
Dept: INFUSION THERAPY | Age: 69
End: 2024-09-09
Payer: COMMERCIAL

## 2024-09-09 ENCOUNTER — DOCUMENTATION (OUTPATIENT)
Dept: PHARMACY | Facility: CLINIC | Age: 69
End: 2024-09-09

## 2024-09-09 VITALS
TEMPERATURE: 97.5 F | OXYGEN SATURATION: 100 % | SYSTOLIC BLOOD PRESSURE: 110 MMHG | BODY MASS INDEX: 22.32 KG/M2 | WEIGHT: 130.73 LBS | HEART RATE: 84 BPM | RESPIRATION RATE: 17 BRPM | HEIGHT: 64 IN | DIASTOLIC BLOOD PRESSURE: 67 MMHG

## 2024-09-09 LAB
ANION GAP SERPL CALC-SCNC: 11 MMOL/L (ref 10–20)
BUN SERPL-MCNC: 24 MG/DL (ref 6–23)
CALCIUM SERPL-MCNC: 9 MG/DL (ref 8.6–10.6)
CHLORIDE SERPL-SCNC: 111 MMOL/L (ref 98–107)
CO2 SERPL-SCNC: 23 MMOL/L (ref 21–32)
CREAT SERPL-MCNC: 1.35 MG/DL (ref 0.5–1.05)
EGFRCR SERPLBLD CKD-EPI 2021: 42 ML/MIN/1.73M*2
ERYTHROCYTE [DISTWIDTH] IN BLOOD BY AUTOMATED COUNT: 14.7 % (ref 11.5–14.5)
GLUCOSE SERPL-MCNC: 66 MG/DL (ref 74–99)
HCT VFR BLD AUTO: 36.1 % (ref 36–46)
HGB BLD-MCNC: 11.1 G/DL (ref 12–16)
MCH RBC QN AUTO: 29.4 PG (ref 26–34)
MCHC RBC AUTO-ENTMCNC: 30.7 G/DL (ref 32–36)
MCV RBC AUTO: 96 FL (ref 80–100)
NRBC BLD-RTO: 0 /100 WBCS (ref 0–0)
PLATELET # BLD AUTO: 206 X10*3/UL (ref 150–450)
POTASSIUM SERPL-SCNC: 4.9 MMOL/L (ref 3.5–5.3)
RBC # BLD AUTO: 3.77 X10*6/UL (ref 4–5.2)
SODIUM SERPL-SCNC: 140 MMOL/L (ref 136–145)
WBC # BLD AUTO: 5.2 X10*3/UL (ref 4.4–11.3)

## 2024-09-09 PROCEDURE — 2500000001 HC RX 250 WO HCPCS SELF ADMINISTERED DRUGS (ALT 637 FOR MEDICARE OP): Performed by: NURSE PRACTITIONER

## 2024-09-09 PROCEDURE — 99152 MOD SED SAME PHYS/QHP 5/>YRS: CPT

## 2024-09-09 PROCEDURE — 36558 INSERT TUNNELED CV CATH: CPT | Performed by: STUDENT IN AN ORGANIZED HEALTH CARE EDUCATION/TRAINING PROGRAM

## 2024-09-09 PROCEDURE — C1894 INTRO/SHEATH, NON-LASER: HCPCS

## 2024-09-09 PROCEDURE — C1769 GUIDE WIRE: HCPCS

## 2024-09-09 PROCEDURE — 85027 COMPLETE CBC AUTOMATED: CPT | Mod: 59 | Performed by: NURSE PRACTITIONER

## 2024-09-09 PROCEDURE — G0378 HOSPITAL OBSERVATION PER HR: HCPCS

## 2024-09-09 PROCEDURE — 2720000007 HC OR 272 NO HCPCS

## 2024-09-09 PROCEDURE — 76942 ECHO GUIDE FOR BIOPSY: CPT | Mod: 59 | Performed by: STUDENT IN AN ORGANIZED HEALTH CARE EDUCATION/TRAINING PROGRAM

## 2024-09-09 PROCEDURE — 99153 MOD SED SAME PHYS/QHP EA: CPT

## 2024-09-09 PROCEDURE — 7100000010 HC PHASE TWO TIME - EACH INCREMENTAL 1 MINUTE

## 2024-09-09 PROCEDURE — 2500000004 HC RX 250 GENERAL PHARMACY W/ HCPCS (ALT 636 FOR OP/ED): Performed by: STUDENT IN AN ORGANIZED HEALTH CARE EDUCATION/TRAINING PROGRAM

## 2024-09-09 PROCEDURE — 2500000002 HC RX 250 W HCPCS SELF ADMINISTERED DRUGS (ALT 637 FOR MEDICARE OP, ALT 636 FOR OP/ED): Performed by: NURSE PRACTITIONER

## 2024-09-09 PROCEDURE — 76942 ECHO GUIDE FOR BIOPSY: CPT | Performed by: STUDENT IN AN ORGANIZED HEALTH CARE EDUCATION/TRAINING PROGRAM

## 2024-09-09 PROCEDURE — 96374 THER/PROPH/DIAG INJ IV PUSH: CPT

## 2024-09-09 PROCEDURE — 80048 BASIC METABOLIC PNL TOTAL CA: CPT | Performed by: NURSE PRACTITIONER

## 2024-09-09 PROCEDURE — C1751 CATH, INF, PER/CENT/MIDLINE: HCPCS

## 2024-09-09 PROCEDURE — 36415 COLL VENOUS BLD VENIPUNCTURE: CPT | Performed by: NURSE PRACTITIONER

## 2024-09-09 PROCEDURE — 7100000009 HC PHASE TWO TIME - INITIAL BASE CHARGE

## 2024-09-09 RX ORDER — MIDAZOLAM HYDROCHLORIDE 1 MG/ML
INJECTION INTRAMUSCULAR; INTRAVENOUS
Status: COMPLETED | OUTPATIENT
Start: 2024-09-09 | End: 2024-09-09

## 2024-09-09 RX ORDER — MAG HYDROX/ALUMINUM HYD/SIMETH 200-200-20
SUSPENSION, ORAL (FINAL DOSE FORM) ORAL 2 TIMES DAILY PRN
Start: 2024-09-09 | End: 2025-09-09

## 2024-09-09 RX ORDER — MILRINONE LACTATE 0.2 MG/ML
0.38 INJECTION, SOLUTION INTRAVENOUS CONTINUOUS
Start: 2024-09-09 | End: 2024-09-12

## 2024-09-09 RX ADMIN — HYDROCORTISONE: 1 OINTMENT TOPICAL at 09:17

## 2024-09-09 RX ADMIN — DAPAGLIFLOZIN 10 MG: 10 TABLET, FILM COATED ORAL at 09:17

## 2024-09-09 RX ADMIN — ASPIRIN 81 MG: 81 TABLET, COATED ORAL at 09:17

## 2024-09-09 RX ADMIN — BUMETANIDE 0.5 MG: 1 TABLET ORAL at 09:17

## 2024-09-09 RX ADMIN — MIDAZOLAM HYDROCHLORIDE 1 MG: 1 INJECTION, SOLUTION INTRAMUSCULAR; INTRAVENOUS at 11:28

## 2024-09-09 RX ADMIN — SPIRONOLACTONE 12.5 MG: 25 TABLET, FILM COATED ORAL at 09:17

## 2024-09-09 ASSESSMENT — COGNITIVE AND FUNCTIONAL STATUS - GENERAL
MOBILITY SCORE: 23
DAILY ACTIVITIY SCORE: 24
WALKING IN HOSPITAL ROOM: A LITTLE

## 2024-09-09 ASSESSMENT — PAIN - FUNCTIONAL ASSESSMENT: PAIN_FUNCTIONAL_ASSESSMENT: 0-10

## 2024-09-09 ASSESSMENT — PAIN SCALES - GENERAL: PAINLEVEL_OUTOF10: 0 - NO PAIN

## 2024-09-09 NOTE — DISCHARGE SUMMARY
Discharge Diagnosis  Problem with vascular access    Issues Requiring Follow-Up  Follow up with home care  Follow up with heart failure     Hospital Course   ED HPI:  HPI:   68y woman with a pmh HFrEF (EF 15-20%, on milrinone), CAD s/p NSTEMI, MVR s/p repair, COPD, HTN, HL, GERD, CVA, and T2DM who presented vascular access issue.   Her Lee catheter fell out this morning.  Otherwise she has no complaints and is in her usual state of health.  No fever, chills,  shortness of breath, abdominal pain, nausea, vomiting, dysuria, hematuria     On review of symptoms does note that she gets occasional chest pain positionally.  Nonexertional, nonpleuritic.  Not associated with shortness of breath, nausea, vomiting, diaphoresis     On interview in the ED patient appears euvolemic with no complaints. She is here with her daughter. She has been doing well on the 0.375mcg/kg/min milrinone at home. She woke up and the lee was on the floor. She does not know how this happened. She has been taking her home medications with no difficulty.  She denies any complaints, fever, chills, shortness of breath, chest pain, palpitations, nausea, vomiting, diarrhea, swelling of the legs.       Admitted from the ED yesterday for lee falling out at home as observation status   Milrinone dependent   Euvolemic  Needs new line on Monday    On Monday new lee was placed by IR.   Diagnosis: Lee line placement     Description of procedure: Image guided placement of a 9.6F right internal jugular vein Lee catheter.     Her daughter brought in her home milrinone pump and medication, she was hooked up and no problems.  No changes to her home medications.    Right chest cathter site dressing dry and intact.  Hydrocortisone cream added for patient to use on surrounding skin for clear chronic irritation sites and patches from tape and tega derm.     Discharge as planned   Patient left she said she will contact the home care agency.      Pertinent Physical Exam At Time of Discharge  Physical Exam  Constitutional:       Appearance: Normal appearance.   HENT:      Mouth/Throat:      Mouth: Mucous membranes are moist.   Cardiovascular:      Rate and Rhythm: Regular rhythm.   Pulmonary:      Effort: Pulmonary effort is normal.      Breath sounds: Normal breath sounds.   Abdominal:      Palpations: Abdomen is soft.   Musculoskeletal:         General: Normal range of motion.   Skin:     General: Skin is warm and dry.      Comments: Right chest dressing dry and intact  Skin irritation sites noted right chest    Neurological:      General: No focal deficit present.      Mental Status: She is alert and oriented to person, place, and time.         Home Medications     Medication List      START taking these medications     hydrocortisone 1 % ointment; Apply topically 2 times a day as needed for   irritation. Chest wall     CHANGE how you take these medications     famotidine 40 mg tablet; Commonly known as: Pepcid; Take 1 tablet (40   mg) by mouth once daily. Do not start before February 3, 2024.; What   changed: when to take this, reasons to take this   milrinone in 5 % dextrose 20 mg/100 mL (200 mcg/mL) infusion; Commonly   known as: Primacor; Infuse 21.9375 mcg/min at 6.58 mL/hr into a venous   catheter continuously.; What changed: how much to take, how fast to infuse   this     CONTINUE taking these medications     acetaminophen 500 mg tablet; Commonly known as: Tylenol   albuterol 90 mcg/actuation inhaler; Inhale 2 puffs every 4 hours if   needed for wheezing or shortness of breath.   aspirin 81 mg EC tablet; Take 1 tablet (81 mg) by mouth once daily.   bumetanide 0.5 mg tablet; Commonly known as: Bumex; Take 1 tablet (0.5   mg) by mouth 2 times a week. On Tuesdays and Saturdays   Calcium Antacid 200 mg calcium (500 mg) chewable tablet; Generic drug:   calcium carbonate   Farxiga 10 mg; Generic drug: dapagliflozin propanediol; Take 1 tablet   (10 mg)  by mouth once daily.   heparin flush(porcine)-0.9NaCl 100 unit/mL kit   ipratropium-albuteroL 0.5-2.5 mg/3 mL nebulizer solution; Commonly known   as: Duo-Neb   OneTouch Verio Flex meter misc; Generic drug: blood-glucose meter   sodium chloride 0.65 % nasal spray; Commonly known as: Ocean   sodium chloride 0.9% flush   spironolactone 25 mg tablet; Commonly known as: Aldactone; Take 0.5   tablets (12.5 mg) by mouth once daily.   Symbicort 80-4.5 mcg/actuation inhaler; Generic drug:   budesonide-formoteroL   Vitamin D3 50 MCG (2000 UT) tablet; Generic drug: cholecalciferol       Outpatient Follow-Up  Future Appointments   Date Time Provider Department Center   9/12/2024 To Be Determined Gabriela Reddy RN [facility]   9/16/2024 To Be Determined Gabriela Reddy RN [facility]   9/24/2024 To Be Determined Gabriela Reddy RN [facility]   10/14/2024 10:00 AM [facility]       Brittney Nunez APRN-CNP  Discussed with Dr. Mera

## 2024-09-09 NOTE — PROGRESS NOTES
Patient discharged today hooked herself up to milrinone pump before [facility] could verify medication settings  stated she knew what she was doing and left hospital [facility]  soc will be in 24-48 hrs post discharge and john call patient about med delivery

## 2024-09-09 NOTE — CARE PLAN
The patient's goals for the shift include      The clinical goals for the shift include be discharged    Over the shift, the patient did make progress toward the following goals. Barriers to progression include none. Recommendations to address these barriers include none.  IV and tele removed. No new meds . Milrinone will continue via new Wiley. Pt already hooked up to home Milrinone Discharge today. No questions at this time

## 2024-09-09 NOTE — CARE PLAN
The patient's goals for the shift include      The clinical goals for the shift include safety    Over the shift, the patient did make progress toward the following goals.     Problem: Safety - Adult  Goal: Free from fall injury  Outcome: Progressing  Flowsheets (Taken 9/8/2024 2118)  Free from fall injury:   Based on caregiver fall risk screen, instruct family/caregiver to ask for assistance with transferring infant if caregiver noted to have fall risk factors   Instruct family/caregiver on patient safety     Problem: Pain - Adult  Goal: Verbalizes/displays adequate comfort level or baseline comfort level  Outcome: Progressing  Flowsheets (Taken 9/8/2024 2118)  Verbalizes/displays adequate comfort level or baseline comfort level: Encourage patient to monitor pain and request assistance

## 2024-09-09 NOTE — PRE-PROCEDURE NOTE
Interventional Radiology Preprocedure Note    Indication for procedure: The primary encounter diagnosis was Problem with vascular access. A diagnosis of Chronic heart failure, unspecified heart failure type (Multi) was also pertinent to this visit.    Relevant review of systems: NA    Relevant Labs:   Lab Results   Component Value Date    CREATININE 1.35 (H) 09/08/2024    EGFR 42 (L) 09/08/2024    INR 1.0 09/08/2024    PROTIME 11.0 09/08/2024       Planned Sedation/Anesthesia: Moderate    Airway assessment: normal    Directed physical examination:    NAD  Breathing comfortably on room air  Moving all extremities spontaneously  Answering and asking questions appropriately    Mallampati: II (hard and soft palate, upper portion of tonsils and uvula visible)    ASA Score: ASA 3 - Patient with moderate systemic disease with functional limitations    Benefits, risks and alternatives of procedure and planned sedation have been discussed with the patient and/or their representative. All questions answered and they agree to proceed.

## 2024-09-09 NOTE — HOSPITAL COURSE
ED HPI:  HPI:   68y woman with a pmh HFrEF (EF 15-20%, on milrinone), CAD s/p NSTEMI, MVR s/p repair, COPD, HTN, HL, GERD, CVA, and T2DM who presented vascular access issue.   Her Lee catheter fell out this morning.  Otherwise she has no complaints and is in her usual state of health.  No fever, chills,  shortness of breath, abdominal pain, nausea, vomiting, dysuria, hematuria     On review of symptoms does note that she gets occasional chest pain positionally.  Nonexertional, nonpleuritic.  Not associated with shortness of breath, nausea, vomiting, diaphoresis     On interview in the ED patient appears euvolemic with no complaints. She is here with her daughter. She has been doing well on the 0.375mcg/kg/min milrinone at home. She woke up and the lee was on the floor. She does not know how this happened. She has been taking her home medications with no difficulty.  She denies any complaints, fever, chills, shortness of breath, chest pain, palpitations, nausea, vomiting, diarrhea, swelling of the legs.       Admitted from the ED yesterday for lee falling out at home as observation status   Milrinone dependent   Euvolemic  Needs new line on Monday    On Monday new lee was placed by IR.   Diagnosis: Lee line placement     Description of procedure: Image guided placement of a 9.6F right internal jugular vein Lee catheter.     Her daughter brought in her home milrinone pump and medication, she was hooked up and no problems.  No changes to her home medications.    Right chest cathter site dressing dry and intact.  Hydrocortisone cream added for patient to use on surrounding skin for clear chronic irritation sites and patches from tape and tega derm.     Discharge as planned

## 2024-09-09 NOTE — PROGRESS NOTES
"Review of chart. Patient with order for continuous milrinone for CHF. Dr Rivera follows at home.    Patient admitted to ED under obs 9/7 due to Lee \"falling out\" - she woke up and lee was on the floor, unsure how this happened. Lee replaced 9/7, patient hooked up to home bag and pump, discharged this afternoon.     Tel call with patient. Confirms she was hooked up to a new home bag today and has one bag remaining in home for 9/11 hookup. States that she had returned her extra pumps due to receiving the pump agreement form and thinking she was going to be charged for extra pump. Agreeable to delivery tomorrow of 3 bags of medication with spare pump.  to call on the way and knock/ring doorbell when dropping off package.     Processed fill for 3x 48hr milrinone bags for mix and OVN delivery 9/10/24 to cover bag changes 9/13 9/15 9/17.     Follow up 9/16/24 with next fill straight. Check weight and inventory .  "

## 2024-09-09 NOTE — POST-PROCEDURE NOTE
Interventional Radiology Brief Postprocedure Note    Attending: Dr Michel Johnston    Assistant: Dr Artemio Dominguez    Diagnosis: Wiley line placement    Description of procedure: Image guided placement of a 9.6F right internal jugular vein Wiley catheter.     Anesthesia:  MAC    Complications: None    Estimated Blood Loss: none    Medications  As of 09/09/24 1233      milrinone (Primacor) 20 mg in dextrose 5% 100 mL (0.2 mg/mL) infusion (mcg/kg/min) Total dose:  Cannot be calculated* Dosing weight:  58.5   *Total user-documented volume 261.34 mL may contain volume from other administrations     Date/Time Rate/Dose/Volume Action       09/07/24  1039 0.375 mcg/kg/min - 6.58 mL/hr New Bag      1242  Stopped               enoxaparin (Lovenox) syringe 40 mg (mg) Total dose:  0 mg* Dosing weight:  58.5   *Administration not included in total     Date/Time Rate/Dose/Volume Action       09/08/24  0958 *40 mg Missed     09/09/24  0919 *40 mg Missed               hydrocortisone 1 % ointment (Application) Total dose:  Cannot be calculated* Dosing weight:  58.5   *Administration does not have dose documented     Date/Time Rate/Dose/Volume Action       09/07/24  2042 *Not included in total Missed     09/08/24  1030 *Not included in total Missed      2126 1 Application Given     09/09/24 0917  Given               aspirin EC tablet 81 mg (mg) Total dose:  243 mg Dosing weight:  58.5      Date/Time Rate/Dose/Volume Action       09/07/24  1906 81 mg Given     09/08/24 0958 81 mg Given     09/09/24 0917 81 mg Given               bumetanide (Bumex) tablet 0.5 mg (mg) Total dose:  0.5 mg Dosing weight:  58.5      Date/Time Rate/Dose/Volume Action       09/09/24 0917 0.5 mg Given               cholecalciferol (Vitamin D-3) tablet 2,000 Units (Units) Total dose:  0 Units* Dosing weight:  58.5   *Administration not included in total     Date/Time Rate/Dose/Volume Action       09/07/24  1906 *2,000 Units Missed     09/08/24   0958 *2,000 Units Missed     09/09/24  0917 *2,000 Units Missed               dapagliflozin propanediol (Farxiga) tablet 10 mg (mg) Total dose:  30 mg      Date/Time Rate/Dose/Volume Action       09/07/24 1906 10 mg Given     09/08/24  0957 10 mg Given     09/09/24  0917 10 mg Given               fluticasone furoate-vilanteroL (Breo Ellipta) 100-25 mcg/dose inhaler 1 puff (puff) Total dose:  0 puff* Dosing weight:  58.5   *Administration not included in total     Date/Time Rate/Dose/Volume Action       09/07/24  2050 *1 puff Missed     09/08/24  1146 *1 puff Missed               spironolactone (Aldactone) tablet 12.5 mg (mg) Total dose:  37.5 mg Dosing weight:  58.5      Date/Time Rate/Dose/Volume Action       09/07/24 1906 12.5 mg Given     09/08/24 0958 12.5 mg Given     09/09/24  0917 12.5 mg Given               ipratropium-albuteroL (Duo-Neb) 0.5-2.5 mg/3 mL nebulizer solution 3 mL (mL) Total volume:  9 mL Dosing weight:  58.5      Date/Time Rate/Dose/Volume Action       09/07/24  2319 3 mL Given     09/08/24  1142 3 mL Given      1310 *3 mL Missed      2220 3 mL Given     09/09/24  1009 *3 mL Missed               milrinone (Primacor) 20 mg in dextrose 5% 100 mL (0.2 mg/mL) infusion (premix) (mcg/kg/min) Total dose:  Cannot be calculated* Dosing weight:  58.5   *Total user-documented volume 261.34 mL may contain volume from other administrations     Date/Time Rate/Dose/Volume Action       09/07/24 2016 0.375 mcg/kg/min - 6.58 mL/hr New Bag     09/08/24  0734 *0.375 mcg/kg/min - 6.58 mL/hr Handoff      0913 0.375 mcg/kg/min - 6.58 mL/hr New Bag      1902 *0.375 mcg/kg/min - 6.58 mL/hr Handoff      2344 0.375 mcg/kg/min - 6.58 mL/hr New Bag     09/09/24  0652 0.375 mcg/kg/min - 6.58 mL/hr Rate Verify      0733 *0.375 mcg/kg/min - 6.58 mL/hr Handoff      0956 0.375 mcg/kg/min - 6.58 mL/hr Rate Verify               midazolam (Versed) injection (mg) Total dose:  1 mg      Date/Time Rate/Dose/Volume Action        09/09/24  1128 1 mg Given                   No specimens collected      See detailed result report with images in PACS.    The patient tolerated the procedure well without incident or complication and is in stable condition.

## 2024-09-10 ENCOUNTER — PATIENT OUTREACH (OUTPATIENT)
Dept: CARE COORDINATION | Facility: CLINIC | Age: 69
End: 2024-09-10
Payer: COMMERCIAL

## 2024-09-10 NOTE — PROGRESS NOTES
Discharge facility: [facility]  Discharge diagnosis: Problem with vascular access   Admission date: 9/7/24  Discharge date: 9/9/24  PCP Appointment Date: 10/14/24- needs sooner appt  Specialist Appointment Date: Cardio 10/14/24    Hospital Encounter and Summary: Linked     Outreach call to patient to support a smooth transition of care from recent admission. Left voicemail message for patient with my contact information. Will continue to follow through transition period.    Vandana Kaplan RN, [facility]  Accountable Care Organization Operations Office  [facility] [id number]  Phone [phone]

## 2024-09-11 ENCOUNTER — HOME CARE VISIT (OUTPATIENT)
Dept: HOME HEALTH SERVICES | Facility: HOME HEALTH | Age: 69
End: 2024-09-11
Payer: COMMERCIAL

## 2024-09-11 VITALS
HEART RATE: 76 BPM | OXYGEN SATURATION: 98 % | DIASTOLIC BLOOD PRESSURE: 78 MMHG | BODY MASS INDEX: 22.14 KG/M2 | SYSTOLIC BLOOD PRESSURE: 110 MMHG | RESPIRATION RATE: 16 BRPM | WEIGHT: 129 LBS | TEMPERATURE: 98.6 F

## 2024-09-11 PROCEDURE — G0299 HHS/HOSPICE OF RN EA 15 MIN: HCPCS | Mod: HHH

## 2024-09-12 ENCOUNTER — HOME INFUSION (OUTPATIENT)
Dept: INFUSION THERAPY | Age: 69
End: 2024-09-12
Payer: COMMERCIAL

## 2024-09-12 DIAGNOSIS — I50.43 ACUTE ON CHRONIC COMBINED SYSTOLIC (CONGESTIVE) AND DIASTOLIC (CONGESTIVE) HEART FAILURE (MULTI): ICD-10-CM

## 2024-09-12 RX ORDER — MILRINONE LACTATE 0.2 MG/ML
0.38 INJECTION, SOLUTION INTRAVENOUS CONTINUOUS
Qty: 340.8 ML | Refills: 11 | Status: SHIPPED
Start: 2024-09-12 | End: 2025-09-12

## 2024-09-12 NOTE — PROGRESS NOTES
Per direction from Dr Rivera - resumed previous home rate of Milrinone 7.1ml/hour   Concentration 0.2mg/ml  Order entered into EPIC

## 2024-09-13 ASSESSMENT — ENCOUNTER SYMPTOMS
OCCASIONAL FEELINGS OF UNSTEADINESS: 1
FORGETFULNESS: 1
LOSS OF SENSATION IN FEET: 0
FATIGUES EASILY: 1
APPETITE LEVEL: GOOD
DEPRESSION: 0
LAST BOWEL MOVEMENT: 67094
CHANGE IN APPETITE: UNCHANGED

## 2024-09-16 ENCOUNTER — HOME INFUSION (OUTPATIENT)
Dept: INFUSION THERAPY | Age: 69
End: 2024-09-16
Payer: COMMERCIAL

## 2024-09-16 ENCOUNTER — DOCUMENTATION (OUTPATIENT)
Dept: PHARMACY | Facility: CLINIC | Age: 69
End: 2024-09-16

## 2024-09-16 NOTE — PROGRESS NOTES
Review of chart. Patient with order for continuous milrinone for CHF. Dr Rivera follows at home.     Tel call with patient. Infusions going well. Has bag for hookup tomorrow. Last weight 129lbs on Saturday. Agreeable to delivery any time today for 4 bags with supplies to match.  to call on the way and knock/ring doorbell when dropping off package.     Processed fill for 4x 48hr milrinone bags for mix and straight delivery 9/16/24 to cover bag changes 9/19 9/21 9/23 9/25     Follow up 9/24/24 with next fill straight. Check weight and inventory .

## 2024-09-19 ENCOUNTER — HOME CARE VISIT (OUTPATIENT)
Dept: HOME HEALTH SERVICES | Facility: HOME HEALTH | Age: 69
End: 2024-09-19
Payer: COMMERCIAL

## 2024-09-19 PROCEDURE — G0299 HHS/HOSPICE OF RN EA 15 MIN: HCPCS | Mod: HHH

## 2024-09-19 RX ADMIN — Medication 10 ML: at 09:44

## 2024-09-22 VITALS
RESPIRATION RATE: 16 BRPM | BODY MASS INDEX: 22.01 KG/M2 | HEART RATE: 68 BPM | WEIGHT: 128.25 LBS | DIASTOLIC BLOOD PRESSURE: 60 MMHG | SYSTOLIC BLOOD PRESSURE: 120 MMHG | TEMPERATURE: 98.6 F | OXYGEN SATURATION: 98 %

## 2024-09-22 ASSESSMENT — ENCOUNTER SYMPTOMS
APPETITE LEVEL: GOOD
FATIGUE: 1
CHANGE IN APPETITE: UNCHANGED
OCCASIONAL FEELINGS OF UNSTEADINESS: 0
LAST BOWEL MOVEMENT: 67102
DENIES PAIN: 1

## 2024-09-22 ASSESSMENT — PAIN SCALES - PAIN ASSESSMENT IN ADVANCED DEMENTIA (PAINAD)
BREATHING: 0
BODYLANGUAGE: 0 - RELAXED.
NEGVOCALIZATION: 0
CONSOLABILITY: 0 - NO NEED TO CONSOLE.
NEGVOCALIZATION: 0 - NONE.
FACIALEXPRESSION: 0
FACIALEXPRESSION: 0 - SMILING OR INEXPRESSIVE.
TOTALSCORE: 0
CONSOLABILITY: 0
BODYLANGUAGE: 0

## 2024-09-24 ENCOUNTER — PATIENT OUTREACH (OUTPATIENT)
Dept: CARE COORDINATION | Facility: CLINIC | Age: 69
End: 2024-09-24
Payer: COMMERCIAL

## 2024-09-24 ENCOUNTER — DOCUMENTATION (OUTPATIENT)
Dept: PHARMACY | Facility: CLINIC | Age: 69
End: 2024-09-24

## 2024-09-24 ENCOUNTER — HOME INFUSION (OUTPATIENT)
Dept: INFUSION THERAPY | Age: 69
End: 2024-09-24
Payer: COMMERCIAL

## 2024-09-24 NOTE — PROGRESS NOTES
"Outreach call to patient to support a smooth transition of care from recent admission. Spoke with patient. Patient identified by name and . Patient informed me that, \"she is doing well and not interested at this time.\"    Thanked patient for her time to speak w/ me today and informed patient that, I am happy to assist if she has any questions or concerns.    Patient verbalizes understanding and does not have any questions, concerns or need any assistance at this time, \"she will contact me if she changes her mind and request not to receive anymore phone calls.\"    Understanding verbalized to patient.    Vandana Kaplan RN, [facility]  Accountable Care Organization Operations Office  [address] [place] [address]  Phone [phone]      Vandana Kaplan RN, [facility]  Accountable Care Organization Operations Office  [address] [place] [address]  Phone [phone]      "

## 2024-09-24 NOTE — PROGRESS NOTES
Review of chart.  Patient with order for continuous milrinone for CHF. Dr Rivera follows at home.    Review of rn visit notes. No problems noted with infusions or line. Weight on 9/19 was 128lbs.    Tel call with patient. Infusions continue to go well. She confirmed next bag change is due tomorrow and she has a bag in the home for this. She is agreeable to delivery of further bags with same supplies as last week later today with  calling on the way and knocking/ringing doorbell when package is dropped off.    Processed fill for 4x milrinone 48hr bags for mix and straight delivery 9/24/24 to cover bag changes 9/27 9/29 10/1 and 10/3/24.    Follow up 10/2/24 with next fill straight.

## 2024-09-26 ENCOUNTER — HOME CARE VISIT (OUTPATIENT)
Dept: HOME HEALTH SERVICES | Facility: HOME HEALTH | Age: 69
End: 2024-09-26
Payer: COMMERCIAL

## 2024-09-26 VITALS
OXYGEN SATURATION: 98 % | DIASTOLIC BLOOD PRESSURE: 68 MMHG | TEMPERATURE: 98.6 F | SYSTOLIC BLOOD PRESSURE: 118 MMHG | RESPIRATION RATE: 16 BRPM | HEART RATE: 64 BPM

## 2024-09-26 PROCEDURE — G0299 HHS/HOSPICE OF RN EA 15 MIN: HCPCS | Mod: HHH

## 2024-09-26 ASSESSMENT — PAIN SCALES - PAIN ASSESSMENT IN ADVANCED DEMENTIA (PAINAD)
TOTALSCORE: 0
NEGVOCALIZATION: 0 - NONE.
NEGVOCALIZATION: 0
CONSOLABILITY: 0
FACIALEXPRESSION: 0
BODYLANGUAGE: 0
CONSOLABILITY: 0 - NO NEED TO CONSOLE.
BODYLANGUAGE: 0 - RELAXED.
BREATHING: 0
FACIALEXPRESSION: 0 - SMILING OR INEXPRESSIVE.

## 2024-09-26 ASSESSMENT — ENCOUNTER SYMPTOMS
APPETITE LEVEL: GOOD
FORGETFULNESS: 1
DEPRESSION: 0
DENIES PAIN: 1
LAST BOWEL MOVEMENT: 67109
OCCASIONAL FEELINGS OF UNSTEADINESS: 1
LOSS OF SENSATION IN FEET: 0
CHANGE IN APPETITE: UNCHANGED

## 2024-10-01 ENCOUNTER — HOME CARE VISIT (OUTPATIENT)
Dept: HOME HEALTH SERVICES | Facility: HOME HEALTH | Age: 69
End: 2024-10-01
Payer: COMMERCIAL

## 2024-10-02 ENCOUNTER — DOCUMENTATION (OUTPATIENT)
Dept: PHARMACY | Facility: CLINIC | Age: 69
End: 2024-10-02

## 2024-10-02 ENCOUNTER — HOME INFUSION (OUTPATIENT)
Dept: INFUSION THERAPY | Age: 69
End: 2024-10-02
Payer: COMMERCIAL

## 2024-10-02 ENCOUNTER — HOME CARE VISIT (OUTPATIENT)
Dept: HOME HEALTH SERVICES | Facility: HOME HEALTH | Age: 69
End: 2024-10-02
Payer: COMMERCIAL

## 2024-10-02 PROCEDURE — G0162 HHC RN E&M PLAN SVS, 15 MIN: HCPCS | Mod: HHH

## 2024-10-02 NOTE — PROGRESS NOTES
Review of chart. Patient with order for continuous milrinone for advance heart failure. Dr Rivera follows outpatient. Current dosing of 0.375mcg/kg/min based on weight of 63.5kg.     Tel call with patient. She states infusions are going well. Her weight is unchanged. She confirmed next bag change of 10/03 with bag in fridge. Agrees to delivery by 8pm with  must call before arrival. Regarding supplies patient wants the usual supplies that we have been sending.      Processed fill for 4 x 48hr milrinone bags with tubing attached for mix and straight delivery 10/02 to cover 10/05 through 10/12/24   (Actual bag changes 10/5 10/07 10/09 and 10/11)     Follow up 10/10/24 with next fill straight. Check progress and inventory.

## 2024-10-03 VITALS
HEART RATE: 80 BPM | BODY MASS INDEX: 21.97 KG/M2 | SYSTOLIC BLOOD PRESSURE: 110 MMHG | RESPIRATION RATE: 16 BRPM | WEIGHT: 128 LBS | DIASTOLIC BLOOD PRESSURE: 68 MMHG | OXYGEN SATURATION: 98 % | TEMPERATURE: 98.8 F

## 2024-10-03 ASSESSMENT — PAIN SCALES - PAIN ASSESSMENT IN ADVANCED DEMENTIA (PAINAD)
CONSOLABILITY: 0
NEGVOCALIZATION: 0
BODYLANGUAGE: 0 - RELAXED.
BODYLANGUAGE: 0
CONSOLABILITY: 0 - NO NEED TO CONSOLE.
FACIALEXPRESSION: 0 - SMILING OR INEXPRESSIVE.
NEGVOCALIZATION: 0 - NONE.
TOTALSCORE: 0
BREATHING: 0
FACIALEXPRESSION: 0

## 2024-10-03 ASSESSMENT — ENCOUNTER SYMPTOMS
DENIES PAIN: 1
PERSON REPORTING PAIN: PATIENT
LAST BOWEL MOVEMENT: 67115
CHANGE IN APPETITE: UNCHANGED
APPETITE LEVEL: GOOD
FATIGUES EASILY: 1

## 2024-10-03 ASSESSMENT — ACTIVITIES OF DAILY LIVING (ADL)
ENTERING_EXITING_HOME: NEEDS ASSISTANCE
OASIS_M1830: 05

## 2024-10-09 ENCOUNTER — HOME INFUSION (OUTPATIENT)
Dept: INFUSION THERAPY | Age: 69
End: 2024-10-09
Payer: COMMERCIAL

## 2024-10-10 ENCOUNTER — DOCUMENTATION (OUTPATIENT)
Dept: PHARMACY | Facility: CLINIC | Age: 69
End: 2024-10-10

## 2024-10-10 ENCOUNTER — HOME CARE VISIT (OUTPATIENT)
Dept: HOME HEALTH SERVICES | Facility: HOME HEALTH | Age: 69
End: 2024-10-10
Payer: COMMERCIAL

## 2024-10-10 VITALS
OXYGEN SATURATION: 97 % | DIASTOLIC BLOOD PRESSURE: 68 MMHG | TEMPERATURE: 98.7 F | RESPIRATION RATE: 16 BRPM | HEART RATE: 68 BPM | SYSTOLIC BLOOD PRESSURE: 98 MMHG

## 2024-10-10 PROCEDURE — G0299 HHS/HOSPICE OF RN EA 15 MIN: HCPCS | Mod: HHH

## 2024-10-10 ASSESSMENT — PAIN SCALES - PAIN ASSESSMENT IN ADVANCED DEMENTIA (PAINAD)
NEGVOCALIZATION: 0
BODYLANGUAGE: 0
CONSOLABILITY: 0 - NO NEED TO CONSOLE.
BODYLANGUAGE: 0 - RELAXED.
TOTALSCORE: 0
NEGVOCALIZATION: 0 - NONE.
FACIALEXPRESSION: 0
FACIALEXPRESSION: 0 - SMILING OR INEXPRESSIVE.
BREATHING: 0
CONSOLABILITY: 0

## 2024-10-10 ASSESSMENT — ENCOUNTER SYMPTOMS
DENIES PAIN: 1
DEPRESSION: 0
LOSS OF SENSATION IN FEET: 0
OCCASIONAL FEELINGS OF UNSTEADINESS: 1
APPETITE LEVEL: FAIR
LAST BOWEL MOVEMENT: 67123

## 2024-10-10 NOTE — PROGRESS NOTES
SPOKE W/ PT - DELIVERY IS SCHEDULED FOR THURSDAY BY 5 PM.  ASKED PT IF THERE ARE ANY QUESTIONS TO A PHARMACIST. PT SAID: NO QUESTIONS.

## 2024-10-10 NOTE — PROGRESS NOTES
Review of chart. Patient with order for continuous milrinone for advance heart failure. Dr Rivera follows outpatient. Current dosing of 0.375mcg/kg/min based on weight of 63.5kg.     Tel call with patient. She states infusions are going well. Her weight is unchanged. However, bag placed on 10/08 (a day early) is reporting only 10 hours left today. Patient believes pump has been running fast. Agrees to delivery by 5pm on 10/10/24 with new CADD pump and  the affected pump for maintenance.  must call before arrival so she can have anything ready. Regarding supplies patient wants the usual supplies that we have been sending.  Pharmacy will send 3 milrinone bags with this delivery and follow up next week juan luis reassess inventory in home.      Processed fill for 3 x 48hr milrinone bags with tubing attached for mix and straight delivery 10/10 to cover 10/13 through 10/17/24   (Actual bag changes 10/12 10/14 and 10/16)     Follow up 10/15/24 with next fill straight. Check progress and inventory in home.  Confirm date of her next bag change.

## 2024-10-14 ENCOUNTER — APPOINTMENT (OUTPATIENT)
Dept: CARDIOLOGY | Facility: CLINIC | Age: 69
End: 2024-10-14
Payer: COMMERCIAL

## 2024-10-14 VITALS
RESPIRATION RATE: 18 BRPM | SYSTOLIC BLOOD PRESSURE: 131 MMHG | OXYGEN SATURATION: 99 % | HEART RATE: 93 BPM | HEIGHT: 64 IN | DIASTOLIC BLOOD PRESSURE: 73 MMHG | BODY MASS INDEX: 22.71 KG/M2 | WEIGHT: 133 LBS

## 2024-10-14 DIAGNOSIS — I50.20 HFREF (HEART FAILURE WITH REDUCED EJECTION FRACTION): ICD-10-CM

## 2024-10-14 DIAGNOSIS — F32.9 MAJOR DEPRESSIVE DISORDER, REMISSION STATUS UNSPECIFIED, UNSPECIFIED WHETHER RECURRENT: Primary | ICD-10-CM

## 2024-10-14 DIAGNOSIS — I50.33 ACUTE ON CHRONIC HEART FAILURE WITH NORMAL EJECTION FRACTION: ICD-10-CM

## 2024-10-14 PROCEDURE — 1159F MED LIST DOCD IN RCRD: CPT | Performed by: STUDENT IN AN ORGANIZED HEALTH CARE EDUCATION/TRAINING PROGRAM

## 2024-10-14 PROCEDURE — 99215 OFFICE O/P EST HI 40 MIN: CPT | Performed by: STUDENT IN AN ORGANIZED HEALTH CARE EDUCATION/TRAINING PROGRAM

## 2024-10-14 PROCEDURE — 3078F DIAST BP <80 MM HG: CPT | Performed by: STUDENT IN AN ORGANIZED HEALTH CARE EDUCATION/TRAINING PROGRAM

## 2024-10-14 PROCEDURE — 3075F SYST BP GE 130 - 139MM HG: CPT | Performed by: STUDENT IN AN ORGANIZED HEALTH CARE EDUCATION/TRAINING PROGRAM

## 2024-10-14 PROCEDURE — 3044F HG A1C LEVEL LT 7.0%: CPT | Performed by: STUDENT IN AN ORGANIZED HEALTH CARE EDUCATION/TRAINING PROGRAM

## 2024-10-14 PROCEDURE — 3008F BODY MASS INDEX DOCD: CPT | Performed by: STUDENT IN AN ORGANIZED HEALTH CARE EDUCATION/TRAINING PROGRAM

## 2024-10-14 PROCEDURE — 1126F AMNT PAIN NOTED NONE PRSNT: CPT | Performed by: STUDENT IN AN ORGANIZED HEALTH CARE EDUCATION/TRAINING PROGRAM

## 2024-10-14 RX ORDER — DIPHENHYDRAMINE HCL 25 MG
4 CAPSULE ORAL AS NEEDED
Qty: 100 EACH | Refills: 0 | Status: SHIPPED | OUTPATIENT
Start: 2024-10-14 | End: 2024-11-13

## 2024-10-14 RX ORDER — BUMETANIDE 0.5 MG/1
0.5 TABLET ORAL 2 TIMES WEEKLY
Qty: 8 TABLET | Refills: 2 | Status: SHIPPED | OUTPATIENT
Start: 2024-10-14 | End: 2025-01-12

## 2024-10-14 RX ORDER — MUPIROCIN 20 MG/G
OINTMENT TOPICAL
COMMUNITY
Start: 2024-08-08

## 2024-10-14 RX ORDER — IBUPROFEN 200 MG
1 TABLET ORAL EVERY 24 HOURS
Qty: 30 PATCH | Refills: 0 | Status: SHIPPED | OUTPATIENT
Start: 2024-10-14 | End: 2024-11-13

## 2024-10-14 ASSESSMENT — ENCOUNTER SYMPTOMS
DEPRESSION: 0
OCCASIONAL FEELINGS OF UNSTEADINESS: 0
LOSS OF SENSATION IN FEET: 0

## 2024-10-14 ASSESSMENT — PATIENT HEALTH QUESTIONNAIRE - PHQ9
2. FEELING DOWN, DEPRESSED OR HOPELESS: SEVERAL DAYS
1. LITTLE INTEREST OR PLEASURE IN DOING THINGS: NOT AT ALL
10. IF YOU CHECKED OFF ANY PROBLEMS, HOW DIFFICULT HAVE THESE PROBLEMS MADE IT FOR YOU TO DO YOUR WORK, TAKE CARE OF THINGS AT HOME, OR GET ALONG WITH OTHER PEOPLE: NOT DIFFICULT AT ALL
SUM OF ALL RESPONSES TO PHQ9 QUESTIONS 1 & 2: 1

## 2024-10-14 ASSESSMENT — PAIN SCALES - GENERAL: PAINLEVEL: 0-NO PAIN

## 2024-10-14 NOTE — PROGRESS NOTES
Patient ID: Melissa Marinelli   Primary Care Provider: Jeremiah Champion MD   Visit Type:  Follow Up   Accompanied by:  alone     Chief Complaint  Ambulatory heart failure care      History of Present Illness    69 y.o. woman who presents for advanced heart failure care. She has a past medical history significant for HrEF (EF 15-20%, multiple RWMA on TTE 2020) CAD s/p NSTEMI s/p RIRI to LCX (2016), MVR s/p mitral valve repair in (2019; 29 mm Epic bioprosthetic valve with Dr. Montana), COPD, HTN, HL, GERD, hx of CVA, DM.  On multiple occasions in the past durable LVAD therapy has been discussed and has been declined on multiple occasions.  Ultimately, Ms. Marinelli was initiated on milrinone 2024 and this therapy has been well-tolerated.    Her Wiley line fell out 2024 and was replaced during a  brief hospitalization.  She continues to receive home inotropic therapy and has been uneventful.  She reports that she takes diuretics approximately twice a week to maintain her weight.    Her brother  2024, and she is struggling with this.  She complains of insomnia and feelings of deep grief.    There is no history of chest pain, SOB at rest, SOBOE, orthopnoea, PND,  bendopnoea, syncope, pre syncope, palpitations, leg swelling.    Ms. Marinelli spends most of her day in her bedroom, she relates that she is depressed.  She has been referred to our mental health colleagues in the past but does not have routine follow-up.     She has been adherent with prescribed medications.     She has not had any defibrillator shocks.     She continues to smoke approximately 10 cigarettes a day.  She stopped at the end of 2023 but has resumed.     Surgical Hx:  - MVR     Social Hx;  - Marijuana   -daily cigarettes      Fam Hx:  Brother- CVA      The electronic medical record has been reviewed by me for salient history. All cardiovascular imaging and testing available in the electronic medical record, and  [employer] has been reviewed.    Medication Documentation Review Audit       Reviewed by Cathy Qureshi RN (Registered Nurse) on 10/14/24 at 1021      Medication Order Taking? Sig Documenting Provider Last Dose Status   acetaminophen (Tylenol) 500 mg tablet [id number] Yes Take 2 tablets (1,000 mg) by mouth every 8 hours if needed for mild pain (1 - 3). Historical Provider, MD Taking Active   albuterol 90 mcg/actuation inhaler [id number] Yes Inhale 2 puffs every 4 hours if needed for wheezing or shortness of breath. Bel Chilel APRN-CNP Taking Active   aspirin 81 mg EC tablet [id number] Yes Take 1 tablet (81 mg) by mouth once daily. Kathy Rivera MD PhD Taking Active   bumetanide (Bumex) 0.5 mg tablet [id number] Yes Take 1 tablet (0.5 mg) by mouth 2 times a week. On Tuesdays and Saturdays Kathy Rivera MD PhD Taking Active   Calcium Antacid 200 mg calcium (500 mg) chewable tablet [id number] Yes Chew 1 tablet (500 mg) every 12 hours if needed. Historical Provider, MD Taking Active   cholecalciferol (Vitamin D3) 50 MCG (2000 UT) tablet [id number] Yes Take 1 tablet (2,000 Units) by mouth once daily. Historical Provider, MD Taking Active   famotidine (Pepcid) 40 mg tablet [id number] Yes Take 1 tablet (40 mg) by mouth once daily. Do not start before February 3, 2024.   Patient taking differently: Take 1 tablet (40 mg) by mouth once daily as needed for indigestion or heartburn.    Ana Mccall APRN-CNP Taking Active   Farxiga 10 mg [id number] Yes Take 1 tablet (10 mg) by mouth once daily. Kathy Rivera MD PhD Taking Active   heparin flush,porcine,-0.9NaCl 100 unit/mL kit [id number] Yes 5 mL by central venous line infusion route 1 (one) time per week. Indications: prevent clot from blocking an intravenous catheter Historical Provider, MD Taking Active   hydrocortisone 1 % ointment [id number] Yes Apply topically 2 times a day as needed for irritation. Chest wall Brittney Nunez APRN-CNP Taking Active  "  ipratropium-albuteroL (Duo-Neb) 0.5-2.5 mg/3 mL nebulizer solution [id number] Yes Take 3 mL by nebulization every 6 hours. Historical Provider, MD Taking Active   milrinone in 5 % dextrose (Primacor) 20 mg/100 mL (200 mcg/mL) infusion [id number] Yes Infuse 23.8125 mcg/min at 7.14 mL/hr into a venous catheter continuously. Kathy Rivera MD PhD Taking Active   mupirocin (Bactroban) 2 % ointment [id number] Yes Apply topically. PRN for nose bleed Historical Provider, MD Taking Active   OneTouch Verio Flex meter misc [id number] Yes USE AS DIRECTED THREE TIMES DAILY Historical Provider, MD Taking Active   sodium chloride (Ocean) 0.65 % nasal spray [id number] Yes Administer 1 spray into each nostril if needed for congestion. Historical Provider, MD Taking Active   sodium chloride 0.9% (sodium chloride) flush [id number] Yes Infuse 10 mL into a venous catheter 1 (one) time per week. Historical Provider, MD Taking Active   spironolactone (Aldactone) 25 mg tablet [id number] Yes Take 0.5 tablets (12.5 mg) by mouth once daily. Kathy Rivera MD PhD Taking Active   Symbicort 80-4.5 mcg/actuation inhaler [id number] Yes Inhale 2 puffs twice a day. Historical Provider, MD Taking Active                         Allergies   Allergen Reactions    Metoprolol Other, Shortness of breath and Respiratory depression    Ticagrelor Anaphylaxis and Other       Feels a severe \"burning feeling\" in her chest    Gadolinium-Containing Contrast Media Hives and Other    Iodinated Contrast Media Hives and Other    Statins-Hmg-Coa Reductase Inhibitors Other and Myalgia       Atorvastatin - hair loss    Prednisone Other       Increased blood sugar    Ace Inhibitors Other and Cough       COUGH    Fentanyl Dizziness and Drowsiness    Hydralazine Dermatitis, Rash and Nausea/vomiting    Nicorette [Nicotine (Polacrilex)] Nausea/vomiting       Nicorette gums and lozenges only. Okay with nicotine patches    Penicillins Rash and Unknown      Visit Vitals  BP " "131/73 (BP Location: Right arm, Patient Position: Sitting, BP Cuff Size: Adult)   Pulse 93   Resp 18   Ht 1.626 m (5' 4\")   Wt 60.3 kg (133 lb)   SpO2 99%   BMI 22.83 kg/m²   OB Status Postmenopausal   Smoking Status Every Day   BSA 1.65 m²             Review of Systems   Constitutional: Negative for decreased appetite, weight gain and weight loss.   HENT:  Negative for hearing loss.    Eyes:  Negative for visual disturbance.   Cardiovascular: Negative for chest pain, dyspnea on exertion, irregular heartbeat, leg swelling, near-syncope, paroxysmal nocturnal dyspnea and syncope.   Respiratory:  Negative for cough, hemoptysis and shortness of breath.    Hematologic/Lymphatic: Does not bruise/bleed easily.   Skin:  Negative for rash.   Musculoskeletal:  Negative for arthritis and back pain.   Gastrointestinal:  Negative for hematochezia and melena.   Genitourinary:  Negative for hematuria.   Neurological:  Negative for focal weakness and weakness.   Psychiatric/Behavioral:  Negative for altered mental status and substance abuse.        Physical Exam     Very pleasant thin elderly AA woman in no apparent CP or painful distress  Well groomed   Neck: No JVD or HJR today  Chest wall: Right infraclavicular Wiley line covered in clean transparent bandages, c/d/i.  Some scratches around the dressing site.  See Media in EPIC 6/10/2024 image captured  CVS: HS 1,2. Gde 1-2 ESM at LL[facility]  Resp: CTA bilaterally  Abdomen:Mildly obese, SNT, BS wnl  Extremities: No pedal oedema. Right index finger granuloma ( dog bite 3 weeks prior)  Skin: warm and dry  CNS: No gross deficits, AO x 4    Lab Results   Component Value Date    WBC 5.2 09/09/2024    HGB 11.1 (L) 09/09/2024    HCT 36.1 09/09/2024    MCV 96 09/09/2024     09/09/2024        Chemistry    Lab Results   Component Value Date/Time     09/09/2024 0816    K 4.9 09/09/2024 0816     (H) 09/09/2024 0816    CO2 23 09/09/2024 0816    BUN 24 (H) 09/09/2024 0816    " CREATININE 1.35 (H) 09/09/2024 0816    Lab Results   Component Value Date/Time    CALCIUM 9.0 09/09/2024 0816    ALKPHOS 82 09/07/2024 1008    AST 21 09/07/2024 1008    ALT 13 09/07/2024 1008    BILITOT 0.4 09/07/2024 1008            IMPRESSION:     68 year old woman who presents for continued advanced heart failure care. She has a past medical history significant for HrEF (LVEF 15-20%, multiple RWMA on TTE 5/2020) CAD s/p NSTEMI s/p RIRI to LCX (Jan 2016), MVR s/p mitral valve repair in 6/2019; 29 mm Epic bioprosthetic valve with Dr. Montana), COPD, HTN, HL, GERD, hx of CVA, DM.Ms Marinelli has declined durable LVAD therapies in the past and again today.  She was initiated on milrinone (milrinone dependent) 2/2024.     NYHA Functional Class: 2  ACC/AHA Stage D heart failure  Volume status: Euvolaemic  Perfusion status: Warm to touch     PLAN:     # Stage D HFrEF  -Continue milrinone     #Active smoking history  She does not want to quit.  Nicotine replacement therapy has been prescribed  again today  Smoking cessation team referral     #Depression/Grief reaction  Referred to psych again today     #3 week old  dog bite to finger  - Seeing PCP today (10/14/2024) to discuss    This note was transcribed using the Dragon Dictation system. There may be grammatical, punctuation, or verbiage errors that can occur with voice recognition programs.     Kathy Rivera MD PhD

## 2024-10-14 NOTE — PATIENT INSTRUCTIONS
Thank you for coming in today. If you have any questions or concerns, you may call the Heart Failure Office at [phone] option [phone], or [phone].  You may also contact our heart failure nursing team via email on [email or web address].    For quicker results set-up your  [employer] account to receive results and other correspondence directly to your phone.    Please bring all your pills/medications to your Cardiology appointments.    **  - No new medication changes today    -We will renew your heart medications today    - Please make the following medication changes:  1. START nicotine gum    2. START nicotine patches 21 mg /day    - Please have the following tests done:  1.Blood tests just before your next visit (RFP, BNP)    - You will be referred to the Psychiatry team for depression/ grief reaction. CALL  [phone] to schedule     - Please make an appointment to be seen in 6 months

## 2024-10-15 ENCOUNTER — HOME INFUSION (OUTPATIENT)
Dept: INFUSION THERAPY | Age: 69
End: 2024-10-15
Payer: COMMERCIAL

## 2024-10-15 ENCOUNTER — DOCUMENTATION (OUTPATIENT)
Dept: PHARMACY | Facility: CLINIC | Age: 69
End: 2024-10-15

## 2024-10-15 NOTE — PROGRESS NOTES
PATIENT CONTINUES MILRONONE INFUSIONS FOR HEART FAILURE.  SPOKE WITH PATIENT TODAY, SHE IS DOING FINE WITH INFUSIONS AND IS ON SCHEDLE WITH BAG CHANGES.  SHE AGREED TO DELIVERY TODAY WITH SAME SUPPLIES AS LIST TIME.  PROCESSED FILL FOR 4 48HR MILRONONE BAGS FOR H/U ON 10/18, 20, 22, 24.  NF FOR 10/23 FOR STRAIGHT DLEIVERY. DSL

## 2024-10-17 ENCOUNTER — HOME CARE VISIT (OUTPATIENT)
Dept: HOME HEALTH SERVICES | Facility: HOME HEALTH | Age: 69
End: 2024-10-17
Payer: COMMERCIAL

## 2024-10-17 VITALS
OXYGEN SATURATION: 97 % | WEIGHT: 130 LBS | SYSTOLIC BLOOD PRESSURE: 120 MMHG | HEART RATE: 90 BPM | BODY MASS INDEX: 22.31 KG/M2 | DIASTOLIC BLOOD PRESSURE: 68 MMHG | RESPIRATION RATE: 16 BRPM | TEMPERATURE: 98.6 F

## 2024-10-17 PROCEDURE — G0299 HHS/HOSPICE OF RN EA 15 MIN: HCPCS | Mod: HHH

## 2024-10-17 ASSESSMENT — PAIN SCALES - PAIN ASSESSMENT IN ADVANCED DEMENTIA (PAINAD)
NEGVOCALIZATION: 0 - NONE.
BODYLANGUAGE: 0
TOTALSCORE: 0
NEGVOCALIZATION: 0
FACIALEXPRESSION: 0
CONSOLABILITY: 0
FACIALEXPRESSION: 0 - SMILING OR INEXPRESSIVE.
BODYLANGUAGE: 0 - RELAXED.
CONSOLABILITY: 0 - NO NEED TO CONSOLE.
BREATHING: 0

## 2024-10-17 ASSESSMENT — ENCOUNTER SYMPTOMS
LAST BOWEL MOVEMENT: 67130
FATIGUE: 1
CHANGE IN APPETITE: UNCHANGED
APPETITE LEVEL: GOOD
OCCASIONAL FEELINGS OF UNSTEADINESS: 0
DENIES PAIN: 1

## 2024-10-22 ENCOUNTER — HOME INFUSION (OUTPATIENT)
Dept: INFUSION THERAPY | Age: 69
End: 2024-10-22
Payer: COMMERCIAL

## 2024-10-22 NOTE — PROGRESS NOTES
Review of chart. Patient with order for continuous milrinone for heart failure. Dr Rivera follows outpatient.    Review of rn visit notes. No problems noted with infusions or line.    Tel call with patient. Infusions are going well. She is on schedule with bag changes- bag changed already today and has bag for 10/24/24. Weight is stable at 130#. Patient is agreeable to delivery tomorrow with same supplies as sent previously and  calling on the way.    Processed fill for 4 x milrinone 48hr bags for mix and straight delivery 10/23 to cover bag changes 10/26 10/28 10/30 and 11/1/24.    Follow up 10/31/24 with next fill straight.

## 2024-10-23 ENCOUNTER — DOCUMENTATION (OUTPATIENT)
Dept: PHARMACY | Facility: CLINIC | Age: 69
End: 2024-10-23

## 2024-10-24 ENCOUNTER — HOME CARE VISIT (OUTPATIENT)
Dept: HOME HEALTH SERVICES | Facility: HOME HEALTH | Age: 69
End: 2024-10-24
Payer: COMMERCIAL

## 2024-10-24 VITALS
RESPIRATION RATE: 16 BRPM | SYSTOLIC BLOOD PRESSURE: 90 MMHG | TEMPERATURE: 98.5 F | HEART RATE: 96 BPM | OXYGEN SATURATION: 99 % | DIASTOLIC BLOOD PRESSURE: 60 MMHG

## 2024-10-24 PROCEDURE — G0299 HHS/HOSPICE OF RN EA 15 MIN: HCPCS | Mod: HHH

## 2024-10-24 ASSESSMENT — ENCOUNTER SYMPTOMS
PERSON REPORTING PAIN: PATIENT
LAST BOWEL MOVEMENT: 67137
CHANGE IN APPETITE: UNCHANGED
FATIGUE: 1
APPETITE LEVEL: FAIR
OCCASIONAL FEELINGS OF UNSTEADINESS: 0
DENIES PAIN: 1

## 2024-10-24 ASSESSMENT — PAIN SCALES - PAIN ASSESSMENT IN ADVANCED DEMENTIA (PAINAD)
FACIALEXPRESSION: 0 - SMILING OR INEXPRESSIVE.
CONSOLABILITY: 0 - NO NEED TO CONSOLE.
CONSOLABILITY: 0
FACIALEXPRESSION: 0
TOTALSCORE: 0
BODYLANGUAGE: 0 - RELAXED.
NEGVOCALIZATION: 0
BODYLANGUAGE: 0
BREATHING: 0
NEGVOCALIZATION: 0 - NONE.

## 2024-10-30 ENCOUNTER — HOME INFUSION (OUTPATIENT)
Dept: INFUSION THERAPY | Age: 69
End: 2024-10-30
Payer: COMMERCIAL

## 2024-10-31 ENCOUNTER — DOCUMENTATION (OUTPATIENT)
Dept: PHARMACY | Facility: CLINIC | Age: 69
End: 2024-10-31

## 2024-10-31 ENCOUNTER — HOME CARE VISIT (OUTPATIENT)
Dept: HOME HEALTH SERVICES | Facility: HOME HEALTH | Age: 69
End: 2024-10-31
Payer: COMMERCIAL

## 2024-10-31 VITALS
TEMPERATURE: 98 F | DIASTOLIC BLOOD PRESSURE: 68 MMHG | OXYGEN SATURATION: 98 % | WEIGHT: 130 LBS | SYSTOLIC BLOOD PRESSURE: 98 MMHG | BODY MASS INDEX: 22.31 KG/M2 | RESPIRATION RATE: 16 BRPM | HEART RATE: 88 BPM

## 2024-10-31 PROCEDURE — G0299 HHS/HOSPICE OF RN EA 15 MIN: HCPCS | Mod: HHH

## 2024-10-31 ASSESSMENT — PAIN SCALES - PAIN ASSESSMENT IN ADVANCED DEMENTIA (PAINAD)
NEGVOCALIZATION: 0 - NONE.
FACIALEXPRESSION: 0
BODYLANGUAGE: 0
BODYLANGUAGE: 0 - RELAXED.
FACIALEXPRESSION: 0 - SMILING OR INEXPRESSIVE.
NEGVOCALIZATION: 0
CONSOLABILITY: 0
CONSOLABILITY: 0 - NO NEED TO CONSOLE.
TOTALSCORE: 0
BREATHING: 0

## 2024-10-31 ASSESSMENT — ENCOUNTER SYMPTOMS
CHANGE IN APPETITE: UNCHANGED
DEPRESSION: 0
LAST BOWEL MOVEMENT: 67144
OCCASIONAL FEELINGS OF UNSTEADINESS: 0
LOSS OF SENSATION IN FEET: 1
APPETITE LEVEL: FAIR
DENIES PAIN: 1

## 2024-11-07 ENCOUNTER — HOME CARE VISIT (OUTPATIENT)
Dept: HOME HEALTH SERVICES | Facility: HOME HEALTH | Age: 69
End: 2024-11-07
Payer: COMMERCIAL

## 2024-11-07 ENCOUNTER — HOME INFUSION (OUTPATIENT)
Dept: INFUSION THERAPY | Age: 69
End: 2024-11-07
Payer: COMMERCIAL

## 2024-11-07 VITALS
TEMPERATURE: 97.6 F | SYSTOLIC BLOOD PRESSURE: 98 MMHG | BODY MASS INDEX: 21.97 KG/M2 | DIASTOLIC BLOOD PRESSURE: 68 MMHG | WEIGHT: 128 LBS | OXYGEN SATURATION: 98 % | RESPIRATION RATE: 16 BRPM | HEART RATE: 68 BPM

## 2024-11-07 PROCEDURE — G0299 HHS/HOSPICE OF RN EA 15 MIN: HCPCS | Mod: HHH

## 2024-11-07 ASSESSMENT — PAIN SCALES - PAIN ASSESSMENT IN ADVANCED DEMENTIA (PAINAD)
CONSOLABILITY: 0 - NO NEED TO CONSOLE.
NEGVOCALIZATION: 0
FACIALEXPRESSION: 0 - SMILING OR INEXPRESSIVE.
CONSOLABILITY: 0
BODYLANGUAGE: 0
NEGVOCALIZATION: 0 - NONE.
FACIALEXPRESSION: 0
TOTALSCORE: 0
BODYLANGUAGE: 0 - RELAXED.
BREATHING: 0

## 2024-11-07 ASSESSMENT — ENCOUNTER SYMPTOMS
APPETITE LEVEL: GOOD
PERSON REPORTING PAIN: PATIENT
DENIES PAIN: 1
LAST BOWEL MOVEMENT: 67150
CHANGE IN APPETITE: UNCHANGED
OCCASIONAL FEELINGS OF UNSTEADINESS: 0

## 2024-11-07 NOTE — PROGRESS NOTES
Chart Review: Patient with order for continuous infusion of Milrinone for CHF  Dr Rivera follows outpt    Per RN visit notes from today - weight stable at 128,bs, no complaints for increased fatigue, cough or SOB    Telephone call with patient's daughter  Patient independent with bag changes - bag changes on schedule  Agreed to delivery on 11/8  Does NOT need saline or heparin flushes  Please send Milrinone, dressing change supplies and batteries (one set per bag change)    Dispense x3 48 hour infusions of Milrinone for cmpd and delivery on 11/8  Bag changes 11/1, 13 and 15    NF 11/14

## 2024-11-08 ENCOUNTER — DOCUMENTATION (OUTPATIENT)
Dept: PHARMACY | Facility: CLINIC | Age: 69
End: 2024-11-08

## 2024-11-08 NOTE — PROGRESS NOTES
SPOKE W/ PT - DELIVERY IS SCHEDULED FOR FRIDAY BY 9 PM.  ASKED PT IF THERE ARE ANY QUESTIONS TO A PHARMACIST. PT SAID: NO QUESTIONS.

## 2024-11-11 ENCOUNTER — HOSPITAL ENCOUNTER (OUTPATIENT)
Dept: CARDIOLOGY | Facility: CLINIC | Age: 69
Discharge: HOME | End: 2024-11-11
Payer: COMMERCIAL

## 2024-11-11 DIAGNOSIS — Z95.810 PRESENCE OF AUTOMATIC (IMPLANTABLE) CARDIAC DEFIBRILLATOR: ICD-10-CM

## 2024-11-11 DIAGNOSIS — I50.9 HEART FAILURE, UNSPECIFIED: ICD-10-CM

## 2024-11-11 PROCEDURE — 93296 REM INTERROG EVL PM/IDS: CPT

## 2024-11-13 ENCOUNTER — HOME INFUSION (OUTPATIENT)
Dept: INFUSION THERAPY | Age: 69
End: 2024-11-13
Payer: COMMERCIAL

## 2024-11-13 ENCOUNTER — DOCUMENTATION (OUTPATIENT)
Dept: PHARMACY | Facility: CLINIC | Age: 69
End: 2024-11-13

## 2024-11-13 NOTE — PROGRESS NOTES
Chart Review: Patient with order for continuous infusion of Milrinone for CHF  Dr Rivera follows outpt     Last reported weight stable at 128,bs, no complaints for increased fatigue, cough or SOB     Telephone call with patient's daughter  Patient independent with bag changes - bag changes on schedule  Agreed to delivery on 11/14  Does NOT need saline or heparin flushes  Please send Milrinone, dressing change supplies and batteries (one set per bag change)     Dispense x3 48 hour infusions of Milrinone for cmpd and delivery on 11/14  Bag changes 11/17, 19 and 21     NF 11/20

## 2024-11-13 NOTE — PROGRESS NOTES
SPOKE W/ PT - DELIVERY IS SCHEDULED FOR THURSDAY BY 9 PM.  ASKED PT IF THERE ARE ANY QUESTIONS TO A PHARMACIST. PT SAID: NO QUESTIONS.

## 2024-11-15 ENCOUNTER — HOME CARE VISIT (OUTPATIENT)
Dept: HOME HEALTH SERVICES | Facility: HOME HEALTH | Age: 69
End: 2024-11-15
Payer: COMMERCIAL

## 2024-11-15 VITALS
RESPIRATION RATE: 16 BRPM | TEMPERATURE: 98.6 F | DIASTOLIC BLOOD PRESSURE: 72 MMHG | WEIGHT: 131 LBS | BODY MASS INDEX: 22.49 KG/M2 | OXYGEN SATURATION: 96 % | SYSTOLIC BLOOD PRESSURE: 118 MMHG | HEART RATE: 92 BPM

## 2024-11-15 PROCEDURE — G0299 HHS/HOSPICE OF RN EA 15 MIN: HCPCS | Mod: HHH

## 2024-11-16 ASSESSMENT — PAIN SCALES - PAIN ASSESSMENT IN ADVANCED DEMENTIA (PAINAD)
CONSOLABILITY: 0
BREATHING: 0
NEGVOCALIZATION: 0
BODYLANGUAGE: 0
NEGVOCALIZATION: 0 - NONE.
BODYLANGUAGE: 0 - RELAXED.
FACIALEXPRESSION: 0 - SMILING OR INEXPRESSIVE.
FACIALEXPRESSION: 0
CONSOLABILITY: 0 - NO NEED TO CONSOLE.
TOTALSCORE: 0

## 2024-11-16 ASSESSMENT — ENCOUNTER SYMPTOMS
OCCASIONAL FEELINGS OF UNSTEADINESS: 0
APPETITE LEVEL: FAIR
LAST BOWEL MOVEMENT: 67159
DENIES PAIN: 1
CHANGE IN APPETITE: UNCHANGED
PERSON REPORTING PAIN: PATIENT

## 2024-11-19 ENCOUNTER — DOCUMENTATION (OUTPATIENT)
Dept: PHARMACY | Facility: CLINIC | Age: 69
End: 2024-11-19

## 2024-11-19 ENCOUNTER — HOME INFUSION (OUTPATIENT)
Dept: INFUSION THERAPY | Age: 69
End: 2024-11-19
Payer: COMMERCIAL

## 2024-11-19 NOTE — PROGRESS NOTES
Chart Review: Patient with order for continuous infusion of Milrinone for CHF  Dr Rivera follows outpt     Weight 131 lbs as of 11/15     Tel call with patient. Infusions are going well. She is on schedule with bag changes- bag about to be changed right now and has bag for 11/21/24. Patient is agreeable to delivery tomorrow with supplies to match and  calling on the way.      Dispense 3x 48 hour infusions of Milrinone for cmpd and delivery on 11/20  Bag changes 11/23, 25 and 27     NF 11/26 check prog, send straight

## 2024-11-21 ENCOUNTER — HOME CARE VISIT (OUTPATIENT)
Dept: HOME HEALTH SERVICES | Facility: HOME HEALTH | Age: 69
End: 2024-11-21
Payer: COMMERCIAL

## 2024-11-21 VITALS
OXYGEN SATURATION: 95 % | WEIGHT: 130 LBS | HEART RATE: 100 BPM | TEMPERATURE: 98.5 F | DIASTOLIC BLOOD PRESSURE: 60 MMHG | SYSTOLIC BLOOD PRESSURE: 90 MMHG | RESPIRATION RATE: 16 BRPM | BODY MASS INDEX: 22.31 KG/M2

## 2024-11-21 PROCEDURE — G0299 HHS/HOSPICE OF RN EA 15 MIN: HCPCS | Mod: HHH

## 2024-11-21 ASSESSMENT — PAIN SCALES - PAIN ASSESSMENT IN ADVANCED DEMENTIA (PAINAD)
BODYLANGUAGE: 0
CONSOLABILITY: 0
CONSOLABILITY: 0 - NO NEED TO CONSOLE.
NEGVOCALIZATION: 0
NEGVOCALIZATION: 0 - NONE.
BREATHING: 0
BODYLANGUAGE: 0 - RELAXED.
FACIALEXPRESSION: 0 - SMILING OR INEXPRESSIVE.
TOTALSCORE: 0
FACIALEXPRESSION: 0

## 2024-11-21 ASSESSMENT — ENCOUNTER SYMPTOMS
PERSON REPORTING PAIN: PATIENT
FATIGUE: 1
LAST BOWEL MOVEMENT: 67165
DENIES PAIN: 1
OCCASIONAL FEELINGS OF UNSTEADINESS: 0
CHANGE IN APPETITE: UNCHANGED
APPETITE LEVEL: FAIR

## 2024-11-25 ENCOUNTER — HOME INFUSION (OUTPATIENT)
Dept: INFUSION THERAPY | Age: 69
End: 2024-11-25
Payer: COMMERCIAL

## 2024-11-25 NOTE — PROGRESS NOTES
Review of chart. Patient infuses continuous milrinone  for CHF. Dr Kathy Rivera follows outpatient.     Review of RN visit notes. No problems are noted with infusions or line. 11/21/24 weight was 131 lbs.    Tel call to daughter. Last bag change was 11/25 12mn and there is one bag left in the home for hookup 11/27 12mn. Daughter is agreeable to delivery of further bags on 11/26/24 with same supplies as last week.    Processed fill for 4 x 48hr milrinone bags for mix and straight delivery 11/26 to cover bag changes 11/29 12/1 12/3 and 12/5/24.    Follow up 12/4/24. Check inventory/bag change schedule and weight. Send straight. **call daughter**

## 2024-11-26 ENCOUNTER — DOCUMENTATION (OUTPATIENT)
Dept: PHARMACY | Facility: CLINIC | Age: 69
End: 2024-11-26

## 2024-11-29 ENCOUNTER — HOME CARE VISIT (OUTPATIENT)
Dept: HOME HEALTH SERVICES | Facility: HOME HEALTH | Age: 69
End: 2024-11-29
Payer: COMMERCIAL

## 2024-11-29 VITALS
HEART RATE: 96 BPM | TEMPERATURE: 98.7 F | SYSTOLIC BLOOD PRESSURE: 110 MMHG | DIASTOLIC BLOOD PRESSURE: 68 MMHG | OXYGEN SATURATION: 99 % | RESPIRATION RATE: 16 BRPM

## 2024-11-29 PROCEDURE — G0162 HHC RN E&M PLAN SVS, 15 MIN: HCPCS | Mod: HHH

## 2024-11-29 ASSESSMENT — ENCOUNTER SYMPTOMS
CHANGE IN APPETITE: UNCHANGED
APPETITE LEVEL: FAIR
FORGETFULNESS: 1
LAST BOWEL MOVEMENT: 67173
PERSON REPORTING PAIN: PATIENT
OCCASIONAL FEELINGS OF UNSTEADINESS: 0
DENIES PAIN: 1

## 2024-11-29 ASSESSMENT — PAIN SCALES - PAIN ASSESSMENT IN ADVANCED DEMENTIA (PAINAD)
CONSOLABILITY: 0 - NO NEED TO CONSOLE.
FACIALEXPRESSION: 0 - SMILING OR INEXPRESSIVE.
CONSOLABILITY: 0
NEGVOCALIZATION: 0
BREATHING: 0
BODYLANGUAGE: 0
TOTALSCORE: 0
BODYLANGUAGE: 0 - RELAXED.
NEGVOCALIZATION: 0 - NONE.
FACIALEXPRESSION: 0

## 2024-11-29 ASSESSMENT — ACTIVITIES OF DAILY LIVING (ADL)
ENTERING_EXITING_HOME: NEEDS ASSISTANCE
OASIS_M1830: 05

## 2024-12-04 ENCOUNTER — HOME INFUSION (OUTPATIENT)
Dept: INFUSION THERAPY | Age: 69
End: 2024-12-04
Payer: COMMERCIAL

## 2024-12-04 ENCOUNTER — DOCUMENTATION (OUTPATIENT)
Dept: PHARMACY | Facility: CLINIC | Age: 69
End: 2024-12-04

## 2024-12-04 NOTE — PROGRESS NOTES
Review of chart. Patient infuses continuous milrinone  for CHF. Dr Kathy Rivera follows outpatient.      Review of RN visit notes. No problems are noted with infusions or line. 11/21/24 weight was 131 lbs.     Tel call to daughter. Pt letting bags run longer than 48 hours and is refusing assistance to maintain independence. Per dtr - pt changed bag on 12/2 and is due to change again tonight in the evening. Daughter is agreeable to delivery of further bags on 12/4/24 with same supplies as last week.     Processed fill for 3 x 48hr milrinone bags for mix and straight delivery 12/4 to cover bag changes 12/6, 12/8, and 12/10     Follow up 12/9/24. Check inventory/bag change schedule and weight. Send straight. **call daughter**

## 2024-12-05 ENCOUNTER — HOSPITAL ENCOUNTER (OUTPATIENT)
Dept: CARDIOLOGY | Facility: CLINIC | Age: 69
Discharge: HOME | End: 2024-12-05
Payer: COMMERCIAL

## 2024-12-05 ENCOUNTER — HOME CARE VISIT (OUTPATIENT)
Dept: HOME HEALTH SERVICES | Facility: HOME HEALTH | Age: 69
End: 2024-12-05
Payer: COMMERCIAL

## 2024-12-05 VITALS
HEART RATE: 100 BPM | BODY MASS INDEX: 22.14 KG/M2 | DIASTOLIC BLOOD PRESSURE: 60 MMHG | TEMPERATURE: 98.6 F | WEIGHT: 129 LBS | OXYGEN SATURATION: 96 % | SYSTOLIC BLOOD PRESSURE: 110 MMHG | RESPIRATION RATE: 16 BRPM

## 2024-12-05 DIAGNOSIS — Z95.810 PRESENCE OF AUTOMATIC (IMPLANTABLE) CARDIAC DEFIBRILLATOR: ICD-10-CM

## 2024-12-05 DIAGNOSIS — I50.9 HEART FAILURE, UNSPECIFIED: ICD-10-CM

## 2024-12-05 PROCEDURE — G0299 HHS/HOSPICE OF RN EA 15 MIN: HCPCS | Mod: HHH

## 2024-12-07 ASSESSMENT — ENCOUNTER SYMPTOMS
APPETITE LEVEL: FAIR
LAST BOWEL MOVEMENT: 67179
OCCASIONAL FEELINGS OF UNSTEADINESS: 0
CHANGE IN APPETITE: UNCHANGED

## 2024-12-09 ENCOUNTER — DOCUMENTATION (OUTPATIENT)
Dept: PHARMACY | Facility: CLINIC | Age: 69
End: 2024-12-09

## 2024-12-09 ENCOUNTER — HOME INFUSION (OUTPATIENT)
Dept: INFUSION THERAPY | Age: 69
End: 2024-12-09
Payer: COMMERCIAL

## 2024-12-09 NOTE — PROGRESS NOTES
Review of chart. Patient infuses continuous milrinone  for CHF. Dr Kathy Rivera follows outpatient.      Review of RN visit notes. No problems are noted with infusions or line. 12/5/24 weight was 129 lbs.     Per previous pharmacy note - pt letting bags run longer than 48 hours and is refusing assistance to maintain independence. [facility] tried calling daughter today but unable to reach by phone or leave . Pt is not admitted so pharmacy will proceed with delivery today.     Processed fill for 4x 48hr milrinone bags for mix and straight delivery 12/9 to cover bag changes 12/12, 12/14, 12/16, and 12/18     Follow up 12/17/24. Check inventory/bag change schedule and weight. Send straight. **call daughter**

## 2024-12-09 NOTE — PROGRESS NOTES
CALLED PT AND LEFT VM - DELIVERY IS SCHEDULED FOR MONDAY BY  8  PM.  LET PT KNOW WE'RE SENDING STND SUPPLIES AND ASKED TO CALL W/ ANY QUESTIONS.

## 2024-12-12 ENCOUNTER — HOME CARE VISIT (OUTPATIENT)
Dept: HOME HEALTH SERVICES | Facility: HOME HEALTH | Age: 69
End: 2024-12-12
Payer: COMMERCIAL

## 2024-12-12 VITALS
WEIGHT: 284.83 LBS | SYSTOLIC BLOOD PRESSURE: 140 MMHG | BODY MASS INDEX: 48.89 KG/M2 | HEART RATE: 90 BPM | OXYGEN SATURATION: 97 % | RESPIRATION RATE: 16 BRPM | DIASTOLIC BLOOD PRESSURE: 70 MMHG | TEMPERATURE: 98.6 F

## 2024-12-12 PROCEDURE — G0299 HHS/HOSPICE OF RN EA 15 MIN: HCPCS | Mod: HHH

## 2024-12-12 ASSESSMENT — PAIN SCALES - PAIN ASSESSMENT IN ADVANCED DEMENTIA (PAINAD)
NEGVOCALIZATION: 0 - NONE.
BREATHING: 0
NEGVOCALIZATION: 0
FACIALEXPRESSION: 0 - SMILING OR INEXPRESSIVE.
BODYLANGUAGE: 0
FACIALEXPRESSION: 0
TOTALSCORE: 0
CONSOLABILITY: 0
CONSOLABILITY: 0 - NO NEED TO CONSOLE.
BODYLANGUAGE: 0 - RELAXED.

## 2024-12-12 ASSESSMENT — ENCOUNTER SYMPTOMS
CHANGE IN APPETITE: UNCHANGED
DENIES PAIN: 1
FATIGUE: 1
OCCASIONAL FEELINGS OF UNSTEADINESS: 0
FATIGUES EASILY: 1
APPETITE LEVEL: FAIR
LAST BOWEL MOVEMENT: 67186
PERSON REPORTING PAIN: PATIENT

## 2024-12-16 ENCOUNTER — HOME INFUSION (OUTPATIENT)
Dept: INFUSION THERAPY | Age: 69
End: 2024-12-16
Payer: COMMERCIAL

## 2024-12-16 NOTE — PROGRESS NOTES
Review of chart. Patient infuses continuous milrinone  for CHF. Dr Kathy Rivera follows outpatient.      Review of RN visit notes. No problems are noted with infusions or line. Reported weight from 12/16: 127 lbs     Tel call to daughter. Pt on schedule with bag changes. Changing bag today with one remaining in home for 12/18. Daughter is agreeable to delivery of further bags on 12/17/24 with same supplies as last week.     Processed fill for 3 x 48hr milrinone bags for mix and straight delivery 12/17 to cover bag changes 12/20, 12/22, and 12/24     Follow up 12/23/24. Check inventory/bag change schedule and weight. Send straight. **call daughter**

## 2024-12-17 ENCOUNTER — DOCUMENTATION (OUTPATIENT)
Dept: PHARMACY | Facility: CLINIC | Age: 69
End: 2024-12-17

## 2024-12-19 ENCOUNTER — HOME CARE VISIT (OUTPATIENT)
Dept: HOME HEALTH SERVICES | Facility: HOME HEALTH | Age: 69
End: 2024-12-19
Payer: COMMERCIAL

## 2024-12-19 VITALS
DIASTOLIC BLOOD PRESSURE: 72 MMHG | BODY MASS INDEX: 22.14 KG/M2 | OXYGEN SATURATION: 99 % | TEMPERATURE: 98.6 F | SYSTOLIC BLOOD PRESSURE: 102 MMHG | RESPIRATION RATE: 16 BRPM | WEIGHT: 129 LBS | HEART RATE: 96 BPM

## 2024-12-19 PROCEDURE — G0299 HHS/HOSPICE OF RN EA 15 MIN: HCPCS | Mod: HHH

## 2024-12-19 ASSESSMENT — PAIN SCALES - PAIN ASSESSMENT IN ADVANCED DEMENTIA (PAINAD)
BODYLANGUAGE: 0 - RELAXED.
NEGVOCALIZATION: 0
CONSOLABILITY: 0
TOTALSCORE: 0
FACIALEXPRESSION: 0 - SMILING OR INEXPRESSIVE.
BREATHING: 0
NEGVOCALIZATION: 0 - NONE.
BODYLANGUAGE: 0
CONSOLABILITY: 0 - NO NEED TO CONSOLE.
FACIALEXPRESSION: 0

## 2024-12-19 ASSESSMENT — ENCOUNTER SYMPTOMS
DENIES PAIN: 1
APPETITE LEVEL: FAIR
OCCASIONAL FEELINGS OF UNSTEADINESS: 0
PERSON REPORTING PAIN: PATIENT
LOSS OF SENSATION IN FEET: 0
DEPRESSION: 0
LAST BOWEL MOVEMENT: 67193
FATIGUE: 1
CHANGE IN APPETITE: UNCHANGED

## 2024-12-21 NOTE — POST-PROCEDURE NOTE
Physician Transition of Care Summary  Invasive Cardiovascular Lab    Procedure Date: 1/26/2024  Attending:    Nader Dodson - Primary  Resident/Fellow/Other Assistant: Surgeon(s) and Role:     * Osito Galeas MD - Fellow    Indications:   Pre-op Diagnosis     * HFrEF (heart failure with reduced ejection fraction) (CMS/HCC) [I50.20]    Post-procedure diagnosis:   Post-op Diagnosis     * HFrEF (heart failure with reduced ejection fraction) (CMS/HCC) [I50.20]    Procedure(s):   Right Heart Cath  81735 - NE RIGHT HEART CATH O2 SATURATION & CARDIAC OUTPUT    Procedure Findings:   -Elevated right-[RA 12, RVEDP 13] and left-[PCWP 26 mmHg] sided filling pressure, PA pressures of 46/29 with a mean of 37 mmHg, and low assumed Chirag cardiac output at 2.3 L/min and cardiac index of 1.3 L/min/m².  SVR 2,886  dynes/seconds/cm-5  -[place]-Alex was sutured in at an external marker m of 55 cm.    Access of the Procedure:   9 [place] right IJ, [place] catheter sutured in at the external marker 55 cm    Complications:   None.    Stents/Implants:   Cardiovascular Implants       Pacemaker    Lead, Pacing, Myocardial, Bipolar, Temporary Case [id number] - Implanted        Model/Cat number: [id number] : [employer]    Lot number: [id number] Implant Date: 6/6/2019    Description: Converted from [facility]. Please see archived information for full log information.        As of 9/1/2023       Status: Unknown                      Other Cardiac Implant    Valve, Mitral Epic, [place], 29mm Case [id number] - Implanted        Model/Cat number: [id number] Serial number: [id number]    : [employer]        As of 9/1/2023       Status: Unknown                      Device, Implella 5.0 Case [id number] - Implanted        Model/Cat number: [id number] : [employer]    Lot number: [id number]        As of 9/1/2023       Status: Unknown                      Pump Set, Heart, Impella Cp Case [id number] - Implanted        Model/Cat number:  [id number] Serial number: [id number]    : [employer]        As of 9/1/2023       Status: Unknown                              Anticoagulation/Antiplatelet Plan:   N/A.    Estimated Blood Loss:   5 mL    Anesthesia: Moderate Sedation Anesthesia Staff: No anesthesia staff entered.    Any Specimen(s) Removed:   No specimens collected during this procedure.    Disposition:   -Patient will be  transferred to heart failure ICU, further management as per heart failure ICU team.      Electronically signed by: Cuate Dodson MD, 1/26/2024 5:42 PM   [[id number]]

## 2024-12-23 ENCOUNTER — DOCUMENTATION (OUTPATIENT)
Dept: PHARMACY | Facility: CLINIC | Age: 69
End: 2024-12-23

## 2024-12-23 ENCOUNTER — HOME INFUSION (OUTPATIENT)
Dept: INFUSION THERAPY | Age: 69
End: 2024-12-23
Payer: COMMERCIAL

## 2024-12-23 NOTE — PROGRESS NOTES
Review of chart. Patient with order for continuous milrinone for CHF. Dr Kathy Rivera following at home.    Review of RN visit notes. No problems with infusions or line noted.    Tel call with daughter. Infusions continue to go well. Last bag change was yesterday and she has a bag in the home for hook up tomorrow. Weight remains stable at 127lbs. She is agreeable to delivery of further bags later today with supplies to match, except she does NOT need flushes this week.    Processed fill for 4 x 48hr milrinone bags for mix and straight delivery 12/23 to cover bag changes 12/26 12/28 12/30 and 1/1.    Follow up 12/30 with next delivery straight. Check weight and supply needs.

## 2024-12-27 ENCOUNTER — HOME CARE VISIT (OUTPATIENT)
Dept: HOME HEALTH SERVICES | Facility: HOME HEALTH | Age: 69
End: 2024-12-27
Payer: COMMERCIAL

## 2024-12-27 VITALS
SYSTOLIC BLOOD PRESSURE: 98 MMHG | WEIGHT: 129 LBS | TEMPERATURE: 98.2 F | RESPIRATION RATE: 16 BRPM | HEART RATE: 68 BPM | OXYGEN SATURATION: 98 % | DIASTOLIC BLOOD PRESSURE: 60 MMHG | BODY MASS INDEX: 22.14 KG/M2

## 2024-12-27 PROCEDURE — G0299 HHS/HOSPICE OF RN EA 15 MIN: HCPCS | Mod: HHH

## 2024-12-27 ASSESSMENT — ENCOUNTER SYMPTOMS
OCCASIONAL FEELINGS OF UNSTEADINESS: 0
DENIES PAIN: 1
APPETITE LEVEL: GOOD
LAST BOWEL MOVEMENT: 67201
CHANGE IN APPETITE: UNCHANGED

## 2024-12-27 ASSESSMENT — PAIN SCALES - PAIN ASSESSMENT IN ADVANCED DEMENTIA (PAINAD)
TOTALSCORE: 0
BREATHING: 0
NEGVOCALIZATION: 0
FACIALEXPRESSION: 0
BODYLANGUAGE: 0 - RELAXED.
CONSOLABILITY: 0
NEGVOCALIZATION: 0 - NONE.
CONSOLABILITY: 0 - NO NEED TO CONSOLE.
FACIALEXPRESSION: 0 - SMILING OR INEXPRESSIVE.
BODYLANGUAGE: 0

## 2024-12-30 ENCOUNTER — HOME INFUSION (OUTPATIENT)
Dept: INFUSION THERAPY | Age: 69
End: 2024-12-30
Payer: COMMERCIAL

## 2024-12-30 ENCOUNTER — DOCUMENTATION (OUTPATIENT)
Dept: PHARMACY | Facility: CLINIC | Age: 69
End: 2024-12-30

## 2024-12-30 NOTE — PROGRESS NOTES
Review of chart. Patient with order for continuous milrinone for CHF. Dr Kathy Rivera following at home.     Review of RN visit notes. No problems with infusions or line noted.     Tel call with daughter. Melissa did a bag change yetserday 12/29 with the bag marked for hookup 12/30. She is not sure exactly what happened but thinks pt did a hookup a day early. Pt continues to refuse assistance and maintain independence. Daughter is trying to figure out options for ways she can help out. Thinks pt has one bag left for tomorrow 12/31. Agreeable to delivery of further bags any time today with standard supplies to match, no flushes needed.     Processed fill for 4 x 48hr milrinone bags for mix and straight delivery 12/30 to cover bag changes 1/2 1/4 1/6 and 1/8.     Follow up 1/6 check inventory, check weight, mix and deliver 1/7 as appropriate

## 2025-01-01 ENCOUNTER — HOME CARE VISIT (OUTPATIENT)
Dept: HOME HEALTH SERVICES | Facility: HOME HEALTH | Age: 70
End: 2025-01-01
Payer: COMMERCIAL

## 2025-01-01 ENCOUNTER — HOME INFUSION (OUTPATIENT)
Dept: INFUSION THERAPY | Age: 70
End: 2025-01-01
Payer: COMMERCIAL

## 2025-01-01 ENCOUNTER — HOSPITAL ENCOUNTER (INPATIENT)
Facility: HOSPITAL | Age: 70
LOS: 1 days | DRG: 291 | End: 2025-04-16
Attending: STUDENT IN AN ORGANIZED HEALTH CARE EDUCATION/TRAINING PROGRAM | Admitting: STUDENT IN AN ORGANIZED HEALTH CARE EDUCATION/TRAINING PROGRAM
Payer: COMMERCIAL

## 2025-01-01 ENCOUNTER — HOME HEALTH ADMISSION (OUTPATIENT)
Dept: HOME HEALTH SERVICES | Facility: HOME HEALTH | Age: 70
End: 2025-01-01
Payer: COMMERCIAL

## 2025-01-01 ENCOUNTER — APPOINTMENT (OUTPATIENT)
Dept: RADIOLOGY | Facility: HOSPITAL | Age: 70
DRG: 291 | End: 2025-01-01
Payer: COMMERCIAL

## 2025-01-01 ENCOUNTER — APPOINTMENT (OUTPATIENT)
Dept: CARDIOLOGY | Facility: HOSPITAL | Age: 70
DRG: 291 | End: 2025-01-01
Payer: COMMERCIAL

## 2025-01-01 ENCOUNTER — CLINICAL SUPPORT (OUTPATIENT)
Dept: EMERGENCY MEDICINE | Facility: HOSPITAL | Age: 70
DRG: 291 | End: 2025-01-01
Payer: COMMERCIAL

## 2025-01-01 VITALS
DIASTOLIC BLOOD PRESSURE: 42 MMHG | HEIGHT: 64 IN | RESPIRATION RATE: 17 BRPM | WEIGHT: 119.93 LBS | HEART RATE: 40 BPM | OXYGEN SATURATION: 56 % | BODY MASS INDEX: 20.47 KG/M2 | SYSTOLIC BLOOD PRESSURE: 78 MMHG | TEMPERATURE: 97.3 F

## 2025-01-01 VITALS
HEIGHT: 62 IN | DIASTOLIC BLOOD PRESSURE: 56 MMHG | HEART RATE: 102 BPM | OXYGEN SATURATION: 95 % | RESPIRATION RATE: 20 BRPM | BODY MASS INDEX: 22.08 KG/M2 | TEMPERATURE: 98 F | SYSTOLIC BLOOD PRESSURE: 80 MMHG | WEIGHT: 120 LBS

## 2025-01-01 VITALS — DIASTOLIC BLOOD PRESSURE: 60 MMHG | HEART RATE: 105 BPM | OXYGEN SATURATION: 86 % | SYSTOLIC BLOOD PRESSURE: 90 MMHG

## 2025-01-01 DIAGNOSIS — I48.92 ATRIAL FLUTTER, UNSPECIFIED TYPE (MULTI): ICD-10-CM

## 2025-01-01 DIAGNOSIS — I50.33 ACUTE ON CHRONIC HEART FAILURE WITH NORMAL EJECTION FRACTION: ICD-10-CM

## 2025-01-01 DIAGNOSIS — I50.9 ACUTE ON CHRONIC CONGESTIVE HEART FAILURE, UNSPECIFIED HEART FAILURE TYPE: ICD-10-CM

## 2025-01-01 DIAGNOSIS — R62.7 FAILURE TO THRIVE IN ADULT: ICD-10-CM

## 2025-01-01 DIAGNOSIS — R53.1 GENERALIZED WEAKNESS: Primary | ICD-10-CM

## 2025-01-01 DIAGNOSIS — I50.9 ACUTE ON CHRONIC HEART FAILURE, UNSPECIFIED HEART FAILURE TYPE: ICD-10-CM

## 2025-01-01 DIAGNOSIS — I47.20 VENTRICULAR TACHYCARDIA (PAROXYSMAL): ICD-10-CM

## 2025-01-01 DIAGNOSIS — I48.92 ATRIAL FIB/FLUTTER, TRANSIENT (MULTI): ICD-10-CM

## 2025-01-01 DIAGNOSIS — I50.43 HEART FAILURE, ACUTE ON CHRONIC, SYSTOLIC AND DIASTOLIC: ICD-10-CM

## 2025-01-01 DIAGNOSIS — I48.91 ATRIAL FIB/FLUTTER, TRANSIENT (MULTI): ICD-10-CM

## 2025-01-01 LAB
ALBUMIN SERPL BCP-MCNC: 2.8 G/DL (ref 3.4–5)
ALBUMIN SERPL BCP-MCNC: 3 G/DL (ref 3.4–5)
ALP SERPL-CCNC: 106 U/L (ref 33–136)
ALP SERPL-CCNC: 110 U/L (ref 33–136)
ALT SERPL W P-5'-P-CCNC: 75 U/L (ref 7–45)
ALT SERPL W P-5'-P-CCNC: 93 U/L (ref 7–45)
ANION GAP BLDA CALCULATED.4IONS-SCNC: 15 MMO/L (ref 10–25)
ANION GAP BLDV CALCULATED.4IONS-SCNC: 14 MMOL/L (ref 10–25)
ANION GAP BLDV CALCULATED.4IONS-SCNC: ABNORMAL MMOL/L
ANION GAP SERPL CALC-SCNC: 15 MMOL/L (ref 10–20)
ANION GAP SERPL CALC-SCNC: 18 MMOL/L (ref 10–20)
APPEARANCE UR: ABNORMAL
AST SERPL W P-5'-P-CCNC: 62 U/L (ref 9–39)
AST SERPL W P-5'-P-CCNC: 85 U/L (ref 9–39)
BASE EXCESS BLDA CALC-SCNC: -7.2 MMOL/L (ref -2–3)
BASE EXCESS BLDV CALC-SCNC: -0.2 MMOL/L (ref -2–3)
BASE EXCESS BLDV CALC-SCNC: 0.3 MMOL/L (ref -2–3)
BASOPHILS # BLD AUTO: 0.01 X10*3/UL (ref 0–0.1)
BASOPHILS # BLD MANUAL: 0 X10*3/UL (ref 0–0.1)
BASOPHILS NFR BLD AUTO: 0.1 %
BASOPHILS NFR BLD MANUAL: 0 %
BILIRUB DIRECT SERPL-MCNC: 1 MG/DL (ref 0–0.3)
BILIRUB SERPL-MCNC: 1.6 MG/DL (ref 0–1.2)
BILIRUB SERPL-MCNC: 1.7 MG/DL (ref 0–1.2)
BILIRUB UR STRIP.AUTO-MCNC: NEGATIVE MG/DL
BLASTS # BLD MANUAL: 0 X10*3/UL
BLASTS NFR BLD MANUAL: 0 %
BNP SERPL-MCNC: 1637 PG/ML (ref 0–99)
BODY TEMPERATURE: 37 DEGREES CELSIUS
BUN SERPL-MCNC: 44 MG/DL (ref 6–23)
BUN SERPL-MCNC: 46 MG/DL (ref 6–23)
BURR CELLS BLD QL SMEAR: NORMAL
CA-I BLDA-SCNC: 1.11 MMOL/L (ref 1.1–1.33)
CA-I BLDV-SCNC: 0.94 MMOL/L (ref 1.1–1.33)
CA-I BLDV-SCNC: 1.21 MMOL/L (ref 1.1–1.33)
CALCIUM SERPL-MCNC: 8.4 MG/DL (ref 8.6–10.6)
CALCIUM SERPL-MCNC: 8.5 MG/DL (ref 8.6–10.6)
CARDIAC TROPONIN I PNL SERPL HS: 368 NG/L (ref 0–34)
CARDIAC TROPONIN I PNL SERPL HS: 466 NG/L (ref 0–34)
CARDIAC TROPONIN I PNL SERPL HS: 502 NG/L (ref 0–34)
CHLORIDE BLDA-SCNC: 99 MMOL/L (ref 98–107)
CHLORIDE BLDV-SCNC: 96 MMOL/L (ref 98–107)
CHLORIDE BLDV-SCNC: 97 MMOL/L (ref 98–107)
CHLORIDE SERPL-SCNC: 94 MMOL/L (ref 98–107)
CHLORIDE SERPL-SCNC: 96 MMOL/L (ref 98–107)
CO2 SERPL-SCNC: 23 MMOL/L (ref 21–32)
CO2 SERPL-SCNC: 25 MMOL/L (ref 21–32)
COLOR UR: YELLOW
CREAT SERPL-MCNC: 2.33 MG/DL (ref 0.5–1.05)
CREAT SERPL-MCNC: 2.63 MG/DL (ref 0.5–1.05)
EGFRCR SERPLBLD CKD-EPI 2021: 19 ML/MIN/1.73M*2
EGFRCR SERPLBLD CKD-EPI 2021: 22 ML/MIN/1.73M*2
EJECTION FRACTION APICAL 4 CHAMBER: 9.3
EJECTION FRACTION: 14 %
EOSINOPHIL # BLD AUTO: 0 X10*3/UL (ref 0–0.7)
EOSINOPHIL # BLD MANUAL: 0 X10*3/UL (ref 0–0.7)
EOSINOPHIL NFR BLD AUTO: 0 %
EOSINOPHIL NFR BLD MANUAL: 0 %
ERYTHROCYTE [DISTWIDTH] IN BLOOD BY AUTOMATED COUNT: 20.1 % (ref 11.5–14.5)
ERYTHROCYTE [DISTWIDTH] IN BLOOD BY AUTOMATED COUNT: 23 % (ref 11.5–14.5)
GLUCOSE BLD MANUAL STRIP-MCNC: 71 MG/DL (ref 74–99)
GLUCOSE BLD MANUAL STRIP-MCNC: 76 MG/DL (ref 74–99)
GLUCOSE BLD MANUAL STRIP-MCNC: 84 MG/DL (ref 74–99)
GLUCOSE BLDA-MCNC: 101 MG/DL (ref 74–99)
GLUCOSE BLDV-MCNC: 71 MG/DL (ref 74–99)
GLUCOSE BLDV-MCNC: 77 MG/DL (ref 74–99)
GLUCOSE SERPL-MCNC: 73 MG/DL (ref 74–99)
GLUCOSE SERPL-MCNC: 81 MG/DL (ref 74–99)
GLUCOSE UR STRIP.AUTO-MCNC: ABNORMAL MG/DL
HCO3 BLDA-SCNC: 17.9 MMOL/L (ref 22–26)
HCO3 BLDV-SCNC: 24.8 MMOL/L (ref 22–26)
HCO3 BLDV-SCNC: 25.4 MMOL/L (ref 22–26)
HCT VFR BLD AUTO: 24.7 % (ref 36–46)
HCT VFR BLD AUTO: 26 % (ref 36–46)
HCT VFR BLD EST: 27 % (ref 36–46)
HCT VFR BLD EST: 27 % (ref 36–46)
HCT VFR BLD EST: 32 % (ref 36–46)
HGB BLD-MCNC: 8.3 G/DL (ref 12–16)
HGB BLD-MCNC: 8.4 G/DL (ref 12–16)
HGB BLDA-MCNC: 10.5 G/DL (ref 12–16)
HGB BLDV-MCNC: 8.9 G/DL (ref 12–16)
HGB BLDV-MCNC: 9 G/DL (ref 12–16)
HOLD SPECIMEN: NORMAL
HOLD SPECIMEN: NORMAL
IMM GRANULOCYTES # BLD AUTO: 0.03 X10*3/UL (ref 0–0.7)
IMM GRANULOCYTES # BLD AUTO: 0.04 X10*3/UL (ref 0–0.7)
IMM GRANULOCYTES NFR BLD AUTO: 0.4 % (ref 0–0.9)
IMM GRANULOCYTES NFR BLD AUTO: 0.5 % (ref 0–0.9)
INHALED O2 CONCENTRATION: 28 %
INHALED O2 CONCENTRATION: 28 %
INHALED O2 CONCENTRATION: 32 %
KETONES UR STRIP.AUTO-MCNC: NEGATIVE MG/DL
LACTATE BLDA-SCNC: 4.5 MMOL/L (ref 0.4–2)
LACTATE BLDV-SCNC: 1 MMOL/L (ref 0.4–2)
LACTATE BLDV-SCNC: 1.5 MMOL/L (ref 0.4–2)
LACTATE SERPL-SCNC: 1.4 MMOL/L (ref 0.4–2)
LEFT VENTRICULAR OUTFLOW TRACT DIAMETER: 1.7 CM
LEUKOCYTE ESTERASE UR QL STRIP.AUTO: NEGATIVE
LIPASE SERPL-CCNC: 120 U/L (ref 9–82)
LYMPHOCYTES # BLD AUTO: 0.3 X10*3/UL (ref 1.2–4.8)
LYMPHOCYTES # BLD MANUAL: 0 X10*3/UL (ref 1.2–4.8)
LYMPHOCYTES NFR BLD AUTO: 4.1 %
LYMPHOCYTES NFR BLD MANUAL: 0 %
MAGNESIUM SERPL-MCNC: 2.56 MG/DL (ref 1.6–2.4)
MAGNESIUM SERPL-MCNC: 2.63 MG/DL (ref 1.6–2.4)
MCH RBC QN AUTO: 28.6 PG (ref 26–34)
MCH RBC QN AUTO: 29.1 PG (ref 26–34)
MCHC RBC AUTO-ENTMCNC: 31.9 G/DL (ref 32–36)
MCHC RBC AUTO-ENTMCNC: 34 G/DL (ref 32–36)
MCV RBC AUTO: 86 FL (ref 80–100)
MCV RBC AUTO: 90 FL (ref 80–100)
METAMYELOCYTES # BLD MANUAL: 0 X10*3/UL
METAMYELOCYTES NFR BLD MANUAL: 0 %
MONOCYTES # BLD AUTO: 0.48 X10*3/UL (ref 0.1–1)
MONOCYTES # BLD MANUAL: 0.26 X10*3/UL (ref 0.1–1)
MONOCYTES NFR BLD AUTO: 6.6 %
MONOCYTES NFR BLD MANUAL: 3.4 %
MYELOCYTES # BLD MANUAL: 0 X10*3/UL
MYELOCYTES NFR BLD MANUAL: 0 %
NEUTROPHILS # BLD AUTO: 6.47 X10*3/UL (ref 1.2–7.7)
NEUTROPHILS # BLD MANUAL: 7.18 X10*3/UL (ref 1.2–7.7)
NEUTROPHILS NFR BLD AUTO: 88.8 %
NEUTS BAND # BLD MANUAL: 0 X10*3/UL (ref 0–0.7)
NEUTS BAND NFR BLD MANUAL: 0 %
NEUTS SEG # BLD MANUAL: 7.18 X10*3/UL (ref 1.2–7)
NEUTS SEG NFR BLD MANUAL: 95.7 %
NITRITE UR QL STRIP.AUTO: NEGATIVE
NRBC BLD MANUAL-RTO: 0 % (ref 0–0)
NRBC BLD-RTO: 0 /100 WBCS (ref 0–0)
NRBC BLD-RTO: 0 /100 WBCS (ref 0–0)
OVALOCYTES BLD QL SMEAR: NORMAL
OXYHGB MFR BLDA: 80.4 % (ref 94–98)
OXYHGB MFR BLDV: 45.8 % (ref 45–75)
OXYHGB MFR BLDV: 47 % (ref 45–75)
PCO2 BLDA: 34 MM HG (ref 38–42)
PCO2 BLDV: 41 MM HG (ref 41–51)
PCO2 BLDV: 42 MM HG (ref 41–51)
PH BLDA: 7.33 PH (ref 7.38–7.42)
PH BLDV: 7.39 PH (ref 7.33–7.43)
PH BLDV: 7.39 PH (ref 7.33–7.43)
PH UR STRIP.AUTO: 5 [PH]
PHOSPHATE SERPL-MCNC: 4.3 MG/DL (ref 2.5–4.9)
PLASMA CELLS # BLD MANUAL: 0 X10*3/UL
PLASMA CELLS NFR BLD MANUAL: 0 %
PLATELET # BLD AUTO: 180 X10*3/UL (ref 150–450)
PLATELET # BLD AUTO: 191 X10*3/UL (ref 150–450)
PO2 BLDA: 54 MM HG (ref 85–95)
PO2 BLDV: 31 MM HG (ref 35–45)
PO2 BLDV: 34 MM HG (ref 35–45)
POLYCHROMASIA BLD QL SMEAR: NORMAL
POTASSIUM BLDA-SCNC: 5.2 MMOL/L (ref 3.5–5.3)
POTASSIUM BLDV-SCNC: 4.7 MMOL/L (ref 3.5–5.3)
POTASSIUM BLDV-SCNC: >10 MMOL/L (ref 3.5–5.3)
POTASSIUM SERPL-SCNC: 4.5 MMOL/L (ref 3.5–5.3)
POTASSIUM SERPL-SCNC: 4.7 MMOL/L (ref 3.5–5.3)
PROMYELOCYTES # BLD MANUAL: 0 X10*3/UL
PROMYELOCYTES NFR BLD MANUAL: 0 %
PROT SERPL-MCNC: 5.4 G/DL (ref 6.4–8.2)
PROT SERPL-MCNC: 5.9 G/DL (ref 6.4–8.2)
PROT UR STRIP.AUTO-MCNC: ABNORMAL MG/DL
RBC # BLD AUTO: 2.89 X10*6/UL (ref 4–5.2)
RBC # BLD AUTO: 2.9 X10*6/UL (ref 4–5.2)
RBC # UR STRIP.AUTO: NEGATIVE MG/DL
RBC #/AREA URNS AUTO: NORMAL /HPF
RBC MORPH BLD: ABNORMAL
RBC MORPH BLD: NORMAL
RIGHT VENTRICLE PEAK SYSTOLIC PRESSURE: 49.5 MMHG
SAO2 % BLDA: 83 % (ref 94–100)
SAO2 % BLDV: 47 % (ref 45–75)
SAO2 % BLDV: 48 % (ref 45–75)
SODIUM BLDA-SCNC: 127 MMOL/L (ref 136–145)
SODIUM BLDV-SCNC: 121 MMOL/L (ref 136–145)
SODIUM BLDV-SCNC: 130 MMOL/L (ref 136–145)
SODIUM SERPL-SCNC: 129 MMOL/L (ref 136–145)
SODIUM SERPL-SCNC: 132 MMOL/L (ref 136–145)
SP GR UR STRIP.AUTO: 1.02
TARGETS BLD QL SMEAR: NORMAL
TOTAL CELLS COUNTED BLD: 116
UROBILINOGEN UR STRIP.AUTO-MCNC: NORMAL MG/DL
VARIANT LYMPHS # BLD MANUAL: 0.07 X10*3/UL (ref 0–0.5)
VARIANT LYMPHS NFR BLD: 0.9 %
WBC # BLD AUTO: 7.3 X10*3/UL (ref 4.4–11.3)
WBC # BLD AUTO: 7.5 X10*3/UL (ref 4.4–11.3)
WBC #/AREA URNS AUTO: NORMAL /HPF

## 2025-01-01 PROCEDURE — 85025 COMPLETE CBC W/AUTO DIFF WBC: CPT

## 2025-01-01 PROCEDURE — 83880 ASSAY OF NATRIURETIC PEPTIDE: CPT

## 2025-01-01 PROCEDURE — 70450 CT HEAD/BRAIN W/O DYE: CPT | Performed by: RADIOLOGY

## 2025-01-01 PROCEDURE — 2020000001 HC ICU ROOM DAILY

## 2025-01-01 PROCEDURE — 70450 CT HEAD/BRAIN W/O DYE: CPT

## 2025-01-01 PROCEDURE — 2500000004 HC RX 250 GENERAL PHARMACY W/ HCPCS (ALT 636 FOR OP/ED): Mod: JZ

## 2025-01-01 PROCEDURE — 3E043XZ INTRODUCTION OF VASOPRESSOR INTO CENTRAL VEIN, PERCUTANEOUS APPROACH: ICD-10-PCS | Performed by: STUDENT IN AN ORGANIZED HEALTH CARE EDUCATION/TRAINING PROGRAM

## 2025-01-01 PROCEDURE — 82947 ASSAY GLUCOSE BLOOD QUANT: CPT

## 2025-01-01 PROCEDURE — 84132 ASSAY OF SERUM POTASSIUM: CPT

## 2025-01-01 PROCEDURE — 99285 EMERGENCY DEPT VISIT HI MDM: CPT | Mod: 25 | Performed by: STUDENT IN AN ORGANIZED HEALTH CARE EDUCATION/TRAINING PROGRAM

## 2025-01-01 PROCEDURE — 82248 BILIRUBIN DIRECT: CPT

## 2025-01-01 PROCEDURE — 2500000002 HC RX 250 W HCPCS SELF ADMINISTERED DRUGS (ALT 637 FOR MEDICARE OP, ALT 636 FOR OP/ED): Mod: JZ

## 2025-01-01 PROCEDURE — 93308 TTE F-UP OR LMTD: CPT

## 2025-01-01 PROCEDURE — 93005 ELECTROCARDIOGRAM TRACING: CPT

## 2025-01-01 PROCEDURE — 80053 COMPREHEN METABOLIC PANEL: CPT

## 2025-01-01 PROCEDURE — 93321 DOPPLER ECHO F-UP/LMTD STD: CPT | Performed by: INTERNAL MEDICINE

## 2025-01-01 PROCEDURE — 84100 ASSAY OF PHOSPHORUS: CPT

## 2025-01-01 PROCEDURE — 36410 VNPNXR 3YR/> PHY/QHP DX/THER: CPT

## 2025-01-01 PROCEDURE — 94660 CPAP INITIATION&MGMT: CPT

## 2025-01-01 PROCEDURE — 83605 ASSAY OF LACTIC ACID: CPT

## 2025-01-01 PROCEDURE — 84132 ASSAY OF SERUM POTASSIUM: CPT | Performed by: STUDENT IN AN ORGANIZED HEALTH CARE EDUCATION/TRAINING PROGRAM

## 2025-01-01 PROCEDURE — 37799 UNLISTED PX VASCULAR SURGERY: CPT

## 2025-01-01 PROCEDURE — 2500000005 HC RX 250 GENERAL PHARMACY W/O HCPCS

## 2025-01-01 PROCEDURE — 83735 ASSAY OF MAGNESIUM: CPT

## 2025-01-01 PROCEDURE — 84484 ASSAY OF TROPONIN QUANT: CPT

## 2025-01-01 PROCEDURE — 93325 DOPPLER ECHO COLOR FLOW MAPG: CPT

## 2025-01-01 PROCEDURE — 94762 N-INVAS EAR/PLS OXIMTRY CONT: CPT

## 2025-01-01 PROCEDURE — 93010 ELECTROCARDIOGRAM REPORT: CPT | Performed by: INTERNAL MEDICINE

## 2025-01-01 PROCEDURE — 93308 TTE F-UP OR LMTD: CPT | Performed by: INTERNAL MEDICINE

## 2025-01-01 PROCEDURE — 36620 INSERTION CATHETER ARTERY: CPT

## 2025-01-01 PROCEDURE — 169592 NO-PAY CLAIM PROCEDURE

## 2025-01-01 PROCEDURE — 85027 COMPLETE CBC AUTOMATED: CPT

## 2025-01-01 PROCEDURE — 5A09357 ASSISTANCE WITH RESPIRATORY VENTILATION, LESS THAN 24 CONSECUTIVE HOURS, CONTINUOUS POSITIVE AIRWAY PRESSURE: ICD-10-PCS | Performed by: STUDENT IN AN ORGANIZED HEALTH CARE EDUCATION/TRAINING PROGRAM

## 2025-01-01 PROCEDURE — G0299 HHS/HOSPICE OF RN EA 15 MIN: HCPCS | Mod: HHH

## 2025-01-01 PROCEDURE — 99223 1ST HOSP IP/OBS HIGH 75: CPT

## 2025-01-01 PROCEDURE — 99291 CRITICAL CARE FIRST HOUR: CPT

## 2025-01-01 PROCEDURE — 2500000004 HC RX 250 GENERAL PHARMACY W/ HCPCS (ALT 636 FOR OP/ED): Mod: TB

## 2025-01-01 PROCEDURE — 99291 CRITICAL CARE FIRST HOUR: CPT | Mod: 25 | Performed by: STUDENT IN AN ORGANIZED HEALTH CARE EDUCATION/TRAINING PROGRAM

## 2025-01-01 PROCEDURE — 81001 URINALYSIS AUTO W/SCOPE: CPT

## 2025-01-01 PROCEDURE — 71045 X-RAY EXAM CHEST 1 VIEW: CPT

## 2025-01-01 PROCEDURE — 71045 X-RAY EXAM CHEST 1 VIEW: CPT | Performed by: STUDENT IN AN ORGANIZED HEALTH CARE EDUCATION/TRAINING PROGRAM

## 2025-01-01 PROCEDURE — 83690 ASSAY OF LIPASE: CPT

## 2025-01-01 PROCEDURE — 4B02XTZ MEASUREMENT OF CARDIAC DEFIBRILLATOR, EXTERNAL APPROACH: ICD-10-PCS

## 2025-01-01 PROCEDURE — 2500000004 HC RX 250 GENERAL PHARMACY W/ HCPCS (ALT 636 FOR OP/ED): Mod: JZ,TB

## 2025-01-01 PROCEDURE — 99497 ADVNCD CARE PLAN 30 MIN: CPT

## 2025-01-01 PROCEDURE — 93325 DOPPLER ECHO COLOR FLOW MAPG: CPT | Performed by: INTERNAL MEDICINE

## 2025-01-01 PROCEDURE — 85007 BL SMEAR W/DIFF WBC COUNT: CPT

## 2025-01-01 RX ORDER — ACETAMINOPHEN 325 MG/1
1000 TABLET ORAL EVERY 8 HOURS PRN
Status: DISCONTINUED | OUTPATIENT
Start: 2025-01-01 | End: 2025-01-01 | Stop reason: HOSPADM

## 2025-01-01 RX ORDER — BENZONATATE 100 MG/1
100 CAPSULE ORAL 3 TIMES DAILY PRN
Status: DISCONTINUED | OUTPATIENT
Start: 2025-01-01 | End: 2025-01-01 | Stop reason: HOSPADM

## 2025-01-01 RX ORDER — HYDROMORPHONE HYDROCHLORIDE 0.2 MG/ML
0.2 INJECTION INTRAMUSCULAR; INTRAVENOUS; SUBCUTANEOUS
Status: DISCONTINUED | OUTPATIENT
Start: 2025-01-01 | End: 2025-01-01

## 2025-01-01 RX ORDER — AMIODARONE HYDROCHLORIDE 200 MG/1
200 TABLET ORAL DAILY
Status: DISCONTINUED | OUTPATIENT
Start: 2025-01-01 | End: 2025-01-01

## 2025-01-01 RX ORDER — LORAZEPAM 0.5 MG/1
0.5 TABLET ORAL EVERY 4 HOURS PRN
Status: DISCONTINUED | OUTPATIENT
Start: 2025-01-01 | End: 2025-01-01 | Stop reason: HOSPADM

## 2025-01-01 RX ORDER — FLUTICASONE FUROATE AND VILANTEROL 100; 25 UG/1; UG/1
1 POWDER RESPIRATORY (INHALATION) DAILY
Status: DISCONTINUED | OUTPATIENT
Start: 2025-01-01 | End: 2025-01-01

## 2025-01-01 RX ORDER — ALBUTEROL SULFATE 0.83 MG/ML
2.5 SOLUTION RESPIRATORY (INHALATION) EVERY 4 HOURS PRN
Status: DISCONTINUED | OUTPATIENT
Start: 2025-01-01 | End: 2025-01-01 | Stop reason: HOSPADM

## 2025-01-01 RX ORDER — HYOSCYAMINE SULFATE 0.12 MG/1
0.12 TABLET, ORALLY DISINTEGRATING ORAL EVERY 4 HOURS PRN
Status: DISCONTINUED | OUTPATIENT
Start: 2025-01-01 | End: 2025-01-01 | Stop reason: HOSPADM

## 2025-01-01 RX ORDER — PANTOPRAZOLE SODIUM 40 MG/10ML
40 INJECTION, POWDER, LYOPHILIZED, FOR SOLUTION INTRAVENOUS
Status: DISCONTINUED | OUTPATIENT
Start: 2025-01-01 | End: 2025-01-01 | Stop reason: HOSPADM

## 2025-01-01 RX ORDER — PHENYLEPHRINE HCL IN 0.9% NACL 0.4MG/10ML
100 SYRINGE (ML) INTRAVENOUS ONCE
Status: COMPLETED | OUTPATIENT
Start: 2025-01-01 | End: 2025-01-01

## 2025-01-01 RX ORDER — PANTOPRAZOLE SODIUM 40 MG/1
40 TABLET, DELAYED RELEASE ORAL
Status: DISCONTINUED | OUTPATIENT
Start: 2025-01-01 | End: 2025-01-01 | Stop reason: HOSPADM

## 2025-01-01 RX ORDER — MAGNESIUM SULFATE HEPTAHYDRATE 40 MG/ML
2 INJECTION, SOLUTION INTRAVENOUS ONCE
Status: COMPLETED | OUTPATIENT
Start: 2025-01-01 | End: 2025-01-01

## 2025-01-01 RX ORDER — NOREPINEPHRINE BITARTRATE/D5W 8 MG/250ML
.01-1 PLASTIC BAG, INJECTION (ML) INTRAVENOUS CONTINUOUS
Status: DISCONTINUED | OUTPATIENT
Start: 2025-01-01 | End: 2025-01-01 | Stop reason: HOSPADM

## 2025-01-01 RX ORDER — IPRATROPIUM BROMIDE AND ALBUTEROL SULFATE 2.5; .5 MG/3ML; MG/3ML
3 SOLUTION RESPIRATORY (INHALATION)
Status: DISCONTINUED | OUTPATIENT
Start: 2025-01-01 | End: 2025-01-01

## 2025-01-01 RX ORDER — ALBUTEROL SULFATE 0.83 MG/ML
SOLUTION RESPIRATORY (INHALATION)
Status: DISPENSED
Start: 2025-01-01 | End: 2025-01-01

## 2025-01-01 RX ORDER — NOREPINEPHRINE BITARTRATE/D5W 8 MG/250ML
PLASTIC BAG, INJECTION (ML) INTRAVENOUS
Status: COMPLETED
Start: 2025-01-01 | End: 2025-01-01

## 2025-01-01 RX ORDER — POLYETHYLENE GLYCOL 3350 17 G/17G
17 POWDER, FOR SOLUTION ORAL DAILY
Status: DISCONTINUED | OUTPATIENT
Start: 2025-01-01 | End: 2025-01-01

## 2025-01-01 RX ORDER — CHLOROTHIAZIDE SODIUM 500 MG/1
1000 INJECTION INTRAVENOUS ONCE
Status: COMPLETED | OUTPATIENT
Start: 2025-01-01 | End: 2025-01-01

## 2025-01-01 RX ORDER — BUMETANIDE 1 MG/1
0.5 TABLET ORAL
Status: DISCONTINUED | OUTPATIENT
Start: 2025-01-01 | End: 2025-01-01

## 2025-01-01 RX ORDER — MILRINONE LACTATE 0.2 MG/ML
0.25 INJECTION, SOLUTION INTRAVENOUS CONTINUOUS
Status: DISCONTINUED | OUTPATIENT
Start: 2025-01-01 | End: 2025-01-01 | Stop reason: HOSPADM

## 2025-01-01 RX ORDER — ESOMEPRAZOLE MAGNESIUM 40 MG/1
40 GRANULE, DELAYED RELEASE ORAL
Status: DISCONTINUED | OUTPATIENT
Start: 2025-01-01 | End: 2025-01-01 | Stop reason: HOSPADM

## 2025-01-01 RX ORDER — BUMETANIDE 0.25 MG/ML
4 INJECTION, SOLUTION INTRAMUSCULAR; INTRAVENOUS ONCE
Status: COMPLETED | OUTPATIENT
Start: 2025-01-01 | End: 2025-01-01

## 2025-01-01 RX ORDER — MAGNESIUM SULFATE HEPTAHYDRATE 40 MG/ML
INJECTION, SOLUTION INTRAVENOUS
Status: COMPLETED
Start: 2025-01-01 | End: 2025-01-01

## 2025-01-01 RX ORDER — ALBUTEROL SULFATE 90 UG/1
2 INHALANT RESPIRATORY (INHALATION) EVERY 4 HOURS PRN
Status: DISCONTINUED | OUTPATIENT
Start: 2025-01-01 | End: 2025-01-01 | Stop reason: HOSPADM

## 2025-01-01 RX ORDER — BUMETANIDE 0.25 MG/ML
5 INJECTION, SOLUTION INTRAMUSCULAR; INTRAVENOUS ONCE
Status: COMPLETED | OUTPATIENT
Start: 2025-01-01 | End: 2025-01-01

## 2025-01-01 RX ORDER — VANCOMYCIN HYDROCHLORIDE 1 G/20ML
INJECTION, POWDER, LYOPHILIZED, FOR SOLUTION INTRAVENOUS DAILY PRN
Status: DISCONTINUED | OUTPATIENT
Start: 2025-01-01 | End: 2025-01-01 | Stop reason: HOSPADM

## 2025-01-01 RX ORDER — BUMETANIDE 0.25 MG/ML
INJECTION, SOLUTION INTRAMUSCULAR; INTRAVENOUS
Status: COMPLETED
Start: 2025-01-01 | End: 2025-01-01

## 2025-01-01 RX ORDER — AMOXICILLIN 250 MG
1 CAPSULE ORAL 2 TIMES DAILY
Status: DISCONTINUED | OUTPATIENT
Start: 2025-01-01 | End: 2025-01-01

## 2025-01-01 RX ORDER — ASPIRIN 81 MG/1
81 TABLET ORAL DAILY
Status: DISCONTINUED | OUTPATIENT
Start: 2025-01-01 | End: 2025-01-01

## 2025-01-01 RX ORDER — LORAZEPAM 2 MG/ML
1 INJECTION INTRAMUSCULAR EVERY 2 HOUR PRN
Status: DISCONTINUED | OUTPATIENT
Start: 2025-01-01 | End: 2025-01-01 | Stop reason: HOSPADM

## 2025-01-01 RX ADMIN — HYDROMORPHONE HYDROCHLORIDE 0.5 MG: 0.5 INJECTION, SOLUTION INTRAMUSCULAR; INTRAVENOUS; SUBCUTANEOUS at 13:47

## 2025-01-01 RX ADMIN — PERFLUTREN 10 ML OF DILUTION: 6.52 INJECTION, SUSPENSION INTRAVENOUS at 08:50

## 2025-01-01 RX ADMIN — BUMETANIDE 4 MG: 0.25 INJECTION INTRAMUSCULAR; INTRAVENOUS at 08:23

## 2025-01-01 RX ADMIN — CHLOROTHIAZIDE SODIUM 1000 MG: 500 INJECTION, POWDER, LYOPHILIZED, FOR SOLUTION INTRAVENOUS at 11:21

## 2025-01-01 RX ADMIN — MAGNESIUM SULFATE HEPTAHYDRATE 2 G: 40 INJECTION, SOLUTION INTRAVENOUS at 06:00

## 2025-01-01 RX ADMIN — IPRATROPIUM BROMIDE AND ALBUTEROL SULFATE 3 ML: .5; 3 SOLUTION RESPIRATORY (INHALATION) at 08:10

## 2025-01-01 RX ADMIN — AMIODARONE HYDROCHLORIDE 150 MG: 1.5 INJECTION, SOLUTION INTRAVENOUS at 05:00

## 2025-01-01 RX ADMIN — PIPERACILLIN SODIUM AND TAZOBACTAM SODIUM 2.25 G: 2; .25 INJECTION, SOLUTION INTRAVENOUS at 08:24

## 2025-01-01 RX ADMIN — BUMETANIDE 5 MG: 0.25 INJECTION, SOLUTION INTRAMUSCULAR; INTRAVENOUS at 11:09

## 2025-01-01 RX ADMIN — Medication 0.1 MCG/KG/MIN: at 06:08

## 2025-01-01 RX ADMIN — BUMETANIDE 5 MG: 0.25 INJECTION INTRAMUSCULAR; INTRAVENOUS at 11:09

## 2025-01-01 RX ADMIN — VANCOMYCIN HYDROCHLORIDE 1500 MG: 1.5 INJECTION, POWDER, LYOPHILIZED, FOR SOLUTION INTRAVENOUS at 10:04

## 2025-01-01 RX ADMIN — Medication 100 MCG: at 05:30

## 2025-01-01 RX ADMIN — NOREPINEPHRINE BITARTRATE 0.1 MCG/KG/MIN: 8 INJECTION, SOLUTION INTRAVENOUS at 06:08

## 2025-01-01 RX ADMIN — Medication 3 PERCENT: at 12:30

## 2025-01-01 RX ADMIN — Medication 20 ML: at 10:00

## 2025-01-01 RX ADMIN — MILRINONE LACTATE IN DEXTROSE 0.25 MCG/KG/MIN: 200 INJECTION, SOLUTION INTRAVENOUS at 05:27

## 2025-01-01 SDOH — SOCIAL STABILITY: SOCIAL INSECURITY: HAVE YOU HAD THOUGHTS OF HARMING ANYONE ELSE?: NO

## 2025-01-01 SDOH — SOCIAL STABILITY: SOCIAL INSECURITY
WITHIN THE LAST YEAR, HAVE YOU BEEN RAPED OR FORCED TO HAVE ANY KIND OF SEXUAL ACTIVITY BY YOUR PARTNER OR EX-PARTNER?: NO

## 2025-01-01 SDOH — ECONOMIC STABILITY: HOUSING INSECURITY: AT ANY TIME IN THE PAST 12 MONTHS, WERE YOU HOMELESS OR LIVING IN A SHELTER (INCLUDING NOW)?: NO

## 2025-01-01 SDOH — ECONOMIC STABILITY: HOUSING INSECURITY: IN THE LAST 12 MONTHS, WAS THERE A TIME WHEN YOU WERE NOT ABLE TO PAY THE MORTGAGE OR RENT ON TIME?: NO

## 2025-01-01 SDOH — SOCIAL STABILITY: SOCIAL INSECURITY: ARE YOU OR HAVE YOU BEEN THREATENED OR ABUSED PHYSICALLY, EMOTIONALLY, OR SEXUALLY BY ANYONE?: NO

## 2025-01-01 SDOH — SOCIAL STABILITY: SOCIAL INSECURITY: WITHIN THE LAST YEAR, HAVE YOU BEEN AFRAID OF YOUR PARTNER OR EX-PARTNER?: NO

## 2025-01-01 SDOH — HEALTH STABILITY: MENTAL HEALTH: HOW OFTEN DO YOU HAVE SIX OR MORE DRINKS ON ONE OCCASION?: NEVER

## 2025-01-01 SDOH — ECONOMIC STABILITY: HOUSING INSECURITY: IN THE PAST 12 MONTHS, HOW MANY TIMES HAVE YOU MOVED WHERE YOU WERE LIVING?: 1

## 2025-01-01 SDOH — ECONOMIC STABILITY: FOOD INSECURITY: WITHIN THE PAST 12 MONTHS, THE FOOD YOU BOUGHT JUST DIDN'T LAST AND YOU DIDN'T HAVE MONEY TO GET MORE.: NEVER TRUE

## 2025-01-01 SDOH — SOCIAL STABILITY: SOCIAL INSECURITY: DO YOU FEEL ANYONE HAS EXPLOITED OR TAKEN ADVANTAGE OF YOU FINANCIALLY OR OF YOUR PERSONAL PROPERTY?: NO

## 2025-01-01 SDOH — ECONOMIC STABILITY: FOOD INSECURITY: WITHIN THE PAST 12 MONTHS, YOU WORRIED THAT YOUR FOOD WOULD RUN OUT BEFORE YOU GOT THE MONEY TO BUY MORE.: NEVER TRUE

## 2025-01-01 SDOH — ECONOMIC STABILITY: INCOME INSECURITY: IN THE PAST 12 MONTHS HAS THE ELECTRIC, GAS, OIL, OR WATER COMPANY THREATENED TO SHUT OFF SERVICES IN YOUR HOME?: NO

## 2025-01-01 SDOH — HEALTH STABILITY: MENTAL HEALTH: HOW MANY DRINKS CONTAINING ALCOHOL DO YOU HAVE ON A TYPICAL DAY WHEN YOU ARE DRINKING?: PATIENT DOES NOT DRINK

## 2025-01-01 SDOH — SOCIAL STABILITY: SOCIAL INSECURITY: WITHIN THE LAST YEAR, HAVE YOU BEEN HUMILIATED OR EMOTIONALLY ABUSED IN OTHER WAYS BY YOUR PARTNER OR EX-PARTNER?: NO

## 2025-01-01 SDOH — ECONOMIC STABILITY: FOOD INSECURITY: HOW HARD IS IT FOR YOU TO PAY FOR THE VERY BASICS LIKE FOOD, HOUSING, MEDICAL CARE, AND HEATING?: NOT HARD AT ALL

## 2025-01-01 SDOH — HEALTH STABILITY: MENTAL HEALTH: HOW OFTEN DO YOU HAVE A DRINK CONTAINING ALCOHOL?: NEVER

## 2025-01-01 SDOH — SOCIAL STABILITY: SOCIAL INSECURITY: ABUSE: ADULT

## 2025-01-01 SDOH — SOCIAL STABILITY: SOCIAL INSECURITY: DOES ANYONE TRY TO KEEP YOU FROM HAVING/CONTACTING OTHER FRIENDS OR DOING THINGS OUTSIDE YOUR HOME?: NO

## 2025-01-01 SDOH — SOCIAL STABILITY: SOCIAL INSECURITY: HAS ANYONE EVER THREATENED TO HURT YOUR FAMILY OR YOUR PETS?: NO

## 2025-01-01 SDOH — ECONOMIC STABILITY: TRANSPORTATION INSECURITY: IN THE PAST 12 MONTHS, HAS LACK OF TRANSPORTATION KEPT YOU FROM MEDICAL APPOINTMENTS OR FROM GETTING MEDICATIONS?: NO

## 2025-01-01 SDOH — SOCIAL STABILITY: SOCIAL INSECURITY: HAVE YOU HAD ANY THOUGHTS OF HARMING ANYONE ELSE?: NO

## 2025-01-01 SDOH — SOCIAL STABILITY: SOCIAL INSECURITY: WERE YOU ABLE TO COMPLETE ALL THE BEHAVIORAL HEALTH SCREENINGS?: YES

## 2025-01-01 SDOH — SOCIAL STABILITY: SOCIAL INSECURITY: DO YOU FEEL UNSAFE GOING BACK TO THE PLACE WHERE YOU ARE LIVING?: NO

## 2025-01-01 SDOH — SOCIAL STABILITY: SOCIAL INSECURITY: ARE THERE ANY APPARENT SIGNS OF INJURIES/BEHAVIORS THAT COULD BE RELATED TO ABUSE/NEGLECT?: NO

## 2025-01-01 ASSESSMENT — ENCOUNTER SYMPTOMS
TACHYCARDIA: 1
PAIN LOCATION - PAIN SEVERITY: 10/10
BOWEL INCONTINENCE: 1
PERSON REPORTING PAIN: PATIENT
PAIN LOCATION - PAIN FREQUENCY: INTERMITTENT
CHANGE IN APPETITE: UNCHANGED
PAIN: 1
HYPOTENSION: 1
PAIN LOCATION - PAIN QUALITY: ACHE
APPETITE LEVEL: FAIR
PAIN LOCATION - PAIN SEVERITY: 10/10
PAIN LOCATION: GENERALIZED
PAIN LOCATION: GENERALIZED
PERSON REPORTING PAIN: PATIENT
PAIN: 1
MUSCLE WEAKNESS: 1
DRY SKIN: 1

## 2025-01-01 ASSESSMENT — COGNITIVE AND FUNCTIONAL STATUS - GENERAL
MOVING FROM LYING ON BACK TO SITTING ON SIDE OF FLAT BED WITH BEDRAILS: A LOT
PATIENT BASELINE BEDBOUND: NO
HELP NEEDED FOR BATHING: A LOT
TOILETING: A LOT
PERSONAL GROOMING: A LOT
TURNING FROM BACK TO SIDE WHILE IN FLAT BAD: A LOT
WALKING IN HOSPITAL ROOM: A LOT
DRESSING REGULAR UPPER BODY CLOTHING: A LOT
DRESSING REGULAR LOWER BODY CLOTHING: A LOT
MOVING TO AND FROM BED TO CHAIR: A LOT
EATING MEALS: A LOT
DAILY ACTIVITIY SCORE: 12
CLIMB 3 TO 5 STEPS WITH RAILING: A LOT
STANDING UP FROM CHAIR USING ARMS: A LOT
MOBILITY SCORE: 12

## 2025-01-01 ASSESSMENT — LIFESTYLE VARIABLES
EVER HAD A DRINK FIRST THING IN THE MORNING TO STEADY YOUR NERVES TO GET RID OF A HANGOVER: NO
SKIP TO QUESTIONS 9-10: 1
HAVE PEOPLE ANNOYED YOU BY CRITICIZING YOUR DRINKING: NO
EVER FELT BAD OR GUILTY ABOUT YOUR DRINKING: NO
AUDIT-C TOTAL SCORE: 0
TOTAL SCORE: 0
HAVE YOU EVER FELT YOU SHOULD CUT DOWN ON YOUR DRINKING: NO

## 2025-01-01 ASSESSMENT — ACTIVITIES OF DAILY LIVING (ADL)
TOILETING: NEEDS ASSISTANCE
BATHING: NEEDS ASSISTANCE
ADEQUATE_TO_COMPLETE_ADL: YES
PATIENT'S MEMORY ADEQUATE TO SAFELY COMPLETE DAILY ACTIVITIES?: YES
JUDGMENT_ADEQUATE_SAFELY_COMPLETE_DAILY_ACTIVITIES: YES
WALKS IN HOME: NEEDS ASSISTANCE
ENTERING_EXITING_HOME: MAXIMUM ASSIST
FEEDING YOURSELF: NEEDS ASSISTANCE
DRESSING YOURSELF: NEEDS ASSISTANCE
BATHING: NEEDS ASSISTANCE
LACK_OF_TRANSPORTATION: NO
ADEQUATE_TO_COMPLETE_ADL: YES
JUDGMENT_ADEQUATE_SAFELY_COMPLETE_DAILY_ACTIVITIES: YES
GROOMING: NEEDS ASSISTANCE
HEARING - LEFT EAR: FUNCTIONAL
LACK_OF_TRANSPORTATION: NO
OASIS_M1830: 05
HEARING - LEFT EAR: FUNCTIONAL
GROOMING: NEEDS ASSISTANCE
DRESSING YOURSELF: NEEDS ASSISTANCE
HEARING - RIGHT EAR: FUNCTIONAL
LACK_OF_TRANSPORTATION: NO
PATIENT'S MEMORY ADEQUATE TO SAFELY COMPLETE DAILY ACTIVITIES?: YES
FEEDING YOURSELF: NEEDS ASSISTANCE
TOILETING: NEEDS ASSISTANCE
WALKS IN HOME: NEEDS ASSISTANCE

## 2025-01-01 ASSESSMENT — RESPIRATORY DISTRESS OBSERVATION SCALE (RDOS)
ACCESSORY MUSCLE RISE IN CLAVICLE DURING INSPIRATION: 1 - SLIGHT RISE
GRUNTING AT END OF EXPIRATION: 0 - NONE
LOOK OF FEAR: 0 - NONE
RDOS TOTAL SCORE: 4
RESTLESS NONPURPOSEFUL MOVEMENTS: 1 - OCCASIONAL, SLIGHT MOVEMENTS
PARADOXICAL BREATHING PATTERN: 0 - NONE
INVOLUNTARY NASAL FLARING: 0 - NONE
RESPIRATORY RATE PER MINUTE: 2 - >30 BREATHS
HEART RATE PER MINUTE: 0 - <90 BEATS

## 2025-01-01 ASSESSMENT — PAIN SCALES - GENERAL
PAINLEVEL_OUTOF10: 0 - NO PAIN
PAINLEVEL_OUTOF10: 10 - WORST POSSIBLE PAIN
PAINLEVEL_OUTOF10: 0 - NO PAIN

## 2025-01-01 ASSESSMENT — PAIN - FUNCTIONAL ASSESSMENT
PAIN_FUNCTIONAL_ASSESSMENT: CPOT (CRITICAL CARE PAIN OBSERVATION TOOL)
PAIN_FUNCTIONAL_ASSESSMENT: 0-10

## 2025-01-01 ASSESSMENT — COLUMBIA-SUICIDE SEVERITY RATING SCALE - C-SSRS
2. HAVE YOU ACTUALLY HAD ANY THOUGHTS OF KILLING YOURSELF?: NO
1. IN THE PAST MONTH, HAVE YOU WISHED YOU WERE DEAD OR WISHED YOU COULD GO TO SLEEP AND NOT WAKE UP?: NO
6. HAVE YOU EVER DONE ANYTHING, STARTED TO DO ANYTHING, OR PREPARED TO DO ANYTHING TO END YOUR LIFE?: NO

## 2025-01-02 ENCOUNTER — HOME CARE VISIT (OUTPATIENT)
Dept: HOME HEALTH SERVICES | Facility: HOME HEALTH | Age: 70
End: 2025-01-02
Payer: COMMERCIAL

## 2025-01-02 PROCEDURE — G0299 HHS/HOSPICE OF RN EA 15 MIN: HCPCS | Mod: HHH

## 2025-01-03 VITALS
RESPIRATION RATE: 18 BRPM | TEMPERATURE: 97.9 F | HEART RATE: 79 BPM | WEIGHT: 128.4 LBS | BODY MASS INDEX: 22.04 KG/M2 | OXYGEN SATURATION: 99 % | SYSTOLIC BLOOD PRESSURE: 105 MMHG | DIASTOLIC BLOOD PRESSURE: 60 MMHG

## 2025-01-03 ASSESSMENT — ENCOUNTER SYMPTOMS
LAST BOWEL MOVEMENT: 67207
PAIN: 1
APPETITE LEVEL: GOOD

## 2025-01-06 ENCOUNTER — DOCUMENTATION (OUTPATIENT)
Dept: INFUSION THERAPY | Age: 70
End: 2025-01-06
Payer: COMMERCIAL

## 2025-01-06 ENCOUNTER — HOME INFUSION (OUTPATIENT)
Dept: INFUSION THERAPY | Age: 70
End: 2025-01-06
Payer: COMMERCIAL

## 2025-01-06 NOTE — PROGRESS NOTES
Review of chart. Patient infuses continuous milrinone  for CHF. Dr Kathy Rivera follows outpatient.      Review of RN visit notes. No problems are noted with infusions or line. Reported weight stable around 127 lbs     Tel call to daughter. Pt on schedule with bag changes. Changing bag today with one remaining in home for 1/8. Daughter is agreeable to delivery of further bags on 1/7 with all supplies and flushes for this delivery.     Processed fill for 3 x 48hr milrinone bags for mix and straight delivery 1/7 to cover bag changes 1/10, 1/12, 1/14     Follow up 1/13. Check inventory/bag change schedule and weight. Send straight. **call daughter**

## 2025-01-09 ENCOUNTER — HOME CARE VISIT (OUTPATIENT)
Dept: HOME HEALTH SERVICES | Facility: HOME HEALTH | Age: 70
End: 2025-01-09
Payer: COMMERCIAL

## 2025-01-09 VITALS
OXYGEN SATURATION: 96 % | SYSTOLIC BLOOD PRESSURE: 118 MMHG | HEART RATE: 100 BPM | DIASTOLIC BLOOD PRESSURE: 78 MMHG | RESPIRATION RATE: 16 BRPM | TEMPERATURE: 99.2 F | WEIGHT: 129 LBS | BODY MASS INDEX: 22.14 KG/M2

## 2025-01-09 PROCEDURE — G0299 HHS/HOSPICE OF RN EA 15 MIN: HCPCS | Mod: HHH

## 2025-01-09 RX ADMIN — Medication 10 ML: at 12:37

## 2025-01-09 ASSESSMENT — PAIN SCALES - PAIN ASSESSMENT IN ADVANCED DEMENTIA (PAINAD)
NEGVOCALIZATION: 0
CONSOLABILITY: 0
BREATHING: 0
FACIALEXPRESSION: 0 - SMILING OR INEXPRESSIVE.
TOTALSCORE: 0
FACIALEXPRESSION: 0
CONSOLABILITY: 0 - NO NEED TO CONSOLE.
NEGVOCALIZATION: 0 - NONE.
BODYLANGUAGE: 0 - RELAXED.
BODYLANGUAGE: 0

## 2025-01-09 ASSESSMENT — ENCOUNTER SYMPTOMS
DEPRESSED MOOD: 1
OCCASIONAL FEELINGS OF UNSTEADINESS: 0
PERSON REPORTING PAIN: PATIENT
DIARRHEA: 1
DENIES PAIN: 1
CHANGE IN APPETITE: UNCHANGED
APPETITE LEVEL: FAIR
STOOL FREQUENCY: MORE THAN TWICE DAILY

## 2025-01-10 ENCOUNTER — APPOINTMENT (OUTPATIENT)
Dept: RADIOLOGY | Facility: HOSPITAL | Age: 70
DRG: 309 | End: 2025-01-10
Payer: COMMERCIAL

## 2025-01-10 ENCOUNTER — HOSPITAL ENCOUNTER (OUTPATIENT)
Dept: CARDIOLOGY | Facility: CLINIC | Age: 70
Discharge: HOME | End: 2025-01-10
Payer: COMMERCIAL

## 2025-01-10 ENCOUNTER — CLINICAL SUPPORT (OUTPATIENT)
Dept: EMERGENCY MEDICINE | Facility: HOSPITAL | Age: 70
DRG: 309 | End: 2025-01-10
Payer: COMMERCIAL

## 2025-01-10 ENCOUNTER — HOSPITAL ENCOUNTER (INPATIENT)
Facility: HOSPITAL | Age: 70
LOS: 1 days | Discharge: HOME | End: 2025-01-12
Attending: EMERGENCY MEDICINE | Admitting: INTERNAL MEDICINE
Payer: COMMERCIAL

## 2025-01-10 DIAGNOSIS — I50.9 HEART FAILURE, UNSPECIFIED: ICD-10-CM

## 2025-01-10 DIAGNOSIS — Z95.810 PRESENCE OF AUTOMATIC (IMPLANTABLE) CARDIAC DEFIBRILLATOR: ICD-10-CM

## 2025-01-10 DIAGNOSIS — I47.29 VENTRICULAR TACHYCARDIA (PAROXYSMAL) (MULTI): Primary | ICD-10-CM

## 2025-01-10 LAB
ANION GAP SERPL CALC-SCNC: 14 MMOL/L (ref 10–20)
APPEARANCE UR: CLEAR
BASOPHILS # BLD AUTO: 0.04 X10*3/UL (ref 0–0.1)
BASOPHILS NFR BLD AUTO: 0.6 %
BILIRUB UR STRIP.AUTO-MCNC: NEGATIVE MG/DL
BNP SERPL-MCNC: 1044 PG/ML (ref 0–99)
BUN SERPL-MCNC: 14 MG/DL (ref 6–23)
CALCIUM SERPL-MCNC: 9.5 MG/DL (ref 8.6–10.6)
CARDIAC TROPONIN I PNL SERPL HS: 159 NG/L (ref 0–34)
CARDIAC TROPONIN I PNL SERPL HS: 257 NG/L (ref 0–34)
CHLORIDE SERPL-SCNC: 103 MMOL/L (ref 98–107)
CO2 SERPL-SCNC: 23 MMOL/L (ref 21–32)
COLOR UR: COLORLESS
CREAT SERPL-MCNC: 1.43 MG/DL (ref 0.5–1.05)
EGFRCR SERPLBLD CKD-EPI 2021: 40 ML/MIN/1.73M*2
EOSINOPHIL # BLD AUTO: 0.6 X10*3/UL (ref 0–0.7)
EOSINOPHIL NFR BLD AUTO: 9.6 %
ERYTHROCYTE [DISTWIDTH] IN BLOOD BY AUTOMATED COUNT: 14.2 % (ref 11.5–14.5)
FLUAV RNA RESP QL NAA+PROBE: NOT DETECTED
FLUBV RNA RESP QL NAA+PROBE: NOT DETECTED
GLUCOSE SERPL-MCNC: 105 MG/DL (ref 74–99)
GLUCOSE UR STRIP.AUTO-MCNC: ABNORMAL MG/DL
HCT VFR BLD AUTO: 38.4 % (ref 36–46)
HGB BLD-MCNC: 12.8 G/DL (ref 12–16)
IMM GRANULOCYTES # BLD AUTO: 0.02 X10*3/UL (ref 0–0.7)
IMM GRANULOCYTES NFR BLD AUTO: 0.3 % (ref 0–0.9)
KETONES UR STRIP.AUTO-MCNC: NEGATIVE MG/DL
LEUKOCYTE ESTERASE UR QL STRIP.AUTO: NEGATIVE
LYMPHOCYTES # BLD AUTO: 1.87 X10*3/UL (ref 1.2–4.8)
LYMPHOCYTES NFR BLD AUTO: 29.8 %
MAGNESIUM SERPL-MCNC: 2.19 MG/DL (ref 1.6–2.4)
MCH RBC QN AUTO: 29.2 PG (ref 26–34)
MCHC RBC AUTO-ENTMCNC: 33.3 G/DL (ref 32–36)
MCV RBC AUTO: 88 FL (ref 80–100)
MONOCYTES # BLD AUTO: 0.57 X10*3/UL (ref 0.1–1)
MONOCYTES NFR BLD AUTO: 9.1 %
NEUTROPHILS # BLD AUTO: 3.18 X10*3/UL (ref 1.2–7.7)
NEUTROPHILS NFR BLD AUTO: 50.6 %
NITRITE UR QL STRIP.AUTO: NEGATIVE
NRBC BLD-RTO: 0 /100 WBCS (ref 0–0)
PH UR STRIP.AUTO: 5.5 [PH]
PLATELET # BLD AUTO: 195 X10*3/UL (ref 150–450)
POTASSIUM SERPL-SCNC: 4.3 MMOL/L (ref 3.5–5.3)
POTASSIUM SERPL-SCNC: 4.4 MMOL/L (ref 3.5–5.3)
PROT UR STRIP.AUTO-MCNC: NEGATIVE MG/DL
RBC # BLD AUTO: 4.38 X10*6/UL (ref 4–5.2)
RBC # UR STRIP.AUTO: NEGATIVE /UL
SARS-COV-2 RNA RESP QL NAA+PROBE: NOT DETECTED
SODIUM SERPL-SCNC: 136 MMOL/L (ref 136–145)
SP GR UR STRIP.AUTO: 1
TSH SERPL-ACNC: 0.57 MIU/L (ref 0.44–3.98)
UROBILINOGEN UR STRIP.AUTO-MCNC: NORMAL MG/DL
WBC # BLD AUTO: 6.3 X10*3/UL (ref 4.4–11.3)

## 2025-01-10 PROCEDURE — 84443 ASSAY THYROID STIM HORMONE: CPT | Performed by: EMERGENCY MEDICINE

## 2025-01-10 PROCEDURE — 96365 THER/PROPH/DIAG IV INF INIT: CPT

## 2025-01-10 PROCEDURE — 93005 ELECTROCARDIOGRAM TRACING: CPT

## 2025-01-10 PROCEDURE — 84484 ASSAY OF TROPONIN QUANT: CPT | Performed by: EMERGENCY MEDICINE

## 2025-01-10 PROCEDURE — 93010 ELECTROCARDIOGRAM REPORT: CPT | Performed by: EMERGENCY MEDICINE

## 2025-01-10 PROCEDURE — 84132 ASSAY OF SERUM POTASSIUM: CPT | Performed by: EMERGENCY MEDICINE

## 2025-01-10 PROCEDURE — 83880 ASSAY OF NATRIURETIC PEPTIDE: CPT

## 2025-01-10 PROCEDURE — 71045 X-RAY EXAM CHEST 1 VIEW: CPT

## 2025-01-10 PROCEDURE — 2500000004 HC RX 250 GENERAL PHARMACY W/ HCPCS (ALT 636 FOR OP/ED): Performed by: EMERGENCY MEDICINE

## 2025-01-10 PROCEDURE — 87636 SARSCOV2 & INF A&B AMP PRB: CPT

## 2025-01-10 PROCEDURE — 83735 ASSAY OF MAGNESIUM: CPT

## 2025-01-10 PROCEDURE — 99291 CRITICAL CARE FIRST HOUR: CPT | Performed by: EMERGENCY MEDICINE

## 2025-01-10 PROCEDURE — 85025 COMPLETE CBC W/AUTO DIFF WBC: CPT | Performed by: EMERGENCY MEDICINE

## 2025-01-10 PROCEDURE — 80048 BASIC METABOLIC PNL TOTAL CA: CPT | Performed by: EMERGENCY MEDICINE

## 2025-01-10 PROCEDURE — 81003 URINALYSIS AUTO W/O SCOPE: CPT | Performed by: EMERGENCY MEDICINE

## 2025-01-10 PROCEDURE — 71045 X-RAY EXAM CHEST 1 VIEW: CPT | Performed by: RADIOLOGY

## 2025-01-10 PROCEDURE — 96361 HYDRATE IV INFUSION ADD-ON: CPT

## 2025-01-10 PROCEDURE — 2500000004 HC RX 250 GENERAL PHARMACY W/ HCPCS (ALT 636 FOR OP/ED)

## 2025-01-10 PROCEDURE — 4B02XTZ MEASUREMENT OF CARDIAC DEFIBRILLATOR, EXTERNAL APPROACH: ICD-10-PCS | Performed by: STUDENT IN AN ORGANIZED HEALTH CARE EDUCATION/TRAINING PROGRAM

## 2025-01-10 PROCEDURE — 84484 ASSAY OF TROPONIN QUANT: CPT

## 2025-01-10 RX ADMIN — AMIODARONE HYDROCHLORIDE 1 MG/MIN: 1.8 INJECTION, SOLUTION INTRAVENOUS at 23:10

## 2025-01-10 RX ADMIN — AMIODARONE HYDROCHLORIDE 150 MG: 1.5 INJECTION, SOLUTION INTRAVENOUS at 22:43

## 2025-01-10 RX ADMIN — SODIUM CHLORIDE, POTASSIUM CHLORIDE, SODIUM LACTATE AND CALCIUM CHLORIDE 500 ML: 600; 310; 30; 20 INJECTION, SOLUTION INTRAVENOUS at 20:12

## 2025-01-10 ASSESSMENT — PAIN - FUNCTIONAL ASSESSMENT: PAIN_FUNCTIONAL_ASSESSMENT: 0-10

## 2025-01-10 ASSESSMENT — LIFESTYLE VARIABLES
EVER FELT BAD OR GUILTY ABOUT YOUR DRINKING: NO
TOTAL SCORE: 0
HAVE YOU EVER FELT YOU SHOULD CUT DOWN ON YOUR DRINKING: NO
EVER HAD A DRINK FIRST THING IN THE MORNING TO STEADY YOUR NERVES TO GET RID OF A HANGOVER: NO
HAVE PEOPLE ANNOYED YOU BY CRITICIZING YOUR DRINKING: NO

## 2025-01-10 ASSESSMENT — PAIN DESCRIPTION - PROGRESSION: CLINICAL_PROGRESSION: NOT CHANGED

## 2025-01-10 ASSESSMENT — PAIN SCALES - GENERAL
PAINLEVEL_OUTOF10: 0 - NO PAIN
PAINLEVEL_OUTOF10: 0 - NO PAIN

## 2025-01-10 NOTE — ED TRIAGE NOTES
Pt reports with palpitations. She has an AICD which was interrogated remotely and she was told to come to ED due to elevated HR into the 200s. Episodes were noted with dizziness. Denies CP, SOB, syncope. Pt is on a continuous milrinone drip.

## 2025-01-11 ENCOUNTER — PHARMACY VISIT (OUTPATIENT)
Dept: PHARMACY | Facility: CLINIC | Age: 70
End: 2025-01-11
Payer: COMMERCIAL

## 2025-01-11 PROBLEM — I47.20 VENTRICULAR TACHYCARDIA (PAROXYSMAL): Status: ACTIVE | Noted: 2025-01-11

## 2025-01-11 PROBLEM — I47.29 VENTRICULAR TACHYCARDIA (PAROXYSMAL) (MULTI): Status: ACTIVE | Noted: 2025-01-11

## 2025-01-11 LAB
ALBUMIN SERPL BCP-MCNC: 3.3 G/DL (ref 3.4–5)
ANION GAP SERPL CALC-SCNC: 21 MMOL/L (ref 10–20)
BASOPHILS # BLD AUTO: 0.03 X10*3/UL (ref 0–0.1)
BASOPHILS NFR BLD AUTO: 0.6 %
BUN SERPL-MCNC: 12 MG/DL (ref 6–23)
CALCIUM SERPL-MCNC: 8 MG/DL (ref 8.6–10.6)
CHLORIDE SERPL-SCNC: 94 MMOL/L (ref 98–107)
CO2 SERPL-SCNC: 23 MMOL/L (ref 21–32)
CREAT SERPL-MCNC: 1.22 MG/DL (ref 0.5–1.05)
EGFRCR SERPLBLD CKD-EPI 2021: 48 ML/MIN/1.73M*2
EOSINOPHIL # BLD AUTO: 0.66 X10*3/UL (ref 0–0.7)
EOSINOPHIL NFR BLD AUTO: 14 %
ERYTHROCYTE [DISTWIDTH] IN BLOOD BY AUTOMATED COUNT: 14 % (ref 11.5–14.5)
GLUCOSE BLD MANUAL STRIP-MCNC: 140 MG/DL (ref 74–99)
GLUCOSE BLD MANUAL STRIP-MCNC: 143 MG/DL (ref 74–99)
GLUCOSE BLD MANUAL STRIP-MCNC: 163 MG/DL (ref 74–99)
GLUCOSE SERPL-MCNC: 445 MG/DL (ref 74–99)
HCT VFR BLD AUTO: 32.9 % (ref 36–46)
HGB BLD-MCNC: 11.4 G/DL (ref 12–16)
HOLD SPECIMEN: NORMAL
IMM GRANULOCYTES # BLD AUTO: 0.01 X10*3/UL (ref 0–0.7)
IMM GRANULOCYTES NFR BLD AUTO: 0.2 % (ref 0–0.9)
LYMPHOCYTES # BLD AUTO: 1.44 X10*3/UL (ref 1.2–4.8)
LYMPHOCYTES NFR BLD AUTO: 30.4 %
MAGNESIUM SERPL-MCNC: 1.96 MG/DL (ref 1.6–2.4)
MCH RBC QN AUTO: 30.3 PG (ref 26–34)
MCHC RBC AUTO-ENTMCNC: 34.7 G/DL (ref 32–36)
MCV RBC AUTO: 88 FL (ref 80–100)
MONOCYTES # BLD AUTO: 0.32 X10*3/UL (ref 0.1–1)
MONOCYTES NFR BLD AUTO: 6.8 %
NEUTROPHILS # BLD AUTO: 2.27 X10*3/UL (ref 1.2–7.7)
NEUTROPHILS NFR BLD AUTO: 48 %
NRBC BLD-RTO: 0 /100 WBCS (ref 0–0)
PHOSPHATE SERPL-MCNC: 2.8 MG/DL (ref 2.5–4.9)
PLATELET # BLD AUTO: 174 X10*3/UL (ref 150–450)
POTASSIUM SERPL-SCNC: 3.5 MMOL/L (ref 3.5–5.3)
RBC # BLD AUTO: 3.76 X10*6/UL (ref 4–5.2)
SODIUM SERPL-SCNC: 134 MMOL/L (ref 136–145)
WBC # BLD AUTO: 4.7 X10*3/UL (ref 4.4–11.3)

## 2025-01-11 PROCEDURE — 80069 RENAL FUNCTION PANEL: CPT | Performed by: STUDENT IN AN ORGANIZED HEALTH CARE EDUCATION/TRAINING PROGRAM

## 2025-01-11 PROCEDURE — 2500000004 HC RX 250 GENERAL PHARMACY W/ HCPCS (ALT 636 FOR OP/ED)

## 2025-01-11 PROCEDURE — 37799 UNLISTED PX VASCULAR SURGERY: CPT | Performed by: STUDENT IN AN ORGANIZED HEALTH CARE EDUCATION/TRAINING PROGRAM

## 2025-01-11 PROCEDURE — 83735 ASSAY OF MAGNESIUM: CPT | Performed by: STUDENT IN AN ORGANIZED HEALTH CARE EDUCATION/TRAINING PROGRAM

## 2025-01-11 PROCEDURE — 2020000001 HC ICU ROOM DAILY

## 2025-01-11 PROCEDURE — RXMED WILLOW AMBULATORY MEDICATION CHARGE

## 2025-01-11 PROCEDURE — 2500000001 HC RX 250 WO HCPCS SELF ADMINISTERED DRUGS (ALT 637 FOR MEDICARE OP)

## 2025-01-11 PROCEDURE — 99291 CRITICAL CARE FIRST HOUR: CPT

## 2025-01-11 PROCEDURE — 2500000002 HC RX 250 W HCPCS SELF ADMINISTERED DRUGS (ALT 637 FOR MEDICARE OP, ALT 636 FOR OP/ED)

## 2025-01-11 PROCEDURE — 82947 ASSAY GLUCOSE BLOOD QUANT: CPT

## 2025-01-11 PROCEDURE — 85025 COMPLETE CBC W/AUTO DIFF WBC: CPT | Performed by: STUDENT IN AN ORGANIZED HEALTH CARE EDUCATION/TRAINING PROGRAM

## 2025-01-11 PROCEDURE — 94640 AIRWAY INHALATION TREATMENT: CPT

## 2025-01-11 RX ORDER — ALBUTEROL SULFATE 90 UG/1
2 INHALANT RESPIRATORY (INHALATION) EVERY 4 HOURS PRN
Status: DISCONTINUED | OUTPATIENT
Start: 2025-01-11 | End: 2025-01-12 | Stop reason: HOSPADM

## 2025-01-11 RX ORDER — BUMETANIDE 1 MG/1
0.5 TABLET ORAL DAILY
Status: DISCONTINUED | OUTPATIENT
Start: 2025-01-11 | End: 2025-01-12

## 2025-01-11 RX ORDER — MAG HYDROX/ALUMINUM HYD/SIMETH 200-200-20
SUSPENSION, ORAL (FINAL DOSE FORM) ORAL 2 TIMES DAILY PRN
Status: DISCONTINUED | OUTPATIENT
Start: 2025-01-11 | End: 2025-01-12 | Stop reason: HOSPADM

## 2025-01-11 RX ORDER — ASPIRIN 81 MG/1
81 TABLET ORAL DAILY
Status: DISCONTINUED | OUTPATIENT
Start: 2025-01-11 | End: 2025-01-12 | Stop reason: HOSPADM

## 2025-01-11 RX ORDER — DAPAGLIFLOZIN 10 MG/1
10 TABLET, FILM COATED ORAL DAILY
Status: DISCONTINUED | OUTPATIENT
Start: 2025-01-11 | End: 2025-01-12 | Stop reason: HOSPADM

## 2025-01-11 RX ORDER — FAMOTIDINE 20 MG/1
40 TABLET, FILM COATED ORAL DAILY PRN
Status: DISCONTINUED | OUTPATIENT
Start: 2025-01-11 | End: 2025-01-12 | Stop reason: HOSPADM

## 2025-01-11 RX ORDER — ENOXAPARIN SODIUM 100 MG/ML
40 INJECTION SUBCUTANEOUS EVERY 24 HOURS
Status: DISCONTINUED | OUTPATIENT
Start: 2025-01-11 | End: 2025-01-12 | Stop reason: HOSPADM

## 2025-01-11 RX ORDER — MILRINONE LACTATE 0.2 MG/ML
INJECTION, SOLUTION INTRAVENOUS
Status: DISPENSED
Start: 2025-01-11 | End: 2025-01-11

## 2025-01-11 RX ORDER — SPIRONOLACTONE 25 MG/1
12.5 TABLET ORAL DAILY
Status: DISCONTINUED | OUTPATIENT
Start: 2025-01-11 | End: 2025-01-12 | Stop reason: HOSPADM

## 2025-01-11 RX ORDER — IPRATROPIUM BROMIDE AND ALBUTEROL SULFATE 2.5; .5 MG/3ML; MG/3ML
3 SOLUTION RESPIRATORY (INHALATION)
Status: DISCONTINUED | OUTPATIENT
Start: 2025-01-11 | End: 2025-01-12 | Stop reason: HOSPADM

## 2025-01-11 RX ORDER — AMIODARONE HYDROCHLORIDE 200 MG/1
200 TABLET ORAL DAILY
Status: DISCONTINUED | OUTPATIENT
Start: 2025-01-24 | End: 2025-01-12 | Stop reason: HOSPADM

## 2025-01-11 RX ORDER — POTASSIUM CHLORIDE 20 MEQ/1
40 TABLET, EXTENDED RELEASE ORAL ONCE
Status: COMPLETED | OUTPATIENT
Start: 2025-01-11 | End: 2025-01-11

## 2025-01-11 RX ORDER — FLUTICASONE FUROATE AND VILANTEROL 100; 25 UG/1; UG/1
1 POWDER RESPIRATORY (INHALATION)
Status: DISCONTINUED | OUTPATIENT
Start: 2025-01-11 | End: 2025-01-12 | Stop reason: HOSPADM

## 2025-01-11 RX ORDER — AMIODARONE HYDROCHLORIDE 200 MG/1
TABLET ORAL
Qty: 82 TABLET | Refills: 0 | Status: SHIPPED | OUTPATIENT
Start: 2025-01-11 | End: 2025-01-12

## 2025-01-11 RX ORDER — PREDNISONE 20 MG/1
20 TABLET ORAL DAILY
Status: DISCONTINUED | OUTPATIENT
Start: 2025-01-12 | End: 2025-01-11

## 2025-01-11 RX ORDER — AMIODARONE HYDROCHLORIDE 200 MG/1
400 TABLET ORAL 2 TIMES DAILY
Status: DISCONTINUED | OUTPATIENT
Start: 2025-01-11 | End: 2025-01-12 | Stop reason: HOSPADM

## 2025-01-11 RX ORDER — CALCIUM CARBONATE 200(500)MG
1 TABLET,CHEWABLE ORAL EVERY 12 HOURS PRN
Status: DISCONTINUED | OUTPATIENT
Start: 2025-01-11 | End: 2025-01-12 | Stop reason: HOSPADM

## 2025-01-11 RX ORDER — MILRINONE LACTATE 0.2 MG/ML
0.38 INJECTION, SOLUTION INTRAVENOUS CONTINUOUS
Status: DISCONTINUED | OUTPATIENT
Start: 2025-01-11 | End: 2025-01-12 | Stop reason: HOSPADM

## 2025-01-11 RX ORDER — CHOLECALCIFEROL (VITAMIN D3) 25 MCG
2000 TABLET ORAL DAILY
Status: DISCONTINUED | OUTPATIENT
Start: 2025-01-11 | End: 2025-01-12 | Stop reason: HOSPADM

## 2025-01-11 RX ADMIN — Medication 2000 UNITS: at 08:18

## 2025-01-11 RX ADMIN — AMIODARONE HYDROCHLORIDE 400 MG: 200 TABLET ORAL at 17:03

## 2025-01-11 RX ADMIN — IPRATROPIUM BROMIDE AND ALBUTEROL SULFATE 3 ML: .5; 3 SOLUTION RESPIRATORY (INHALATION) at 03:07

## 2025-01-11 RX ADMIN — AMIODARONE HYDROCHLORIDE 0.5 MG/MIN: 1.8 INJECTION, SOLUTION INTRAVENOUS at 17:02

## 2025-01-11 RX ADMIN — IPRATROPIUM BROMIDE AND ALBUTEROL SULFATE 3 ML: .5; 3 SOLUTION RESPIRATORY (INHALATION) at 15:54

## 2025-01-11 RX ADMIN — IPRATROPIUM BROMIDE AND ALBUTEROL SULFATE 3 ML: .5; 3 SOLUTION RESPIRATORY (INHALATION) at 08:39

## 2025-01-11 RX ADMIN — IPRATROPIUM BROMIDE AND ALBUTEROL SULFATE 3 ML: .5; 3 SOLUTION RESPIRATORY (INHALATION) at 20:04

## 2025-01-11 RX ADMIN — BUMETANIDE 0.5 MG: 1 TABLET ORAL at 08:17

## 2025-01-11 RX ADMIN — ENOXAPARIN SODIUM 40 MG: 100 INJECTION SUBCUTANEOUS at 06:25

## 2025-01-11 RX ADMIN — POTASSIUM CHLORIDE 40 MEQ: 1500 TABLET, EXTENDED RELEASE ORAL at 06:25

## 2025-01-11 RX ADMIN — SPIRONOLACTONE 12.5 MG: 25 TABLET, FILM COATED ORAL at 08:16

## 2025-01-11 RX ADMIN — DAPAGLIFLOZIN 10 MG: 10 TABLET, FILM COATED ORAL at 08:17

## 2025-01-11 RX ADMIN — AMIODARONE HYDROCHLORIDE 0.5 MG/MIN: 1.8 INJECTION, SOLUTION INTRAVENOUS at 05:05

## 2025-01-11 RX ADMIN — ASPIRIN 81 MG: 81 TABLET, COATED ORAL at 08:17

## 2025-01-11 SDOH — SOCIAL STABILITY: SOCIAL INSECURITY: HAVE YOU HAD ANY THOUGHTS OF HARMING ANYONE ELSE?: YES

## 2025-01-11 SDOH — SOCIAL STABILITY: SOCIAL INSECURITY: WITHIN THE LAST YEAR, HAVE YOU BEEN HUMILIATED OR EMOTIONALLY ABUSED IN OTHER WAYS BY YOUR PARTNER OR EX-PARTNER?: NO

## 2025-01-11 SDOH — SOCIAL STABILITY: SOCIAL INSECURITY: DO YOU FEEL ANYONE HAS EXPLOITED OR TAKEN ADVANTAGE OF YOU FINANCIALLY OR OF YOUR PERSONAL PROPERTY?: YES

## 2025-01-11 SDOH — SOCIAL STABILITY: SOCIAL INSECURITY: DO YOU FEEL UNSAFE GOING BACK TO THE PLACE WHERE YOU ARE LIVING?: YES

## 2025-01-11 SDOH — ECONOMIC STABILITY: FOOD INSECURITY: WITHIN THE PAST 12 MONTHS, THE FOOD YOU BOUGHT JUST DIDN'T LAST AND YOU DIDN'T HAVE MONEY TO GET MORE.: NEVER TRUE

## 2025-01-11 SDOH — SOCIAL STABILITY: SOCIAL INSECURITY: ABUSE: ADULT

## 2025-01-11 SDOH — ECONOMIC STABILITY: INCOME INSECURITY: IN THE PAST 12 MONTHS HAS THE ELECTRIC, GAS, OIL, OR WATER COMPANY THREATENED TO SHUT OFF SERVICES IN YOUR HOME?: NO

## 2025-01-11 SDOH — SOCIAL STABILITY: SOCIAL INSECURITY: WERE YOU ABLE TO COMPLETE ALL THE BEHAVIORAL HEALTH SCREENINGS?: YES

## 2025-01-11 SDOH — SOCIAL STABILITY: SOCIAL INSECURITY: ARE THERE ANY APPARENT SIGNS OF INJURIES/BEHAVIORS THAT COULD BE RELATED TO ABUSE/NEGLECT?: YES

## 2025-01-11 SDOH — SOCIAL STABILITY: SOCIAL INSECURITY: DOES ANYONE TRY TO KEEP YOU FROM HAVING/CONTACTING OTHER FRIENDS OR DOING THINGS OUTSIDE YOUR HOME?: YES

## 2025-01-11 SDOH — SOCIAL STABILITY: SOCIAL INSECURITY: HAS ANYONE EVER THREATENED TO HURT YOUR FAMILY OR YOUR PETS?: YES

## 2025-01-11 SDOH — ECONOMIC STABILITY: FOOD INSECURITY: WITHIN THE PAST 12 MONTHS, YOU WORRIED THAT YOUR FOOD WOULD RUN OUT BEFORE YOU GOT THE MONEY TO BUY MORE.: NEVER TRUE

## 2025-01-11 SDOH — SOCIAL STABILITY: SOCIAL INSECURITY: HAVE YOU HAD THOUGHTS OF HARMING ANYONE ELSE?: YES

## 2025-01-11 SDOH — SOCIAL STABILITY: SOCIAL INSECURITY: WITHIN THE LAST YEAR, HAVE YOU BEEN AFRAID OF YOUR PARTNER OR EX-PARTNER?: NO

## 2025-01-11 SDOH — SOCIAL STABILITY: SOCIAL INSECURITY: ARE YOU OR HAVE YOU BEEN THREATENED OR ABUSED PHYSICALLY, EMOTIONALLY, OR SEXUALLY BY ANYONE?: YES

## 2025-01-11 ASSESSMENT — COGNITIVE AND FUNCTIONAL STATUS - GENERAL
MOBILITY SCORE: 24
DAILY ACTIVITIY SCORE: 24
DAILY ACTIVITIY SCORE: 24
MOBILITY SCORE: 24
PATIENT BASELINE BEDBOUND: NO

## 2025-01-11 ASSESSMENT — LIFESTYLE VARIABLES
HOW OFTEN DO YOU HAVE 6 OR MORE DRINKS ON ONE OCCASION: NEVER
SKIP TO QUESTIONS 9-10: 1
AUDIT-C TOTAL SCORE: 0
HOW MANY STANDARD DRINKS CONTAINING ALCOHOL DO YOU HAVE ON A TYPICAL DAY: PATIENT DOES NOT DRINK
AUDIT-C TOTAL SCORE: 0
HOW OFTEN DO YOU HAVE A DRINK CONTAINING ALCOHOL: NEVER

## 2025-01-11 ASSESSMENT — ACTIVITIES OF DAILY LIVING (ADL)
PATIENT'S MEMORY ADEQUATE TO SAFELY COMPLETE DAILY ACTIVITIES?: YES
WALKS IN HOME: INDEPENDENT
TOILETING: INDEPENDENT
FEEDING YOURSELF: INDEPENDENT
GROOMING: INDEPENDENT
BATHING: INDEPENDENT
HEARING - LEFT EAR: FUNCTIONAL
JUDGMENT_ADEQUATE_SAFELY_COMPLETE_DAILY_ACTIVITIES: YES
DRESSING YOURSELF: INDEPENDENT
LACK_OF_TRANSPORTATION: NO
HEARING - RIGHT EAR: FUNCTIONAL
ADEQUATE_TO_COMPLETE_ADL: YES

## 2025-01-11 ASSESSMENT — PAIN - FUNCTIONAL ASSESSMENT
PAIN_FUNCTIONAL_ASSESSMENT: 0-10

## 2025-01-11 ASSESSMENT — PAIN SCALES - GENERAL
PAINLEVEL_OUTOF10: 0 - NO PAIN

## 2025-01-11 ASSESSMENT — PATIENT HEALTH QUESTIONNAIRE - PHQ9
2. FEELING DOWN, DEPRESSED OR HOPELESS: NOT AT ALL
1. LITTLE INTEREST OR PLEASURE IN DOING THINGS: NOT AT ALL
SUM OF ALL RESPONSES TO PHQ9 QUESTIONS 1 & 2: 0

## 2025-01-11 NOTE — ED PROCEDURE NOTE
Procedure  Critical Care    Performed by: Analisa Belle MD  Authorized by: Analisa Belle MD    Critical care provider statement:     Critical care time (minutes):  30    Critical care time was exclusive of:  Separately billable procedures and treating other patients    Critical care was necessary to treat or prevent imminent or life-threatening deterioration of the following conditions:  Cardiac failure and circulatory failure    Critical care was time spent personally by me on the following activities:  Ordering and performing treatments and interventions, ordering and review of laboratory studies, ordering and review of radiographic studies, development of treatment plan with patient or surrogate, discussions with consultants, examination of patient, re-evaluation of patient's condition, pulse oximetry, review of old charts and obtaining history from patient or surrogate               Analisa Belle MD  01/11/25 0019

## 2025-01-11 NOTE — H&P
"CICU ADMISSION NOTE    History of Present Illness  Melissa Marinelli is a 69 y.o. female with PMHx of ICM/ HFrEF (last EF 15-20% 7/2024, s/p [employer] ICD 5/2020) on milrinone infusion since 2/2024, CAD s/p NSTEMI s/p RIRI to LCX (Jan 2016), MVR s/p mitral valve repair in (June 2019; 29 mm Epic bioprosthetic valve with Dr. Montana), COPD, HTN, HL, GERD, hx of CVA, DM who presented to [facility] ED with palpitations, found to have episode of VT s/p ICD shock, admitted to CICU for further monitoring.       Pt states she began experiencing diarrhea on Monday, 1/6/2025. She notes that her daughter had similar symptoms recently, and was notified that a coworker recently tested positive for norovirus. This week she has also been noticing palpitations, that first began on Monday. She has had 8-10 episodes of diarrhea/day this week, however it has slowed down within the last 24 hours. She has been trying to increase her fluid intake, and has been drinking more water and ginger ale. She also endorses nausea but no vomiting. She states that she has not had much of an appetite recently.     In terms of HF symptoms, she notes that she has been feeling a little more SOB over the last day, and it is more noticeable when laying flat. She has not had any recent chest pain, although does note a sensation since receiving the device shock in the ED. Denies any BLE edema or abdominal distension/swelling.         ED Course:  BP: 129/75  Temp: 36.4 °C (97.5 °F)  Heart Rate: 99  Resp: 18  MAP (mmHg): 92  SpO2: 97 %  Current Vitals  /72 (BP Location: Left arm, Patient Position: Lying)   Pulse 90   Temp 35.7 °C (96.3 °F) (Temporal)   Resp 23   Ht 1.626 m (5' 4\")   Wt 58.2 kg (128 lb 6.4 oz)   SpO2 97%   BMI 22.04 kg/m²      Labs:     CBC: WBC 6.3 , HGB 12.8,   BMP: , K 4.3, Cl 103, HCO3 23, BUN 14, CR 1.43, Glu 105  LFTS: AST 21 , ALT 13, ALKPHOS 82 , TBILI 0.4 , DBILI 0.1  TROP: 159  BNP: 1,044  COAGS: PT 11.0 , PTT 30  " , INR 1.0  UA:   Results from last 7 days   Lab Units 01/10/25  2202   COLOR U  Colorless*   PH U  5.5   SPEC GRAV UR  1.004*   PROTEIN U mg/dL NEGATIVE   BLOOD UR  NEGATIVE   NITRITE U  NEGATIVE          EKG  Regular however does not have clear upright p waves in lead I or II, nor biphasic p-waves in v1; , 94 BPM, QRS 122ms, TWI v5-v6 unchanged from prior      Imaging:  XR chest 1 view    Result Date: 1/10/2025  Interpreted By:  Bijan Arango,  and Dina Up STUDY: XR CHEST 1 VIEW;  1/10/2025 8:19 pm   INDICATION: Signs/Symptoms:palpitations.     COMPARISON: Chest radiograph 09/07/2024 CT chest abdomen pelvis 07/08/2024   ACCESSION NUMBER(S): [id number]   ORDERING CLINICIAN: LUCIE CHICAS   FINDINGS: AP radiograph of the chest was provided. Patient is rotated left.   Left pectoral generator pack with ICD lead projecting of the expected position of the right ventricle. Median sternotomy changes. A right IJ central venous catheter projects over the region of the cavoatrial junction.   Borderline cardiomegaly. Mild central pulmonary vascular congestion and interstitial prominence. Possible small left pleural effusion. No discernible pneumothorax.       1.  Prominent perihilar interstitial markings which can be seen in setting of pulmonary edema, recommend correlation with patient's fluid status. 2. Possible small left effusion.   I personally reviewed the images/study and I agree with Annel Arroyo DO's (radiology resident) findings as stated.   MACRO: None   Signed by: Bijan Arango 1/10/2025 9:19 PM Dictation workstation:   [id number]      ED Interventions:  Medications   amiodarone (Nexterone) 150 mg in dextrose (iso)  mL (0 mg intravenous Stopped 1/10/25 2310)     Followed by   amiodarone (Nexterone) 360 mg in dextrose,iso-osm 200 mL (1.8 mg/mL) infusion (premix) (1 mg/min intravenous New Bag 1/10/25 2310)     Followed by   amiodarone (Nexterone) 360 mg in dextrose,iso-osm 200 mL (1.8  mg/mL) infusion (premix) (has no administration in time range)   albuterol 90 mcg/actuation inhaler 2 puff (has no administration in time range)   aspirin EC tablet 81 mg (has no administration in time range)   calcium carbonate (Tums) chewable tablet 500 mg (has no administration in time range)   cholecalciferol (Vitamin D-3) tablet 2,000 Units (has no administration in time range)   famotidine (Pepcid) tablet 40 mg (has no administration in time range)   dapagliflozin propanediol (Farxiga) tablet 10 mg (has no administration in time range)   hydrocortisone 1 % ointment (has no administration in time range)   ipratropium-albuteroL (Duo-Neb) 0.5-2.5 mg/3 mL nebulizer solution 3 mL (has no administration in time range)   milrinone (Primacor) 20 mg in dextrose 5% 100 mL (0.2 mg/mL) infusion (premix) (has no administration in time range)   sodium chloride (Ocean) 0.65 % nasal spray 1 spray (has no administration in time range)   spironolactone (Aldactone) tablet 12.5 mg (has no administration in time range)   fluticasone furoate-vilanteroL (Breo Ellipta) 100-25 mcg/dose inhaler 1 puff (has no administration in time range)   enoxaparin (Lovenox) syringe 40 mg (has no administration in time range)   lactated Ringer's bolus 500 mL (0 mL intravenous Stopped 1/10/25 2116)       Cardiac Hx:  TTE 7/8/2024  CONCLUSIONS:   1. The left ventricular systolic function is severely decreased, with a visually estimated ejection fraction of 15-20%.   2. There is global hypokinesis of the left ventricle with minor regional variations.   3. Spectral Doppler shows an abnormal pattern of left ventricular diastolic filling.   4. Left ventricular cavity size is severely dilated.   5. No left ventricular thrombus visualized.   6. There is reduced right ventricular systolic function.   7. The left atrium is severely dilated.   8. The DI is normal, consistent with normal prosthetic mitral valve function. The mean diastolic pressure is 8 mmHg at  a heart rate of 136 bpm.   9. Mild to moderately elevated right ventricular systolic pressure.  10. Mild aortic valve regurgitation.    RHC 1/26/2024  RA: 12; PA 46/29 (37); PCWP: 26; CI: 1.3       Past Medical History  Past Medical History:   Diagnosis Date    Asthma     CAD (coronary artery disease)     CHF (congestive heart failure)     CKD (chronic kidney disease)     COPD (chronic obstructive pulmonary disease) (Multi)     CVA (cerebral vascular accident) (Multi)     Diabetes mellitus (Multi)     GERD (gastroesophageal reflux disease)     Hyperlipidemia     Hypertension     NSTEMI (non-ST elevated myocardial infarction) (Multi)     Unspecified systolic (congestive) heart failure 08/11/2022    HFrEF (heart failure with reduced ejection fraction)       Surgical History  Past Surgical History:   Procedure Laterality Date    CARDIAC CATHETERIZATION N/A 1/26/2024    Procedure: Right Heart Cath;  Surgeon: Cuate Dodson MD;  Location: [place] [address] Cardiac Cath Lab;  Service: Cardiovascular;  Laterality: N/A;    MITRAL VALVE REPLACEMENT  06/2019    OTHER SURGICAL HISTORY  08/11/2022    Tonsillectomy    OTHER SURGICAL HISTORY  08/11/2022    Right ventricular assist device placement    OTHER SURGICAL HISTORY  08/11/2022    Mitral valve replacement       Social History  She reports that she has been smoking cigarettes. She has never used smokeless tobacco. She reports current drug use. Drug: Marijuana. She reports that she does not drink alcohol.    Family History  Family History   Problem Relation Name Age of Onset    Other (CVA) Brother          Allergies  Metoprolol, Ticagrelor, Gadolinium-containing contrast media, Iodinated contrast media, Statins-hmg-coa reductase inhibitors, Red dye, Ace inhibitors, Fentanyl, Hydralazine, Nicorette [nicotine (polacrilex)], Nicotine, Penicillins, and Prednisone     Physical Exam  Constitutional:       General: She is not in acute distress.     Appearance: Normal appearance. She is  not ill-appearing.   HENT:      Head: Normocephalic and atraumatic.   Cardiovascular:      Rate and Rhythm: Normal rate and regular rhythm.      Pulses: Normal pulses.      Heart sounds: No murmur heard.     No friction rub. No gallop.   Pulmonary:      Effort: Pulmonary effort is normal. No respiratory distress.      Breath sounds: Normal breath sounds.   Abdominal:      General: Abdomen is flat. Bowel sounds are normal.      Palpations: Abdomen is soft.   Musculoskeletal:         General: No swelling.      Right lower leg: No edema.      Left lower leg: No edema.   Skin:     General: Skin is warm and dry.   Neurological:      General: No focal deficit present.      Mental Status: She is alert and oriented to person, place, and time.   Psychiatric:         Mood and Affect: Mood normal.         Behavior: Behavior normal.         Medications  Home Meds  Prior to Admission medications    Medication Sig Start Date End Date Taking? Authorizing Provider   acetaminophen (Tylenol) 500 mg tablet Take 2 tablets by mouth every 8 hours if needed for mild pain (1 - 3). 10/19/23   Historical Provider, MD   albuterol 90 mcg/actuation inhaler Inhale 2 puffs every 4 hours if needed for wheezing or shortness of breath. 11/26/23   Bel Chilel APRN-CNP   aspirin 81 mg EC tablet Take 1 tablet (81 mg) by mouth once daily. 6/10/24 6/10/25  Kathy Rivera MD PhD   bumetanide (Bumex) 0.5 mg tablet Take 1 tablet (0.5 mg) by mouth 2 times a week. On Tuesdays and Saturdays 10/14/24 1/12/25  Kathy Rivera MD PhD   Calcium Antacid 200 mg calcium (500 mg) chewable tablet Chew 1 tablet every 12 hours if needed. 1/21/24   Historical Provider, MD   cholecalciferol (Vitamin D3) 50 MCG (2000 UT) tablet Take 1 tablet (2,000 Units) by mouth once daily. 5/23/24   Historical Provider, MD   famotidine (Pepcid) 40 mg tablet Take 1 tablet (40 mg) by mouth once daily. Do not start before February 3, 2024.  Patient taking differently: Take 1 tablet  (40 mg) by mouth once daily as needed for indigestion or heartburn. 2/18/24 10/14/24  Ana Mccall APRN-CNP   Farxiga 10 mg Take 1 tablet (10 mg) by mouth once daily. 6/10/24 6/10/25  Kathy Rivera MD PhD   heparin flush,porcine,-0.9NaCl 100 unit/mL kit 5 mL by central venous line infusion route 1 (one) time per week. Indications: prevent clot from blocking an intravenous catheter    Historical Provider, MD   hydrocortisone 1 % ointment Apply topically 2 times a day as needed for irritation. Chest wall 9/9/24 9/9/25  Brittney Nunez APRN-CNP   ipratropium-albuteroL (Duo-Neb) 0.5-2.5 mg/3 mL nebulizer solution Take 3 mL by nebulization every 6 hours.    Historical Provider, MD   milrinone in 5 % dextrose (Primacor) 20 mg/100 mL (200 mcg/mL) infusion Infuse 23.8125 mcg/min at 7.14 mL/hr into a venous catheter continuously. 9/12/24 9/12/25  Kathy Rivera MD PhD   mupirocin (Bactroban) 2 % ointment Apply topically. PRN for nose bleed 8/8/24   Historical Provider, MD   nicotine (Nicoderm CQ) 21 mg/24 hr patch Place 1 patch over 24 hours on the skin once every 24 hours. 10/14/24 11/13/24  Kathy Rivera MD PhD   nicotine polacrilex (Nicorette) 4 mg gum Chew 1 each (4 mg) if needed for smoking cessation. 10/14/24 11/13/24  Kathy Rivera MD PhD   OneTouch Verio Flex meter misc USE AS DIRECTED THREE TIMES DAILY 9/22/23   Historical Provider, MD   sodium chloride (Ocean) 0.65 % nasal spray Administer 1 spray into each nostril if needed for congestion.    Historical Provider, MD   sodium chloride 0.9% (sodium chloride) flush Infuse 10 mL into a venous catheter 1 (one) time per week. Indications: flush picc line    Historical Provider, MD   spironolactone (Aldactone) 25 mg tablet Take 0.5 tablets (12.5 mg) by mouth once daily. 6/10/24 6/10/25  Kathy Rivera MD PhD   Symbicort 80-4.5 mcg/actuation inhaler Inhale 2 puffs twice a day. 11/20/23   Historical Provider, MD     Scheduled  medications  aspirin, 81 mg, oral, Daily  cholecalciferol, 2,000 Units, oral, Daily  dapagliflozin propanediol, 10 mg, oral, Daily  enoxaparin, 40 mg, subcutaneous, q24h  fluticasone furoate-vilanteroL, 1 puff, inhalation, Daily  ipratropium-albuteroL, 3 mL, nebulization, q6h  spironolactone, 12.5 mg, oral, Daily      Continuous medications  amiodarone, 1 mg/min, Last Rate: 1 mg/min (01/10/25 2310)   Followed by  amiodarone, 0.5 mg/min  milrinone in 5 % dextrose, 0.375 mcg/kg/min (Order-Specific)      PRN medications  PRN medications: albuterol, calcium carbonate, famotidine, hydrocortisone, sodium chloride      Assessment/Plan   Melissa Marinelli is a 69 y.o. female with PMHx of ICM/HFrEF (last EF 15-20% 7/2024, s/p [employer] ICD 5/2020) on milrinone infusion since 2/2024, CAD s/p NSTEMI s/p RIRI to LCX (Jan 2016), MVR s/p mitral valve repair in (June 2019; 29 mm Epic bioprosthetic valve with Dr. Montana), COPD, HTN, hx of CVA, DM who presented to [facility] ED with palpitations, found to have episode of VT s/p ICD shock, admitted to CICU for further monitoring.  Unclear etiology for episode of VT. Differential includes hyper/hypo volemia in setting of recent diarrheal illness, electrolyte abnormalities, ischemia, or related to milrinone infusion. Not grossly overloaded on exam and breathing comfortably on RA, however she does have elevated BNP compared with prior values, and her CXR has some evidence of pulmonary edema. Will plan to give her home PO bumex dose as scheduled (pt reports taking Tuesdays and Saturdays) and continue to monitor for evidence of volume overload. Ischemia less likely in setting of absent chest pain/SOB, and relatively mild troponin elevation despite recent shock. Will continue amio infusion and plan for EP consult in AM for further evaluation.      NEUROLOGY/ PSYCH:  #Hx of CVA 2006 with residual R eyelid droop  -Continue ASA 81mg daily    #Depression/anxiety  -Not currently on medications  -Has  previously declined referral to  providers      CARDIOVASCULAR:  #Episode of VT s/p appropriate device shock 1/10/2025  #ICM/HFrEF (EF 15-20% 7/2024) s/p [employer] ICD 5/2020  #Chronic milrinone infusion since 2/2024  ::Device interrogation in ED demonstrating ~50s run of VT with ATP bursts x2 unsuccessful, followed by successful conversion to sinus rhythm with device discharge  -Bolused amio and started on gtt in ED; will continue same  -No evidence of severe electrolyte derangements despite recent diarrheal illness  -Mild troponin elevation likely due to recent device shock; less likely ACS without recent chest pain/SOB, and no acute EKG changes  -Episode of VT most likely 2/2 volume status in setting of milrinone infusion  -Plan for EP consult in AM  -Continue home farxiga 10mg, spironolactone; was previously on Entresto but this was stopped during previous hospitalization due to hypotension; she has a previous allergy to bblockers    #CAD s/p PCI 2016  -Continue ASA 81mg daily  -Not on statin due to allergy      PULMONARY:  #COPD  -No PFTs in system  -Continue home Duonebs  -Continue Breo Ellipta       RENAL/ GENITOURINARY:  #CKD stage IIIb  -Cr on admission 1.43 (at baseline)  -Continue to monitor      GASTROENTEROLOGY:  #Diarrheal illness  -Stool pathogen panel sent  -CLD for now; can advance as tolerated      ENDOCRINOLOGY:  #DMT2  -Last A1c 5.2 7/2024  -Repeat pending  -SSI if hyperglycemia on daily RFPs    HEMATOLOGY:  No active issues      MUSCULOSKELETAL/ SKIN:  No active issues      INFECTIOUS DISEASE:  See GI for diarrheal illness      Fluids: Replete PRN  Nutrition:  Adult diet Clear Liquid   Antimicrobials: None  DVT PPX: Lovenox Subq 40  GI ppx: None  Bowel care:None in setting of diarrhea  Catheter:None  Lines: Wiley for milrinone infusion; PIVs  Oxygen:Room Air  Drips: milrinone 0.375 (chronic); amio gtt      Code Status: Full Code (confirmed on admission)   NOK:  Primary Emergency Contact:  Mary Caruso MD

## 2025-01-11 NOTE — PROGRESS NOTES
I assumed care of this patient at 11 PM.    Please see initial provider note for full HPI.  Briefly this is a 69-year-old female with past medical history of ICD and HFrEF on milrinone presenting with palpitations and after being notified by the ICD company to come in for elevated heart rate.  She had her ICD fired once today and has also had incidental diarrhea for the past 5 days. Patient's [employer] ICD was interrogated by Cardiology fellow in the ED who reported that the patient had several nonsustained episodes of ventricular tachycardia today and then a sustained episode >50s around 6:30pm for which ATP bursts x2 were unsuccessful, then shock x1 converted her back to sinus rhythm. Patient was discussed with heart failure ICU attending Dr. Sav Arcos regarding admission. Given the findings and per discussion with Dr. Sav Arcos, the patient was initiated on IV amiodarone. Given the fact that there are no available heart failure ICU beds at this time, patient was discussed with CICU attending Dr. Gerber and the patient was accepted for admission to CICU for further workup and care.  Patient does have significant PVCs on cardiac monitoring but electrolytes are otherwise stable and troponin is downtrending.  Patient was admitted and care was overseen by attending who agrees with the plan and disposition.    Giselle Todd MD  Emergency Medicine - PGY3  [facility]  [address] [facility] [address]

## 2025-01-11 NOTE — PROGRESS NOTES
Pharmacy Medication History Review    Melissa Marinelli is a 69 y.o. female admitted for Ventricular tachycardia (paroxysmal) (Multi). Pharmacy reviewed the patient's [id number] medications and allergies for accuracy.    Medications ADDED:  None   Medications CHANGED:  None   Medications REMOVED:   None      The list below reflects the updated PTA list.   Prior to Admission Medications   Prescriptions Last Dose Informant   Calcium Antacid 200 mg calcium (500 mg) chewable tablet  Self   Sig: Chew 1 tablet every 12 hours if needed.   Farxiga 10 mg  Self   Sig: Take 1 tablet (10 mg) by mouth once daily.   OneTouch Verio Flex meter misc  Self   Sig: USE AS DIRECTED THREE TIMES DAILY   Symbicort 80-4.5 mcg/actuation inhaler  Self   Sig: Inhale 2 puffs twice a day.   acetaminophen (Tylenol) 500 mg tablet  Self   Sig: Take 2 tablets by mouth every 8 hours if needed for mild pain (1 - 3).   albuterol 90 mcg/actuation inhaler  Self   Sig: Inhale 2 puffs every 4 hours if needed for wheezing or shortness of breath.   aspirin 81 mg EC tablet  Self   Sig: Take 1 tablet (81 mg) by mouth once daily.   bumetanide (Bumex) 0.5 mg tablet  Self   Sig: Take 1 tablet (0.5 mg) by mouth 2 times a week. On  and    cholecalciferol (Vitamin D3) 50 MCG (2000 UT) tablet  Self   Sig: Take 1 tablet (2,000 Units) by mouth once daily.   famotidine (Pepcid) 40 mg tablet  Self   Sig: Take 1 tablet (40 mg) by mouth once daily. Do not start before February 3, 2024.   Patient taking differently: Take 1 tablet (40 mg) by mouth once daily as needed for indigestion or heartburn.   heparin flush,porcine,-0.9NaCl 100 unit/mL kit  Self   Si mL by central venous line infusion route 1 (one) time per week. Indications: prevent clot from blocking an intravenous catheter   hydrocortisone 1 % ointment  Self   Sig: Apply topically 2 times a day as needed for irritation. Chest wall   ipratropium-albuteroL (Duo-Neb) 0.5-2.5 mg/3 mL nebulizer  "solution  Self   Sig: Take 3 mL by nebulization every 6 hours.   milrinone in 5 % dextrose (Primacor) 20 mg/100 mL (200 mcg/mL) infusion  Self   Sig: Infuse 23.8125 mcg/min at 7.14 mL/hr into a venous catheter continuously.   mupirocin (Bactroban) 2 % ointment  Self   Sig: Apply topically. PRN for nose bleed   nicotine (Nicoderm CQ) 21 mg/24 hr patch     Sig: Place 1 patch over 24 hours on the skin once every 24 hours.   nicotine polacrilex (Nicorette) 4 mg gum Not Taking    Sig: Chew 1 each (4 mg) if needed for smoking cessation.   Patient not taking: Reported on 1/11/2025   sodium chloride (Ocean) 0.65 % nasal spray  Self   Sig: Administer 1 spray into each nostril if needed for congestion.   sodium chloride 0.9% (sodium chloride) flush  Self   Sig: Infuse 10 mL into a venous catheter 1 (one) time per week. Indications: flush picc line   spironolactone (Aldactone) 25 mg tablet  Self   Sig: Take 0.5 tablets (12.5 mg) by mouth once daily.      Facility-Administered Medications: None        The list below reflects the updated allergy list. Please review each documented allergy for additional clarification and justification.  Allergies  Reviewed by Rona Morrow on 1/11/2025        Severity Reactions Comments    Metoprolol High Other, Shortness of breath, Respiratory depression     Ticagrelor High Anaphylaxis, Other Feels a severe \"burning feeling\" in her chest    Gadolinium-containing Contrast Media Medium Hives, Other     Iodinated Contrast Media Medium Hives, Other     Statins-hmg-coa Reductase Inhibitors Medium Myalgia, Other, Unknown Atorvastatin - hair loss    Red Dye Not Specified Unknown     Ace Inhibitors Low Other, Cough COUGH    Fentanyl Low Dizziness, Drowsiness     Hydralazine Low Dermatitis, Rash, Nausea/vomiting     Nicorette [nicotine (polacrilex)] Low Nausea/vomiting Nicorette gums and lozenges only. Okay with nicotine patches    Nicotine Low Nausea/vomiting     Penicillins Low Rash     Prednisone " "Low Other Increased blood sugar            Patient declines M2B at discharge.     Sources:   [facility]  Pharmacy dispense history  Patient interview Good historian  Child  Chart Review  Care Everywhere     Additional Comments:  Patient requests refill for Nicotine Patches, Acetaminophen and Glucerna.  Patient requests new Nebulizer.      Rona Morrow  Pharmacy Technician  01/11/25     Secure Chat preferred   If no response call [phone] or [employer] \"Med Rec\"   "

## 2025-01-11 NOTE — SIGNIFICANT EVENT
Paged tonight for device interrogation. Patient has [employer] ICD. Several nonsustained episodes of VT today and then a sustained episode >50s around 6:30pm for which ATP bursts x2 were unsuccessful, then shock x1 converted her back to sinus rhythm. Per discussion with ED team, plan is for antiarrhythmic/admission to HFICU.

## 2025-01-11 NOTE — PROGRESS NOTES
"CARDIAC ICU PROGRESS NOTE    Melissa Marinelli/[id number]    Admit Date: 1/10/2025  Hospital Length of Stay: 0   ICU Length of Stay: 14h     Subjective   Upon evaluation this AM, patient is doing well, conversant  Reports mild SOB compared to her baseline  Denies SOB  Denies palpitations at current time  Reports her diarrhea is much improved, no BM yesterday, nausea is also improved. She is tolerating PO intake.         Objective      VITALS:   Visit Vitals  BP 78/60   Pulse 88   Temp 36.1 °C (97 °F)   Resp 26   Ht 1.626 m (5' 4\")   Wt 58.2 kg (128 lb 6.4 oz)   SpO2 94%   BMI 22.04 kg/m²   OB Status Postmenopausal   Smoking Status Every Day   BSA 1.62 m²       Vent settings:    Most Recent Range Past 24hrs   Mode      FiO2   No data recorded   Rate   No data recorded   Vt    No data recorded   PEEP   No data recorded     Invasive Hemodynamics:    Most Recent Range Past 24hrs   BP (Art)   No data recorded   MAP(Art)   No data recorded   RA/CVP   No data recorded   PA   No data recorded   PA(mean)   No data recorded   PCWP   No data recorded   CO   No data recorded   CI   No data recorded   Mixed Venous   No data recorded   SVR    No data recorded   PVR   No data recorded     Infusions:  amiodarone, Last Rate: 0.5 mg/min (01/11/25 0505)  milrinone in 5 % dextrose        Intake/output:  I/O last 3 completed shifts:  In: 933.3 (16 mL/kg) [I.V.:399.9 (6.9 mL/kg); IV Piggyback:533.3]  Out: - (0 mL/kg)   Weight: 58.2 kg      Weight:  Wt Readings from Last 5 Encounters:   01/11/25 58.2 kg (128 lb 6.4 oz)   01/09/25 58.5 kg (129 lb)   01/02/25 58.2 kg (128 lb 6.4 oz)   12/27/24 58.5 kg (129 lb)   12/19/24 58.5 kg (129 lb)       Physical exam:  Constitutional: No acute distress, cooperative   Neuro: A&O x3, able to move all 4 extremities spontaneously  HEENT: No conjunctival icterus  Cardiovascular: RRR, normal S1/S2, no murmurs noted  Pulmonary: Clear to auscultation b/l, no wheezes/crackles/rhonchi, no increased work of " breathing on RA  GI: Soft, non-tender, non-distended, normoactive bowel sounds  Lower extremities: Warm and well perfused, no lower extremity edema    Labs:  CBC:  Recent Labs     01/11/25  0132 01/10/25  1845 09/09/24  0816 09/08/24  0647 09/07/24  1552 09/07/24  1008 08/19/24  1104 07/11/24  0420   WBC 4.7 6.3 5.2 5.0 5.3 5.5 5.9 5.5   HGB 11.4* 12.8 11.1* 11.6* 11.7* 12.6 13.3 8.5*   HCT 32.9* 38.4 36.1 36.9 36.5 38.0 43.4 25.3*    195 206 205 204 193 262 103*   MCV 88 88 96 96 93 89 96 86     CMP:  Recent Labs     01/11/25  0132 01/10/25  2003 01/10/25  1845 09/09/24  0816 09/08/24  0647 09/07/24  1008 08/19/24  1104 07/11/24  0420 07/10/24  0547 07/09/24  0527 07/08/24  1137 07/08/24  0643 02/17/24  1748 02/17/24  0252 02/03/24  0648 02/02/24  0459   *  --  136 140 141 140 139 138 137 141 142   < > 139 141 139 135*   K 3.5 4.3 4.4 4.9 4.8 4.7 5.0 4.0 4.4 5.4* 5.4*   < > 4.4 4.7 4.5 4.3   CL 94*  --  103 111* 110* 107 103 104 102 102 107   < > 107 108* 108* 105   CO2 23  --  23 23 23 23 25 26 26 22 23   < > 24 21 25 26   ANIONGAP 21*  --  14 11 13 15 16 12 13 22* 17   < > 12 17 11 8*   BUN 12  --  14 24* 24* 23 25* 33* 42* 39* 26*   < > 13 11 34* 36*   CREATININE 1.22*  --  1.43* 1.35* 1.35* 1.44* 1.74* 1.59* 2.11* 2.57* 1.70*   < > 1.28* 1.26* 1.45* 1.15*   EGFR 48*  --  40* 42* 42* 39* 31* 35* 25* 20* 32*   < > 45* 46* 39* 52*   MG 1.96  --  2.19  --   --   --   --  2.23 2.12 2.41* 2.67*  --  2.05 2.27 2.19 2.30    < > = values in this interval not displayed.     Recent Labs     01/11/25  0132 09/07/24  1008 08/19/24  1104 07/11/24  0420 07/10/24  0547 07/09/24  0527 07/08/24  1137 07/08/24  0643 02/17/24  1748 02/17/24  0252 01/27/24  0432 01/26/24  1815 01/24/24  0500 01/23/24  2252 01/23/24  0548 01/22/24  0521 11/21/23  0315 11/20/23  1548 08/31/23  1041 08/31/23  0430 08/31/23  0356 03/21/23  2005 03/21/23  0203 03/20/23  2235 12/23/21  0454 12/20/21  0701 07/14/21  1234 07/13/21  0700    ALBUMIN 3.3* 4.3 4.4 3.1* 2.9* 3.6 4.1 4.0   < > 3.7   < > 3.6   < > 3.8   < > 4.5   < > 4.0   < >  --  3.8   < > 4.3 CANCELED   < > 4.4   < > 4.0   ALT  --  13 10  --   --   --  13 11  --  26  --  98*  --  11  --  16   < > 15   < >  --  9  --  21 CANCELED   < > 10  --  10   AST  --  21 19  --   --   --  23 29  --  23  --  63*  --  17  --  40*   < > 20   < >  --  13  --  38 CANCELED   < > 19  --  21   BILITOT  --  0.4 0.3  --   --   --  0.4 0.3  --  0.4  --  0.7  --  0.4  --  0.4   < > 0.4   < >  --  0.3  --  0.4 CANCELED   < > 0.3  --  0.4   LIPASE  --   --   --   --   --   --   --   --   --   --   --   --   --   --   --   --   --  22  --  CANCELED 54  --  59 CANCELED  --  96*  --  75    < > = values in this interval not displayed.     COAG:   Recent Labs     09/08/24  0647 07/08/24 1137 07/08/24  0643 06/02/24  0329 02/17/24  0252 01/23/24  2252 01/22/24  0521 11/21/23  0315   INR 1.0 1.0 0.9 1.0 1.1 1.1 1.0 1.1     ABO:   Recent Labs     07/08/24 1137   ABO O     HEME/ENDO:  Recent Labs     01/10/25  2003 08/19/24  1104 07/08/24  1137 04/24/24  1640   FERRITIN  --  63 74  --    IRONSAT  --  28 18*  --    TSH 0.57  --  2.87  --    HGBA1C  --   --  5.2 5.3      CARDIAC:   Recent Labs     01/10/25  2003 01/10/25  1845 09/07/24  1008 08/19/24  1104 07/10/24  0547 07/09/24  1723 07/09/24  0927 07/08/24  2007 07/08/24  1137 07/08/24  0758 07/08/24  0643 02/17/24  0347 02/17/24  0252 01/26/24  0715 01/22/24  0609 01/22/24  0521 05/02/21  1255 05/21/20  2102 12/06/19  1942 11/29/19  0937 11/22/19  0418 11/21/19  0513 11/20/19  0327 11/19/19  0411 11/18/19  0808 11/17/19  0436   LDH  --   --   --   --   --   --   --   --   --   --   --   --   --   --   --   --   --  293*  --  414* 481* 463* 539* 479* 528* 585*   TROPHS 159* 257* 240*  --  2,723* 2,820* 2,782* 1,340* 628*   < > 238*   < > 170* 700*   < > 151*   < >  --   --   --   --   --   --   --   --   --    BNP  --  1,044* 195* 297*  --   --   --   --  2,095*  --   "509*  --  1,139* >5,000*  --  335*   < >  --    < >  --   --   --   --   --   --   --     < > = values in this interval not displayed.     Recent Labs     07/10/24  1320 07/10/24  0547 07/09/24  2338 07/09/24  1723 07/09/24  1250 07/08/24  1416 01/27/24  0432 01/25/24  2333 01/23/24  1121 11/21/23  0321 08/31/23  0844   LACMX  --   --   --   --  1.4 3.1*  --   --   --   --   --    LACTATEART  --   --   --   --   --   --   --  1.6 2.3*  --  1.6   SO2MV 61 65 61 61 73 84*   < >  --   --    < >  --    O2CMX 59.8 63.2 59.3 59.9 71.5 81.8*   < >  --   --    < >  --     < > = values in this interval not displayed.     No results for input(s): \"TACROLIMUS\", \"SIROLIMUS\", \"CYCLOSPORINE\" in the last 43655 hours.    Results from last 7 days   Lab Units 01/11/25  0132 01/10/25  1845   WBC AUTO x10*3/uL 4.7 6.3   HEMOGLOBIN g/dL 11.4* 12.8   HEMATOCRIT % 32.9* 38.4   PLATELETS AUTO x10*3/uL 174 195     Results from last 7 days   Lab Units 01/11/25  0132 01/10/25  2003 01/10/25  1845   SODIUM mmol/L 134*  --  136   POTASSIUM mmol/L 3.5 4.3 4.4   CO2 mmol/L 23  --  23   ANION GAP mmol/L 21*  --  14   BUN mg/dL 12  --  14   CREATININE mg/dL 1.22*  --  1.43*   GLUCOSE mg/dL 445*  --  105*   EGFR mL/min/1.73m*2 48*  --  40*   MAGNESIUM mg/dL 1.96  --  2.19   PHOSPHORUS mg/dL 2.8  --   --             No lab exists for component: \"BILIT\"         No lab exists for component: \"PT\"                No lab exists for component: \"BNPRESU\", \"CPKT\"        Results from last 7 days   Lab Units 01/10/25  2003 01/10/25  1845   [facility] ng/L 159* 257*       Lab Results   Component Value Date    CKTOTAL 101 12/20/2021    TROPONINI 1.24 (HH) 12/22/2021      Lab Results   Component Value Date    HGBA1C 5.2 07/08/2024      Lab Results   Component Value Date    CHOL 227 (H) 09/01/2023    CHOL 109 05/21/2020    CHOL 215 (H) 11/07/2019     Lab Results   Component Value Date    HDL 56.9 09/01/2023    HDL 26.9 (A) 05/21/2020    HDL 46.5 11/07/2019     No " "results found for: \"LDLCALC\"  Lab Results   Component Value Date    TRIG 103 09/01/2023    TRIG 156 (H) 05/21/2020    TRIG 131 11/11/2019     No components found for: \"CHOLHDL\"     Imaging:   XR chest 1 view    Result Date: 1/10/2025  Interpreted By:  Bijan Arango  and Dina Up STUDY: XR CHEST 1 VIEW;  1/10/2025 8:19 pm   INDICATION: Signs/Symptoms:palpitations.     COMPARISON: Chest radiograph 09/07/2024 CT chest abdomen pelvis 07/08/2024   ACCESSION NUMBER(S): [id number]   ORDERING CLINICIAN: LUCIE CHICAS   FINDINGS: AP radiograph of the chest was provided. Patient is rotated left.   Left pectoral generator pack with ICD lead projecting of the expected position of the right ventricle. Median sternotomy changes. A right IJ central venous catheter projects over the region of the cavoatrial junction.   Borderline cardiomegaly. Mild central pulmonary vascular congestion and interstitial prominence. Possible small left pleural effusion. No discernible pneumothorax.       1.  Prominent perihilar interstitial markings which can be seen in setting of pulmonary edema, recommend correlation with patient's fluid status. 2. Possible small left effusion.   I personally reviewed the images/study and I agree with Annel Arroyo DO's (radiology resident) findings as stated.   MACRO: None   Signed by: Bijan Arango 1/10/2025 9:19 PM Dictation workstation:   [id number]        Medications:  Scheduled medications  amiodarone, 400 mg, oral, BID   Followed by  [START ON 1/24/2025] amiodarone, 200 mg, oral, Daily  aspirin, 81 mg, oral, Daily  bumetanide, 0.5 mg, oral, Daily  cholecalciferol, 2,000 Units, oral, Daily  dapagliflozin propanediol, 10 mg, oral, Daily  enoxaparin, 40 mg, subcutaneous, q24h  fluticasone furoate-vilanteroL, 1 puff, inhalation, Daily  ipratropium-albuteroL, 3 mL, nebulization, q6h  spironolactone, 12.5 mg, oral, Daily        Continuous medications  amiodarone, 0.5 mg/min, Last Rate: 0.5 " mg/min (01/11/25 0505)  milrinone in 5 % dextrose, 0.375 mcg/kg/min (Order-Specific)        PRN medications  PRN medications: albuterol, calcium carbonate, famotidine, hydrocortisone, sodium chloride            Assessment/Plan   This is a 69 y.o. female with PMHx of ICM/HFrEF (last EF 15-20% 7/2024, s/p [employer] ICD 5/2020) on palliative milrinone infusion since 2/2024, CAD s/p NSTEMI s/p RIRI to LCX (Jan 2016), MVR s/p mitral valve repair in (June 2019; 29 mm Epic bioprosthetic valve with Dr. Montana), COPD, HTN, hx of CVA, DM who presented to [facility] ED with palpitations, found to have episode of VT s/p ICD shock, admitted to CICU for further monitoring.  Unclear etiology for episode of VT. Differential includes hyper/hypo volemia in setting of recent diarrheal illness, electrolyte abnormalities, ischemia, or related to milrinone infusion. Currently on amio drip.    Updates 1/11/2024  -C/w amio drip, started also on amio 400 BID  -Patient has red dye allergy (generalized rash) and some amio pills contain red dye however amio pills without red dye secured by pharmacy  -Device interrogation  -Monitor for PVCs, if frequent will decrease milrinone dose  -Monitor for VTs, if patient stable will plan for DC tomorrow      CARDIOVASCULAR  #Episode of VT s/p appropriate device shock 1/10/2025  #ICM/HFrEF (EF 15-20% 7/2024) s/p [employer] ICD 5/2020, on chronic palliative milrinone infusion 0.375 since 2/2024  ::Device interrogation in ED demonstrating ~50s run of VT with ATP bursts x2 unsuccessful, followed by successful conversion to sinus rhythm with device discharge  -Bolused amio and started on gtt in ED; will continue same  -No evidence of severe electrolyte derangements despite recent diarrheal illness  -Mild troponin elevation likely due to recent device shock; less likely ACS without recent chest pain/SOB, and no acute EKG changes  -Episode of VT most likely 2/2 volume status in setting of milrinone  infusion  -Continue home farxiga 10mg, spironolactone; was previously on Entresto but this was stopped during previous hospitalization due to hypotension; she has a previous allergy to bblockers  -Device interrogation  -Monitor for PVCs, if frequent will decrease milrinone dose  -Monitor for VTs, if patient stable will plan for DC tomorrow    #CAD s/p PCI 2016  -Continue ASA 81mg daily  -Not on statin due to allergy    NEURO/PSYCH  #Hx of CVA 2006 with residual R eyelid droop  -Continue ASA 81mg daily     #Depression/anxiety  -Not currently on medications  -Has previously declined referral to  providers    PULMONARY:  #COPD  -No PFTs in system  -Continue home Duonebs  -Continue Breo Ellipta      RENAL/ GENITOURINARY:  #CKD stage IIIb  -Cr on admission 1.43 (at baseline)  -Continue to monitor     GASTROENTEROLOGY:  #Diarrheal illness  -Diarrhea and nausea improved  -Stool pathogen panel needs to be collected  -On regular diet        ENDOCRINOLOGY:  #DMT2  -Last A1c 5.2 7/2024  -SSI if hyperglycemia on daily RFPs    ICU checklist:  FEN:  F : PRN  E: PRN  N: regular diet  Prophylaxis:  GI prophylaxis: none  Bowel care: none  DVT prophylaxis: Lovenox subq    Code Status: Full Code (confirmed on admission)   NOK: Extended Emergency Contact Information  Primary Emergency Contact: GurinderedithMary  Mobile Phone: [phone]  Relation: Daughter   needed? No  Secondary Emergency Contact: Elin Marinelli  Mobile Phone: [phone]  Relation: Daughter  Preferred language: English   needed? No        Awa Logan MD  PGY-2 Internal Medicine

## 2025-01-11 NOTE — ED PROVIDER NOTES
HPI   Chief Complaint   Patient presents with    Palpitations       Patient is a 69-year-old female with past medical history of HFrEF (EF 15-20%, on milrinone, with [employer] AICD), CAD s/p NSTEMI, MVR s/p repair, COPD, HTN, HL, GERD, CVA, and T2DM presenting at the behest of being notified by the ICD company with concern for tachycardia on AICD monitoring.  Patient reports that since Monday she has had 8-10 episodes of watery, nonbloody diarrhea a day.  She reports no associated abdominal pain, nausea, vomiting or fevers.  She reports that her daughter was sick with similar symptoms and thinks that her daughter brought something home from the hospital when she works. Patient reports that she feels dehydrated with dry mouth, reduced urination for the last several days.  Additionally she reports that she has been having episodes of heart palpitations and feeling her heart is racing for a similar duration as the diarrhea.  She reports that she did have her AICD fire once in triage. She currently reports some soreness to her chest after the AICD fired but denies any nan chest pain. She denies any shortness of breath, headache, dizziness, vision changes, neck pain, sore throat, neck stiffness, back pain, numbness, tingling, fevers, or chills. No recent travels.             Patient History   Past Medical History:   Diagnosis Date    Asthma     CAD (coronary artery disease)     CHF (congestive heart failure)     CKD (chronic kidney disease)     COPD (chronic obstructive pulmonary disease) (Multi)     CVA (cerebral vascular accident) (Multi)     Diabetes mellitus (Multi)     GERD (gastroesophageal reflux disease)     Hyperlipidemia     Hypertension     NSTEMI (non-ST elevated myocardial infarction) (Multi)     Unspecified systolic (congestive) heart failure 08/11/2022    HFrEF (heart failure with reduced ejection fraction)     Past Surgical History:   Procedure Laterality Date    CARDIAC CATHETERIZATION N/A 1/26/2024     Procedure: Right Heart Cath;  Surgeon: Cuate Dodson MD;  Location: [place] [address] Cardiac Cath Lab;  Service: Cardiovascular;  Laterality: N/A;    MITRAL VALVE REPLACEMENT  06/2019    OTHER SURGICAL HISTORY  08/11/2022    Tonsillectomy    OTHER SURGICAL HISTORY  08/11/2022    Right ventricular assist device placement    OTHER SURGICAL HISTORY  08/11/2022    Mitral valve replacement     Family History   Problem Relation Name Age of Onset    Other (CVA) Brother       Social History     Tobacco Use    Smoking status: Every Day     Current packs/day: 0.50     Types: Cigarettes    Smokeless tobacco: Never   Substance Use Topics    Alcohol use: Never    Drug use: Yes     Types: Marijuana     Comment: rarely       Physical Exam   ED Triage Vitals [01/10/25 1819]   Temperature Heart Rate Respirations BP   36.4 °C (97.5 °F) 99 18 129/75      Pulse Ox Temp src Heart Rate Source Patient Position   97 % -- -- --      BP Location FiO2 (%)     -- --       Physical Exam  Constitutional:       General: She is not in acute distress.  HENT:      Head: Normocephalic and atraumatic.      Mouth/Throat:      Comments: Tacky mucous membranes.  Eyes:      General: No scleral icterus.     Extraocular Movements: Extraocular movements intact.      Pupils: Pupils are equal, round, and reactive to light.   Cardiovascular:      Rate and Rhythm: Normal rate and regular rhythm.      Comments: S1 plus S2.  2+ bilateral radial and PT pulses.  Pulmonary:      Effort: Pulmonary effort is normal. No respiratory distress.      Breath sounds: Normal breath sounds. No wheezing.   Abdominal:      General: Bowel sounds are normal. There is no distension.      Palpations: Abdomen is soft.      Tenderness: There is no abdominal tenderness. There is no guarding or rebound.      Comments: No tenderness to palpation in any quadrant.   Musculoskeletal:         General: No deformity. Normal range of motion.      Cervical back: Normal range of motion and neck  supple. No rigidity.      Comments: No lower extremity edema.   Skin:     General: Skin is warm and dry.      Findings: No rash.   Neurological:      General: No focal deficit present.      Mental Status: She is alert and oriented to person, place, and time.      Cranial Nerves: No cranial nerve deficit.      Sensory: No sensory deficit.      Motor: No weakness.   Psychiatric:         Mood and Affect: Mood normal.         Behavior: Behavior normal.           ED Course & MDM   Diagnoses as of 01/12/25 1224   Ventricular tachycardia (paroxysmal) (Multi)                 No data recorded     McCaulley Coma Scale Score: 15 (01/10/25 1820 : Dani Cates, RN)                           Medical Decision Making  EKG shows a normal rate of 94bpm, sinus rhythm with occasional ectopic beats., rightward axis,  ms,  ms, QTc 485 ms.  T wave inversions in 2, 3, aVF unchanged from prior, otherwise normal ST and T wave pattern with no evidence of acute ischemia or other acute findings.  This    Patient is a 69-year-old female with past medical history of HFrEF (EF 15-20%, on milrinone, with AICD), CAD s/p NSTEMI, MVR s/p repair, COPD, HTN, HL, GERD, CVA, and T2DM presenting at the behest of the ICD company with concern for tachycardia on AICD monitoring. Patient does report having nonbloody diarrhea since Monday along with feelings of dehydration, with associated palpitations.  Patient reports that she did have her AICD fire once in triage but is normotensive, well-appearing, heart rate within normal limits on initial presentation. Patient has soft, nontender, non-peritonitic abdominal exam. She is tolerating po intake in the ED. In the Emergency Department, hospital records were reviewed and IV access was obtained.  Patient was placed on continuous cardiac monitoring and pulse-ox. Given her history of multiple episodes of watery diarrhea, there is concern for possible dehydration/electrolytes abnormalities as etiology  of symptoms but on labs potassium and magnesium within normal limits. Initial troponin was elevated at 257 but repeat troponin is downtrending to 159. Troponins elevated suspected secondary to ICD firing with no ischemic changes on EKG. CBC showed no leukocytosis. Hemoglobin is normal. Chest xray showed no consolidations to suggest pneumonia. Given history of heart failure and clinical evidence of dehydration, patient was judiciously resuscitated with 500 mL bolus of LR. Cardiology fellow was contacted to help interrogate patient's [employer] AICD. Case discussed with cardiology service who recommended initiation of amiodarone as the reported rhythm on interrogation of the ICD was ventricular tachycardia. Patient will need to be admitted to the hospital for further management and monitoring. We are awaiting to hear back from the ICU attending about admission to either the heart failure ICU or CICU. Patient signed out to oncoming provider pending remainder of ED course and final disposition.     Patient seen and discussed with Dr. Barbra Locke MD, PhD  Emergency Medicine PGY3          Procedure  Procedures     Kory Locke MD  Resident  01/12/25 9150    I saw and evaluated the patient. I personally obtained the key and critical portions of the history and physical exam or was physically present for key and critical portions performed by the resident/fellow. I reviewed the resident/fellow's documentation and discussed the patient with the resident/fellow. I agree with the resident/fellow's medical decision making as documented in the note.    MD Analisa Epstein MD  01/12/25 2469

## 2025-01-12 VITALS
BODY MASS INDEX: 23.76 KG/M2 | OXYGEN SATURATION: 98 % | WEIGHT: 139.2 LBS | SYSTOLIC BLOOD PRESSURE: 122 MMHG | HEIGHT: 64 IN | HEART RATE: 90 BPM | RESPIRATION RATE: 22 BRPM | DIASTOLIC BLOOD PRESSURE: 69 MMHG | TEMPERATURE: 96.8 F

## 2025-01-12 LAB
ALBUMIN SERPL BCP-MCNC: 3.5 G/DL (ref 3.4–5)
ALP SERPL-CCNC: 71 U/L (ref 33–136)
ALT SERPL W P-5'-P-CCNC: 7 U/L (ref 7–45)
ANION GAP SERPL CALC-SCNC: 16 MMOL/L (ref 10–20)
AST SERPL W P-5'-P-CCNC: 12 U/L (ref 9–39)
BASOPHILS # BLD AUTO: 0.03 X10*3/UL (ref 0–0.1)
BASOPHILS NFR BLD AUTO: 0.6 %
BILIRUB SERPL-MCNC: 0.3 MG/DL (ref 0–1.2)
BUN SERPL-MCNC: 12 MG/DL (ref 6–23)
CALCIUM SERPL-MCNC: 8.4 MG/DL (ref 8.6–10.6)
CHLORIDE SERPL-SCNC: 103 MMOL/L (ref 98–107)
CO2 SERPL-SCNC: 22 MMOL/L (ref 21–32)
CREAT SERPL-MCNC: 1.38 MG/DL (ref 0.5–1.05)
EGFRCR SERPLBLD CKD-EPI 2021: 41 ML/MIN/1.73M*2
EOSINOPHIL # BLD AUTO: 0.62 X10*3/UL (ref 0–0.7)
EOSINOPHIL NFR BLD AUTO: 13 %
ERYTHROCYTE [DISTWIDTH] IN BLOOD BY AUTOMATED COUNT: 14.4 % (ref 11.5–14.5)
GLUCOSE BLD MANUAL STRIP-MCNC: 121 MG/DL (ref 74–99)
GLUCOSE SERPL-MCNC: 164 MG/DL (ref 74–99)
HCT VFR BLD AUTO: 33.4 % (ref 36–46)
HGB BLD-MCNC: 10.8 G/DL (ref 12–16)
IMM GRANULOCYTES # BLD AUTO: 0.01 X10*3/UL (ref 0–0.7)
IMM GRANULOCYTES NFR BLD AUTO: 0.2 % (ref 0–0.9)
LYMPHOCYTES # BLD AUTO: 1.02 X10*3/UL (ref 1.2–4.8)
LYMPHOCYTES NFR BLD AUTO: 21.4 %
MAGNESIUM SERPL-MCNC: 1.93 MG/DL (ref 1.6–2.4)
MCH RBC QN AUTO: 30.2 PG (ref 26–34)
MCHC RBC AUTO-ENTMCNC: 32.3 G/DL (ref 32–36)
MCV RBC AUTO: 93 FL (ref 80–100)
MONOCYTES # BLD AUTO: 0.35 X10*3/UL (ref 0.1–1)
MONOCYTES NFR BLD AUTO: 7.3 %
NEUTROPHILS # BLD AUTO: 2.74 X10*3/UL (ref 1.2–7.7)
NEUTROPHILS NFR BLD AUTO: 57.5 %
NRBC BLD-RTO: 0 /100 WBCS (ref 0–0)
PLATELET # BLD AUTO: 167 X10*3/UL (ref 150–450)
POTASSIUM SERPL-SCNC: 4.1 MMOL/L (ref 3.5–5.3)
PROT SERPL-MCNC: 6.4 G/DL (ref 6.4–8.2)
RBC # BLD AUTO: 3.58 X10*6/UL (ref 4–5.2)
SODIUM SERPL-SCNC: 137 MMOL/L (ref 136–145)
WBC # BLD AUTO: 4.8 X10*3/UL (ref 4.4–11.3)

## 2025-01-12 PROCEDURE — 80053 COMPREHEN METABOLIC PANEL: CPT

## 2025-01-12 PROCEDURE — 2500000004 HC RX 250 GENERAL PHARMACY W/ HCPCS (ALT 636 FOR OP/ED)

## 2025-01-12 PROCEDURE — 83735 ASSAY OF MAGNESIUM: CPT

## 2025-01-12 PROCEDURE — 2500000001 HC RX 250 WO HCPCS SELF ADMINISTERED DRUGS (ALT 637 FOR MEDICARE OP)

## 2025-01-12 PROCEDURE — 99239 HOSP IP/OBS DSCHRG MGMT >30: CPT | Performed by: INTERNAL MEDICINE

## 2025-01-12 PROCEDURE — 94640 AIRWAY INHALATION TREATMENT: CPT

## 2025-01-12 PROCEDURE — 2500000002 HC RX 250 W HCPCS SELF ADMINISTERED DRUGS (ALT 637 FOR MEDICARE OP, ALT 636 FOR OP/ED)

## 2025-01-12 PROCEDURE — 85025 COMPLETE CBC W/AUTO DIFF WBC: CPT

## 2025-01-12 PROCEDURE — 82947 ASSAY GLUCOSE BLOOD QUANT: CPT

## 2025-01-12 PROCEDURE — 37799 UNLISTED PX VASCULAR SURGERY: CPT

## 2025-01-12 RX ORDER — ACETAMINOPHEN 325 MG/1
650 TABLET ORAL EVERY 6 HOURS PRN
Status: DISCONTINUED | OUTPATIENT
Start: 2025-01-12 | End: 2025-01-12 | Stop reason: HOSPADM

## 2025-01-12 RX ORDER — BUMETANIDE 1 MG/1
0.5 TABLET ORAL 2 TIMES WEEKLY
Status: DISCONTINUED | OUTPATIENT
Start: 2025-01-14 | End: 2025-01-12 | Stop reason: HOSPADM

## 2025-01-12 RX ORDER — MAGNESIUM SULFATE HEPTAHYDRATE 40 MG/ML
2 INJECTION, SOLUTION INTRAVENOUS ONCE
Status: COMPLETED | OUTPATIENT
Start: 2025-01-12 | End: 2025-01-12

## 2025-01-12 RX ORDER — AMIODARONE HYDROCHLORIDE 200 MG/1
TABLET ORAL
Qty: 70 TABLET | Refills: 0 | Status: SHIPPED | OUTPATIENT
Start: 2025-01-12 | End: 2025-02-21

## 2025-01-12 RX ADMIN — IPRATROPIUM BROMIDE AND ALBUTEROL SULFATE 3 ML: .5; 3 SOLUTION RESPIRATORY (INHALATION) at 13:21

## 2025-01-12 RX ADMIN — Medication 2000 UNITS: at 09:35

## 2025-01-12 RX ADMIN — AMIODARONE HYDROCHLORIDE 0.5 MG/MIN: 1.8 INJECTION, SOLUTION INTRAVENOUS at 03:19

## 2025-01-12 RX ADMIN — ENOXAPARIN SODIUM 40 MG: 100 INJECTION SUBCUTANEOUS at 07:58

## 2025-01-12 RX ADMIN — IPRATROPIUM BROMIDE AND ALBUTEROL SULFATE 3 ML: .5; 3 SOLUTION RESPIRATORY (INHALATION) at 08:51

## 2025-01-12 RX ADMIN — DAPAGLIFLOZIN 10 MG: 10 TABLET, FILM COATED ORAL at 09:35

## 2025-01-12 RX ADMIN — AMIODARONE HYDROCHLORIDE 400 MG: 200 TABLET ORAL at 05:57

## 2025-01-12 RX ADMIN — MAGNESIUM SULFATE HEPTAHYDRATE 2 G: 40 INJECTION, SOLUTION INTRAVENOUS at 07:57

## 2025-01-12 RX ADMIN — IPRATROPIUM BROMIDE AND ALBUTEROL SULFATE 3 ML: .5; 3 SOLUTION RESPIRATORY (INHALATION) at 01:27

## 2025-01-12 RX ADMIN — SPIRONOLACTONE 12.5 MG: 25 TABLET, FILM COATED ORAL at 09:35

## 2025-01-12 RX ADMIN — ASPIRIN 81 MG: 81 TABLET, COATED ORAL at 09:35

## 2025-01-12 ASSESSMENT — PAIN SCALES - GENERAL
PAINLEVEL_OUTOF10: 0 - NO PAIN
PAINLEVEL_OUTOF10: 4
PAINLEVEL_OUTOF10: 0 - NO PAIN

## 2025-01-12 ASSESSMENT — PAIN - FUNCTIONAL ASSESSMENT
PAIN_FUNCTIONAL_ASSESSMENT: 0-10

## 2025-01-12 NOTE — DISCHARGE INSTRUCTIONS
Kirt Ms. Marinelli    You were admitted to the hospital for palpitations. You were found to have an abnormal heart rhythm called ventricular tachycardia after which your implanted defibrillator delivered a shock. The reason behind this could be your recent diarrhea. While in the hospital, we started you on a medication called amiodarone to help with this abnormal heart rhythm. We're glad you are feeling well enough to go home.     Medications:  Please take the following medications as prescribed  Amiodarone 400 mg (2 tabs) twice daily until 1/21/2025, then 200 mg (one tab) once daily starting 1/22/2025    Rest of medications per after visit summary    It was a pleasure caring for you at .    Sincerely,  Your  CICU Team

## 2025-01-12 NOTE — DISCHARGE SUMMARY
Discharge Diagnosis  Ventricular tachycardia (paroxysmal) (Multi)    Issues Requiring Follow-Up  none    Discharge Meds     Medication List      START taking these medications     amiodarone 200 mg tablet; Commonly known as: Pacerone; Take 2 tablets   (400 mg) by mouth 2 times a day for 10 days, THEN 1 tablet (200 mg) once   daily.; Start taking on: January 12, 2025     CHANGE how you take these medications     famotidine 40 mg tablet; Commonly known as: Pepcid; Take 1 tablet (40   mg) by mouth once daily. Do not start before February 3, 2024.; What   changed: when to take this, reasons to take this     CONTINUE taking these medications     acetaminophen 500 mg tablet; Commonly known as: Tylenol   albuterol 90 mcg/actuation inhaler; Inhale 2 puffs every 4 hours if   needed for wheezing or shortness of breath.   aspirin 81 mg EC tablet; Take 1 tablet (81 mg) by mouth once daily.   bumetanide 0.5 mg tablet; Commonly known as: Bumex; Take 1 tablet (0.5   mg) by mouth 2 times a week. On Tuesdays and Saturdays   Calcium Antacid 200 mg calcium (500 mg) chewable tablet; Generic drug:   calcium carbonate   Farxiga 10 mg; Generic drug: dapagliflozin propanediol; Take 1 tablet   (10 mg) by mouth once daily.   heparin flush(porcine)-0.9NaCl 100 unit/mL kit   hydrocortisone 1 % ointment; Apply topically 2 times a day as needed for   irritation. Chest wall   ipratropium-albuteroL 0.5-2.5 mg/3 mL nebulizer solution; Commonly known   as: Duo-Neb   milrinone in 5 % dextrose 20 mg/100 mL (200 mcg/mL) infusion; Commonly   known as: Primacor; Infuse 23.8125 mcg/min at 7.14 mL/hr into a venous   catheter continuously.   mupirocin 2 % ointment; Commonly known as: Bactroban   nicotine 21 mg/24 hr patch; Commonly known as: Nicoderm CQ; Place 1   patch over 24 hours on the skin once every 24 hours.   nicotine polacrilex 4 mg gum; Commonly known as: Nicorette; Chew 1 each   (4 mg) if needed for smoking cessation.   OneTouch Verio Flex meter  misc; Generic drug: blood-glucose meter   sodium chloride 0.65 % nasal spray; Commonly known as: Ocean   sodium chloride 0.9% flush   spironolactone 25 mg tablet; Commonly known as: Aldactone; Take 0.5   tablets (12.5 mg) by mouth once daily.   Symbicort 80-4.5 mcg/actuation inhaler; Generic drug:   budesonide-formoteroL   Vitamin D3 50 MCG (2000 UT) tablet; Generic drug: cholecalciferol       Test Results Pending At Discharge  Pending Labs       No current pending labs.            Hospital Course   This is a 69 y.o. female with PMHx of ICM/ HFrEF (last EF 15-20% 7/2024, s/p [employer] ICD 5/2020) on palliative milrinone infusion since 2/2024, CAD s/p NSTEMI s/p RIRI to LCX (Jan 2016), MVR s/p mitral valve repair in (June 2019; 29 mm Epic bioprosthetic valve with Dr. Montana), COPD, HTN, HL, GERD, hx of CVA, DM who presented to [facility] ED with palpitations.   Patient started having watery diarrhea with up to 9-10 episodes per day few days prior to presentation. During the same week she started also noticing palpitations. Reports mild increase in her baseline SOB. While in the ED, patient had a shock from her device. Device interrogation revealed several nonsustained episodes of VT today and then a sustained episode >50s for which ATP bursts x2 were unsuccessful, then shock x1 converted her back to sinus rhythm. She was admitted to CICU for further monitoring. Differential includes hypovolemia in setting of recent diarrheal illness, electrolyte abnormalities, or related to milrinone infusion. She was started on amiodarone drip.  During her stay patient did not report any palpitations and diarrhea resolved. She did not have any episode of VT. She was discharged on 1/12 on amiodarone 400 BID then 200 daily.  To note that patient has red dye allergy (rash), therefore she was provided with amiodarone pills that don't contain red dye.    Pertinent Physical Exam At Time of Discharge  Physical Exam  Constitutional:        Appearance: Normal appearance.   Cardiovascular:      Rate and Rhythm: Normal rate and regular rhythm.   Pulmonary:      Effort: Pulmonary effort is normal.      Breath sounds: Normal breath sounds.   Abdominal:      Palpations: Abdomen is soft.      Tenderness: There is no abdominal tenderness.   Neurological:      Mental Status: She is alert and oriented to person, place, and time.         Outpatient Follow-Up  Future Appointments   Date Time Provider Department Center   1/15/2025 To Be Determined Gabriela Reddy RN [facility]   1/22/2025 To Be Determined Gabriela Reddy RN [facility]   4/14/2025 10:00 AM Kathy Rivera MD PhD [id number] [facility]         Awa Logan MD

## 2025-01-13 ENCOUNTER — DOCUMENTATION (OUTPATIENT)
Dept: PHARMACY | Facility: CLINIC | Age: 70
End: 2025-01-13

## 2025-01-13 ENCOUNTER — HOME INFUSION (OUTPATIENT)
Dept: INFUSION THERAPY | Age: 70
End: 2025-01-13
Payer: COMMERCIAL

## 2025-01-13 NOTE — PROGRESS NOTES
Review of chart. Patient infuses continuous milrinone  for CHF. Dr Kathy Rivera follows outpatient.      Review of EPIC notes: Patient was admitted over weekend for a short stay due to rhythm issues. Discharged with no change in milrinone orders. Reported weight stable around 139 lbs     Tel call to daughter. Unable to leave message or check inventory. Will need flushes this weeks delivery. Patient may have an extra bag in home.     Processed refill for 4 x 48hr milrinone bags with tubing attached for mix and straight delivery 1/13 to cover bag changes 1/16, 1/18, 1/20, 1/22     Follow up 1/21. Check inventory/bag change schedule and weight. Send straight. **call daughter**

## 2025-01-14 LAB
ATRIAL RATE: 94 BPM
P AXIS: -78 DEGREES
P OFFSET: 216 MS
P ONSET: 165 MS
PR INTERVAL: 106 MS
Q ONSET: 218 MS
QRS COUNT: 16 BEATS
QRS DURATION: 122 MS
QT INTERVAL: 388 MS
QTC CALCULATION(BAZETT): 485 MS
QTC FREDERICIA: 450 MS
R AXIS: 98 DEGREES
T AXIS: -87 DEGREES
T OFFSET: 412 MS
VENTRICULAR RATE: 94 BPM

## 2025-01-15 ENCOUNTER — HOME CARE VISIT (OUTPATIENT)
Dept: HOME HEALTH SERVICES | Facility: HOME HEALTH | Age: 70
End: 2025-01-15
Payer: COMMERCIAL

## 2025-01-15 PROCEDURE — G0493 RN CARE EA 15 MIN HH/HOSPICE: HCPCS | Mod: HHH

## 2025-01-17 VITALS
RESPIRATION RATE: 16 BRPM | OXYGEN SATURATION: 88 % | HEART RATE: 86 BPM | SYSTOLIC BLOOD PRESSURE: 100 MMHG | TEMPERATURE: 98.6 F | DIASTOLIC BLOOD PRESSURE: 70 MMHG

## 2025-01-17 ASSESSMENT — ENCOUNTER SYMPTOMS
LAST BOWEL MOVEMENT: 67220
DENIES PAIN: 1
PERSON REPORTING PAIN: PATIENT
FATIGUES EASILY: 1
OCCASIONAL FEELINGS OF UNSTEADINESS: 0
APPETITE LEVEL: FAIR
CHANGE IN APPETITE: UNCHANGED

## 2025-01-17 ASSESSMENT — PAIN SCALES - PAIN ASSESSMENT IN ADVANCED DEMENTIA (PAINAD)
FACIALEXPRESSION: 0 - SMILING OR INEXPRESSIVE.
BODYLANGUAGE: 0
CONSOLABILITY: 0
BODYLANGUAGE: 0 - RELAXED.
CONSOLABILITY: 0 - NO NEED TO CONSOLE.
FACIALEXPRESSION: 0
BREATHING: 0
TOTALSCORE: 0
NEGVOCALIZATION: 0
NEGVOCALIZATION: 0 - NONE.

## 2025-01-17 ASSESSMENT — ACTIVITIES OF DAILY LIVING (ADL)
ENTERING_EXITING_HOME: NEEDS ASSISTANCE
OASIS_M1830: 05

## 2025-01-20 ENCOUNTER — HOME INFUSION (OUTPATIENT)
Dept: INFUSION THERAPY | Age: 70
End: 2025-01-20
Payer: COMMERCIAL

## 2025-01-20 NOTE — PROGRESS NOTES
Review of chart. Patient infuses continuous milrinone  for CHF. Dr Kathy Rivera follows outpatient.      Review of RN visit notes. No problems are noted with infusions or line. Reported weight stable around 139 lbs     Tel call to daughter. Pt on schedule with bag changes. Changing bag today with one remaining in home for 1/22. Daughter is agreeable to delivery of further bags on 1/21 with all supplies and flushes for this delivery.     Processed fill for 3 x 48hr milrinone bags for mix and straight delivery 1/21 to cover bag changes 1/24, 1/26, 1/28     Follow up 1/27. Check inventory/bag change schedule and weight. Send straight. **call daughter**

## 2025-01-21 ENCOUNTER — DOCUMENTATION (OUTPATIENT)
Dept: PHARMACY | Facility: CLINIC | Age: 70
End: 2025-01-21

## 2025-01-21 ENCOUNTER — HOSPITAL ENCOUNTER (INPATIENT)
Facility: HOSPITAL | Age: 70
LOS: 3 days | Discharge: HOME HEALTH | DRG: 308 | End: 2025-01-24
Attending: EMERGENCY MEDICINE | Admitting: INTERNAL MEDICINE
Payer: COMMERCIAL

## 2025-01-21 ENCOUNTER — APPOINTMENT (OUTPATIENT)
Dept: RADIOLOGY | Facility: HOSPITAL | Age: 70
DRG: 308 | End: 2025-01-21
Payer: COMMERCIAL

## 2025-01-21 ENCOUNTER — CLINICAL SUPPORT (OUTPATIENT)
Dept: EMERGENCY MEDICINE | Facility: HOSPITAL | Age: 70
DRG: 308 | End: 2025-01-21
Payer: COMMERCIAL

## 2025-01-21 DIAGNOSIS — Z01.89 ENCOUNTER FOR CARDIOVERSION PROCEDURE: ICD-10-CM

## 2025-01-21 DIAGNOSIS — I50.43 ACUTE ON CHRONIC COMBINED SYSTOLIC (CONGESTIVE) AND DIASTOLIC (CONGESTIVE) HEART FAILURE: ICD-10-CM

## 2025-01-21 DIAGNOSIS — I48.92 ATRIAL FIBRILLATION AND FLUTTER: Primary | ICD-10-CM

## 2025-01-21 DIAGNOSIS — I51.3 ATRIAL THROMBUS: ICD-10-CM

## 2025-01-21 DIAGNOSIS — I50.20 HFREF (HEART FAILURE WITH REDUCED EJECTION FRACTION): ICD-10-CM

## 2025-01-21 DIAGNOSIS — I47.20 VENTRICULAR TACHYCARDIA (PAROXYSMAL): ICD-10-CM

## 2025-01-21 DIAGNOSIS — I48.91 ATRIAL FIB/FLUTTER, TRANSIENT (MULTI): ICD-10-CM

## 2025-01-21 DIAGNOSIS — I47.29 VENTRICULAR TACHYCARDIA (PAROXYSMAL) (MULTI): ICD-10-CM

## 2025-01-21 DIAGNOSIS — I48.91 ATRIAL FIBRILLATION AND FLUTTER: Primary | ICD-10-CM

## 2025-01-21 DIAGNOSIS — I48.92 ATRIAL FIB/FLUTTER, TRANSIENT (MULTI): ICD-10-CM

## 2025-01-21 DIAGNOSIS — I48.4 ATYPICAL ATRIAL FLUTTER (MULTI): ICD-10-CM

## 2025-01-21 DIAGNOSIS — E87.79 OTHER HYPERVOLEMIA: ICD-10-CM

## 2025-01-21 DIAGNOSIS — I27.20 PULMONARY HYPERTENSION (MULTI): ICD-10-CM

## 2025-01-21 DIAGNOSIS — J44.9 CHRONIC OBSTRUCTIVE PULMONARY DISEASE, UNSPECIFIED COPD TYPE (MULTI): ICD-10-CM

## 2025-01-21 DIAGNOSIS — R06.02 SHORTNESS OF BREATH: ICD-10-CM

## 2025-01-21 DIAGNOSIS — I50.33 ACUTE ON CHRONIC HEART FAILURE WITH NORMAL EJECTION FRACTION: ICD-10-CM

## 2025-01-21 DIAGNOSIS — J96.21 ACUTE ON CHRONIC RESPIRATORY FAILURE WITH HYPOXIA (MULTI): ICD-10-CM

## 2025-01-21 LAB
ABO GROUP (TYPE) IN BLOOD: NORMAL
ALBUMIN SERPL BCP-MCNC: 4 G/DL (ref 3.4–5)
ALBUMIN SERPL BCP-MCNC: 4 G/DL (ref 3.4–5)
ALP SERPL-CCNC: 85 U/L (ref 33–136)
ALT SERPL W P-5'-P-CCNC: 42 U/L (ref 7–45)
ANION GAP BLDV CALCULATED.4IONS-SCNC: 14 MMOL/L (ref 10–25)
ANION GAP SERPL CALC-SCNC: 18 MMOL/L (ref 10–20)
ANION GAP SERPL CALC-SCNC: 21 MMOL/L (ref 10–20)
ANTIBODY SCREEN: NORMAL
APTT PPP: 25 SECONDS (ref 27–38)
AST SERPL W P-5'-P-CCNC: 26 U/L (ref 9–39)
ATRIAL RATE: 224 BPM
BASE EXCESS BLDV CALC-SCNC: -4.1 MMOL/L (ref -2–3)
BASOPHILS # BLD AUTO: 0.06 X10*3/UL (ref 0–0.1)
BASOPHILS NFR BLD AUTO: 1.3 %
BILIRUB SERPL-MCNC: 0.5 MG/DL (ref 0–1.2)
BNP SERPL-MCNC: 785 PG/ML (ref 0–99)
BODY TEMPERATURE: 37 DEGREES CELSIUS
BUN SERPL-MCNC: 21 MG/DL (ref 6–23)
BUN SERPL-MCNC: 30 MG/DL (ref 6–23)
CA-I BLDV-SCNC: 1.19 MMOL/L (ref 1.1–1.33)
CALCIUM SERPL-MCNC: 9.1 MG/DL (ref 8.6–10.6)
CALCIUM SERPL-MCNC: 9.4 MG/DL (ref 8.6–10.6)
CARDIAC TROPONIN I PNL SERPL HS: 175 NG/L (ref 0–34)
CARDIAC TROPONIN I PNL SERPL HS: 180 NG/L (ref 0–34)
CHLORIDE BLDV-SCNC: 106 MMOL/L (ref 98–107)
CHLORIDE SERPL-SCNC: 100 MMOL/L (ref 98–107)
CHLORIDE SERPL-SCNC: 105 MMOL/L (ref 98–107)
CO2 SERPL-SCNC: 18 MMOL/L (ref 21–32)
CO2 SERPL-SCNC: 20 MMOL/L (ref 21–32)
CREAT SERPL-MCNC: 1.73 MG/DL (ref 0.5–1.05)
CREAT SERPL-MCNC: 1.9 MG/DL (ref 0.5–1.05)
EGFRCR SERPLBLD CKD-EPI 2021: 28 ML/MIN/1.73M*2
EGFRCR SERPLBLD CKD-EPI 2021: 31 ML/MIN/1.73M*2
EOSINOPHIL # BLD AUTO: 0.41 X10*3/UL (ref 0–0.7)
EOSINOPHIL NFR BLD AUTO: 9 %
ERYTHROCYTE [DISTWIDTH] IN BLOOD BY AUTOMATED COUNT: 14.6 % (ref 11.5–14.5)
FLUAV RNA RESP QL NAA+PROBE: NOT DETECTED
FLUBV RNA RESP QL NAA+PROBE: NOT DETECTED
GLUCOSE BLDV-MCNC: 109 MG/DL (ref 74–99)
GLUCOSE SERPL-MCNC: 100 MG/DL (ref 74–99)
GLUCOSE SERPL-MCNC: 226 MG/DL (ref 74–99)
HCO3 BLDV-SCNC: 20.9 MMOL/L (ref 22–26)
HCT VFR BLD AUTO: 35.7 % (ref 36–46)
HCT VFR BLD EST: 38 % (ref 36–46)
HGB BLD-MCNC: 11.7 G/DL (ref 12–16)
HGB BLDV-MCNC: 12.5 G/DL (ref 12–16)
HOLD SPECIMEN: NORMAL
IMM GRANULOCYTES # BLD AUTO: 0.02 X10*3/UL (ref 0–0.7)
IMM GRANULOCYTES NFR BLD AUTO: 0.4 % (ref 0–0.9)
INR PPP: 1.1 (ref 0.9–1.1)
LACTATE BLDV-SCNC: 1.6 MMOL/L (ref 0.4–2)
LYMPHOCYTES # BLD AUTO: 1.45 X10*3/UL (ref 1.2–4.8)
LYMPHOCYTES NFR BLD AUTO: 31.8 %
MAGNESIUM SERPL-MCNC: 2.43 MG/DL (ref 1.6–2.4)
MCH RBC QN AUTO: 29.1 PG (ref 26–34)
MCHC RBC AUTO-ENTMCNC: 32.8 G/DL (ref 32–36)
MCV RBC AUTO: 89 FL (ref 80–100)
MONOCYTES # BLD AUTO: 0.38 X10*3/UL (ref 0.1–1)
MONOCYTES NFR BLD AUTO: 8.3 %
NEUTROPHILS # BLD AUTO: 2.24 X10*3/UL (ref 1.2–7.7)
NEUTROPHILS NFR BLD AUTO: 49.2 %
NRBC BLD-RTO: 0 /100 WBCS (ref 0–0)
OXYHGB MFR BLDV: 67 % (ref 45–75)
P AXIS: -45 DEGREES
PCO2 BLDV: 37 MM HG (ref 41–51)
PH BLDV: 7.36 PH (ref 7.33–7.43)
PHOSPHATE SERPL-MCNC: 2.9 MG/DL (ref 2.5–4.9)
PLATELET # BLD AUTO: 224 X10*3/UL (ref 150–450)
PO2 BLDV: 40 MM HG (ref 35–45)
POTASSIUM BLDV-SCNC: 5 MMOL/L (ref 3.5–5.3)
POTASSIUM SERPL-SCNC: 4.8 MMOL/L (ref 3.5–5.3)
POTASSIUM SERPL-SCNC: 5.2 MMOL/L (ref 3.5–5.3)
PROT SERPL-MCNC: 7.7 G/DL (ref 6.4–8.2)
PROTHROMBIN TIME: 11.9 SECONDS (ref 9.8–12.8)
Q ONSET: 213 MS
Q ONSET: 214 MS
QRS COUNT: 18 BEATS
QRS COUNT: 19 BEATS
QRS DURATION: 126 MS
QRS DURATION: 136 MS
QT INTERVAL: 372 MS
QT INTERVAL: 392 MS
QTC CALCULATION(BAZETT): 507 MS
QTC CALCULATION(BAZETT): 535 MS
QTC FREDERICIA: 458 MS
QTC FREDERICIA: 482 MS
R AXIS: 109 DEGREES
R AXIS: 93 DEGREES
RBC # BLD AUTO: 4.02 X10*6/UL (ref 4–5.2)
RH FACTOR (ANTIGEN D): NORMAL
SAO2 % BLDV: 69 % (ref 45–75)
SARS-COV-2 RNA RESP QL NAA+PROBE: NOT DETECTED
SODIUM BLDV-SCNC: 136 MMOL/L (ref 136–145)
SODIUM SERPL-SCNC: 134 MMOL/L (ref 136–145)
SODIUM SERPL-SCNC: 138 MMOL/L (ref 136–145)
T AXIS: -62 DEGREES
T AXIS: 268 DEGREES
T OFFSET: 399 MS
T OFFSET: 410 MS
VENTRICULAR RATE: 112 BPM
VENTRICULAR RATE: 112 BPM
WBC # BLD AUTO: 4.6 X10*3/UL (ref 4.4–11.3)

## 2025-01-21 PROCEDURE — 2500000004 HC RX 250 GENERAL PHARMACY W/ HCPCS (ALT 636 FOR OP/ED)

## 2025-01-21 PROCEDURE — 2500000002 HC RX 250 W HCPCS SELF ADMINISTERED DRUGS (ALT 637 FOR MEDICARE OP, ALT 636 FOR OP/ED)

## 2025-01-21 PROCEDURE — 99285 EMERGENCY DEPT VISIT HI MDM: CPT | Performed by: EMERGENCY MEDICINE

## 2025-01-21 PROCEDURE — 94640 AIRWAY INHALATION TREATMENT: CPT

## 2025-01-21 PROCEDURE — 85610 PROTHROMBIN TIME: CPT

## 2025-01-21 PROCEDURE — 83880 ASSAY OF NATRIURETIC PEPTIDE: CPT

## 2025-01-21 PROCEDURE — 93010 ELECTROCARDIOGRAM REPORT: CPT | Performed by: EMERGENCY MEDICINE

## 2025-01-21 PROCEDURE — 84132 ASSAY OF SERUM POTASSIUM: CPT

## 2025-01-21 PROCEDURE — 99285 EMERGENCY DEPT VISIT HI MDM: CPT | Mod: 25 | Performed by: EMERGENCY MEDICINE

## 2025-01-21 PROCEDURE — 83735 ASSAY OF MAGNESIUM: CPT

## 2025-01-21 PROCEDURE — 71046 X-RAY EXAM CHEST 2 VIEWS: CPT | Mod: FOREIGN READ | Performed by: RADIOLOGY

## 2025-01-21 PROCEDURE — 2500000001 HC RX 250 WO HCPCS SELF ADMINISTERED DRUGS (ALT 637 FOR MEDICARE OP)

## 2025-01-21 PROCEDURE — 84484 ASSAY OF TROPONIN QUANT: CPT

## 2025-01-21 PROCEDURE — 96375 TX/PRO/DX INJ NEW DRUG ADDON: CPT

## 2025-01-21 PROCEDURE — 1210000001 HC SEMI-PRIVATE ROOM DAILY

## 2025-01-21 PROCEDURE — 82947 ASSAY GLUCOSE BLOOD QUANT: CPT

## 2025-01-21 PROCEDURE — 96365 THER/PROPH/DIAG IV INF INIT: CPT

## 2025-01-21 PROCEDURE — 93005 ELECTROCARDIOGRAM TRACING: CPT

## 2025-01-21 PROCEDURE — 85520 HEPARIN ASSAY: CPT | Performed by: INTERNAL MEDICINE

## 2025-01-21 PROCEDURE — 85025 COMPLETE CBC W/AUTO DIFF WBC: CPT

## 2025-01-21 PROCEDURE — 86901 BLOOD TYPING SEROLOGIC RH(D): CPT

## 2025-01-21 PROCEDURE — 99223 1ST HOSP IP/OBS HIGH 75: CPT | Performed by: INTERNAL MEDICINE

## 2025-01-21 PROCEDURE — 96374 THER/PROPH/DIAG INJ IV PUSH: CPT | Mod: 59

## 2025-01-21 PROCEDURE — 87636 SARSCOV2 & INF A&B AMP PRB: CPT

## 2025-01-21 PROCEDURE — 71046 X-RAY EXAM CHEST 2 VIEWS: CPT

## 2025-01-21 RX ORDER — AMIODARONE HYDROCHLORIDE 200 MG/1
200 TABLET ORAL DAILY
Status: DISCONTINUED | OUTPATIENT
Start: 2025-01-22 | End: 2025-01-21

## 2025-01-21 RX ORDER — DAPAGLIFLOZIN 10 MG/1
10 TABLET, FILM COATED ORAL DAILY
Status: DISCONTINUED | OUTPATIENT
Start: 2025-01-21 | End: 2025-01-24 | Stop reason: HOSPADM

## 2025-01-21 RX ORDER — AMIODARONE HYDROCHLORIDE 200 MG/1
400 TABLET ORAL 2 TIMES DAILY
Status: DISCONTINUED | OUTPATIENT
Start: 2025-01-21 | End: 2025-01-21

## 2025-01-21 RX ORDER — IPRATROPIUM BROMIDE AND ALBUTEROL SULFATE 2.5; .5 MG/3ML; MG/3ML
3 SOLUTION RESPIRATORY (INHALATION)
Status: DISCONTINUED | OUTPATIENT
Start: 2025-01-21 | End: 2025-01-21

## 2025-01-21 RX ORDER — IPRATROPIUM BROMIDE AND ALBUTEROL SULFATE 2.5; .5 MG/3ML; MG/3ML
3 SOLUTION RESPIRATORY (INHALATION) EVERY 20 MIN
Status: COMPLETED | OUTPATIENT
Start: 2025-01-21 | End: 2025-01-21

## 2025-01-21 RX ORDER — ENOXAPARIN SODIUM 100 MG/ML
30 INJECTION SUBCUTANEOUS EVERY 24 HOURS
Status: DISCONTINUED | OUTPATIENT
Start: 2025-01-21 | End: 2025-01-21

## 2025-01-21 RX ORDER — MILRINONE LACTATE 0.2 MG/ML
0.38 INJECTION, SOLUTION INTRAVENOUS CONTINUOUS
Status: DISCONTINUED | OUTPATIENT
Start: 2025-01-21 | End: 2025-01-22

## 2025-01-21 RX ORDER — FUROSEMIDE 10 MG/ML
60 INJECTION INTRAMUSCULAR; INTRAVENOUS ONCE
Status: COMPLETED | OUTPATIENT
Start: 2025-01-21 | End: 2025-01-21

## 2025-01-21 RX ORDER — IBUPROFEN 200 MG
1 TABLET ORAL EVERY 24 HOURS
Status: DISCONTINUED | OUTPATIENT
Start: 2025-01-21 | End: 2025-01-24 | Stop reason: HOSPADM

## 2025-01-21 RX ORDER — ALBUTEROL SULFATE 0.83 MG/ML
2.5 SOLUTION RESPIRATORY (INHALATION) EVERY 6 HOURS PRN
Status: DISCONTINUED | OUTPATIENT
Start: 2025-01-21 | End: 2025-01-24 | Stop reason: HOSPADM

## 2025-01-21 RX ORDER — MAGNESIUM SULFATE 1 G/100ML
1 INJECTION INTRAVENOUS ONCE
Status: COMPLETED | OUTPATIENT
Start: 2025-01-21 | End: 2025-01-21

## 2025-01-21 RX ORDER — HEPARIN SODIUM 10000 [USP'U]/100ML
0-4000 INJECTION, SOLUTION INTRAVENOUS CONTINUOUS
Status: DISPENSED | OUTPATIENT
Start: 2025-01-21 | End: 2025-01-24

## 2025-01-21 RX ORDER — POLYETHYLENE GLYCOL 3350 17 G/17G
17 POWDER, FOR SOLUTION ORAL DAILY PRN
Status: DISCONTINUED | OUTPATIENT
Start: 2025-01-21 | End: 2025-01-24 | Stop reason: HOSPADM

## 2025-01-21 RX ORDER — IPRATROPIUM BROMIDE AND ALBUTEROL SULFATE 2.5; .5 MG/3ML; MG/3ML
3 SOLUTION RESPIRATORY (INHALATION) EVERY 6 HOURS PRN
Status: DISCONTINUED | OUTPATIENT
Start: 2025-01-21 | End: 2025-01-24 | Stop reason: HOSPADM

## 2025-01-21 RX ORDER — CHOLECALCIFEROL (VITAMIN D3) 25 MCG
2000 TABLET ORAL DAILY
Status: DISCONTINUED | OUTPATIENT
Start: 2025-01-21 | End: 2025-01-24 | Stop reason: HOSPADM

## 2025-01-21 RX ORDER — AMIODARONE HYDROCHLORIDE 200 MG/1
200 TABLET ORAL DAILY
Status: DISCONTINUED | OUTPATIENT
Start: 2025-01-22 | End: 2025-01-22 | Stop reason: SDUPTHER

## 2025-01-21 RX ORDER — AMIODARONE HYDROCHLORIDE 200 MG/1
400 TABLET ORAL 2 TIMES DAILY
Status: COMPLETED | OUTPATIENT
Start: 2025-01-21 | End: 2025-01-21

## 2025-01-21 RX ORDER — SPIRONOLACTONE 25 MG/1
12.5 TABLET ORAL DAILY
Status: DISCONTINUED | OUTPATIENT
Start: 2025-01-21 | End: 2025-01-24 | Stop reason: HOSPADM

## 2025-01-21 RX ORDER — DEXAMETHASONE SODIUM PHOSPHATE 10 MG/ML
10 INJECTION INTRAMUSCULAR; INTRAVENOUS ONCE
Status: COMPLETED | OUTPATIENT
Start: 2025-01-21 | End: 2025-01-21

## 2025-01-21 RX ORDER — TALC
3 POWDER (GRAM) TOPICAL NIGHTLY PRN
Status: DISCONTINUED | OUTPATIENT
Start: 2025-01-21 | End: 2025-01-24 | Stop reason: HOSPADM

## 2025-01-21 RX ORDER — MUPIROCIN 20 MG/G
OINTMENT TOPICAL 2 TIMES DAILY
Status: DISCONTINUED | OUTPATIENT
Start: 2025-01-21 | End: 2025-01-24 | Stop reason: HOSPADM

## 2025-01-21 RX ORDER — BUMETANIDE 1 MG/1
0.5 TABLET ORAL 2 TIMES WEEKLY
Status: DISCONTINUED | OUTPATIENT
Start: 2025-01-21 | End: 2025-01-24 | Stop reason: HOSPADM

## 2025-01-21 RX ORDER — FAMOTIDINE 20 MG/1
40 TABLET, FILM COATED ORAL DAILY PRN
Status: DISCONTINUED | OUTPATIENT
Start: 2025-01-21 | End: 2025-01-24 | Stop reason: HOSPADM

## 2025-01-21 RX ORDER — FLUTICASONE FUROATE AND VILANTEROL 100; 25 UG/1; UG/1
1 POWDER RESPIRATORY (INHALATION)
Status: DISCONTINUED | OUTPATIENT
Start: 2025-01-21 | End: 2025-01-24 | Stop reason: HOSPADM

## 2025-01-21 RX ORDER — MAG HYDROX/ALUMINUM HYD/SIMETH 200-200-20
SUSPENSION, ORAL (FINAL DOSE FORM) ORAL 2 TIMES DAILY PRN
Status: DISCONTINUED | OUTPATIENT
Start: 2025-01-21 | End: 2025-01-24 | Stop reason: HOSPADM

## 2025-01-21 RX ORDER — CALCIUM CARBONATE 200(500)MG
1 TABLET,CHEWABLE ORAL EVERY 12 HOURS PRN
Status: DISCONTINUED | OUTPATIENT
Start: 2025-01-21 | End: 2025-01-24 | Stop reason: HOSPADM

## 2025-01-21 RX ORDER — ASPIRIN 81 MG/1
81 TABLET ORAL DAILY
Status: DISCONTINUED | OUTPATIENT
Start: 2025-01-21 | End: 2025-01-24 | Stop reason: HOSPADM

## 2025-01-21 RX ADMIN — IPRATROPIUM BROMIDE AND ALBUTEROL SULFATE 3 ML: .5; 3 SOLUTION RESPIRATORY (INHALATION) at 20:56

## 2025-01-21 RX ADMIN — IPRATROPIUM BROMIDE AND ALBUTEROL SULFATE 3 ML: .5; 3 SOLUTION RESPIRATORY (INHALATION) at 04:15

## 2025-01-21 RX ADMIN — IPRATROPIUM BROMIDE AND ALBUTEROL SULFATE 3 ML: .5; 3 SOLUTION RESPIRATORY (INHALATION) at 04:50

## 2025-01-21 RX ADMIN — IPRATROPIUM BROMIDE AND ALBUTEROL SULFATE 3 ML: .5; 3 SOLUTION RESPIRATORY (INHALATION) at 03:55

## 2025-01-21 RX ADMIN — FUROSEMIDE 60 MG: 10 INJECTION, SOLUTION INTRAMUSCULAR; INTRAVENOUS at 16:10

## 2025-01-21 RX ADMIN — DAPAGLIFLOZIN 10 MG: 10 TABLET, FILM COATED ORAL at 08:14

## 2025-01-21 RX ADMIN — IPRATROPIUM BROMIDE AND ALBUTEROL SULFATE 3 ML: .5; 3 SOLUTION RESPIRATORY (INHALATION) at 08:43

## 2025-01-21 RX ADMIN — ALBUTEROL SULFATE 2.5 MG: 2.5 SOLUTION RESPIRATORY (INHALATION) at 11:04

## 2025-01-21 RX ADMIN — ASPIRIN 81 MG: 81 TABLET, COATED ORAL at 08:13

## 2025-01-21 RX ADMIN — HEPARIN SODIUM 800 UNITS/HR: 10000 INJECTION, SOLUTION INTRAVENOUS at 16:10

## 2025-01-21 RX ADMIN — Medication 2000 UNITS: at 08:13

## 2025-01-21 RX ADMIN — AMIODARONE HYDROCHLORIDE 400 MG: 200 TABLET ORAL at 20:53

## 2025-01-21 RX ADMIN — FUROSEMIDE 60 MG: 10 INJECTION, SOLUTION INTRAMUSCULAR; INTRAVENOUS at 06:00

## 2025-01-21 RX ADMIN — MAGNESIUM SULFATE HEPTAHYDRATE 1 G: 1 INJECTION, SOLUTION INTRAVENOUS at 04:47

## 2025-01-21 RX ADMIN — IPRATROPIUM BROMIDE AND ALBUTEROL SULFATE 3 ML: .5; 3 SOLUTION RESPIRATORY (INHALATION) at 16:10

## 2025-01-21 RX ADMIN — SPIRONOLACTONE 12.5 MG: 25 TABLET, FILM COATED ORAL at 08:11

## 2025-01-21 RX ADMIN — DEXAMETHASONE SODIUM PHOSPHATE 10 MG: 10 INJECTION INTRAMUSCULAR; INTRAVENOUS at 04:46

## 2025-01-21 RX ADMIN — FAMOTIDINE 40 MG: 40 TABLET, FILM COATED ORAL at 22:40

## 2025-01-21 RX ADMIN — AMIODARONE HYDROCHLORIDE 400 MG: 200 TABLET ORAL at 12:47

## 2025-01-21 ASSESSMENT — ENCOUNTER SYMPTOMS
CONSTIPATION: 0
SHORTNESS OF BREATH: 1
DIZZINESS: 0
DIARRHEA: 0
ARTHRALGIAS: 0
DIFFICULTY URINATING: 0
HEADACHES: 0
BACK PAIN: 0
AGITATION: 0
CHEST TIGHTNESS: 0
FREQUENCY: 0
ABDOMINAL DISTENTION: 0
NAUSEA: 0
ABDOMINAL PAIN: 0
CONFUSION: 0

## 2025-01-21 ASSESSMENT — PAIN SCALES - GENERAL
PAINLEVEL_OUTOF10: 0 - NO PAIN
PAINLEVEL_OUTOF10: 0 - NO PAIN

## 2025-01-21 ASSESSMENT — LIFESTYLE VARIABLES
HAVE YOU EVER FELT YOU SHOULD CUT DOWN ON YOUR DRINKING: NO
TOTAL SCORE: 0
EVER FELT BAD OR GUILTY ABOUT YOUR DRINKING: NO
HAVE PEOPLE ANNOYED YOU BY CRITICIZING YOUR DRINKING: NO
EVER HAD A DRINK FIRST THING IN THE MORNING TO STEADY YOUR NERVES TO GET RID OF A HANGOVER: NO

## 2025-01-21 NOTE — Clinical Note
Is the patient on isolation?: No   Do you expect the patient to require telemetry (informational-only for bed management): Yes   Do you expect the patient to require observation or inpt? (informational-only for bed management): Observation   Bed request comments: HHVI please

## 2025-01-21 NOTE — PROGRESS NOTES
Music Therapy Note    Melissa Marinelli was referred Music Therapy Note    Melissa Marinelli was referred by    Therapy Session  Referral Type: New referral this admission  Visit Type: New visit  Session Start Time: 0853  Session End Time: 0907  Intervention Delivery: In-person  Conflict of Service: None  Number of family members present: 0  Family Present for Session: None  Number of staff members present: 0     Pre-assessment  Pain Score: 0 - No pain  Mood/Affect: Avoidant, Cooperative, Calm  Verbalized Emotional State: Hopefulness, Loneliness         Treatment/Interventions  Areas of Focus: Relaxation, Emotional support  Music Therapy Interventions: Active music engagement, Live music listening, Allyson analysis  Interruption: No  Patient Fell Asleep at End of Session: No    Post-assessment  0-10 (Numeric) Pain Score: 0 - No pain  Mood/Affect: Elated  Verbalized Emotional State: Happiness, Hopefulness, Gratitude  Continue Visiting: No  Total Session Time (min): 14 minutes    Narrative  Assessment Detail: Patient was lying in bed in a private ER room. Patent consented to an Music Therapy session  Intervention: The Therapist and the patient sang songs that were inspirational to the patient.  The therapist then engaged the patient in repetitive rhythmic movement via music live guitar and backing track. The patient also expressed her concerns about the importance of family and friends during this health emergency  Patient Comments: The patient said that the music therapy session made her day    Education Documentation  No documentation found.            Therapy Session  Referral Type: New referral this admission  Visit Type: New visit  Session Start Time: 0853  Session End Time: 0907  Intervention Delivery: In-person  Conflict of Service: None  Number of family members present: 0  Family Present for Session: None  Number of staff members present: 0     Pre-assessment  Pain Score: 0 - No pain  Mood/Affect: Avoidant,  Cooperative, Calm  Verbalized Emotional State: Hopefulness, Loneliness         Treatment/Interventions  Areas of Focus: Relaxation, Emotional support  Music Therapy Interventions: Active music engagement, Live music listening, Allyson analysis  Interruption: No  Patient Fell Asleep at End of Session: No    Post-assessment  0-10 (Numeric) Pain Score: 0 - No pain  Mood/Affect: Elated  Verbalized Emotional State: Happiness, Hopefulness, Gratitude  Continue Visiting: No  Total Session Time (min): 14 minutes    Narrative  Assessment Detail: Patient was lying in bed in a private ER room. Patent consented to an Music Therapy session  Intervention: The Therapist and the patient sang songs that were inspirational to the patient.  The therapist then engaged the patient in repetitive rhythmic movement via music live guitar and backing track. The patient also expressed her concerns about the importance of family and friends during this health emergency  Patient Comments: The patient said that the music therapy session made her day    Education Documentation  No documentation found.

## 2025-01-21 NOTE — ED PROVIDER NOTES
"History of Present Illness     History provided by: {History Provided By:06492::\"Patient\"}  Limitations to History: {History Limitations:73813::\"None\"}  External Records Reviewed with Brief Summary: {ED External Records Reviewed with Brief Summary:06981::\"None\"}    HPI:  Melissa Marinelli is a 69 y.o. female with history of ICM/ HFrEF (last EF 15-20% 2024, s/p [employer] ICD 2020) on palliative milrinone infusion since 2024, CAD s/p NSTEMI s/p RIRI to LCX (2016), MVR s/p mitral valve repair in (2019; 29 mm Epic bioprosthetic valve with Dr. Montana), COPD, HTN, HL, GERD, hx of CVA, DM     Physical Exam   Triage vitals:  T 36.8 °C (98.2 °F)  HR (!) 110  /72  RR 20  O2 94 % None (Room air)    GEN:  A&Ox3, no acute distress, appears comfortable. Conversational and appropriate.    HEENT: Normocephalic, atraumatic. Conjunctiva pink with no redness or exudates. Hearing grossly intact. Moist mucous membranes.  CARDIO: Normal rate and regular rhythm. Normal S1, S2  without murmurs, rubs, or gallops.   PULM: Clear to auscultation bilaterally. No rales, rhonchi, or wheezes. No accessory muscle use or stridor.  GI: Soft, non-tender, non-distended. No rebound tenderness or guarding.   SKIN: Warm and dry, no rashes, lesions, petechiae, or purpura.  MSK: ROM intact in all 4 extremities without contractures or pain. No peripheral edema, contusions, or wounds.    NEURO: No focal findings identified. No confusion or gross mental status changes.  PSYCH: Appropriate mood and behavior, converses and responds appropriately during exam.    Medical Decision Making & ED Course   Medical Decision Makin y.o. female ***  ----  {Scoring Tools Utilized:41114}    Differential diagnoses considered include but are not limited to: ***     Social Determinants of Health which Significantly Impact Care: {Social Determinants of Health which Significantly Impact Care:23531::\"None identified\"} {The following actions were taken " "to address these social determinants:90517}    EKG Independent Interpretation: Please see ED course for interpretation of EKG    Independent Result Review and Interpretation: Please see ED course and MDM for interpretation of results and interpretation    Chronic conditions affecting the patient's care:Please see ED course and MDM for interpretation of chronic conditions    The patient was discussed with the following consultants/services: {The patient was discussed with the following consultants/services:10063::\"None\"}    Care Considerations: As documented in ED course and MDM.    ED Course:       Disposition   {ED Disposition:51358}    Procedures   Procedures    Patient seen and discussed with ED attending physician    Tosha Cadena, DO  Emergency Medicine  " [AD]   0411 Creatinine(!): 1.73  Significant PATSY [AD]   0411 XR chest 2 views  X-ray with notable infiltrates throughout, suggestive of fluid overload.  Worse from prior x-ray. [AD]   0411 ECG 12 lead  EKG showing atrial flutter with 2-1 conduction.  Change from prior EKG performed on 1/11/2025 [AD]   0535 Troponin I, High Sensitivity (CMC)(!!): 180  Elevated, somewhat similar to prior. [AD]   0535 BNP(!): 785  Elevated from what appears to be prior baseline between 100s-300s [AD]      ED Course User Index  [AD] Tosha Cadena DO         Diagnoses as of 01/24/25 0022   Shortness of breath   Other hypervolemia   Pulmonary hypertension (Multi)       Disposition   As a result of their workup, the patient will require admission to the hospital.  The patient was informed of her diagnosis.  The patient was given the opportunity to ask questions and I answered them. The patient agreed to be admitted to the hospital.    Procedures   Procedures    Patient seen and discussed with ED attending physician    Tosha Cadena DO  Emergency Medicine     Tosha Cadena DO  Resident  01/24/25 0022

## 2025-01-21 NOTE — PROGRESS NOTES
Pharmacy Medication History Review    Melissa Marinelli is a 69 y.o. female admitted for Shortness of breath. Pharmacy reviewed the patient's gdupw-lp-vyyermzoc medications and allergies for accuracy.    Medications ADDED:  N/A  Medications CHANGED:  N/A  Medications REMOVED:   Calcium antacid chewable tab     The list below reflects the updated PTA list.   Prior to Admission Medications   Prescriptions Last Dose Informant   Farxiga 10 mg 2025 Self   Sig: Take 1 tablet (10 mg) by mouth once daily.   OneTouch Verio Flex meter misc  Self   Sig: USE AS DIRECTED THREE TIMES DAILY   Symbicort 80-4.5 mcg/actuation inhaler 2025 Self   Sig: Inhale 2 puffs twice a day.   acetaminophen (Tylenol) 500 mg tablet 2025 Self   Sig: Take 2 tablets (1,000 mg) by mouth every 8 hours if needed for mild pain (1 - 3).   albuterol 90 mcg/actuation inhaler  Self   Sig: Inhale 2 puffs every 4 hours if needed for wheezing or shortness of breath.   amiodarone (Pacerone) 200 mg tablet 2025 Self   Sig: Take 2 tablets (400 mg) by mouth 2 times a day for 10 days, THEN 1 tablet (200 mg) once daily.   aspirin 81 mg EC tablet 2025 Morning Self   Sig: Take 1 tablet (81 mg) by mouth once daily.   bumetanide (Bumex) 0.5 mg tablet 2025 Self   Sig: Take 1 tablet (0.5 mg) by mouth 2 times a week. On  and    cholecalciferol (Vitamin D3) 50 MCG ( UT) tablet 2025 Self   Sig: Take 1 tablet (2,000 Units) by mouth once daily.   famotidine (Pepcid) 40 mg tablet 2025 Self   Sig: Take 1 tablet (40 mg) by mouth once daily. Do not start before February 3, 2024.   Patient taking differently: Take 1 tablet (40 mg) by mouth once daily as needed for indigestion or heartburn.   heparin flush,porcine,-0.9NaCl 100 unit/mL kit  Self   Si mL by central venous line infusion route 1 (one) time per week. Indications: prevent clot from blocking an intravenous catheter   hydrocortisone 1 % ointment Past Week Self  "  Sig: Apply topically 2 times a day as needed for irritation. Chest wall   ipratropium-albuteroL (Duo-Neb) 0.5-2.5 mg/3 mL nebulizer solution  Self   Sig: Take 3 mL by nebulization every 6 hours.   milrinone in 5 % dextrose (Primacor) 20 mg/100 mL (200 mcg/mL) infusion  Self   Sig: Infuse 23.8125 mcg/min at 7.14 mL/hr into a venous catheter continuously.   mupirocin (Bactroban) 2 % ointment  Self   Sig: Apply topically. PRN for nose bleed   nicotine (Nicoderm CQ) 21 mg/24 hr patch Past Week    Sig: Place 1 patch over 24 hours on the skin once every 24 hours.   nicotine polacrilex (Nicorette) 4 mg gum Not Taking    Sig: Chew 1 each (4 mg) if needed for smoking cessation.   Patient not taking: Reported on 1/21/2025   sodium chloride (Ocean) 0.65 % nasal spray  Self   Sig: Administer 1 spray into each nostril if needed for congestion.   sodium chloride 0.9% (sodium chloride) flush  Self   Sig: Infuse 10 mL into a venous catheter 1 (one) time per week. Indications: flush picc line   spironolactone (Aldactone) 25 mg tablet 1/20/2025 Self   Sig: Take 0.5 tablets (12.5 mg) by mouth once daily.      Facility-Administered Medications: None        The list below reflects the updated allergy list. Please review each documented allergy for additional clarification and justification.  Allergies  Reviewed by Bernadette Hancock on 1/21/2025        Severity Reactions Comments    Metoprolol High Other, Shortness of breath, Respiratory depression     Ticagrelor High Anaphylaxis, Other Feels a severe \"burning feeling\" in her chest    Gadolinium-containing Contrast Media Medium Hives, Other     Iodinated Contrast Media Medium Hives, Other     Statins-hmg-coa Reductase Inhibitors Medium Myalgia, Other, Unknown Atorvastatin - hair loss    Red Dye Not Specified Unknown     Ace Inhibitors Low Other, Cough COUGH    Fentanyl Low Dizziness, Drowsiness     Hydralazine Low Dermatitis, Rash, Nausea/vomiting     Nicorette [nicotine (polacrilex)] Low " "Nausea/vomiting Nicorette gums and lozenges only. Okay with nicotine patches    Nicotine Low Nausea/vomiting     Penicillins Low Rash     Prednisone Low Other Increased blood sugar            Patient accepts M2B at discharge.     Sources:   [facility]  Pharmacy dispense history  Patient interview Good historian     Additional Comments:  Patient reports she has been somewhat nonadherent to taking the nicotine patch as she feels like it is not working.       KRISTIN LOYD  Pharmacy Technician  01/21/25     Secure Chat preferred   If no response call [phone] or Thanx \"Med Rec\"   "

## 2025-01-21 NOTE — ED TRIAGE NOTES
Pt presents to ED via EMS with a chief complaint of shortness of breath. Pt has PMH of asthma and received one duo neb en route and reports she brandon some relief.

## 2025-01-21 NOTE — H&P
History Of Present Illness  Melissa Marinelli is a 69 y.o. female with PMHx of ICM/ HFrEF (last EF 15-20% 7/2024, s/p [employer] ICD 5/2020) on palliative milrinone infusion since 2/2024, CAD s/p NSTEMI s/p RIRI to LCX (Jan 2016), MVR s/p mitral valve repair in (June 2019; 29 mm Epic bioprosthetic valve with Dr. Montana), COPD, HTN, HL, GERD, hx of CVA, DM who presented to [facility] ED with concern for new shortness of breath.     She states that two days ago, she noticed that she had increased shortness of breath while she was walking around her house. This shortness of breath was not relieved with her inhalers. She states that she also feels like she may have some extra fluid on her. She denies chest pain, cough, or ankle swelling. She lives at home with her daughter and grandson and feels safe. She does not use oxygen at home and reports her inhalers normally work.     She was last admitted here at [facility] and discharge from the CICU on 1/12 after having episodes of palpitations that were found to be VT. She was discharged with continuation of an amiodarone loading course. She has one more day of amiodarone BID, and then converting to every day.     She has a noted allergy to red dye in normal amiodarone tablets. We will contact pharmacy for dye free amiodarone.     Past Medical History  She has a past medical history of Asthma, CAD (coronary artery disease), CHF (congestive heart failure), CKD (chronic kidney disease), COPD (chronic obstructive pulmonary disease) (Multi), CVA (cerebral vascular accident) (Multi), Diabetes mellitus (Multi), GERD (gastroesophageal reflux disease), Hyperlipidemia, Hypertension, NSTEMI (non-ST elevated myocardial infarction) (Multi), and Unspecified systolic (congestive) heart failure (08/11/2022).    Surgical History  She has a past surgical history that includes Other surgical history (08/11/2022); Other surgical history (08/11/2022); Other surgical history (08/11/2022); Mitral valve  replacement (06/2019); and Cardiac catheterization (N/A, 1/26/2024).     Social History  She reports that she has been smoking cigarettes. She has never used smokeless tobacco. She reports current drug use. Drug: Marijuana. She reports that she does not drink alcohol.    Family History  Family History   Problem Relation Name Age of Onset    Other (CVA) Brother          Allergies  Metoprolol, Ticagrelor, Gadolinium-containing contrast media, Iodinated contrast media, Statins-hmg-coa reductase inhibitors, Red dye, Ace inhibitors, Fentanyl, Hydralazine, Nicorette [nicotine (polacrilex)], Nicotine, Penicillins, and Prednisone    Review of Systems   Respiratory:  Positive for shortness of breath. Negative for chest tightness.    Cardiovascular:  Negative for chest pain and leg swelling.   Gastrointestinal:  Negative for abdominal distention, abdominal pain, constipation, diarrhea and nausea.   Genitourinary:  Negative for difficulty urinating and frequency.   Musculoskeletal:  Negative for arthralgias and back pain.   Neurological:  Negative for dizziness and headaches.   Psychiatric/Behavioral:  Negative for agitation, behavioral problems and confusion.      Physical Exam  Constitutional:       Appearance: Normal appearance.   Cardiovascular:      Rate and Rhythm: Normal rate and regular rhythm.      Pulses: Normal pulses.      Heart sounds: Normal heart sounds.   Pulmonary:      Effort: Pulmonary effort is normal.      Breath sounds: Wheezing present.   Abdominal:      General: Abdomen is flat.      Palpations: Abdomen is soft.   Musculoskeletal:         General: No swelling.      Right lower leg: No edema.      Left lower leg: No edema.   Neurological:      General: No focal deficit present.      Mental Status: She is alert and oriented to person, place, and time.   Psychiatric:         Mood and Affect: Mood normal.         Behavior: Behavior normal.       Last Recorded Vitals  /89   Pulse 60   Temp 36.7 °C  (98.1 °F) (Temporal)   Resp 16   Wt 63 kg (139 lb)   SpO2 97%     TTE 7/8/2024  CONCLUSIONS:   1. The left ventricular systolic function is severely decreased, with a visually estimated ejection fraction of 15-20%.   2. There is global hypokinesis of the left ventricle with minor regional variations.   3. Spectral Doppler shows an abnormal pattern of left ventricular diastolic filling.   4. Left ventricular cavity size is severely dilated.   5. No left ventricular thrombus visualized.   6. There is reduced right ventricular systolic function.   7. The left atrium is severely dilated.   8. The DI is normal, consistent with normal prosthetic mitral valve function. The mean diastolic pressure is 8 mmHg at a heart rate of 136 bpm.   9. Mild to moderately elevated right ventricular systolic pressure.  10. Mild aortic valve regurgitation.    RHC 1/26/2024  RA: 12; PA 46/29 (37); PCWP: 26; CI: 1.3     Assessment/Plan   Melissa Marinelli is a 70 yo female with PMHx of ICM/ HFrEF (last EF 15-20% 7/2024, s/p [employer] ICD 5/2020) on palliative milrinone infusion since 2/2024, CAD s/p NSTEMI s/p RIRI to LCX (Jan 2016), MVR s/p mitral valve repair in (June 2019; 29 mm Epic bioprosthetic valve with Dr. Montana), COPD, HTN, HL, GERD, hx of CVA, DM who presented to [facility] ED with concern for new shortness of breath.     Updates 1/21/25  Weight today was 139lbs, with baseline being 129lbs. She notes that she feeling like she holds fluids more in her abdomen. Hypervolemic based weight and CXR, no bilateral edema noted. Plan for diuresis to euvolemia, and repeat RHC for pressures and possible milrinone dose adjustment.   - note red dye allergy which is in normal amiodarone pills  - received lasix 60mg IV early this AM  - plan to continue diuresis  - RFP BID during diuresis  - weights and I/Os    #SOB  #C/F HFrEF vs COPD Exacerbation  ::troponin 180 -> 175  ::  ::home meds: spironolactone 12.5mg every day, farxiga 10mg every  day, amiodarone 200mg every day, bumex 0.5mg 2x weekly, milrinone 0.375 mcg/kg/min  - continue home GDMT   - no BB with milrinone   - no ace/arb/arni with milrinone  - continue amiodarone 200mg every day  - lasix 60mg IV today  - strict IO and weights  - plan to repeat RHC for pressures after euvolemic  - will consider milrinone dose adjustment at that time    #VT history  - continue amiodarone 200mg QD    #CAD s/p PCI 2016  - continue on ASA 81mg daily  - no statin due to allergy    #Hx of COPD  - continue home meds  - continue breo ellipta    #CKD3b  ::baseline creatinine around 1.4-1.5  - CTM    F: PRN  E: PRN  N: Adult regular  A: PIV  DVT:   Drips: milrinone 0.375 mcg/kg/min (chronic home rate)  Outpatient Cardiologist: Dr. Kathy Rivera    Code Status: Full Code  POC: Daughter Mary Caruso [phone]     Sung Bullock DO

## 2025-01-21 NOTE — HOSPITAL COURSE
Melissa Marinelli is a 69 y.o. female with PMHx of ICM/ HFrEF (last EF 15-20% 7/2024, s/p [employer] ICD 5/2020) on palliative milrinone infusion since 2/2024, CAD s/p NSTEMI s/p RIRI to LCX (Jan 2016), MVR s/p mitral valve repair in (June 2019; 29 mm Epic bioprosthetic valve with Dr. Montana), COPD, HTN, HL, GERD, hx of CVA, DM who presented to [facility] ED with concern for new shortness of breath.      She states that two days ago, she noticed that she had increased shortness of breath while she was walking around her house. This shortness of breath was not relieved with her inhalers. She states that she also feels like she may have some extra fluid on her. She denies chest pain, cough, or ankle swelling. She lives at home with her daughter and grandson and feels safe. She does not use oxygen at home and reports her inhalers normally work.      She was last admitted here at [facility] and discharge from the CICU on 1/12 after having episodes of palpitations that were found to be VT. She was discharged with continuation of an amiodarone loading course. She has one more day of amiodarone BID, and then converting to every day.      She has a noted allergy to red dye in normal amiodarone tablets. We will contact pharmacy for dye free amiodarone.     After noting her to be in a-flutter, we consulted EP and she is scheduled to get a SHRUTHI DCCV on 1/23/25.

## 2025-01-22 ENCOUNTER — HOME CARE VISIT (OUTPATIENT)
Dept: HOME HEALTH SERVICES | Facility: HOME HEALTH | Age: 70
End: 2025-01-22
Payer: COMMERCIAL

## 2025-01-22 PROBLEM — I48.92 ATRIAL FIB/FLUTTER, TRANSIENT (MULTI): Status: ACTIVE | Noted: 2025-01-22

## 2025-01-22 PROBLEM — I48.91 ATRIAL FIB/FLUTTER, TRANSIENT (MULTI): Status: ACTIVE | Noted: 2025-01-22

## 2025-01-22 LAB
ALBUMIN SERPL BCP-MCNC: 3.6 G/DL (ref 3.4–5)
ALBUMIN SERPL BCP-MCNC: 4 G/DL (ref 3.4–5)
ALBUMIN SERPL BCP-MCNC: 4 G/DL (ref 3.4–5)
ANION GAP SERPL CALC-SCNC: 17 MMOL/L (ref 10–20)
ANION GAP SERPL CALC-SCNC: 17 MMOL/L (ref 10–20)
ANION GAP SERPL CALC-SCNC: 19 MMOL/L (ref 10–20)
BASOPHILS # BLD AUTO: 0 X10*3/UL (ref 0–0.1)
BASOPHILS NFR BLD AUTO: 0 %
BUN SERPL-MCNC: 31 MG/DL (ref 6–23)
BUN SERPL-MCNC: 33 MG/DL (ref 6–23)
BUN SERPL-MCNC: 37 MG/DL (ref 6–23)
CALCIUM SERPL-MCNC: 8.9 MG/DL (ref 8.6–10.6)
CALCIUM SERPL-MCNC: 9.1 MG/DL (ref 8.6–10.6)
CALCIUM SERPL-MCNC: 9.1 MG/DL (ref 8.6–10.6)
CHLORIDE SERPL-SCNC: 97 MMOL/L (ref 98–107)
CHLORIDE SERPL-SCNC: 97 MMOL/L (ref 98–107)
CHLORIDE SERPL-SCNC: 98 MMOL/L (ref 98–107)
CO2 SERPL-SCNC: 22 MMOL/L (ref 21–32)
CO2 SERPL-SCNC: 22 MMOL/L (ref 21–32)
CO2 SERPL-SCNC: 25 MMOL/L (ref 21–32)
CREAT SERPL-MCNC: 1.91 MG/DL (ref 0.5–1.05)
CREAT SERPL-MCNC: 2.02 MG/DL (ref 0.5–1.05)
CREAT SERPL-MCNC: 2.31 MG/DL (ref 0.5–1.05)
EGFRCR SERPLBLD CKD-EPI 2021: 22 ML/MIN/1.73M*2
EGFRCR SERPLBLD CKD-EPI 2021: 26 ML/MIN/1.73M*2
EGFRCR SERPLBLD CKD-EPI 2021: 28 ML/MIN/1.73M*2
EOSINOPHIL # BLD AUTO: 0 X10*3/UL (ref 0–0.7)
EOSINOPHIL NFR BLD AUTO: 0 %
ERYTHROCYTE [DISTWIDTH] IN BLOOD BY AUTOMATED COUNT: 13.8 % (ref 11.5–14.5)
GLUCOSE BLD MANUAL STRIP-MCNC: 119 MG/DL (ref 74–99)
GLUCOSE SERPL-MCNC: 107 MG/DL (ref 74–99)
GLUCOSE SERPL-MCNC: 127 MG/DL (ref 74–99)
GLUCOSE SERPL-MCNC: 137 MG/DL (ref 74–99)
HCT VFR BLD AUTO: 31.3 % (ref 36–46)
HGB BLD-MCNC: 10.8 G/DL (ref 12–16)
IMM GRANULOCYTES # BLD AUTO: 0.02 X10*3/UL (ref 0–0.7)
IMM GRANULOCYTES NFR BLD AUTO: 0.3 % (ref 0–0.9)
LYMPHOCYTES # BLD AUTO: 0.34 X10*3/UL (ref 1.2–4.8)
LYMPHOCYTES NFR BLD AUTO: 5.7 %
MAGNESIUM SERPL-MCNC: 2.55 MG/DL (ref 1.6–2.4)
MCH RBC QN AUTO: 29.6 PG (ref 26–34)
MCHC RBC AUTO-ENTMCNC: 34.5 G/DL (ref 32–36)
MCV RBC AUTO: 86 FL (ref 80–100)
MONOCYTES # BLD AUTO: 0.33 X10*3/UL (ref 0.1–1)
MONOCYTES NFR BLD AUTO: 5.5 %
NEUTROPHILS # BLD AUTO: 5.31 X10*3/UL (ref 1.2–7.7)
NEUTROPHILS NFR BLD AUTO: 88.5 %
NRBC BLD-RTO: 0 /100 WBCS (ref 0–0)
PHOSPHATE SERPL-MCNC: 3.4 MG/DL (ref 2.5–4.9)
PLATELET # BLD AUTO: 259 X10*3/UL (ref 150–450)
POTASSIUM SERPL-SCNC: 4.7 MMOL/L (ref 3.5–5.3)
POTASSIUM SERPL-SCNC: 5.2 MMOL/L (ref 3.5–5.3)
POTASSIUM SERPL-SCNC: 5.4 MMOL/L (ref 3.5–5.3)
RBC # BLD AUTO: 3.65 X10*6/UL (ref 4–5.2)
SODIUM SERPL-SCNC: 131 MMOL/L (ref 136–145)
SODIUM SERPL-SCNC: 134 MMOL/L (ref 136–145)
SODIUM SERPL-SCNC: 134 MMOL/L (ref 136–145)
UFH PPP CHRO-ACNC: 0.4 IU/ML
UFH PPP CHRO-ACNC: 0.5 IU/ML
UFH PPP CHRO-ACNC: 0.7 IU/ML
WBC # BLD AUTO: 6 X10*3/UL (ref 4.4–11.3)

## 2025-01-22 PROCEDURE — 80069 RENAL FUNCTION PANEL: CPT

## 2025-01-22 PROCEDURE — 99223 1ST HOSP IP/OBS HIGH 75: CPT | Performed by: STUDENT IN AN ORGANIZED HEALTH CARE EDUCATION/TRAINING PROGRAM

## 2025-01-22 PROCEDURE — 2500000004 HC RX 250 GENERAL PHARMACY W/ HCPCS (ALT 636 FOR OP/ED)

## 2025-01-22 PROCEDURE — 2500000002 HC RX 250 W HCPCS SELF ADMINISTERED DRUGS (ALT 637 FOR MEDICARE OP, ALT 636 FOR OP/ED)

## 2025-01-22 PROCEDURE — 83735 ASSAY OF MAGNESIUM: CPT

## 2025-01-22 PROCEDURE — 1200000002 HC GENERAL ROOM WITH TELEMETRY DAILY

## 2025-01-22 PROCEDURE — 2500000005 HC RX 250 GENERAL PHARMACY W/O HCPCS

## 2025-01-22 PROCEDURE — 85520 HEPARIN ASSAY: CPT | Performed by: INTERNAL MEDICINE

## 2025-01-22 PROCEDURE — 85025 COMPLETE CBC W/AUTO DIFF WBC: CPT

## 2025-01-22 PROCEDURE — 2500000001 HC RX 250 WO HCPCS SELF ADMINISTERED DRUGS (ALT 637 FOR MEDICARE OP)

## 2025-01-22 RX ORDER — FUROSEMIDE 10 MG/ML
60 INJECTION INTRAMUSCULAR; INTRAVENOUS ONCE
Status: COMPLETED | OUTPATIENT
Start: 2025-01-22 | End: 2025-01-22

## 2025-01-22 RX ORDER — AMIODARONE HYDROCHLORIDE 200 MG/1
200 TABLET ORAL DAILY
Status: DISCONTINUED | OUTPATIENT
Start: 2025-01-22 | End: 2025-01-24 | Stop reason: HOSPADM

## 2025-01-22 RX ORDER — FUROSEMIDE 10 MG/ML
80 INJECTION INTRAMUSCULAR; INTRAVENOUS ONCE
Status: DISCONTINUED | OUTPATIENT
Start: 2025-01-22 | End: 2025-01-22

## 2025-01-22 RX ADMIN — SODIUM ZIRCONIUM CYCLOSILICATE 5 G: 10 POWDER, FOR SUSPENSION ORAL at 08:28

## 2025-01-22 RX ADMIN — SPIRONOLACTONE 12.5 MG: 25 TABLET, FILM COATED ORAL at 08:29

## 2025-01-22 RX ADMIN — ASPIRIN 81 MG: 81 TABLET, COATED ORAL at 08:29

## 2025-01-22 RX ADMIN — DAPAGLIFLOZIN 10 MG: 10 TABLET, FILM COATED ORAL at 08:29

## 2025-01-22 RX ADMIN — FUROSEMIDE 60 MG: 10 INJECTION, SOLUTION INTRAMUSCULAR; INTRAVENOUS at 12:32

## 2025-01-22 RX ADMIN — Medication 2000 UNITS: at 08:29

## 2025-01-22 RX ADMIN — FAMOTIDINE 40 MG: 40 TABLET, FILM COATED ORAL at 17:37

## 2025-01-22 RX ADMIN — AMIODARONE HYDROCHLORIDE 200 MG: 200 TABLET ORAL at 10:10

## 2025-01-22 RX ADMIN — MUPIROCIN: 20 OINTMENT TOPICAL at 21:39

## 2025-01-22 SDOH — SOCIAL STABILITY: SOCIAL INSECURITY: DOES ANYONE TRY TO KEEP YOU FROM HAVING/CONTACTING OTHER FRIENDS OR DOING THINGS OUTSIDE YOUR HOME?: NO

## 2025-01-22 SDOH — SOCIAL STABILITY: SOCIAL INSECURITY: ARE YOU OR HAVE YOU BEEN THREATENED OR ABUSED PHYSICALLY, EMOTIONALLY, OR SEXUALLY BY ANYONE?: NO

## 2025-01-22 SDOH — SOCIAL STABILITY: SOCIAL INSECURITY: DO YOU FEEL UNSAFE GOING BACK TO THE PLACE WHERE YOU ARE LIVING?: NO

## 2025-01-22 SDOH — SOCIAL STABILITY: SOCIAL INSECURITY: DO YOU FEEL ANYONE HAS EXPLOITED OR TAKEN ADVANTAGE OF YOU FINANCIALLY OR OF YOUR PERSONAL PROPERTY?: NO

## 2025-01-22 SDOH — SOCIAL STABILITY: SOCIAL INSECURITY: WITHIN THE LAST YEAR, HAVE YOU BEEN HUMILIATED OR EMOTIONALLY ABUSED IN OTHER WAYS BY YOUR PARTNER OR EX-PARTNER?: NO

## 2025-01-22 SDOH — SOCIAL STABILITY: SOCIAL INSECURITY: HAVE YOU HAD THOUGHTS OF HARMING ANYONE ELSE?: NO

## 2025-01-22 SDOH — ECONOMIC STABILITY: INCOME INSECURITY: IN THE PAST 12 MONTHS HAS THE ELECTRIC, GAS, OIL, OR WATER COMPANY THREATENED TO SHUT OFF SERVICES IN YOUR HOME?: NO

## 2025-01-22 SDOH — SOCIAL STABILITY: SOCIAL INSECURITY: WERE YOU ABLE TO COMPLETE ALL THE BEHAVIORAL HEALTH SCREENINGS?: YES

## 2025-01-22 SDOH — SOCIAL STABILITY: SOCIAL INSECURITY: HAVE YOU HAD ANY THOUGHTS OF HARMING ANYONE ELSE?: NO

## 2025-01-22 SDOH — ECONOMIC STABILITY: FOOD INSECURITY: WITHIN THE PAST 12 MONTHS, YOU WORRIED THAT YOUR FOOD WOULD RUN OUT BEFORE YOU GOT THE MONEY TO BUY MORE.: NEVER TRUE

## 2025-01-22 SDOH — SOCIAL STABILITY: SOCIAL INSECURITY: WITHIN THE LAST YEAR, HAVE YOU BEEN AFRAID OF YOUR PARTNER OR EX-PARTNER?: NO

## 2025-01-22 SDOH — ECONOMIC STABILITY: FOOD INSECURITY: WITHIN THE PAST 12 MONTHS, THE FOOD YOU BOUGHT JUST DIDN'T LAST AND YOU DIDN'T HAVE MONEY TO GET MORE.: NEVER TRUE

## 2025-01-22 SDOH — SOCIAL STABILITY: SOCIAL INSECURITY: ABUSE: ADULT

## 2025-01-22 SDOH — SOCIAL STABILITY: SOCIAL INSECURITY: HAS ANYONE EVER THREATENED TO HURT YOUR FAMILY OR YOUR PETS?: NO

## 2025-01-22 SDOH — SOCIAL STABILITY: SOCIAL INSECURITY: ARE THERE ANY APPARENT SIGNS OF INJURIES/BEHAVIORS THAT COULD BE RELATED TO ABUSE/NEGLECT?: NO

## 2025-01-22 ASSESSMENT — PAIN SCALES - GENERAL
PAINLEVEL_OUTOF10: 0 - NO PAIN

## 2025-01-22 ASSESSMENT — LIFESTYLE VARIABLES
HOW OFTEN DO YOU HAVE 6 OR MORE DRINKS ON ONE OCCASION: NEVER
AUDIT-C TOTAL SCORE: 0
SKIP TO QUESTIONS 9-10: 1
HOW OFTEN DO YOU HAVE A DRINK CONTAINING ALCOHOL: NEVER
AUDIT-C TOTAL SCORE: 0
HOW MANY STANDARD DRINKS CONTAINING ALCOHOL DO YOU HAVE ON A TYPICAL DAY: PATIENT DOES NOT DRINK

## 2025-01-22 ASSESSMENT — ACTIVITIES OF DAILY LIVING (ADL)
HEARING - RIGHT EAR: FUNCTIONAL
GROOMING: NEEDS ASSISTANCE
HEARING - LEFT EAR: FUNCTIONAL
ASSISTIVE_DEVICE: WALKER;CANE
DRESSING YOURSELF: NEEDS ASSISTANCE
WALKS IN HOME: NEEDS ASSISTANCE
TOILETING: INDEPENDENT
JUDGMENT_ADEQUATE_SAFELY_COMPLETE_DAILY_ACTIVITIES: YES
PATIENT'S MEMORY ADEQUATE TO SAFELY COMPLETE DAILY ACTIVITIES?: YES
ADEQUATE_TO_COMPLETE_ADL: YES
BATHING: NEEDS ASSISTANCE
LACK_OF_TRANSPORTATION: NO
FEEDING YOURSELF: INDEPENDENT

## 2025-01-22 ASSESSMENT — PATIENT HEALTH QUESTIONNAIRE - PHQ9
1. LITTLE INTEREST OR PLEASURE IN DOING THINGS: NOT AT ALL
SUM OF ALL RESPONSES TO PHQ9 QUESTIONS 1 & 2: 0
2. FEELING DOWN, DEPRESSED OR HOPELESS: NOT AT ALL

## 2025-01-22 ASSESSMENT — COGNITIVE AND FUNCTIONAL STATUS - GENERAL
DAILY ACTIVITIY SCORE: 23
PATIENT BASELINE BEDBOUND: NO
DRESSING REGULAR LOWER BODY CLOTHING: A LITTLE
CLIMB 3 TO 5 STEPS WITH RAILING: A LITTLE
MOBILITY SCORE: 23

## 2025-01-22 NOTE — CONSULTS
Inpatient consult to Electrophysiology  Consult performed by: French Buck MD  Consult ordered by: French Buck MD  Reason for consult: af        History Of Present Illness:    Melissa Marinelli is a 69 y.o. female presenting with sob, new AFL . Pmhx notable for ICM/ HFrEF (last EF 15-20% 7/2024, s/p [employer] ICD 5/2020) on palliative milrinone infusion since 2/2024, CAD s/p NSTEMI s/p RIRI to LCX (Jan 2016), MVR s/p mitral valve repair in (June 2019; 29 mm Epic bioprosthetic valve with Dr. Montana), COPD, HTN, HL, GERD, hx of CVA, DM.     Patient recently addmitted (dc 1/12) with VT with appropriate shocks and started on amiodarone. She is on palliative milrinone at baseline. Vt was quiescent on dc. She then had several days of worsening sob and was found to be in aflutter on EKG on admission. She's admitted to cardiology team who are requesting john dccv.      Last Recorded Vitals:  Vitals:    01/22/25 0330 01/22/25 0400 01/22/25 0430 01/22/25 0600   BP: 96/57 (!) 89/47 96/58 104/69   Pulse: (!) 108 (!) 108 (!) 107 (!) 108   Resp: 16 20 17 16   Temp: 36.6 °C (97.9 °F)      TempSrc: Temporal      SpO2: 98% 98% 97% 97%   Weight:       Height:           Last Labs:  CBC - 1/22/2025:  5:11 AM  6.0 10.8 259    31.3      CMP - 1/22/2025:  5:11 AM  8.9 7.7 26 --- 0.5   3.4 3.6 42 85      PTT - 1/21/2025:  1:49 AM  1.1   11.9 25     Troponin I, High Sensitivity   Date/Time Value Ref Range Status   02/17/2024 03:47  (HH) 0 - 34 ng/L Final     Comment:     Previous result verified on 2/17/2024 0325 on specimen/case [id number] called with component [facility] for procedure Troponin I, High Sensitivity, Initial with value 170 ng/L.   02/17/2024 02:52  (HH) 0 - 34 ng/L Final   01/26/2024 07:15  (HH) 0 - 34 ng/L Final     Troponin I, High Sensitivity ([facility])   Date/Time Value Ref Range Status   01/21/2025 04:45  (HH) 0 - 34 ng/L Final     Comment:     Previous result verified on 1/21/2025 0521 on  specimen/case [id number] called with component [facility] for procedure Troponin I, High Sensitivity with value 180 ng/L.   01/21/2025 01:49  (HH) 0 - 34 ng/L Final   01/10/2025 08:03  (HH) 0 - 34 ng/L Final     Comment:     Previous result verified on 1/10/2025 1947 on specimen/case [id number] called with component [facility] for procedure Troponin I, High Sensitivity with value 257 ng/L.     BNP   Date/Time Value Ref Range Status   01/21/2025 01:49  (H) 0 - 99 pg/mL Final   01/10/2025 06:45 PM 1,044 (H) 0 - 99 pg/mL Final     Hemoglobin A1C   Date/Time Value Ref Range Status   07/08/2024 11:37 AM 5.2 see below % Final   04/24/2024 04:40 PM 5.3 4.3 - 5.6 % Final     Comment:     American Diabetes Association guidelines indicate that patients with HgbA1c in the range 5.7-6.4% are at increased risk for development of diabetes, and intervention by lifestyle modification may be beneficial. HgbA1c greater or equal to 6.5% is considered diagnostic of diabetes.   01/26/2024 06:05 PM 5.4 see below % Final     VLDL   Date/Time Value Ref Range Status   09/01/2023 05:24 AM 21 0 - 40 mg/dL Final   05/21/2020 09:02 PM 31 0 - 40 mg/dL Final   11/07/2019 07:57 PM 21 0 - 40 mg/dL Final      Last I/O:  I/O last 3 completed shifts:  In: 120 (1.9 mL/kg) [P.O.:120]  Out: 420 (6.7 mL/kg) [Urine:420 (0.2 mL/kg/hr)]  Weight: 63 kg     -reviewed inpatient and recent diagnostics including labs, telemetry, EKGs, echocardiography, CXR    Ejection Fractions:  EF   Date/Time Value Ref Range Status   07/08/2024 04:30 PM 18 %    01/24/2024 08:40 AM 20 %    11/21/2023 04:03 PM 23       Cath:  Cardiac catheterization - non-coronary 01/26/2024    Stress Test:  No results found for this or any previous visit from the past 1095 days.    Cardiac Imaging:  No results found for this or any previous visit from the past 1095 days.      Past Medical History:  She has a past medical history of Asthma, CAD (coronary artery disease), CHF  (congestive heart failure), CKD (chronic kidney disease), COPD (chronic obstructive pulmonary disease) (Multi), CVA (cerebral vascular accident) (Multi), Diabetes mellitus (Multi), GERD (gastroesophageal reflux disease), Hyperlipidemia, Hypertension, NSTEMI (non-ST elevated myocardial infarction) (Multi), and Unspecified systolic (congestive) heart failure (08/11/2022).    Past Surgical History:  She has a past surgical history that includes Other surgical history (08/11/2022); Other surgical history (08/11/2022); Other surgical history (08/11/2022); Mitral valve replacement (06/2019); and Cardiac catheterization (N/A, 1/26/2024).      Social History:  She reports that she has been smoking cigarettes. She has never used smokeless tobacco. She reports current drug use. Drug: Marijuana. She reports that she does not drink alcohol.    Family History:  Family History   Problem Relation Name Age of Onset    Other (CVA) Brother          Allergies:  Metoprolol, Ticagrelor, Gadolinium-containing contrast media, Iodinated contrast media, Statins-hmg-coa reductase inhibitors, Red dye, Ace inhibitors, Fentanyl, Hydralazine, Nicorette [nicotine (polacrilex)], Nicotine, Penicillins, and Prednisone    Inpatient Medications:  Scheduled medications   Medication Dose Route Frequency    amiodarone  200 mg oral Daily    aspirin  81 mg oral Daily    bumetanide  0.5 mg oral Once per day on Monday Thursday    cholecalciferol  2,000 Units oral Daily    dapagliflozin propanediol  10 mg oral Daily    fluticasone furoate-vilanteroL  1 puff inhalation Daily    furosemide  60 mg intravenous Once    mupirocin   Topical BID    nicotine  1 patch transdermal q24h    spironolactone  12.5 mg oral Daily     PRN medications   Medication    albuterol    calcium carbonate    famotidine    heparin    hydrocortisone    ipratropium-albuteroL    melatonin    polyethylene glycol    sodium chloride     Continuous Medications   Medication Dose Last Rate     heparin  0-4,000 Units/hr 700 Units/hr (01/22/25 1026)    milrinone in 5 % dextrose  0.375 mcg/kg/min (Order-Specific) 0.375 mcg/kg/min (01/21/25 0953)     Outpatient Medications:  Current Outpatient Medications   Medication Instructions    acetaminophen (Tylenol) 500 mg tablet 2 tablets, Every 8 hours PRN    albuterol 90 mcg/actuation inhaler 2 puffs, inhalation, Every 4 hours PRN    amiodarone (Pacerone) 200 mg tablet Take 2 tablets (400 mg) by mouth 2 times a day for 10 days, THEN 1 tablet (200 mg) once daily.    aspirin 81 mg, oral, Daily    bumetanide (BUMEX) 0.5 mg, oral, 2 times weekly, On Tuesdays and Saturdays    cholecalciferol (VITAMIN D3) 2,000 Units, Daily    famotidine (Pepcid) 40 mg tablet Take 1 tablet (40 mg) by mouth once daily. Do not start before February 3, 2024.    Farxiga 10 mg, oral, Daily    heparin flush,porcine,-0.9NaCl 100 unit/mL kit 5 mL, central venous line infusion, Once Weekly    hydrocortisone 1 % ointment Topical, 2 times daily PRN, Chest wall    ipratropium-albuteroL (Duo-Neb) 0.5-2.5 mg/3 mL nebulizer solution 3 mL, nebulization, Every 6 hours RT    milrinone in 5 % dextrose (Primacor) 20 mg/100 mL (200 mcg/mL) infusion 0.375 mcg/kg/min, intravenous, Continuous    mupirocin (Bactroban) 2 % ointment Topical, PRN for nose bleed    nicotine (Nicoderm CQ) 21 mg/24 hr patch 1 patch, transdermal, Every 24 hours    nicotine polacrilex (NICORETTE) 4 mg, Mouth/Throat, As needed    OneTouch Verio Flex meter misc USE AS DIRECTED THREE TIMES DAILY    sodium chloride (Ocean) 0.65 % nasal spray 1 spray, Each Nostril, As needed    sodium chloride 0.9% (sodium chloride) flush 10 mL, intravenous, Once Weekly    spironolactone (ALDACTONE) 12.5 mg, oral, Daily    Symbicort 80-4.5 mcg/actuation inhaler 2 puffs, 2 times daily     Physical Exam  Constitutional:       General: She is not in acute distress.     Appearance: Normal appearance. She is not toxic-appearing.   HENT:      Head: Normocephalic  and atraumatic.      Mouth/Throat:      Mouth: Mucous membranes are moist.      Pharynx: Oropharynx is clear.   Eyes:      Extraocular Movements: Extraocular movements intact.      Conjunctiva/sclera: Conjunctivae normal.      Pupils: Pupils are equal, round, and reactive to light.   Cardiovascular:      Rate and Rhythm: Regular rhythm. Tachycardia present.      Heart sounds: No murmur heard.     No friction rub. No gallop.   Pulmonary:      Effort: Pulmonary effort is normal. No respiratory distress.      Breath sounds: Normal breath sounds. No wheezing or rales.   Abdominal:      General: Abdomen is flat. Bowel sounds are normal. There is no distension.      Tenderness: There is no abdominal tenderness. There is no guarding.   Musculoskeletal:         General: No swelling. Normal range of motion.      Cervical back: Normal range of motion.   Skin:     General: Skin is warm and dry.      Capillary Refill: Capillary refill takes less than 2 seconds.   Neurological:      General: No focal deficit present.      Mental Status: She is alert and oriented to person, place, and time. Mental status is at baseline.   Psychiatric:         Mood and Affect: Mood normal.         Behavior: Behavior normal.         Thought Content: Thought content normal.         Judgment: Judgment normal.           Assessment/Plan   69 y.o. female with PMHx of ICM/ HFrEF (last EF 15-20% 7/2024, s/p [employer] ICD 5/2020) on palliative milrinone infusion since 2/2024, CAD s/p NSTEMI s/p RIRI to LCX (Jan 2016), MVR s/p mitral valve repair in (June 2019; 29 mm Epic bioprosthetic valve with Dr. Montana), COPD, HTN, HL, GERD, hx of CVA, DM who presented to [facility] ED with concern for new shortness of breath, found to be in new atrial flutter vs coarse AF.     #atypical AFL  Index dx: 1/22/25. Unfortunately, she has single chamber rv ppm lead and unclear when she developed AF since discharge 1/12.   Rate: none, on milrinone   Rhythm control: hold  amiodarone (rx'ed for VT last admission and finishing oral load). Case request placed for SHRUTHI DCCV. If SHRUTHI neg, likely continue 400mg qday.   Anticoagulation: hep gtt >> doac on dc      Thank you for the opportunity to contribute to the care of this patient. We will follow. Above recommendations discussed with Dr. Belcher. If further questions arise, please page the EP consult pager at [phone] on weekdays 7AM - 6PM and weekends 7AM - 2PM, or at [phone] at all other times. The EP device nurse can be reached at pager [phone] during regular business hours MARILYN Buck MD  Fellow, Electrophysiology        Peripheral IV 01/21/25 20 G Left;Proximal;Posterior Forearm (Active)   Site Assessment Clean;Dry;Intact 01/21/25 0526   Dressing Type Transparent 01/21/25 0526   Line Status Flushed 01/21/25 0526   Dressing Status Clean;Dry;Occlusive 01/21/25 0526   Number of days: 1       Peripheral IV 01/21/25 24 G Left;Dorsal Hand (Active)   Site Assessment Clean;Dry;Intact 01/21/25 1908   Dressing Type Transparent 01/21/25 1908   Line Status Flushed 01/21/25 1908   Dressing Status Clean;Dry;Occlusive 01/21/25 1908   Number of days: 1       Peripheral IV 01/21/25 24 G Right;Dorsal Hand (Active)   Site Assessment Clean;Dry;Intact 01/21/25 1909   Dressing Type Transparent 01/21/25 1909   Line Status Flushed 01/21/25 1909   Dressing Status Clean;Dry;Occlusive 01/21/25 1909   Number of days: 1       Code Status:  Full Code    I spent 45 minutes in the professional and overall care of this patient.        French Buck MD

## 2025-01-22 NOTE — PROGRESS NOTES
Melissa Marinelli is a 69 y.o. female on day 1 of admission presenting with Shortness of breath.    Subjective   NAEO. Today, patient was comfortable in bed in CDU this AM. She reports increased urination with lasix but unable to an accurate output value.     Objective     Last Recorded Vitals  /69   Pulse (!) 108   Temp 36.6 °C (97.9 °F) (Temporal)   Resp 16   Wt 63 kg (139 lb)   SpO2 97%   Intake/Output last 3 Shifts:    Intake/Output Summary (Last 24 hours) at 1/22/2025 1257  Last data filed at 1/22/2025 0057  Gross per 24 hour   Intake 120 ml   Output 420 ml   Net -300 ml       Admission Weight  Weight: 63 kg (139 lb) (01/21/25 0155)    Daily Weight  01/21/25 : 63 kg (139 lb)    Image Results  ECG 12 Lead  See ED provider note for full interpretation and clinical correlation  Confirmed by Kamari Juarez ([id number]) on 1/21/2025 5:08:25 PM  ECG 12 lead  Atrial flutter with 2:1 AV conduction  Nonspecific intraventricular block  Minimal voltage criteria for LVH, may be normal variant ( Graeme product )  Possible Anterolateral infarct (cited on or before 08-JUL-2024)  Abnormal ECG  When compared with ECG of 11-JAN-2025 18:11,  Atrial flutter has replaced Ectopic atrial rhythm    See ED provider note for full interpretation and clinical correlation  Confirmed by Hina Lu ([id number]) on 1/21/2025 5:18:07 AM  XR chest 2 views  Narrative: STUDY:  Chest Radiographs;  1/21/2025 2:20 AM  INDICATION:  Pneumonia.  COMPARISON:  9/7/2024 XR Chest two view  ACCESSION NUMBER(S):  [id number]  ORDERING CLINICIAN:  SELENA LOW  TECHNIQUE:  Frontal and lateral chest.   FINDINGS:  CARDIOMEDIASTINAL SILHOUETTE:  Median sternotomy.  Right subclavian approach central venous catheter terminates into  cavoatrial junction.  Single lead left pectoral AICD. terminates into the right ventricle.  Cardiomediastinal silhouette is enlarged.  LUNGS:  The left lower lung is obscured by left cardiac silhouette..  Prominent central  pulmonary vascularity.  Diffuse interstitial opacity in the visualized lungs.  Haziness in the  right lower lung.  ABDOMEN:  No remarkable upper abdominal findings.     BONES:  No acute osseous changes.  Impression: Median sternotomy with left pectoral AICD.    Interval placement of right subclavian approach central venous  catheter terminates into cavoatrial junction.  Cardiomegaly with pulmonary congestion.  Signed by Opal Buckner MD      Physical Exam    Assessment/Plan   Melissa Marinelli is a 69 y.o. female with PMHx of ICM/ HFrEF (last EF 15-20% 7/2024, s/p [employer]. Gregorio ICD 5/2020) on palliative milrinone infusion since 2/2024, CAD s/p NSTEMI s/p RIRI to LCX (Jan 2016), MVR s/p mitral valve repair in (June 2019; 29 mm Epic bioprosthetic valve with Dr. Montana), COPD, HTN, HL, GERD, hx of CVA, DM who presented to [facility] ED with concern for new shortness of breath.     Updates 01/22/25   Patient was noted to be in a-flutter this AM. We consulted EP today and will continue diuresis until euvolemic.   - stopping amiodarone for now due to risk of sinus conversion off anticoagulation  - plan for CT Watchman  - plan for future SHRUTHI DCCV  - lasix 60mg IV BID  - RFP BID while diuresing  - weights and I/Os    #SOB  #C/F HFrEF vs COPD Exacerbation  ::troponin 180 -> 175  ::  ::home meds: spironolactone 12.5mg every day, farxiga 10mg every day, amiodarone 200mg every day, bumex 0.5mg 2x weekly, milrinone 0.375 mcg/kg/min  - continue home GDMT              - no BB with milrinone              - no ace/arb/arni with milrinone  - stopping amiodarone with risk of sinus conversion off anticoagulation  - ordering CT Watchman  - plan for future SHRUTHI DCCV  - lasix 60mg IV BID  - strict IO and weights     #VT history  - stopping amiodarone currently     #CAD s/p PCI 2016  - continue on ASA 81mg daily  - no statin due to allergy     #Hx of COPD  - continue home meds  - continue breo ellipta     #CKD3b  ::baseline creatinine around  1.4-1.5  - CTM     F: PRN  E: PRN  N: Adult regular  A: PIV  DVT:   Drips: milrinone 0.375 mcg/kg/min (chronic home rate)  Outpatient Cardiologist: Dr. Kathy Rivera     Code Status: Full Code  POC: Daughter Mary Caruso [phone]      Sung Bullock, DO

## 2025-01-23 ENCOUNTER — ANESTHESIA (OUTPATIENT)
Dept: CARDIOLOGY | Facility: HOSPITAL | Age: 70
End: 2025-01-23
Payer: COMMERCIAL

## 2025-01-23 ENCOUNTER — APPOINTMENT (OUTPATIENT)
Dept: CARDIOLOGY | Facility: HOSPITAL | Age: 70
DRG: 308 | End: 2025-01-23
Payer: COMMERCIAL

## 2025-01-23 ENCOUNTER — ANESTHESIA EVENT (OUTPATIENT)
Dept: CARDIOLOGY | Facility: HOSPITAL | Age: 70
End: 2025-01-23
Payer: COMMERCIAL

## 2025-01-23 LAB
ALBUMIN SERPL BCP-MCNC: 3.5 G/DL (ref 3.4–5)
ALBUMIN SERPL BCP-MCNC: 3.9 G/DL (ref 3.4–5)
ANION GAP SERPL CALC-SCNC: 14 MMOL/L (ref 10–20)
ANION GAP SERPL CALC-SCNC: 16 MMOL/L (ref 10–20)
BASOPHILS # BLD AUTO: 0.03 X10*3/UL (ref 0–0.1)
BASOPHILS NFR BLD AUTO: 0.4 %
BUN SERPL-MCNC: 34 MG/DL (ref 6–23)
BUN SERPL-MCNC: 37 MG/DL (ref 6–23)
CALCIUM SERPL-MCNC: 8.8 MG/DL (ref 8.6–10.6)
CALCIUM SERPL-MCNC: 8.9 MG/DL (ref 8.6–10.6)
CHLORIDE SERPL-SCNC: 98 MMOL/L (ref 98–107)
CHLORIDE SERPL-SCNC: 99 MMOL/L (ref 98–107)
CO2 SERPL-SCNC: 23 MMOL/L (ref 21–32)
CO2 SERPL-SCNC: 26 MMOL/L (ref 21–32)
CREAT SERPL-MCNC: 2.18 MG/DL (ref 0.5–1.05)
CREAT SERPL-MCNC: 2.46 MG/DL (ref 0.5–1.05)
EGFRCR SERPLBLD CKD-EPI 2021: 21 ML/MIN/1.73M*2
EGFRCR SERPLBLD CKD-EPI 2021: 24 ML/MIN/1.73M*2
EJECTION FRACTION: 13 %
EOSINOPHIL # BLD AUTO: 0.06 X10*3/UL (ref 0–0.7)
EOSINOPHIL NFR BLD AUTO: 0.8 %
ERYTHROCYTE [DISTWIDTH] IN BLOOD BY AUTOMATED COUNT: 14.7 % (ref 11.5–14.5)
GLUCOSE SERPL-MCNC: 190 MG/DL (ref 74–99)
GLUCOSE SERPL-MCNC: 73 MG/DL (ref 74–99)
HCT VFR BLD AUTO: 37.3 % (ref 36–46)
HGB BLD-MCNC: 11.7 G/DL (ref 12–16)
IMM GRANULOCYTES # BLD AUTO: 0.02 X10*3/UL (ref 0–0.7)
IMM GRANULOCYTES NFR BLD AUTO: 0.3 % (ref 0–0.9)
LYMPHOCYTES # BLD AUTO: 1.39 X10*3/UL (ref 1.2–4.8)
LYMPHOCYTES NFR BLD AUTO: 19.3 %
MAGNESIUM SERPL-MCNC: 2.55 MG/DL (ref 1.6–2.4)
MCH RBC QN AUTO: 29.2 PG (ref 26–34)
MCHC RBC AUTO-ENTMCNC: 31.4 G/DL (ref 32–36)
MCV RBC AUTO: 93 FL (ref 80–100)
MONOCYTES # BLD AUTO: 0.64 X10*3/UL (ref 0.1–1)
MONOCYTES NFR BLD AUTO: 8.9 %
NEUTROPHILS # BLD AUTO: 5.07 X10*3/UL (ref 1.2–7.7)
NEUTROPHILS NFR BLD AUTO: 70.3 %
NRBC BLD-RTO: 0 /100 WBCS (ref 0–0)
PHOSPHATE SERPL-MCNC: 3 MG/DL (ref 2.5–4.9)
PHOSPHATE SERPL-MCNC: 3.3 MG/DL (ref 2.5–4.9)
PLATELET # BLD AUTO: 289 X10*3/UL (ref 150–450)
POTASSIUM SERPL-SCNC: 4.1 MMOL/L (ref 3.5–5.3)
POTASSIUM SERPL-SCNC: 4.7 MMOL/L (ref 3.5–5.3)
RBC # BLD AUTO: 4.01 X10*6/UL (ref 4–5.2)
RIGHT VENTRICLE PEAK SYSTOLIC PRESSURE: 38.3 MMHG
SODIUM SERPL-SCNC: 133 MMOL/L (ref 136–145)
SODIUM SERPL-SCNC: 134 MMOL/L (ref 136–145)
UFH PPP CHRO-ACNC: 0.3 IU/ML
UFH PPP CHRO-ACNC: 0.3 IU/ML
WBC # BLD AUTO: 7.2 X10*3/UL (ref 4.4–11.3)

## 2025-01-23 PROCEDURE — 2500000005 HC RX 250 GENERAL PHARMACY W/O HCPCS

## 2025-01-23 PROCEDURE — 93312 ECHO TRANSESOPHAGEAL: CPT

## 2025-01-23 PROCEDURE — 7100000009 HC PHASE TWO TIME - INITIAL BASE CHARGE: Performed by: STUDENT IN AN ORGANIZED HEALTH CARE EDUCATION/TRAINING PROGRAM

## 2025-01-23 PROCEDURE — 93320 DOPPLER ECHO COMPLETE: CPT

## 2025-01-23 PROCEDURE — 85520 HEPARIN ASSAY: CPT

## 2025-01-23 PROCEDURE — 83735 ASSAY OF MAGNESIUM: CPT

## 2025-01-23 PROCEDURE — 97165 OT EVAL LOW COMPLEX 30 MIN: CPT | Mod: GO

## 2025-01-23 PROCEDURE — 92960 CARDIOVERSION ELECTRIC EXT: CPT | Mod: 74 | Performed by: STUDENT IN AN ORGANIZED HEALTH CARE EDUCATION/TRAINING PROGRAM

## 2025-01-23 PROCEDURE — 99232 SBSQ HOSP IP/OBS MODERATE 35: CPT | Performed by: INTERNAL MEDICINE

## 2025-01-23 PROCEDURE — 93318 ECHO TRANSESOPHAGEAL INTRAOP: CPT

## 2025-01-23 PROCEDURE — 97162 PT EVAL MOD COMPLEX 30 MIN: CPT | Mod: GP

## 2025-01-23 PROCEDURE — 99233 SBSQ HOSP IP/OBS HIGH 50: CPT | Performed by: STUDENT IN AN ORGANIZED HEALTH CARE EDUCATION/TRAINING PROGRAM

## 2025-01-23 PROCEDURE — 2500000004 HC RX 250 GENERAL PHARMACY W/ HCPCS (ALT 636 FOR OP/ED)

## 2025-01-23 PROCEDURE — 97535 SELF CARE MNGMENT TRAINING: CPT | Mod: GO

## 2025-01-23 PROCEDURE — 92960 CARDIOVERSION ELECTRIC EXT: CPT | Performed by: STUDENT IN AN ORGANIZED HEALTH CARE EDUCATION/TRAINING PROGRAM

## 2025-01-23 PROCEDURE — 2500000002 HC RX 250 W HCPCS SELF ADMINISTERED DRUGS (ALT 637 FOR MEDICARE OP, ALT 636 FOR OP/ED)

## 2025-01-23 PROCEDURE — 85025 COMPLETE CBC W/AUTO DIFF WBC: CPT

## 2025-01-23 PROCEDURE — 1200000002 HC GENERAL ROOM WITH TELEMETRY DAILY

## 2025-01-23 PROCEDURE — 93325 DOPPLER ECHO COLOR FLOW MAPG: CPT

## 2025-01-23 PROCEDURE — 2500000004 HC RX 250 GENERAL PHARMACY W/ HCPCS (ALT 636 FOR OP/ED): Performed by: ANESTHESIOLOGIST ASSISTANT

## 2025-01-23 PROCEDURE — 80069 RENAL FUNCTION PANEL: CPT

## 2025-01-23 PROCEDURE — 97530 THERAPEUTIC ACTIVITIES: CPT | Mod: GP

## 2025-01-23 PROCEDURE — 7100000010 HC PHASE TWO TIME - EACH INCREMENTAL 1 MINUTE: Performed by: STUDENT IN AN ORGANIZED HEALTH CARE EDUCATION/TRAINING PROGRAM

## 2025-01-23 PROCEDURE — 84100 ASSAY OF PHOSPHORUS: CPT

## 2025-01-23 PROCEDURE — 3700000001 HC GENERAL ANESTHESIA TIME - INITIAL BASE CHARGE: Performed by: STUDENT IN AN ORGANIZED HEALTH CARE EDUCATION/TRAINING PROGRAM

## 2025-01-23 PROCEDURE — 3700000002 HC GENERAL ANESTHESIA TIME - EACH INCREMENTAL 1 MINUTE: Performed by: STUDENT IN AN ORGANIZED HEALTH CARE EDUCATION/TRAINING PROGRAM

## 2025-01-23 PROCEDURE — 2720000007 HC OR 272 NO HCPCS: Performed by: STUDENT IN AN ORGANIZED HEALTH CARE EDUCATION/TRAINING PROGRAM

## 2025-01-23 PROCEDURE — 2500000005 HC RX 250 GENERAL PHARMACY W/O HCPCS: Performed by: ANESTHESIOLOGIST ASSISTANT

## 2025-01-23 PROCEDURE — 2500000001 HC RX 250 WO HCPCS SELF ADMINISTERED DRUGS (ALT 637 FOR MEDICARE OP)

## 2025-01-23 RX ORDER — PROPOFOL 10 MG/ML
INJECTION, EMULSION INTRAVENOUS AS NEEDED
Status: DISCONTINUED | OUTPATIENT
Start: 2025-01-23 | End: 2025-01-23

## 2025-01-23 RX ORDER — LIDOCAINE HYDROCHLORIDE 20 MG/ML
SOLUTION OROPHARYNGEAL
Status: DISPENSED
Start: 2025-01-23 | End: 2025-01-24

## 2025-01-23 RX ORDER — LIDOCAINE HYDROCHLORIDE 20 MG/ML
SOLUTION OROPHARYNGEAL AS NEEDED
Status: DISCONTINUED | OUTPATIENT
Start: 2025-01-23 | End: 2025-01-24 | Stop reason: HOSPADM

## 2025-01-23 RX ORDER — PHENYLEPHRINE HCL IN 0.9% NACL 0.4MG/10ML
SYRINGE (ML) INTRAVENOUS AS NEEDED
Status: DISCONTINUED | OUTPATIENT
Start: 2025-01-23 | End: 2025-01-23

## 2025-01-23 RX ADMIN — LIDOCAINE HYDROCHLORIDE 15 ML: 20 SOLUTION ORAL; TOPICAL at 13:29

## 2025-01-23 RX ADMIN — Medication 10 MG: at 13:34

## 2025-01-23 RX ADMIN — Medication 120 MCG: at 13:40

## 2025-01-23 RX ADMIN — BENZOCAINE, BUTAMBEN, AND TETRACAINE HYDROCHLORIDE 4 SPRAY: .028; .004; .004 AEROSOL, SPRAY TOPICAL at 13:29

## 2025-01-23 RX ADMIN — HEPARIN SODIUM 700 UNITS/HR: 10000 INJECTION, SOLUTION INTRAVENOUS at 00:50

## 2025-01-23 RX ADMIN — SPIRONOLACTONE 12.5 MG: 25 TABLET, FILM COATED ORAL at 09:06

## 2025-01-23 RX ADMIN — PROPOFOL 20 MG: 10 INJECTION, EMULSION INTRAVENOUS at 13:34

## 2025-01-23 RX ADMIN — Medication 2000 UNITS: at 09:06

## 2025-01-23 RX ADMIN — PROPOFOL 20 MG: 10 INJECTION, EMULSION INTRAVENOUS at 13:37

## 2025-01-23 RX ADMIN — SODIUM CHLORIDE: 9 INJECTION, SOLUTION INTRAVENOUS at 13:31

## 2025-01-23 RX ADMIN — ASPIRIN 81 MG: 81 TABLET, COATED ORAL at 09:06

## 2025-01-23 RX ADMIN — Medication 10 MG: at 13:39

## 2025-01-23 ASSESSMENT — PAIN SCALES - GENERAL
PAINLEVEL_OUTOF10: 0 - NO PAIN

## 2025-01-23 ASSESSMENT — PAIN - FUNCTIONAL ASSESSMENT
PAIN_FUNCTIONAL_ASSESSMENT: 0-10

## 2025-01-23 ASSESSMENT — COGNITIVE AND FUNCTIONAL STATUS - GENERAL
EATING MEALS: A LITTLE
CLIMB 3 TO 5 STEPS WITH RAILING: A LITTLE
DAILY ACTIVITIY SCORE: 22
DAILY ACTIVITIY SCORE: 21
MOBILITY SCORE: 19
PERSONAL GROOMING: A LITTLE
CLIMB 3 TO 5 STEPS WITH RAILING: A LITTLE
MOBILITY SCORE: 23
MOBILITY SCORE: 23
EATING MEALS: A LITTLE
STANDING UP FROM CHAIR USING ARMS: A LITTLE
TURNING FROM BACK TO SIDE WHILE IN FLAT BAD: A LITTLE
WALKING IN HOSPITAL ROOM: A LITTLE
DAILY ACTIVITIY SCORE: 24
MOVING TO AND FROM BED TO CHAIR: A LITTLE
CLIMB 3 TO 5 STEPS WITH RAILING: A LITTLE
TOILETING: A LITTLE
PERSONAL GROOMING: A LITTLE

## 2025-01-23 ASSESSMENT — ACTIVITIES OF DAILY LIVING (ADL)
HOME_MANAGEMENT_TIME_ENTRY: 10
ADL_ASSISTANCE: INDEPENDENT
BATHING_ASSISTANCE: STAND BY
ADL_ASSISTANCE: INDEPENDENT

## 2025-01-23 NOTE — PROGRESS NOTES
Subjective Data:  The patient underwent a SHRUTHI and there was a left atrial appendage clot and the cardioversion was cancelled.       Objective Data:  Last Recorded Vitals:  Vitals:    01/23/25 1332 01/23/25 1335 01/23/25 1356 01/23/25 1430   BP: 112/64 125/79 102/72 122/77   BP Location:    Right arm   Patient Position:    Lying   Pulse: (!) 114 (!) 113 (!) 111 (!) 116   Resp: 18 18 18 19   Temp:    36.2 °C (97.2 °F)   TempSrc:    Temporal   SpO2: 97% 98% 100% 96%   Weight:       Height:           Last Labs:  CBC - 1/23/2025:  6:49 AM  7.2 11.7 289    37.3      CMP - 1/23/2025:  6:49 AM  8.9 7.7 26 --- 0.5   3.3 3.9 42 85      PTT - 1/21/2025:  1:49 AM  1.1   11.9 25     TROPHS   Date/Time Value Ref Range Status   01/21/2025 04:45  0 - 34 ng/L Final     Comment:     Previous result verified on 1/21/2025 0521 on specimen/case [id number] called with component RPostHS for procedure Troponin I, High Sensitivity with value 180 ng/L.   01/21/2025 01:49  0 - 34 ng/L Final   01/10/2025 08:03  0 - 34 ng/L Final     Comment:     Previous result verified on 1/10/2025 1947 on specimen/case [id number] called with component TRPHS for procedure Troponin I, High Sensitivity with value 257 ng/L.   02/17/2024 03:47  0 - 34 ng/L Final     Comment:     Previous result verified on 2/17/2024 0325 on specimen/case [id number] called with component TRPHS for procedure Troponin I, High Sensitivity, Initial with value 170 ng/L.   02/17/2024 02:52  0 - 34 ng/L Final   01/26/2024 07:15  0 - 34 ng/L Final     BNP   Date/Time Value Ref Range Status   01/21/2025 01:49  0 - 99 pg/mL Final   01/10/2025 06:45 PM 1,044 0 - 99 pg/mL Final     HGBA1C   Date/Time Value Ref Range Status   07/08/2024 11:37 AM 5.2 see below % Final   04/24/2024 04:40 PM 5.3 4.3 - 5.6 % Final     Comment:     American Diabetes Association guidelines indicate that patients with HgbA1c in the range 5.7-6.4% are at increased  risk for development of diabetes, and intervention by lifestyle modification may be beneficial. HgbA1c greater or equal to 6.5% is considered diagnostic of diabetes.   01/26/2024 06:05 PM 5.4 see below % Final     VLDL   Date/Time Value Ref Range Status   09/01/2023 05:24 AM 21 0 - 40 mg/dL Final   05/21/2020 09:02 PM 31 0 - 40 mg/dL Final   11/07/2019 07:57 PM 21 0 - 40 mg/dL Final      Last I/O:  I/O last 3 completed shifts:  In: - (0 mL/kg)   Out: 1400 (22.4 mL/kg) [Urine:1400 (0.6 mL/kg/hr)]  Weight: 62.4 kg     Past Cardiology Tests (Last 3 Years):  EKG:  ECG 12 Lead 01/21/2025      ECG 12 lead 01/21/2025      ECG 12 lead 01/10/2025 (Preliminary)      ECG 12 lead 08/21/2024      Electrocardiogram, 12-lead PRN ACS symtpoms 07/08/2024      ECG 12 lead 07/08/2024      ECG 12 lead 07/08/2024      ECG 12 Lead 01/28/2024      Electrocardiogram, 12-lead PRN ACS symptoms 01/25/2024      ECG 12 Lead 01/24/2024      ECG 12 lead 01/23/2024      ECG 12 lead 01/23/2024      ECG 12 lead 01/22/2024      ECG 12 Lead 01/22/2024      ECG 12 Lead 12/01/2023      ECG 12 Lead       ECG 12 lead 11/21/2023      ECG 12 lead 11/21/2023    Echo:  Transesophageal Echo (SHRUTHI) 01/23/2025      Transthoracic Echo (TTE) Complete 07/08/2024      Transthoracic Echo (TTE) Limited 01/24/2024      Transthoracic Echo (TTE) Complete 11/21/2023    Ejection Fractions:  EF   Date/Time Value Ref Range Status   01/23/2025 02:02 PM 13 %    07/08/2024 04:30 PM 18 %    01/24/2024 08:40 AM 20 %      Cath:  Cardiac catheterization - non-coronary 01/26/2024    Stress Test:  No results found for this or any previous visit from the past 1095 days.    Cardiac Imaging:  No results found for this or any previous visit from the past 1095 days.      Inpatient Medications:  Scheduled medications   Medication Dose Route Frequency    amiodarone  200 mg oral Daily    aspirin  81 mg oral Daily    bumetanide  0.5 mg oral Once per day on Monday Thursday    cholecalciferol   2,000 Units oral Daily    dapagliflozin propanediol  10 mg oral Daily    fluticasone furoate-vilanteroL  1 puff inhalation Daily    lidocaine        mupirocin   Topical BID    nicotine  1 patch transdermal q24h    PATIENT OWN PUMP milrinone in 5 % dextrose (Primacor) PATIENT SUPPLIED   pump - intravenous Continuous Pump    spironolactone  12.5 mg oral Daily     PRN medications   Medication    albuterol    butamben-tetracaine-benzocaine    calcium carbonate    famotidine    heparin    hydrocortisone    ipratropium-albuteroL    lidocaine    lidocaine    melatonin    polyethylene glycol    sodium chloride     Continuous Medications   Medication Dose Last Rate    heparin  0-4,000 Units/hr 700 Units/hr (01/23/25 0050)       Assessment/Plan   69 y.o. female with PMHx of ICM/ HFrEF (last EF 15-20% 7/2024, s/p [employer] ICD 5/2020) on palliative milrinone infusion since 2/2024, CAD s/p NSTEMI s/p RIRI to LCX (Jan 2016), MVR s/p mitral valve repair in (June 2019; 29 mm Epic bioprosthetic valve with Dr. Montana), COPD, HTN, HL, GERD, hx of CVA, DM who presented to [facility] ED with concern for new shortness of breath, found to be in new atrial flutter vs coarse AF.      #atypical AFL  Index dx: 1/22/25. Unfortunately, she has single chamber rv ppm lead and unclear when she developed AF since discharge 1/12.   Rate: none, on milrinone 0.375 mcg, disucssed the case with Dr. Gerber who suggested possibly decreasing the milrinone to 0.25 mcg to decrease the burden of AFL.  Rhythm control: Unfortunately with DEVIKA clot, cardioversion is cancelled. The patient has been on Amiodarone for VT since 1/12 and it did not cardiovert the atypical flutter but suppressed the VT, the risks of stopping amiodarone and having another ICD shock for recurrent VT could place the patient in sinus rhythm and increase the risk of stroke given the proven DEVIKA clot.  Anticoagulation: hep gtt >> doac on dc  with plans to repeat SHRUTHI in 1 month along with  possible cardioversion.    Peripheral IV 01/21/25 20 G Left;Proximal;Posterior Forearm (Active)   Site Assessment Clean;Dry;Intact 01/23/25 1100   Dressing Status Clean;Dry 01/23/25 1100   Number of days: 2       Peripheral IV 01/21/25 24 G Left;Dorsal Hand (Active)   Site Assessment Clean;Dry 01/23/25 1100   Dressing Status Clean;Dry;Occlusive 01/23/25 1100   Number of days: 2       Peripheral IV 01/21/25 24 G Right;Dorsal Hand (Active)   Site Assessment Clean;Dry;Intact 01/23/25 1100   Dressing Type Transparent 01/23/25 1100   Dressing Status Clean;Dry;Occlusive 01/23/25 1100   Number of days: 2       Code Status:  Full Code    I spent 30 minutes in the professional and overall care of this patient.        Jose Maria Gautam MD  Cardiology Fellow PGY5    Thank you for involving us in this patient care. Recommendations not final until signed by attending.     General Cardiology Consult Pager: [phone] (weekday 7AM-6PM and weekend 7AM-2PM) and other: [phone]  EP Consult Pager: [phone] (weekday 7AM-6PM and weekend 7AM-2PM) and other: [phone]  CICU Fellow Pager: [phone] anytime  EP Device Nurse Pager: [phone] (weekday 7AM-4PM)  Advanced Heart Failure Consult Pager: [phone] anytime

## 2025-01-23 NOTE — PROGRESS NOTES
Occupational Therapy    Evaluation/Treatment    Patient Name: Melissa Marinelli  MRN: [id number]  Department: [place] [address]  Room: [address] [place] [address]  Today's Date: 01/23/25  Time Calculation  Start Time: 0930  Stop Time: 0955  Time Calculation (min): 25 min       Assessment:  OT Assessment: NN, pending potential sx  Prognosis: Good  Barriers to Discharge Home: No anticipated barriers  Evaluation/Treatment Tolerance: Patient tolerated treatment well  Medical Staff Made Aware: Yes  End of Session Communication: Bedside nurse  End of Session Patient Position: Bed, 3 rail up, Alarm off, not on at start of session  OT Assessment Results: Decreased ADL status, Decreased endurance, Decreased functional mobility  Prognosis: Good  Evaluation/Treatment Tolerance: Patient tolerated treatment well  Medical Staff Made Aware: Yes  Strengths: Ability to acquire knowledge, Attitude of self  Barriers to Participation:  (none)  Plan:  Treatment Interventions: ADL retraining, Functional transfer training, Endurance training, Patient/family training, Equipment evaluation/education  OT Frequency: 2 times per week  OT Discharge Recommendations: No OT needed after discharge  Equipment Recommended upon Discharge:  (n/a)  OT Recommended Transfer Status: Assist of 1  OT - OK to Discharge: Yes  Treatment Interventions: ADL retraining, Functional transfer training, Endurance training, Patient/family training, Equipment evaluation/education    Subjective   Current Problem:  1. Atrial fibrillation and flutter  Transesophageal Echo (SHRUTHI)    Transesophageal Echo (SHRUTHI)      2. Shortness of breath        3. Other hypervolemia        4. Pulmonary hypertension (Multi)        5. Atrial fib/flutter, transient (Multi)  Case Request EP Lab: SHRUTHI DCCV    Case Request EP Lab: SHRUTHI DCCV    Electrophysiology procedure    Electrophysiology procedure    CANCELED: Cardiac device check - Inpatient    CANCELED: Cardiac device check - Inpatient      6. Encounter for cardioversion  procedure  Transesophageal Echo (SHRUTHI)    Transesophageal Echo (SHRUTHI)      7. Atypical atrial flutter (Multi)  Transesophageal Echo (SHRUTHI)    Transesophageal Echo (SHRUTHI)        General:   OT Received On: 01/23/25  General  Reason for Referral: SOB  Past Medical History Relevant to Rehab: ICM/ HFrEF (last EF 15-20% 7/2024, s/p [employer] ICD 5/2020) on palliative milrinone infusion since 2/2024, CAD s/p NSTEMI s/p RIRI to LCX (Jan 2016), MVR s/p mitral valve repair in (June 2019; 29 mm Epic bioprosthetic valve with Dr. Montana), COPD, HTN, HL, GERD, hx of CVA, DM  Family/Caregiver Present: No  Co-Treatment: PT  Co-Treatment Reason: maximize pt safety and function  Prior to Session Communication: Bedside nurse  Patient Position Received: Bed, 3 rail up, Alarm off, not on at start of session   Precautions:  Medical Precautions: Fall precautions     Date/Time Vitals Session Patient Position Pulse Resp SpO2 BP MAP (mmHg)    01/23/25 0930 --  --  114  --  --  --  --     01/23/25 0931 During OT  --  114  --  --  --  --                 Pain:  Pain Assessment  Pain Assessment: 0-10  0-10 (Numeric) Pain Score: 0 - No pain    Objective   Cognition:  Overall Cognitive Status: Within Functional Limits  Orientation Level: Oriented X4  Safety Judgment: Good awareness of safety precautions  Insight: Within function limits           Home Living:  Type of Home: House  Lives With:  (dtr)  Home Adaptive Equipment:  (cane, rollator)  Home Layout:  (4 COLEEN, flight to bed/bath)  Entrance Stairs-Number of Steps: 4  Bathroom Shower/Tub: Tub/shower unit  Bathroom Equipment: Grab bars in shower, Shower chair with back  Prior Function:  Level of [place]: Independent with ADLs and functional transfers, Independent with homemaking with ambulation  Receives Help From: Family  ADL Assistance: Independent  Homemaking Assistance:  (dtr A with cooking and cleaning and driving)  Vocational: Retired  Leisure: enjoys singing and dancing  Hand Dominance:  Right  IADL History:  IADL Comments: A IADLs A driving  ADL:  Eating Assistance: Independent  Grooming Assistance:  (pt performed oral care I standing sink side)  Bathing Assistance: Stand by (anticipated)  UE Dressing Assistance: Independent (donned gown seated EOB post)  LE Dressing Assistance:  (brought B feet to self adjusted socks I)  Toileting Assistance with Device: Independent (anticipated)    Activity Tolerance:  Endurance:  (good)       Bed Mobility/Transfers: Bed Mobility  Bed Mobility:  (sup/sit I)    Transfers  Transfer:  (sit/stand S)      Functional Mobility:  Functional Mobility  Functional Mobility Performed:  (pt performed fxnl mob to/from bed/bathroom/sink S-I)  Sitting Balance:  Dynamic Sitting Balance  Dynamic Sitting-Level of Assistance: Independent  Standing Balance:  Dynamic Standing Balance  Dynamic Standing-Level of Assistance: Distant supervision        Vision:Vision - Basic Assessment  Current Vision: No visual deficits  Sensation:  Light Touch: No apparent deficits  Strength:  Strength Comments: BUE WFL       Coordination:  Movements are Fluid and Coordinated: Yes   Hand Function:  Hand Function  Gross Grasp: Functional  Extremities: RUE   RUE : Within Functional Limits and LUE   LUE: Within Functional Limits      Outcome Measures: [facility] Daily Activity  Putting on and taking off regular lower body clothing: None  Bathing (including washing, rinsing, drying): None  Putting on and taking off regular upper body clothing: None  Toileting, which includes using toilet, bedpan or urinal: None  Taking care of personal grooming such as brushing teeth: None  Eating Meals: None  Daily Activity - Total Score: 24         and OT Adult Other Outcome Measures  4AT: -    Education Documentation  Precautions, taught by Delia Cooney OT at 1/23/2025 11:20 AM.  Learner: Patient  Readiness: Acceptance  Method: Explanation, Demonstration  Response: Verbalizes Understanding, Needs Reinforcement  Comment:  shruti rosen ADLs    Body Mechanics, taught by Delia Cooney OT at 1/23/2025 11:20 AM.  Learner: Patient  Readiness: Acceptance  Method: Explanation, Demonstration  Response: Verbalizes Understanding, Needs Reinforcement  Comment: shruti rosen ADLs    ADL Training, taught by Delia Cooney OT at 1/23/2025 11:20 AM.  Learner: Patient  Readiness: Acceptance  Method: Explanation, Demonstration  Response: Verbalizes Understanding, Needs Reinforcement  Comment: shruti rosen ADLs    Education Comments  No comments found.        Goals:  Encounter Problems       Encounter Problems (Active)       ADLs       Patient will perform UB and LB bathing  with independent level of assistance  (Progressing)       Start:  01/23/25    Expected End:  02/13/25            Patient with complete lower body dressing with independent level of assistance  (Progressing)       Start:  01/23/25    Expected End:  02/13/25            Patient will complete daily grooming tasks  with independent level of assistance  (Progressing)       Start:  01/23/25    Expected End:  02/13/25            Patient will complete toileting including hygiene clothing management/hygiene with independent level of assistance (Progressing)       Start:  01/23/25    Expected End:  02/13/25               MOBILITY       Patient will perform Functional mobility max Household distances/Community Distances with independent level of assistance  (Progressing)       Start:  01/23/25    Expected End:  02/13/25               TRANSFERS       Patient will perform bed mobility independent level of assistance  (Progressing)       Start:  01/23/25    Expected End:  02/13/25            Patient will complete functional transfer with independent level of assistance. (Progressing)       Start:  01/23/25    Expected End:  02/13/25

## 2025-01-23 NOTE — ANESTHESIA PREPROCEDURE EVALUATION
Patient: Melissa Marinelli    Procedure Information       Date/Time: 01/23/25 1300    Procedure: SHRUTHI DCCV    Location: [facility] [id number] EP PRE/POST [place] 1 / Virtual [facility] MAT 3529 Cardiac Cath Lab    Providers: Emilee Brewster MD            Relevant Problems   Anesthesia (within normal limits)      Cardiac   (+) Acute on chronic heart failure with normal ejection fraction   (+) Atrial fib/flutter, transient (Multi)   (+) CAD (coronary artery disease)   (+) Hyperlipidemia   (+) Hypertension   (+) MI, acute, non ST segment elevation (Multi)   (+) Presence of automatic (implantable) cardiac defibrillator   (+) Stable angina (CMS-HCC)   (+) Subsequent non-ST elevation (NSTEMI) myocardial infarction   (+) Ventricular tachycardia (paroxysmal) (Multi)      Pulmonary   (+) Aspiration pneumonia (Multi)   (+) Asthma   (+) COPD (chronic obstructive pulmonary disease) (Multi)      Neuro   (+) Anxiety   (+) Major depressive disorder      GI   (+) Gastro-esophageal reflux disease without esophagitis      /Renal (within normal limits)      Liver   (+) Cholelithiasis      Endocrine   (+) Obesity, unspecified      Hematology   (+) Anemia due to chronic kidney disease   (+) Iron deficiency anemia      Musculoskeletal   (+) Lumbar spondylosis      HEENT (within normal limits)      ID   (+) Aspiration pneumonia (Multi)   (+) Dermatophytosis of nail      Skin (within normal limits)      GYN (within normal limits)       Clinical information reviewed:    Allergies  Meds               NPO Detail:  No data recorded     Physical Exam    Airway  Mallampati: II     Cardiovascular    Dental    Pulmonary - normal exam     Abdominal            Anesthesia Plan    History of general anesthesia?: yes  History of complications of general anesthesia?: no    ASA 3     general       Plan discussed with CAA and attending.

## 2025-01-23 NOTE — ANESTHESIA POSTPROCEDURE EVALUATION
Patient: Melissa Marinelli    Procedure Summary       Date: 01/23/25 Room / Location: [facility] [place] [id number] EP PRE/POST BAY 1 / Virtual [facility] [place] MAT 3529 Cardiac Cath Lab    Anesthesia Start: 1331 Anesthesia Stop:     Procedure: SHRUTHI DCCV Diagnosis: Atrial fib/flutter, transient (Multi)    Providers: Emilee Brewster MD Responsible Provider: Oli Campbell MD    Anesthesia Type: general ASA Status: 3            Anesthesia Type: general    Vitals Value Taken Time   /72 01/23/25 1356   Temp 36.3 01/23/25 1401   Pulse 111 01/23/25 1356   Resp 18 01/23/25 1356   SpO2 100 % 01/23/25 1356       Anesthesia Post Evaluation    Patient location during evaluation: bedside  Patient participation: complete - patient participated  Level of consciousness: awake and alert  Pain management: adequate  Airway patency: patent  Cardiovascular status: acceptable and hemodynamically stable  Respiratory status: acceptable and room air  Hydration status: acceptable  Postoperative Nausea and Vomiting: none        No notable events documented.

## 2025-01-23 NOTE — PROGRESS NOTES
Physical Therapy    Physical Therapy Evaluation & Treatment    Patient Name: Melissa Marinelli  MRN: [id number]  Department: [place] [address]  Room: [address] [place] [address]  Today's Date: 1/23/2025   Time Calculation  Start Time: 0930  Stop Time: 0955  Time Calculation (min): 25 min    Assessment/Plan   PT Assessment  PT Assessment Results: Decreased endurance, Impaired balance, Decreased mobility  Rehab Prognosis: Good  Barriers to Discharge Home: No anticipated barriers  Evaluation/Treatment Tolerance: Patient tolerated treatment well  Medical Staff Made Aware: Yes  End of Session Communication: Bedside nurse  Assessment Comment: Patient is a 68yo F presenting with SOB. Patient is indep at baseline and lives with dtr. Patient is SBA for mobility. Anticipate no needs at d  End of Session Patient Position: Bed, 3 rail up (declined sitting in chair)   IP OR SWING BED PT PLAN  Inpatient or Swing Bed: Inpatient  PT Plan  Treatment/Interventions: Bed mobility, Gait training, Transfer training, Stair training, Balance training, Neuromuscular re-education, Strengthening, Endurance training, Range of motion, Therapeutic exercise, Therapeutic activity  PT Plan: Ongoing PT  PT Frequency: 2 times per week  PT Discharge Recommendations: No PT needed after discharge  PT Recommended Transfer Status: Assist x1  PT - OK to Discharge: Yes (indicates PT eval complete and dc rec determined)      Subjective     General Visit Information:  General  Reason for Referral: SOB  Past Medical History Relevant to Rehab: ICM/ HFrEF (last EF 15-20% 7/2024, s/p [employer] ICD 5/2020) on palliative milrinone infusion since 2/2024, CAD s/p NSTEMI s/p RIRI to LCX (Jan 2016), MVR s/p mitral valve repair in (June 2019; 29 mm Epic bioprosthetic valve with Dr. Montana), COPD, HTN, HL, GERD, hx of CVA, DM  Co-Treatment: OT  Co-Treatment Reason: maximize pt safety and function  Prior to Session Communication: Bedside nurse  Patient Position Received: Bed, 3 rail up  General  Comment: pt supine in bed, RN cleared. pleasant and cooperative  Home Living:  Home Living  Type of Home: House  Lives With:  (dtr)  Home Adaptive Equipment: Cane (rollator)  Home Layout: Bed/bath upstairs  Home Access: Stairs to enter with rails  Entrance Stairs-Number of Steps: 4  Bathroom Shower/Tub: Tub/shower unit  Bathroom Equipment: Grab bars in shower, Shower chair with back  Home Living Comments: reports they are in the process of looking for new house with first floor bathroom  Prior Level of Function:  Prior Function Per Pt/Caregiver Report  Level of [place]: Independent with ADLs and functional transfers, Independent with homemaking with ambulation  Receives Help From: Family  ADL Assistance: Independent  Homemaking Assistance: Independent  Ambulatory Assistance:  (rollator and cane as needed)  Precautions:  Precautions  Medical Precautions: Fall precautions     Date/Time Vitals Session Patient Position Pulse Resp SpO2 BP MAP (mmHg)    01/23/25 0930 --  --  114  --  --  --  --     01/23/25 0931 During OT  --  114  --  --  --  --           Vital Signs Comment: -122 during session    Objective   Pain:  Pain Assessment  Pain Assessment: 0-10  0-10 (Numeric) Pain Score: 0 - No pain  Cognition:  Cognition  Overall Cognitive Status: Within Functional Limits    General Assessments:     Activity Tolerance  Endurance: Tolerates 10 - 20 min exercise with multiple rests    Sensation  Light Touch: No apparent deficits    Static Standing Balance  Static Standing-Level of Assistance:  (SBA)  Static Standing-Comment/Number of Minutes: stood at sink to complete ADL's.  Functional Assessments:  Bed Mobility  Bed Mobility: Yes  Bed Mobility 1  Bed Mobility 1: Supine to sitting, Sitting to supine  Level of Assistance 1: Independent    Transfers  Transfer: Yes  Transfer 1  Transfer From 1: Sit to, Stand to  Transfer to 1: Stand, Sit  Technique 1: Sit to stand, Stand to sit  Transfer Level of Assistance 1:   (SBA)  Trials/Comments 1: stood from EOB no AD    Ambulation/Gait Training  Ambulation/Gait Training Performed: Yes  Ambulation/Gait Training 1  Surface 1: Level tile  Device 1: No device  Assistance 1:  (SBA)  Quality of Gait 1: Decreased step length  Comments/Distance (ft) 1: pt ambulated 10' x2 to/from restroom. standing break between    Stairs  Stairs: No  Extremity/Trunk Assessments:  RLE   RLE : Within Functional Limits  LLE   LLE : Within Functional Limits  Treatments:  Therapeutic Activity  Therapeutic Activity Performed: Yes  Therapeutic Activity 1: pt completed bed mobility, stood from EOB, and ambulated to restroom. extended time in standing at sink to complete ADL's. ambulated back to bed and returned to supine.  Outcome Measures:  [facility] Basic Mobility  Turning from your back to your side while in a flat bed without using bedrails: None  Moving from lying on your back to sitting on the side of a flat bed without using bedrails: A little  Moving to and from bed to chair (including a wheelchair): A little  Standing up from a chair using your arms (e.g. wheelchair or bedside chair): A little  To walk in hospital room: A little  Climbing 3-5 steps with railing: A little  Basic Mobility - Total Score: 19    Encounter Problems       Encounter Problems (Active)       Balance       tinetti score > 24 to indicate low risk for falls  (Progressing)       Start:  01/23/25    Expected End:  02/13/25               Mobility       STG - Patient will ambulate > 250' with LRAD modif indep VSS (Progressing)       Start:  01/23/25    Expected End:  02/13/25            STG - Patient will ascend and descend flight of steps with SBA (Progressing)       Start:  01/23/25    Expected End:  02/13/25               PT Transfers       STG - Patient will perform bed mobility indep (Progressing)       Start:  01/23/25    Expected End:  02/13/25            STG - Patient will transfer sit to and from stand LRAD modif indep  (Progressing)        Start:  01/23/25    Expected End:  02/13/25               Pain - Adult              Education Documentation  Precautions, taught by Gisele Calloway PT at 1/23/2025 11:13 AM.  Learner: Patient  Readiness: Acceptance  Method: Explanation  Response: Verbalizes Understanding  Comment: edu on role of PT, dc plan and safety    Mobility Training, taught by Gisele Calloway PT at 1/23/2025 11:13 AM.  Learner: Patient  Readiness: Acceptance  Method: Explanation  Response: Verbalizes Understanding  Comment: edu on role of PT, dc plan and safety    Education Comments  No comments found.

## 2025-01-23 NOTE — PROGRESS NOTES
Melissa Marinelli is a 69 y.o. female on day 2 of admission presenting with Shortness of breath.    Subjective   NAEO. Today, patient was lying comfortably in her bed. She slept well overnight. We discussed her SHRUTHI DCCV today.     Objective     Last Recorded Vitals  BP 90/54 (BP Location: Right arm, Patient Position: Lying)   Pulse (!) 111   Temp 36 °C (96.8 °F) (Temporal)   Resp 19   Wt 62.4 kg (137 lb 9.1 oz)   SpO2 97%   Intake/Output last 3 Shifts:    Intake/Output Summary (Last 24 hours) at 1/23/2025 0812  Last data filed at 1/22/2025 1741  Gross per 24 hour   Intake --   Output 900 ml   Net -900 ml       Admission Weight  Weight: 63 kg (139 lb) (01/21/25 0155)    Daily Weight  01/23/25 : 62.4 kg (137 lb 9.1 oz)    Image Results  ECG 12 Lead  See ED provider note for full interpretation and clinical correlation  Confirmed by Kamari Juarez ([id number]) on 1/21/2025 5:08:25 PM  ECG 12 lead  Atrial flutter with 2:1 AV conduction  Nonspecific intraventricular block  Minimal voltage criteria for LVH, may be normal variant ( Graeme product )  Possible Anterolateral infarct (cited on or before 08-JUL-2024)  Abnormal ECG  When compared with ECG of 11-JAN-2025 18:11,  Atrial flutter has replaced Ectopic atrial rhythm    See ED provider note for full interpretation and clinical correlation  Confirmed by Hina Lu ([id number]) on 1/21/2025 5:18:07 AM  XR chest 2 views  Narrative: STUDY:  Chest Radiographs;  1/21/2025 2:20 AM  INDICATION:  Pneumonia.  COMPARISON:  9/7/2024 XR Chest two view  ACCESSION NUMBER(S):  [id number]  ORDERING CLINICIAN:  SELENA LOW  TECHNIQUE:  Frontal and lateral chest.   FINDINGS:  CARDIOMEDIASTINAL SILHOUETTE:  Median sternotomy.  Right subclavian approach central venous catheter terminates into  cavoatrial junction.  Single lead left pectoral AICD. terminates into the right ventricle.  Cardiomediastinal silhouette is enlarged.  LUNGS:  The left lower lung is obscured by left cardiac  silhouette..  Prominent central pulmonary vascularity.  Diffuse interstitial opacity in the visualized lungs.  Haziness in the  right lower lung.  ABDOMEN:  No remarkable upper abdominal findings.     BONES:  No acute osseous changes.  Impression: Median sternotomy with left pectoral AICD.    Interval placement of right subclavian approach central venous  catheter terminates into cavoatrial junction.  Cardiomegaly with pulmonary congestion.  Signed by Opal Buckner MD      Physical Exam  Constitutional:       Appearance: Normal appearance.   Neck:      Comments: JVD noted slightly below angle of mandible  Cardiovascular:      Rate and Rhythm: Regular rhythm. Tachycardia present.      Pulses: Normal pulses.      Heart sounds: Normal heart sounds.      Comments: EKG A-Flutter  Pulmonary:      Effort: Pulmonary effort is normal.      Breath sounds: Normal breath sounds.   Abdominal:      General: Abdomen is flat.      Palpations: Abdomen is soft.   Skin:     General: Skin is warm and dry.   Neurological:      General: No focal deficit present.      Mental Status: She is alert and oriented to person, place, and time.   Psychiatric:         Mood and Affect: Mood normal.         Behavior: Behavior normal.       Assessment/Plan   Melissa Marinelli is a 69 y.o. female with PMHx of ICM/ HFrEF (last EF 15-20% 7/2024, s/p [employer] ICD 5/2020) on palliative milrinone infusion since 2/2024, CAD s/p NSTEMI s/p RIRI to LCX (Jan 2016), MVR s/p mitral valve repair in (June 2019; 29 mm Epic bioprosthetic valve with Dr. Montana), COPD, HTN, HL, GERD, hx of CVA, DM who presented to [facility] ED with concern for new shortness of breath.     Updates 01/23/25   Patient continued to be in a-flutter this AM. She is NPO and we are planned for SHRUTHI DCCV this afternoon.   - continuing heparin gtt  - SHRUTHI DCCV this afternoon  - holding diuresis with PM RFP yesterday showing increased creatinine    #SOB  #C/F HFrEF vs COPD Exacerbation  ::troponin 180  -> 175  ::  ::home meds: spironolactone 12.5mg every day, farxiga 10mg every day, amiodarone 200mg every day, bumex 0.5mg 2x weekly, milrinone 0.375 mcg/kg/min  - continue home GDMT              - no BB with milrinone              - no ace/arb/arni with milrinone  - heparin gtt  - SHRUTHI DCCV this afternoon  - holding diuresis      #VT history  - holding amiodarone      #CAD s/p PCI 2016  - continue on ASA 81mg daily  - no statin due to allergy     #Hx of COPD  - continue home meds  - continue breo ellipta     #CKD3b  ::baseline creatinine around 1.4-1.5  :: PM RFP on 1/22/25 creatinine 2.31  - holding diuresis today  - CTM     F: PRN  E: PRN  N: Adult regular (NPO currently)  A: PIV  DVT:   Drips: milrinone 0.375 mcg/kg/min (chronic home rate)  Outpatient Cardiologist: Dr. Kathy Rivera     Code Status: Full Code  POC: Daughter Mary Caruso [phone]      Sung Bullock DO

## 2025-01-24 ENCOUNTER — APPOINTMENT (OUTPATIENT)
Dept: RADIOLOGY | Facility: HOSPITAL | Age: 70
DRG: 308 | End: 2025-01-24
Payer: COMMERCIAL

## 2025-01-24 VITALS
WEIGHT: 136.69 LBS | RESPIRATION RATE: 19 BRPM | OXYGEN SATURATION: 100 % | HEART RATE: 82 BPM | HEIGHT: 64 IN | SYSTOLIC BLOOD PRESSURE: 111 MMHG | BODY MASS INDEX: 23.34 KG/M2 | DIASTOLIC BLOOD PRESSURE: 69 MMHG | TEMPERATURE: 97.7 F

## 2025-01-24 LAB
ALBUMIN SERPL BCP-MCNC: 3.6 G/DL (ref 3.4–5)
ANION GAP SERPL CALC-SCNC: 14 MMOL/L (ref 10–20)
ATRIAL RATE: 94 BPM
BASOPHILS # BLD AUTO: 0.03 X10*3/UL (ref 0–0.1)
BASOPHILS NFR BLD AUTO: 0.5 %
BUN SERPL-MCNC: 33 MG/DL (ref 6–23)
CALCIUM SERPL-MCNC: 8.6 MG/DL (ref 8.6–10.6)
CHLORIDE SERPL-SCNC: 99 MMOL/L (ref 98–107)
CHLORIDE UR-SCNC: <15 MMOL/L
CHLORIDE/CREATININE (MMOL/G) IN URINE: NORMAL
CO2 SERPL-SCNC: 26 MMOL/L (ref 21–32)
CREAT SERPL-MCNC: 2.09 MG/DL (ref 0.5–1.05)
CREAT UR-MCNC: 34 MG/DL (ref 20–320)
CREAT UR-MCNC: 34 MG/DL (ref 20–320)
EGFRCR SERPLBLD CKD-EPI 2021: 25 ML/MIN/1.73M*2
EOSINOPHIL # BLD AUTO: 0.34 X10*3/UL (ref 0–0.7)
EOSINOPHIL NFR BLD AUTO: 5.5 %
ERYTHROCYTE [DISTWIDTH] IN BLOOD BY AUTOMATED COUNT: 14.6 % (ref 11.5–14.5)
GLUCOSE SERPL-MCNC: 88 MG/DL (ref 74–99)
HCT VFR BLD AUTO: 35.5 % (ref 36–46)
HGB BLD-MCNC: 11.6 G/DL (ref 12–16)
IMM GRANULOCYTES # BLD AUTO: 0.02 X10*3/UL (ref 0–0.7)
IMM GRANULOCYTES NFR BLD AUTO: 0.3 % (ref 0–0.9)
LYMPHOCYTES # BLD AUTO: 1.88 X10*3/UL (ref 1.2–4.8)
LYMPHOCYTES NFR BLD AUTO: 30.5 %
MAGNESIUM SERPL-MCNC: 2.4 MG/DL (ref 1.6–2.4)
MCH RBC QN AUTO: 29.8 PG (ref 26–34)
MCHC RBC AUTO-ENTMCNC: 32.7 G/DL (ref 32–36)
MCV RBC AUTO: 91 FL (ref 80–100)
MONOCYTES # BLD AUTO: 0.58 X10*3/UL (ref 0.1–1)
MONOCYTES NFR BLD AUTO: 9.4 %
NEUTROPHILS # BLD AUTO: 3.31 X10*3/UL (ref 1.2–7.7)
NEUTROPHILS NFR BLD AUTO: 53.8 %
NRBC BLD-RTO: 0 /100 WBCS (ref 0–0)
P AXIS: -78 DEGREES
P OFFSET: 216 MS
P ONSET: 165 MS
PHOSPHATE SERPL-MCNC: 3.2 MG/DL (ref 2.5–4.9)
PLATELET # BLD AUTO: 323 X10*3/UL (ref 150–450)
POTASSIUM SERPL-SCNC: 4.5 MMOL/L (ref 3.5–5.3)
POTASSIUM UR-SCNC: 10 MMOL/L
POTASSIUM/CREAT UR-RTO: 29 MMOL/G CREAT
PR INTERVAL: 106 MS
Q ONSET: 218 MS
QRS COUNT: 16 BEATS
QRS DURATION: 122 MS
QT INTERVAL: 388 MS
QTC CALCULATION(BAZETT): 485 MS
QTC FREDERICIA: 450 MS
R AXIS: 98 DEGREES
RBC # BLD AUTO: 3.89 X10*6/UL (ref 4–5.2)
SODIUM SERPL-SCNC: 134 MMOL/L (ref 136–145)
SODIUM UR-SCNC: 21 MMOL/L
SODIUM/CREAT UR-RTO: 62 MMOL/G CREAT
T AXIS: -87 DEGREES
T OFFSET: 412 MS
UFH PPP CHRO-ACNC: 0.3 IU/ML
UREA/CREAT UR-SRTO: 7.4 G/G CREAT
UUN UR-MCNC: 250 MG/DL
VENTRICULAR RATE: 94 BPM
WBC # BLD AUTO: 6.2 X10*3/UL (ref 4.4–11.3)

## 2025-01-24 PROCEDURE — 2500000005 HC RX 250 GENERAL PHARMACY W/O HCPCS

## 2025-01-24 PROCEDURE — 71045 X-RAY EXAM CHEST 1 VIEW: CPT | Performed by: RADIOLOGY

## 2025-01-24 PROCEDURE — 99239 HOSP IP/OBS DSCHRG MGMT >30: CPT

## 2025-01-24 PROCEDURE — 71045 X-RAY EXAM CHEST 1 VIEW: CPT

## 2025-01-24 PROCEDURE — 2500000001 HC RX 250 WO HCPCS SELF ADMINISTERED DRUGS (ALT 637 FOR MEDICARE OP)

## 2025-01-24 PROCEDURE — 85025 COMPLETE CBC W/AUTO DIFF WBC: CPT

## 2025-01-24 PROCEDURE — 1800000001 HC LEAVE OF ABSENSE - GENERAL CLASSIFICATION

## 2025-01-24 PROCEDURE — 83735 ASSAY OF MAGNESIUM: CPT

## 2025-01-24 PROCEDURE — 80069 RENAL FUNCTION PANEL: CPT

## 2025-01-24 PROCEDURE — 82436 ASSAY OF URINE CHLORIDE: CPT

## 2025-01-24 PROCEDURE — 85520 HEPARIN ASSAY: CPT

## 2025-01-24 PROCEDURE — 84540 ASSAY OF URINE/UREA-N: CPT

## 2025-01-24 PROCEDURE — 2500000002 HC RX 250 W HCPCS SELF ADMINISTERED DRUGS (ALT 637 FOR MEDICARE OP, ALT 636 FOR OP/ED)

## 2025-01-24 RX ORDER — BUMETANIDE 0.5 MG/1
0.5 TABLET ORAL 2 TIMES WEEKLY
Qty: 8 TABLET | Refills: 1 | Status: SHIPPED | OUTPATIENT
Start: 2025-01-27 | End: 2025-03-28

## 2025-01-24 RX ORDER — IBUPROFEN 200 MG
1 TABLET ORAL EVERY 24 HOURS
Start: 2025-01-24 | End: 2025-02-23

## 2025-01-24 RX ORDER — ALBUTEROL SULFATE 90 UG/1
2 INHALANT RESPIRATORY (INHALATION) EVERY 4 HOURS PRN
Qty: 8.5 G | Refills: 11 | Status: SHIPPED | OUTPATIENT
Start: 2025-01-24

## 2025-01-24 RX ORDER — BUMETANIDE 0.5 MG/1
0.5 TABLET ORAL 2 TIMES WEEKLY
Qty: 8 TABLET | Refills: 1 | Status: SHIPPED | OUTPATIENT
Start: 2025-01-27 | End: 2025-01-24

## 2025-01-24 RX ORDER — AMIODARONE HYDROCHLORIDE 200 MG/1
200 TABLET ORAL DAILY
Start: 2025-01-24 | End: 2025-01-24 | Stop reason: HOSPADM

## 2025-01-24 RX ORDER — MILRINONE LACTATE 1 MG/ML
0.25 INJECTION INTRAVENOUS
Qty: 15 ML | Refills: 0 | Status: CANCELLED | OUTPATIENT
Start: 2025-01-24

## 2025-01-24 RX ORDER — BUMETANIDE 0.5 MG/1
0.5 TABLET ORAL 2 TIMES WEEKLY
Status: CANCELLED
Start: 2025-01-27

## 2025-01-24 RX ORDER — MILRINONE LACTATE 0.2 MG/ML
0.25 INJECTION, SOLUTION INTRAVENOUS CONTINUOUS
Qty: 100 ML | Refills: 3 | Status: SHIPPED | OUTPATIENT
Start: 2025-01-28 | End: 2025-03-29

## 2025-01-24 RX ADMIN — APIXABAN 10 MG: 5 TABLET, FILM COATED ORAL at 19:28

## 2025-01-24 RX ADMIN — ASPIRIN 81 MG: 81 TABLET, COATED ORAL at 09:21

## 2025-01-24 RX ADMIN — Medication 2000 UNITS: at 09:21

## 2025-01-24 RX ADMIN — APIXABAN 10 MG: 5 TABLET, FILM COATED ORAL at 11:05

## 2025-01-24 RX ADMIN — MUPIROCIN: 20 OINTMENT TOPICAL at 11:06

## 2025-01-24 RX ADMIN — DAPAGLIFLOZIN 10 MG: 10 TABLET, FILM COATED ORAL at 09:21

## 2025-01-24 RX ADMIN — SPIRONOLACTONE 12.5 MG: 25 TABLET, FILM COATED ORAL at 09:20

## 2025-01-24 ASSESSMENT — PAIN SCALES - GENERAL: PAINLEVEL_OUTOF10: 0 - NO PAIN

## 2025-01-24 ASSESSMENT — COGNITIVE AND FUNCTIONAL STATUS - GENERAL
PERSONAL GROOMING: A LITTLE
EATING MEALS: A LITTLE
CLIMB 3 TO 5 STEPS WITH RAILING: A LITTLE
DAILY ACTIVITIY SCORE: 22
MOBILITY SCORE: 23

## 2025-01-24 ASSESSMENT — ACTIVITIES OF DAILY LIVING (ADL): LACK_OF_TRANSPORTATION: NO

## 2025-01-24 ASSESSMENT — PAIN - FUNCTIONAL ASSESSMENT: PAIN_FUNCTIONAL_ASSESSMENT: 0-10

## 2025-01-24 NOTE — SIGNIFICANT EVENT
Rapid Response Nurse Note: RADAR alert: 6    Pager time:   Arrival time:   Event end time:   Location: LT [facility]  [x] Triage by phone or secure messaging    Rapid response initiated by:  [] Rapid response RN [] Family [] Nursing Supervisor [] Physician   [x] RADAR auto page [] Sepsis auto-page [] RN [] RT   [] NP/PA [] Other:     Primary reason for call:   [] BAT [] New CPAP/BiPAP [] Bleeding [] Change in mental status   [] Chest pain [] Code blue [] FiO2 >/= 50% [] HR </= 40 bpm   [] HR >/= 130 bpm [] Hyperglycemia [] Hypoglycemia [x] RADAR    [] RR </= 8 bpm [] RR >/= 30 bpm [] SBP </= 90 mmHg [] SpO2 < 90%   [] Seizure [] Sepsis [] Shortness of breath  [] Staff concern: see comments     Initial VS and/or RADAR VS: T 36.2 °C; ; RR 22; BP 99/62; SPO2 95%.      Interventions:  [x] None [] ABG/VBG [] Assist w/ICU transfer [] BAT paged    [] Bag mask [] Blood [] Cardioversion [] Code Blue   [] Code blue for intubation [] Code status changed [] Chest x-ray [] EKG   [] IV fluid/bolus [] KUB x-ray [] Labs/cultures [] Medication   [] Nebulizer treatment [] NIPPV (CPAP/BiPAP) [] Oxygen [] Oral airway   [] Peripheral IV [] Palliative care consult [] CT/MRI [] Sepsis protocol    [] Suctioned [] Other:     Outcome:  [] Coded and  [] Code blue for intubation [] Coded and transferred to ICU []  on division   [x] Remained on division (no change) [] Remained on division + additional monitoring [] Remained in ED [] Transferred to ED   [] Transferred to ICU [] Transferred to inpatient status [] Transferred for interventions (procedure) [] Transferred to ICU stepdown    [] Transferred to surgery [] Transferred to telemetry [] Sepsis protocol [] STEMI protocol   [] Stroke protocol [x] Bedside nurse instructed to page rapid response for any concerns or acute change in condition/VS     Additional Comments: Reviewed above RADAR VS with bedside RN via phone.  Vital signs within patient's current trends.  No  acute change in condition.  No interventions by rapid response team indicated at this time.  Staff to page rapid response for any concerns or acute change in condition or VS.

## 2025-01-24 NOTE — PROGRESS NOTES
Referral sent to [facility] ([facility] for PT and SN. Patient will discharge with Milrinone drip; TCC to follow for discharge planning.      MELLISSA Lewis, RN  [facility]  Transitional Care Coordinator, Mon-Fri  Cell: [phone], Office: [phone]  Email: [email or web address]

## 2025-01-24 NOTE — PROGRESS NOTES
TCC met with patient at bedside for discharge assessment. DME- cane, walker, rollator. Pharmacy- [facility]. Does patient feel safe- Yes. Current discharge recommendations- discharge with milrinone drip (updated dosage) with RICHIE w/[facility] ([facility]). TCC will continue to follow for discharge planning.       01/24/25 1330   Discharge Planning   Living Arrangements Children  (Lives with daughter)   Support Systems Children;Family members   Assistance Needed Home Care   Type of Residence Private residence   Number of Stairs to Enter Residence 4   Number of Stairs Within Residence 12   Do you have animals or pets at home? No   Who is requesting discharge planning? Provider   Home or Post Acute Services In home services   Type of Home Care Services Home nursing visits   Expected Discharge Disposition Home Health   Does the patient need discharge transport arranged? Yes   RoundTrip coordination needed? Yes   Has discharge transport been arranged? No   Financial Resource Strain   How hard is it for you to pay for the very basics like food, housing, medical care, and heating? Not hard   Housing Stability   In the last 12 months, was there a time when you were not able to pay the mortgage or rent on time? N   At any time in the past 12 months, were you homeless or living in a shelter (including now)? N   Transportation Needs   In the past 12 months, has lack of transportation kept you from medical appointments or from getting medications? no   In the past 12 months, has lack of transportation kept you from meetings, work, or from getting things needed for daily living? No   Patient Choice   Patient / Family choosing to utilize agency / facility established prior to hospitalization Yes   Stroke Family Assessment   Stroke Family Assessment Needed No   Intensity of Service   Intensity of Service 0-30 min       MELLISSA Lewis, RN  [facility]  Transitional Care Coordinator, Mon-Fri  Cell:  [phone], Office: [phone]  Email: [email or web address]

## 2025-01-24 NOTE — DISCHARGE INSTRUCTIONS
Ms. Marinelli,    It was a pleasure taking care of you in the hospital! You came here because you were short of breath. When you got to the hospital it seemed you had some extra fluid and you were given some diuresis.     When you got to the ER your heart was beating fast and you were found to be in an abnormal heart rhythm called atrial flutter

## 2025-01-24 NOTE — PROGRESS NOTES
Melissa Marinelli is a 69 y.o. female on day 3 of admission presenting with Shortness of breath.    Subjective   NAEO. SHRUTHI aborted last night due to clot seen in left atrial appendage. Per EP, higher risk of VT with Amiodarone cessation which would trigger PPM and lead to stroke. Recommended to remain on amiodarone and DOAC for 1 month.    Today, patient was lying comfortably in her bed. Endorsing anxieties about the clot in her heart. Otherwise denies chest pain or shortness of breath.    Objective     Last Recorded Vitals  /55 (BP Location: Right arm, Patient Position: Sitting)   Pulse (!) 112   Temp 36.4 °C (97.5 °F) (Temporal)   Resp 18   Wt 62 kg (136 lb 11 oz)   SpO2 97%   Intake/Output last 3 Shifts:    Intake/Output Summary (Last 24 hours) at 1/24/2025 1331  Last data filed at 1/24/2025 1100  Gross per 24 hour   Intake 600 ml   Output 1901 ml   Net -1301 ml       Admission Weight  Weight: 63 kg (139 lb) (01/21/25 0155)    Daily Weight  01/24/25 : 62 kg (136 lb 11 oz)    Image Results  Electrophysiology procedure  Procedure aborted due to left atrial appendage thrombus observed on SHRUTHI.  ECG 12 lead  Unusual P axis and short VT, probable junctional tachycardia  Rightward axis  Anteroseptal infarct (cited on or before 08-JUL-2024)  ST & T wave abnormality, consider inferolateral ischemia  Abnormal ECG  When compared with ECG of 10-JAIRO-2025 18:22,  Previous ECG has undetermined rhythm, needs review  Confirmed by Andrade Granados ([id number]) on 1/24/2025 9:41:51 AM      Physical Exam  Constitutional:       Appearance: Normal appearance.   Neck:      Comments: JVD noted slightly below angle of mandible  Cardiovascular:      Rate and Rhythm: Regular rhythm. Tachycardia present.      Pulses: Normal pulses.      Heart sounds: Normal heart sounds.      Comments: EKG A-Flutter  Pulmonary:      Effort: Pulmonary effort is normal.      Breath sounds: Normal breath sounds.   Abdominal:      General: Abdomen is flat.       Palpations: Abdomen is soft.   Skin:     General: Skin is warm and dry.   Neurological:      General: No focal deficit present.      Mental Status: She is alert and oriented to person, place, and time.   Psychiatric:         Mood and Affect: Mood normal.         Behavior: Behavior normal.       Assessment/Plan   Melissa Marinelli is a 69 y.o. female with PMHx of ICM/ HFrEF (last EF 15-20% 7/2024, s/p [employer]. Gregorio ICD 5/2020) on palliative milrinone infusion since 2/2024, CAD s/p NSTEMI s/p RIRI to LCX (Jan 2016), MVR s/p mitral valve repair in (June 2019; 29 mm Epic bioprosthetic valve with Dr. Montana), COPD, HTN, HL, GERD, hx of CVA, DM who presented to [facility] ED with concern for new shortness of breath, likely related to Aflutter and HFrEF/COPD exacerbation.     Updates 01/24/25:   -Remains in Aflutter, Rates ~114  -Left atrial appendage clot - Cardioversion cancelled  -Heparin Dc'ed -> Transitioned to Eliquis (10mg BID x 7 days), then 5mg BID  -Plan for DOAC for 1 month and reevaluate with SHRUTHI  -Continue Amiodarone, Pending discharge dose per EP  -Plan to lower milrinone rate 0.25mcg/kg/min (0.375mcg/kg/min) for reduced atrial burden (HF provider, Dr. Rivera made aware)  -Home diuretic regimen Lasix 40 daily with additional dose PRN based on weight  -Plan for DC 1/25  -Planning w/ HomeCare to update milrinone pump settings and confirming new milrinone bag deliveries     #SOB  #C/F HFrEF vs COPD Exacerbation  ::troponin 180 -> 175  ::  ::home meds: spironolactone 12.5mg every day, farxiga 10mg every day, amiodarone 200mg every day, bumex 0.5mg 2x weekly, milrinone 0.375 mcg/kg/min  - continue home GDMT              - no BB with milrinone              - no ace/arb/arni with milrinone  - heparin gtt -> Eliquis loading dose  - SHRUTHI DCCV this afternoon  - holding diuresis   - On discharge:  Lasix 40 daily with additional dose PRN based on weight     #VT history  - resumed amiodarone - pending new dose regimen per  EP     #CAD s/p PCI 2016  - continue on ASA 81mg daily  - no statin due to allergy     #Hx of COPD  - continue home meds  - continue breo ellipta     #CKD3b  ::baseline creatinine around 1.4-1.5  :: PM RFP on 1/22/25 creatinine 2.31  - holding diuresis today  - CTM     F: PRN  E: PRN  N: Adult regular (NPO currently)  A: PIV  DVT:   Drips: milrinone 0.375 mcg/kg/min (chronic home rate)  Outpatient Cardiologist: Dr. Kathy Rivera     Code Status: Full Code  POC: Daughter Mary Caruso [phone]      Homero Small MD PhD

## 2025-01-24 NOTE — ED PROVIDER NOTES
"History of Present Illness     History provided by: {History Provided By:[id number]::\"Patient\"}  Limitations to History: {History Limitations:04152::\"None\"}  External Records Reviewed with Brief Summary: {ED External Records Reviewed with Brief Summary:86205::\"None\"}    HPI:  Melissa Marinelli is a 69 y.o. female with history of ICM/ HFrEF (last EF 15-20% 2024, s/p [place] ICD 2020) on palliative milrinone infusion since 2024, CAD s/p NSTEMI s/p RIRI to LCX (2016), MVR s/p mitral valve repair in (2019; 29 mm Epic bioprosthetic valve with Dr. Montana), COPD, HTN, HL, GERD, hx of CVA, DM     Physical Exam   Triage vitals:  T 36.8 °C (98.2 °F)  HR (!) 110  /72  RR 20  O2 94 % None (Room air)    GEN:  A&Ox3, no acute distress, appears comfortable. Conversational and appropriate.    HEENT: Normocephalic, atraumatic. Conjunctiva pink with no redness or exudates. Hearing grossly intact. Moist mucous membranes.  CARDIO: Normal rate and regular rhythm. Normal S1, S2  without murmurs, rubs, or gallops.   PULM: Clear to auscultation bilaterally. No rales, rhonchi, or wheezes. No accessory muscle use or stridor.  GI: Soft, non-tender, non-distended. No rebound tenderness or guarding.   SKIN: Warm and dry, no rashes, lesions, petechiae, or purpura.  MSK: ROM intact in all 4 extremities without contractures or pain. No peripheral edema, contusions, or wounds.    NEURO: No focal findings identified. No confusion or gross mental status changes.  PSYCH: Appropriate mood and behavior, converses and responds appropriately during exam.    Medical Decision Making & ED Course   Medical Decision Makin y.o. female ***  ----  {Scoring Tools Utilized:36270}    Differential diagnoses considered include but are not limited to: ***     Social Determinants of Health which Significantly Impact Care: {Social Determinants of Health which Significantly Impact Care:22179::\"None identified\"} {The following actions were taken " "to address these social determinants:10819}    EKG Independent Interpretation: Please see ED course for interpretation of EKG    Independent Result Review and Interpretation: Please see ED course and MDM for interpretation of results and interpretation    Chronic conditions affecting the patient's care:Please see ED course and MDM for interpretation of chronic conditions    The patient was discussed with the following consultants/services: {The patient was discussed with the following consultants/services:76628::\"None\"}    Care Considerations: As documented in ED course and MDM.    ED Course:  ED Course as of 01/23/25 2329 Tue Jan 21, 2025   0409 CBC and Auto Differential(!)  WNL [AD]   0411 Creatinine(!): 1.73  Significant PATSY [AD]   0411 XR chest 2 views  X-ray with notable infiltrates throughout, suggestive of fluid overload.  Worse from prior x-ray. [AD]   0411 ECG 12 lead  EKG showing atrial flutter with 2-1 conduction.  Change from prior EKG performed on 1/11/2025 [AD]   0535 Troponin I, High Sensitivity (CMC)(!!): 180  Elevated, somewhat similar to prior. [AD]   0535 BNP(!): 785  Elevated from what appears to be prior baseline between 100s-300s [AD]      ED Course User Index  [AD] Tosha Cadena DO         Diagnoses as of 01/23/25 2329   Shortness of breath   Other hypervolemia   Pulmonary hypertension (Multi)       Disposition   As a result of their workup, the patient will require admission to the hospital.  The patient was informed of her diagnosis.  The patient was given the opportunity to ask questions and I answered them. The patient agreed to be admitted to the hospital.    Procedures   Procedures    Patient seen and discussed with ED attending physician    Tosha Cadena DO  Emergency Medicine  "

## 2025-01-25 ENCOUNTER — HOME INFUSION (OUTPATIENT)
Dept: INFUSION THERAPY | Age: 70
End: 2025-01-25
Payer: COMMERCIAL

## 2025-01-25 NOTE — DOCUMENTATION CLARIFICATION NOTE
"    PATIENT:               FANNIE RIOS  ACCT #:                  [id number]  MRN:                       [id number]  :                       1955  ADMIT DATE:       2025 1:09 AM  DISCH DATE:        2025 8:08 PM  RESPONDING PROVIDER #:        [id number]          PROVIDER RESPONSE TEXT:    Acute on Chronic Systolic Congestive Heart Failure    CDI QUERY TEXT:    Clarification        Instruction:    Based on your assessment of the patient and the clinical information, please provide the requested documentation by clicking on the appropriate radio button and enter any additional information if prompted.    Question: Please further clarify the acuity of congestive heart failure    When answering this query, please exercise your independent professional judgment. The fact that a question is being asked, does not imply that any particular answer is desired or expected.    The patient's clinical indicators include:  Clinical Information: Progress notes indicate pt is a 68y/o female admitted via ED with c/o SOB    Clinical Indicators:   ED: \"presents emergency department with shortness of breath, likely in the setting of fluid overload... Given furosemide 60 mg as well to assist with diuresis... Discussed with medicine team and was accepted for further management of her fluid overload with known history of heart failure.\"   BNP: 785   CXR: Cardiomegaly with pulmonary congestion   H&P: \"Weight today was 139lbs, with baseline being 129lbs. She notes that she feeling like she holds fluids more in her abdomen. Hypervolemic based weight and CXR\"   EP CS: \"found to be in new atrial flutter vs coarse AF.\"   -  Cardiology H&P, PN: \"C/F HFrEF vs COPD Exacerbation\"    Treatment: IV Lasix on  and , Monitoring I/Os, Daily weight    Risk Factors: Cardiac arrhythmia  Options provided:  -- Acute on Chronic Systolic Congestive Heart Failure  -- Chronic Systolic Congestive Heart Failure  -- " Other - I will add my own diagnosis  -- Refer to Clinical Documentation Reviewer    Query created by: Mariah Gonzalez on 1/24/2025 3:26 PM      Electronically signed by:  CARO SMITH DO 1/25/2025 1:18 PM

## 2025-01-25 NOTE — DISCHARGE SUMMARY
Discharge Diagnosis  Shortness of breath    Issues Requiring Follow-Up  -F/U Cardiology with Dr. Kathy Rivera  -F/U EP Refferal  -Referral to Home Health  -SHRUTHI in 1 month (2/24/25)        Discharge Meds     Medication List      START taking these medications     alteplase 2 mg injection; Commonly known as: Cathflo Activase; 2 mL (2   mg) by intra-catheter route 1 time for 1 dose.   apixaban 5 mg tablet; Commonly known as: Eliquis; Take 2 tablets (10 mg)   by mouth 2 times a day for 7 days, THEN 1 tablet (5 mg) 2 times a day.;   Start taking on: January 24, 2025     CHANGE how you take these medications     bumetanide 0.5 mg tablet; Commonly known as: Bumex; Take 1 tablet (0.5   mg) by mouth 2 times a week. Please weigh yourself every day. If you gain   2 pounds within 2 days, start taking 0.5mg Bumex EVERY DAY.; Start taking   on: January 27, 2025; What changed: additional instructions   famotidine 40 mg tablet; Commonly known as: Pepcid; Take 1 tablet (40   mg) by mouth once daily. Do not start before February 3, 2024.; What   changed: when to take this, reasons to take this   milrinone in 5 % dextrose 20 mg/100 mL (200 mcg/mL) infusion; Commonly   known as: Primacor; Infuse 15.5 mcg/min at 4.65 mL/hr into a venous   catheter continuously. Do not fill before January 28, 2025.; Start taking   on: January 28, 2025; What changed: how much to take, how fast to infuse   this, These instructions start on January 28, 2025. If you are unsure what   to do until then, ask your doctor or other care provider.     CONTINUE taking these medications     acetaminophen 500 mg tablet; Commonly known as: Tylenol   albuterol 90 mcg/actuation inhaler; Inhale 2 puffs every 4 hours if   needed for wheezing or shortness of breath.   amiodarone 200 mg tablet; Commonly known as: Pacerone; Take 2 tablets   (400 mg) by mouth 2 times a day for 10 days, THEN 1 tablet (200 mg) once   daily.; Start taking on: January 12, 2025   aspirin 81 mg  EC tablet; Take 1 tablet (81 mg) by mouth once daily.   Farxiga 10 mg; Generic drug: dapagliflozin propanediol; Take 1 tablet   (10 mg) by mouth once daily.   heparin flush(porcine)-0.9NaCl 100 unit/mL kit   hydrocortisone 1 % ointment; Apply topically 2 times a day as needed for   irritation. Chest wall   ipratropium-albuteroL 0.5-2.5 mg/3 mL nebulizer solution; Commonly known   as: Duo-Neb   mupirocin 2 % ointment; Commonly known as: Bactroban   nicotine 21 mg/24 hr patch; Commonly known as: Nicoderm CQ; Place 1   patch over 24 hours on the skin once every 24 hours.   OneTouch Verio Flex meter misc; Generic drug: blood-glucose meter   sodium chloride 0.65 % nasal spray; Commonly known as: Ocean   sodium chloride 0.9% flush   spironolactone 25 mg tablet; Commonly known as: Aldactone; Take 0.5   tablets (12.5 mg) by mouth once daily.   Symbicort 80-4.5 mcg/actuation inhaler; Generic drug:   budesonide-formoteroL   Vitamin D3 50 MCG (2000 UT) tablet; Generic drug: cholecalciferol     STOP taking these medications     nicotine polacrilex 4 mg gum; Commonly known as: Nicorette       Test Results Pending At Discharge  Pending Labs       No current pending labs.            Hospital Course  Melissa Marinelli is a 69 y.o. female with PMHx of ICM/ HFrEF (last EF 15-20% 7/2024, s/p [place] ICD 5/2020) on palliative milrinone infusion since 2/2024, CAD s/p NSTEMI s/p RIRI to LCX (Jan 2016), MVR s/p mitral valve repair in (June 2019; 29 mm Epic bioprosthetic valve with Dr. Montana), COPD, HTN, HL, GERD, hx of CVA, DM who presented to [facility] ED with concern for new shortness of breath.      She states that two days ago, she noticed that she had increased shortness of breath while she was walking around her house. This shortness of breath was not relieved with her inhalers. She states that she also feels like she may have some extra fluid on her. She denies chest pain, cough, or ankle swelling. She lives at home with her daughter  and grandson and feels safe. She does not use oxygen at home and reports her inhalers normally work.      She was last admitted here at [facility] and discharge from the CICU on 1/12 after having episodes of palpitations that were found to be VT. She was discharged with continuation of an amiodarone loading course. She has one more day of amiodarone BID, and then converting to every day. She has a noted allergy to red dye in normal amiodarone tablets. Pharmacy was consulted and white pills were procured. After noting her to be in a-flutter, we consulted EP for potential cardioversion and she was underwent a SHRUTHI on 1/23/25. A Left atrial appendage clot was noted and cardioversion was cancelled. Amiodarone was continued at 200mg qD. Milrinone was arranged to be decreased from 0.375mcg/kg/min to 0.25mcg/kg/min (discussed with Dr. Rivera; HF provider) for lower atrial . Heparin drip was transitioned to Eliquis at loading dose with overall plans to continue DOAC for 1 month and re-evaluate with SHRUTHI. Spoke with patient about checking daily weights with Lasix 40 daily + additional dose PRN. Patient discharged 1/24.      Pertinent Physical Exam At Time of Discharge  Physical Exam  Constitutional:       Appearance: Normal appearance.   Neck:      Comments: JVD noted slightly below angle of mandible  Cardiovascular:      Rate and Rhythm: Regular rhythm. Tachycardia present.      Pulses: Normal pulses.      Heart sounds: Normal heart sounds.      Comments: EKG A-Flutter  Pulmonary:      Effort: Pulmonary effort is normal.      Breath sounds: Normal breath sounds.   Abdominal:      General: Abdomen is flat.      Palpations: Abdomen is soft.   Skin:     General: Skin is warm and dry.   Neurological:      General: No focal deficit present.      Mental Status: She is alert and oriented to person, place, and time.   Psychiatric:         Mood and Affect: Mood normal.         Behavior: Behavior normal.     Outpatient Follow-Up  Future  Appointments   Date Time Provider Department Center   4/14/2025 10:00 AM Kathy Rivera MD PhD [id number] [facility]         Homero Small MD PhD

## 2025-01-25 NOTE — PROGRESS NOTES
"Patient admitted to [facility] on 1/21 with increased SOB not relieved with inhalers - also reported \"extra fluid\"    Milrinone pump and medication from homecare continued to infuse during this admit - patient's daughter changed bag as appropriate while in the hospital.    Patient discharged on 1/24 to home - per patient's daughter one bag in the home for 1/26 bag change  - one bag wasted as used out of order    Received orders to decrease milrinone to 0.25mcg/kg/min starting with bag change on 1/28 2/2 arrhythmias - patient to take po Lasix daily plus on additional dose PRN    Dispense x2 48 hour infusions of Milrinone at new rate of 4.6ml/hour based on weight of 62kg and dosing of 0.25mcg/kg/min  Delivery 1/25  Bag changes on 1/28 and 1/30  Daughter Mary understands to start new bags and pumps on 1/28 - bags and pumps labeled as such    NF 1/30 - straight - please check weight - arrange for  of old pumps   "

## 2025-01-25 NOTE — DISCHARGE SUMMARY
Discharge Diagnosis  Shortness of breath    Issues Requiring Follow-Up  ***    Discharge Meds     Medication List      START taking these medications     alteplase 2 mg injection; Commonly known as: Cathflo Activase; 2 mL (2   mg) by intra-catheter route 1 time for 1 dose.   apixaban 5 mg tablet; Commonly known as: Eliquis; Take 2 tablets (10 mg)   by mouth 2 times a day for 7 days, THEN 1 tablet (5 mg) 2 times a day.;   Start taking on: January 24, 2025     CHANGE how you take these medications     bumetanide 0.5 mg tablet; Commonly known as: Bumex; Take 1 tablet (0.5   mg) by mouth 2 times a week. Please weigh yourself every day. If you gain   2 pounds within 2 days, start taking 0.5mg Bumex EVERY DAY.; Start taking   on: January 27, 2025; What changed: additional instructions   famotidine 40 mg tablet; Commonly known as: Pepcid; Take 1 tablet (40   mg) by mouth once daily. Do not start before February 3, 2024.; What   changed: when to take this, reasons to take this   milrinone in 5 % dextrose 20 mg/100 mL (200 mcg/mL) infusion; Commonly   known as: Primacor; Infuse 15.5 mcg/min at 4.65 mL/hr into a venous   catheter continuously. Do not fill before January 28, 2025.; Start taking   on: January 28, 2025; What changed: how much to take, how fast to infuse   this, These instructions start on January 28, 2025. If you are unsure what   to do until then, ask your doctor or other care provider.     CONTINUE taking these medications     acetaminophen 500 mg tablet; Commonly known as: Tylenol   albuterol 90 mcg/actuation inhaler; Inhale 2 puffs every 4 hours if   needed for wheezing or shortness of breath.   amiodarone 200 mg tablet; Commonly known as: Pacerone; Take 2 tablets   (400 mg) by mouth 2 times a day for 10 days, THEN 1 tablet (200 mg) once   daily.; Start taking on: January 12, 2025   aspirin 81 mg EC tablet; Take 1 tablet (81 mg) by mouth once daily.   Farxiga 10 mg; Generic drug: dapagliflozin propanediol;  Take 1 tablet   (10 mg) by mouth once daily.   heparin flush(porcine)-0.9NaCl 100 unit/mL kit   hydrocortisone 1 % ointment; Apply topically 2 times a day as needed for   irritation. Chest wall   ipratropium-albuteroL 0.5-2.5 mg/3 mL nebulizer solution; Commonly known   as: Duo-Neb   mupirocin 2 % ointment; Commonly known as: Bactroban   nicotine 21 mg/24 hr patch; Commonly known as: Nicoderm CQ; Place 1   patch over 24 hours on the skin once every 24 hours.   OneTouch Verio Flex meter misc; Generic drug: blood-glucose meter   sodium chloride 0.65 % nasal spray; Commonly known as: Ocean   sodium chloride 0.9% flush   spironolactone 25 mg tablet; Commonly known as: Aldactone; Take 0.5   tablets (12.5 mg) by mouth once daily.   Symbicort 80-4.5 mcg/actuation inhaler; Generic drug:   budesonide-formoteroL   Vitamin D3 50 MCG (2000 UT) tablet; Generic drug: cholecalciferol     STOP taking these medications     nicotine polacrilex 4 mg gum; Commonly known as: Nicorette       Test Results Pending At Discharge  Pending Labs       No current pending labs.            Hospital Course  Melissa Marinelli is a 69 y.o. female with PMHx of ICM/ HFrEF (last EF 15-20% 7/2024, s/p [place]. Greg[employer] ICD 5/2020) on palliative milrinone infusion since 2/2024, CAD s/p NSTEMI s/p RIRI to LCX (Jan 2016), MVR s/p mitral valve repair in (June 2019; 29 mm Epic bioprosthetic valve with Dr. Montana), COPD, HTN, HL, GERD, hx of CVA, DM who presented to [facility] ED with concern for new shortness of breath.      She states that two days ago, she noticed that she had increased shortness of breath while she was walking around her house. This shortness of breath was not relieved with her inhalers. She states that she also feels like she may have some extra fluid on her. She denies chest pain, cough, or ankle swelling. She lives at home with her daughter and grandson and feels safe. She does not use oxygen at home and reports her inhalers normally work.      She  was last admitted here at [facility] and discharge from the CICU on 1/12 after having episodes of palpitations that were found to be VT. She was discharged with continuation of an amiodarone loading course. She has one more day of amiodarone BID, and then converting to every day.      She has a noted allergy to red dye in normal amiodarone tablets. We will contact pharmacy for dye free amiodarone.     After noting her to be in a-flutter, we consulted EP and she is scheduled to get a SHRUTHI DCCV on 1/23/25.     ***    Pertinent Physical Exam At Time of Discharge  Physical Exam    Outpatient Follow-Up  Future Appointments   Date Time Provider Department Center   4/14/2025 10:00 AM Kathy Rivera MD PhD [id number] [facility]         Homero Small MD PhD

## 2025-01-26 ENCOUNTER — DOCUMENTATION (OUTPATIENT)
Dept: HOME HEALTH SERVICES | Facility: HOME HEALTH | Age: 70
End: 2025-01-26
Payer: COMMERCIAL

## 2025-01-26 NOTE — HH CARE COORDINATION
Home Care received a Referral to Resume Care for Infusion and Nursing. We have processed the referral for a Resumption of Care on 1/28/2025.     If you have any questions or concerns, please feel free to contact us at [phone]. Follow the prompts, enter your five digit zip code, and you will be directed to your care team on CENTL 1.

## 2025-01-28 ENCOUNTER — HOME CARE VISIT (OUTPATIENT)
Dept: HOME HEALTH SERVICES | Facility: HOME HEALTH | Age: 70
End: 2025-01-28
Payer: COMMERCIAL

## 2025-01-28 ENCOUNTER — HOME INFUSION (OUTPATIENT)
Dept: INFUSION THERAPY | Age: 70
End: 2025-01-28
Payer: COMMERCIAL

## 2025-01-28 VITALS
TEMPERATURE: 97.6 F | RESPIRATION RATE: 16 BRPM | BODY MASS INDEX: 22.31 KG/M2 | DIASTOLIC BLOOD PRESSURE: 78 MMHG | HEART RATE: 100 BPM | SYSTOLIC BLOOD PRESSURE: 100 MMHG | OXYGEN SATURATION: 97 % | WEIGHT: 130 LBS

## 2025-01-28 PROCEDURE — G0162 HHC RN E&M PLAN SVS, 15 MIN: HCPCS | Mod: HHH

## 2025-01-28 RX ADMIN — Medication 10 ML: at 10:38

## 2025-01-28 NOTE — PROGRESS NOTES
Review of chart. Patient infuses continuous milrinone for CHF. Dr Kathy Rivera follows outpatient.      Pt scheduled to start milrinone at new rate of 4.6 ml/hr with today's bag change.    Rph spoke with daughter Mary, confirmed new rate being used today and bag on hand for today and Thurs 1/30. She is agreeable to delivery Wed 1/29 for additional bags and supplies. Pt weight has been stable since discharge. She agrees to set up pump pickup for 1/29. No questions for [facility] at this time.     Pharmacy to dispense the following 1/29 straight:  3x milrinone 48-hr bags  HU 2/1, 2/3, 2/5     NF 2/4 straight - please check weight

## 2025-01-29 ENCOUNTER — DOCUMENTATION (OUTPATIENT)
Dept: CARE COORDINATION | Facility: CLINIC | Age: 70
End: 2025-01-29
Payer: COMMERCIAL

## 2025-01-29 ENCOUNTER — PATIENT OUTREACH (OUTPATIENT)
Dept: CARE COORDINATION | Facility: CLINIC | Age: 70
End: 2025-01-29
Payer: COMMERCIAL

## 2025-01-29 ENCOUNTER — HOME CARE VISIT (OUTPATIENT)
Dept: HOME HEALTH SERVICES | Facility: HOME HEALTH | Age: 70
End: 2025-01-29
Payer: COMMERCIAL

## 2025-01-29 SDOH — ECONOMIC STABILITY: GENERAL: WOULD YOU LIKE HELP WITH ANY OF THE FOLLOWING NEEDS?: SOCIAL CONNECTION

## 2025-01-29 SDOH — ECONOMIC STABILITY: FOOD INSECURITY
ARE ANY OF YOUR NEEDS URGENT? FOR EXAMPLE, UNCERTAINTY OF WHERE YOU WILL GET YOUR NEXT MEAL OR NOT HAVING THE MEDICATIONS YOU NEED TO TAKE TOMORROW.: NO

## 2025-01-29 ASSESSMENT — ENCOUNTER SYMPTOMS
OCCASIONAL FEELINGS OF UNSTEADINESS: 0
PERSON REPORTING PAIN: PATIENT
APPETITE LEVEL: FAIR
FORGETFULNESS: 1
DENIES PAIN: 1
CHANGE IN APPETITE: UNCHANGED
LAST BOWEL MOVEMENT: 67233

## 2025-01-29 ASSESSMENT — PAIN SCALES - PAIN ASSESSMENT IN ADVANCED DEMENTIA (PAINAD)
BODYLANGUAGE: 0
BREATHING: 0
BODYLANGUAGE: 0 - RELAXED.
TOTALSCORE: 0
FACIALEXPRESSION: 0
NEGVOCALIZATION: 0
NEGVOCALIZATION: 0 - NONE.
CONSOLABILITY: 0 - NO NEED TO CONSOLE.
FACIALEXPRESSION: 0 - SMILING OR INEXPRESSIVE.
CONSOLABILITY: 0

## 2025-01-29 ASSESSMENT — ACTIVITIES OF DAILY LIVING (ADL)
OASIS_M1830: 05
ENTERING_EXITING_HOME: NEEDS ASSISTANCE

## 2025-01-29 NOTE — PROGRESS NOTES
Referral sent to [facility] contact for assistance with programs and services offer through [facility].

## 2025-01-29 NOTE — PROGRESS NOTES
Art Therapy Note    Melissa Marinelli     Therapy Session  Referral Type: New referral this admission  Visit Type: New visit  Session Start Time: 1145  Session End Time: 1150  Intervention Delivery: In-person  Family Present for Session: None              Treatment/Interventions       Post-assessment  Total Session Time (min): 5 minutes    Narrative  Assessment Detail: Pt was preparing for discharge when ATR visited to introduce AT services and benefits. 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 ATR will close referral due to pt being discharged.    Education Documentation  No documentation found.

## 2025-01-29 NOTE — PROGRESS NOTES
Notified by  that patient is at home with IV milrinone. She lives at home alone and could benefit from some additional support resources such as passport service or aides through [facility].     [facility] SW tasked to assist with care coordination in regards to social support.     Will continue to follow patient

## 2025-01-30 ENCOUNTER — HOME CARE VISIT (OUTPATIENT)
Dept: HOME HEALTH SERVICES | Facility: HOME HEALTH | Age: 70
End: 2025-01-30
Payer: COMMERCIAL

## 2025-01-30 ENCOUNTER — DOCUMENTATION (OUTPATIENT)
Dept: CARE COORDINATION | Facility: CLINIC | Age: 70
End: 2025-01-30
Payer: COMMERCIAL

## 2025-01-30 NOTE — PROGRESS NOTES
[facility] SW deferred pt to [facility] SW service.   Secure Chat sent to [facility] sent to see if their SW team can assist in support of patient.   Will continue to follow.

## 2025-02-03 ENCOUNTER — HOME INFUSION (OUTPATIENT)
Dept: INFUSION THERAPY | Age: 70
End: 2025-02-03
Payer: COMMERCIAL

## 2025-02-03 ENCOUNTER — HOME CARE VISIT (OUTPATIENT)
Dept: HOME HEALTH SERVICES | Facility: HOME HEALTH | Age: 70
End: 2025-02-03
Payer: COMMERCIAL

## 2025-02-03 NOTE — PROGRESS NOTES
Review of chart. Patient infuses continuous milrinone for CHF. Dr Kathy Rivera follows outpatient. Current orders are for a rate of 4.6ml/hr.    [facility] spoke with daughter Mary, no issues with new rate or bags. Has a change for today and one for 2/5/25. Agreeable to delivery 2/4 any time (preferably in the evening) of 4 bags with supplies to match.  to call on the way.      Pharmacy to dispense the following 2/4 straight:  4x milrinone 48-hr bags  HU 2/7 2/9 2/11 2/13     NF 2/12 straight - please check weight

## 2025-02-04 ENCOUNTER — DOCUMENTATION (OUTPATIENT)
Dept: PHARMACY | Facility: CLINIC | Age: 70
End: 2025-02-04

## 2025-02-05 ENCOUNTER — APPOINTMENT (OUTPATIENT)
Dept: RADIOLOGY | Facility: HOSPITAL | Age: 70
DRG: 291 | End: 2025-02-05
Payer: COMMERCIAL

## 2025-02-05 ENCOUNTER — HOSPITAL ENCOUNTER (INPATIENT)
Facility: HOSPITAL | Age: 70
LOS: 4 days | Discharge: HOME HEALTH | DRG: 308 | End: 2025-02-09
Attending: EMERGENCY MEDICINE | Admitting: NURSE PRACTITIONER
Payer: COMMERCIAL

## 2025-02-05 ENCOUNTER — HOME CARE VISIT (OUTPATIENT)
Dept: HOME HEALTH SERVICES | Facility: HOME HEALTH | Age: 70
End: 2025-02-05
Payer: COMMERCIAL

## 2025-02-05 ENCOUNTER — CLINICAL SUPPORT (OUTPATIENT)
Dept: EMERGENCY MEDICINE | Facility: HOSPITAL | Age: 70
DRG: 291 | End: 2025-02-05
Payer: COMMERCIAL

## 2025-02-05 DIAGNOSIS — I50.23 ACUTE ON CHRONIC SYSTOLIC HEART FAILURE: Primary | ICD-10-CM

## 2025-02-05 DIAGNOSIS — I48.92 ATRIAL FIB/FLUTTER, TRANSIENT (MULTI): ICD-10-CM

## 2025-02-05 DIAGNOSIS — I50.43 ACUTE ON CHRONIC COMBINED SYSTOLIC (CONGESTIVE) AND DIASTOLIC (CONGESTIVE) HEART FAILURE: ICD-10-CM

## 2025-02-05 DIAGNOSIS — I47.20 VENTRICULAR TACHYCARDIA (PAROXYSMAL): ICD-10-CM

## 2025-02-05 DIAGNOSIS — I48.91 ATRIAL FIB/FLUTTER, TRANSIENT (MULTI): ICD-10-CM

## 2025-02-05 DIAGNOSIS — I50.20 HFREF (HEART FAILURE WITH REDUCED EJECTION FRACTION): ICD-10-CM

## 2025-02-05 DIAGNOSIS — I47.29 VENTRICULAR TACHYCARDIA (PAROXYSMAL) (MULTI): ICD-10-CM

## 2025-02-05 DIAGNOSIS — I50.33 ACUTE ON CHRONIC HEART FAILURE WITH NORMAL EJECTION FRACTION: ICD-10-CM

## 2025-02-05 DIAGNOSIS — I50.22 CHRONIC SYSTOLIC HEART FAILURE: ICD-10-CM

## 2025-02-05 LAB
ALBUMIN SERPL BCP-MCNC: 3.9 G/DL (ref 3.4–5)
ALBUMIN SERPL BCP-MCNC: 4 G/DL (ref 3.4–5)
ALBUMIN SERPL BCP-MCNC: 4.3 G/DL (ref 3.4–5)
ALP SERPL-CCNC: 104 U/L (ref 33–136)
ALT SERPL W P-5'-P-CCNC: 42 U/L (ref 7–45)
ANION GAP BLDV CALCULATED.4IONS-SCNC: 13 MMOL/L (ref 10–25)
ANION GAP BLDV CALCULATED.4IONS-SCNC: 14 MMOL/L (ref 10–25)
ANION GAP BLDV CALCULATED.4IONS-SCNC: 9 MMOL/L (ref 10–25)
ANION GAP SERPL CALC-SCNC: 18 MMOL/L (ref 10–20)
ANION GAP SERPL CALC-SCNC: 19 MMOL/L (ref 10–20)
APTT PPP: 36 SECONDS (ref 27–38)
AST SERPL W P-5'-P-CCNC: 35 U/L (ref 9–39)
BASE EXCESS BLDV CALC-SCNC: -3.2 MMOL/L (ref -2–3)
BASE EXCESS BLDV CALC-SCNC: -3.5 MMOL/L (ref -2–3)
BASE EXCESS BLDV CALC-SCNC: -4.8 MMOL/L (ref -2–3)
BASOPHILS # BLD AUTO: 0.08 X10*3/UL (ref 0–0.1)
BASOPHILS NFR BLD AUTO: 0.8 %
BILIRUB SERPL-MCNC: 0.5 MG/DL (ref 0–1.2)
BNP SERPL-MCNC: 886 PG/ML (ref 0–99)
BODY TEMPERATURE: 37 DEGREES CELSIUS
BUN SERPL-MCNC: 34 MG/DL (ref 6–23)
BUN SERPL-MCNC: 40 MG/DL (ref 6–23)
CA-I BLD-SCNC: 1.15 MMOL/L (ref 1.1–1.33)
CA-I BLDV-SCNC: 1.18 MMOL/L (ref 1.1–1.33)
CA-I BLDV-SCNC: 1.19 MMOL/L (ref 1.1–1.33)
CA-I BLDV-SCNC: 1.19 MMOL/L (ref 1.1–1.33)
CALCIUM SERPL-MCNC: 9.2 MG/DL (ref 8.6–10.6)
CALCIUM SERPL-MCNC: 9.5 MG/DL (ref 8.6–10.6)
CARDIAC TROPONIN I PNL SERPL HS: 212 NG/L (ref 0–34)
CARDIAC TROPONIN I PNL SERPL HS: 220 NG/L (ref 0–34)
CHLORIDE BLDV-SCNC: 104 MMOL/L (ref 98–107)
CHLORIDE BLDV-SCNC: 105 MMOL/L (ref 98–107)
CHLORIDE BLDV-SCNC: 105 MMOL/L (ref 98–107)
CHLORIDE SERPL-SCNC: 101 MMOL/L (ref 98–107)
CHLORIDE SERPL-SCNC: 102 MMOL/L (ref 98–107)
CO2 SERPL-SCNC: 21 MMOL/L (ref 21–32)
CO2 SERPL-SCNC: 22 MMOL/L (ref 21–32)
CREAT SERPL-MCNC: 2.15 MG/DL (ref 0.5–1.05)
CREAT SERPL-MCNC: 2.19 MG/DL (ref 0.5–1.05)
EGFRCR SERPLBLD CKD-EPI 2021: 24 ML/MIN/1.73M*2
EGFRCR SERPLBLD CKD-EPI 2021: 24 ML/MIN/1.73M*2
EOSINOPHIL # BLD AUTO: 0.58 X10*3/UL (ref 0–0.7)
EOSINOPHIL NFR BLD AUTO: 6 %
ERYTHROCYTE [DISTWIDTH] IN BLOOD BY AUTOMATED COUNT: 15.6 % (ref 11.5–14.5)
EST. AVERAGE GLUCOSE BLD GHB EST-MCNC: 117 MG/DL
FERRITIN SERPL-MCNC: 81 NG/ML (ref 8–150)
FLUAV RNA RESP QL NAA+PROBE: NOT DETECTED
FLUBV RNA RESP QL NAA+PROBE: NOT DETECTED
FOLATE SERPL-MCNC: 20.2 NG/ML
GLUCOSE BLD MANUAL STRIP-MCNC: 121 MG/DL (ref 74–99)
GLUCOSE BLDV-MCNC: 128 MG/DL (ref 74–99)
GLUCOSE BLDV-MCNC: 142 MG/DL (ref 74–99)
GLUCOSE BLDV-MCNC: 240 MG/DL (ref 74–99)
GLUCOSE SERPL-MCNC: 145 MG/DL (ref 74–99)
GLUCOSE SERPL-MCNC: 239 MG/DL (ref 74–99)
HBA1C MFR BLD: 5.7 %
HCO3 BLDV-SCNC: 22.1 MMOL/L (ref 22–26)
HCO3 BLDV-SCNC: 22.1 MMOL/L (ref 22–26)
HCO3 BLDV-SCNC: 23.2 MMOL/L (ref 22–26)
HCT VFR BLD AUTO: 37.2 % (ref 36–46)
HCT VFR BLD EST: 35 % (ref 36–46)
HCT VFR BLD EST: 38 % (ref 36–46)
HCT VFR BLD EST: 50 % (ref 36–46)
HGB BLD-MCNC: 12.3 G/DL (ref 12–16)
HGB BLDV-MCNC: 11.8 G/DL (ref 12–16)
HGB BLDV-MCNC: 12.6 G/DL (ref 12–16)
HGB BLDV-MCNC: 16.5 G/DL (ref 12–16)
IMM GRANULOCYTES # BLD AUTO: 0.04 X10*3/UL (ref 0–0.7)
IMM GRANULOCYTES NFR BLD AUTO: 0.4 % (ref 0–0.9)
INHALED O2 CONCENTRATION: 30 %
INHALED O2 CONCENTRATION: 30 %
INHALED O2 CONCENTRATION: 40 %
INR PPP: 2 (ref 0.9–1.1)
IRON SATN MFR SERPL: 21 % (ref 25–45)
IRON SERPL-MCNC: 72 UG/DL (ref 35–150)
LACTATE BLDV-SCNC: 1.6 MMOL/L (ref 0.4–2)
LACTATE BLDV-SCNC: 2.1 MMOL/L (ref 0.4–2)
LACTATE BLDV-SCNC: 3.2 MMOL/L (ref 0.4–2)
LACTATE SERPL-SCNC: 1.7 MMOL/L (ref 0.4–2)
LYMPHOCYTES # BLD AUTO: 2.18 X10*3/UL (ref 1.2–4.8)
LYMPHOCYTES NFR BLD AUTO: 22.5 %
MAGNESIUM SERPL-MCNC: 2.89 MG/DL (ref 1.6–2.4)
MCH RBC QN AUTO: 29.9 PG (ref 26–34)
MCHC RBC AUTO-ENTMCNC: 33.1 G/DL (ref 32–36)
MCV RBC AUTO: 91 FL (ref 80–100)
MONOCYTES # BLD AUTO: 0.34 X10*3/UL (ref 0.1–1)
MONOCYTES NFR BLD AUTO: 3.5 %
NEUTROPHILS # BLD AUTO: 6.46 X10*3/UL (ref 1.2–7.7)
NEUTROPHILS NFR BLD AUTO: 66.8 %
NRBC BLD-RTO: 0 /100 WBCS (ref 0–0)
OXYHGB MFR BLDV: 48.1 % (ref 45–75)
OXYHGB MFR BLDV: 60.8 % (ref 45–75)
OXYHGB MFR BLDV: 62.6 % (ref 45–75)
PCO2 BLDV: 41 MM HG (ref 41–51)
PCO2 BLDV: 46 MM HG (ref 41–51)
PCO2 BLDV: 47 MM HG (ref 41–51)
PH BLDV: 7.28 PH (ref 7.33–7.43)
PH BLDV: 7.31 PH (ref 7.33–7.43)
PH BLDV: 7.34 PH (ref 7.33–7.43)
PHOSPHATE SERPL-MCNC: 5.2 MG/DL (ref 2.5–4.9)
PHOSPHATE SERPL-MCNC: 5.2 MG/DL (ref 2.5–4.9)
PLATELET # BLD AUTO: 280 X10*3/UL (ref 150–450)
PO2 BLDV: 38 MM HG (ref 35–45)
PO2 BLDV: 39 MM HG (ref 35–45)
PO2 BLDV: 41 MM HG (ref 35–45)
POTASSIUM BLDV-SCNC: 5.6 MMOL/L (ref 3.5–5.3)
POTASSIUM BLDV-SCNC: 5.8 MMOL/L (ref 3.5–5.3)
POTASSIUM BLDV-SCNC: 6.6 MMOL/L (ref 3.5–5.3)
POTASSIUM SERPL-SCNC: 5.3 MMOL/L (ref 3.5–5.3)
POTASSIUM SERPL-SCNC: 5.8 MMOL/L (ref 3.5–5.3)
PROT SERPL-MCNC: 7.9 G/DL (ref 6.4–8.2)
PROTHROMBIN TIME: 22.8 SECONDS (ref 9.8–12.8)
RBC # BLD AUTO: 4.11 X10*6/UL (ref 4–5.2)
S PYO DNA THROAT QL NAA+PROBE: NOT DETECTED
SAO2 % BLDV: 50 % (ref 45–75)
SAO2 % BLDV: 62 % (ref 45–75)
SAO2 % BLDV: 64 % (ref 45–75)
SARS-COV-2 RNA RESP QL NAA+PROBE: NOT DETECTED
SODIUM BLDV-SCNC: 129 MMOL/L (ref 136–145)
SODIUM BLDV-SCNC: 134 MMOL/L (ref 136–145)
SODIUM BLDV-SCNC: 135 MMOL/L (ref 136–145)
SODIUM SERPL-SCNC: 135 MMOL/L (ref 136–145)
SODIUM SERPL-SCNC: 137 MMOL/L (ref 136–145)
TIBC SERPL-MCNC: 347 UG/DL (ref 240–445)
TSH SERPL-ACNC: 1.4 MIU/L (ref 0.44–3.98)
UFH PPP CHRO-ACNC: >2 IU/ML
UIBC SERPL-MCNC: 275 UG/DL (ref 110–370)
VIT B12 SERPL-MCNC: 533 PG/ML (ref 211–911)
WBC # BLD AUTO: 9.7 X10*3/UL (ref 4.4–11.3)

## 2025-02-05 PROCEDURE — 84132 ASSAY OF SERUM POTASSIUM: CPT | Performed by: EMERGENCY MEDICINE

## 2025-02-05 PROCEDURE — 87651 STREP A DNA AMP PROBE: CPT | Performed by: EMERGENCY MEDICINE

## 2025-02-05 PROCEDURE — 94640 AIRWAY INHALATION TREATMENT: CPT

## 2025-02-05 PROCEDURE — 80053 COMPREHEN METABOLIC PANEL: CPT | Performed by: EMERGENCY MEDICINE

## 2025-02-05 PROCEDURE — 84484 ASSAY OF TROPONIN QUANT: CPT | Performed by: EMERGENCY MEDICINE

## 2025-02-05 PROCEDURE — 83605 ASSAY OF LACTIC ACID: CPT | Performed by: NURSE PRACTITIONER

## 2025-02-05 PROCEDURE — 2500000002 HC RX 250 W HCPCS SELF ADMINISTERED DRUGS (ALT 637 FOR MEDICARE OP, ALT 636 FOR OP/ED)

## 2025-02-05 PROCEDURE — 84443 ASSAY THYROID STIM HORMONE: CPT | Performed by: NURSE PRACTITIONER

## 2025-02-05 PROCEDURE — 85520 HEPARIN ASSAY: CPT | Performed by: NURSE PRACTITIONER

## 2025-02-05 PROCEDURE — 82435 ASSAY OF BLOOD CHLORIDE: CPT | Performed by: NURSE PRACTITIONER

## 2025-02-05 PROCEDURE — 2500000004 HC RX 250 GENERAL PHARMACY W/ HCPCS (ALT 636 FOR OP/ED)

## 2025-02-05 PROCEDURE — 2020000001 HC ICU ROOM DAILY

## 2025-02-05 PROCEDURE — 37799 UNLISTED PX VASCULAR SURGERY: CPT | Performed by: NURSE PRACTITIONER

## 2025-02-05 PROCEDURE — 85610 PROTHROMBIN TIME: CPT | Performed by: NURSE PRACTITIONER

## 2025-02-05 PROCEDURE — 82330 ASSAY OF CALCIUM: CPT | Performed by: NURSE PRACTITIONER

## 2025-02-05 PROCEDURE — 94760 N-INVAS EAR/PLS OXIMETRY 1: CPT

## 2025-02-05 PROCEDURE — 83735 ASSAY OF MAGNESIUM: CPT | Performed by: NURSE PRACTITIONER

## 2025-02-05 PROCEDURE — 82947 ASSAY GLUCOSE BLOOD QUANT: CPT

## 2025-02-05 PROCEDURE — 83880 ASSAY OF NATRIURETIC PEPTIDE: CPT | Performed by: EMERGENCY MEDICINE

## 2025-02-05 PROCEDURE — 82746 ASSAY OF FOLIC ACID SERUM: CPT | Performed by: NURSE PRACTITIONER

## 2025-02-05 PROCEDURE — 84132 ASSAY OF SERUM POTASSIUM: CPT | Performed by: NURSE PRACTITIONER

## 2025-02-05 PROCEDURE — 71045 X-RAY EXAM CHEST 1 VIEW: CPT | Performed by: RADIOLOGY

## 2025-02-05 PROCEDURE — 2500000004 HC RX 250 GENERAL PHARMACY W/ HCPCS (ALT 636 FOR OP/ED): Performed by: EMERGENCY MEDICINE

## 2025-02-05 PROCEDURE — 84132 ASSAY OF SERUM POTASSIUM: CPT

## 2025-02-05 PROCEDURE — 82435 ASSAY OF BLOOD CHLORIDE: CPT

## 2025-02-05 PROCEDURE — 2500000005 HC RX 250 GENERAL PHARMACY W/O HCPCS: Performed by: EMERGENCY MEDICINE

## 2025-02-05 PROCEDURE — 87636 SARSCOV2 & INF A&B AMP PRB: CPT | Performed by: EMERGENCY MEDICINE

## 2025-02-05 PROCEDURE — 94660 CPAP INITIATION&MGMT: CPT

## 2025-02-05 PROCEDURE — 87502 INFLUENZA DNA AMP PROBE: CPT | Performed by: EMERGENCY MEDICINE

## 2025-02-05 PROCEDURE — 99291 CRITICAL CARE FIRST HOUR: CPT | Performed by: NURSE PRACTITIONER

## 2025-02-05 PROCEDURE — 96374 THER/PROPH/DIAG INJ IV PUSH: CPT

## 2025-02-05 PROCEDURE — 2500000004 HC RX 250 GENERAL PHARMACY W/ HCPCS (ALT 636 FOR OP/ED): Performed by: NURSE PRACTITIONER

## 2025-02-05 PROCEDURE — 83036 HEMOGLOBIN GLYCOSYLATED A1C: CPT | Performed by: NURSE PRACTITIONER

## 2025-02-05 PROCEDURE — 82040 ASSAY OF SERUM ALBUMIN: CPT | Performed by: NURSE PRACTITIONER

## 2025-02-05 PROCEDURE — 5A09357 ASSISTANCE WITH RESPIRATORY VENTILATION, LESS THAN 24 CONSECUTIVE HOURS, CONTINUOUS POSITIVE AIRWAY PRESSURE: ICD-10-PCS | Performed by: NURSE PRACTITIONER

## 2025-02-05 PROCEDURE — 71045 X-RAY EXAM CHEST 1 VIEW: CPT

## 2025-02-05 PROCEDURE — 85730 THROMBOPLASTIN TIME PARTIAL: CPT | Performed by: NURSE PRACTITIONER

## 2025-02-05 PROCEDURE — 82607 VITAMIN B-12: CPT | Performed by: NURSE PRACTITIONER

## 2025-02-05 PROCEDURE — 93010 ELECTROCARDIOGRAM REPORT: CPT | Performed by: INTERNAL MEDICINE

## 2025-02-05 PROCEDURE — 82728 ASSAY OF FERRITIN: CPT | Performed by: NURSE PRACTITIONER

## 2025-02-05 PROCEDURE — 99285 EMERGENCY DEPT VISIT HI MDM: CPT | Mod: 25 | Performed by: EMERGENCY MEDICINE

## 2025-02-05 PROCEDURE — 85025 COMPLETE CBC W/AUTO DIFF WBC: CPT | Performed by: EMERGENCY MEDICINE

## 2025-02-05 PROCEDURE — 2500000002 HC RX 250 W HCPCS SELF ADMINISTERED DRUGS (ALT 637 FOR MEDICARE OP, ALT 636 FOR OP/ED): Performed by: EMERGENCY MEDICINE

## 2025-02-05 PROCEDURE — 2500000001 HC RX 250 WO HCPCS SELF ADMINISTERED DRUGS (ALT 637 FOR MEDICARE OP): Performed by: NURSE PRACTITIONER

## 2025-02-05 PROCEDURE — 84100 ASSAY OF PHOSPHORUS: CPT | Performed by: NURSE PRACTITIONER

## 2025-02-05 PROCEDURE — 96375 TX/PRO/DX INJ NEW DRUG ADDON: CPT

## 2025-02-05 PROCEDURE — 83550 IRON BINDING TEST: CPT | Performed by: NURSE PRACTITIONER

## 2025-02-05 PROCEDURE — 93005 ELECTROCARDIOGRAM TRACING: CPT

## 2025-02-05 PROCEDURE — 83540 ASSAY OF IRON: CPT | Performed by: NURSE PRACTITIONER

## 2025-02-05 PROCEDURE — 2500000002 HC RX 250 W HCPCS SELF ADMINISTERED DRUGS (ALT 637 FOR MEDICARE OP, ALT 636 FOR OP/ED): Performed by: STUDENT IN AN ORGANIZED HEALTH CARE EDUCATION/TRAINING PROGRAM

## 2025-02-05 PROCEDURE — 99285 EMERGENCY DEPT VISIT HI MDM: CPT | Performed by: EMERGENCY MEDICINE

## 2025-02-05 RX ORDER — CHOLECALCIFEROL (VITAMIN D3) 25 MCG
2000 TABLET ORAL DAILY
Status: DISCONTINUED | OUTPATIENT
Start: 2025-02-05 | End: 2025-02-09 | Stop reason: HOSPADM

## 2025-02-05 RX ORDER — ONDANSETRON HYDROCHLORIDE 2 MG/ML
INJECTION, SOLUTION INTRAVENOUS
Status: COMPLETED
Start: 2025-02-05 | End: 2025-02-05

## 2025-02-05 RX ORDER — ALBUTEROL SULFATE 90 UG/1
2 INHALANT RESPIRATORY (INHALATION) EVERY 4 HOURS PRN
Status: DISCONTINUED | OUTPATIENT
Start: 2025-02-05 | End: 2025-02-09 | Stop reason: HOSPADM

## 2025-02-05 RX ORDER — SPIRONOLACTONE 25 MG/1
12.5 TABLET ORAL DAILY
Status: DISCONTINUED | OUTPATIENT
Start: 2025-02-05 | End: 2025-02-09 | Stop reason: HOSPADM

## 2025-02-05 RX ORDER — AMIODARONE HYDROCHLORIDE 200 MG/1
200 TABLET ORAL DAILY
Status: DISCONTINUED | OUTPATIENT
Start: 2025-02-05 | End: 2025-02-09 | Stop reason: HOSPADM

## 2025-02-05 RX ORDER — MILRINONE LACTATE 0.2 MG/ML
0.25 INJECTION, SOLUTION INTRAVENOUS CONTINUOUS
Status: DISCONTINUED | OUTPATIENT
Start: 2025-02-05 | End: 2025-02-09 | Stop reason: HOSPADM

## 2025-02-05 RX ORDER — ACETAMINOPHEN 325 MG/1
1000 TABLET ORAL EVERY 8 HOURS PRN
Status: DISCONTINUED | OUTPATIENT
Start: 2025-02-05 | End: 2025-02-09 | Stop reason: HOSPADM

## 2025-02-05 RX ORDER — IPRATROPIUM BROMIDE AND ALBUTEROL SULFATE 2.5; .5 MG/3ML; MG/3ML
3 SOLUTION RESPIRATORY (INHALATION) EVERY 6 HOURS PRN
Status: DISCONTINUED | OUTPATIENT
Start: 2025-02-05 | End: 2025-02-09 | Stop reason: HOSPADM

## 2025-02-05 RX ORDER — HEPARIN SODIUM 10000 [USP'U]/100ML
0-4000 INJECTION, SOLUTION INTRAVENOUS CONTINUOUS
Status: DISCONTINUED | OUTPATIENT
Start: 2025-02-05 | End: 2025-02-07

## 2025-02-05 RX ORDER — IPRATROPIUM BROMIDE AND ALBUTEROL SULFATE 2.5; .5 MG/3ML; MG/3ML
3 SOLUTION RESPIRATORY (INHALATION)
Status: DISCONTINUED | OUTPATIENT
Start: 2025-02-05 | End: 2025-02-05

## 2025-02-05 RX ORDER — FUROSEMIDE 10 MG/ML
40 INJECTION INTRAMUSCULAR; INTRAVENOUS ONCE
Status: COMPLETED | OUTPATIENT
Start: 2025-02-05 | End: 2025-02-05

## 2025-02-05 RX ORDER — FAMOTIDINE 20 MG/1
40 TABLET, FILM COATED ORAL DAILY
Status: DISCONTINUED | OUTPATIENT
Start: 2025-02-05 | End: 2025-02-05

## 2025-02-05 RX ORDER — DAPAGLIFLOZIN 10 MG/1
10 TABLET, FILM COATED ORAL DAILY
Status: DISCONTINUED | OUTPATIENT
Start: 2025-02-05 | End: 2025-02-09 | Stop reason: HOSPADM

## 2025-02-05 RX ORDER — FLUTICASONE FUROATE AND VILANTEROL 100; 25 UG/1; UG/1
1 POWDER RESPIRATORY (INHALATION)
Status: DISCONTINUED | OUTPATIENT
Start: 2025-02-05 | End: 2025-02-08

## 2025-02-05 RX ORDER — FAMOTIDINE 20 MG/1
10 TABLET, FILM COATED ORAL DAILY
Status: DISCONTINUED | OUTPATIENT
Start: 2025-02-06 | End: 2025-02-09

## 2025-02-05 RX ORDER — IPRATROPIUM BROMIDE AND ALBUTEROL SULFATE 2.5; .5 MG/3ML; MG/3ML
3 SOLUTION RESPIRATORY (INHALATION) EVERY 20 MIN
Status: COMPLETED | OUTPATIENT
Start: 2025-02-05 | End: 2025-02-05

## 2025-02-05 RX ORDER — ONDANSETRON HYDROCHLORIDE 2 MG/ML
4 INJECTION, SOLUTION INTRAVENOUS ONCE
Status: COMPLETED | OUTPATIENT
Start: 2025-02-05 | End: 2025-02-05

## 2025-02-05 RX ORDER — ASPIRIN 81 MG/1
81 TABLET ORAL DAILY
Status: DISCONTINUED | OUTPATIENT
Start: 2025-02-05 | End: 2025-02-09 | Stop reason: HOSPADM

## 2025-02-05 RX ORDER — IBUPROFEN 200 MG
1 TABLET ORAL EVERY 24 HOURS
Status: DISCONTINUED | OUTPATIENT
Start: 2025-02-05 | End: 2025-02-09 | Stop reason: HOSPADM

## 2025-02-05 RX ADMIN — IPRATROPIUM BROMIDE AND ALBUTEROL SULFATE 3 ML: .5; 3 SOLUTION RESPIRATORY (INHALATION) at 15:45

## 2025-02-05 RX ADMIN — SODIUM ZIRCONIUM CYCLOSILICATE 10 G: 10 POWDER, FOR SUSPENSION ORAL at 22:47

## 2025-02-05 RX ADMIN — IPRATROPIUM BROMIDE AND ALBUTEROL SULFATE 3 ML: .5; 3 SOLUTION RESPIRATORY (INHALATION) at 14:05

## 2025-02-05 RX ADMIN — ONDANSETRON HYDROCHLORIDE 4 MG: 2 INJECTION, SOLUTION INTRAVENOUS at 13:40

## 2025-02-05 RX ADMIN — IPRATROPIUM BROMIDE AND ALBUTEROL SULFATE 3 ML: .5; 3 SOLUTION RESPIRATORY (INHALATION) at 20:20

## 2025-02-05 RX ADMIN — FUROSEMIDE 40 MG: 10 INJECTION, SOLUTION INTRAVENOUS at 21:11

## 2025-02-05 RX ADMIN — ONDANSETRON 4 MG: 2 INJECTION INTRAMUSCULAR; INTRAVENOUS at 13:40

## 2025-02-05 RX ADMIN — Medication 40 PERCENT: at 14:00

## 2025-02-05 RX ADMIN — ACETAMINOPHEN 975 MG: 325 TABLET ORAL at 17:30

## 2025-02-05 RX ADMIN — MILRINONE LACTATE IN DEXTROSE 0.25 MCG/KG/MIN: 200 INJECTION, SOLUTION INTRAVENOUS at 17:36

## 2025-02-05 RX ADMIN — FUROSEMIDE 40 MG: 10 INJECTION, SOLUTION INTRAVENOUS at 14:04

## 2025-02-05 RX ADMIN — IPRATROPIUM BROMIDE AND ALBUTEROL SULFATE 3 ML: .5; 3 SOLUTION RESPIRATORY (INHALATION) at 15:05

## 2025-02-05 SDOH — ECONOMIC STABILITY: FOOD INSECURITY: HOW HARD IS IT FOR YOU TO PAY FOR THE VERY BASICS LIKE FOOD, HOUSING, MEDICAL CARE, AND HEATING?: NOT HARD AT ALL

## 2025-02-05 SDOH — ECONOMIC STABILITY: FOOD INSECURITY: WITHIN THE PAST 12 MONTHS, YOU WORRIED THAT YOUR FOOD WOULD RUN OUT BEFORE YOU GOT THE MONEY TO BUY MORE.: NEVER TRUE

## 2025-02-05 SDOH — SOCIAL STABILITY: SOCIAL INSECURITY: WITHIN THE LAST YEAR, HAVE YOU BEEN HUMILIATED OR EMOTIONALLY ABUSED IN OTHER WAYS BY YOUR PARTNER OR EX-PARTNER?: NO

## 2025-02-05 SDOH — ECONOMIC STABILITY: HOUSING INSECURITY: IN THE PAST 12 MONTHS, HOW MANY TIMES HAVE YOU MOVED WHERE YOU WERE LIVING?: 0

## 2025-02-05 SDOH — HEALTH STABILITY: MENTAL HEALTH
DO YOU FEEL STRESS - TENSE, RESTLESS, NERVOUS, OR ANXIOUS, OR UNABLE TO SLEEP AT NIGHT BECAUSE YOUR MIND IS TROUBLED ALL THE TIME - THESE DAYS?: NOT AT ALL

## 2025-02-05 SDOH — ECONOMIC STABILITY: HOUSING INSECURITY: AT ANY TIME IN THE PAST 12 MONTHS, WERE YOU HOMELESS OR LIVING IN A SHELTER (INCLUDING NOW)?: NO

## 2025-02-05 SDOH — SOCIAL STABILITY: SOCIAL NETWORK: HOW OFTEN DO YOU GET TOGETHER WITH FRIENDS OR RELATIVES?: THREE TIMES A WEEK

## 2025-02-05 SDOH — ECONOMIC STABILITY: TRANSPORTATION INSECURITY: IN THE PAST 12 MONTHS, HAS LACK OF TRANSPORTATION KEPT YOU FROM MEDICAL APPOINTMENTS OR FROM GETTING MEDICATIONS?: NO

## 2025-02-05 SDOH — SOCIAL STABILITY: SOCIAL NETWORK
DO YOU BELONG TO ANY CLUBS OR ORGANIZATIONS SUCH AS CHURCH GROUPS, UNIONS, FRATERNAL OR ATHLETIC GROUPS, OR SCHOOL GROUPS?: NO

## 2025-02-05 SDOH — ECONOMIC STABILITY: HOUSING INSECURITY: IN THE LAST 12 MONTHS, WAS THERE A TIME WHEN YOU WERE NOT ABLE TO PAY THE MORTGAGE OR RENT ON TIME?: NO

## 2025-02-05 SDOH — SOCIAL STABILITY: SOCIAL INSECURITY: ARE YOU MARRIED, WIDOWED, DIVORCED, SEPARATED, NEVER MARRIED, OR LIVING WITH A PARTNER?: PATIENT DECLINED

## 2025-02-05 SDOH — SOCIAL STABILITY: SOCIAL INSECURITY: ABUSE: ADULT

## 2025-02-05 SDOH — SOCIAL STABILITY: SOCIAL NETWORK
IN A TYPICAL WEEK, HOW MANY TIMES DO YOU TALK ON THE PHONE WITH FAMILY, FRIENDS, OR NEIGHBORS?: MORE THAN THREE TIMES A WEEK

## 2025-02-05 SDOH — SOCIAL STABILITY: SOCIAL INSECURITY: DO YOU FEEL ANYONE HAS EXPLOITED OR TAKEN ADVANTAGE OF YOU FINANCIALLY OR OF YOUR PERSONAL PROPERTY?: NO

## 2025-02-05 SDOH — ECONOMIC STABILITY: INCOME INSECURITY: IN THE PAST 12 MONTHS HAS THE ELECTRIC, GAS, OIL, OR WATER COMPANY THREATENED TO SHUT OFF SERVICES IN YOUR HOME?: NO

## 2025-02-05 SDOH — ECONOMIC STABILITY: FOOD INSECURITY: WITHIN THE PAST 12 MONTHS, THE FOOD YOU BOUGHT JUST DIDN'T LAST AND YOU DIDN'T HAVE MONEY TO GET MORE.: NEVER TRUE

## 2025-02-05 SDOH — SOCIAL STABILITY: SOCIAL INSECURITY: HAS ANYONE EVER THREATENED TO HURT YOUR FAMILY OR YOUR PETS?: NO

## 2025-02-05 SDOH — SOCIAL STABILITY: SOCIAL INSECURITY: HAVE YOU HAD ANY THOUGHTS OF HARMING ANYONE ELSE?: NO

## 2025-02-05 SDOH — HEALTH STABILITY: PHYSICAL HEALTH: ON AVERAGE, HOW MANY MINUTES DO YOU ENGAGE IN EXERCISE AT THIS LEVEL?: 0 MIN

## 2025-02-05 SDOH — SOCIAL STABILITY: SOCIAL NETWORK: HOW OFTEN DO YOU ATTEND CHURCH OR RELIGIOUS SERVICES?: MORE THAN 4 TIMES PER YEAR

## 2025-02-05 SDOH — SOCIAL STABILITY: SOCIAL NETWORK: HOW OFTEN DO YOU ATTEND MEETINGS OF THE CLUBS OR ORGANIZATIONS YOU BELONG TO?: NEVER

## 2025-02-05 SDOH — HEALTH STABILITY: PHYSICAL HEALTH: ON AVERAGE, HOW MANY DAYS PER WEEK DO YOU ENGAGE IN MODERATE TO STRENUOUS EXERCISE (LIKE A BRISK WALK)?: 0 DAYS

## 2025-02-05 SDOH — HEALTH STABILITY: PHYSICAL HEALTH
HOW OFTEN DO YOU NEED TO HAVE SOMEONE HELP YOU WHEN YOU READ INSTRUCTIONS, PAMPHLETS, OR OTHER WRITTEN MATERIAL FROM YOUR DOCTOR OR PHARMACY?: RARELY

## 2025-02-05 SDOH — SOCIAL STABILITY: SOCIAL INSECURITY: HAVE YOU HAD THOUGHTS OF HARMING ANYONE ELSE?: NO

## 2025-02-05 SDOH — SOCIAL STABILITY: SOCIAL INSECURITY: WITHIN THE LAST YEAR, HAVE YOU BEEN AFRAID OF YOUR PARTNER OR EX-PARTNER?: NO

## 2025-02-05 SDOH — SOCIAL STABILITY: SOCIAL INSECURITY: DO YOU FEEL UNSAFE GOING BACK TO THE PLACE WHERE YOU ARE LIVING?: NO

## 2025-02-05 SDOH — SOCIAL STABILITY: SOCIAL INSECURITY: ARE THERE ANY APPARENT SIGNS OF INJURIES/BEHAVIORS THAT COULD BE RELATED TO ABUSE/NEGLECT?: NO

## 2025-02-05 SDOH — SOCIAL STABILITY: SOCIAL INSECURITY: DOES ANYONE TRY TO KEEP YOU FROM HAVING/CONTACTING OTHER FRIENDS OR DOING THINGS OUTSIDE YOUR HOME?: NO

## 2025-02-05 SDOH — SOCIAL STABILITY: SOCIAL INSECURITY: WERE YOU ABLE TO COMPLETE ALL THE BEHAVIORAL HEALTH SCREENINGS?: YES

## 2025-02-05 SDOH — SOCIAL STABILITY: SOCIAL INSECURITY: ARE YOU OR HAVE YOU BEEN THREATENED OR ABUSED PHYSICALLY, EMOTIONALLY, OR SEXUALLY BY ANYONE?: NO

## 2025-02-05 ASSESSMENT — LIFESTYLE VARIABLES
HOW MANY STANDARD DRINKS CONTAINING ALCOHOL DO YOU HAVE ON A TYPICAL DAY: PATIENT DOES NOT DRINK
HOW OFTEN DO YOU HAVE 6 OR MORE DRINKS ON ONE OCCASION: NEVER
AUDIT-C TOTAL SCORE: 0
HOW OFTEN DO YOU HAVE A DRINK CONTAINING ALCOHOL: NEVER
SKIP TO QUESTIONS 9-10: 1
AUDIT-C TOTAL SCORE: 0

## 2025-02-05 ASSESSMENT — PAIN - FUNCTIONAL ASSESSMENT
PAIN_FUNCTIONAL_ASSESSMENT: 0-10

## 2025-02-05 ASSESSMENT — ENCOUNTER SYMPTOMS
NEUROLOGICAL NEGATIVE: 1
HEMATOLOGIC/LYMPHATIC NEGATIVE: 1
GASTROINTESTINAL NEGATIVE: 1
PSYCHIATRIC NEGATIVE: 1
EYES NEGATIVE: 1
FATIGUE: 1
MUSCULOSKELETAL NEGATIVE: 1
SHORTNESS OF BREATH: 1
ALLERGIC/IMMUNOLOGIC NEGATIVE: 1
ENDOCRINE NEGATIVE: 1

## 2025-02-05 ASSESSMENT — PAIN SCALES - GENERAL
PAINLEVEL_OUTOF10: 7
PAINLEVEL_OUTOF10: 0 - NO PAIN
PAINLEVEL_OUTOF10: 0 - NO PAIN
PAINLEVEL_OUTOF10: 4

## 2025-02-05 ASSESSMENT — PAIN DESCRIPTION - DESCRIPTORS: DESCRIPTORS: SORE

## 2025-02-05 ASSESSMENT — COGNITIVE AND FUNCTIONAL STATUS - GENERAL
MOBILITY SCORE: 17
MOVING TO AND FROM BED TO CHAIR: A LITTLE
STANDING UP FROM CHAIR USING ARMS: A LITTLE
WALKING IN HOSPITAL ROOM: A LITTLE
MOVING FROM LYING ON BACK TO SITTING ON SIDE OF FLAT BED WITH BEDRAILS: A LITTLE
CLIMB 3 TO 5 STEPS WITH RAILING: A LOT
TURNING FROM BACK TO SIDE WHILE IN FLAT BAD: A LITTLE

## 2025-02-05 ASSESSMENT — ACTIVITIES OF DAILY LIVING (ADL)
BATHING: NEEDS ASSISTANCE
WALKS IN HOME: NEEDS ASSISTANCE
LACK_OF_TRANSPORTATION: NO
PATIENT'S MEMORY ADEQUATE TO SAFELY COMPLETE DAILY ACTIVITIES?: YES
FEEDING YOURSELF: INDEPENDENT
HEARING - RIGHT EAR: FUNCTIONAL
TOILETING: NEEDS ASSISTANCE
ASSISTIVE_DEVICE: CANE;WALKER
GROOMING: NEEDS ASSISTANCE
ADEQUATE_TO_COMPLETE_ADL: YES
DRESSING YOURSELF: NEEDS ASSISTANCE
HEARING - LEFT EAR: FUNCTIONAL
JUDGMENT_ADEQUATE_SAFELY_COMPLETE_DAILY_ACTIVITIES: YES

## 2025-02-05 NOTE — ED PROVIDER NOTES
"HPI   Chief Complaint   Patient presents with    Shortness of Breath       6-year-old female with history of CHF, EF 15 to 20%, on palliative milrinone drip along with coronary disease COPD and CKD presenting with 2 days of worsening shortness of breath.  Denies chest pain or peripheral edema.  Reports compliance with all her usual medications.  Has had dry cough without report of fever or known sick contacts.  Has been hospitalized with similar symptoms multiple times in the past per her report.  She has difficulty providing further history due to respiratory distress, asking for \"the mask\" to assist with her breathing.      History provided by:  Caregiver  History limited by:  Acuity of condition          Patient History   Past Medical History:   Diagnosis Date    Asthma     CAD (coronary artery disease)     CHF (congestive heart failure)     CKD (chronic kidney disease)     COPD (chronic obstructive pulmonary disease) (Multi)     CVA (cerebral vascular accident) (Multi)     Diabetes mellitus (Multi)     GERD (gastroesophageal reflux disease)     Hyperlipidemia     Hypertension     NSTEMI (non-ST elevated myocardial infarction) (Multi)     Unspecified systolic (congestive) heart failure 08/11/2022    HFrEF (heart failure with reduced ejection fraction)     Past Surgical History:   Procedure Laterality Date    CARDIAC CATHETERIZATION N/A 1/26/2024    Procedure: Right Heart Cath;  Surgeon: Cuate Dodson MD;  Location: [place] [address] [facility];  Service: Cardiovascular;  Laterality: N/A;    CARDIAC ELECTROPHYSIOLOGY PROCEDURE N/A 1/23/2025    Procedure: SHRUTHI DCCV;  Surgeon: Emilee Brewster MD;  Location: [place] [address] [facility]iac [facility];  Service: Electrophysiology;  Laterality: N/A;    MITRAL VALVE REPLACEMENT  06/2019    OTHER SURGICAL HISTORY  08/11/2022    Tonsillectomy    OTHER SURGICAL HISTORY  08/11/2022    Right ventricular assist device placement    OTHER SURGICAL HISTORY  08/11/2022    Mitral valve " replacement     Family History   Problem Relation Name Age of Onset    Other (CVA) Brother       Social History     Tobacco Use    Smoking status: Every Day     Current packs/day: 0.50     Types: Cigarettes    Smokeless tobacco: Never   Substance Use Topics    Alcohol use: Never    Drug use: Yes     Types: Marijuana     Comment: rarely       Physical Exam   ED Triage Vitals [02/05/25 1332]   Temperature Heart Rate Respirations BP   36.2 °C (97.1 °F) (!) 130 17 152/83      Pulse Ox Temp src Heart Rate Source Patient Position   98 % -- -- --      BP Location FiO2 (%)     -- --       Physical Exam  Constitutional:       Comments: Patient is diaphoretic in moderate respiratory distress, speaking short sentences   Eyes:      Pupils: Pupils are equal, round, and reactive to light.   Cardiovascular:      Rate and Rhythm: Regular rhythm. Tachycardia present.   Pulmonary:      Comments: Increased work of breathing and tachypnea noted.  Diminished breath sounds bilateral bases with rales right greater than left.  Abdominal:      Palpations: Abdomen is soft.      Tenderness: There is no abdominal tenderness.   Musculoskeletal:      Cervical back: Neck supple.      Right lower leg: No tenderness. No edema.      Left lower leg: No tenderness. No edema.   Skin:     General: Skin is warm.   Neurological:      General: No focal deficit present.           ED Course & MDM   Diagnoses as of 02/06/25 1701   Acute on chronic systolic heart failure                 No data recorded     Yahir Coma Scale Score: 15 (02/05/25 1333 : Myah Sanford RN)                           Medical Decision Making  Patient with significant heart failure as well as COPD presents in acute respiratory distress.  Clinical presentation most suggestive of decompensated heart failure.  After talking to family, apparently her milrinone dosage was recently reduced, which may be a factor.  EKG shows atrial flutter with variable block, similar in morphology to  recent priors.  Troponin chronically elevated, slightly above baseline.  BNP also elevated chronically.  Remainder of labs unremarkable.  EKG showing mild pulmonary edema.  Patient was initially treated with BiPAP, Lasix, DuoNebs.  Had been given Solu-Medrol by EMS.  Upon reassessment, patient looks much more comfortable.  Will attempt to wean off BiPAP.  Anticipate patient will require admission for diuresis and further treatment regardless.  Will be signed out to the oncoming team at change of shift who will reassess the patient and complete disposition.        Procedure  Procedures     Pj Medellin MD  02/05/25 0835       Pj Medellin MD  02/06/25 1702

## 2025-02-05 NOTE — H&P
[facility]  HFICU HISTORY AND PHYSICAL     Melissa Marinelli/[id number]    Admit Date: 2/5/2025  Hospital Length of Stay: 0   ICU Length of Stay: 1h   Primary Service:   Primary HF Cardiologist:   Referring:    HPI:     Melissa Marinelli is a 69 y.o. female with PMHx of ICM/ HFrEF (last EF 15-20% 7/2024, s/p [place] ICD 5/2020) on palliative milrinone infusion since 2/2024, CAD s/p NSTEMI s/p RIRI to LCX (Jan 2016), MVR s/p mitral valve repair in (June 2019; 29 mm Epic bioprosthetic valve with Dr. Montana), COPD, HTN, HL, GERD, hx of CVA, DM, who presented to [facility] ED 2/5 with acutely worsening shortness of breath and a clinical picture of decompensated heart failure.  Admitted to HF ICU ICU for continued BiPAP and diuresis.     Patient presented to ED via EMS for SOB. Pt is stating 98% on RA upon arrival though increased work of breathing is noted. Shortly after arrival pt became diaphoretic and nauseous with vomiting. Pt is sitting in tripod position. Wheezes noted. EMS gave 125 of solumedrol and a duoneb which had appeared to help.   Upon arriving in the HF ICU, patient on BIPAP, awake, alert, with conversational dyspnea. ST on monitor, 's, /79 (87), BLE slightly cold to touch, no edema, JVD positive,  ~12 cm. On BIPAP 30% FIO2, Sats 100%. On home milrinone drip at 0.25 mcg/kg/min via home pump through Wiley Catheter. Patient denies chest pain, cough, or ankle swelling. Plan to diuresis and for BIPAP overnight. .    Ms Marinelli has declined durable LVAD therapies in the past.       Interval History    Patient was last admitted here at [facility] and discharge from the CICU on 1/12 after having episodes of palpitations that were found to be VT. She was discharged with continuation of an amiodarone loading course. She has one more day of amiodarone BID, and then converting to every day. She has a noted allergy to red dye in normal amiodarone tablets. Pharmacy was consulted and white  pills were procured. After noting her to be in a-flutter, we consulted EP for potential cardioversion and she was underwent a SHRUTHI on 1/23/25. A Left atrial appendage clot was noted and cardioversion was cancelled. Amiodarone was continued at 200mg qD. Milrinone was arranged to be decreased from 0.375mcg/kg/min to 0.25mcg/kg/min (discussed with Dr. Rivera; HF provider) for lower atrial. Heparin drip was transitioned to Eliquis at loading dose with overall plans to continue DOAC for 1 month and re-evaluate with SHRUTHI. Spoke with patient about checking daily weights with Lasix 40 daily + additional dose PRN. Patient discharged 1/24.    Cardiac Tests:    SHRUTHI (1/23/25): 1.Left ventricular ejection fraction is severely decreased, by visual estimate at 10-15%.   2. Left ventricular diastolic filling cannot be determined, due to mitral valve repair/replacement.   3. The left atrium is enlarged.   4. The left atrial appendage appears dilated, with very dense spontaneous contrast, flow ectasia, and an image suggestive of a thrombus.   5. Here is a 4x6mm mobile echogenic image that appears to be attached to the device lead (Clip41).   6. There is an unknown bioprosthetic type mitral valve prosthesis.   7. Mildly elevated right ventricular systolic pressure.   8. Moderate tricuspid regurgitation visualized.   9. Mild aortic valve regurgitation.  10. There is plaque visualized in the descending aorta.    Past Medical History:  Past Medical History:   Diagnosis Date    Asthma     CAD (coronary artery disease)     CHF (congestive heart failure)     CKD (chronic kidney disease)     COPD (chronic obstructive pulmonary disease) (Multi)     CVA (cerebral vascular accident) (Multi)     Diabetes mellitus (Multi)     GERD (gastroesophageal reflux disease)     Hyperlipidemia     Hypertension     NSTEMI (non-ST elevated myocardial infarction) (Multi)     Unspecified systolic (congestive) heart failure 08/11/2022    HFrEF (heart failure  with reduced ejection fraction)       Past Surgical History:  Past Surgical History:   Procedure Laterality Date    CARDIAC CATHETERIZATION N/A 1/26/2024    Procedure: Right Heart Cath;  Surgeon: Cuate Dodson MD;  Location: [place] [address] Cardiac Cath Lab;  Service: Cardiovascular;  Laterality: N/A;    CARDIAC ELECTROPHYSIOLOGY PROCEDURE N/A 1/23/2025    Procedure: SHRUTHI DCCV;  Surgeon: Emilee Brewster MD;  Location: [place] [address] Cardiac Cath Lab;  Service: Electrophysiology;  Laterality: N/A;    MITRAL VALVE REPLACEMENT  06/2019    OTHER SURGICAL HISTORY  08/11/2022    Tonsillectomy    OTHER SURGICAL HISTORY  08/11/2022    Right ventricular assist device placement    OTHER SURGICAL HISTORY  08/11/2022    Mitral valve replacement       Family History:  Family History   Problem Relation Name Age of Onset    Other (CVA) Brother         Social History:  Social History     Socioeconomic History    Marital status: Single     Spouse name: Not on file    Number of children: Not on file    Years of education: Not on file    Highest education level: Not on file   Occupational History    Not on file   Tobacco Use    Smoking status: Every Day     Current packs/day: 0.50     Types: Cigarettes    Smokeless tobacco: Never   Substance and Sexual Activity    Alcohol use: Never    Drug use: Yes     Types: Marijuana     Comment: rarely    Sexual activity: Not on file   Other Topics Concern    Not on file   Social History Narrative    Not on file     Social Drivers of Health     Financial Resource Strain: Low Risk  (1/24/2025)    Overall Financial Resource Strain (CARDIA)     Difficulty of Paying Living Expenses: Not hard at all   Food Insecurity: No Food Insecurity (1/22/2025)    Hunger Vital Sign     Worried About Running Out of Food in the Last Year: Never true     Ran Out of Food in the Last Year: Never true   Transportation Needs: No Transportation Needs (1/28/2025)    OASIS : Transportation     Lack of Transportation  "(Medical): No     Lack of Transportation (Non-Medical): No     Patient Unable or Declines to Respond: No   Physical Activity: Inactive (2/17/2024)    Exercise Vital Sign     Days of Exercise per Week: 0 days     Minutes of Exercise per Session: 0 min   Stress: No Stress Concern Present (2/17/2024)    [employer] [place] [employer] - Occupational Stress Questionnaire     Feeling of Stress : Not at all   Social Connections: Feeling Socially Integrated (1/28/2025)    OASIS : Social Isolation     Frequency of experiencing loneliness or isolation: Never   Intimate Partner Violence: Not At Risk (1/22/2025)    Humiliation, Afraid, Rape, and Kick questionnaire     Fear of Current or Ex-Partner: No     Emotionally Abused: No     Physically Abused: No     Sexually Abused: No   Housing Stability: Low Risk  (1/24/2025)    Housing Stability Vital Sign     Unable to Pay for Housing in the Last Year: No     Number of Times Moved in the Last Year: 0     Homeless in the Last Year: No       Allergies:  Allergies   Allergen Reactions    Metoprolol Other, Shortness of breath and Respiratory depression    Ticagrelor Anaphylaxis and Other     Feels a severe \"burning feeling\" in her chest    Gadolinium-Containing Contrast Media Hives and Other    Iodinated Contrast Media Hives and Other    Statins-Hmg-Coa Reductase Inhibitors Myalgia, Other and Unknown     Atorvastatin - hair loss    Red Dye Unknown    Ace Inhibitors Other and Cough     COUGH    Fentanyl Dizziness and Drowsiness    Hydralazine Dermatitis, Rash and Nausea/vomiting    Nicorette [Nicotine (Polacrilex)] Nausea/vomiting     Nicorette gums and lozenges only. Okay with nicotine patches    Nicotine Nausea/vomiting    Penicillins Rash    Prednisone Other     Increased blood sugar       Prior to Admission Meds:  Medications Prior to Admission   Medication Sig Dispense Refill Last Dose/Taking    acetaminophen (Tylenol) 500 mg tablet Take 2 tablets (1,000 mg) by mouth " every 8 hours if needed for mild pain (1 - 3).       albuterol 90 mcg/actuation inhaler Inhale 2 puffs every 4 hours if needed for wheezing or shortness of breath. 8.5 g 11     amiodarone (Pacerone) 200 mg tablet Take 2 tablets (400 mg) by mouth 2 times a day for 10 days, THEN 1 tablet (200 mg) once daily. 70 tablet 0     apixaban (Eliquis) 5 mg tablet Take 2 tablets (10 mg) by mouth 2 times a day for 7 days, THEN 1 tablet (5 mg) 2 times a day. 180 tablet 0     aspirin 81 mg EC tablet Take 1 tablet (81 mg) by mouth once daily. 30 tablet 11     bumetanide (Bumex) 0.5 mg tablet Take 1 tablet (0.5 mg) by mouth 2 times a week. Please weigh yourself every day. If you gain 2 pounds within 2 days, start taking 0.5mg Bumex EVERY DAY. 8 tablet 1     cholecalciferol (Vitamin D3) 50 MCG (2000 UT) tablet Take 1 tablet by mouth once daily. Indications: low vitamin D levels       famotidine (Pepcid) 40 mg tablet Take 1 tablet (40 mg) by mouth once daily. Do not start before February 3, 2024. (Patient taking differently: Take 1 tablet (40 mg) by mouth once daily as needed for indigestion or heartburn.) 30 tablet 2     Farxiga 10 mg Take 1 tablet (10 mg) by mouth once daily. 30 tablet 11     heparin flush,porcine,-0.9NaCl 100 unit/mL kit 5 mL by central venous line infusion route 1 (one) time per week. Indications: prevent clot from blocking an intravenous catheter       hydrocortisone 1 % ointment Apply topically 2 times a day as needed for irritation. Chest wall       ipratropium-albuteroL (Duo-Neb) 0.5-2.5 mg/3 mL nebulizer solution Take 3 mL by nebulization every 6 hours.       milrinone in 5 % dextrose (Primacor) 20 mg/100 mL (200 mcg/mL) infusion Infuse 15.5 mcg/min at 4.65 mL/hr into a venous catheter continuously. Do not fill before January 28, 2025. 100 mL 3     mupirocin (Bactroban) 2 % ointment Apply topically. PRN for nose bleed       nicotine (Nicoderm CQ) 21 mg/24 hr patch Place 1 patch over 24 hours on the skin once  "every 24 hours.       OneTouch Verio Flex meter [facility] USE AS DIRECTED THREE TIMES DAILY       sodium chloride (Ocean) 0.65 % nasal spray Administer 1 spray into each nostril if needed for congestion.       sodium chloride 0.9% (sodium chloride) flush Infuse 10 mL into a venous catheter 1 (one) time per week. Indications: flush picc line       spironolactone (Aldactone) 25 mg tablet Take 0.5 tablets (12.5 mg) by mouth once daily. 15 tablet 11     Symbicort 80-4.5 mcg/actuation inhaler Inhale 2 puffs twice a day.          Current Medications:  Infusions:  milrinone, Last Rate: 0.25 mcg/kg/min (02/05/25 1736)      Scheduled:  amiodarone, 200 mg, Daily  [Held by provider] apixaban, 5 mg, BID  aspirin, 81 mg, Daily  cholecalciferol, 2,000 Units, Daily  dapagliflozin propanediol, 10 mg, Daily  [START ON 2/6/2025] famotidine, 10 mg, Daily  fluticasone furoate-vilanteroL, 1 puff, Daily  nicotine, 1 patch, q24h  [Held by provider] spironolactone, 12.5 mg, Daily      PRN:  acetaminophen, 975 mg, q8h PRN  albuterol, 2 puff, q4h PRN  ipratropium-albuteroL, 3 mL, q6h PRN  oxygen, , Continuous PRN - O2/gases  sodium chloride, 1 spray, PRN        Invasive Hemodynamics:    Most Recent Range Past 24hrs   BP (Art)   No data recorded   MAP(Art)   No data recorded   RA/CVP   No data recorded   PA   No data recorded   PA(mean)   No data recorded   PCWP   No data recorded   CO   No data recorded   CI   No data recorded   Mixed Venous   No data recorded   SVR    No data recorded   PVR   No data recorded     VENT:    Most Recent Range Past 24hrs   Mode      FiO2 30 % FiO2 (%)  Min: 30 %   Min taken time: 02/05/25 1648  Max: 40 %   Max taken time: 02/05/25 1405   Rate 16 Resp Rate (Set)  Min: 16   Min taken time: 02/05/25 1648  Max: 16   Max taken time: 02/05/25 1648   Vt    No data recorded   PEEP   No data recorded     PHYSICAL EXAM:   Visit Vitals  /79   Pulse (!) 120   Temp 36.2 °C (97.1 °F)   Resp 24   Ht 1.626 m (5' 4\")   Wt 63.2 " kg (139 lb 5.3 oz)   SpO2 100%   BMI 23.92 kg/m²   OB Status Postmenopausal   Smoking Status Every Day   BSA 1.69 m²       Wt Readings from Last 5 Encounters:   02/05/25 63.2 kg (139 lb 5.3 oz)   01/28/25 59 kg (130 lb)   01/24/25 62 kg (136 lb 11 oz)   01/12/25 63.1 kg (139 lb 3.2 oz)   01/09/25 58.5 kg (129 lb)       INTAKE/OUTPUT:  No intake/output data recorded.     Physical Exam  Constitutional:       Appearance: She is ill-appearing.   HENT:      Head: Normocephalic and atraumatic.      Nose: Nose normal.   Eyes:      Pupils: Pupils are equal, round, and reactive to light.   Cardiovascular:      Rate and Rhythm: Regular rhythm. Tachycardia present.   Pulmonary:      Effort: Tachypnea and prolonged expiration present.      Breath sounds: Normal air entry. Examination of the right-lower field reveals decreased breath sounds. Examination of the left-lower field reveals decreased breath sounds. Decreased breath sounds present.   Abdominal:      General: Abdomen is flat. Bowel sounds are normal.      Palpations: Abdomen is soft.   Musculoskeletal:         General: Normal range of motion.      Cervical back: Normal range of motion and neck supple.   Skin:     General: Skin is warm.   Neurological:      General: No focal deficit present.      Mental Status: She is alert and oriented to person, place, and time.   Psychiatric:         Mood and Affect: Mood normal.         Behavior: Behavior normal.         Review of Systems   Constitutional:  Positive for fatigue.   HENT: Negative.     Eyes: Negative.    Respiratory:  Positive for shortness of breath.    Gastrointestinal: Negative.    Endocrine: Negative.    Genitourinary: Negative.    Musculoskeletal: Negative.    Skin: Negative.    Allergic/Immunologic: Negative.    Neurological: Negative.    Hematological: Negative.    Psychiatric/Behavioral: Negative.         DATA:  CMP:  Recent Labs     02/05/25  1339 01/24/25  0715 01/23/25  1916 01/23/25  0649 01/22/25 2038  01/22/25 1247 01/22/25  0511 01/21/25 1919 01/21/25 0149 01/12/25 0320 01/11/25  0132 01/10/25  2003 01/10/25  1845 08/19/24  1104 07/11/24  0420 07/10/24  0547 07/09/24  0527   * 134* 134* 133* 134* 131* 134* 134* 138 137 134*  --  136   < > 138 137 141   K 5.3 4.5 4.1 4.7 4.7 5.2 5.4* 5.2 4.8 4.1 3.5   < > 4.4   < > 4.0 4.4 5.4*    99 98 99 97* 97* 98 100 105 103 94*  --  103   < > 104 102 102   CO2 21 26 26 23 25 22 22 18* 20* 22 23  --  23   < > 26 26 22   ANIONGAP 18 14 14 16 17 17 19 21* 18 16 21*  --  14   < > 12 13 22*   BUN 34* 33* 34* 37* 37* 33* 31* 30* 21 12 12  --  14   < > 33* 42* 39*   CREATININE 2.15* 2.09* 2.46* 2.18* 2.31* 2.02* 1.91* 1.90* 1.73* 1.38* 1.22*  --  1.43*   < > 1.59* 2.11* 2.57*   EGFR 24* 25* 21* 24* 22* 26* 28* 28* 31* 41* 48*  --  40*   < > 35* 25* 20*   MG  --  2.40  --  2.55*  --   --  2.55*  --  2.43* 1.93 1.96  --  2.19  --  2.23 2.12 2.41*    < > = values in this interval not displayed.     Recent Labs     02/05/25 1339 01/24/25  0715 01/23/25 1916 01/23/25  0649 01/22/25 2038 01/22/25 1247 01/22/25  0511 01/21/25 1919 01/21/25 0149 01/12/25 0320 01/11/25  0132 09/07/24  1008 08/19/24  1104 07/09/24  0527 07/08/24  1137 07/08/24  0643 02/17/24  1748 02/17/24  0252 11/21/23  0315 11/20/23  1548 08/31/23  1041 08/31/23  0430 08/31/23  0356 03/21/23 2005 03/21/23  0203 03/20/23  2235 12/23/21  0454 12/20/21  0701 07/14/21  1234 07/13/21  0700   ALBUMIN 4.3 3.6 3.5 3.9 4.0 4.0 3.6 4.0 4.0 3.5   < > 4.3 4.4   < > 4.1 4.0   < > 3.7   < > 4.0   < >  --  3.8   < > 4.3 CANCELED   < > 4.4   < > 4.0   ALT 42  --   --   --   --   --   --   --  42 7  --  13 10  --  13 11  --  26   < > 15   < >  --  9  --  21 CANCELED   < > 10  --  10   AST 35  --   --   --   --   --   --   --  26 12  --  21 19  --  23 29  --  23   < > 20   < >  --  13  --  38 CANCELED   < > 19  --  21   BILITOT 0.5  --   --   --   --   --   --   --  0.5 0.3  --  0.4 0.3  --  0.4 0.3  --  0.4   < >  0.4   < >  --  0.3  --  0.4 CANCELED   < > 0.3  --  0.4   LIPASE  --   --   --   --   --   --   --   --   --   --   --   --   --   --   --   --   --   --   --  22  --  CANCELED 54  --  59 CANCELED  --  96*  --  75    < > = values in this interval not displayed.     CBC:  Recent Labs     02/05/25  1339 01/24/25  0715 01/23/25  0649 01/22/25  0511 01/21/25  0149 01/12/25  0320 01/11/25  0132 01/10/25  1845   WBC 9.7 6.2 7.2 6.0 4.6 4.8 4.7 6.3   HGB 12.3 11.6* 11.7* 10.8* 11.7* 10.8* 11.4* 12.8   HCT 37.2 35.5* 37.3 31.3* 35.7* 33.4* 32.9* 38.4    323 289 259 224 167 174 195   MCV 91 91 93 86 89 93 88 88     COAG:   Recent Labs     01/21/25  0149 09/08/24  0647 07/08/24  1137 07/08/24  0643 06/02/24 0329 02/17/24  0252 01/23/24  2252 01/22/24  0521   INR 1.1 1.0 1.0 0.9 1.0 1.1 1.1 1.0     ABO:   Recent Labs     01/21/25 0821   ABO O     HEME/ENDO:  Recent Labs     02/05/25 1433 02/05/25 1339 01/10/25  2003 08/19/24  1104 07/08/24  1137 04/24/24  1640   FERRITIN  --   --   --  63 74  --    IRONSAT  --  21*  --  28 18*  --    TSH 1.40  --  0.57  --  2.87  --    HGBA1C  --   --   --   --  5.2 5.3      CARDIAC:   Recent Labs     02/05/25  1433 02/05/25 1339 01/21/25  0445 01/21/25  0149 01/10/25  2003 01/10/25  1845 09/07/24  1008 08/19/24  1104 07/10/24  0547 07/08/24 2007 07/08/24  1137 07/08/24  0758 07/08/24  0643 02/17/24  0347 02/17/24  0252 05/02/21  1255 05/21/20  2102 12/06/19  1942 11/29/19  0937 11/22/19  0418 11/21/19  0513 11/20/19  0327 11/19/19  0411 11/18/19  0808 11/17/19  0436   LDH  --   --   --   --   --   --   --   --   --   --   --   --   --   --   --   --  293*  --  414* 481* 463* 539* 479* 528* 585*   TROPHS 212* 220* 175* 180* 159* 257* 240*  --  2,723*   < > 628*   < > 238*   < > 170*   < >  --   --   --   --   --   --   --   --   --    BNP  --  886*  --  785*  --  1,044* 195* 297*  --   --  2,095*  --  509*  --  1,139*   < >  --    < >  --   --   --   --   --   --   --     < > =  "values in this interval not displayed.     Recent Labs     07/10/24  1320 07/10/24  0547 07/09/24  2338 07/09/24  1723 07/09/24  1250 07/08/24  1416 01/27/24  0432 01/25/24  2333 01/23/24  1121 11/21/23  0321 08/31/23  0844   LACMX  --   --   --   --  1.4 3.1*  --   --   --   --   --    LACTATEART  --   --   --   --   --   --   --  1.6 2.3*  --  1.6   SO2MV 61 65 61 61 73 84*   < >  --   --    < >  --    O2CMX 59.8 63.2 59.3 59.9 71.5 81.8*   < >  --   --    < >  --     < > = values in this interval not displayed.     No results for input(s): \"TACROLIMUS\", \"SIROLIMUS\", \"CYCLOSPORINE\" in the last 83561 hours.      ASSESSMENT AND PLAN:   Neuro:  # Anxiety. Depression, Acute pain   - Serial neuro and pain assessments   - PO Tylenol PRN for pain  - PT/OT Consult, OOB to chair  - CAM ICU score every shift  - Sleep/wake cycle normalization    # Physical Status  -Normal: BMI: 23.9  -Age-related debility/bed confined? /Reduced Mobility     #Substance abuse  -Alcohol abuse/Alcohol dependence: NO   -Tobacco use/Nicotine Dependence: Yes  - Refused Nicotine patch     Cardiovascular:   # ICM, stage D, HFrEF,   - SHRUTHI (1/23/25): LVEF 10-15%, The left atrial appendage appears dilated, with very dense spontaneous contrast, flow ectasia, and an image suggestive of a thrombus, Mobile echogenic image that appears to be attached to the device lead (Clip41), Mildly elevated right ventricular systolic pressure. Moderate tricuspid regurgitation, mild aortic valve regurgitation  - Plan SHRUTHI in 1 month (2/24/25) per chart review  - admit weight (2/5): 63.2 KG   - admit BNP (2/5): 886  - C/w ASA  - C/w Farxigo 10 mg daily  - Hold home Spirolactone 12.5 mg daily for now as PATSY   - Diuretics: Lasix 40 mg x1 in HF ICU   - C/w Home Milrinone drip at 0.25 mcg/kg/min   - Daily standing weights, 2gm sodium diet, 2L fluid restriction, strict I&Os    #CAD, s/p PCI 2016   -  C/w ASA 81mg daily  -  No statin due to allergy       #VT history "   -Device  -C/w Amiodarone 200 mg PO daily     #Electrolyte Disturbances  -Keep K>4, Mag >2     Pulmonary:   # Hx of COPD  - continue home meds  - continue breo ellipta  - Flu A, Flu B, Covid 19 (2/5): Negative   -Monitor and maintain SpO2 > 92%    GI:  - Bowel regimen  - C/w Famotidine 10 mg daily     :  #PATSY/CKD stage 3B  -Baseline BUN/Cr: 1.4-1.5   -Admit BUN/Cr (2/5): 34/2.15   -I/Os  -avoid hypotension and nephrotoxic agents    Heme:  #Anemia in the setting of chronic disease   - Labs (2/5): H/H: 12.3/37.2, TIBC: 341, ferritin: 81, Sats: 21%,  serum Fe: 72, folate: 20.2,  B12 : Pending      # Coagulopathy due to on home AC  -Hold home Eliquis  -Will transition to heparin drip if Heparin assay in the therapeutic range    Endo:  #DM  - Euglycemic  - hgbA1c (2/5): Pending     #Thyroid  -TSH (2/5): Pending      ID:  -afebrile, nontoxic   -no s/s infx  -trend temps q4h      PHYSICAL AND OCCUPATIONAL THERAPY:    LINES:  PIVs   Wiley     DVT: Heparin drip if Heparin assay within therapeutic range  VAP BUNDLE: NA   CENTRAL LINE BUNDLE: Wiley   ULCER PPX: PPI   GLYCEMIC CONTROL:  BOWEL CARE: Senna, Miralax   INDWELLING CATHETER: NA   NUTRITION: NPO Diet Except: Sips with meds; Effective now      EMERGENCY CONTACT: Extended Emergency Contact Information  Primary Emergency Contact: Mary Caruso  Mobile Phone: [phone]  Relation: Daughter   needed? No  Secondary Emergency Contact: Elin Marinelli  Mobile Phone: [phone]  Relation: Daughter  Preferred language: English   needed? No  FAMILY UPDATE:  CODE STATUS: Full Code  DISPO:   HF ICU   Patient seen and assessed with Dr. BRITTNI ESPINAL personally spent 74 minutes of critical care time directly and personally managing the patient exclusive of separately billable procedures   _________________________________________________  JASMIN [employer]

## 2025-02-05 NOTE — ED TRIAGE NOTES
Pt presents to ED via EMS for SOB. Pt is stating 98% on RA upon arrival though increased work of breathing is noted. Shortly after arrival pt became diaphoretic and nauseous with vomiting. Pt is sitting in tripod position. Wheezes noted. EMS gave 125 of solumedrol and a duoneb which had appeared to help. ST noted on monitor. Provider called to bedside.

## 2025-02-05 NOTE — CARE PLAN
Problem: Pain - Adult  Goal: Verbalizes/displays adequate comfort level or baseline comfort level  Outcome: Progressing  Flowsheets (Taken 2/5/2025 1851)  Verbalizes/displays adequate comfort level or baseline comfort level:   Encourage patient to monitor pain and request assistance   Assess pain using appropriate pain scale   Administer analgesics based on type and severity of pain and evaluate response   Implement non-pharmacological measures as appropriate and evaluate response     Problem: Safety - Adult  Goal: Free from fall injury  Outcome: Progressing  Flowsheets (Taken 2/5/2025 1851)  Free from fall injury:   Instruct family/caregiver on patient safety   Based on caregiver fall risk screen, instruct family/caregiver to ask for assistance with transferring infant if caregiver noted to have fall risk factors     Problem: Discharge Planning  Goal: Discharge to home or other facility with appropriate resources  Outcome: Progressing  Flowsheets (Taken 2/5/2025 1851)  Discharge to home or other facility with appropriate resources:   Identify barriers to discharge with patient and caregiver   Arrange for needed discharge resources and transportation as appropriate     Problem: Chronic Conditions and Co-morbidities  Goal: Patient's chronic conditions and co-morbidity symptoms are monitored and maintained or improved  Flowsheets (Taken 2/5/2025 1851)  Care Plan - Patient's Chronic Conditions and Co-Morbidity Symptoms are Monitored and Maintained or Improved: Monitor and assess patient's chronic conditions and comorbid symptoms for stability, deterioration, or improvement   The patient's goals for the shift include      The clinical goals for the shift include pt will maintain O2 sat >92, weaning O2 as able

## 2025-02-05 NOTE — CARE PLAN
The patient's goals for the shift include      The clinical goals for the shift include pt will maintain O2 sat >92, weaning O2 as able

## 2025-02-06 ENCOUNTER — APPOINTMENT (OUTPATIENT)
Dept: CARDIOLOGY | Facility: HOSPITAL | Age: 70
DRG: 291 | End: 2025-02-06
Payer: COMMERCIAL

## 2025-02-06 LAB
ALBUMIN SERPL BCP-MCNC: 3.6 G/DL (ref 3.4–5)
ALBUMIN SERPL BCP-MCNC: 3.7 G/DL (ref 3.4–5)
ANION GAP SERPL CALC-SCNC: 16 MMOL/L (ref 10–20)
ANION GAP SERPL CALC-SCNC: 18 MMOL/L (ref 10–20)
ATRIAL RATE: 214 BPM
BUN SERPL-MCNC: 43 MG/DL (ref 6–23)
BUN SERPL-MCNC: 45 MG/DL (ref 6–23)
CALCIUM SERPL-MCNC: 8.8 MG/DL (ref 8.6–10.6)
CALCIUM SERPL-MCNC: 8.9 MG/DL (ref 8.6–10.6)
CHLORIDE SERPL-SCNC: 102 MMOL/L (ref 98–107)
CHLORIDE SERPL-SCNC: 93 MMOL/L (ref 98–107)
CO2 SERPL-SCNC: 23 MMOL/L (ref 21–32)
CO2 SERPL-SCNC: 25 MMOL/L (ref 21–32)
CREAT SERPL-MCNC: 2.24 MG/DL (ref 0.5–1.05)
CREAT SERPL-MCNC: 2.38 MG/DL (ref 0.5–1.05)
EGFRCR SERPLBLD CKD-EPI 2021: 21 ML/MIN/1.73M*2
EGFRCR SERPLBLD CKD-EPI 2021: 23 ML/MIN/1.73M*2
ERYTHROCYTE [DISTWIDTH] IN BLOOD BY AUTOMATED COUNT: 15.6 % (ref 11.5–14.5)
GLUCOSE BLD MANUAL STRIP-MCNC: 151 MG/DL (ref 74–99)
GLUCOSE SERPL-MCNC: 120 MG/DL (ref 74–99)
GLUCOSE SERPL-MCNC: 166 MG/DL (ref 74–99)
HCT VFR BLD AUTO: 34.2 % (ref 36–46)
HGB BLD-MCNC: 10.6 G/DL (ref 12–16)
MCH RBC QN AUTO: 30 PG (ref 26–34)
MCHC RBC AUTO-ENTMCNC: 31 G/DL (ref 32–36)
MCV RBC AUTO: 97 FL (ref 80–100)
NRBC BLD-RTO: 0 /100 WBCS (ref 0–0)
PHOSPHATE SERPL-MCNC: 3.8 MG/DL (ref 2.5–4.9)
PHOSPHATE SERPL-MCNC: 5.1 MG/DL (ref 2.5–4.9)
PLATELET # BLD AUTO: 204 X10*3/UL (ref 150–450)
POTASSIUM SERPL-SCNC: 4.4 MMOL/L (ref 3.5–5.3)
POTASSIUM SERPL-SCNC: 5.6 MMOL/L (ref 3.5–5.3)
Q ONSET: 210 MS
QRS COUNT: 19 BEATS
QRS DURATION: 124 MS
QT INTERVAL: 358 MS
QTC CALCULATION(BAZETT): 503 MS
QTC FREDERICIA: 449 MS
R AXIS: 105 DEGREES
RBC # BLD AUTO: 3.53 X10*6/UL (ref 4–5.2)
SODIUM SERPL-SCNC: 130 MMOL/L (ref 136–145)
SODIUM SERPL-SCNC: 137 MMOL/L (ref 136–145)
T AXIS: 12 DEGREES
T OFFSET: 389 MS
UFH PPP CHRO-ACNC: 1.9 IU/ML
UFH PPP CHRO-ACNC: >2 IU/ML
UFH PPP CHRO-ACNC: >2 IU/ML
VENTRICULAR RATE: 119 BPM
WBC # BLD AUTO: 5.7 X10*3/UL (ref 4.4–11.3)

## 2025-02-06 PROCEDURE — 80069 RENAL FUNCTION PANEL: CPT | Performed by: NURSE PRACTITIONER

## 2025-02-06 PROCEDURE — 93005 ELECTROCARDIOGRAM TRACING: CPT

## 2025-02-06 PROCEDURE — 2500000002 HC RX 250 W HCPCS SELF ADMINISTERED DRUGS (ALT 637 FOR MEDICARE OP, ALT 636 FOR OP/ED)

## 2025-02-06 PROCEDURE — 37799 UNLISTED PX VASCULAR SURGERY: CPT

## 2025-02-06 PROCEDURE — 2500000004 HC RX 250 GENERAL PHARMACY W/ HCPCS (ALT 636 FOR OP/ED): Performed by: NURSE PRACTITIONER

## 2025-02-06 PROCEDURE — 94640 AIRWAY INHALATION TREATMENT: CPT

## 2025-02-06 PROCEDURE — 2500000004 HC RX 250 GENERAL PHARMACY W/ HCPCS (ALT 636 FOR OP/ED)

## 2025-02-06 PROCEDURE — 97530 THERAPEUTIC ACTIVITIES: CPT | Mod: GP

## 2025-02-06 PROCEDURE — 99232 SBSQ HOSP IP/OBS MODERATE 35: CPT | Performed by: NURSE PRACTITIONER

## 2025-02-06 PROCEDURE — 37799 UNLISTED PX VASCULAR SURGERY: CPT | Performed by: NURSE PRACTITIONER

## 2025-02-06 PROCEDURE — 94660 CPAP INITIATION&MGMT: CPT

## 2025-02-06 PROCEDURE — 85520 HEPARIN ASSAY: CPT | Performed by: NURSE PRACTITIONER

## 2025-02-06 PROCEDURE — 80069 RENAL FUNCTION PANEL: CPT

## 2025-02-06 PROCEDURE — 4B02XSZ MEASUREMENT OF CARDIAC PACEMAKER, EXTERNAL APPROACH: ICD-10-PCS | Performed by: NURSE PRACTITIONER

## 2025-02-06 PROCEDURE — 93289 INTERROG DEVICE EVAL HEART: CPT | Performed by: STUDENT IN AN ORGANIZED HEALTH CARE EDUCATION/TRAINING PROGRAM

## 2025-02-06 PROCEDURE — 93289 INTERROG DEVICE EVAL HEART: CPT

## 2025-02-06 PROCEDURE — 2500000002 HC RX 250 W HCPCS SELF ADMINISTERED DRUGS (ALT 637 FOR MEDICARE OP, ALT 636 FOR OP/ED): Performed by: NURSE PRACTITIONER

## 2025-02-06 PROCEDURE — 82947 ASSAY GLUCOSE BLOOD QUANT: CPT

## 2025-02-06 PROCEDURE — 99291 CRITICAL CARE FIRST HOUR: CPT | Performed by: STUDENT IN AN ORGANIZED HEALTH CARE EDUCATION/TRAINING PROGRAM

## 2025-02-06 PROCEDURE — 2500000002 HC RX 250 W HCPCS SELF ADMINISTERED DRUGS (ALT 637 FOR MEDICARE OP, ALT 636 FOR OP/ED): Performed by: STUDENT IN AN ORGANIZED HEALTH CARE EDUCATION/TRAINING PROGRAM

## 2025-02-06 PROCEDURE — 94762 N-INVAS EAR/PLS OXIMTRY CONT: CPT

## 2025-02-06 PROCEDURE — 2500000001 HC RX 250 WO HCPCS SELF ADMINISTERED DRUGS (ALT 637 FOR MEDICARE OP): Performed by: NURSE PRACTITIONER

## 2025-02-06 PROCEDURE — 85027 COMPLETE CBC AUTOMATED: CPT

## 2025-02-06 PROCEDURE — 2020000001 HC ICU ROOM DAILY

## 2025-02-06 PROCEDURE — 97116 GAIT TRAINING THERAPY: CPT | Mod: GP

## 2025-02-06 PROCEDURE — 97162 PT EVAL MOD COMPLEX 30 MIN: CPT | Mod: GP

## 2025-02-06 RX ORDER — SACUBITRIL AND VALSARTAN 24; 26 MG/1; MG/1
1 TABLET, FILM COATED ORAL 2 TIMES DAILY
Status: DISCONTINUED | OUTPATIENT
Start: 2025-02-06 | End: 2025-02-09 | Stop reason: HOSPADM

## 2025-02-06 RX ORDER — SODIUM NITROPRUSSIDE IN 0.9% SODIUM CHLORIDE 0.5 MG/ML
INJECTION INTRAVENOUS
Status: COMPLETED
Start: 2025-02-06 | End: 2025-02-06

## 2025-02-06 RX ORDER — SODIUM NITROPRUSSIDE IN 0.9% SODIUM CHLORIDE 0.5 MG/ML
.25-5 INJECTION INTRAVENOUS CONTINUOUS
Status: DISCONTINUED | OUTPATIENT
Start: 2025-02-06 | End: 2025-02-07

## 2025-02-06 RX ADMIN — ACETAMINOPHEN 975 MG: 325 TABLET ORAL at 00:38

## 2025-02-06 RX ADMIN — AMIODARONE HYDROCHLORIDE 200 MG: 200 TABLET ORAL at 08:56

## 2025-02-06 RX ADMIN — FAMOTIDINE 10 MG: 20 TABLET, FILM COATED ORAL at 08:56

## 2025-02-06 RX ADMIN — MILRINONE LACTATE IN DEXTROSE 0.25 MCG/KG/MIN: 200 INJECTION, SOLUTION INTRAVENOUS at 05:25

## 2025-02-06 RX ADMIN — Medication 2000 UNITS: at 08:56

## 2025-02-06 RX ADMIN — SODIUM ZIRCONIUM CYCLOSILICATE 10 G: 10 POWDER, FOR SUSPENSION ORAL at 15:30

## 2025-02-06 RX ADMIN — SALINE NASAL SPRAY 1 SPRAY: 1.5 SOLUTION NASAL at 08:58

## 2025-02-06 RX ADMIN — SODIUM NITROPRUSSIDE IN 0.9% SODIUM CHLORIDE 0.5 MCG/KG/MIN: 0.5 INJECTION INTRAVENOUS at 16:33

## 2025-02-06 RX ADMIN — IPRATROPIUM BROMIDE AND ALBUTEROL SULFATE 3 ML: .5; 3 SOLUTION RESPIRATORY (INHALATION) at 05:00

## 2025-02-06 RX ADMIN — ASPIRIN 81 MG: 81 TABLET ORAL at 08:56

## 2025-02-06 RX ADMIN — IPRATROPIUM BROMIDE AND ALBUTEROL SULFATE 3 ML: .5; 3 SOLUTION RESPIRATORY (INHALATION) at 08:00

## 2025-02-06 RX ADMIN — SODIUM NITROPRUSSIDE IN 0.9% SODIUM CHLORIDE 2.5 MCG/KG/MIN: 0.5 INJECTION INTRAVENOUS at 05:26

## 2025-02-06 RX ADMIN — SODIUM NITROPRUSSIDE IN 0.9% SODIUM CHLORIDE 0.25 MCG/KG/MIN: 0.5 INJECTION INTRAVENOUS at 00:35

## 2025-02-06 RX ADMIN — ACETAMINOPHEN 975 MG: 325 TABLET ORAL at 13:07

## 2025-02-06 RX ADMIN — SACUBITRIL AND VALSARTAN 1 TABLET: 24; 26 TABLET, FILM COATED ORAL at 20:38

## 2025-02-06 RX ADMIN — SACUBITRIL AND VALSARTAN 1 TABLET: 24; 26 TABLET, FILM COATED ORAL at 13:05

## 2025-02-06 RX ADMIN — IPRATROPIUM BROMIDE AND ALBUTEROL SULFATE 3 ML: .5; 3 SOLUTION RESPIRATORY (INHALATION) at 14:34

## 2025-02-06 RX ADMIN — DAPAGLIFLOZIN 10 MG: 10 TABLET, FILM COATED ORAL at 08:55

## 2025-02-06 RX ADMIN — IPRATROPIUM BROMIDE AND ALBUTEROL SULFATE 3 ML: .5; 3 SOLUTION RESPIRATORY (INHALATION) at 20:30

## 2025-02-06 RX ADMIN — SODIUM ZIRCONIUM CYCLOSILICATE 10 G: 10 POWDER, FOR SUSPENSION ORAL at 08:58

## 2025-02-06 ASSESSMENT — COGNITIVE AND FUNCTIONAL STATUS - GENERAL
WALKING IN HOSPITAL ROOM: A LITTLE
TURNING FROM BACK TO SIDE WHILE IN FLAT BAD: A LITTLE
DRESSING REGULAR UPPER BODY CLOTHING: A LITTLE
MOVING FROM LYING ON BACK TO SITTING ON SIDE OF FLAT BED WITH BEDRAILS: A LITTLE
TOILETING: A LITTLE
TURNING FROM BACK TO SIDE WHILE IN FLAT BAD: A LITTLE
DAILY ACTIVITIY SCORE: 18
EATING MEALS: A LITTLE
DRESSING REGULAR LOWER BODY CLOTHING: A LITTLE
CLIMB 3 TO 5 STEPS WITH RAILING: A LOT
STANDING UP FROM CHAIR USING ARMS: A LITTLE
MOBILITY SCORE: 17
WALKING IN HOSPITAL ROOM: A LITTLE
PATIENT BASELINE BEDBOUND: NO
MOBILITY SCORE: 17
MOVING FROM LYING ON BACK TO SITTING ON SIDE OF FLAT BED WITH BEDRAILS: A LITTLE
PERSONAL GROOMING: A LITTLE
MOVING TO AND FROM BED TO CHAIR: A LITTLE
CLIMB 3 TO 5 STEPS WITH RAILING: A LOT
STANDING UP FROM CHAIR USING ARMS: A LITTLE
HELP NEEDED FOR BATHING: A LITTLE
MOVING TO AND FROM BED TO CHAIR: A LITTLE

## 2025-02-06 ASSESSMENT — PAIN SCALES - GENERAL
PAINLEVEL_OUTOF10: 0 - NO PAIN
PAINLEVEL_OUTOF10: 8
PAINLEVEL_OUTOF10: 0 - NO PAIN

## 2025-02-06 ASSESSMENT — PAIN - FUNCTIONAL ASSESSMENT
PAIN_FUNCTIONAL_ASSESSMENT: 0-10

## 2025-02-06 ASSESSMENT — ACTIVITIES OF DAILY LIVING (ADL): ADL_ASSISTANCE: INDEPENDENT

## 2025-02-06 NOTE — PROGRESS NOTES
"[facility]  HFICU PROGRESS NOTE    Melissa Marinelli/[id number]    Admit Date: 2/5/2025  Hospital Length of Stay: 1   ICU Length of Stay: 22h   Primary Service:   Primary HF Cardiologist:   Referring:    INTERVAL EVENTS / PERTINENT ROS:     Admitted last evening from ED for SOB and decompensate heart failure needed for BIPAP and diuresis.  Diuresed overnight with 1 L urine output. Nipride drip initiated for high MAPS.    No complaints this morning. States feeling better and OFF BIPAP, able to eat and drink. On 4 L O2 NC.    Plan:   - Entresto 24-26 mg BID  - ICD interrogation  - Wean Nipride as able   - Wean O2     MEDICATIONS  Infusions:  heparin  milrinone, Last Rate: 0.25 mcg/kg/min (02/06/25 1509)  nitroprusside, Last Rate: 1 mcg/kg/min (02/06/25 1509)      Scheduled:  amiodarone, 200 mg, Daily  [Held by provider] apixaban, 5 mg, BID  aspirin, 81 mg, Daily  cholecalciferol, 2,000 Units, Daily  dapagliflozin propanediol, 10 mg, Daily  famotidine, 10 mg, Daily  fluticasone furoate-vilanteroL, 1 puff, Daily  nicotine, 1 patch, q24h  sacubitriL-valsartan, 1 tablet, BID  sodium zirconium cyclosilicate, 10 g, q8h  [Held by provider] spironolactone, 12.5 mg, Daily      PRN:  acetaminophen, 975 mg, q8h PRN  albuterol, 2 puff, q4h PRN  heparin, 1,500-3,000 Units, q4h PRN  ipratropium-albuteroL, 3 mL, q6h PRN  oxygen, , Continuous PRN - O2/gases  sodium chloride, 1 spray, PRN      VENT:    Most Recent Range Past 24hrs   Mode      FiO2 36 % FiO2 (%)  Min: 30 %   Min taken time: 02/05/25 2020  Max: 36 %   Max taken time: 02/06/25 0500   Rate 16 Resp Rate (Set)  Min: 16   Min taken time: 02/06/25 0021  Max: 16   Max taken time: 02/06/25 0021   Vt    No data recorded   PEEP   No data recorded     PHYSICAL EXAM:   Visit Vitals  /58   Pulse (!) 117   Temp 36.8 °C (98.2 °F) (Temporal)   Resp 26   Ht 1.626 m (5' 4\")   Wt 63.2 kg (139 lb 5.3 oz)   SpO2 100%   BMI 23.92 kg/m²   OB Status Postmenopausal " "  Smoking Status Every Day   BSA 1.69 m²       Wt Readings from Last 5 Encounters:   02/05/25 63.2 kg (139 lb 5.3 oz)   01/28/25 59 kg (130 lb)   01/24/25 62 kg (136 lb 11 oz)   01/12/25 63.1 kg (139 lb 3.2 oz)   01/09/25 58.5 kg (129 lb)       INTAKE/OUTPUT:  I/O last 3 completed shifts:  In: 59.1 (0.9 mL/kg) [I.V.:59.1 (0.9 mL/kg)]  Out: 1025 (16.2 mL/kg) [Urine:1025 (0.5 mL/kg/hr)]  Weight: 63.2 kg      Physical Exam    DATA:  CMP:  Results from last 7 days   Lab Units 02/06/25 0427 02/05/25 2131 02/05/25 1824 02/05/25  1339   SODIUM mmol/L 137 137  --  135*   POTASSIUM mmol/L 5.6* 5.8*  --  5.3   CHLORIDE mmol/L 102 102  --  101   CO2 mmol/L 23 22  --  21   ANION GAP mmol/L 18 19  --  18   BUN mg/dL 43* 40*  --  34*   CREATININE mg/dL 2.24* 2.19*  --  2.15*   EGFR mL/min/1.73m*2 23* 24*  --  24*   MAGNESIUM mg/dL  --   --  2.89*  --    ALBUMIN g/dL 3.7 4.0 3.9 4.3   ALT U/L  --   --   --  42   AST U/L  --   --   --  35   BILIRUBIN TOTAL mg/dL  --   --   --  0.5     CBC:  Results from last 7 days   Lab Units 02/06/25 0427 02/05/25  1339   WBC AUTO x10*3/uL 5.7 9.7   HEMOGLOBIN g/dL 10.6* 12.3   HEMATOCRIT % 34.2* 37.2   PLATELETS AUTO x10*3/uL 204 280   MCV fL 97 91     COAG:   Results from last 7 days   Lab Units 02/05/25 2131   INR  2.0*     ABO: No results found for: \"ABO\"  HEME/ENDO:  Results from last 7 days   Lab Units 02/05/25 1824 02/05/25  1433 02/05/25  1339   FERRITIN ng/mL 81  --   --    IRON SATURATION %  --   --  21*   TSH mIU/L  --  1.40  --    HEMOGLOBIN A1C % 5.7*  --   --       CARDIAC:   Results from last 7 days   Lab Units 02/05/25  1433 02/05/25  1339   TROPHSCMC ng/L 212* 220*   BNP pg/mL  --  886*       ASSESSMENT AND PLAN:   Melissa Marinelli is a 69 y.o. female with PMHx of ICM/ HFrEF (last EF 15-20% 7/2024, s/p [place] ICD 5/2020) on palliative milrinone infusion since 2/2024, CAD s/p NSTEMI s/p RIRI to LCX (Jan 2016), MVR s/p mitral valve repair in (June 2019; 29 mm Epic " bioprosthetic valve with Dr. 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  Admitted to HF ICU ICU 2/5/25 for continued BiPAP and diuresis.     Patient was recently admitted (1/12) and discharge from [facility] on 1/24/25. She was admitted for VT , was on amio and noted she had AF/A flutter later on, SHRUTHI done, with DEVIKA thrombus, cardioversion was not able to precede. Of notes, Ms Yves has declined durable LVAD therapies in the past.  Patient diuresed with Lasix IVP, Nipride drip started for high MAPs, Entresto initiated for afterload reduction,      Neuro:  # Anxiety. Depression, Acute pain   - Serial neuro and pain assessments   - PO Tylenol PRN for pain  - PT/OT Consult, OOB to chair  - CAM ICU score every shift  - Sleep/wake cycle normalization     # Physical Status  -Normal: BMI: 23.9  -Age-related debility/bed confined? /Reduced Mobility     #Substance abuse  -Alcohol abuse/Alcohol dependence: NO   -Tobacco use/Nicotine Dependence: Yes  - Refused Nicotine patch      Cardiovascular:   # ICM, stage D, HFrEF,   - SHRUTHI (1/23/25): LVEF 10-15%, The left atrial appendage appears dilated, with very dense spontaneous contrast, flow ectasia, and an image suggestive of a thrombus, Mobile echogenic image that appears to be attached to the device lead ([id number]), Mildly elevated right ventricular systolic pressure. Moderate tricuspid regurgitation, mild aortic valve regurgitation  - Plan SHRUTHI in 1 month (2/24/25) per chart review  - admit weight (2/5): 63.2 KG   - admit BNP (2/5): 886  - C/w ASA  - C/w Farxigo 10 mg daily  - Hold home Spirolactone 12.5 mg daily for now as PATSY   - Diuretics: Lasix 40 mg x2 on 2/5  - Nipride drip initiated 2/5,  to wean Nipride drip as able  - Entresto 24/26 mg BID initiated 2/6  - C/w Home Milrinone drip at 0.25 mcg/kg/min   -  Daily standing weights, 2gm sodium diet, 2L fluid restriction, strict I&Os     #CAD, s/p PCI 2016   -  C/w ASA 81mg daily  -  No statin due to allergy     #VT history   -Device interrogation (2/6):  No VT or ICD shock, no AT/AF [place]  -C/w Amiodarone 200 mg PO daily      #Electrolyte Disturbances  -Keep K>4, Mag >2      Pulmonary:   # Hx of COPD  - continue home meds  - continue breo ellipta  - Flu A, Flu B, Covid 19 (2/5): Negative   -Monitor and maintain SpO2 > 92%     GI:  - Bowel regimen  - C/w Famotidine 10 mg daily      :  #PATSY/CKD stage 3B  -Baseline BUN/Cr: 1.4-1.5   -Admit BUN/Cr (2/5): 34/2.15   -I/Os  -avoid hypotension and nephrotoxic agents     Heme:  #Anemia in the setting of chronic disease   - Labs (2/5): H/H: 12.3/37.2, TIBC: 341, ferritin: 81, Sats: 21%,  serum Fe: 72, folate: 20.2,  B12 : 533       # Coagulopathy due to on home AC  -Hold home Eliquis  -Will transition to heparin drip if Heparin assay in the therapeutic range     Endo:  #H/o DM  - Euglycemic  - hgbA1c (2/5): 5.7  - Not on insulin, monitor BS from AM Labs.     # No thyroid disease  -TSH (2/6): 1.4      ID:  -afebrile, nontoxic   -no s/s infx  -trend temps q4h       PHYSICAL AND OCCUPATIONAL THERAPY:    LINES:     DVT:  VAP BUNDLE:  ULCER PPX:  GLYCEMIC CONTROL:  BOWEL CARE:  INDWELLING CATHETER:  NUTRITION: Adult diet Potassium restricted 2 gm (50mEq); 2000 mL fluid      EMERGENCY CONTACT: Extended Emergency Contact Information  Primary Emergency Contact: GurinderedithMary  Mobile Phone: [phone]  Relation: Daughter   needed? No  Secondary Emergency Contact: Elin Marinelli  Mobile Phone: [phone]  Relation: Daughter  Preferred language: English   needed? No  FAMILY UPDATE:  CODE STATUS: Full Code  DISPO:  HF ICU    Patient seen and assessed with Dr. Keene

## 2025-02-06 NOTE — PROGRESS NOTES
HFICU Attending Note  Referring: Dr. Rivera    69F from [place] with stage D HFrEF (EF 10-15% per SHRUTHI 1/23/25), CAD c/b NSTEMI with RIRI to LCX (2016), MR s/p bioprosthetic mitral valve repair (2019), COPD, CKD Stage 3b, HTN, Type II DM, and prior CVA who presented to [facility] ED on 2/5 with acute decompensated HF. She was admitted to the HF ICU for BiPAP support and diuresis. Continues on home milrinone (0.25 mcg/kg/min) via Wiley catheter. Of note, she has a recent CICU admission (1/12/2025) for VT, managed with amiodarone; Noted to be AF/AFL but SHRUTHI during that admit noted LA thrombus, precluding cardioversion.     - repeat EKG, difficult to tell if still in AFL based on baseline artifact. Petros leads and device check  - consider repeat SHRUTHI +/- DCCV if still in AFL  - NTP->H/ISDN  - diuresis with 40mg IV lasix    This critically ill patient continues to be at-risk for clinically significant deterioration / failure due to the above mentioned dysfunctional, unstable organ systems.  I have personally identified and managed all complex critical care issues to prevent aforementioned clinical deterioration.  Critical care time is spent at bedside and/or the immediate area and has included, but is not limited to, the review of diagnostic tests, labs, radiographs, serial assessments of hemodynamics, respiratory status, ventilatory management, and family updates.  Time spent in procedures and teaching are reported separately.    Critical care time: __35__ minutes     Objective    Melissa Marinelli/[id number]  Admit Date: 2/5/2025  Hospital Length of Stay: 1   ICU Length of Stay: 18h     MEDICATIONS  Infusions:  heparin  milrinone, Last Rate: 0.25 mcg/kg/min (02/06/25 1000)  nitroprusside, Last Rate: 2 mcg/kg/min (02/06/25 1000)      Scheduled:  amiodarone, 200 mg, Daily  [Held by provider] apixaban, 5 mg, BID  aspirin, 81 mg, Daily  cholecalciferol, 2,000 Units, Daily  dapagliflozin propanediol, 10 mg,  Daily  famotidine, 10 mg, Daily  fluticasone furoate-vilanteroL, 1 puff, Daily  nicotine, 1 patch, q24h  sodium zirconium cyclosilicate, 10 g, q8h  [Held by provider] spironolactone, 12.5 mg, Daily      PRN:  acetaminophen, 975 mg, q8h PRN  albuterol, 2 puff, q4h PRN  heparin, 1,500-3,000 Units, q4h PRN  ipratropium-albuteroL, 3 mL, q6h PRN  oxygen, , Continuous PRN - O2/gases  sodium chloride, 1 spray, PRN        Prior to Admission Meds:  Medications Prior to Admission   Medication Sig Dispense Refill Last Dose/Taking    acetaminophen (Tylenol) 500 mg tablet Take 2 tablets (1,000 mg) by mouth every 8 hours if needed for mild pain (1 - 3).       albuterol 90 mcg/actuation inhaler Inhale 2 puffs every 4 hours if needed for wheezing or shortness of breath. 8.5 g 11     amiodarone (Pacerone) 200 mg tablet Take 2 tablets (400 mg) by mouth 2 times a day for 10 days, THEN 1 tablet (200 mg) once daily. 70 tablet 0     apixaban (Eliquis) 5 mg tablet Take 2 tablets (10 mg) by mouth 2 times a day for 7 days, THEN 1 tablet (5 mg) 2 times a day. 180 tablet 0     aspirin 81 mg EC tablet Take 1 tablet (81 mg) by mouth once daily. 30 tablet 11     bumetanide (Bumex) 0.5 mg tablet Take 1 tablet (0.5 mg) by mouth 2 times a week. Please weigh yourself every day. If you gain 2 pounds within 2 days, start taking 0.5mg Bumex EVERY DAY. 8 tablet 1     cholecalciferol (Vitamin D3) 50 MCG (2000 UT) tablet Take 1 tablet by mouth once daily. Indications: low vitamin D levels       famotidine (Pepcid) 40 mg tablet Take 1 tablet (40 mg) by mouth once daily. Do not start before February 3, 2024. (Patient taking differently: Take 1 tablet (40 mg) by mouth once daily as needed for indigestion or heartburn.) 30 tablet 2     Farxiga 10 mg Take 1 tablet (10 mg) by mouth once daily. 30 tablet 11     heparin flush,porcine,-0.9NaCl 100 unit/mL kit 5 mL by central venous line infusion route 1 (one) time per week. Indications: prevent clot from blocking  "an intravenous catheter       hydrocortisone 1 % ointment Apply topically 2 times a day as needed for irritation. Chest wall       ipratropium-albuteroL (Duo-Neb) 0.5-2.5 mg/3 mL nebulizer solution Take 3 mL by nebulization every 6 hours.       milrinone in 5 % dextrose (Primacor) 20 mg/100 mL (200 mcg/mL) infusion Infuse 15.5 mcg/min at 4.65 mL/hr into a venous catheter continuously. Do not fill before January 28, 2025. 100 mL 3     mupirocin (Bactroban) 2 % ointment Apply topically. PRN for nose bleed       nicotine (Nicoderm CQ) 21 mg/24 hr patch Place 1 patch over 24 hours on the skin once every 24 hours.       OneTouch Verio Flex meter misc USE AS DIRECTED THREE TIMES DAILY       sodium chloride (Ocean) 0.65 % nasal spray Administer 1 spray into each nostril if needed for congestion.       sodium chloride 0.9% (sodium chloride) flush Infuse 10 mL into a venous catheter 1 (one) time per week. Indications: flush picc line       spironolactone (Aldactone) 25 mg tablet Take 0.5 tablets (12.5 mg) by mouth once daily. 15 tablet 11     Symbicort 80-4.5 mcg/actuation inhaler Inhale 2 puffs twice a day.        VENT:    Most Recent Range Past 24hrs   Mode      FiO2 36 % FiO2 (%)  Min: 30 %   Min taken time: 02/05/25 2020  Max: 40 %   Max taken time: 02/05/25 1405   Rate 16 Resp Rate (Set)  Min: 16   Min taken time: 02/06/25 0021  Max: 16   Max taken time: 02/06/25 0021   Vt    No data recorded   PEEP   No data recorded         2/6/2025    10:00 AM 2/6/2025     9:30 AM 2/6/2025     9:00 AM 2/6/2025     8:30 AM 2/6/2025     8:00 AM 2/6/2025     7:46 AM 2/6/2025     7:30 AM   Vitals   Systolic 98 98 104 111 113  100   Diastolic 59 61 66 70 75  62   BP Location     Left arm     Heart Rate 109 107 108 118 110  112   Temp      36.3 °C (97.3 °F)    Resp 23 19 16 18 14  32     Visit Vitals  BP 98/59   Pulse 109   Temp 36.3 °C (97.3 °F)   Resp 23   Ht 1.626 m (5' 4\")   Wt 63.2 kg (139 lb 5.3 oz)   SpO2 100%   BMI 23.92 kg/m²   OB " Status Postmenopausal   Smoking Status Every Day   BSA 1.69 m²     Wt Readings from Last 5 Encounters:   02/05/25 63.2 kg (139 lb 5.3 oz)   01/28/25 59 kg (130 lb)   01/24/25 62 kg (136 lb 11 oz)   01/12/25 63.1 kg (139 lb 3.2 oz)   01/09/25 58.5 kg (129 lb)       Intake/Output Summary (Last 24 hours) at 2/6/2025 1040  Last data filed at 2/6/2025 1000  Gross per 24 hour   Intake 179.15 ml   Output 1025 ml   Net -845.85 ml     CHEST: Unlabored, Diminished, Clear  CV:  Atrial flutter  ABD:      Bowel sounds:  , Flatus:    EXT:   RLE:  ,   DP:    PT:    LLE:  ,   DP:    PT:    NEURO:   RASS: Alert and calm  CAM: Negative  LOC:    Cognition:    GCS: 15    DATA:  CMP:  Recent Labs     02/06/25  0427 02/05/25  2131 02/05/25  1824 02/05/25  1339 01/24/25  0715 01/23/25  1916 01/23/25  0649 01/22/25  2038 01/22/25  1247 01/22/25  0511 01/21/25  1919 01/21/25  0149 01/12/25  0320 01/11/25  0132 01/10/25  2003 01/10/25  1845 08/19/24  1104 07/11/24  0420 07/10/24  0547    137  --  135* 134* 134* 133* 134* 131* 134* 134* 138 137 134*  --  136   < > 138 137   K 5.6* 5.8*  --  5.3 4.5 4.1 4.7 4.7 5.2 5.4* 5.2 4.8 4.1 3.5   < > 4.4   < > 4.0 4.4    102  --  101 99 98 99 97* 97* 98 100 105 103 94*  --  103   < > 104 102   CO2 23 22  --  21 26 26 23 25 22 22 18* 20* 22 23  --  23   < > 26 26   ANIONGAP 18 19  --  18 14 14 16 17 17 19 21* 18 16 21*  --  14   < > 12 13   BUN 43* 40*  --  34* 33* 34* 37* 37* 33* 31* 30* 21 12 12  --  14   < > 33* 42*   CREATININE 2.24* 2.19*  --  2.15* 2.09* 2.46* 2.18* 2.31* 2.02* 1.91* 1.90* 1.73* 1.38* 1.22*  --  1.43*   < > 1.59* 2.11*   EGFR 23* 24*  --  24* 25* 21* 24* 22* 26* 28* 28* 31* 41* 48*  --  40*   < > 35* 25*   MG  --   --  2.89*  --  2.40  --  2.55*  --   --  2.55*  --  2.43* 1.93 1.96  --  2.19  --  2.23 2.12    < > = values in this interval not displayed.     Recent Labs     02/06/25  0427 02/05/25  2131 02/05/25  1824 02/05/25  1339 01/24/25  0715 01/23/25  1916  01/23/25  0649 01/22/25  2038 01/21/25  1919 01/21/25  0149 01/12/25  0320 01/11/25  0132 09/07/24  1008 08/19/24  1104 07/09/24  0527 07/08/24  1137 07/08/24  0643 02/17/24  1748 02/17/24  0252 11/21/23  0315 11/20/23  1548 08/31/23  1041 08/31/23  0430 08/31/23  0356 03/21/23 2005 03/21/23  0203 03/20/23  2235 12/23/21  0454 12/20/21  0701 07/14/21  1234 07/13/21  0700   ALBUMIN 3.7 4.0 3.9 4.3 3.6 3.5 3.9 4.0   < > 4.0 3.5   < > 4.3 4.4   < > 4.1 4.0   < > 3.7   < > 4.0   < >  --  3.8   < > 4.3 CANCELED   < > 4.4   < > 4.0   ALT  --   --   --  42  --   --   --   --   --  42 7  --  13 10  --  13 11  --  26   < > 15   < >  --  9  --  21 CANCELED   < > 10  --  10   AST  --   --   --  35  --   --   --   --   --  26 12  --  21 19  --  23 29  --  23   < > 20   < >  --  13  --  38 CANCELED   < > 19  --  21   BILITOT  --   --   --  0.5  --   --   --   --   --  0.5 0.3  --  0.4 0.3  --  0.4 0.3  --  0.4   < > 0.4   < >  --  0.3  --  0.4 CANCELED   < > 0.3  --  0.4   LIPASE  --   --   --   --   --   --   --   --   --   --   --   --   --   --   --   --   --   --   --   --  22  --  CANCELED 54  --  59 CANCELED  --  96*  --  75    < > = values in this interval not displayed.     CBC:  Recent Labs     02/06/25  0427 02/05/25  1339 01/24/25  0715 01/23/25  0649 01/22/25  0511 01/21/25  0149 01/12/25  0320 01/11/25  0132   WBC 5.7 9.7 6.2 7.2 6.0 4.6 4.8 4.7   HGB 10.6* 12.3 11.6* 11.7* 10.8* 11.7* 10.8* 11.4*   HCT 34.2* 37.2 35.5* 37.3 31.3* 35.7* 33.4* 32.9*    280 323 289 259 224 167 174   MCV 97 91 91 93 86 89 93 88     COAG:   Recent Labs     02/06/25  0427 02/05/25  2131 01/24/25  0715 01/23/25  1916 01/23/25  0742 01/21/25  2333 01/21/25  0149 09/08/24  0647 07/08/24  1137 07/08/24  0643 06/02/24  0329 02/17/24  0252 01/23/24  2252 06/07/19  0225 06/06/19  1400 06/06/19  1224 06/06/19  0749 05/23/19  1154 02/28/19  0809   INR  --  2.0*  --   --   --   --  1.1 1.0 1.0 0.9 1.0 1.1 1.1   < > 1.3* 1.5* 0.9   < > 1.0    HAUF >2.0* >2.0* 0.3 0.3 0.3   < >  --   --   --   --   --   --   --    < >  --   --   --   --   --    DDIMERVTE  --   --   --   --   --   --   --   --   --   --   --   --   --   --   --   --   --   --  642*   FIBRINOGEN  --   --   --   --   --   --   --   --   --   --   --   --   --   --  190* 165* 416*  --   --     < > = values in this interval not displayed.     ABO:   Recent Labs     01/21/25 0821   ABO O     HEME/ENDO:   Recent Labs     02/05/25  1824 02/05/25  1433 02/05/25  1339 01/10/25  2003 08/19/24  1104 07/08/24  1137 04/24/24  1640 02/17/24  0252 01/26/24  1815 01/26/24  1805   FERRITIN 81  --   --   --  63 74  --   --  720*  --    IRONSAT  --   --  21*  --  28 18*  --  13* 34  --    TSH  --  1.40  --  0.57  --  2.87  --   --   --  0.45   HGBA1C 5.7*  --   --   --   --  5.2 5.3  --   --  5.4     CARDIAC:   Recent Labs     02/05/25  1433 02/05/25  1339 01/21/25  0445 01/21/25  0149 01/10/25  2003 01/10/25  1845 09/07/24  1008 08/19/24  1104 07/10/24  0547 07/08/24 2007 07/08/24  1137 07/08/24  0758 07/08/24  0643 02/17/24  0347 02/17/24  0252 05/02/21  1255 05/21/20  2102 12/06/19  1942 11/29/19  0937 11/22/19  0418 11/21/19  0513 11/20/19  0327 11/19/19  0411 11/18/19  0808 11/17/19  0436   LDH  --   --   --   --   --   --   --   --   --   --   --   --   --   --   --   --  293*  --  414* 481* 463* 539* 479* 528* 585*   TROPHS 212* 220* 175* 180* 159* 257* 240*  --  2,723*   < > 628*   < > 238*   < > 170*   < >  --   --   --   --   --   --   --   --   --    BNP  --  886*  --  785*  --  1,044* 195* 297*  --   --  2,095*  --  509*  --  1,139*   < >  --    < >  --   --   --   --   --   --   --     < > = values in this interval not displayed.     Recent Labs     07/10/24  1320 07/09/24  1723 07/09/24  1250 07/08/24  1416 01/27/24  0432 01/25/24  2333 01/23/24  1121 11/21/23  0321 08/31/23  0844   LACMX  --   --  1.4 3.1*  --   --   --   --   --    LACTATEART  --   --   --   --   --  1.6 2.3*  --  1.6  "  SO2MV 61   < > 73 84*   < >  --   --    < >  --     < > = values in this interval not displayed.     No results for input(s): \"TACROLIMUS\", \"SIROLIMUS\", \"CYCLOSPORINE\" in the last 81496 hours.  Recent Labs     09/01/23  0524 05/21/20  2102 11/11/19  0248 11/08/19  0356 11/07/19  1957   CHOL 227* 109  --   --  215*   LDLF 150* 51  --   --  148*   HDL 56.9 26.9*  --   --  46.5   TRIG 103 156* 131   < > 103    < > = values in this interval not displayed.     MICRO:   Recent Labs     07/08/24  1735 07/08/24  1137 11/21/23  0811 08/31/23  1548 05/15/20  0338 11/10/19  1649   CRP  --  1.76*  --   --   --  18.03*   PROCAL 2.60*  --  3.44* 2.64*   < >  --     < > = values in this interval not displayed.     No results found for the last 90 days.  EKG:   Recent Labs     01/21/25  1346 01/21/25  0434 01/10/25  1823 08/21/24  2015 07/08/24  2105   ATRRATE  --  224 94 101 141   VENTRATE 112 112 94 101 141   PRINT  --   --  106 132 124   QRSDUR 136 126 122 118 128   QTCFRED 482 458 450 440 433   QTCCALCB 535 507 485 479 499     Encounter Date: 01/21/25   ECG 12 Lead   Result Value    Ventricular Rate 112    QRS Duration 136    QT Interval 392    QTC Calculation(Bazett) 535    R Axis 109    T Axis -62    QRS Count 18    Q Onset 214    T Offset 410    QTC Fredericia 482    Narrative    See ED provider note for full interpretation and clinical correlation  Confirmed by Kamari Juarez ([id number]) on 1/21/2025 5:08:25 PM     Echocardiogram:   Recent Labs     01/23/25  1402 07/08/24  1630 01/24/24  0840 11/21/23  1603   EF 13 18 20 23   LVIDD  --  6.90 6.30 5.90   RV 38.3 47.9  --  28.6   RVFRWALLPKSP  --  5.77  --  6.00   TAPSE  --   --  1.3  --    Transthoracic Echo (TTE) Complete With Contrast 07/08/2024    [facility], [address] [place] [address]  Tel [phone] and Fax [phone]    TRANSTHORACIC ECHOCARDIOGRAM REPORT      Patient Name:      FANNIE Stephens Physician:    [id number] " Kory Rizo MD  Study Date:        7/8/2024             Ordering Provider:    [id number] TOBI SAMANTHA  JOSELUIS  MRN/PID:           [id number]             Fellow:  Accession#:        [id number]         Nurse:  Date of Birth/Age: 1955 / 68 years Sonographer:          Kary Tran RDCS  Gender:            F                    Additional Staff:  Height:            162.56 cm            Admit Date:           7/8/2024  Weight:            58.06 kg             Admission Status:     Inpatient - STAT  BSA / BMI:         1.62 m2 / 21.97      Encounter#:           [id number]  kg/m2  Blood Pressure:    142/98 mmHg          Department Location:  [address]    Study Type:    TRANSTHORACIC ECHO (TTE) COMPLETE  Diagnosis/ICD: Acute combined systolic (congestive) and diastolic (congestive)  heart failure (CHF)-I50.41  Indication:    Congestive Heart Failure  CPT Code:      Echo Complete w Full Doppler-45315    Patient History:  Pertinent History: CAD, Chest Pain, HTN and COPD. MVR 2019 29mm Epic, CHF, Low  EF, H/O MI,CKD.    Study Detail: The following Echo studies were performed: 2D, M-Mode, Doppler and  color flow. Technically challenging study due to poor acoustic  windows. Definity used as a contrast agent for endocardial border  definition. Total contrast used for this procedure was 1.0 mL via  IV push.      PHYSICIAN INTERPRETATION:  Left Ventricle: The left ventricular systolic function is severely decreased, with a visually estimated ejection fraction of 15-20%. There is global hypokinesis of the left ventricle with minor regional variations. The left ventricular cavity size is severely dilated. Spectral Doppler shows an abnormal pattern of left ventricular diastolic filling. There is no definite left ventricular thrombus visualized.  Left Atrium: The left atrium is severely dilated.  Right Ventricle: The right ventricle is normal in size. There is reduced right ventricular systolic function. A device is visualized in the  right ventricle.  Right Atrium: The right atrium is normal in size.  Aortic Valve: The aortic valve is trileaflet. There is mild aortic valve regurgitation. The peak instantaneous gradient of the aortic valve is 3.1 mmHg. There is nodular calcium on the noncoronary cusp.  Mitral Valve: The mitral valve is abnormal. There is a Epic mitral valve bioprosthesis with a 29 mm reported size. There is no prosthetic mitral valve regurgitaion. There is trace to mild mitral valve regurgitation. The DI is normal, consistent with normal prosthetic mitral valve function. The mean diastolic pressure is 8 mmHg at a heart rate of 136 bpm.  Tricuspid Valve: The tricuspid valve is structurally normal. There is mild tricuspid regurgitation. The Doppler estimated RVSP is mild to moderately elevated at 47.9 mmHg.  Pulmonic Valve: The pulmonic valve is structurally normal. The pulmonic valve regurgitation was not well visualized.  Pericardium: There is no pericardial effusion noted.  Aorta: The aortic root is normal.  Systemic Veins: The inferior vena cava appears to be of normal size. There is IVC inspiratory collapse greater than 50%.  In comparison to the previous echocardiogram(s): Compared with study dated 1/24/2024, no significant change.      CONCLUSIONS:  1. The left ventricular systolic function is severely decreased, with a visually estimated ejection fraction of 15-20%.  2. There is global hypokinesis of the left ventricle with minor regional variations.  3. Spectral Doppler shows an abnormal pattern of left ventricular diastolic filling.  4. Left ventricular cavity size is severely dilated.  5. No left ventricular thrombus visualized.  6. There is reduced right ventricular systolic function.  7. The left atrium is severely dilated.  8. The DI is normal, consistent with normal prosthetic mitral valve function. The mean diastolic pressure is 8 mmHg at a heart rate of 136 bpm.  9. Mild to moderately elevated right ventricular  systolic pressure.  10. Mild aortic valve regurgitation.    QUANTITATIVE DATA SUMMARY:  2D MEASUREMENTS:  Normal Ranges:  Ao Root d:     2.60 cm    (2.0-3.7cm)  IVSd:          0.80 cm    (0.6-1.1cm)  LVPWd:         0.80 cm    (0.6-1.1cm)  LVIDd:         6.90 cm    (3.9-5.9cm)  LVIDs:         6.40 cm  LV Mass Index: 147.2 g/m2  LV % FS        7.2 %    LA VOLUME:  Normal Ranges:  LA Volume Index: 68.9 ml/m2    RA VOLUME BY A/L METHOD:  Normal Ranges:  RA Area A4C: 12.9 cm2    LV SYSTOLIC FUNCTION BY 2D PLANIMETRY (MOD):  Normal Ranges:  EF-A4C View:    19 % (>=55%)  EF-A2C View:    17 %  EF-Biplane:     17 %  EF-Visual:      18 %  LV EF Reported: 18 %    MITRAL VALVE:  Normal Ranges:  MV Vmax:    2.28 m/s  (<=1.3m/s)  MV peak P.8 mmHg (<5mmHg)  MV mean P.0 mmHg  (<2mmHg)    AORTIC VALVE:  Normal Ranges:  AoV Vmax:      0.88 m/s (<=1.7m/s)  AoV Peak PG:   3.1 mmHg (<20mmHg)  LVOT Max Edvin:  0.71 m/s (<=1.1m/s)  LVOT VTI:      9.09 cm  LVOT Diameter: 1.60 cm  (1.8-2.4cm)  AoV Area,Vmax: 1.63 cm2 (2.5-4.5cm2)      RIGHT VENTRICLE:  RV Basal 2.49 cm  RV Mid   1.86 cm  RV Major 6.6 cm  RV s'    0.06 m/s    TRICUSPID VALVE/RVSP:  Normal Ranges:  Peak TR Velocity: 3.35 m/s  RV Syst Pressure: 47.9 mmHg (< 30mmHg)      [id number] Kory Rizo MD  Electronically signed on 2024 at 5:38:02 PM        ** Final **    Coronary Angiography:   Right Heart Cath 2024    [facility], Cath Lab, [address] [place] [address]    Cardiovascular Catheterization Report    Patient Name:     FANNIE RIOS      Performing Physician:  [id number] Cuate Dodson MD  Study Date:       2024           Verifying Physician:   [id number] Cuate Dodson MD  MRN/PID:          [id number]            Cardiologist/Co-scrub:  Accession#:       [id number]        Ordering Physician:    [id number] JULITA JASON  Date of           1955      Fellow:  Birth/Age:        years  Gender:           F                    Fellow:  Encounter#:       [id number]      Study: Right Heart Cath      Indications:  Heart failure.    Procedure Description:  After infiltration with 2% Lidocaine, the was cannulated with a modified Seldinger technique. After infiltration of local anesthetic, the right internal jugular vein was identified with two-dimensional ultrasound. Under direct ultrasound visualization, the right internal jugular vein was cannulated with a micropuncture technique. A 9 [place] sheath was placed in the vein. Cardiac output was calculated via the Chirag method. Post-procedure, the venous sheath was left intact and sutured in place.    Right Heart Catheterization:  Cardiac output was calculated via the Chirag method. Elevated left sided filling pressures with low cardiac output. -Elevated right_[RA 12, RVEDP 13] and left_[PCWP 26 mmHg] sided filling pressure, PA pressures of 46/29 with a mean of 37 mmHg, and low assumed Chirag cardiac output at 2.3 L/min and cardiac index of 1.3 L/min/mï¿½. SVR 2,886 dynes/seconds/cm-5  -[place] was sutured in at an external marker m of 55 cm.    Hemo Personnel:  +----------------+---------+  Name            Duty       +----------------+---------+  Cuate Dodson MD 1  +----------------+---------+      Hemodynamic Pressures:    +----+----------+---------+-------------+-------------+---+----+-------+-------+  SiteDate Time   Phase  Systolic mmHg  Diastolic  ED MeanA-Wave V-Wave                   Name                    mmHg     mmHmmHg mmHg   mmHg                                                      g                     +----+----------+---------+-------------+-------------+---+----+-------+-------+    AO 1/26/2024     Rest          132           90    104                    5:07:55 PM                                                          +----+----------+---------+-------------+-------------+---+----+-------+-------+    RA 1/26/2024      Rest                               13     13     13      5:20:27 PM                                                          +----+----------+---------+-------------+-------------+---+----+-------+-------+    RV 1/26/2024     Rest           50            7  9                        5:20:45 PM                                                          +----+----------+---------+-------------+-------------+---+----+-------+-------+    PW 1/26/2024     Rest                               23     25     25      5:21:46 PM                                                          +----+----------+---------+-------------+-------------+---+----+-------+-------+    PA 1/26/2024     Rest           46           29     37                    5:22:03 PM                                                          +----+----------+---------+-------------+-------------+---+----+-------+-------+        Oxygen Saturation %:  +-----------+----------+------------+  Sample SiteO2 Sat (%)HB (g/100ml)  +-----------+----------+------------+           AO       100        11.7  +-----------+----------+------------+           PA        43        11.7  +-----------+----------+------------+           AO       100        11.7  +-----------+----------+------------+           PA        43        11.7  +-----------+----------+------------+      Cardiac Outputs:  +---------------+------------------+-------+  LORRAINE CO (l/min)LORRAINE CI (l/min/m2)LORRAINE SV  +---------------+------------------+-------+              2.3               1.4   22.2  +---------------+------------------+-------+      Complications:  No in-lab complications observed.    Cardiac Cath Post Procedure Notes:  Post Procedure Diagnosis: See below.  Blood Loss:               Estimated blood loss during the procedure was 5 mls.  Specimens Removed:        Number of specimen(s) removed:  none.    ____________________________________________________________________________________  CONCLUSIONS:  1. Elevated right_[RA 12, RVEDP 13] and left_[PCWP 26 mmHg] sided filling pressure, PA pressures of 46/29 with a mean of 37 mmHg, and low assumed Chirag cardiac output at 2.3 L/min and cardiac index of 1.3 L/min/mï¿½. SVR 2,886 dynes/seconds/cm-5.  2. -[place] was sutured in at an external marker m of 55 cm.  3. Patient will be transferred to heart failure ICU, further management as per heart failure ICU team.    ICD 10 Codes:  Other forms of dyspnea-R06.09    CPT Codes:  Right Heart Cath O2/Cardiac output without biopsy (RHC)-99884; Ultrasound guidance for vascular access-79291; [place] Alex-[id number]    [id number] Dodson MD  Performing Physician  Electronically signed by Jung Dodson MD on 1/26/2024 at 5:49:38 PM          ** Final **    Right Heart Cath:   Right Heart Cath 01/26/2024    [facility], Cath Lab, [address] [place] [address]    Cardiovascular Catheterization Report    Patient Name:     FANNIE RIOS      Performing Physician:  Jung Dodson MD  Study Date:       1/26/2024           Verifying Physician:   Jung Dodson MD  MRN/PID:          [id number]            Cardiologist/Co-scrub:  Accession#:       [id number]        Ordering Physician:    [id number] JULITA JASON  Date of           1955 / 68      Fellow:  Birth/Age:        years  Gender:           F                   Fellow:  Encounter#:       [id number]      Study: Right Heart Cath      Indications:  Heart failure.    Procedure Description:  After infiltration with 2% Lidocaine, the was cannulated with a modified Seldinger technique. After infiltration of local anesthetic, the right internal jugular vein was identified with two-dimensional ultrasound. Under direct ultrasound visualization, the right internal jugular vein was cannulated with a micropuncture technique. A 9 Fij[place] sheath was  placed in the vein. Cardiac output was calculated via the Chirag method. Post-procedure, the venous sheath was left intact and sutured in place.    Right Heart Catheterization:  Cardiac output was calculated via the Chirag method. Elevated left sided filling pressures with low cardiac output. -Elevated right_[RA 12, RVEDP 13] and left_[PCWP 26 mmHg] sided filling pressure, PA pressures of 46/29 with a mean of 37 mmHg, and low assumed Chirag cardiac output at 2.3 L/min and cardiac index of 1.3 L/min/mï¿½. SVR 2,886 dynes/seconds/cm-5  -[place]-Alex was sutured in at an external marker m of 55 cm.    Hemo Personnel:  +----------------+---------+  Name            Duty       +----------------+---------+  Cuate DodsonROC MD 1  +----------------+---------+      Hemodynamic Pressures:    +----+----------+---------+-------------+-------------+---+----+-------+-------+  SiteDate Time   Phase  Systolic mmHg  Diastolic  ED MeanA-Wave V-Wave                   Name                    mmHg     mmHmmHg mmHg   mmHg                                                      g                     +----+----------+---------+-------------+-------------+---+----+-------+-------+    AO 1/26/2024     Rest          132           90    104                    5:07:55 PM                                                          +----+----------+---------+-------------+-------------+---+----+-------+-------+    RA 1/26/2024     Rest                               13     13     13      5:20:27 PM                                                          +----+----------+---------+-------------+-------------+---+----+-------+-------+    RV 1/26/2024     Rest           50            7  9                        5:20:45 PM                                                          +----+----------+---------+-------------+-------------+---+----+-------+-------+    PW  1/26/2024     Rest                               23     25     25      5:21:46 PM                                                          +----+----------+---------+-------------+-------------+---+----+-------+-------+    PA 1/26/2024     Rest           46           29     37                    5:22:03 PM                                                          +----+----------+---------+-------------+-------------+---+----+-------+-------+        Oxygen Saturation %:  +-----------+----------+------------+  Sample SiteO2 Sat (%)HB (g/100ml)  +-----------+----------+------------+           AO       100        11.7  +-----------+----------+------------+           PA        43        11.7  +-----------+----------+------------+           AO       100        11.7  +-----------+----------+------------+           PA        43        11.7  +-----------+----------+------------+      Cardiac Outputs:  +---------------+------------------+-------+  LORRAINE CO (l/min)LORRAINE CI (l/min/m2)LORRAINE SV  +---------------+------------------+-------+              2.3               1.4   22.2  +---------------+------------------+-------+      Complications:  No in-lab complications observed.    Cardiac Cath Post Procedure Notes:  Post Procedure Diagnosis: See below.  Blood Loss:               Estimated blood loss during the procedure was 5 mls.  Specimens Removed:        Number of specimen(s) removed: none.    ____________________________________________________________________________________  CONCLUSIONS:  1. Elevated right_[RA 12, RVEDP 13] and left_[PCWP 26 mmHg] sided filling pressure, PA pressures of 46/29 with a mean of 37 mmHg, and low assumed [place]raine cardiac output at 2.3 L/min and cardiac index of 1.3 L/min/mï¿½. SVR 2,886 dynes/seconds/cm-5.  2. -[place]-Alex was sutured in at an external marker m of 55 cm.  3. Patient will be transferred to heart failure ICU, further  management as per heart failure ICU team.    ICD 10 Codes:  Other forms of dyspnea-R06.09    CPT Codes:  Right Heart Cath O2/Cardiac output without biopsy (RHC)-93223; Ultrasound guidance for vascular access-05177; [place] Alex-[id number]    [id number] Cuate Dodson MD  Performing Physician  Electronically signed by [id number] Cuate Dodson MD on 1/26/2024 at 5:49:38 PM          ** Final **    Cardiac Scoring: No results found for this or any previous visit from the past 1800 days.    Cardiac MRI: No results found for this or any previous visit from the past 1800 days.    Nuclear:No results found for this or any previous visit from the past 1800 days.    Metabolic Stress: No results found for this or any previous visit from the past 1800 days.        Assessment & Plan  Acute on chronic systolic heart failure    LDA:  CVC 09/09/24 Single lumen Tunneled Right Subclavian (Active)   Placement Date/Time: 09/09/24 1150   Hand Hygiene Performed Prior to CVC Insertion: Yes  Site Prep: Chlorhexidine   Site Prep Agent has Completely Dried Before Insertion: Yes  All 5 Sterile Barriers Used (Gloves, Gown, Cap, Mask, Large Sterile Drape):...   Number of days: 149       External Urinary Catheter Female (Active)   Placement Date/Time: 02/05/25 2200   Hand Hygiene Completed: Yes  External Catheter Type: Female   Number of days: 0     NUTRITION: Adult diet Regular  EMERGENCY CONTACT: Extended Emergency Contact Information  Primary Emergency Contact: Mary Caruso  Mobile Phone: [phone]  Relation: Daughter   needed? No  Secondary Emergency Contact: Elin Marinelli  Mobile Phone: [phone]  Relation: Daughter  Preferred language: English   needed? No  CODE STATUS: Full Code  DISPO: Discharge Planning  Living Arrangements: Children  Support Systems: Children, Family members  Assistance Needed: basic  Type of Residence: Private residence  Number of Stairs to Enter Residence: 4  Number of Stairs Within Residence: 12  Do you have  animals or pets at home?: No  Home or Post Acute Services: In home services  Type of Home Care Services: Home nursing visits  Expected Discharge Disposition: Home  Does the patient need discharge transport arranged?: No  FOLLOWUP:   Future Appointments   Date Time Provider Department Center   2/11/2025 To Be Determined Gabriela Reddy RN [facility]   2/13/2025 To Be Determined Gabriela Reddy RN [facility]   2/28/2025  1:30 PM [facility] [id number] [facility]   4/14/2025 10:00 AM Kathy Rivera MD PhD [id number]

## 2025-02-06 NOTE — CARE PLAN
Problem: Pain - Adult  Goal: Verbalizes/displays adequate comfort level or baseline comfort level  Outcome: Progressing     Problem: Safety - Adult  Goal: Free from fall injury  Outcome: Progressing     Problem: Discharge Planning  Goal: Discharge to home or other facility with appropriate resources  Outcome: Progressing     Problem: Chronic Conditions and Co-morbidities  Goal: Patient's chronic conditions and co-morbidity symptoms are monitored and maintained or improved  Outcome: Progressing     Problem: Nutrition  Goal: Nutrient intake appropriate for maintaining nutritional needs  Outcome: Progressing     Problem: Fall/Injury  Goal: Not fall by end of shift  Outcome: Progressing  Goal: Be free from injury by end of the shift  Outcome: Progressing  Goal: Verbalize understanding of personal risk factors for fall in the hospital  Outcome: Progressing  Goal: Verbalize understanding of risk factor reduction measures to prevent injury from fall in the home  Outcome: Progressing  Goal: Use assistive devices by end of the shift  Outcome: Progressing  Goal: Pace activities to prevent fatigue by end of the shift  Outcome: Progressing   The patient's goals for the shift include      The clinical goals for the shift include pt will remain HDS throughout shift

## 2025-02-06 NOTE — PROGRESS NOTES
Physical Therapy    Physical Therapy Evaluation and Treatment    Patient Name: Melissa Marinelli  MRN: [id number]  Department: [facility] HFICU  Room: 06/06-A  Today's Date: 2/6/2025   Time Calculation  Start Time: 1417  Stop Time: 1505  Time Calculation (min): 48 min    Assessment/Plan   PT Assessment  PT Assessment Results: Decreased strength, Decreased endurance, Impaired balance, Decreased safety awareness  Rehab Prognosis: Good  Barriers to Discharge Home: Caregiver assistance  Caregiver Assistance: Caregiver assistance needed per identified barriers - however, level of patient's required assistance exceeds assistance available at home  Evaluation/Treatment Tolerance: Patient tolerated treatment well  Medical Staff Made Aware: Yes  Strengths: Physical health, Premorbid level of function, Support of Caregivers  Barriers to Participation: Insight into problems, Housing layout  End of Session Communication: Bedside nurse  Assessment Comment: Patient hypotensive prior to session when supine with improvement when moving (MAP 50 supine -->60 supine -->68 edge  of bed -->71 in chair-->70 after walking). Patient reported no lightheadedness, nausea, headache, nor chest pain during session. Patient is very impulsive and will stand up without direction, but does not require substantial assistance to mobilize short distances ([place]vin-CGA). PT unable to confirm d/c recommendation this session as she has numerous stairs to enter home/get to bedroom, but patient prefers a home discharge to that of a rehab facility. PT to follow up as able and appropriate.  End of Session Patient Position: Up in chair, Alarm off, not on at start of session  IP OR SWING BED PT PLAN  Inpatient or Swing Bed: Inpatient  PT Plan  Treatment/Interventions: Bed mobility, Transfer training, Gait training, Stair training, Balance training, Neuromuscular re-education, Neurodevelopmental intervention, Strengthening, Endurance training, Range of motion, Therapeutic  exercise, Therapeutic activity, Home exercise program  PT Plan: Ongoing PT  PT Frequency: 5 times per week  PT Discharge Recommendations: Other (comment) (Hopeful to progress to home PT pending improvement in gait distance and stair climbing ability. Limited support at home during day)  PT Recommended Transfer Status: Assist x1  PT - OK to Discharge: Yes    Subjective   General Visit Information:  General  Reason for Referral: presented to [facility] ED 2/5 with acutely worsening shortness of breath and a clinical picture of decompensated heart failure.  Admitted to HF ICU ICU for continued BiPAP and diuresis. Patient off BiPAP 2/6 to nasal canula  Past Medical History Relevant to Rehab: ICM/ HFrEF (last EF 15-20% 7/2024, s/p [place] ICD 5/2020) on palliative milrinone infusion since 2/2024, CAD s/p NSTEMI s/p RIRI to LCX (Jan 2016), MVR s/p mitral valve repair in (June 2019; 29 mm Epic bioprosthetic valve with Dr. [place]), COPD, HTN, HL, GERD, hx of CVA, DM  Family/Caregiver Present: Yes  Caregiver Feedback: Daughter present  Prior to Session Communication: Bedside nurse  Patient Position Received: Bed, 2 rail up, Alarm off, not on at start of session  General Comment: Patient very motivated to work with PT  Home Living:  Home Living  Type of Home: House  Lives With: Adult children, Grandchildren (She is home alone during the day. Grandson (13yrs old) gets home at 4pm, daughter get home ~5pm)  Home Adaptive Equipment: Walker rolling or standard, Cane  Home Layout: Two level, Stairs to alternate level with rails  Alternate Level Stairs-Number of Steps: 10  Home Access: Stairs to enter with rails  Entrance Stairs-Rails: Left  Entrance Stairs-Number of Steps: 4  Home Living Comments: Limited support during the day. She sleeps on second floor. Bathroom on second floor. lives on main floor mostly. Needs to climb 10 stairs mutliple times a day.  Prior Level of Function:  Prior Function Per Pt/Caregiver Report  Level of  "[place]: Independent with ADLs and functional transfers, Independent with homemaking with ambulation  ADL Assistance: Independent  Homemaking Assistance: Independent  Ambulatory Assistance: Independent  Prior Function Comments: Patient reports sometimes using a cane or walker to walk at home, but also reported that she does not use it to walk to the kitchen or bathroom. Patient reports being independent at home up to 1 week before admission. Immediately prior to admission, she had increased shortness of breath and difficulty with stairs.  Precautions:  Precautions  Medical Precautions: Fall precautions  Precautions Comment: MAP 60-80      Date/Time Vitals Session Patient Position Pulse Resp SpO2 BP MAP (mmHg)    02/06/25 1400 --  --  109  23  98 %  91/48  63     02/06/25 1417 Pre PT  --  105  22  98 %  --  60     02/06/25 1430 During OT  --  110  21  99 %  74/40  50     02/06/25 1500 --  --  117  26  100 %  100/58  71     02/06/25 1505 Post PT  --  112  24  98 %  --  70            Treatments:    Please see \"Functional Assessment\" for specifics regarding treatment during this evaluation. Patient given education on how to safely mobilize with regard to equipment and precautions. Extra repetitions and cuing given to maximize independence.       Objective   Pain:  Pain Assessment  Pain Assessment: 0-10  0-10 (Numeric) Pain Score: 0 - No pain  Cognition:  Cognition  Overall Cognitive Status: Within Functional Limits  Orientation Level: Disoriented to time (Thought it was Wednesday. Close)  Insight: Moderate  Impulsive: Moderately  Processing Speed: Delayed    General Assessments:  Activity Tolerance  Endurance: Decreased tolerance for upright activites    Sensation  Light Touch: No apparent deficits    Strength  Strength Comments: Generalized functional weakness  Strength  Strength Comments: Generalized functional weakness    Perception  Inattention/Neglect: Appears intact      Coordination  Movements are Fluid and " Coordinated: Yes    Postural Control  Postural Control: Within Functional Limits    Static Sitting Balance  Static Sitting-Balance Support: No upper extremity supported, Feet supported  Static Sitting-Level of Assistance: Distant supervision  Dynamic Sitting Balance  Dynamic Sitting-Balance Support: Feet supported, No upper extremity supported  Dynamic Sitting-Level of Assistance: Distant supervision    Static Standing Balance  Static Standing-Balance Support: Bilateral upper extremity supported  Static Standing-Level of Assistance: Minimum assistance  Functional Assessments:  Bed Mobility  Bed Mobility: Yes  Bed Mobility 1  Bed Mobility 1: Supine to sitting  Level of Assistance 1: Modified independent  Bed Mobility Comments 1: Uses hand rails, but did not need assistance    Transfers  Transfer: Yes  Transfer 1  Transfer From 1: Sit to, Stand to  Transfer to 1: Sit, Stand  Technique 1: Sit to stand, Stand to sit  Transfer Device 1:  (hand held assist)  Transfer Level of Assistance 1: Minimum assistance  Trials/Comments 1: Slow, but successful. May be CGA with walker/cane  Transfers 2  Transfer From 2: Bed to  Transfer to 2: Chair with arms  Technique 2: Lateral  Transfer Device 2:  (hand held assist)  Transfer Level of Assistance 2: Minimum assistance  Trials/Comments 2: Short shuffled steps but successful. Mild instability    Ambulation/Gait Training  Ambulation/Gait Training Performed: Yes  Ambulation/Gait Training 1  Surface 1: Level tile  Device 1: No device  Assistance 1: Contact guard  Comments/Distance (ft) 1: 30 feet with 1 planned rest break. short shuffled steps without loss of balance  Extremity/Trunk Assessments:  RLE   RLE :  (ROM within normal functional ranges)  LLE   LLE :  (ROM within normal functional ranges)  Outcome Measures:  [facility] Basic Mobility  Turning from your back to your side while in a flat bed without using bedrails: A little  Moving from lying on your back to sitting on the side of a  flat bed without using bedrails: A little  Moving to and from bed to chair (including a wheelchair): A little  Standing up from a chair using your arms (e.g. wheelchair or bedside chair): A little  To walk in hospital room: A little  Climbing 3-5 steps with railing: A lot  Basic Mobility - Total Score: 17    FSS-ICU  Ambulation: Walks <50 feet with any assistance x1 or walks any distance with assistance x2 people  Rolling: Supervision or set-up only  Sitting: Supervision or set-up only  Transfer Sit-to-Stand: Minimal assistance (performs 75% or more of task)  Transfer Supine-to-Sit: Supervision or set-up only  Total Score: 20         ICU Mobility Scale: Walking with assistance of 1 person [8]    Encounter Problems       Encounter Problems (Active)       PT Problem       Patient is able to ambulate 150 feet without loss of balance requiring contact guard or less assist  (Progressing)       Start:  02/06/25    Expected End:  02/20/25            Patient is able to ascend 5 stairs without loss of balance requiring contact guard or less assist  (Progressing)       Start:  02/06/25    Expected End:  02/20/25            Patient is able to perform sit to stand requiring contact guard assist or less  (Progressing)       Start:  02/06/25    Expected End:  02/20/25            Patient able to walk >996 feet in the 6 minute walk test without loss of balance  (Progressing)       Start:  02/06/25    Expected End:  02/20/25               Pain - Adult              Education Documentation  Home Exercise Program, taught by Cesario Padgett PT at 2/6/2025  3:33 PM.  Learner: Family, Patient  Readiness: Acceptance  Method: Demonstration  Response: Demonstrated Understanding, Verbalizes Understanding, Needs Reinforcement  Comment: Goals of PT, safety with mobility,  vital signs and mobility    Precautions, taught by Cesario Padgett PT at 2/6/2025  3:33 PM.  Learner: Family, Patient  Readiness: Acceptance  Method:  Demonstration  Response: Demonstrated Understanding, Verbalizes Understanding, Needs Reinforcement  Comment: Goals of PT, safety with mobility,  vital signs and mobility    Body Mechanics, taught by Cesario Padgett PT at 2/6/2025  3:33 PM.  Learner: Family, Patient  Readiness: Acceptance  Method: Demonstration  Response: Demonstrated Understanding, Verbalizes Understanding, Needs Reinforcement  Comment: Goals of PT, safety with mobility,  vital signs and mobility    Mobility Training, taught by Cesario Padgett PT at 2/6/2025  3:33 PM.  Learner: Family, Patient  Readiness: Acceptance  Method: Demonstration  Response: Demonstrated Understanding, Verbalizes Understanding, Needs Reinforcement  Comment: Goals of PT, safety with mobility,  vital signs and mobility    Education Comments  No comments found.

## 2025-02-07 LAB
ALBUMIN SERPL BCP-MCNC: 3.3 G/DL (ref 3.4–5)
ALBUMIN SERPL BCP-MCNC: 3.4 G/DL (ref 3.4–5)
ANION GAP SERPL CALC-SCNC: 14 MMOL/L (ref 10–20)
ANION GAP SERPL CALC-SCNC: 14 MMOL/L (ref 10–20)
BUN SERPL-MCNC: 38 MG/DL (ref 6–23)
BUN SERPL-MCNC: 42 MG/DL (ref 6–23)
CALCIUM SERPL-MCNC: 8.4 MG/DL (ref 8.6–10.6)
CALCIUM SERPL-MCNC: 8.6 MG/DL (ref 8.6–10.6)
CHLORIDE SERPL-SCNC: 94 MMOL/L (ref 98–107)
CHLORIDE SERPL-SCNC: 96 MMOL/L (ref 98–107)
CO2 SERPL-SCNC: 25 MMOL/L (ref 21–32)
CO2 SERPL-SCNC: 27 MMOL/L (ref 21–32)
CREAT SERPL-MCNC: 2.01 MG/DL (ref 0.5–1.05)
CREAT SERPL-MCNC: 2.21 MG/DL (ref 0.5–1.05)
EGFRCR SERPLBLD CKD-EPI 2021: 23 ML/MIN/1.73M*2
EGFRCR SERPLBLD CKD-EPI 2021: 26 ML/MIN/1.73M*2
ERYTHROCYTE [DISTWIDTH] IN BLOOD BY AUTOMATED COUNT: 14.6 % (ref 11.5–14.5)
ERYTHROCYTE [DISTWIDTH] IN BLOOD BY AUTOMATED COUNT: 15.6 % (ref 11.5–14.5)
GLUCOSE SERPL-MCNC: 110 MG/DL (ref 74–99)
GLUCOSE SERPL-MCNC: 111 MG/DL (ref 74–99)
HCT VFR BLD AUTO: 28.4 % (ref 36–46)
HCT VFR BLD AUTO: 33.1 % (ref 36–46)
HGB BLD-MCNC: 10.3 G/DL (ref 12–16)
HGB BLD-MCNC: 9.9 G/DL (ref 12–16)
MAGNESIUM SERPL-MCNC: 2.52 MG/DL (ref 1.6–2.4)
MAGNESIUM SERPL-MCNC: 2.76 MG/DL (ref 1.6–2.4)
MCH RBC QN AUTO: 29.9 PG (ref 26–34)
MCH RBC QN AUTO: 30.3 PG (ref 26–34)
MCHC RBC AUTO-ENTMCNC: 31.1 G/DL (ref 32–36)
MCHC RBC AUTO-ENTMCNC: 34.9 G/DL (ref 32–36)
MCV RBC AUTO: 87 FL (ref 80–100)
MCV RBC AUTO: 96 FL (ref 80–100)
NRBC BLD-RTO: 0 /100 WBCS (ref 0–0)
NRBC BLD-RTO: 0 /100 WBCS (ref 0–0)
PHOSPHATE SERPL-MCNC: 3.6 MG/DL (ref 2.5–4.9)
PHOSPHATE SERPL-MCNC: 3.7 MG/DL (ref 2.5–4.9)
PLATELET # BLD AUTO: 190 X10*3/UL (ref 150–450)
PLATELET # BLD AUTO: 212 X10*3/UL (ref 150–450)
POTASSIUM SERPL-SCNC: 4.3 MMOL/L (ref 3.5–5.3)
POTASSIUM SERPL-SCNC: 4.5 MMOL/L (ref 3.5–5.3)
RBC # BLD AUTO: 3.27 X10*6/UL (ref 4–5.2)
RBC # BLD AUTO: 3.44 X10*6/UL (ref 4–5.2)
SODIUM SERPL-SCNC: 130 MMOL/L (ref 136–145)
SODIUM SERPL-SCNC: 131 MMOL/L (ref 136–145)
UFH PPP CHRO-ACNC: 0.8 IU/ML
WBC # BLD AUTO: 5.6 X10*3/UL (ref 4.4–11.3)
WBC # BLD AUTO: 8 X10*3/UL (ref 4.4–11.3)

## 2025-02-07 PROCEDURE — 2500000001 HC RX 250 WO HCPCS SELF ADMINISTERED DRUGS (ALT 637 FOR MEDICARE OP): Performed by: NURSE PRACTITIONER

## 2025-02-07 PROCEDURE — 2500000002 HC RX 250 W HCPCS SELF ADMINISTERED DRUGS (ALT 637 FOR MEDICARE OP, ALT 636 FOR OP/ED): Performed by: NURSE PRACTITIONER

## 2025-02-07 PROCEDURE — 85027 COMPLETE CBC AUTOMATED: CPT

## 2025-02-07 PROCEDURE — 84100 ASSAY OF PHOSPHORUS: CPT | Performed by: NURSE PRACTITIONER

## 2025-02-07 PROCEDURE — 99291 CRITICAL CARE FIRST HOUR: CPT | Performed by: NURSE PRACTITIONER

## 2025-02-07 PROCEDURE — 2500000004 HC RX 250 GENERAL PHARMACY W/ HCPCS (ALT 636 FOR OP/ED): Performed by: NURSE PRACTITIONER

## 2025-02-07 PROCEDURE — 94640 AIRWAY INHALATION TREATMENT: CPT

## 2025-02-07 PROCEDURE — 97530 THERAPEUTIC ACTIVITIES: CPT | Mod: GP

## 2025-02-07 PROCEDURE — 85027 COMPLETE CBC AUTOMATED: CPT | Performed by: NURSE PRACTITIONER

## 2025-02-07 PROCEDURE — 97116 GAIT TRAINING THERAPY: CPT | Mod: GP

## 2025-02-07 PROCEDURE — 85520 HEPARIN ASSAY: CPT | Performed by: NURSE PRACTITIONER

## 2025-02-07 PROCEDURE — 80069 RENAL FUNCTION PANEL: CPT | Performed by: NURSE PRACTITIONER

## 2025-02-07 PROCEDURE — 1100000001 HC PRIVATE ROOM DAILY

## 2025-02-07 PROCEDURE — 80069 RENAL FUNCTION PANEL: CPT

## 2025-02-07 PROCEDURE — 84100 ASSAY OF PHOSPHORUS: CPT

## 2025-02-07 PROCEDURE — 83735 ASSAY OF MAGNESIUM: CPT | Performed by: NURSE PRACTITIONER

## 2025-02-07 PROCEDURE — 37799 UNLISTED PX VASCULAR SURGERY: CPT | Performed by: NURSE PRACTITIONER

## 2025-02-07 PROCEDURE — 83735 ASSAY OF MAGNESIUM: CPT

## 2025-02-07 RX ORDER — CALCIUM CARBONATE 200(500)MG
1000 TABLET,CHEWABLE ORAL 4 TIMES DAILY PRN
Status: DISCONTINUED | OUTPATIENT
Start: 2025-02-07 | End: 2025-02-09 | Stop reason: HOSPADM

## 2025-02-07 RX ADMIN — MILRINONE LACTATE IN DEXTROSE 0.25 MCG/KG/MIN: 200 INJECTION, SOLUTION INTRAVENOUS at 04:29

## 2025-02-07 RX ADMIN — CALCIUM CARBONATE (ANTACID) CHEW TAB 500 MG 1000 MG: 500 CHEW TAB at 17:47

## 2025-02-07 RX ADMIN — APIXABAN 5 MG: 5 TABLET, FILM COATED ORAL at 21:27

## 2025-02-07 RX ADMIN — ASPIRIN 81 MG: 81 TABLET ORAL at 08:54

## 2025-02-07 RX ADMIN — FAMOTIDINE 10 MG: 20 TABLET, FILM COATED ORAL at 08:54

## 2025-02-07 RX ADMIN — DAPAGLIFLOZIN 10 MG: 10 TABLET, FILM COATED ORAL at 08:54

## 2025-02-07 RX ADMIN — AMIODARONE HYDROCHLORIDE 200 MG: 200 TABLET ORAL at 08:54

## 2025-02-07 RX ADMIN — ACETAMINOPHEN 975 MG: 325 TABLET ORAL at 16:29

## 2025-02-07 RX ADMIN — SACUBITRIL AND VALSARTAN 1 TABLET: 24; 26 TABLET, FILM COATED ORAL at 08:54

## 2025-02-07 RX ADMIN — SACUBITRIL AND VALSARTAN 1 TABLET: 24; 26 TABLET, FILM COATED ORAL at 21:27

## 2025-02-07 RX ADMIN — CALCIUM CARBONATE (ANTACID) CHEW TAB 500 MG 1000 MG: 500 CHEW TAB at 23:51

## 2025-02-07 RX ADMIN — IPRATROPIUM BROMIDE AND ALBUTEROL SULFATE 3 ML: .5; 3 SOLUTION RESPIRATORY (INHALATION) at 22:47

## 2025-02-07 RX ADMIN — Medication 2000 UNITS: at 08:54

## 2025-02-07 ASSESSMENT — COGNITIVE AND FUNCTIONAL STATUS - GENERAL
MOBILITY SCORE: 22
CLIMB 3 TO 5 STEPS WITH RAILING: A LITTLE
CLIMB 3 TO 5 STEPS WITH RAILING: A LITTLE
DAILY ACTIVITIY SCORE: 24
MOBILITY SCORE: 23
DAILY ACTIVITIY SCORE: 24
WALKING IN HOSPITAL ROOM: A LITTLE
CLIMB 3 TO 5 STEPS WITH RAILING: A LITTLE
MOBILITY SCORE: 20
MOVING TO AND FROM BED TO CHAIR: A LITTLE
STANDING UP FROM CHAIR USING ARMS: A LITTLE
WALKING IN HOSPITAL ROOM: A LITTLE

## 2025-02-07 ASSESSMENT — PAIN SCALES - GENERAL
PAINLEVEL_OUTOF10: 0 - NO PAIN
PAINLEVEL_OUTOF10: 8
PAINLEVEL_OUTOF10: 0 - NO PAIN

## 2025-02-07 ASSESSMENT — PAIN - FUNCTIONAL ASSESSMENT
PAIN_FUNCTIONAL_ASSESSMENT: 0-10

## 2025-02-07 ASSESSMENT — PAIN DESCRIPTION - LOCATION: LOCATION: NOSE

## 2025-02-07 NOTE — PROGRESS NOTES
"[facility]  HFICU PROGRESS NOTE    Melissa Marinelli/[id number]    Admit Date: 2/5/2025  Hospital Length of Stay: 2   ICU Length of Stay: 1d 15h   Primary Service:   Primary HF Cardiologist:   Referring:    INTERVAL EVENTS / PERTINENT ROS:   Patient claims she had a good night last night.  She was weaned off her night pride drip yesterday.  Entresto was started at 24-26 mg twice daily.  Currently on palliative milrinone 0.25 mics. ICD interrogation yesterday showed no VT or ICD shock, no AT/AF [place].    Plan:   -add Aldactone 12.5mg daily   -consider repeat SHRUTHI +/- DCCV prior to d/c   -transfer to floor HVI   -stop Heparin gtt -restart home Eliquis    MEDICATIONS  Infusions:  heparin  milrinone, Last Rate: 0.25 mcg/kg/min (02/07/25 0719)  nitroprusside, Last Rate: Stopped (02/06/25 1706)      Scheduled:  amiodarone, 200 mg, Daily  [Held by provider] apixaban, 5 mg, BID  aspirin, 81 mg, Daily  cholecalciferol, 2,000 Units, Daily  dapagliflozin propanediol, 10 mg, Daily  famotidine, 10 mg, Daily  fluticasone furoate-vilanteroL, 1 puff, Daily  nicotine, 1 patch, q24h  sacubitriL-valsartan, 1 tablet, BID  [Held by provider] sodium zirconium cyclosilicate, 10 g, q8h  [Held by provider] spironolactone, 12.5 mg, Daily      PRN:  acetaminophen, 975 mg, q8h PRN  albuterol, 2 puff, q4h PRN  heparin, 1,500-3,000 Units, q4h PRN  ipratropium-albuteroL, 3 mL, q6h PRN  oxygen, , Continuous PRN - O2/gases  sodium chloride, 1 spray, PRN      VENT:    Most Recent Range Past 24hrs   Mode      FiO2 28 % FiO2 (%)  Min: 28 %   Min taken time: 02/07/25 0727  Max: 28 %   Max taken time: 02/07/25 0727   Rate 16 No data recorded   Vt    No data recorded   PEEP   No data recorded     PHYSICAL EXAM:   Visit Vitals  BP 83/52   Pulse (!) 111   Temp 37.3 °C (99.1 °F) (Temporal)   Resp 20   Ht 1.626 m (5' 4\")   Wt 63.2 kg (139 lb 5.3 oz)   SpO2 100%   BMI 23.92 kg/m²   OB Status Postmenopausal   Smoking Status Every Day " "  BSA 1.69 m²       Wt Readings from Last 5 Encounters:   02/05/25 63.2 kg (139 lb 5.3 oz)   01/28/25 59 kg (130 lb)   01/24/25 62 kg (136 lb 11 oz)   01/12/25 63.1 kg (139 lb 3.2 oz)   01/09/25 58.5 kg (129 lb)       INTAKE/OUTPUT:  I/O last 3 completed shifts:  In: 1768 (28 mL/kg) [P.O.:1460; I.V.:308 (4.9 mL/kg)]  Out: 2725 (43.1 mL/kg) [Urine:2725 (1.2 mL/kg/hr)]  Weight: 63.2 kg      Physical Exam    DATA:  CMP:  Results from last 7 days   Lab Units 02/07/25 0438 02/06/25  1801 02/06/25 0427 02/05/25  2131 02/05/25  1824 02/05/25  1339   SODIUM mmol/L 131* 130* 137 137  --  135*   POTASSIUM mmol/L 4.3 4.4 5.6* 5.8*  --  5.3   CHLORIDE mmol/L 94* 93* 102 102  --  101   CO2 mmol/L 27 25 23 22  --  21   ANION GAP mmol/L 14 16 18 19  --  18   BUN mg/dL 42* 45* 43* 40*  --  34*   CREATININE mg/dL 2.01* 2.38* 2.24* 2.19*  --  2.15*   EGFR mL/min/1.73m*2 26* 21* 23* 24*  --  24*   MAGNESIUM mg/dL 2.52*  --   --   --  2.89*  --    ALBUMIN g/dL 3.4 3.6 3.7 4.0 3.9 4.3   ALT U/L  --   --   --   --   --  42   AST U/L  --   --   --   --   --  35   BILIRUBIN TOTAL mg/dL  --   --   --   --   --  0.5     CBC:  Results from last 7 days   Lab Units 02/07/25 0438 02/06/25  0427 02/05/25  1339   WBC AUTO x10*3/uL 8.0 5.7 9.7   HEMOGLOBIN g/dL 9.9* 10.6* 12.3   HEMATOCRIT % 28.4* 34.2* 37.2   PLATELETS AUTO x10*3/uL 190 204 280   MCV fL 87 97 91     COAG:   Results from last 7 days   Lab Units 02/05/25  2131   INR  2.0*     ABO: No results found for: \"ABO\"  HEME/ENDO:  Results from last 7 days   Lab Units 02/05/25  1824 02/05/25  1433 02/05/25  1339   FERRITIN ng/mL 81  --   --    IRON SATURATION %  --   --  21*   TSH mIU/L  --  1.40  --    HEMOGLOBIN A1C % 5.7*  --   --       CARDIAC:   Results from last 7 days   Lab Units 02/05/25  1433 02/05/25  1339   TROPHSCMC ng/L 212* 220*   BNP pg/mL  --  886*       ASSESSMENT AND PLAN:   Melissa Marinelli is a 69 y.o. female with PMHx of ICM/ HFrEF (last EF 15-20% 7/2024, s/p [employer] ICD " 5/2020) on palliative milrinone infusion since 2/2024, CAD s/p NSTEMI s/p RIRI to LCX (Jan 2016), MVR s/p mitral valve repair in (June 2019; 29 mm Epic bioprosthetic valve with Dr. 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  Admitted to HF ICU ICU 2/5/25 for continued BiPAP and diuresis.     Patient was recently admitted (1/12) and [place] [facility] on [place]24/25. She was admitted for VT , was on amio and noted she had AF/A flutter later on, SHRUTHI done, with DEVIKA thrombus, cardioversion was not able to precede. Of notes, Ms Marinelli has declined durable LVAD therapies in the past.  Patient diuresed with Lasix IVP, Nipride drip started for high MAPs, Entresto initiated for afterload reduction,      Neuro:  # Anxiety. Depression, Acute pain   - Serial neuro and pain assessments   - PO Tylenol PRN for pain  - PT/OT Consult, OOB to chair  - CAM ICU score every shift  - Sleep/wake cycle normalization     # Physical Status  -Normal: BMI: 23.9  -Age-related debility/bed confined? /Reduced Mobility     #Substance abuse  -Alcohol abuse/Alcohol dependence: NO   -Tobacco use/Nicotine Dependence: Yes  - Refused Nicotine patch      Cardiovascular:   # ICM, stage D, HFrEF,   - SHRUTHI (1/23/25): LVEF 10-15%, The left atrial appendage appears dilated, with very dense spontaneous contrast, flow ectasia, and an image suggestive of a thrombus, Mobile echogenic image that appears to be attached to the device lead (Clip41), Mildly elevated right ventricular systolic pressure. Moderate tricuspid regurgitation, mild aortic valve regurgitation  - Plan SHRUTHI in 1 month (2/24/25) per chart review  - admit weight (2/5): 63.2 KG   - admit BNP (2/5): 886  - C/w ASA  - C/w Farxigo 10 mg daily  - Restart home Spirolactone 12.5 mg daily   - Diuretics: Lasix 40 mg x2 on 2/5  - Nipride  drip initiated 2/5,  to wean Nipride drip as able  - Entresto 24/26 mg BID initiated 2/6  - C/w Home Milrinone drip at 0.25 mcg/kg/min   - Daily standing weights, 2gm sodium diet, 2L fluid restriction, strict I&Os     #CAD, s/p PCI 2016   -  C/w ASA 81mg daily  -  No statin due to allergy     #VT history   -Device interrogation (2/6):  No VT or ICD shock, no AT/AF [place]  -C/w Amiodarone 200 mg PO daily      #Electrolyte Disturbances  -Keep K>4, Mag >2      Pulmonary:   # Hx of COPD  - continue home meds  - continue breo ellipta  - Flu A, Flu B, Covid 19 (2/5): Negative   -Monitor and maintain SpO2 > 92%     GI:  - Bowel regimen  - C/w Famotidine 10 mg daily      :  #PATSY/CKD stage 3B  -Baseline BUN/Cr: 1.4-1.5   -Admit BUN/Cr (2/5): 34/2.15   -I/Os  -avoid hypotension and nephrotoxic agents     Heme:  #Anemia in the setting of chronic disease   - Labs (2/5): H/H: 12.3/37.2, TIBC: 341, ferritin: 81, Sats: 21%,  serum Fe: 72, folate: 20.2,  B12 : 533       # Coagulopathy due to on home AC  -Restart home Eliquis  -Stop Heparin gtt     Endo:  #H/o DM  - Euglycemic  - hgbA1c (2/5): 5.7  - Not on insulin, monitor BS from AM Labs.     # No thyroid disease  -TSH (2/6): 1.4      ID:  -afebrile, nontoxic   -no s/s infx  -trend temps q4h       PHYSICAL AND OCCUPATIONAL THERAPY:    LINES: Wiley     DVT:Hepain gtt (on home Eliquis)  VAP BUNDLE: N/A  ULCER PPX: PPI  GLYCEMIC CONTROL:   BOWEL CARE: Senna, Miralax  INDWELLING CATHETER:N/A  NUTRITION: Adult diet Potassium restricted 2 gm (50mEq); 2000 mL fluid      EMERGENCY CONTACT: Extended Emergency Contact Information  Primary Emergency Contact: Mary Caruso  Mobile Phone: [phone]  Relation: Daughter   needed? No  Secondary Emergency Contact: Elin Marinelli  Mobile Phone: [phone]  Relation: Daughter  Preferred language: English   needed? No  FAMILY UPDATE:  CODE STATUS: Full Code  DISPO:  HF ICU-> transfer to floor     Patient seen and  assessed with Dr. Keene

## 2025-02-07 NOTE — DOCUMENTATION CLARIFICATION NOTE
"    PATIENT:               FANNIE RIOS  ACCT #:                  [id number]  MRN:                       [id number]  :                       1955  ADMIT DATE:       2025 1:22 PM  DISCH DATE:  RESPONDING PROVIDER #:        [id number]          PROVIDER RESPONSE TEXT:    Acute Hypoxemic and Hypercapnic Respiratory Failure    CDI QUERY TEXT:    Clarification    Instruction:    Based on your assessment of the patient and the clinical information, please provide the requested documentation by clicking on the appropriate radio button and enter any additional information if prompted.    Question: Is there a diagnosis indicative of the clinical information    When answering this query, please exercise your independent professional judgment. The fact that a question is being asked, does not imply that any particular answer is desired or expected.    The patient's clinical indicators include:  Clinical Information: From H&P- \"69 y.o. female with PMHx of ICM/ HFrEF (last EF 15-20% 2024, s/p [employer] ICD 2020) on palliative milrinone infusion since 2024, CAD s/p NSTEMI s/p RIRI to LCX (2016), MVR s/p mitral valve repair in (2019; 29 mm Epic bioprosthetic valve with Dr. Montana), COPD, HTN, HL, GERD, hx of CVA, DM, who presented to [facility] ED  with acutely worsening shortness of breath and a clinical picture of decompensated heart failure.  Admitted to HF ICU ICU for continued BiPAP and diuresis.\"    Clinical Indicators:    From H&P-  \"Pulmonary:  Effort: Tachypnea and prolonged expiration present.  Breath sounds: Normal air entry. Examination of the right-lower field reveals decreased breath sounds. Examination of the left-lower field reveals decreased breath sounds. Decreased breath sounds present\"    \"Respiratory:  Positive for shortness of breath\"    \"Upon arriving in the HF ICU, patient on BIPAP, awake, alert, with conversational dyspnea.\"    \"On BIPAP 30% FIO2, Sats 100%\"    Treatment: Diuretics: " Lasix 40 mg x2 on 2/5, BiPap    Risk Factors: Decompensated heart failure, COPD, SOB  Options provided:  -- Acute Hypoxemic Respiratory Failure  -- Acute Hypercapnic Respiratory Failure  -- Acute Hypoxemic and Hypercapnic Respiratory Failure  -- No acute respiratory failure  -- Other - I will add my own diagnosis  -- Refer to Clinical Documentation Reviewer    Query created by: Vickie Lewis on 2/7/2025 10:56 AM      Electronically signed by:  KELSI GARVEY MD 2/7/2025 11:40 AM

## 2025-02-07 NOTE — CARE PLAN
Patient safe and stable throughout shift. Arrived from HFICU. Wiley dressing changed. Continues on milrinone. Weaned to RA.      Problem: Pain - Adult  Goal: Verbalizes/displays adequate comfort level or baseline comfort level  Outcome: Progressing     Problem: Safety - Adult  Goal: Free from fall injury  Outcome: Progressing     Problem: Discharge Planning  Goal: Discharge to home or other facility with appropriate resources  Outcome: Progressing     Problem: Chronic Conditions and Co-morbidities  Goal: Patient's chronic conditions and co-morbidity symptoms are monitored and maintained or improved  Outcome: Progressing     Problem: Nutrition  Goal: Nutrient intake appropriate for maintaining nutritional needs  Outcome: Progressing     Problem: Fall/Injury  Goal: Not fall by end of shift  Outcome: Progressing  Goal: Be free from injury by end of the shift  Outcome: Progressing  Goal: Verbalize understanding of personal risk factors for fall in the hospital  Outcome: Progressing  Goal: Verbalize understanding of risk factor reduction measures to prevent injury from fall in the home  Outcome: Progressing  Goal: Use assistive devices by end of the shift  Outcome: Progressing  Goal: Pace activities to prevent fatigue by end of the shift  Outcome: Progressing

## 2025-02-07 NOTE — SIGNIFICANT EVENT
Patient safely transferred to the floor with all belongings in stable condition to the [facility] Cardiology service. Upon evaluation, patient is HDS and in no acute distress with no active complaints. Based on chart review and physical examination, patient is appropriate for the [facility] service at this time. All questions answered at this time (specifically inquired about timing of repeat SHRUTHI/DCCV; discussed the plan is to continue OP SHRUTHI/DCCV for 2/24 after 1 month of uninterrupted AC; she verbalized understanding). Plan of care as per daily progress note. To be seen and discussed with AM staff.

## 2025-02-07 NOTE — PROGRESS NOTES
Physical Therapy    Physical Therapy Treatment    Patient Name: Melissa Marinelli  MRN: [id number]  Department: [facility] HFICU  Room: 06/06-A  Today's Date: 2/7/2025  Time Calculation  Start Time: 1003  Stop Time: 1045  Time Calculation (min): 42 min         Assessment/Plan   PT Assessment  PT Assessment Results: Decreased strength, Decreased endurance, Impaired balance, Decreased safety awareness  Rehab Prognosis: Good  Barriers to Discharge Home: Caregiver assistance  Caregiver Assistance: Caregiver assistance needed per identified barriers - however, level of patient's required assistance exceeds assistance available at home  Evaluation/Treatment Tolerance: Patient tolerated treatment well  Medical Staff Made Aware: Yes  Strengths: Physical health, Premorbid level of function, Support of Caregivers  Barriers to Participation: Insight into problems, Housing layout  End of Session Communication: Bedside nurse  Assessment Comment: Patient able to ambulate >300 feet today (total, 4 rest breaks) without loss of balance using a walker and was independent in all bed mobility. Patient denied any lightheadedness, nausea, headache, chest pain, and shortness of breath with mobility. She does have 14 stairs to get to her only bathroom and bedroom in home and would benefit from stair training next session. low intensity rehab recommended.  End of Session Patient Position: Up in chair, Alarm off, not on at start of session     PT Plan  Treatment/Interventions: Bed mobility, Transfer training, Gait training, Stair training, Balance training, Neuromuscular re-education, Neurodevelopmental intervention, Strengthening, Endurance training, Range of motion, Therapeutic exercise, Therapeutic activity, Home exercise program  PT Plan: Ongoing PT  PT Frequency: 3 times per week  PT Discharge Recommendations: Low intensity level of continued care  PT Recommended Transfer Status: Stand by assist  PT - OK to Discharge: Yes      General Visit  Information:   PT  Visit  PT Received On: 02/07/25  Response to Previous Treatment: Patient with no complaints from previous session.  General  Reason for Referral: presented to [facility] ED 2/5 with acutely worsening shortness of breath and a clinical picture of decompensated heart failure.  Admitted to HF ICU ICU for continued BiPAP and diuresis. Patient off BiPAP 2/6 to nasal canula  Past Medical History Relevant to Rehab: ICM/ HFrEF (last EF 15-20% 7/2024, s/p [employer] ICD 5/2020) on palliative milrinone infusion since 2/2024, CAD s/p NSTEMI s/p RIRI to LCX (Jan 2016), MVR s/p mitral valve repair in (June 2019; 29 mm Epic bioprosthetic valve with Dr. Montana), COPD, HTN, HL, GERD, hx of CVA, DM  Family/Caregiver Present: No  Caregiver Feedback: Daughter present  Prior to Session Communication: Bedside nurse  Patient Position Received: Bed, 2 rail up, Alarm off, not on at start of session  General Comment: Patient very motivated to work with PT    Subjective   Precautions:  Precautions  Medical Precautions: Fall precautions  Precautions Comment: MAP 60-80     Date/Time Vitals Session Patient Position Pulse Resp SpO2 BP MAP (mmHg)    02/07/25 1000 --  --  109  20  --  104/62  76     02/07/25 1003 Pre PT  --  110  15  --  --  --     02/07/25 1025 --  --  112  19  96 %  106/62  77     02/07/25 1035 Post PT  --  112  22  100 %  115/82  95                 Objective   Pain:  Pain Assessment  Pain Assessment: 0-10  0-10 (Numeric) Pain Score: 0 - No pain  Cognition:  Cognition  Overall Cognitive Status: Within Functional Limits  Orientation Level: Oriented X4  Insight: Moderate  Impulsive: Moderately  Processing Speed: Delayed  Coordination:  Movements are Fluid and Coordinated: Yes  Postural Control:  Postural Control  Postural Control: Within Functional Limits  Static Sitting Balance  Static Sitting-Balance Support: No upper extremity supported, Feet supported  Static Sitting-Level of Assistance: Distant  supervision  Dynamic Sitting Balance  Dynamic Sitting-Balance Support: Feet supported, No upper extremity supported  Dynamic Sitting-Level of Assistance: Distant supervision  Static Standing Balance  Static Standing-Balance Support: Bilateral upper extremity supported  Static Standing-Level of Assistance: Minimum assistance    Activity Tolerance:  Activity Tolerance  Endurance: Decreased tolerance for upright activites  Treatments:  Therapeutic Activity  Therapeutic Activity 1: sit to stand x5  Therapeutic Activity 2: Bed mobility    Bed Mobility  Bed Mobility: Yes  Bed Mobility 1  Bed Mobility 1: Supine to sitting  Level of Assistance 1: Modified independent  Bed Mobility Comments 1: No assistance given    Ambulation/Gait Training  Ambulation/Gait Training Performed: Yes  Ambulation/Gait Training 1  Surface 1: Level tile  Device 1: Rolling walker, No device  Assistance 1: Close supervision  Comments/Distance (ft) 1: 300 feet total with 4 seated rest breaks. slow gait. ~30 feet without walker at the end of the session.  Transfers  Transfer: Yes  Transfer 1  Transfer From 1: Sit to, Stand to  Transfer to 1: Sit, Stand  Technique 1: Sit to stand, Stand to sit  Transfer Device 1:  (hand held assist)  Transfer Level of Assistance 1: Contact guard  Trials/Comments 1: Slow, but successful without assist  Transfers 2  Transfer From 2: Bed to  Transfer to 2: Chair with arms  Technique 2: Lateral  Transfer Device 2:  (hand held assist)  Transfer Level of Assistance 2: Minimum assistance  Trials/Comments 2: Short shuffled steps but successful. Mild instability    Outcome Measures:  [facility] Basic Mobility  Turning from your back to your side while in a flat bed without using bedrails: None  Moving from lying on your back to sitting on the side of a flat bed without using bedrails: None  Moving to and from bed to chair (including a wheelchair): A little  Standing up from a chair using your arms (e.g. wheelchair or bedside chair):  A little  To walk in hospital room: A little  Climbing 3-5 steps with railing: A little  Basic Mobility - Total Score: 20    FSS-ICU  Ambulation: Walks <50 feet with any assistance x1 or walks any distance with assistance x2 people  Rolling: Supervision or set-up only  Sitting: Supervision or set-up only  Transfer Sit-to-Stand: Minimal assistance (performs 75% or more of task)  Transfer Supine-to-Sit: Supervision or set-up only  Total Score: 20         ICU Mobility Scale: Walking with assistance of 1 person [8]    Education Documentation  Home Exercise Program, taught by Cesario Padgett PT at 2/7/2025 11:04 AM.  Learner: Patient  Readiness: Acceptance  Method: Demonstration, Explanation  Response: Verbalizes Understanding, Demonstrated Understanding  Comment: Goals of PT, safety with mobility, vital signs with mobility    Precautions, taught by Cesario Padgett PT at 2/7/2025 11:04 AM.  Learner: Patient  Readiness: Acceptance  Method: Demonstration, Explanation  Response: Verbalizes Understanding, Demonstrated Understanding  Comment: Goals of PT, safety with mobility, vital signs with mobility    Body Mechanics, taught by Cesario Padgett PT at 2/7/2025 11:04 AM.  Learner: Patient  Readiness: Acceptance  Method: Demonstration, Explanation  Response: Verbalizes Understanding, Demonstrated Understanding  Comment: Goals of PT, safety with mobility, vital signs with mobility    Mobility Training, taught by Cesario Padgett PT at 2/7/2025 11:04 AM.  Learner: Patient  Readiness: Acceptance  Method: Demonstration, Explanation  Response: Verbalizes Understanding, Demonstrated Understanding  Comment: Goals of PT, safety with mobility, vital signs with mobility    Education Comments  No comments found.        OP EDUCATION:       Encounter Problems       Encounter Problems (Active)       PT Problem       Patient is able to ambulate 150 feet without loss of balance requiring contact guard or less assist  (Met)        Start:  02/06/25    Expected End:  02/20/25    Resolved:  02/07/25         Patient is able to ascend 5 stairs without loss of balance requiring contact guard or less assist  (Progressing)       Start:  02/06/25    Expected End:  02/20/25            Patient is able to perform sit to stand requiring contact guard assist or less  (Met)       Start:  02/06/25    Expected End:  02/20/25    Resolved:  02/07/25         Patient able to walk >996 feet in the 6 minute walk test without loss of balance  (Progressing)       Start:  02/06/25    Expected End:  02/20/25               Pain - Adult

## 2025-02-08 LAB
ALBUMIN SERPL BCP-MCNC: 3.4 G/DL (ref 3.4–5)
ANION GAP SERPL CALC-SCNC: 14 MMOL/L (ref 10–20)
BUN SERPL-MCNC: 35 MG/DL (ref 6–23)
CALCIUM SERPL-MCNC: 8.7 MG/DL (ref 8.6–10.6)
CHLORIDE SERPL-SCNC: 99 MMOL/L (ref 98–107)
CO2 SERPL-SCNC: 24 MMOL/L (ref 21–32)
CREAT SERPL-MCNC: 1.96 MG/DL (ref 0.5–1.05)
EGFRCR SERPLBLD CKD-EPI 2021: 27 ML/MIN/1.73M*2
ERYTHROCYTE [DISTWIDTH] IN BLOOD BY AUTOMATED COUNT: 15.9 % (ref 11.5–14.5)
GLUCOSE SERPL-MCNC: 120 MG/DL (ref 74–99)
HCT VFR BLD AUTO: 37.2 % (ref 36–46)
HGB BLD-MCNC: 10.9 G/DL (ref 12–16)
MAGNESIUM SERPL-MCNC: 2.44 MG/DL (ref 1.6–2.4)
MCH RBC QN AUTO: 29.8 PG (ref 26–34)
MCHC RBC AUTO-ENTMCNC: 29.3 G/DL (ref 32–36)
MCV RBC AUTO: 102 FL (ref 80–100)
NRBC BLD-RTO: 0 /100 WBCS (ref 0–0)
PHOSPHATE SERPL-MCNC: 3.4 MG/DL (ref 2.5–4.9)
PLATELET # BLD AUTO: 191 X10*3/UL (ref 150–450)
POTASSIUM SERPL-SCNC: 4.4 MMOL/L (ref 3.5–5.3)
RBC # BLD AUTO: 3.66 X10*6/UL (ref 4–5.2)
SODIUM SERPL-SCNC: 133 MMOL/L (ref 136–145)
WBC # BLD AUTO: 3.7 X10*3/UL (ref 4.4–11.3)

## 2025-02-08 PROCEDURE — 2500000001 HC RX 250 WO HCPCS SELF ADMINISTERED DRUGS (ALT 637 FOR MEDICARE OP): Performed by: NURSE PRACTITIONER

## 2025-02-08 PROCEDURE — 2500000002 HC RX 250 W HCPCS SELF ADMINISTERED DRUGS (ALT 637 FOR MEDICARE OP, ALT 636 FOR OP/ED): Performed by: NURSE PRACTITIONER

## 2025-02-08 PROCEDURE — 94640 AIRWAY INHALATION TREATMENT: CPT

## 2025-02-08 PROCEDURE — 1100000001 HC PRIVATE ROOM DAILY

## 2025-02-08 PROCEDURE — 80069 RENAL FUNCTION PANEL: CPT | Performed by: NURSE PRACTITIONER

## 2025-02-08 PROCEDURE — 99232 SBSQ HOSP IP/OBS MODERATE 35: CPT | Performed by: INTERNAL MEDICINE

## 2025-02-08 PROCEDURE — 85027 COMPLETE CBC AUTOMATED: CPT | Performed by: NURSE PRACTITIONER

## 2025-02-08 PROCEDURE — 2500000004 HC RX 250 GENERAL PHARMACY W/ HCPCS (ALT 636 FOR OP/ED): Performed by: NURSE PRACTITIONER

## 2025-02-08 PROCEDURE — 83735 ASSAY OF MAGNESIUM: CPT | Performed by: NURSE PRACTITIONER

## 2025-02-08 RX ORDER — BUMETANIDE 1 MG/1
0.5 TABLET ORAL DAILY
Status: DISCONTINUED | OUTPATIENT
Start: 2025-02-08 | End: 2025-02-09 | Stop reason: HOSPADM

## 2025-02-08 RX ADMIN — MILRINONE LACTATE IN DEXTROSE 0.25 MCG/KG/MIN: 200 INJECTION, SOLUTION INTRAVENOUS at 03:15

## 2025-02-08 RX ADMIN — SPIRONOLACTONE 12.5 MG: 25 TABLET, FILM COATED ORAL at 08:27

## 2025-02-08 RX ADMIN — IPRATROPIUM BROMIDE AND ALBUTEROL SULFATE 3 ML: .5; 3 SOLUTION RESPIRATORY (INHALATION) at 10:09

## 2025-02-08 RX ADMIN — SACUBITRIL AND VALSARTAN 1 TABLET: 24; 26 TABLET, FILM COATED ORAL at 21:02

## 2025-02-08 RX ADMIN — APIXABAN 5 MG: 5 TABLET, FILM COATED ORAL at 21:02

## 2025-02-08 RX ADMIN — Medication 2000 UNITS: at 08:27

## 2025-02-08 RX ADMIN — CALCIUM CARBONATE (ANTACID) CHEW TAB 500 MG 1000 MG: 500 CHEW TAB at 04:53

## 2025-02-08 RX ADMIN — DAPAGLIFLOZIN 10 MG: 10 TABLET, FILM COATED ORAL at 08:28

## 2025-02-08 RX ADMIN — APIXABAN 5 MG: 5 TABLET, FILM COATED ORAL at 08:27

## 2025-02-08 RX ADMIN — AMIODARONE HYDROCHLORIDE 200 MG: 200 TABLET ORAL at 08:27

## 2025-02-08 RX ADMIN — ASPIRIN 81 MG: 81 TABLET ORAL at 08:27

## 2025-02-08 RX ADMIN — BUMETANIDE 0.5 MG: 1 TABLET ORAL at 10:16

## 2025-02-08 RX ADMIN — SACUBITRIL AND VALSARTAN 1 TABLET: 24; 26 TABLET, FILM COATED ORAL at 08:27

## 2025-02-08 RX ADMIN — FAMOTIDINE 10 MG: 20 TABLET, FILM COATED ORAL at 08:27

## 2025-02-08 ASSESSMENT — COGNITIVE AND FUNCTIONAL STATUS - GENERAL
CLIMB 3 TO 5 STEPS WITH RAILING: A LITTLE
WALKING IN HOSPITAL ROOM: A LITTLE
MOBILITY SCORE: 22
CLIMB 3 TO 5 STEPS WITH RAILING: A LITTLE
WALKING IN HOSPITAL ROOM: A LITTLE
MOBILITY SCORE: 22
DAILY ACTIVITIY SCORE: 24
DAILY ACTIVITIY SCORE: 24

## 2025-02-08 ASSESSMENT — PAIN SCALES - GENERAL
PAINLEVEL_OUTOF10: 0 - NO PAIN
PAINLEVEL_OUTOF10: 0 - NO PAIN

## 2025-02-08 ASSESSMENT — PAIN - FUNCTIONAL ASSESSMENT: PAIN_FUNCTIONAL_ASSESSMENT: 0-10

## 2025-02-08 NOTE — CARE PLAN
The patient's goals for the shift include      The clinical goals for the shift include Pt will have an O2 level greater than 92% when on RA    Over the shift, the patient did not make progress toward the following goals. Barriers to progression include pt not being titrated to RA during the shift. Recommendations to address these barriers include trying to wean the pt off of 1L NC during the next shift.      Problem: Pain - Adult  Goal: Verbalizes/displays adequate comfort level or baseline comfort level  Outcome: Progressing     Problem: Safety - Adult  Goal: Free from fall injury  Outcome: Progressing     Problem: Discharge Planning  Goal: Discharge to home or other facility with appropriate resources  Outcome: Progressing     Problem: Chronic Conditions and Co-morbidities  Goal: Patient's chronic conditions and co-morbidity symptoms are monitored and maintained or improved  Outcome: Progressing     Problem: Nutrition  Goal: Nutrient intake appropriate for maintaining nutritional needs  Outcome: Progressing     Problem: Fall/Injury  Goal: Not fall by end of shift  Outcome: Progressing  Goal: Be free from injury by end of the shift  Outcome: Progressing  Goal: Verbalize understanding of personal risk factors for fall in the hospital  Outcome: Progressing  Goal: Verbalize understanding of risk factor reduction measures to prevent injury from fall in the home  Outcome: Progressing  Goal: Use assistive devices by end of the shift  Outcome: Progressing  Goal: Pace activities to prevent fatigue by end of the shift  Outcome: Progressing

## 2025-02-08 NOTE — PROGRESS NOTES
Subjective Data:  On tele, atrial flutter 115. Weight down from 63.2 to 61.9. Patient thinks she was not on enough of diuretic at last DC. She was only given 0.5mg twice a week and was told to take more if needed, but was not given more tabs should that happen. She doesn't want to take that many pills at home either.     Overnight Events:    NA     Objective Data:  Last Recorded Vitals:  Vitals:    02/07/25 2325 02/08/25 0333 02/08/25 0427 02/08/25 0755   BP: 91/58 93/60  98/61   BP Location: Right arm Right arm  Right arm   Patient Position: Lying Lying  Lying   Pulse: (!) 114 (!) 112  (!) 112   Resp: 20 20  18   Temp: 36 °C (96.8 °F) 36.7 °C (98.1 °F)  36.6 °C (97.9 °F)   TempSrc: Temporal Temporal  Temporal   SpO2: 100% 93% 96% 96%   Weight:  61.9 kg (136 lb 7.4 oz)     Height:           Last Labs:  CBC - 2/7/2025:  6:21 PM  5.6 10.3 212    33.1      CMP - 2/7/2025:  6:21 PM  8.4 7.9 35 --- 0.5   3.7 3.3 42 104      PTT - 2/5/2025:  9:31 PM  2.0   22.8 36     TROPHS   Date/Time Value Ref Range Status   02/05/2025 02:33  0 - 34 ng/L Final     Comment:     Previous result verified on 2/5/2025 1455 on specimen/case [id number] called with component TRPHS for procedure Troponin I, High Sensitivity, Initial with value 220 ng/L.   02/05/2025 01:39  0 - 34 ng/L Final   01/21/2025 04:45  0 - 34 ng/L Final     Comment:     Previous result verified on 1/21/2025 0521 on specimen/case [id number] called with component TRPHS for procedure Troponin I, High Sensitivity with value 180 ng/L.   02/17/2024 03:47  0 - 34 ng/L Final     Comment:     Previous result verified on 2/17/2024 0325 on specimen/case [id number] called with component RUST for procedure Troponin I, High Sensitivity, Initial with value 170 ng/L.   02/17/2024 02:52  0 - 34 ng/L Final   01/26/2024 07:15  0 - 34 ng/L Final     BNP   Date/Time Value Ref Range Status   02/05/2025 01:39  0 - 99 pg/mL Final    01/21/2025 01:49  0 - 99 pg/mL Final     HGBA1C   Date/Time Value Ref Range Status   02/05/2025 06:24 PM 5.7 See comment % Final   07/08/2024 11:37 AM 5.2 see below % Final     VLDL   Date/Time Value Ref Range Status   09/01/2023 05:24 AM 21 0 - 40 mg/dL Final   05/21/2020 09:02 PM 31 0 - 40 mg/dL Final   11/07/2019 07:57 PM 21 0 - 40 mg/dL Final      Last I/O:  I/O last 3 completed shifts:  In: 1052.9 (17 mL/kg) [P.O.:941; I.V.:111.9 (1.8 mL/kg)]  Out: 1500 (24.2 mL/kg) [Urine:1500 (0.7 mL/kg/hr)]  Weight: 61.9 kg     Past Cardiology Tests (Last 3 Years):  EKG:  ECG 12 Lead 02/06/2025      ECG 12 Lead 01/21/2025      ECG 12 lead 01/21/2025      ECG 12 lead 01/10/2025      ECG 12 lead 08/21/2024      Electrocardiogram, 12-lead PRN ACS symtpoms 07/08/2024      ECG 12 lead 07/08/2024      ECG 12 lead 07/08/2024      ECG 12 Lead 01/28/2024      Electrocardiogram, 12-lead PRN ACS symptoms 01/25/2024      ECG 12 Lead 01/24/2024      ECG 12 lead 01/23/2024      ECG 12 lead 01/23/2024      ECG 12 lead 01/22/2024      ECG 12 Lead 01/22/2024      ECG 12 Lead 12/01/2023      ECG 12 Lead       ECG 12 lead 11/21/2023      ECG 12 lead 11/21/2023    Echo:  Transesophageal Echo (SHRUTHI) 01/23/2025      Transthoracic Echo (TTE) Complete 07/08/2024      Transthoracic Echo (TTE) Limited 01/24/2024      Transthoracic Echo (TTE) Complete 11/21/2023    Ejection Fractions:  EF   Date/Time Value Ref Range Status   01/23/2025 02:02 PM 13 %    07/08/2024 04:30 PM 18 %    01/24/2024 08:40 AM 20 %      Cath:  Cardiac catheterization - non-coronary 01/26/2024    Stress Test:  No results found for this or any previous visit from the past 1095 days.    Cardiac Imaging:  No results found for this or any previous visit from the past 1095 days.      Inpatient Medications:  Scheduled medications   Medication Dose Route Frequency    amiodarone  200 mg oral Daily    apixaban  5 mg oral BID    aspirin  81 mg oral Daily    bumetanide  0.5 mg oral  Daily    cholecalciferol  2,000 Units oral Daily    dapagliflozin propanediol  10 mg oral Daily    famotidine  10 mg oral Daily    fluticasone furoate-vilanteroL  1 puff inhalation Daily    nicotine  1 patch transdermal q24h    sacubitriL-valsartan  1 tablet oral BID    spironolactone  12.5 mg oral Daily     PRN medications   Medication    acetaminophen    albuterol    calcium carbonate    ipratropium-albuteroL    oxygen    sodium chloride    trimethobenzamide     Continuous Medications   Medication Dose Last Rate    milrinone  0.25 mcg/kg/min 0.25 mcg/kg/min (02/08/25 0315)       Physical Exam:  General: NAD, lying in bed  Skin: warm and dry   Head/ neck: no JVD seen at 90 degrees  Cardiac: RRR, S1, S2   Pulm: CTAB, room air   GI: soft, nontender   Extremities: no LE edema   Neuro: no focal neuro deficits   Psych: appropriate mood and behavior        Assessment/Plan   Melissa Marinelli is a 69 y.o. female with PMHx of ICM/ HFrEF (last EF 15-20% 7/2024, s/p [employer] ICD 5/2020) on palliative milrinone infusion since 2/2024, CAD s/p NSTEMI s/p RIRI to LCX (Jan 2016), MVR s/p mitral valve repair in (June 2019; 29 mm Epic bioprosthetic valve with Dr. 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       ICM, stage D, HFrEF  LV Thrombus dx 1/2025  - SHRUTHI (1/23/25): LVEF 10-15%, The left atrial appendage appears dilated, with very dense spontaneous contrast, flow ectasia, and an image suggestive of a thrombus, Mobile echogenic image that appears to be attached to the device lead (Clip41), Mildly elevated right ventricular systolic pressure. Moderate tricuspid regurgitation, mild aortic valve regurgitation  - Plan SHRUTHI in 1 month (2/24/25) per chart review  - admit weight (2/5): 63.2 KG   - admit BNP (2/5): 886  - Weight today 61.9kg  - DC weight at DC 1/24  62kg  - C/w ASA  - C/w Farxigo 10 mg daily  - Restart home Spirolactone 12.5 mg daily   - Diuretics: Lasix 40 mg x2 on 2/5  - Nipride drip initiated 2/5, weaned off 2/7  - Entresto 24/26 mg BID initiated 2/6  - C/w Home Milrinone drip at 0.25 mcg/kg/min (dose lowered from 0.375 last admission 1/2025 to reduce atrial fibrillation burden)  - Continue eliquis 5mg BID   - DC Famotidine 10 mg daily, patient does not want to be on too many meds   - 2/8 start bumex 0.5mg PO daily   - Daily standing weights, 2gm sodium diet, 2L fluid restriction, strict I&Os     CAD, s/p PCI 2016   -  C/w ASA 81mg daily  -  No statin due to allergy     VT history   -Device interrogation (2/6):  No VT or ICD shock, no AT/AF [place]  -C/w Amiodarone 200 mg PO daily (shock for VT could but patient in SR, so need to continue)     Hx of COPD  - continue home meds  - continue breo ellipta  - Flu A, Flu B, Covid 19 (2/5): Negative   - Monitor and maintain SpO2 > 92%  - Neb PRN        PATSY/CKD stage 3B  - Baseline BUN/Cr: 1.4-1.5   - Admit BUN/Cr (2/5): 34/2.15   - Cr today 2.21  - resume bumex daily   - I/Os  - avoid hypotension and nephrotoxic agents       Anemia in the setting of chronic disease   - Labs (2/5): H/H: 12.3/37.2, TIBC: 341, ferritin: 81, Sats: 21%,  serum Fe: 72, folate: 20.2,  B12 : 533       H/o DM  - Euglycemic  - hgbA1c (2/5): 5.7  - Not on insulin, monitor BS from AM Labs.    Anxiety. Depression, Acute pain   - Serial neuro and pain assessments   - PO Tylenol PRN for pain  - PT/OT Consult, OOB to chair  - CAM ICU score every shift  - Sleep/wake cycle normalization     Substance abuse  - Alcohol abuse/Alcohol dependence: NO   - Tobacco use/Nicotine Dependence: Yes  - Refused Nicotine patch      PHYSICAL AND OCCUPATIONAL THERAPY: pending      LINES: Inez      DVT: eliquis  VAP BUNDLE: N/A  ULCER PPX: PPI  GLYCEMIC CONTROL:   BOWEL CARE: Senna, Miralax  INDWELLING CATHETER:N/A  NUTRITION: Adult diet Potassium restricted 2 gm  (50mEq); 2000 mL fluid        EMERGENCY CONTACT: Extended Emergency Contact Information  Primary Emergency Contact: Mary Caruso  Mobile Phone: [phone]  Relation: Daughter   needed? No  Secondary Emergency Contact: Elin Marinelli  Mobile Phone: [phone]  Relation: Daughter  Preferred language: English   needed? No  FAMILY UPDATE:  CODE STATUS: Full Code  DISPO: DC pending tolerance to PO lasix   CVC 09/09/24 Single lumen Tunneled Right Subclavian (Active)   Line Necessity Intravenous medication therapy 02/08/25 0900   Site Assessment Clean;Dry;Intact 02/08/25 0900   Proximal Lumen Status Infusing 02/08/25 0900   Dressing Type Antimicrobial patch;Transparent 02/08/25 0900   Dressing Status Dry;Clean;Occlusive 02/08/25 0900   Dressing Change Due 02/14/25 02/08/25 0900   Number of days: 152       Peripheral IV 02/05/25 Right Antecubital (Active)   Site Assessment Clean;Dry;Intact 02/08/25 0900   Dressing Type Transparent 02/08/25 0900   Line Status Flushed 02/08/25 0900   Dressing Status Clean;Dry;Occlusive 02/08/25 0900   Number of days: 3     All labs, vital signs, tests & imaging results, and medications were reviewed.  Seen and discussed with LAWSON Alva-CNP    I conducted a shared care visit with the JAMIL, remains in controlled atrial flutter overall hemodynamically stable she is both unhappy with the low dose of loop diuretics that she was discharged on but also unhappy with the number of pills that we are prescribing.  We explained that the majority of these are necessary but we will discontinue her famotidine as she denies any symptomatic improvement from that.  Will trial her on Bumex 0.5 mg daily although she may need a higher dose

## 2025-02-08 NOTE — CARE PLAN
The clinical goals for the shift include pt will have O2 sat >92% off oxygen    Over the shift, the patient was on RA and had O2 sats >92. Pt did have trouble breathing around 2245 but the respiratory therapist was on the unit and gave her a duo-neb treatment which relieved her symptoms. Pt did not report any pain but reported indigestion which was  relieved by TUMS; milrinone infusing

## 2025-02-08 NOTE — SIGNIFICANT EVENT
Rapid Response Nurse Note: RADAR alert: 6    Pager time: [phone]  Arrival time: 428  Event end time: 435  Location:   [x] Triage by phone or secure messaging    Rapid response initiated by:  [] Rapid response RN [] Family [] Nursing Supervisor [] Physician   [x] RADAR auto page [] Sepsis auto-page [] RN [] RT   [] NP/PA [] Other:     Primary reason for call:   [] BAT [] New CPAP/BiPAP [] Bleeding [] Change in mental status   [] Chest pain [] Code blue [] FiO2 >/= 50% [] HR </= 40 bpm   [] HR >/= 130 bpm [] Hyperglycemia [] Hypoglycemia [x] RADAR    [] RR </= 8 bpm [] RR >/= 30 bpm [] SBP </= 90 mmHg [] SpO2 < 90%   [] Seizure [] Sepsis [] Shortness of breath  [] Staff concern: see comments     Initial VS and/or RADAR VS: T 36.7 °C; ; RR 20; BP 93/60; SPO2 93%.    Providers present at bedside (if applicable): n/a    Name of ICU Provider contacted (if applicable): n/a    Interventions:  [x] None [] ABG/VBG [] Assist w/ICU transfer [] BAT paged    [] Bag mask [] Blood [] Cardioversion [] Code Blue   [] Code blue for intubation [] Code status changed [] Chest x-ray [] EKG   [] IV fluid/bolus [] KUB x-ray [] Labs/cultures [] Medication   [] Nebulizer treatment [] NIPPV (CPAP/BiPAP) [] Oxygen [] Oral airway   [] Peripheral IV [] Palliative care consult [] CT/MRI [] Sepsis protocol    [] Suctioned [] Other:     Outcome:  [] Coded and  [] Code blue for intubation [] Coded and transferred to ICU []  on division   [x] Remained on division (no change) [] Remained on division + additional monitoring [] Remained in ED [] Transferred to ED   [] Transferred to ICU [] Transferred to inpatient status [] Transferred for interventions (procedure) [] Transferred to ICU stepdown    [] Transferred to surgery [] Transferred to telemetry [] Sepsis protocol [] STEMI protocol   [] Stroke protocol [x] Bedside nurse instructed to page rapid response for any concerns or acute change in condition/VS     Additional  Comments: Radar alert for above vital signs in bold. Most notable values: , BP 93/60, and pOx 93%.  Reviewed vital signs with bedside nurse Mis Sierra who rechecked patient's pOx at 96%.  Patient's other vital signs are within trends. Patient just recently transferred out of the HFICU to HHVI service. Patient is without acute complaints at this time. RN will page rapid response for decompensation or call with new concerns.

## 2025-02-09 VITALS
BODY MASS INDEX: 22.92 KG/M2 | HEIGHT: 64 IN | WEIGHT: 134.26 LBS | RESPIRATION RATE: 18 BRPM | HEART RATE: 108 BPM | SYSTOLIC BLOOD PRESSURE: 99 MMHG | TEMPERATURE: 97.5 F | DIASTOLIC BLOOD PRESSURE: 66 MMHG | OXYGEN SATURATION: 99 %

## 2025-02-09 PROCEDURE — 2500000002 HC RX 250 W HCPCS SELF ADMINISTERED DRUGS (ALT 637 FOR MEDICARE OP, ALT 636 FOR OP/ED): Performed by: NURSE PRACTITIONER

## 2025-02-09 PROCEDURE — 2500000001 HC RX 250 WO HCPCS SELF ADMINISTERED DRUGS (ALT 637 FOR MEDICARE OP): Performed by: NURSE PRACTITIONER

## 2025-02-09 PROCEDURE — 2500000004 HC RX 250 GENERAL PHARMACY W/ HCPCS (ALT 636 FOR OP/ED): Performed by: NURSE PRACTITIONER

## 2025-02-09 PROCEDURE — 99239 HOSP IP/OBS DSCHRG MGMT >30: CPT | Performed by: INTERNAL MEDICINE

## 2025-02-09 RX ORDER — BUMETANIDE 0.5 MG/1
0.5 TABLET ORAL DAILY
Qty: 30 TABLET | Refills: 1 | Status: SHIPPED | OUTPATIENT
Start: 2025-02-10 | End: 2025-02-09

## 2025-02-09 RX ORDER — BUMETANIDE 0.5 MG/1
0.5 TABLET ORAL DAILY
Qty: 40 TABLET | Refills: 1 | Status: SHIPPED | OUTPATIENT
Start: 2025-02-10 | End: 2025-04-11

## 2025-02-09 RX ORDER — AMIODARONE HYDROCHLORIDE 200 MG/1
200 TABLET ORAL DAILY
Qty: 30 TABLET | Refills: 1 | Status: SHIPPED | OUTPATIENT
Start: 2025-02-10 | End: 2025-04-11

## 2025-02-09 RX ORDER — SACUBITRIL AND VALSARTAN 24; 26 MG/1; MG/1
1 TABLET, FILM COATED ORAL 2 TIMES DAILY
Qty: 60 TABLET | Refills: 1 | Status: SHIPPED | OUTPATIENT
Start: 2025-02-09 | End: 2025-04-10

## 2025-02-09 RX ADMIN — Medication 2000 UNITS: at 08:49

## 2025-02-09 RX ADMIN — DAPAGLIFLOZIN 10 MG: 10 TABLET, FILM COATED ORAL at 08:49

## 2025-02-09 RX ADMIN — SACUBITRIL AND VALSARTAN 1 TABLET: 24; 26 TABLET, FILM COATED ORAL at 08:48

## 2025-02-09 RX ADMIN — MILRINONE LACTATE IN DEXTROSE 0.25 MCG/KG/MIN: 200 INJECTION, SOLUTION INTRAVENOUS at 00:16

## 2025-02-09 RX ADMIN — ASPIRIN 81 MG: 81 TABLET ORAL at 08:49

## 2025-02-09 RX ADMIN — APIXABAN 5 MG: 5 TABLET, FILM COATED ORAL at 08:49

## 2025-02-09 RX ADMIN — BUMETANIDE 0.5 MG: 1 TABLET ORAL at 08:48

## 2025-02-09 RX ADMIN — AMIODARONE HYDROCHLORIDE 200 MG: 200 TABLET ORAL at 08:49

## 2025-02-09 RX ADMIN — SPIRONOLACTONE 12.5 MG: 25 TABLET, FILM COATED ORAL at 08:49

## 2025-02-09 RX ADMIN — ACETAMINOPHEN 975 MG: 325 TABLET ORAL at 04:19

## 2025-02-09 ASSESSMENT — COGNITIVE AND FUNCTIONAL STATUS - GENERAL
WALKING IN HOSPITAL ROOM: A LITTLE
CLIMB 3 TO 5 STEPS WITH RAILING: A LITTLE
MOBILITY SCORE: 22
DAILY ACTIVITIY SCORE: 24

## 2025-02-09 ASSESSMENT — PAIN SCALES - GENERAL: PAINLEVEL_OUTOF10: 0 - NO PAIN

## 2025-02-09 NOTE — DISCHARGE SUMMARY
Discharge Diagnosis  Acute on chronic systolic heart failure    Issues Requiring Follow-Up  SHRUTHI with [facility] in a few months when thrombus resolves.   BMP in one week      Test Results Pending At Discharge  Pending Labs       No current pending labs.            Hospital Course  Melissa Marinelli is a 69 y.o. female with PMHx of ICM/ HFrEF (last EF 15-20% 7/2024, s/p [employer] ICD 5/2020) on palliative milrinone infusion since 2/2024, CAD s/p NSTEMI s/p RIRI to LCX (Jan 2016), MVR s/p mitral valve repair in (June 2019; 29 mm Epic bioprosthetic valve with Dr. Montana), COPD, HTN, HL, GERD, hx of CVA, DM, who presented to [facility] ED 2/5 with acutely worsening shortness of breath and a clinical picture of decompensated heart failure.  Admitted to HF ICU ICU for continued BiPAP and diuresis.     Patient was recently admitted (1/11-1/12) for palpitations, diarrhea. Had ICD shock in ED, interrogation showed VT. Placed on amiodarone. Home dose of bumex 0.5mg twice weekly continued. Admission 1/22-1/24/25 for SOB, was on amio and noted she had AF/A flutter later on, SHRUTHI done, with DEVIKA thrombus, cardioversion was not able to precede. Discharged on bumex 0.5mg twice weekly and if she noticed more CHF symptoms, to increase to daily, but patient states she was not given more than 8 pills a month, eliquis 5mg BID with plan to repeat SHRUTHI in one month. Of note, Ms Marinelli has declined durable LVAD therapies in the past.     Patient presented to ED via EMS for SOB. Pt is stating 98% on RA upon arrival though increased work of breathing is noted. Shortly after arrival pt became diaphoretic and nauseous with vomiting. Pt is sitting in tripod position. Wheezes noted. EMS gave 125 of solumedrol and a duoneb which had appeared to help.     Upon arriving in the HF ICU 2/5/25, patient on BIPAP, awake, alert, with conversational dyspnea. ST on monitor, 's, /79 (87), BLE slightly cold to touch, no edema, JVD positive,  ~12 cm. On BIPAP  30% FIO2, Sats 100%. On home milrinone drip at 0.25 mcg/kg/min via home pump through Wiley Catheter. Patient denies chest pain, cough, or ankle swelling. Plan to diuresis and for BIPAP overnight.  Patient diuresed with Lasix IVP, Nipride drip started for high MAPs, Entresto initiated for afterload reduction.     Floor course:  Patient was started on daily bumex 0.5mg. She tolerated meds well. She will follow with Dr. Rivera, asked patient to call office Monday for earlier appointment. BMP in one week to monitor daily diuretic. In an effort to decrease number of pills she takes per her request, we stopped PPI.     Patient was discharged with home care.       Discharge weight: 60.9 kg    After all labs and VS were reviewed the decision was made that the patient was medically stable for discharge.  The patient was discharged in satisfactory condition.    More than 30 minutes were spent in coordinating patient discharge.      Pertinent Physical Exam At Time of Discharge  General: NAD, lying in bed  Skin: warm and dry   Head/ neck: no JVD seen at 90 degrees  Cardiac: RRR, S1, S2   Pulm: CTAB, room air   GI: soft, nontender   Extremities: no LE edema   Neuro: no focal neuro deficits   Psych: appropriate mood and behavior     Home Medications     Medication List      START taking these medications     alteplase 2 mg injection; Commonly known as: Cathflo Activase; 2 mL (2   mg) by intra-catheter route 1 time for 1 dose.   sacubitriL-valsartan 24-26 mg tablet; Commonly known as: Entresto; Take   1 tablet by mouth 2 times a day.     CHANGE how you take these medications     amiodarone 200 mg tablet; Commonly known as: Pacerone; Take 1 tablet   (200 mg) by mouth once daily.; Start taking on: February 10, 2025; What   changed: See the new instructions.   bumetanide 0.5 mg tablet; Commonly known as: Bumex; Take 1 tablet (0.5   mg) by mouth once daily. Do not fill before February 10, 2025.; Start   taking on: February 10,  2025; What changed: when to take this, additional   instructions     CONTINUE taking these medications     acetaminophen 500 mg tablet; Commonly known as: Tylenol   albuterol 90 mcg/actuation inhaler; Inhale 2 puffs every 4 hours if   needed for wheezing or shortness of breath.   apixaban 5 mg tablet; Commonly known as: Eliquis; Take 2 tablets (10 mg)   by mouth 2 times a day for 7 days, THEN 1 tablet (5 mg) 2 times a day.;   Start taking on: January 24, 2025   aspirin 81 mg EC tablet; Take 1 tablet (81 mg) by mouth once daily.   Farxiga 10 mg; Generic drug: dapagliflozin propanediol; Take 1 tablet   (10 mg) by mouth once daily.   heparin flush(porcine)-0.9NaCl 100 unit/mL kit   hydrocortisone 1 % ointment; Apply topically 2 times a day as needed for   irritation. Chest wall   ipratropium-albuteroL 0.5-2.5 mg/3 mL nebulizer solution; Commonly known   as: Duo-Neb   milrinone in 5 % dextrose 20 mg/100 mL (200 mcg/mL) infusion; Commonly   known as: Primacor; Infuse 15.5 mcg/min at 4.65 mL/hr into a venous   catheter continuously. Do not fill before January 28, 2025.   mupirocin 2 % ointment; Commonly known as: Bactroban   nicotine 21 mg/24 hr patch; Commonly known as: Nicoderm CQ; Place 1   patch over 24 hours on the skin once every 24 hours.   OneTouch Verio Flex meter misc; Generic drug: blood-glucose meter   sodium chloride 0.65 % nasal spray; Commonly known as: Ocean   sodium chloride 0.9% flush   spironolactone 25 mg tablet; Commonly known as: Aldactone; Take 0.5   tablets (12.5 mg) by mouth once daily.   Symbicort 80-4.5 mcg/actuation inhaler; Generic drug:   budesonide-formoteroL   Vitamin D3 50 MCG (2000 UT) tablet; Generic drug: cholecalciferol     STOP taking these medications     famotidine 40 mg tablet; Commonly known as: Pepcid       Outpatient Follow-Up  Future Appointments   Date Time Provider Department [place]   2/11/2025 To Be Determined Gabriela Reddy RN [facility]   2/13/2025 To Be Determined Gabriela Reddy  [facility]   2/28/2025  1:30 PM [facility] [id number] [facility]   4/14/2025 10:00 AM Kathy Rivera MD PhD [id number] [facility]       Bucky Naik, APRN-CNP    Briefly patient with end-stage systolic heart failure on palliative milrinone for 1 year she was recently admitted for atrial fibrillation could not attempt cardioversion due to left atrial thrombus but she was started on anticoagulation and amiodarone, her milrinone dose was decreased at discharge but she was readmitted with shortness of breath which she attributes to being discharged on insufficient dose of loop diuretic we increased her standing oral diuretic dose.  Of note patient made frequent comments about how she was taking too many medications and wished to eliminate as many medications as possible concerning for medication nonadherence at home we did feel that most of her medicines were necessary but did stop her famotidine as she denies any symptomatic benefit from this medication

## 2025-02-09 NOTE — DISCHARGE INSTRUCTIONS
Please contact Dr. Rivera's office to get follow up appointment in 1-2 weeks.   If you gain more than 3 pounds overnight or 5 pounds in a week, contact Dr. Rivera office   Weigh yourself daily. Your weight at discharge is 134 pounds. That is your dry weight.

## 2025-02-09 NOTE — HOSPITAL COURSE
Melissa Marinelli is a 69 y.o. female with PMHx of ICM/ HFrEF (last EF 15-20% 7/2024, s/p [employer] Gregorio ICD 5/2020) on palliative milrinone infusion since 2/2024, CAD s/p NSTEMI s/p RIRI to LCX (Jan 2016), MVR s/p mitral valve repair in (June 2019; 29 mm Epic bioprosthetic valve with Dr. Montana), COPD, HTN, HL, GERD, hx of CVA, DM, who presented to [facility] ED 2/5 with acutely worsening shortness of breath and a clinical picture of decompensated heart failure.  Admitted to HF ICU ICU for continued BiPAP and diuresis.     Patient was recently admitted (1/11-1/12) for palpitations, diarrhea. Had ICD shock in ED, interrogation showed VT. Placed on amiodarone. Home dose of bumex 0.5mg twice weekly continued. Admission 1/22-1/24/25 for SOB, was on amio and noted she had AF/A flutter later on, SHRUTHI done, with DEVIKA thrombus, cardioversion was not able to precede. Discharged on bumex 0.5mg twice weekly and if she noticed more CHF symptoms, to increase to daily, but patient states she was not given more than 8 pills a month, eliquis 5mg BID with plan to repeat SHRUTHI in one month. Of note, Ms Marinelli has declined durable LVAD therapies in the past.     Patient presented to ED via EMS for SOB. Pt is stating 98% on RA upon arrival though increased work of breathing is noted. Shortly after arrival pt became diaphoretic and nauseous with vomiting. Pt is sitting in tripod position. Wheezes noted. EMS gave 125 of solumedrol and a duoneb which had appeared to help.     Upon arriving in the HF ICU 2/5/25, patient on BIPAP, awake, alert, with conversational dyspnea. ST on monitor, 's, /79 (87), BLE slightly cold to touch, no edema, JVD positive,  ~12 cm. On BIPAP 30% FIO2, Sats 100%. On home milrinone drip at 0.25 mcg/kg/min via home pump through [employer] Catheter. Patient denies chest pain, cough, or ankle swelling. Plan to diuresis and for BIPAP overnight.  Patient diuresed with Lasix IVP, Nipride drip started for high MAPs, Entresto  initiated for afterload reduction.     Floor course:  Patient was started on daily bumex 0.5mg. She tolerated meds well. She will follow with Dr. Rivera, asked patient to call office Monday for earlier appointment. BMP in one week to monitor daily diuretic. In an effort to decrease number of pills she takes per her request, we stopped PPI.     Patient was discharged with home care.       Discharge weight: 60.9 kg    After all labs and VS were reviewed the decision was made that the patient was medically stable for discharge.  The patient was discharged in satisfactory condition.    More than 30 minutes were spent in coordinating patient discharge.

## 2025-02-09 NOTE — CARE PLAN
The clinical goals for the shift include Patient will remain HDS throughout shift    Problem: Pain - Adult  Goal: Verbalizes/displays adequate comfort level or baseline comfort level  Outcome: Progressing     Problem: Safety - Adult  Goal: Free from fall injury  Outcome: Progressing     Problem: Discharge Planning  Goal: Discharge to home or other facility with appropriate resources  Outcome: Progressing     Problem: Chronic Conditions and Co-morbidities  Goal: Patient's chronic conditions and co-morbidity symptoms are monitored and maintained or improved  Outcome: Progressing     Problem: Nutrition  Goal: Nutrient intake appropriate for maintaining nutritional needs  Outcome: Progressing     Problem: Fall/Injury  Goal: Not fall by end of shift  Outcome: Progressing  Goal: Be free from injury by end of the shift  Outcome: Progressing  Goal: Verbalize understanding of personal risk factors for fall in the hospital  Outcome: Progressing  Goal: Verbalize understanding of risk factor reduction measures to prevent injury from fall in the home  Outcome: Progressing  Goal: Use assistive devices by end of the shift  Outcome: Progressing  Goal: Pace activities to prevent fatigue by end of the shift  Outcome: Progressing

## 2025-02-09 NOTE — CARE PLAN
The patient's goals for the shift include      The clinical goals for the shift include Pt will remain free of falls throughout shift.    Problem: Pain - Adult  Goal: Verbalizes/displays adequate comfort level or baseline comfort level  Outcome: Met     Problem: Safety - Adult  Goal: Free from fall injury  Outcome: Met     Problem: Discharge Planning  Goal: Discharge to home or other facility with appropriate resources  Outcome: Met     Problem: Chronic Conditions and Co-morbidities  Goal: Patient's chronic conditions and co-morbidity symptoms are monitored and maintained or improved  Outcome: Met     Problem: Nutrition  Goal: Nutrient intake appropriate for maintaining nutritional needs  Outcome: Met     Problem: Fall/Injury  Goal: Not fall by end of shift  Outcome: Met  Goal: Be free from injury by end of the shift  Outcome: Met  Goal: Verbalize understanding of personal risk factors for fall in the hospital  Outcome: Met  Goal: Verbalize understanding of risk factor reduction measures to prevent injury from fall in the home  Outcome: Met  Goal: Use assistive devices by end of the shift  Outcome: Met  Goal: Pace activities to prevent fatigue by end of the shift  Outcome: Met

## 2025-02-10 ENCOUNTER — HOME CARE VISIT (OUTPATIENT)
Dept: HOME HEALTH SERVICES | Facility: HOME HEALTH | Age: 70
End: 2025-02-10
Payer: COMMERCIAL

## 2025-02-10 ENCOUNTER — HOSPITAL ENCOUNTER (OUTPATIENT)
Dept: CARDIOLOGY | Facility: CLINIC | Age: 70
Discharge: HOME | End: 2025-02-10
Payer: COMMERCIAL

## 2025-02-10 ENCOUNTER — HOME HEALTH ADMISSION (OUTPATIENT)
Dept: HOME HEALTH SERVICES | Facility: HOME HEALTH | Age: 70
End: 2025-02-10
Payer: COMMERCIAL

## 2025-02-10 DIAGNOSIS — Z95.810 CARDIOMYOPATHY WITH IMPLANTABLE CARDIOVERTER-DEFIBRILLATOR (MULTI): ICD-10-CM

## 2025-02-10 DIAGNOSIS — I42.9 CARDIOMYOPATHY WITH IMPLANTABLE CARDIOVERTER-DEFIBRILLATOR (MULTI): ICD-10-CM

## 2025-02-11 ENCOUNTER — HOME CARE VISIT (OUTPATIENT)
Dept: HOME HEALTH SERVICES | Facility: HOME HEALTH | Age: 70
End: 2025-02-11
Payer: COMMERCIAL

## 2025-02-11 ENCOUNTER — HOME INFUSION (OUTPATIENT)
Dept: INFUSION THERAPY | Age: 70
End: 2025-02-11
Payer: COMMERCIAL

## 2025-02-11 VITALS
HEIGHT: 64 IN | TEMPERATURE: 98.1 F | OXYGEN SATURATION: 100 % | HEART RATE: 102 BPM | RESPIRATION RATE: 18 BRPM | WEIGHT: 131 LBS | BODY MASS INDEX: 22.36 KG/M2 | SYSTOLIC BLOOD PRESSURE: 90 MMHG | DIASTOLIC BLOOD PRESSURE: 64 MMHG

## 2025-02-11 PROCEDURE — 169592 NO-PAY CLAIM PROCEDURE

## 2025-02-11 PROCEDURE — G0299 HHS/HOSPICE OF RN EA 15 MIN: HCPCS | Mod: HHH

## 2025-02-11 RX ADMIN — Medication 20 ML: at 11:01

## 2025-02-11 ASSESSMENT — ENCOUNTER SYMPTOMS
APPETITE LEVEL: GOOD
CHANGE IN APPETITE: UNCHANGED
DENIES PAIN: 1

## 2025-02-11 ASSESSMENT — ACTIVITIES OF DAILY LIVING (ADL)
OASIS_M1830: 05
ENTERING_EXITING_HOME: MODERATE ASSIST

## 2025-02-11 NOTE — PROGRESS NOTES
Patient continues with continuous infusion of Milrinone for CHF - discharged from [facility] on 2/9 - no change in Milrinone dose     Telephone call with patient's daughterMary - on schedule with bag changes  - agreed to delivery on 2/12 of another 4 48 hour infusions with usual supplies    Dispense x4 48 hour infusions of Milrinone for cmpd and delivery on 2/12  Bag changes 2/15, 2/17, 2/19 and 2/21    NF 2/20 Straight    Rosina Tucker  (RN)  2020 17:36:04

## 2025-02-12 LAB
ATRIAL RATE: 123 BPM
Q ONSET: 213 MS
QRS COUNT: 19 BEATS
QRS DURATION: 112 MS
QT INTERVAL: 366 MS
QTC CALCULATION(BAZETT): 523 MS
QTC FREDERICIA: 464 MS
R AXIS: 108 DEGREES
T AXIS: -32 DEGREES
T OFFSET: 396 MS
VENTRICULAR RATE: 123 BPM

## 2025-02-16 DIAGNOSIS — I50.20 HFREF (HEART FAILURE WITH REDUCED EJECTION FRACTION): ICD-10-CM

## 2025-02-18 ENCOUNTER — HOME CARE VISIT (OUTPATIENT)
Dept: HOME HEALTH SERVICES | Facility: HOME HEALTH | Age: 70
End: 2025-02-18
Payer: COMMERCIAL

## 2025-02-18 VITALS
TEMPERATURE: 98.5 F | OXYGEN SATURATION: 96 % | SYSTOLIC BLOOD PRESSURE: 87 MMHG | DIASTOLIC BLOOD PRESSURE: 63 MMHG | HEART RATE: 110 BPM

## 2025-02-18 PROCEDURE — G0299 HHS/HOSPICE OF RN EA 15 MIN: HCPCS | Mod: HHH

## 2025-02-18 RX ADMIN — Medication 10 ML: at 10:50

## 2025-02-18 ASSESSMENT — ENCOUNTER SYMPTOMS
HEADACHES: 1
FATIGUES EASILY: 1
APPETITE LEVEL: FAIR
HYPOTENSION: 1
LAST BOWEL MOVEMENT: 67254

## 2025-02-19 ENCOUNTER — HOME INFUSION (OUTPATIENT)
Dept: INFUSION THERAPY | Age: 70
End: 2025-02-19
Payer: COMMERCIAL

## 2025-02-19 LAB
ANION GAP SERPL CALCULATED.4IONS-SCNC: 9 MMOL/L (CALC) (ref 7–17)
BUN SERPL-MCNC: 29 MG/DL (ref 7–25)
BUN/CREAT SERPL: 13 (CALC) (ref 6–22)
CALCIUM SERPL-MCNC: 8.4 MG/DL (ref 8.6–10.4)
CHLORIDE SERPL-SCNC: 104 MMOL/L (ref 98–110)
CO2 SERPL-SCNC: 24 MMOL/L (ref 20–32)
CREAT SERPL-MCNC: 2.27 MG/DL (ref 0.6–1)
EGFRCR SERPLBLD CKD-EPI 2021: 23 ML/MIN/1.73M2
GLUCOSE SERPL-MCNC: 129 MG/DL (ref 65–99)
POTASSIUM SERPL-SCNC: 5.3 MMOL/L (ref 3.5–5.3)
SODIUM SERPL-SCNC: 137 MMOL/L (ref 135–146)

## 2025-02-19 NOTE — PROGRESS NOTES
Review of chart. Patient infuses continuous milrinone for CHF. Dr Kathy Rivera follows outpatient.     Telephone call with patient's daughter, Mary - on schedule with bag changes, weight stable - agreed to delivery on 2/20 of another 4 48 hour infusions with usual supplies     Dispense x4 48 hour infusions of Milrinone for cmpd and delivery on 2/20  Bag changes 2/23, 2/25, 2/27, 3/1     NF 2/28 Straight

## 2025-02-25 ENCOUNTER — APPOINTMENT (OUTPATIENT)
Dept: CARDIOLOGY | Facility: HOSPITAL | Age: 70
End: 2025-02-25
Payer: COMMERCIAL

## 2025-02-25 ENCOUNTER — HOME CARE VISIT (OUTPATIENT)
Dept: HOME HEALTH SERVICES | Facility: HOME HEALTH | Age: 70
End: 2025-02-25
Payer: COMMERCIAL

## 2025-02-25 ENCOUNTER — HOSPITAL ENCOUNTER (INPATIENT)
Facility: HOSPITAL | Age: 70
LOS: 3 days | Discharge: HOME HEALTH | End: 2025-02-28
Attending: STUDENT IN AN ORGANIZED HEALTH CARE EDUCATION/TRAINING PROGRAM | Admitting: INTERNAL MEDICINE
Payer: COMMERCIAL

## 2025-02-25 ENCOUNTER — CLINICAL SUPPORT (OUTPATIENT)
Dept: EMERGENCY MEDICINE | Facility: HOSPITAL | Age: 70
DRG: 309 | End: 2025-02-25
Payer: COMMERCIAL

## 2025-02-25 ENCOUNTER — APPOINTMENT (OUTPATIENT)
Dept: RADIOLOGY | Facility: HOSPITAL | Age: 70
DRG: 309 | End: 2025-02-25
Payer: COMMERCIAL

## 2025-02-25 DIAGNOSIS — I48.91 ATRIAL FIB/FLUTTER, TRANSIENT (MULTI): ICD-10-CM

## 2025-02-25 DIAGNOSIS — I50.33 ACUTE ON CHRONIC HEART FAILURE WITH NORMAL EJECTION FRACTION: ICD-10-CM

## 2025-02-25 DIAGNOSIS — I48.4 ATYPICAL ATRIAL FLUTTER (MULTI): ICD-10-CM

## 2025-02-25 DIAGNOSIS — R42 DIZZINESS: Primary | ICD-10-CM

## 2025-02-25 DIAGNOSIS — I48.92 ATRIAL FIB/FLUTTER, TRANSIENT (MULTI): ICD-10-CM

## 2025-02-25 DIAGNOSIS — N30.00 ACUTE CYSTITIS WITHOUT HEMATURIA: ICD-10-CM

## 2025-02-25 DIAGNOSIS — I48.92 ATRIAL FLUTTER (MULTI): ICD-10-CM

## 2025-02-25 DIAGNOSIS — I47.29 VENTRICULAR TACHYCARDIA (PAROXYSMAL) (MULTI): ICD-10-CM

## 2025-02-25 LAB
ALBUMIN SERPL BCP-MCNC: 4.1 G/DL (ref 3.4–5)
ALP SERPL-CCNC: 68 U/L (ref 33–136)
ALT SERPL W P-5'-P-CCNC: 17 U/L (ref 7–45)
ANION GAP BLDV CALCULATED.4IONS-SCNC: 12 MMOL/L (ref 10–25)
ANION GAP BLDV CALCULATED.4IONS-SCNC: 13 MMOL/L (ref 10–25)
ANION GAP SERPL CALC-SCNC: 15 MMOL/L (ref 10–20)
AST SERPL W P-5'-P-CCNC: 20 U/L (ref 9–39)
ATRIAL RATE: 107 BPM
BASE EXCESS BLDV CALC-SCNC: -3 MMOL/L (ref -2–3)
BASE EXCESS BLDV CALC-SCNC: -3.3 MMOL/L (ref -2–3)
BASOPHILS # BLD AUTO: 0.02 X10*3/UL (ref 0–0.1)
BASOPHILS NFR BLD AUTO: 0.7 %
BILIRUB SERPL-MCNC: 0.4 MG/DL (ref 0–1.2)
BNP SERPL-MCNC: 416 PG/ML (ref 0–99)
BODY TEMPERATURE: 37 DEGREES CELSIUS
BODY TEMPERATURE: 37 DEGREES CELSIUS
BUN SERPL-MCNC: 33 MG/DL (ref 6–23)
CA-I BLDV-SCNC: 1.14 MMOL/L (ref 1.1–1.33)
CA-I BLDV-SCNC: 1.21 MMOL/L (ref 1.1–1.33)
CALCIUM SERPL-MCNC: 9.1 MG/DL (ref 8.6–10.6)
CARDIAC TROPONIN I PNL SERPL HS: 293 NG/L (ref 0–34)
CARDIAC TROPONIN I PNL SERPL HS: 347 NG/L (ref 0–34)
CHLORIDE BLDV-SCNC: 106 MMOL/L (ref 98–107)
CHLORIDE BLDV-SCNC: 106 MMOL/L (ref 98–107)
CHLORIDE SERPL-SCNC: 104 MMOL/L (ref 98–107)
CO2 SERPL-SCNC: 23 MMOL/L (ref 21–32)
CREAT SERPL-MCNC: 2.68 MG/DL (ref 0.5–1.05)
EGFRCR SERPLBLD CKD-EPI 2021: 19 ML/MIN/1.73M*2
EOSINOPHIL # BLD AUTO: 0.14 X10*3/UL (ref 0–0.7)
EOSINOPHIL NFR BLD AUTO: 4.7 %
ERYTHROCYTE [DISTWIDTH] IN BLOOD BY AUTOMATED COUNT: 16 % (ref 11.5–14.5)
FLUAV RNA RESP QL NAA+PROBE: NOT DETECTED
FLUBV RNA RESP QL NAA+PROBE: NOT DETECTED
GLUCOSE BLDV-MCNC: 82 MG/DL (ref 74–99)
GLUCOSE BLDV-MCNC: 89 MG/DL (ref 74–99)
GLUCOSE SERPL-MCNC: 82 MG/DL (ref 74–99)
HCO3 BLDV-SCNC: 22.1 MMOL/L (ref 22–26)
HCO3 BLDV-SCNC: 23.2 MMOL/L (ref 22–26)
HCT VFR BLD AUTO: 34.5 % (ref 36–46)
HCT VFR BLD EST: 34 % (ref 36–46)
HCT VFR BLD EST: 37 % (ref 36–46)
HGB BLD-MCNC: 11.3 G/DL (ref 12–16)
HGB BLDV-MCNC: 11.4 G/DL (ref 12–16)
HGB BLDV-MCNC: 12.3 G/DL (ref 12–16)
IMM GRANULOCYTES # BLD AUTO: 0.03 X10*3/UL (ref 0–0.7)
IMM GRANULOCYTES NFR BLD AUTO: 1 % (ref 0–0.9)
INHALED O2 CONCENTRATION: 21 %
LACTATE BLDV-SCNC: 1.2 MMOL/L (ref 0.4–2)
LACTATE BLDV-SCNC: 1.2 MMOL/L (ref 0.4–2)
LYMPHOCYTES # BLD AUTO: 0.56 X10*3/UL (ref 1.2–4.8)
LYMPHOCYTES NFR BLD AUTO: 18.6 %
MAGNESIUM SERPL-MCNC: 2.52 MG/DL (ref 1.6–2.4)
MCH RBC QN AUTO: 29.9 PG (ref 26–34)
MCHC RBC AUTO-ENTMCNC: 32.8 G/DL (ref 32–36)
MCV RBC AUTO: 91 FL (ref 80–100)
MONOCYTES # BLD AUTO: 0.43 X10*3/UL (ref 0.1–1)
MONOCYTES NFR BLD AUTO: 14.3 %
NEUTROPHILS # BLD AUTO: 1.83 X10*3/UL (ref 1.2–7.7)
NEUTROPHILS NFR BLD AUTO: 60.7 %
NRBC BLD-RTO: 0 /100 WBCS (ref 0–0)
OXYHGB MFR BLDV: 41 % (ref 45–75)
OXYHGB MFR BLDV: 59 % (ref 45–75)
PCO2 BLDV: 40 MM HG (ref 41–51)
PCO2 BLDV: 45 MM HG (ref 41–51)
PH BLDV: 7.32 PH (ref 7.33–7.43)
PH BLDV: 7.35 PH (ref 7.33–7.43)
PHOSPHATE SERPL-MCNC: 3.5 MG/DL (ref 2.5–4.9)
PLATELET # BLD AUTO: 192 X10*3/UL (ref 150–450)
PO2 BLDV: 26 MM HG (ref 35–45)
PO2 BLDV: 33 MM HG (ref 35–45)
POTASSIUM BLDV-SCNC: 4.7 MMOL/L (ref 3.5–5.3)
POTASSIUM BLDV-SCNC: 6.9 MMOL/L (ref 3.5–5.3)
POTASSIUM SERPL-SCNC: 4.5 MMOL/L (ref 3.5–5.3)
PROT SERPL-MCNC: 7.1 G/DL (ref 6.4–8.2)
Q ONSET: 217 MS
QRS COUNT: 18 BEATS
QRS DURATION: 118 MS
QT INTERVAL: 366 MS
QTC CALCULATION(BAZETT): 497 MS
QTC FREDERICIA: 449 MS
R AXIS: 98 DEGREES
RBC # BLD AUTO: 3.78 X10*6/UL (ref 4–5.2)
SAO2 % BLDV: 42 % (ref 45–75)
SAO2 % BLDV: 61 % (ref 45–75)
SARS-COV-2 RNA RESP QL NAA+PROBE: NOT DETECTED
SODIUM BLDV-SCNC: 134 MMOL/L (ref 136–145)
SODIUM BLDV-SCNC: 136 MMOL/L (ref 136–145)
SODIUM SERPL-SCNC: 137 MMOL/L (ref 136–145)
T AXIS: -68 DEGREES
T OFFSET: 400 MS
VENTRICULAR RATE: 111 BPM
WBC # BLD AUTO: 3 X10*3/UL (ref 4.4–11.3)

## 2025-02-25 PROCEDURE — 84132 ASSAY OF SERUM POTASSIUM: CPT

## 2025-02-25 PROCEDURE — 2500000004 HC RX 250 GENERAL PHARMACY W/ HCPCS (ALT 636 FOR OP/ED)

## 2025-02-25 PROCEDURE — 82330 ASSAY OF CALCIUM: CPT

## 2025-02-25 PROCEDURE — 1200000002 HC GENERAL ROOM WITH TELEMETRY DAILY

## 2025-02-25 PROCEDURE — 93005 ELECTROCARDIOGRAM TRACING: CPT

## 2025-02-25 PROCEDURE — 87636 SARSCOV2 & INF A&B AMP PRB: CPT | Performed by: STUDENT IN AN ORGANIZED HEALTH CARE EDUCATION/TRAINING PROGRAM

## 2025-02-25 PROCEDURE — 71046 X-RAY EXAM CHEST 2 VIEWS: CPT

## 2025-02-25 PROCEDURE — 93010 ELECTROCARDIOGRAM REPORT: CPT | Performed by: STUDENT IN AN ORGANIZED HEALTH CARE EDUCATION/TRAINING PROGRAM

## 2025-02-25 PROCEDURE — 93282 PRGRMG EVAL IMPLANTABLE DFB: CPT | Performed by: INTERNAL MEDICINE

## 2025-02-25 PROCEDURE — 93282 PRGRMG EVAL IMPLANTABLE DFB: CPT

## 2025-02-25 PROCEDURE — 99285 EMERGENCY DEPT VISIT HI MDM: CPT | Mod: 25 | Performed by: STUDENT IN AN ORGANIZED HEALTH CARE EDUCATION/TRAINING PROGRAM

## 2025-02-25 PROCEDURE — 84484 ASSAY OF TROPONIN QUANT: CPT | Performed by: STUDENT IN AN ORGANIZED HEALTH CARE EDUCATION/TRAINING PROGRAM

## 2025-02-25 PROCEDURE — G0299 HHS/HOSPICE OF RN EA 15 MIN: HCPCS | Mod: HHH

## 2025-02-25 PROCEDURE — 83880 ASSAY OF NATRIURETIC PEPTIDE: CPT

## 2025-02-25 PROCEDURE — 87637 SARSCOV2&INF A&B&RSV AMP PRB: CPT | Performed by: STUDENT IN AN ORGANIZED HEALTH CARE EDUCATION/TRAINING PROGRAM

## 2025-02-25 PROCEDURE — 84100 ASSAY OF PHOSPHORUS: CPT

## 2025-02-25 PROCEDURE — 71046 X-RAY EXAM CHEST 2 VIEWS: CPT | Performed by: RADIOLOGY

## 2025-02-25 PROCEDURE — 83735 ASSAY OF MAGNESIUM: CPT

## 2025-02-25 PROCEDURE — 96374 THER/PROPH/DIAG INJ IV PUSH: CPT

## 2025-02-25 PROCEDURE — 4B02XTZ MEASUREMENT OF CARDIAC DEFIBRILLATOR, EXTERNAL APPROACH: ICD-10-PCS

## 2025-02-25 PROCEDURE — 85025 COMPLETE CBC W/AUTO DIFF WBC: CPT

## 2025-02-25 PROCEDURE — 1210000001 HC SEMI-PRIVATE ROOM DAILY

## 2025-02-25 PROCEDURE — 82435 ASSAY OF BLOOD CHLORIDE: CPT

## 2025-02-25 PROCEDURE — 99285 EMERGENCY DEPT VISIT HI MDM: CPT | Performed by: STUDENT IN AN ORGANIZED HEALTH CARE EDUCATION/TRAINING PROGRAM

## 2025-02-25 RX ORDER — SACUBITRIL AND VALSARTAN 24; 26 MG/1; MG/1
1 TABLET, FILM COATED ORAL 2 TIMES DAILY
Status: DISCONTINUED | OUTPATIENT
Start: 2025-02-25 | End: 2025-02-26

## 2025-02-25 RX ORDER — CHOLECALCIFEROL (VITAMIN D3) 25 MCG
2000 TABLET ORAL DAILY
Status: DISCONTINUED | OUTPATIENT
Start: 2025-02-26 | End: 2025-02-28 | Stop reason: HOSPADM

## 2025-02-25 RX ORDER — ASPIRIN 81 MG/1
81 TABLET ORAL DAILY
Status: DISCONTINUED | OUTPATIENT
Start: 2025-02-26 | End: 2025-02-28 | Stop reason: HOSPADM

## 2025-02-25 RX ORDER — FLUTICASONE FUROATE AND VILANTEROL 100; 25 UG/1; UG/1
1 POWDER RESPIRATORY (INHALATION)
Status: DISCONTINUED | OUTPATIENT
Start: 2025-02-26 | End: 2025-02-28 | Stop reason: HOSPADM

## 2025-02-25 RX ORDER — DEXTROSE 50 % IN WATER (D50W) INTRAVENOUS SYRINGE
25
Status: DISCONTINUED | OUTPATIENT
Start: 2025-02-25 | End: 2025-02-28 | Stop reason: HOSPADM

## 2025-02-25 RX ORDER — PANTOPRAZOLE SODIUM 40 MG/1
40 TABLET, DELAYED RELEASE ORAL
Status: DISCONTINUED | OUTPATIENT
Start: 2025-02-26 | End: 2025-02-26

## 2025-02-25 RX ORDER — MAG HYDROX/ALUMINUM HYD/SIMETH 200-200-20
SUSPENSION, ORAL (FINAL DOSE FORM) ORAL 2 TIMES DAILY PRN
Status: DISCONTINUED | OUTPATIENT
Start: 2025-02-25 | End: 2025-02-28 | Stop reason: HOSPADM

## 2025-02-25 RX ORDER — MILRINONE LACTATE 0.2 MG/ML
0.25 INJECTION, SOLUTION INTRAVENOUS CONTINUOUS
Status: DISCONTINUED | OUTPATIENT
Start: 2025-02-25 | End: 2025-02-28 | Stop reason: HOSPADM

## 2025-02-25 RX ORDER — ALBUTEROL SULFATE 90 UG/1
2 INHALANT RESPIRATORY (INHALATION) EVERY 4 HOURS PRN
Status: DISCONTINUED | OUTPATIENT
Start: 2025-02-25 | End: 2025-02-28 | Stop reason: HOSPADM

## 2025-02-25 RX ORDER — DAPAGLIFLOZIN 10 MG/1
10 TABLET, FILM COATED ORAL DAILY
Status: DISCONTINUED | OUTPATIENT
Start: 2025-02-26 | End: 2025-02-28 | Stop reason: HOSPADM

## 2025-02-25 RX ORDER — DEXTROSE 50 % IN WATER (D50W) INTRAVENOUS SYRINGE
12.5
Status: DISCONTINUED | OUTPATIENT
Start: 2025-02-25 | End: 2025-02-28 | Stop reason: HOSPADM

## 2025-02-25 RX ORDER — TALC
3 POWDER (GRAM) TOPICAL NIGHTLY PRN
Status: DISCONTINUED | OUTPATIENT
Start: 2025-02-25 | End: 2025-02-28 | Stop reason: HOSPADM

## 2025-02-25 RX ORDER — BUMETANIDE 0.5 MG/1
0.5 TABLET ORAL DAILY
Status: DISCONTINUED | OUTPATIENT
Start: 2025-02-26 | End: 2025-02-28 | Stop reason: HOSPADM

## 2025-02-25 RX ORDER — IBUPROFEN 200 MG
1 TABLET ORAL EVERY 24 HOURS
Status: DISCONTINUED | OUTPATIENT
Start: 2025-02-25 | End: 2025-02-28 | Stop reason: HOSPADM

## 2025-02-25 RX ORDER — INSULIN LISPRO 100 [IU]/ML
0-5 INJECTION, SOLUTION INTRAVENOUS; SUBCUTANEOUS
Status: DISCONTINUED | OUTPATIENT
Start: 2025-02-26 | End: 2025-02-28 | Stop reason: HOSPADM

## 2025-02-25 RX ORDER — AMIODARONE HYDROCHLORIDE 200 MG/1
200 TABLET ORAL DAILY
Status: DISCONTINUED | OUTPATIENT
Start: 2025-02-26 | End: 2025-02-28 | Stop reason: HOSPADM

## 2025-02-25 RX ORDER — ACETAMINOPHEN 160 MG/5ML
950 SOLUTION ORAL EVERY 6 HOURS PRN
Status: DISCONTINUED | OUTPATIENT
Start: 2025-02-25 | End: 2025-02-28 | Stop reason: HOSPADM

## 2025-02-25 RX ORDER — PANTOPRAZOLE SODIUM 40 MG/10ML
40 INJECTION, POWDER, LYOPHILIZED, FOR SOLUTION INTRAVENOUS
Status: DISCONTINUED | OUTPATIENT
Start: 2025-02-26 | End: 2025-02-26

## 2025-02-25 RX ORDER — HEPARIN 100 UNIT/ML
5 SYRINGE INTRAVENOUS AS NEEDED
Status: DISCONTINUED | OUTPATIENT
Start: 2025-02-25 | End: 2025-02-28 | Stop reason: HOSPADM

## 2025-02-25 RX ORDER — SODIUM CHLORIDE 0.9 % (FLUSH) 0.9 %
10 SYRINGE (ML) INJECTION
Status: DISCONTINUED | OUTPATIENT
Start: 2025-03-02 | End: 2025-02-28 | Stop reason: HOSPADM

## 2025-02-25 RX ORDER — POLYETHYLENE GLYCOL 3350 17 G/17G
17 POWDER, FOR SOLUTION ORAL DAILY
Status: DISCONTINUED | OUTPATIENT
Start: 2025-02-26 | End: 2025-02-28 | Stop reason: HOSPADM

## 2025-02-25 RX ORDER — SPIRONOLACTONE 25 MG/1
12.5 TABLET ORAL DAILY
Status: DISCONTINUED | OUTPATIENT
Start: 2025-02-26 | End: 2025-02-28 | Stop reason: HOSPADM

## 2025-02-25 RX ORDER — ACETAMINOPHEN 325 MG/1
950 TABLET ORAL EVERY 6 HOURS PRN
Status: DISCONTINUED | OUTPATIENT
Start: 2025-02-25 | End: 2025-02-28 | Stop reason: HOSPADM

## 2025-02-25 RX ORDER — HEPARIN SODIUM 10000 [USP'U]/100ML
0-4500 INJECTION, SOLUTION INTRAVENOUS CONTINUOUS
Status: DISCONTINUED | OUTPATIENT
Start: 2025-02-25 | End: 2025-02-26

## 2025-02-25 RX ORDER — IPRATROPIUM BROMIDE AND ALBUTEROL SULFATE 2.5; .5 MG/3ML; MG/3ML
3 SOLUTION RESPIRATORY (INHALATION)
Status: DISCONTINUED | OUTPATIENT
Start: 2025-02-26 | End: 2025-02-26

## 2025-02-25 RX ADMIN — MILRINONE LACTATE IN DEXTROSE 0.25 MCG/KG/MIN: 200 INJECTION, SOLUTION INTRAVENOUS at 20:26

## 2025-02-25 RX ADMIN — Medication 30 ML: at 19:32

## 2025-02-25 ASSESSMENT — LIFESTYLE VARIABLES
EVER FELT BAD OR GUILTY ABOUT YOUR DRINKING: NO
HAVE PEOPLE ANNOYED YOU BY CRITICIZING YOUR DRINKING: NO
TOTAL SCORE: 0
EVER HAD A DRINK FIRST THING IN THE MORNING TO STEADY YOUR NERVES TO GET RID OF A HANGOVER: NO
HAVE YOU EVER FELT YOU SHOULD CUT DOWN ON YOUR DRINKING: NO

## 2025-02-25 ASSESSMENT — PAIN SCALES - GENERAL: PAINLEVEL_OUTOF10: 0 - NO PAIN

## 2025-02-25 ASSESSMENT — PAIN - FUNCTIONAL ASSESSMENT: PAIN_FUNCTIONAL_ASSESSMENT: 0-10

## 2025-02-25 NOTE — ED TRIAGE NOTES
Pt arrives to the ED, brought in via ambulance, due to dizziness/lethargy and hypotension. Pt's home care nurse noted low BP and contacted pt's cardiologist who recommended coming to ED. Of note, Pt is on a continuous milrinone infusion.

## 2025-02-25 NOTE — ED PROVIDER NOTES
HPI   Chief Complaint   Patient presents with    Hypotension    Dizziness       Melissa Marinelli is a 69 y.o. female with PMH of ICM/ HFrEF (last EF 15-20% 7/2024, s/p [employer] ICD 5/2020) on palliative milrinone infusion since 2/2024, CAD s/p NSTEMI s/p IRRI to LCX (Jan 2016), MVR s/p mitral valve repair in (June 2019; 29 mm Epic bioprosthetic valve with Dr. Montana), COPD, HTN, HL, GERD, hx of CVA, and DM. She presented today by EMS after her home health nurse measured a blood pressure of 66/46. HHN contacted her cardiologist and he recommended ED evaluation. Per the pt, she reports feeling sick yesterday and has a new productive cough with clear sputum. This morning she began to feel dizzy and had the HHN stop by. She also notes feeling some new palpitations this morning similar to when she had her ventricular arrhythmia previously. No loss of consciousness or fall occurred. ROS is negative for fevers, chills chest pain, shortness of breath, dyspnea on exertion, positional orthopnea, abdominal pain, changes in PO intake, diarrhea, dysuria, urinary frequency, and new leg swelling. She was recently admitted from 2/5-2/9 for AoC CHF exacerbation and she was started on Entresto.            Patient History   Past Medical History:   Diagnosis Date    Asthma     CAD (coronary artery disease)     CHF (congestive heart failure)     CKD (chronic kidney disease)     COPD (chronic obstructive pulmonary disease) (Multi)     CVA (cerebral vascular accident) (Multi)     Diabetes mellitus (Multi)     GERD (gastroesophageal reflux disease)     Hyperlipidemia     Hypertension     NSTEMI (non-ST elevated myocardial infarction) (Multi)     Unspecified systolic (congestive) heart failure 08/11/2022    HFrEF (heart failure with reduced ejection fraction)     Past Surgical History:   Procedure Laterality Date    CARDIAC CATHETERIZATION N/A 1/26/2024    Procedure: Right Heart Cath;  Surgeon: Cuate Dodson MD;  Location: [place] [address]  Cardiac Cath Lab;  Service: Cardiovascular;  Laterality: N/A;    CARDIAC ELECTROPHYSIOLOGY PROCEDURE N/A 1/23/2025    Procedure: SHRUTHI DCCV;  Surgeon: Emilee Brewster MD;  Location: [place] [address] Cardiac Cath Lab;  Service: Electrophysiology;  Laterality: N/A;    MITRAL VALVE REPLACEMENT  06/2019    OTHER SURGICAL HISTORY  08/11/2022    Tonsillectomy    OTHER SURGICAL HISTORY  08/11/2022    Right ventricular assist device placement    OTHER SURGICAL HISTORY  08/11/2022    Mitral valve replacement     Family History   Problem Relation Name Age of Onset    Other (CVA) Brother       Social History     Tobacco Use    Smoking status: Every Day     Current packs/day: 0.50     Types: Cigarettes    Smokeless tobacco: Never   Substance Use Topics    Alcohol use: Never    Drug use: Yes     Types: Marijuana     Comment: rarely       Physical Exam   ED Triage Vitals [02/25/25 1441]   Temperature Heart Rate Respirations BP   37 °C (98.6 °F) (!) 111 20 91/51      Pulse Ox Temp Source Heart Rate Source Patient Position   98 % Temporal Monitor --      BP Location FiO2 (%)     -- --       Physical Exam      ED Course & MDM   Diagnoses as of 02/27/25 0940   Dizziness                 No data recorded     Yahir Coma Scale Score: 15 (02/25/25 1444 : Nicki Doe, GWEN)                           Medical Decision Making  Pt presenting with dizziness, palpitations, and hypotension who has significant cardiac history. DDx includes ACS, arrhythmia, acute on chronic heart failure, PATSY on CKD. Venous full panel showed mild acidosis of 7.32 with a normal lactate. She has a tropinemia above her baseline that is downtrending (347 to 293), indicating type II MI. BNP also elevated at 347 c/f AoC CHF exacerbation. CMP showed PATSY on CKD with Cr of 2.68 with a rough baseline of 1.5-2.2. WBC notable for leukopenia at 3.0. CXR unremarkable. Will plan to admit to cardiology for further evaluation.        Procedure  Procedures     Jayce Chavez,  MD  Resident  02/27/25 0948

## 2025-02-25 NOTE — Clinical Note
Pts hemodynamics, airway and sedation being managed by anesthesia staff. Please see anesthesia flowsheets for medications and vital signs.

## 2025-02-25 NOTE — Clinical Note
Device nurse notified patient has ICD.  GWEN Chang states no need to see patient prior to SHRUTHI/CVN

## 2025-02-26 PROBLEM — I48.92 ATRIAL FLUTTER (MULTI): Status: ACTIVE | Noted: 2025-02-25

## 2025-02-26 LAB
ABO GROUP (TYPE) IN BLOOD: NORMAL
ALBUMIN SERPL BCP-MCNC: 3.6 G/DL (ref 3.4–5)
ANION GAP SERPL CALC-SCNC: 15 MMOL/L (ref 10–20)
ANTIBODY SCREEN: NORMAL
APPEARANCE UR: CLEAR
APTT PPP: 32 SECONDS (ref 26–36)
BACTERIA #/AREA URNS AUTO: ABNORMAL /HPF
BILIRUB UR STRIP.AUTO-MCNC: NEGATIVE MG/DL
BUN SERPL-MCNC: 33 MG/DL (ref 6–23)
CALCIUM SERPL-MCNC: 8.6 MG/DL (ref 8.6–10.6)
CHLORIDE SERPL-SCNC: 107 MMOL/L (ref 98–107)
CO2 SERPL-SCNC: 22 MMOL/L (ref 21–32)
COLOR UR: ABNORMAL
CREAT SERPL-MCNC: 2.37 MG/DL (ref 0.5–1.05)
EGFRCR SERPLBLD CKD-EPI 2021: 22 ML/MIN/1.73M*2
GLUCOSE BLD MANUAL STRIP-MCNC: 113 MG/DL (ref 74–99)
GLUCOSE BLD MANUAL STRIP-MCNC: 74 MG/DL (ref 74–99)
GLUCOSE BLD MANUAL STRIP-MCNC: 84 MG/DL (ref 74–99)
GLUCOSE BLD MANUAL STRIP-MCNC: 89 MG/DL (ref 74–99)
GLUCOSE SERPL-MCNC: 79 MG/DL (ref 74–99)
GLUCOSE UR STRIP.AUTO-MCNC: ABNORMAL MG/DL
INR PPP: 1.6 (ref 0.9–1.1)
KETONES UR STRIP.AUTO-MCNC: NEGATIVE MG/DL
LEUKOCYTE ESTERASE UR QL STRIP.AUTO: NEGATIVE
MUCOUS THREADS #/AREA URNS AUTO: ABNORMAL /LPF
NITRITE UR QL STRIP.AUTO: NEGATIVE
PH UR STRIP.AUTO: 5.5 [PH]
PHOSPHATE SERPL-MCNC: 3.4 MG/DL (ref 2.5–4.9)
POTASSIUM SERPL-SCNC: 4.8 MMOL/L (ref 3.5–5.3)
PROT UR STRIP.AUTO-MCNC: ABNORMAL MG/DL
PROTHROMBIN TIME: 17.7 SECONDS (ref 9.8–12.4)
RBC # UR STRIP.AUTO: ABNORMAL MG/DL
RBC #/AREA URNS AUTO: ABNORMAL /HPF
RH FACTOR (ANTIGEN D): NORMAL
RSV RNA RESP QL NAA+PROBE: NOT DETECTED
SODIUM SERPL-SCNC: 139 MMOL/L (ref 136–145)
SP GR UR STRIP.AUTO: 1.02
SQUAMOUS #/AREA URNS AUTO: ABNORMAL /HPF
UFH PPP CHRO-ACNC: >2 IU/ML
UFH PPP CHRO-ACNC: >2 IU/ML
UROBILINOGEN UR STRIP.AUTO-MCNC: NORMAL MG/DL
WBC #/AREA URNS AUTO: ABNORMAL /HPF

## 2025-02-26 PROCEDURE — 94640 AIRWAY INHALATION TREATMENT: CPT

## 2025-02-26 PROCEDURE — 85520 HEPARIN ASSAY: CPT

## 2025-02-26 PROCEDURE — 2500000004 HC RX 250 GENERAL PHARMACY W/ HCPCS (ALT 636 FOR OP/ED)

## 2025-02-26 PROCEDURE — 82947 ASSAY GLUCOSE BLOOD QUANT: CPT

## 2025-02-26 PROCEDURE — 2500000002 HC RX 250 W HCPCS SELF ADMINISTERED DRUGS (ALT 637 FOR MEDICARE OP, ALT 636 FOR OP/ED): Performed by: STUDENT IN AN ORGANIZED HEALTH CARE EDUCATION/TRAINING PROGRAM

## 2025-02-26 PROCEDURE — 2500000001 HC RX 250 WO HCPCS SELF ADMINISTERED DRUGS (ALT 637 FOR MEDICARE OP)

## 2025-02-26 PROCEDURE — 1200000002 HC GENERAL ROOM WITH TELEMETRY DAILY

## 2025-02-26 PROCEDURE — 85730 THROMBOPLASTIN TIME PARTIAL: CPT

## 2025-02-26 PROCEDURE — 85610 PROTHROMBIN TIME: CPT

## 2025-02-26 PROCEDURE — 81001 URINALYSIS AUTO W/SCOPE: CPT | Performed by: STUDENT IN AN ORGANIZED HEALTH CARE EDUCATION/TRAINING PROGRAM

## 2025-02-26 PROCEDURE — 80069 RENAL FUNCTION PANEL: CPT

## 2025-02-26 PROCEDURE — 2500000002 HC RX 250 W HCPCS SELF ADMINISTERED DRUGS (ALT 637 FOR MEDICARE OP, ALT 636 FOR OP/ED): Performed by: INTERNAL MEDICINE

## 2025-02-26 PROCEDURE — 99223 1ST HOSP IP/OBS HIGH 75: CPT

## 2025-02-26 PROCEDURE — 2500000002 HC RX 250 W HCPCS SELF ADMINISTERED DRUGS (ALT 637 FOR MEDICARE OP, ALT 636 FOR OP/ED)

## 2025-02-26 PROCEDURE — 86900 BLOOD TYPING SEROLOGIC ABO: CPT

## 2025-02-26 RX ORDER — SACUBITRIL AND VALSARTAN 24; 26 MG/1; MG/1
0.5 TABLET, FILM COATED ORAL 2 TIMES DAILY
Start: 2025-02-26 | End: 2025-02-26 | Stop reason: HOSPADM

## 2025-02-26 RX ORDER — FAMOTIDINE 40 MG/1
40 TABLET, FILM COATED ORAL DAILY
COMMUNITY
Start: 2025-02-16

## 2025-02-26 RX ORDER — SACUBITRIL AND VALSARTAN 24; 26 MG/1; MG/1
1 TABLET, FILM COATED ORAL 2 TIMES DAILY
Status: ON HOLD | COMMUNITY
End: 2025-02-26 | Stop reason: WASHOUT

## 2025-02-26 RX ORDER — IPRATROPIUM BROMIDE AND ALBUTEROL SULFATE 2.5; .5 MG/3ML; MG/3ML
3 SOLUTION RESPIRATORY (INHALATION) EVERY 6 HOURS PRN
Status: DISCONTINUED | OUTPATIENT
Start: 2025-02-26 | End: 2025-02-28 | Stop reason: HOSPADM

## 2025-02-26 RX ORDER — IPRATROPIUM BROMIDE AND ALBUTEROL SULFATE 2.5; .5 MG/3ML; MG/3ML
3 SOLUTION RESPIRATORY (INHALATION)
Status: DISCONTINUED | OUTPATIENT
Start: 2025-02-26 | End: 2025-02-26

## 2025-02-26 RX ORDER — FAMOTIDINE 20 MG/1
40 TABLET, FILM COATED ORAL DAILY
Status: DISCONTINUED | OUTPATIENT
Start: 2025-02-26 | End: 2025-02-28 | Stop reason: HOSPADM

## 2025-02-26 RX ADMIN — MILRINONE LACTATE IN DEXTROSE 0.25 MCG/KG/MIN: 200 INJECTION, SOLUTION INTRAVENOUS at 15:07

## 2025-02-26 RX ADMIN — IPRATROPIUM BROMIDE AND ALBUTEROL SULFATE 3 ML: .5; 3 SOLUTION RESPIRATORY (INHALATION) at 23:13

## 2025-02-26 RX ADMIN — POLYETHYLENE GLYCOL 3350 17 G: 17 POWDER, FOR SOLUTION ORAL at 08:23

## 2025-02-26 RX ADMIN — SPIRONOLACTONE 12.5 MG: 25 TABLET, FILM COATED ORAL at 08:23

## 2025-02-26 RX ADMIN — ACETAMINOPHEN 975 MG: 325 TABLET ORAL at 21:45

## 2025-02-26 RX ADMIN — IPRATROPIUM BROMIDE AND ALBUTEROL SULFATE 3 ML: .5; 3 SOLUTION RESPIRATORY (INHALATION) at 02:52

## 2025-02-26 RX ADMIN — IPRATROPIUM BROMIDE AND ALBUTEROL SULFATE 3 ML: .5; 3 SOLUTION RESPIRATORY (INHALATION) at 10:40

## 2025-02-26 RX ADMIN — AMIODARONE HYDROCHLORIDE 200 MG: 200 TABLET ORAL at 08:23

## 2025-02-26 RX ADMIN — APIXABAN 5 MG: 5 TABLET, FILM COATED ORAL at 09:10

## 2025-02-26 RX ADMIN — APIXABAN 5 MG: 5 TABLET, FILM COATED ORAL at 21:40

## 2025-02-26 RX ADMIN — Medication 2000 UNITS: at 08:23

## 2025-02-26 RX ADMIN — ASPIRIN 81 MG: 81 TABLET, COATED ORAL at 08:23

## 2025-02-26 SDOH — ECONOMIC STABILITY: HOUSING INSECURITY: AT ANY TIME IN THE PAST 12 MONTHS, WERE YOU HOMELESS OR LIVING IN A SHELTER (INCLUDING NOW)?: NO

## 2025-02-26 SDOH — ECONOMIC STABILITY: FOOD INSECURITY: WITHIN THE PAST 12 MONTHS, THE FOOD YOU BOUGHT JUST DIDN'T LAST AND YOU DIDN'T HAVE MONEY TO GET MORE.: NEVER TRUE

## 2025-02-26 SDOH — SOCIAL STABILITY: SOCIAL INSECURITY: ARE THERE ANY APPARENT SIGNS OF INJURIES/BEHAVIORS THAT COULD BE RELATED TO ABUSE/NEGLECT?: NO

## 2025-02-26 SDOH — ECONOMIC STABILITY: INCOME INSECURITY: IN THE PAST 12 MONTHS HAS THE ELECTRIC, GAS, OIL, OR WATER COMPANY THREATENED TO SHUT OFF SERVICES IN YOUR HOME?: NO

## 2025-02-26 SDOH — SOCIAL STABILITY: SOCIAL INSECURITY: WITHIN THE LAST YEAR, HAVE YOU BEEN AFRAID OF YOUR PARTNER OR EX-PARTNER?: NO

## 2025-02-26 SDOH — SOCIAL STABILITY: SOCIAL INSECURITY: DOES ANYONE TRY TO KEEP YOU FROM HAVING/CONTACTING OTHER FRIENDS OR DOING THINGS OUTSIDE YOUR HOME?: NO

## 2025-02-26 SDOH — SOCIAL STABILITY: SOCIAL INSECURITY: WERE YOU ABLE TO COMPLETE ALL THE BEHAVIORAL HEALTH SCREENINGS?: YES

## 2025-02-26 SDOH — ECONOMIC STABILITY: FOOD INSECURITY: WITHIN THE PAST 12 MONTHS, YOU WORRIED THAT YOUR FOOD WOULD RUN OUT BEFORE YOU GOT THE MONEY TO BUY MORE.: NEVER TRUE

## 2025-02-26 SDOH — SOCIAL STABILITY: SOCIAL INSECURITY: WITHIN THE LAST YEAR, HAVE YOU BEEN HUMILIATED OR EMOTIONALLY ABUSED IN OTHER WAYS BY YOUR PARTNER OR EX-PARTNER?: NO

## 2025-02-26 SDOH — SOCIAL STABILITY: SOCIAL INSECURITY: DO YOU FEEL UNSAFE GOING BACK TO THE PLACE WHERE YOU ARE LIVING?: NO

## 2025-02-26 SDOH — SOCIAL STABILITY: SOCIAL INSECURITY: HAVE YOU HAD THOUGHTS OF HARMING ANYONE ELSE?: NO

## 2025-02-26 SDOH — SOCIAL STABILITY: SOCIAL INSECURITY: HAS ANYONE EVER THREATENED TO HURT YOUR FAMILY OR YOUR PETS?: NO

## 2025-02-26 SDOH — ECONOMIC STABILITY: TRANSPORTATION INSECURITY: IN THE PAST 12 MONTHS, HAS LACK OF TRANSPORTATION KEPT YOU FROM MEDICAL APPOINTMENTS OR FROM GETTING MEDICATIONS?: NO

## 2025-02-26 SDOH — ECONOMIC STABILITY: HOUSING INSECURITY: IN THE PAST 12 MONTHS, HOW MANY TIMES HAVE YOU MOVED WHERE YOU WERE LIVING?: 0

## 2025-02-26 SDOH — ECONOMIC STABILITY: HOUSING INSECURITY: IN THE LAST 12 MONTHS, WAS THERE A TIME WHEN YOU WERE NOT ABLE TO PAY THE MORTGAGE OR RENT ON TIME?: NO

## 2025-02-26 SDOH — ECONOMIC STABILITY: FOOD INSECURITY: HOW HARD IS IT FOR YOU TO PAY FOR THE VERY BASICS LIKE FOOD, HOUSING, MEDICAL CARE, AND HEATING?: NOT VERY HARD

## 2025-02-26 SDOH — SOCIAL STABILITY: SOCIAL INSECURITY: ARE YOU OR HAVE YOU BEEN THREATENED OR ABUSED PHYSICALLY, EMOTIONALLY, OR SEXUALLY BY ANYONE?: NO

## 2025-02-26 SDOH — SOCIAL STABILITY: SOCIAL INSECURITY: DO YOU FEEL ANYONE HAS EXPLOITED OR TAKEN ADVANTAGE OF YOU FINANCIALLY OR OF YOUR PERSONAL PROPERTY?: NO

## 2025-02-26 SDOH — SOCIAL STABILITY: SOCIAL INSECURITY: ABUSE: ADULT

## 2025-02-26 SDOH — SOCIAL STABILITY: SOCIAL INSECURITY: HAVE YOU HAD ANY THOUGHTS OF HARMING ANYONE ELSE?: NO

## 2025-02-26 ASSESSMENT — COGNITIVE AND FUNCTIONAL STATUS - GENERAL
PATIENT BASELINE BEDBOUND: NO
CLIMB 3 TO 5 STEPS WITH RAILING: A LOT
WALKING IN HOSPITAL ROOM: A LITTLE
STANDING UP FROM CHAIR USING ARMS: A LITTLE
DAILY ACTIVITIY SCORE: 22
CLIMB 3 TO 5 STEPS WITH RAILING: A LITTLE
DAILY ACTIVITIY SCORE: 22
DRESSING REGULAR LOWER BODY CLOTHING: A LITTLE
MOBILITY SCORE: 19
MOVING TO AND FROM BED TO CHAIR: A LITTLE
TOILETING: A LITTLE
WALKING IN HOSPITAL ROOM: A LITTLE
STANDING UP FROM CHAIR USING ARMS: A LITTLE
WALKING IN HOSPITAL ROOM: A LITTLE
TOILETING: A LITTLE
CLIMB 3 TO 5 STEPS WITH RAILING: A LOT
MOBILITY SCORE: 19
MOVING TO AND FROM BED TO CHAIR: A LITTLE
DRESSING REGULAR LOWER BODY CLOTHING: A LITTLE
DAILY ACTIVITIY SCORE: 22
STANDING UP FROM CHAIR USING ARMS: A LITTLE
MOVING TO AND FROM BED TO CHAIR: A LITTLE
MOVING TO AND FROM BED TO CHAIR: A LITTLE
WALKING IN HOSPITAL ROOM: A LITTLE
TOILETING: A LITTLE
CLIMB 3 TO 5 STEPS WITH RAILING: A LOT
DAILY ACTIVITIY SCORE: 22
MOBILITY SCORE: 19
MOBILITY SCORE: 20
TOILETING: A LITTLE
DRESSING REGULAR LOWER BODY CLOTHING: A LITTLE
DRESSING REGULAR LOWER BODY CLOTHING: A LITTLE
STANDING UP FROM CHAIR USING ARMS: A LITTLE

## 2025-02-26 ASSESSMENT — PATIENT HEALTH QUESTIONNAIRE - PHQ9
SUM OF ALL RESPONSES TO PHQ9 QUESTIONS 1 & 2: 2
2. FEELING DOWN, DEPRESSED OR HOPELESS: SEVERAL DAYS
1. LITTLE INTEREST OR PLEASURE IN DOING THINGS: SEVERAL DAYS

## 2025-02-26 ASSESSMENT — ACTIVITIES OF DAILY LIVING (ADL)
GROOMING: INDEPENDENT
HEARING - RIGHT EAR: FUNCTIONAL
BATHING: INDEPENDENT
LACK_OF_TRANSPORTATION: NO
PATIENT'S MEMORY ADEQUATE TO SAFELY COMPLETE DAILY ACTIVITIES?: YES
ASSISTIVE_DEVICE: EYEGLASSES;WALKER;CANE
JUDGMENT_ADEQUATE_SAFELY_COMPLETE_DAILY_ACTIVITIES: YES
LACK_OF_TRANSPORTATION: NO
ADEQUATE_TO_COMPLETE_ADL: YES
TOILETING: INDEPENDENT
DRESSING YOURSELF: INDEPENDENT
HEARING - LEFT EAR: FUNCTIONAL
WALKS IN HOME: INDEPENDENT
FEEDING YOURSELF: INDEPENDENT

## 2025-02-26 ASSESSMENT — PAIN SCALES - GENERAL
PAINLEVEL_OUTOF10: 0 - NO PAIN

## 2025-02-26 ASSESSMENT — PAIN - FUNCTIONAL ASSESSMENT
PAIN_FUNCTIONAL_ASSESSMENT: 0-10

## 2025-02-26 ASSESSMENT — LIFESTYLE VARIABLES
SKIP TO QUESTIONS 9-10: 1
HOW OFTEN DO YOU HAVE A DRINK CONTAINING ALCOHOL: NEVER
AUDIT-C TOTAL SCORE: 0
HOW OFTEN DO YOU HAVE 6 OR MORE DRINKS ON ONE OCCASION: NEVER
AUDIT-C TOTAL SCORE: 0
HOW MANY STANDARD DRINKS CONTAINING ALCOHOL DO YOU HAVE ON A TYPICAL DAY: PATIENT DOES NOT DRINK

## 2025-02-26 NOTE — PROGRESS NOTES
Pharmacy Medication History Review    Melissa Marinelli is a 69 y.o. female admitted for Dizziness. Pharmacy reviewed the patient's [id number] medications and allergies for accuracy.    Medications ADDED  Famotidine 40 mg  Medications CHANGED  Entresto 24-26 mg --> once daily  Medications REMOVED/NOT TAKING   Nicotine patch 21 mg/24 hour     The list below reflects the updated PTA list.   Prior to Admission Medications   Prescriptions Last Dose Informant   Farxiga 10 mg 2025 Self   Sig: Take 1 tablet (10 mg) by mouth once daily.   OneTouch Verio Flex meter misc  Self   Sig: USE AS DIRECTED THREE TIMES DAILY   Symbicort 80-4.5 mcg/actuation inhaler  Self   Sig: Inhale 2 puffs twice a day.   acetaminophen (Tylenol) 500 mg tablet  Self   Sig: Take 2 tablets (1,000 mg) by mouth every 8 hours if needed for mild pain (1 - 3).   albuterol 90 mcg/actuation inhaler  Self   Sig: Inhale 2 puffs every 4 hours if needed for wheezing or shortness of breath.   amiodarone (Pacerone) 200 mg tablet 2025 Self   Sig: Take 1 tablet (200 mg) by mouth once daily.   apixaban (Eliquis) 5 mg tablet 2025 Self   Sig: Take 2 tablets (10 mg) by mouth 2 times a day for 7 days, THEN 1 tablet (5 mg) 2 times a day.   Patient taking differently: Take 1 tablet (5 mg) 2 times a day.   aspirin 81 mg EC tablet 2025 Self   Sig: Take 1 tablet (81 mg) by mouth once daily.   bumetanide (Bumex) 0.5 mg tablet 2025 Self   Sig: Take 1 tablet (0.5 mg) by mouth once daily. Do not fill before February 10, 2025.   cholecalciferol (Vitamin D3) 50 MCG (2000 UT) tablet 2025 Self   Sig: Take 1 tablet (2,000 Units) by mouth once daily.   famotidine (Pepcid) 40 mg tablet  Self   Sig: Take 1 tablet (40 mg) by mouth once daily.   heparin flush,porcine,-0.9NaCl 100 unit/mL kit  Self   Si mL by central venous line infusion route 1 (one) time per week. Indications: prevent clot from blocking an intravenous catheter   hydrocortisone 1 %  "ointment  Self   Sig: Apply topically 2 times a day as needed for irritation. Chest wall   ipratropium-albuteroL (Duo-Neb) 0.5-2.5 mg/3 mL nebulizer solution  Self   Sig: Take 3 mL by nebulization every 6 hours.   milrinone in 5 % dextrose (Primacor) 20 mg/100 mL (200 mcg/mL) infusion 2/26/2025 Morning Self   Sig: Infuse 15.5 mcg/min at 4.65 mL/hr into a venous catheter continuously. Do not fill before January 28, 2025.   mupirocin (Bactroban) 2 % ointment  Self   Sig: Apply topically. PRN for nose bleed   sacubitriL-valsartan (Entresto) 24-26 mg tablet 2/25/2025    Sig: Take 1 tablet by mouth 2 times a day.   sacubitriL-valsartan (Entresto) 24-26 mg tablet  Self   Sig: Take 1 tablet by mouth 2 times a day.   Patient taking differently: Take 1 tablet by mouth once daily.   sodium chloride (Ocean) 0.65 % nasal spray  Self   Sig: Administer 1 spray into each nostril if needed for congestion.   sodium chloride 0.9% (sodium chloride) flush  Self   Sig: Infuse 10 mL into a venous catheter 1 (one) time per week. If drawing labs flush with 20ML  Indications: flush picc line   spironolactone (Aldactone) 25 mg tablet 2/25/2025 Self   Sig: Take 0.5 tablets (12.5 mg) by mouth once daily.      Facility-Administered Medications: None       The list below reflects the updated allergy list. Please review each documented allergy for additional clarification and justification.  Allergies  Reviewed by Claudia Snow RN on 2/26/2025        Severity Reactions Comments    Metoprolol High Other, Shortness of breath, Respiratory depression     Ticagrelor High Anaphylaxis, Other Feels a severe \"burning feeling\" in her chest    Gadolinium-containing Contrast Media Medium Hives, Other     Iodinated Contrast Media Medium Hives, Other     Statins-hmg-coa Reductase Inhibitors Medium Myalgia, Other, Unknown Atorvastatin - hair loss    Red Dye Not Specified Unknown     Ace Inhibitors Low Other, Cough COUGH    Fentanyl Low Dizziness, Drowsiness  "    Hydralazine Low Dermatitis, Rash, Nausea/vomiting     Nicorette [nicotine (polacrilex)] Low Nausea/vomiting Nicorette gums and lozenges only. Okay with nicotine patches    Nicotine Low Nausea/vomiting     Penicillins Low Rash     Prednisone Low Other Increased blood sugar            Patient accepts M2B at discharge. Pharmacy has been updated to [facility].    Sources  [facility]  Pharmacy dispense history  Patient interview Good historian  Chart Review   Home infusion 2/19/25    Additional Comments  See highlighted section in above table  Patient reported taking entresto 24-26 mg once daily for tolerability     Eligio Greene, PharmD  [facility]  [address]  [facility], [address]  [place] [address]  Phone: [phone]  Please reach out via Secure Chat for questions, or if no response call [employer] or [facility]

## 2025-02-26 NOTE — HOSPITAL COURSE
Melissa Marinelli is a 70yo female with PMHx ICM/ HFrEF (last EF 10-15% 1/2025, on palliative milrinone infusion since 2/2024, s/p [employer] ICD 5/2020), CAD (s/p NSTEMI, s/p RIRI to LCX 1/2016), MVR (s/p mitral valve repair 6/2019; 29 mm Epic bioprosthetic valve with Dr. Montana), COPD (No PFTs on file), HTN, HL, GERD, hx of CVA, and DM, presenting to the ED via EMS for symptomatic hypotension. Patient's home health nurse measured BP 66/26 and contacted patient's cardiologist, who recommended ED evaluation.     Patient states that this morning, she started feeling dizzy, especially upon standing up. She also had shortness of breath that resolved after she used her inhaler. Of note, she was recently admitted from 2/5-2/9 for HF exacerbation, for which she was started on Entresto and also found to have a DEVIKA thrombus. She had been asymptomatic ever since discharge, until this morning. She attributes her dizziness and hypotension to Entresto, and states that she was also started on Entresto a couple years ago, but discontinued it due to hypotension and dizziness. Upon questioning, patient states she had been taking one pill of Entresto per day, instead of the prescribed two pills per day. She realized this yesterday, and started taking two pills Entresto per day since then. Patient states that her systolic BP usually runs around 80-90s at baseline, but her systolic BP was 90s-100s during her most recent hospitalization. Her dizziness had resolved once she arrived to the ED. She also developed cough and rhinorrhea a couple days ago, and with a recent sick contact (daughter) who had similar symptoms last week. Endorses chronic palpitations and constipation (last BM this morning). Denies falls, fever, headache, chest pain, orthopnea, n/v, changes in PO intake, diarrhea, dysuria, urinary changes, edema. Overall, patient says she feels like she is at her baseline.      In the ED, patient remained hypotensive 80s-90s/50s, and  mildly tachycardic 100s, but satting appropriately. Labs were notable for elevated but downtrending troponin 347->293, elevated , mild acidosis 7.32 with normal lactate, and elevated Cr 2.68 (baseline 1.9-2.3). ICD was interrogated and showed no arrhythmias, and CXR showed no acute processes. Negative for flu/covid.    Patient is feeling better today, no dizziness since yesterday. Labs today look within her baseline. Tele reviewed, no significant increase of HR from her baseline, no new changes on her rhythm. PATSY is getting better, and electrolytes within normal limits. Team suggest stop doing entresto until follow up outpatient with HF cardiologist.     Patient with A flutter and Afib noted since previous admission. 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 To avoid recurrent admissions and potential benefit of cardioversion, we will re attempt cardioversion while inpatient. However, SHRUTHI revealed that thrombus is smaller but still there, so no cardioversion was attempted. We recommend continue follow up with HF cardiologist, Dr. Rivera.     Patient had fever overnight (02/26) along urinary symptoms, she was started on antibiotics empirically for UTI. BC for now negative, procalcitonin positive, and UA with just +1 bacteria, pending urine culture. Initially started on ciprofloxacin, however due to concern for qtc prolongation with amiodarone on board, patient switched to fosfomycin on 02/28, which completed UTI treatment course.    Patient deemed ready for discharge today, continue rest of medications and milrinone palliative infusion.

## 2025-02-26 NOTE — PROGRESS NOTES
Melissa Marinelli is a 69 y.o. female on day 1 of admission presenting with Dizziness.      Subjective   Patient is feeling better today, denies dizziness since yesterday. Denies chest pain or palpitations. Tolerating diet. Patient confirms that 2 days ago started taking entresto 24/26 twice daily and that previously was taking just one pill per day.        Objective     Last Recorded Vitals  BP 92/58   Pulse (!) 111   Temp 36.7 °C (98.1 °F)   Resp 18   Wt 59.6 kg (131 lb 6.3 oz)   SpO2 97%   Intake/Output last 3 Shifts:    Intake/Output Summary (Last 24 hours) at 2/26/2025 0654  Last data filed at 2/26/2025 0521  Gross per 24 hour   Intake 515.76 ml   Output --   Net 515.76 ml       Admission Weight  Weight: 53.5 kg (118 lb) (02/25/25 1441)    Daily Weight  02/26/25 : 59.6 kg (131 lb 6.3 oz)    Image Results  ECG 12 lead  Accelerated Junctional rhythm with retrograde conduction  Rightward axis  Low voltage QRS  Septal infarct , age undetermined  ST & T wave abnormality, consider inferolateral ischemia  Abnormal ECG  When compared with ECG of 06-FEB-2025 10:08,  Junctional rhythm has replaced Wide QRS rhythm    See ED provider note for full interpretation and clinical correlation  Confirmed by Alycia Joyner ([id number]) on 2/25/2025 9:42:26 PM  XR chest 2 views  Narrative: Interpreted By:  Ronak Han,   STUDY:  XR CHEST 2 VIEWS      INDICATION:  Signs/Symptoms:Dizziness.      COMPARISON:  February 5, 2025      ACCESSION NUMBER(S):  [id number]      ORDERING CLINICIAN:  MARY MARTIN      FINDINGS:  Cardiomegaly with previous median sternotomy and pacemaker unchanged.      Right-sided central line again noted unchanged from prior.      No consolidation or edema. No large effusion. No evidence of  pneumothorax.      Impression: Cardiac surgical changes with pacemaker. No evidence of acute  intrathoracic abnormality.      Signed by: Ronak Han 2/25/2025 5:29 PM  Dictation workstation:   [id number]      Physical  Exam  HENT:      Head: Normocephalic.      Nose: Nose normal.      Mouth/Throat:      Mouth: Mucous membranes are moist.   Eyes:      Pupils: Pupils are equal, round, and reactive to light.   Cardiovascular:      Rate and Rhythm: Normal rate. Rhythm irregular.      Pulses: Normal pulses.   Pulmonary:      Effort: Pulmonary effort is normal.      Breath sounds: Normal breath sounds.   Abdominal:      General: Abdomen is flat. There is no distension.      Tenderness: There is no abdominal tenderness.   Musculoskeletal:      Right lower leg: No edema.      Left lower leg: No edema.   Skin:     General: Skin is warm.      Capillary Refill: Capillary refill takes less than 2 seconds.   Neurological:      Mental Status: She is alert.   Psychiatric:         Mood and Affect: Mood normal.         Relevant Results             Scheduled medications  amiodarone, 200 mg, oral, Daily  apixaban, 5 mg, oral, BID  aspirin, 81 mg, oral, Daily  bumetanide, 0.5 mg, oral, Daily  cholecalciferol, 2,000 Units, oral, Daily  dapagliflozin propanediol, 10 mg, oral, Daily  fluticasone furoate-vilanteroL, 1 puff, inhalation, Daily  insulin lispro, 0-5 Units, subcutaneous, TID AC  ipratropium-albuteroL, 3 mL, nebulization, TID  nicotine, 1 patch, transdermal, q24h  pantoprazole, 40 mg, oral, Daily before breakfast   Or  pantoprazole, 40 mg, intravenous, Daily before breakfast  polyethylene glycol, 17 g, oral, Daily  [START ON 3/2/2025] sodium chloride 0.9%, 10 mL, intravenous, Every Sunday  spironolactone, 12.5 mg, oral, Daily      Continuous medications  milrinone, 0.25 mcg/kg/min, Last Rate: 0.25 mcg/kg/min (02/26/25 0956)      PRN medications  PRN medications: acetaminophen **OR** acetaminophen **OR** acetaminophen, albuterol, dextrose, dextrose, glucagon, glucagon, heparin flush, hydrocortisone, melatonin, sodium chloride    Results for orders placed or performed during the hospital encounter of 02/25/25 (from the past 24 hours)   ECG 12  lead   Result Value Ref Range    Ventricular Rate 111 BPM    Atrial Rate 107 BPM    QRS Duration 118 ms    QT Interval 366 ms    QTC Calculation(Bazett) 497 ms    R Axis 98 degrees    T Axis -68 degrees    QRS Count 18 beats    Q Onset 217 ms    T Offset 400 ms    QTC Fredericia 449 ms   Blood Gas Venous Full Panel   Result Value Ref Range    POCT pH, Venous 7.35 7.33 - 7.43 pH    POCT pCO2, Venous 40 (L) 41 - 51 mm Hg    POCT pO2, Venous 33 (L) 35 - 45 mm Hg    POCT SO2, Venous 61 45 - 75 %    POCT Oxy Hemoglobin, Venous 59.0 45.0 - 75.0 %    POCT Hematocrit Calculated, Venous 37.0 36.0 - 46.0 %    POCT Sodium, Venous 134 (L) 136 - 145 mmol/L    POCT Potassium, Venous 6.9 (HH) 3.5 - 5.3 mmol/L    POCT Chloride, Venous 106 98 - 107 mmol/L    POCT Ionized Calicum, Venous 1.14 1.10 - 1.33 mmol/L    POCT Glucose, Venous 89 74 - 99 mg/dL    POCT Lactate, Venous 1.2 0.4 - 2.0 mmol/L    POCT Base Excess, Venous -3.3 (L) -2.0 - 3.0 mmol/L    POCT HCO3 Calculated, Venous 22.1 22.0 - 26.0 mmol/L    POCT Hemoglobin, Venous 12.3 12.0 - 16.0 g/dL    POCT Anion Gap, Venous 13.0 10.0 - 25.0 mmol/L    Patient Temperature 37.0 degrees Celsius    FiO2 21 %   Blood Gas Venous Full Panel Unsolicited   Result Value Ref Range    POCT pH, Venous 7.32 (L) 7.33 - 7.43 pH    POCT pCO2, Venous 45 41 - 51 mm Hg    POCT pO2, Venous 26 (L) 35 - 45 mm Hg    POCT SO2, Venous 42 (L) 45 - 75 %    POCT Oxy Hemoglobin, Venous 41.0 (L) 45.0 - 75.0 %    POCT Hematocrit Calculated, Venous 34.0 (L) 36.0 - 46.0 %    POCT Sodium, Venous 136 136 - 145 mmol/L    POCT Potassium, Venous 4.7 3.5 - 5.3 mmol/L    POCT Chloride, Venous 106 98 - 107 mmol/L    POCT Ionized Calicum, Venous 1.21 1.10 - 1.33 mmol/L    POCT Glucose, Venous 82 74 - 99 mg/dL    POCT Lactate, Venous 1.2 0.4 - 2.0 mmol/L    POCT Base Excess, Venous -3.0 (L) -2.0 - 3.0 mmol/L    POCT HCO3 Calculated, Venous 23.2 22.0 - 26.0 mmol/L    POCT Hemoglobin, Venous 11.4 (L) 12.0 - 16.0 g/dL    POCT  Anion Gap, Venous 12.0 10.0 - 25.0 mmol/L    Patient Temperature 37.0 degrees Celsius   CBC and Auto Differential   Result Value Ref Range    WBC 3.0 (L) 4.4 - 11.3 x10*3/uL    nRBC 0.0 0.0 - 0.0 /100 WBCs    RBC 3.78 (L) 4.00 - 5.20 x10*6/uL    Hemoglobin 11.3 (L) 12.0 - 16.0 g/dL    Hematocrit 34.5 (L) 36.0 - 46.0 %    MCV 91 80 - 100 fL    MCH 29.9 26.0 - 34.0 pg    MCHC 32.8 32.0 - 36.0 g/dL    RDW 16.0 (H) 11.5 - 14.5 %    Platelets 192 150 - 450 x10*3/uL    Neutrophils % 60.7 40.0 - 80.0 %    Immature Granulocytes %, Automated 1.0 (H) 0.0 - 0.9 %    Lymphocytes % 18.6 13.0 - 44.0 %    Monocytes % 14.3 2.0 - 10.0 %    Eosinophils % 4.7 0.0 - 6.0 %    Basophils % 0.7 0.0 - 2.0 %    Neutrophils Absolute 1.83 1.20 - 7.70 x10*3/uL    Immature Granulocytes Absolute, Automated 0.03 0.00 - 0.70 x10*3/uL    Lymphocytes Absolute 0.56 (L) 1.20 - 4.80 x10*3/uL    Monocytes Absolute 0.43 0.10 - 1.00 x10*3/uL    Eosinophils Absolute 0.14 0.00 - 0.70 x10*3/uL    Basophils Absolute 0.02 0.00 - 0.10 x10*3/uL   Phosphorus   Result Value Ref Range    Phosphorus 3.5 2.5 - 4.9 mg/dL   Magnesium   Result Value Ref Range    Magnesium 2.52 (H) 1.60 - 2.40 mg/dL   Comprehensive metabolic panel   Result Value Ref Range    Glucose 82 74 - 99 mg/dL    Sodium 137 136 - 145 mmol/L    Potassium 4.5 3.5 - 5.3 mmol/L    Chloride 104 98 - 107 mmol/L    Bicarbonate 23 21 - 32 mmol/L    Anion Gap 15 10 - 20 mmol/L    Urea Nitrogen 33 (H) 6 - 23 mg/dL    Creatinine 2.68 (H) 0.50 - 1.05 mg/dL    eGFR 19 (L) >60 mL/min/1.73m*2    Calcium 9.1 8.6 - 10.6 mg/dL    Albumin 4.1 3.4 - 5.0 g/dL    Alkaline Phosphatase 68 33 - 136 U/L    Total Protein 7.1 6.4 - 8.2 g/dL    AST 20 9 - 39 U/L    Bilirubin, Total 0.4 0.0 - 1.2 mg/dL    ALT 17 7 - 45 U/L   B-Type Natriuretic Peptide   Result Value Ref Range     (H) 0 - 99 pg/mL   Troponin I, High Sensitivity, Initial   Result Value Ref Range    Troponin I, High Sensitivity (CMC) 347 (HH) 0 - 34 ng/L    Sars-CoV-2 and Influenza A/B PCR   Result Value Ref Range    Flu A Result Not Detected Not Detected    Flu B Result Not Detected Not Detected    Coronavirus 2019, PCR Not Detected Not Detected   RSV PCR   Result Value Ref Range    RSV PCR Not Detected Not Detected   Troponin, High Sensitivity, 1 Hour   Result Value Ref Range    Troponin I, High Sensitivity (CMC) 293 (HH) 0 - 34 ng/L   POCT GLUCOSE   Result Value Ref Range    POCT Glucose 84 74 - 99 mg/dL   Renal function panel   Result Value Ref Range    Glucose 79 74 - 99 mg/dL    Sodium 139 136 - 145 mmol/L    Potassium 4.8 3.5 - 5.3 mmol/L    Chloride 107 98 - 107 mmol/L    Bicarbonate 22 21 - 32 mmol/L    Anion Gap 15 10 - 20 mmol/L    Urea Nitrogen 33 (H) 6 - 23 mg/dL    Creatinine 2.37 (H) 0.50 - 1.05 mg/dL    eGFR 22 (L) >60 mL/min/1.73m*2    Calcium 8.6 8.6 - 10.6 mg/dL    Phosphorus 3.4 2.5 - 4.9 mg/dL    Albumin 3.6 3.4 - 5.0 g/dL   Type and screen   Result Value Ref Range    ABO TYPE O     Rh TYPE POS     ANTIBODY SCREEN NEG    Coagulation Screen   Result Value Ref Range    Protime 17.7 (H) 9.8 - 12.4 seconds    INR 1.6 (H) 0.9 - 1.1    aPTT 32 26 - 36 seconds   Heparin Assay   Result Value Ref Range    Heparin Unfractionated >2.0 (HH) See Comment Below for Therapeutic Ranges IU/mL   Heparin Assay, UFH   Result Value Ref Range    Heparin Unfractionated >2.0 (HH) See Comment Below for Therapeutic Ranges IU/mL   POCT GLUCOSE   Result Value Ref Range    POCT Glucose 74 74 - 99 mg/dL     *Note: Due to a large number of results and/or encounters for the requested time period, some results have not been displayed. A complete set of results can be found in Results Review.       Assessment/Plan      Melissa Marinelli is a 68yo female with PMHx ICM/ HFrEF (last EF 10-15% 1/2025, on palliative milrinone infusion since 2/204, s/p [employer] ICD 5/2020), CAD (s/p NSTEMI, s/p RIRI to LCX 1/2016), MVR (s/p mitral valve repair 6/2019; 29 mm Epic bioprosthetic valve with   Nan), COPD (no pfts), HTN, HL, hx of CVA, and DM, presenting to the ED via EMS for symptomatic hypotension.     Patient is feeling better today, no dizziness since yesterday. Labs today look within her baseline. Tele reviewed, no significant increase of HR from her baseline, no new changes on her rhythm. PATSY is getting better, and electrolytes within normal limits. Team suggest stop doing entresto until follow up outpatient with HF cardiologist.    Patient with A flutter and Afib noted since previous admission. 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 To avoid recurrent admissions and potential benefit of cardioversion, we will re attempt cardioversion while inpatient. Order for SHRUTHI with possible cardioversion has been placed.      Updates 2/26   - Resume bumex, amiodarone and Farxiga   - Stop Heparin drip and resume Eliquis  - Stop Entresto until patient meets with her HF cardiologist, Dr. Rivera.   - SHRUTHI with possible cardioversion for tomorrow     #Hypotension  ::Most likely induced by entresto  ::Baseline systolic BP 90s-100s, most recent systolic BP 80s-90s  Resume home bumex  Stop Entresto and follow up with HF cardiologist, Dr. Rivera, outpatient  CTM      #HFrEF, last EF 10-15% 1/2025, on palliative milrinone infusion since 2/204, s/p 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 SHRUTHI 1/23: DEVIKA thrombus. Plan to repeat SHRUTHI in one month.   Stop heparin drip, resume home Eliquis   Resume Amiodarone   SHRUTHI with possible  cardioversion for tomorrow   C/w milrinone infusion      #PATSY on CKD non oliguric   ::Cr 2.68, baseline 1.9-2.3. Eitiology likely pre-renal due to hypotension   ::Cr improving, 2.68->2.37  CTM     #COPD   :: Patient does not require oxygen at home  :: Dry cough, shortness of breath that improves after breathing treatment. No signs or symptoms of exacerbation.  Continue home inhalers and breathing treatment as needed     #Type 2 Diabetes mellitus  :: No signs or symptoms of decompensation   - c/w ISS     F: Replete PRN  E: Keep mg >2, phos >3  and K >4  N: Cardiac diet, NPO after midnight for SHRUTHI with possible cardioversion  A: PIV and tunneled R chest Central line     DVT: SCD's  GI ppx: Pantoprazole  Bowel care:Miralax  Catheter:None  Oxygen:Room Air     Disposition:   TBD, likely home     Code Status: Full Code (confirmed on admission)   NOK:  Primary Emergency Contact: Mary Caruso    Patient discussed and seen with attending physician, Dr. Pina.     Maximo Denis MD  PGY1 Internal Medicine

## 2025-02-26 NOTE — CARE PLAN
Problem: Pain - Adult  Goal: Verbalizes/displays adequate comfort level or baseline comfort level  Outcome: Progressing     Problem: Safety - Adult  Goal: Free from fall injury  Outcome: Progressing     Problem: Discharge Planning  Goal: Discharge to home or other facility with appropriate resources  Outcome: Progressing     Problem: Chronic Conditions and Co-morbidities  Goal: Patient's chronic conditions and co-morbidity symptoms are monitored and maintained or improved  Outcome: Progressing     Problem: Nutrition  Goal: Nutrient intake appropriate for maintaining nutritional needs  Outcome: Progressing     The clinical goals for the shift include patient will remain hemodynamically stable through end of shift

## 2025-02-26 NOTE — PROGRESS NOTES
Patient was handed off to me from the previous team. For full history, physical, and prior ED course, please see previous provider note prior to patient handoff. This is an addendum to the record.    This is a 69-year-old female presenting to the ED for hypotension.  Admission is pending the time of signout.  Patient's blood pressure has remained hypotensive however near her baseline.  Patient has no new concerns or complaints.    Hospital Course/MDM:  Please see ED provider note for hospital course and MDM    Disposition: Hospital admission  Patient is admitted to cardiology under Dr. Pina.  Patient agrees to hospital admission at this time.    Elkin Ruiz PA-C  Emergency Medicine

## 2025-02-26 NOTE — H&P
CHIEF COMPLAINT: Hypotension, Dizziness    HPI:   Melissa Marinelli is a 68yo female with PMHx ICM/ HFrEF (last EF 10-15% 1/2025, on palliative milrinone infusion since 2/2024, s/p [employer] ICD 5/2020), CAD (s/p NSTEMI, s/p RIRI to LCX 1/2016), MVR (s/p mitral valve repair 6/2019; 29 mm Epic bioprosthetic valve with Dr. Montana), COPD (No PFTs on file), HTN, HL, GERD, hx of CVA, and DM, presenting to the ED via EMS for symptomatic hypotension. Patient's home health nurse measured BP 66/26 and contacted patient's cardiologist, who recommended ED evaluation.    Patient states that this morning, she started feeling dizzy, especially upon standing up. She also had shortness of breath that resolved after she used her inhaler. Of note, she was recently admitted from 2/5-2/9 for HF exacerbation, for which she was started on Entresto and also found to have a DEVIKA thrombus. She had been asymptomatic ever since discharge, until this morning. She attributes her dizziness and hypotension to Entresto, and states that she was also started on Entresto a couple years ago, but discontinued it due to hypotension and dizziness. Upon questioning, patient states she had been taking one pill of Entresto per day, instead of the prescribed two pills per day. She realized this yesterday, and started taking two pills Entresto per day since then. Patient states that her systolic BP usually runs around 80-90s at baseline, but her systolic BP was 90s-100s during her most recent hospitalization. Her dizziness had resolved once she arrived to the ED. She also developed cough and rhinorrhea a couple days ago, and with a recent sick contact (daughter) who had similar symptoms last week. Endorses chronic palpitations and constipation (last BM this morning). Denies falls, fever, headache, chest pain, orthopnea, n/v, changes in PO intake, diarrhea, dysuria, urinary changes, edema. Overall, patient says she feels like she is at her baseline.     In the  "ED, patient remained hypotensive 80s-90s/50s, and mildly tachycardic 100s, but satting appropriately. Labs were notable for elevated but downtrending troponin 347->293, elevated , mild acidosis 7.32 with normal lactate, and elevated Cr 2.68 (baseline 1.9-2.3). ICD was interrogated and showed no arrhythmias, and CXR showed no acute processes. Negative for flu/covid.    Vitals:   BP 88/59   Pulse (!) 108   Temp 37 °C (98.6 °F) (Temporal)   Resp 10   Ht 1.626 m (5' 4\")   Wt 53.5 kg (118 lb)   SpO2 98%   BMI 20.25 kg/m²    Initial Labs:   CBC: WBC 3.0 Hgb 11.3  plt 192   BMP: Na 137, K 4.5 Cl 104 HCO3 23 BUN 33 Cr 2.68glu 82   LFT: Ca 9.1 tprot 7.1, alb 4.1 alkphos 68 AST20 ALT 17 tbili 0.4 dbili _   Electrolytes: PO4 _ Mg 2.52   Heme: PT 22.8, INR 2.0 aPTT 36   lactate 1.7 Troponin 293  Imaging:  -CXR: Cardiac surgical changes with pacemaker. No evidence of acute intrathoracic abnormality.  -EKG: Regular rhythm, slightly tachy to 110. NO ST segment or T wave abnormalities compared to prior ECG (06-feb-2025)    Interventions:  -Continued home milrinone infusion    -------------------------------------------------------------------------------------  PAST MEDICAL HISTORY:   Past Medical History:   Diagnosis Date    Asthma     CAD (coronary artery disease)     CHF (congestive heart failure)     CKD (chronic kidney disease)     COPD (chronic obstructive pulmonary disease) (Multi)     CVA (cerebral vascular accident) (Multi)     Diabetes mellitus (Multi)     GERD (gastroesophageal reflux disease)     Hyperlipidemia     Hypertension     NSTEMI (non-ST elevated myocardial infarction) (Multi)     Unspecified systolic (congestive) heart failure 08/11/2022    HFrEF (heart failure with reduced ejection fraction)      PAST SURGICAL HISTORY:   Past Surgical History:   Procedure Laterality Date    CARDIAC CATHETERIZATION N/A 1/26/2024    Procedure: Right Heart Cath;  Surgeon: Cuate Dodson MD;  Location: [facility]" 3529 Cardiac Cath Lab;  Service: Cardiovascular;  Laterality: N/A;    CARDIAC ELECTROPHYSIOLOGY PROCEDURE N/A 1/23/2025    Procedure: SHRUTHI DCCV;  Surgeon: Emilee Brewster MD;  Location: [place] [address] Cardiac Cath Lab;  Service: Electrophysiology;  Laterality: N/A;    MITRAL VALVE REPLACEMENT  06/2019    OTHER SURGICAL HISTORY  08/11/2022    Tonsillectomy    OTHER SURGICAL HISTORY  08/11/2022    Right ventricular assist device placement    OTHER SURGICAL HISTORY  08/11/2022    Mitral valve replacement     Medications  Current Outpatient Medications   Medication Instructions    acetaminophen (Tylenol) 500 mg tablet 2 tablets, oral, Every 8 hours PRN    albuterol 90 mcg/actuation inhaler 2 puffs, inhalation, Every 4 hours PRN    amiodarone (PACERONE) 200 mg, oral, Daily    apixaban (Eliquis) 5 mg tablet Take 2 tablets (10 mg) by mouth 2 times a day for 7 days, THEN 1 tablet (5 mg) 2 times a day.    aspirin 81 mg, oral, Daily    bumetanide (BUMEX) 0.5 mg, oral, Daily    cholecalciferol (Vitamin D3) 50 MCG (2000 UT) tablet 1 tablet, oral, Daily    Farxiga 10 mg, oral, Daily    heparin flush,porcine,-0.9NaCl 100 unit/mL kit 5 mL, central venous line infusion, Once Weekly    hydrocortisone 1 % ointment Topical, 2 times daily PRN, Chest wall    ipratropium-albuteroL (Duo-Neb) 0.5-2.5 mg/3 mL nebulizer solution 3 mL, nebulization, Every 6 hours RT    milrinone in 5 % dextrose (Primacor) 20 mg/100 mL (200 mcg/mL) infusion 0.25 mcg/kg/min, intravenous, Continuous    mupirocin (Bactroban) 2 % ointment Topical, PRN for nose bleed    nicotine (Nicoderm CQ) 21 mg/24 hr patch 1 patch, transdermal, Every 24 hours    OneTouch Verio Flex meter misc USE AS DIRECTED THREE TIMES DAILY    sacubitriL-valsartan (Entresto) 24-26 mg tablet 1 tablet, oral, 2 times daily    sodium chloride (Ocean) 0.65 % nasal spray 1 spray, Each Nostril, As needed    sodium chloride 0.9% (sodium chloride) flush 10 mL, intravenous, Once Weekly, If drawing  "labs flush with 20ML    spironolactone (ALDACTONE) 12.5 mg, oral, Daily    Symbicort 80-4.5 mcg/actuation inhaler 2 puffs, inhalation, 2 times daily        Allergies   Allergen Reactions    Metoprolol Other, Shortness of breath and Respiratory depression    Ticagrelor Anaphylaxis and Other     Feels a severe \"burning feeling\" in her chest    Gadolinium-Containing Contrast Media Hives and Other    Iodinated Contrast Media Hives and Other    Statins-Hmg-Coa Reductase Inhibitors Myalgia, Other and Unknown     Atorvastatin - hair loss    Red Dye Unknown    Ace Inhibitors Other and Cough     COUGH    Fentanyl Dizziness and Drowsiness    Hydralazine Dermatitis, Rash and Nausea/vomiting    Nicorette [Nicotine (Polacrilex)] Nausea/vomiting     Nicorette gums and lozenges only. Okay with nicotine patches    Nicotine Nausea/vomiting    Penicillins Rash    Prednisone Other     Increased blood sugar        FAMILY HISTORY:   Family History   Problem Relation Name Age of Onset    Other (CVA) Brother          SOCIAL HISTORY:   - Living Situation: home with daughter and grandson. Has HHA  - Functional Status: can walk without assistance, but has dyspnea on exertion  - Tobacco: 10 cigarettes/day, last cigarette was a couple days ago  - Alcohol: none  - Drugs: none    PHYSICAL EXAM:   General: awake, alert, conversant, appears stated age  HEENT: pupils equal and round, no scleral icterus or conjunctivitis  Skin: no suspect lesions or rashes noted on visible skin  Chest: ctab, normal respiratory effort, not on supplemental oxygen  Cardiac: regular rate and rhythm, normal s1, s2, no M/R/G, no JVD  Abdomen: soft, ND, NT, no involuntary guarding  : no flank pain or indwelling urinary catheter  EXT: no peripheral edema, no asymmetry noted  MSK: no focal joint swelling noted  Neuro: AOx4, moving all limbs spontaneously, follows commands  Psych: coherent thought process, appropriate mood and affect      Assessment/Plan   Melissa Marinelli is " a 68yo female with PMHx ICM/ HFrEF (last EF 10-15% 1/2025, on palliative milrinone infusion since 2/204, s/p [employer] ICD 5/2020), CAD (s/p NSTEMI, s/p RIRI to LCX 1/2016), MVR (s/p mitral valve repair 6/2019; 29 mm Epic bioprosthetic valve with Dr. Montana), COPD (no pfts), HTN, HL, hx of CVA, and DM, presenting to the ED via EMS for symptomatic hypotension.   Patient's home health nurse measured BP 66/26 and contacted patient's cardiologist, who recommended ED evaluation. On exam, patient appeared euvolemic and had clear lungs bilaterally, RRR, with no shortness of breath, orthopnea, or peripheral edema, and CXR, EKG, lactate were normal, making heart failure exacerbation unlikely. BNP elevated, however lower equal or even lower than baseline. Given patient increased Entresto dose a day prior to symptoms, suspect medication side effect contributing to her hypotension and dizziness, which could also have been exacerbated by recent URI. Labs also notable for mild PATSY (Cr 2.68), will continue to monitor. Will hold Entresto and bumetanide, and continue to monitor vitals.    PLAN:  #Hypotension  ::Baseline systolic BP 90s-100s, most recent systolic BP 80s-90s  Hold home bumex, Entresto  Will continue spironolactone  Vitals check    #HFrEF  #CAD  #LA thrombus  ::last EF 10-15% 1/2025  ::CXR and EKG 2/25: no acute abnormalities  ::Cardiac device check normal  ::Elevated but downtrending tropnin 347->293  ::  :: SHRUTHI 1/23: DEVIKA thrombus  Stopped eliquis, started hep ggt  C/w home meds  Patient due to get SHRUTHI On Monday(?), Consider getting SHRUTHI for monitoring of thrombus while inpatient., Farxiga held    #PATSY  ::CR 2.68, baseline 1.9-2.3. Eitiology likely pre-renal  CTM  If not improving, consider ordering urine lytes, urea    #COPD  ::stable  Continue home inhalers    F: Replete PRN  E: Keep mg >2, phos >3  and K >4  N: Cardiac diet  A: PIV and tunneled R chest Central line    DVT: SCD's  GI ppx: Pantoprazole  Bowel  care:Miralax  Catheter:None  Oxygen:Room Air    Disposition:   TBD, likely home    Code Status: Full Code (confirmed on admission)   NOK:  Primary Emergency Contact: Mary Caruso     This patient was admitted after daytime hours and will be seen and staffed by  Primary Team Attending Physician in AM.    Gia Carolina  MS3    And    Nita Davis  PGY-2 Internal medicine

## 2025-02-27 ENCOUNTER — HOME CARE VISIT (OUTPATIENT)
Dept: HOME HEALTH SERVICES | Facility: HOME HEALTH | Age: 70
End: 2025-02-27
Payer: COMMERCIAL

## 2025-02-27 ENCOUNTER — ANESTHESIA EVENT (OUTPATIENT)
Dept: CARDIOLOGY | Facility: HOSPITAL | Age: 70
End: 2025-02-27
Payer: COMMERCIAL

## 2025-02-27 ENCOUNTER — APPOINTMENT (OUTPATIENT)
Dept: RADIOLOGY | Facility: HOSPITAL | Age: 70
DRG: 309 | End: 2025-02-27
Payer: COMMERCIAL

## 2025-02-27 ENCOUNTER — APPOINTMENT (OUTPATIENT)
Dept: CARDIOLOGY | Facility: HOSPITAL | Age: 70
DRG: 309 | End: 2025-02-27
Payer: COMMERCIAL

## 2025-02-27 ENCOUNTER — DOCUMENTATION (OUTPATIENT)
Dept: HOME HEALTH SERVICES | Facility: HOME HEALTH | Age: 70
End: 2025-02-27

## 2025-02-27 ENCOUNTER — ANESTHESIA (OUTPATIENT)
Dept: CARDIOLOGY | Facility: HOSPITAL | Age: 70
End: 2025-02-27
Payer: COMMERCIAL

## 2025-02-27 LAB
ALBUMIN SERPL BCP-MCNC: 3.4 G/DL (ref 3.4–5)
ALBUMIN SERPL BCP-MCNC: 3.7 G/DL (ref 3.4–5)
ANION GAP SERPL CALC-SCNC: 14 MMOL/L (ref 10–20)
ANION GAP SERPL CALC-SCNC: 16 MMOL/L (ref 10–20)
BASE EXCESS BLDV CALC-SCNC: -6 MMOL/L (ref -2–3)
BASOPHILS # BLD AUTO: 0.01 X10*3/UL (ref 0–0.1)
BASOPHILS NFR BLD AUTO: 0.3 %
BODY TEMPERATURE: 37 DEGREES CELSIUS
BUN SERPL-MCNC: 30 MG/DL (ref 6–23)
BUN SERPL-MCNC: 31 MG/DL (ref 6–23)
CALCIUM SERPL-MCNC: 8.3 MG/DL (ref 8.6–10.6)
CALCIUM SERPL-MCNC: 8.6 MG/DL (ref 8.6–10.6)
CHLORIDE SERPL-SCNC: 105 MMOL/L (ref 98–107)
CHLORIDE SERPL-SCNC: 105 MMOL/L (ref 98–107)
CO2 SERPL-SCNC: 19 MMOL/L (ref 21–32)
CO2 SERPL-SCNC: 20 MMOL/L (ref 21–32)
CREAT SERPL-MCNC: 2.22 MG/DL (ref 0.5–1.05)
CREAT SERPL-MCNC: 2.31 MG/DL (ref 0.5–1.05)
EGFRCR SERPLBLD CKD-EPI 2021: 22 ML/MIN/1.73M*2
EGFRCR SERPLBLD CKD-EPI 2021: 23 ML/MIN/1.73M*2
EJECTION FRACTION: 13 %
EOSINOPHIL # BLD AUTO: 0.02 X10*3/UL (ref 0–0.7)
EOSINOPHIL NFR BLD AUTO: 0.6 %
ERYTHROCYTE [DISTWIDTH] IN BLOOD BY AUTOMATED COUNT: 16.1 % (ref 11.5–14.5)
ERYTHROCYTE [DISTWIDTH] IN BLOOD BY AUTOMATED COUNT: 16.2 % (ref 11.5–14.5)
GLUCOSE BLD MANUAL STRIP-MCNC: 101 MG/DL (ref 74–99)
GLUCOSE BLD MANUAL STRIP-MCNC: 105 MG/DL (ref 74–99)
GLUCOSE BLD MANUAL STRIP-MCNC: 154 MG/DL (ref 74–99)
GLUCOSE BLD MANUAL STRIP-MCNC: 88 MG/DL (ref 74–99)
GLUCOSE SERPL-MCNC: 126 MG/DL (ref 74–99)
GLUCOSE SERPL-MCNC: 95 MG/DL (ref 74–99)
HCO3 BLDV-SCNC: 18.8 MMOL/L (ref 22–26)
HCT VFR BLD AUTO: 31.3 % (ref 36–46)
HCT VFR BLD AUTO: 33.3 % (ref 36–46)
HGB BLD-MCNC: 10 G/DL (ref 12–16)
HGB BLD-MCNC: 10.6 G/DL (ref 12–16)
HOLD SPECIMEN: NORMAL
IMM GRANULOCYTES # BLD AUTO: 0.01 X10*3/UL (ref 0–0.7)
IMM GRANULOCYTES NFR BLD AUTO: 0.3 % (ref 0–0.9)
INHALED O2 CONCENTRATION: 21 %
LACTATE SERPL-SCNC: 1.2 MMOL/L (ref 0.4–2)
LYMPHOCYTES # BLD AUTO: 0.39 X10*3/UL (ref 1.2–4.8)
LYMPHOCYTES NFR BLD AUTO: 10.9 %
MAGNESIUM SERPL-MCNC: 2.48 MG/DL (ref 1.6–2.4)
MCH RBC QN AUTO: 30 PG (ref 26–34)
MCH RBC QN AUTO: 30 PG (ref 26–34)
MCHC RBC AUTO-ENTMCNC: 31.8 G/DL (ref 32–36)
MCHC RBC AUTO-ENTMCNC: 31.9 G/DL (ref 32–36)
MCV RBC AUTO: 94 FL (ref 80–100)
MCV RBC AUTO: 94 FL (ref 80–100)
MONOCYTES # BLD AUTO: 0.29 X10*3/UL (ref 0.1–1)
MONOCYTES NFR BLD AUTO: 8.1 %
NEUTROPHILS # BLD AUTO: 2.87 X10*3/UL (ref 1.2–7.7)
NEUTROPHILS NFR BLD AUTO: 79.8 %
NRBC BLD-RTO: 0 /100 WBCS (ref 0–0)
NRBC BLD-RTO: 0 /100 WBCS (ref 0–0)
OXYHGB MFR BLDV: 86.6 % (ref 45–75)
PCO2 BLDV: 34 MM HG (ref 41–51)
PH BLDV: 7.35 PH (ref 7.33–7.43)
PHOSPHATE SERPL-MCNC: 3.2 MG/DL (ref 2.5–4.9)
PHOSPHATE SERPL-MCNC: 3.4 MG/DL (ref 2.5–4.9)
PLATELET # BLD AUTO: 160 X10*3/UL (ref 150–450)
PLATELET # BLD AUTO: 166 X10*3/UL (ref 150–450)
PO2 BLDV: 55 MM HG (ref 35–45)
POTASSIUM SERPL-SCNC: 4.5 MMOL/L (ref 3.5–5.3)
POTASSIUM SERPL-SCNC: 4.9 MMOL/L (ref 3.5–5.3)
PROCALCITONIN SERPL-MCNC: 0.26 NG/ML
RBC # BLD AUTO: 3.33 X10*6/UL (ref 4–5.2)
RBC # BLD AUTO: 3.53 X10*6/UL (ref 4–5.2)
SAO2 % BLDV: 89 % (ref 45–75)
SODIUM SERPL-SCNC: 134 MMOL/L (ref 136–145)
SODIUM SERPL-SCNC: 135 MMOL/L (ref 136–145)
WBC # BLD AUTO: 3 X10*3/UL (ref 4.4–11.3)
WBC # BLD AUTO: 3.6 X10*3/UL (ref 4.4–11.3)

## 2025-02-27 PROCEDURE — 2500000004 HC RX 250 GENERAL PHARMACY W/ HCPCS (ALT 636 FOR OP/ED)

## 2025-02-27 PROCEDURE — 82810 BLOOD GASES O2 SAT ONLY: CPT

## 2025-02-27 PROCEDURE — 92960 CARDIOVERSION ELECTRIC EXT: CPT | Performed by: INTERNAL MEDICINE

## 2025-02-27 PROCEDURE — 71045 X-RAY EXAM CHEST 1 VIEW: CPT | Performed by: RADIOLOGY

## 2025-02-27 PROCEDURE — 83605 ASSAY OF LACTIC ACID: CPT

## 2025-02-27 PROCEDURE — 85027 COMPLETE CBC AUTOMATED: CPT

## 2025-02-27 PROCEDURE — 80069 RENAL FUNCTION PANEL: CPT

## 2025-02-27 PROCEDURE — 99233 SBSQ HOSP IP/OBS HIGH 50: CPT

## 2025-02-27 PROCEDURE — 2500000002 HC RX 250 W HCPCS SELF ADMINISTERED DRUGS (ALT 637 FOR MEDICARE OP, ALT 636 FOR OP/ED)

## 2025-02-27 PROCEDURE — 7100000010 HC PHASE TWO TIME - EACH INCREMENTAL 1 MINUTE: Performed by: INTERNAL MEDICINE

## 2025-02-27 PROCEDURE — 36416 COLLJ CAPILLARY BLOOD SPEC: CPT

## 2025-02-27 PROCEDURE — 93320 DOPPLER ECHO COMPLETE: CPT | Performed by: INTERNAL MEDICINE

## 2025-02-27 PROCEDURE — 2500000001 HC RX 250 WO HCPCS SELF ADMINISTERED DRUGS (ALT 637 FOR MEDICARE OP)

## 2025-02-27 PROCEDURE — 93312 ECHO TRANSESOPHAGEAL: CPT | Performed by: INTERNAL MEDICINE

## 2025-02-27 PROCEDURE — 7100000009 HC PHASE TWO TIME - INITIAL BASE CHARGE: Performed by: INTERNAL MEDICINE

## 2025-02-27 PROCEDURE — 93325 DOPPLER ECHO COLOR FLOW MAPG: CPT | Performed by: INTERNAL MEDICINE

## 2025-02-27 PROCEDURE — 93320 DOPPLER ECHO COMPLETE: CPT

## 2025-02-27 PROCEDURE — 82805 BLOOD GASES W/O2 SATURATION: CPT

## 2025-02-27 PROCEDURE — 85025 COMPLETE CBC W/AUTO DIFF WBC: CPT

## 2025-02-27 PROCEDURE — 3700000002 HC GENERAL ANESTHESIA TIME - EACH INCREMENTAL 1 MINUTE: Performed by: INTERNAL MEDICINE

## 2025-02-27 PROCEDURE — 3700000001 HC GENERAL ANESTHESIA TIME - INITIAL BASE CHARGE: Performed by: INTERNAL MEDICINE

## 2025-02-27 PROCEDURE — 36415 COLL VENOUS BLD VENIPUNCTURE: CPT

## 2025-02-27 PROCEDURE — 83735 ASSAY OF MAGNESIUM: CPT

## 2025-02-27 PROCEDURE — 1200000002 HC GENERAL ROOM WITH TELEMETRY DAILY

## 2025-02-27 PROCEDURE — 2500000002 HC RX 250 W HCPCS SELF ADMINISTERED DRUGS (ALT 637 FOR MEDICARE OP, ALT 636 FOR OP/ED): Performed by: INTERNAL MEDICINE

## 2025-02-27 PROCEDURE — 82947 ASSAY GLUCOSE BLOOD QUANT: CPT

## 2025-02-27 PROCEDURE — B246ZZ4 ULTRASONOGRAPHY OF RIGHT AND LEFT HEART, TRANSESOPHAGEAL: ICD-10-PCS | Performed by: INTERNAL MEDICINE

## 2025-02-27 PROCEDURE — 87075 CULTR BACTERIA EXCEPT BLOOD: CPT

## 2025-02-27 PROCEDURE — 2720000007 HC OR 272 NO HCPCS: Performed by: INTERNAL MEDICINE

## 2025-02-27 PROCEDURE — 2500000005 HC RX 250 GENERAL PHARMACY W/O HCPCS: Performed by: INTERNAL MEDICINE

## 2025-02-27 PROCEDURE — 84145 PROCALCITONIN (PCT): CPT

## 2025-02-27 PROCEDURE — 87040 BLOOD CULTURE FOR BACTERIA: CPT

## 2025-02-27 PROCEDURE — 71045 X-RAY EXAM CHEST 1 VIEW: CPT

## 2025-02-27 RX ORDER — KETAMINE HYDROCHLORIDE 50 MG/ML
INJECTION, SOLUTION INTRAMUSCULAR; INTRAVENOUS AS NEEDED
Status: DISCONTINUED | OUTPATIENT
Start: 2025-02-27 | End: 2025-02-27

## 2025-02-27 RX ORDER — LIDOCAINE HYDROCHLORIDE 20 MG/ML
SOLUTION OROPHARYNGEAL AS NEEDED
Status: DISCONTINUED | OUTPATIENT
Start: 2025-02-27 | End: 2025-02-28 | Stop reason: HOSPADM

## 2025-02-27 RX ORDER — PROPOFOL 10 MG/ML
INJECTION, EMULSION INTRAVENOUS AS NEEDED
Status: DISCONTINUED | OUTPATIENT
Start: 2025-02-27 | End: 2025-02-27

## 2025-02-27 RX ORDER — CIPROFLOXACIN 250 MG/1
250 TABLET, FILM COATED ORAL EVERY 24 HOURS
Status: DISCONTINUED | OUTPATIENT
Start: 2025-02-27 | End: 2025-02-28

## 2025-02-27 RX ORDER — VANCOMYCIN HYDROCHLORIDE 1 G/200ML
1000 INJECTION, SOLUTION INTRAVENOUS ONCE
Status: COMPLETED | OUTPATIENT
Start: 2025-02-27 | End: 2025-02-27

## 2025-02-27 RX ORDER — PHENYLEPHRINE HCL IN 0.9% NACL 0.4MG/10ML
SYRINGE (ML) INTRAVENOUS AS NEEDED
Status: DISCONTINUED | OUTPATIENT
Start: 2025-02-27 | End: 2025-02-27

## 2025-02-27 RX ORDER — MILRINONE LACTATE 1 MG/ML
0.25 INJECTION INTRAVENOUS CONTINUOUS
Qty: 50 ML | Refills: 11 | Status: SHIPPED
Start: 2025-02-27 | End: 2025-04-28

## 2025-02-27 RX ORDER — NITROFURANTOIN 25; 75 MG/1; MG/1
100 CAPSULE ORAL 2 TIMES DAILY
Status: DISCONTINUED | OUTPATIENT
Start: 2025-02-27 | End: 2025-02-27

## 2025-02-27 RX ORDER — VANCOMYCIN HYDROCHLORIDE 1 G/20ML
INJECTION, POWDER, LYOPHILIZED, FOR SOLUTION INTRAVENOUS DAILY PRN
Status: DISCONTINUED | OUTPATIENT
Start: 2025-02-27 | End: 2025-02-27

## 2025-02-27 RX ADMIN — PROPOFOL 10 MG: 10 INJECTION, EMULSION INTRAVENOUS at 09:03

## 2025-02-27 RX ADMIN — SODIUM CHLORIDE, SODIUM LACTATE, POTASSIUM CHLORIDE, AND CALCIUM CHLORIDE: 600; 310; 30; 20 INJECTION, SOLUTION INTRAVENOUS at 08:49

## 2025-02-27 RX ADMIN — FAMOTIDINE 40 MG: 20 TABLET, FILM COATED ORAL at 08:11

## 2025-02-27 RX ADMIN — LIDOCAINE HYDROCHLORIDE 15 ML: 20 SOLUTION ORAL; TOPICAL at 08:50

## 2025-02-27 RX ADMIN — KETAMINE HYDROCHLORIDE 10 MG: 50 INJECTION, SOLUTION, CONCENTRATE INTRAMUSCULAR; INTRAVENOUS at 08:59

## 2025-02-27 RX ADMIN — BUMETANIDE 0.5 MG: 0.5 TABLET ORAL at 13:38

## 2025-02-27 RX ADMIN — CIPROFOLXACIN 250 MG: 250 TABLET ORAL at 13:38

## 2025-02-27 RX ADMIN — APIXABAN 5 MG: 5 TABLET, FILM COATED ORAL at 20:35

## 2025-02-27 RX ADMIN — PROPOFOL 20 MG: 10 INJECTION, EMULSION INTRAVENOUS at 09:01

## 2025-02-27 RX ADMIN — DAPAGLIFLOZIN 10 MG: 10 TABLET, FILM COATED ORAL at 08:12

## 2025-02-27 RX ADMIN — ASPIRIN 81 MG: 81 TABLET, COATED ORAL at 08:11

## 2025-02-27 RX ADMIN — SPIRONOLACTONE 12.5 MG: 25 TABLET, FILM COATED ORAL at 08:11

## 2025-02-27 RX ADMIN — PIPERACILLIN SODIUM AND TAZOBACTAM SODIUM 2.25 G: 2; .25 INJECTION, SOLUTION INTRAVENOUS at 06:28

## 2025-02-27 RX ADMIN — PROPOFOL 20 MG: 10 INJECTION, EMULSION INTRAVENOUS at 08:59

## 2025-02-27 RX ADMIN — SODIUM CHLORIDE, SODIUM LACTATE, POTASSIUM CHLORIDE, AND CALCIUM CHLORIDE 500 ML: 600; 310; 30; 20 INJECTION, SOLUTION INTRAVENOUS at 15:50

## 2025-02-27 RX ADMIN — ACETAMINOPHEN 975 MG: 325 TABLET ORAL at 19:10

## 2025-02-27 RX ADMIN — VANCOMYCIN HYDROCHLORIDE 1000 MG: 1 INJECTION, SOLUTION INTRAVENOUS at 02:21

## 2025-02-27 RX ADMIN — MILRINONE LACTATE IN DEXTROSE 0.25 MCG/KG/MIN: 200 INJECTION, SOLUTION INTRAVENOUS at 14:14

## 2025-02-27 RX ADMIN — LIDOCAINE 1 APPLICATION: 4 GEL TOPICAL at 08:54

## 2025-02-27 RX ADMIN — IPRATROPIUM BROMIDE AND ALBUTEROL SULFATE 3 ML: .5; 3 SOLUTION RESPIRATORY (INHALATION) at 22:01

## 2025-02-27 RX ADMIN — APIXABAN 5 MG: 5 TABLET, FILM COATED ORAL at 08:11

## 2025-02-27 RX ADMIN — PIPERACILLIN SODIUM AND TAZOBACTAM SODIUM 2.25 G: 2; .25 INJECTION, SOLUTION INTRAVENOUS at 01:25

## 2025-02-27 RX ADMIN — Medication 80 MCG: at 08:59

## 2025-02-27 RX ADMIN — Medication 120 MCG: at 09:09

## 2025-02-27 RX ADMIN — IPRATROPIUM BROMIDE AND ALBUTEROL SULFATE 3 ML: .5; 3 SOLUTION RESPIRATORY (INHALATION) at 13:38

## 2025-02-27 RX ADMIN — BENZOCAINE 4 SPRAY: 200 SPRAY DENTAL; ORAL; PERIODONTAL at 08:49

## 2025-02-27 RX ADMIN — Medication 2000 UNITS: at 08:11

## 2025-02-27 RX ADMIN — Medication 80 MCG: at 09:03

## 2025-02-27 RX ADMIN — AMIODARONE HYDROCHLORIDE 200 MG: 200 TABLET ORAL at 08:11

## 2025-02-27 ASSESSMENT — COGNITIVE AND FUNCTIONAL STATUS - GENERAL
DRESSING REGULAR LOWER BODY CLOTHING: A LITTLE
CLIMB 3 TO 5 STEPS WITH RAILING: A LOT
CLIMB 3 TO 5 STEPS WITH RAILING: A LOT
MOBILITY SCORE: 19
DRESSING REGULAR LOWER BODY CLOTHING: A LITTLE
WALKING IN HOSPITAL ROOM: A LITTLE
MOVING TO AND FROM BED TO CHAIR: A LITTLE
MOVING TO AND FROM BED TO CHAIR: A LITTLE
STANDING UP FROM CHAIR USING ARMS: A LITTLE
DAILY ACTIVITIY SCORE: 22
WALKING IN HOSPITAL ROOM: A LITTLE
TOILETING: A LITTLE
DAILY ACTIVITIY SCORE: 22
TOILETING: A LITTLE
STANDING UP FROM CHAIR USING ARMS: A LITTLE
MOBILITY SCORE: 19

## 2025-02-27 ASSESSMENT — PAIN SCALES - GENERAL
PAINLEVEL_OUTOF10: 10 - WORST POSSIBLE PAIN
PAIN_LEVEL: 0
PAINLEVEL_OUTOF10: 5 - MODERATE PAIN
PAINLEVEL_OUTOF10: 0 - NO PAIN

## 2025-02-27 ASSESSMENT — PAIN - FUNCTIONAL ASSESSMENT
PAIN_FUNCTIONAL_ASSESSMENT: 0-10

## 2025-02-27 ASSESSMENT — PAIN DESCRIPTION - DESCRIPTORS: DESCRIPTORS: NAGGING

## 2025-02-27 NOTE — PROGRESS NOTES
2/26/2025  care coordination  Pt is a long term home Mil[facility]one infusion, [facility]  Pt has home pump here.  Message to [facility] and Primary team.  Will need [facility] referral and bedside hookup scheduled.

## 2025-02-27 NOTE — DOCUMENTATION CLARIFICATION NOTE
"    PATIENT:               FANNIE RIOS  ACCT #:                  [id number]  MRN:                       [id number]  :                       1955  ADMIT DATE:       2025 2:41 PM  DISCH DATE:  RESPONDING PROVIDER #:        [id number]          PROVIDER RESPONSE TEXT:    CKD stage 3    CDI QUERY TEXT:    Clarification    Instruction:    Based on your assessment of the patient and the clinical information, please provide the requested documentation by clicking on the appropriate radio button and enter any additional information if prompted.    Question: Please further clarify the diagnosis of chronic kidney disease as    When answering this query, please exercise your independent professional judgment. The fact that a question is being asked, does not imply that any particular answer is desired or expected.    The patient's clinical indicators include:  Clinical Information: 68yo female admitted with hypotension and dizziness.    Clinical Indicators:  -GFR: : 28        Cr: 1.91         BUN: 30  : 25              2.09                  33  :  24              2.15                  40  :  28              1.96                  35  : 19              2.68                  33  : 23              2.22                  30    -PN  noted \"PATSY on CKD non oliguric--Cr 2.68, baseline 1.9-2.3. Etiology likely pre-renal due to hypotension.\"    Treatment: monitor renal labs    Risk Factors: CHF, HTN, DM2  Options provided:  -- CKD stage 3  -- CKD stage 4  -- Other - I will add my own diagnosis  -- Refer to Clinical Documentation Reviewer    Query created by: Buffy Brush on 2025 3:29 PM      Electronically signed by:  MARY NARVAEZ MD 2025 4:09 PM          "

## 2025-02-27 NOTE — CONSULTS
Vancomycin Dosing by Pharmacy- Cessation of Therapy    Consult to pharmacy for vancomycin dosing has been discontinued by the prescriber, pharmacy will sign off at this time.    Please call pharmacy if there are further questions or re-enter a consult if vancomycin is resumed.     Kristel Damian, PharmD

## 2025-02-27 NOTE — CARE PLAN
The patient's goals for the shift include  to ambulate to the bathroom.    The clinical goals for the shift include patient will remain free of falls throughout shift

## 2025-02-27 NOTE — CONSULTS
Vancomycin Dosing by Pharmacy- INITIAL    Melissa Marinelli is a 69 y.o. year old female who Pharmacy has been consulted for vancomycin dosing for endocarditis/endovascular infection. Based on the patient's indication and renal status this patient will be dosed based on a goal trough/random level of 15-20.     Renal function is currently declining/PATSY (baseline Scr 1.9-2.3).    Visit Vitals  BP 95/57   Pulse (!) 112   Temp 37.3 °C (99.1 °F) (Temporal)   Resp 16        Lab Results   Component Value Date    CREATININE 2.37 (H) 2025    CREATININE 2.68 (H) 2025    CREATININE 2.27 (H) 2025    CREATININE 1.96 (H) 2025    CREATININE 2.21 (H) 2025        Patient weight is as follows:   Vitals:    25 0112   Weight: 59.6 kg (131 lb 6.3 oz)       Cultures:  No results found for the encounter in last 14 days.        I/O last 3 completed shifts:  In: 774.1 (13 mL/kg) [P.O.:720; I.V.:54.1 (0.9 mL/kg)]  Out: 400 (6.7 mL/kg) [Urine:400 (0.2 mL/kg/hr)]  Weight: 59.6 kg   I/O during current shift:  I/O this shift:  In: -   Out: 150 [Urine:150]    Temp (24hrs), Av.1 °C (98.8 °F), Min:36.7 °C (98.1 °F), Max:38.1 °C (100.6 °F)         Assessment/Plan     Patient will be given a loading dose of 1000 mg.  Follow-up level will be ordered on 25 at 1800 unless clinically indicated sooner.  Will continue to monitor renal function daily while on vancomycin and order serum creatinine at least every 48 hours if not already ordered.  Follow for continued vancomycin needs, clinical response, and signs/symptoms of toxicity.       Cesario Barrow, PharmD

## 2025-02-27 NOTE — ANESTHESIA POSTPROCEDURE EVALUATION
Patient: Melissa Marinelli    Procedure Summary       Date: 02/27/25 Room / Location: [facility] [id number] EP PRE/POST BAY 1 / [facility] 3529 Cardiac Cath Lab    Anesthesia Start: 0849 Anesthesia Stop: 0916    Procedure: Cardioversion Diagnosis: Atrial flutter (Multi)    Providers: Celso Ordoñez MD Responsible Provider: Cesario Schreiber MD    Anesthesia Type: general ASA Status: 3            Anesthesia Type: general    Vitals Value Taken Time   /73 02/27/25 0916   Temp 36.3 02/27/25 0916   Pulse 136 02/27/25 0916   Resp 15 02/27/25 0916   SpO2 99 02/27/25 0916       Anesthesia Post Evaluation    Patient location during evaluation: bedside  Patient participation: complete - patient participated  Level of consciousness: awake  Pain score: 0  Pain management: adequate  Airway patency: patent  Cardiovascular status: acceptable  Respiratory status: acceptable  Hydration status: acceptable  Postoperative Nausea and Vomiting: none        There were no known notable events for this encounter.

## 2025-02-27 NOTE — DISCHARGE INSTRUCTIONS
Dear Ms. Marinelli,    You were admitted on 2/25/2025 due to dizziness and low blood pressure. Since your admission your symptoms have improved. It is possible that the two pills of Entresto (as it was prescribed) that you took the day before of your admission, precipitated the low blood pressure. We suggest discontinuing Entresto for now until you meet with your cardiologist, Dr. Rivera, who may provide further instructions.     You also had a transesophageal echocardiogram to check on a thrombus that was seen in your heart a month ago, with the aim to do a procedure to treat an arrhythmia of your heart (cardioversion). Since the the clot is still there, cardioversion was not done. Please follow up with your cardiologist to determine next steps about this.     While in the hospital, a urinary infection was documented, an oral antibiotic was started which you will not need to continue upon discharge.    MEDICATION CHANGES:   START: None    STOP: Entresto      CONTINUE:   - All other home medications     FOLLOW UP APPOINTMENTS:  - Please follow up with Cardiology doctor.    Future Appointments   Date Time Provider Department Center   3/10/2025  9:20 AM Kathy Rivera MD PhD [id number] [place]

## 2025-02-27 NOTE — SIGNIFICANT EVENT
Rapid Response Nurse Note: Rapid Response    Pager time:   Arrival time:   Event end time:   Location: [place] [id number]  [] Triage by phone or secure messaging    Rapid response initiated by:  [] Rapid response RN [] Family [] Nursing Supervisor [] Physician   [] RADAR auto page [] Sepsis auto-page [x] RN [] RT   [] NP/PA [] Other:     Primary reason for call:   [] BAT [] New CPAP/BiPAP [] Bleeding [] Change in mental status   [] Chest pain [] Code blue [] FiO2 >/= 50% [] HR </= 40 bpm   [] HR >/= 130 bpm [] Hyperglycemia [] Hypoglycemia [] RADAR    [] RR </= 8 bpm [] RR >/= 30 bpm [x] SBP </= 90 mmHg [] SpO2 < 90%   [] Seizure [] Sepsis [] Shortness of breath  [] Staff concern: see comments     Initial VS and/or RADAR VS: T null °C; ; RR 14; BP 62/38; SPO2 95%.    Providers present at bedside (if applicable): Primary RN, Rapid Response RNKAJAL MD [facility]    Name of ICU Provider contacted (if applicable):     Interventions:  [] None [] ABG/VBG [] Assist w/ICU transfer [] BAT paged    [] Bag mask [] Blood [] Cardioversion [] Code Blue   [] Code blue for intubation [] Code status changed [] Chest x-ray [x] EKG   [x] IV fluid/bolus [] KUB x-ray [] Labs/cultures [] Medication   [] Nebulizer treatment [] NIPPV (CPAP/BiPAP) [] Oxygen [] Oral airway   [] Peripheral IV [] Palliative care consult [] CT/MRI [] Sepsis protocol    [] Suctioned [] Other:     Outcome:  [] Coded and  [] Code blue for intubation [] Coded and transferred to ICU []  on division   [x] Remained on division (no change) [] Remained on division + additional monitoring [] Remained in ED [] Transferred to ED   [] Transferred to ICU [] Transferred to inpatient status [] Transferred for interventions (procedure) [] Transferred to ICU stepdown    [] Transferred to surgery [] Transferred to telemetry [] Sepsis protocol [] STEMI protocol   [] Stroke protocol [x] Bedside nurse instructed to page rapid response for any  concerns or acute change in condition/VS     Additional Comments:     I responded to Rapid Response called for asymptomatic hypotension to 62/38 (verified with manual blood pressure).     Upon examination at the bedside she denies dizziness, lightheadedness.  Appears well perfused.  In trendelenburg.      Order placed for 500 mL LR bolus over 60 min.      BP improved to 90/53 after lying flat.  Goal SBP > 90 mmHg.      RN to contact Rapid Response with any future concerns or signs of clinical decompensation.

## 2025-02-27 NOTE — SIGNIFICANT EVENT
Rapid response was activated due to hypotension. SBP 60's. Patient only reported mild headache.    Patient was assessed at bedside, denies chest pain, shortness of breath and mild headache. Blood pressure was found as part of routine vital signs checked. Patient received oral bumex at least 1 hour and half previous to hypotension. Rapid response at bedside, repeated blood pressure reported manual SBP 80/45, and posteriorly 90/53 after 2 minutes, MAP 65, with no intervention. , SatO2 95% at room air.     Patient had procedure today for which she was NPO, received sedation and then bumex, all factors and most likely prompted hypotension reported. Team will order  mL and reassess. Will hold Bumex as well for now. No further actions are needed. Blood pressure should be reassessed after bolus with goal of MAP>65.

## 2025-02-27 NOTE — HH CARE COORDINATION
Home Care received a Referral for Infusion and Nursing. We have processed the referral for a hospital hook-up on 2/28 between 11A-1P. IV RICHIE 3/2    If you have any questions or concerns, please feel free to contact us at [phone]. Follow the prompts, enter your five digit zip code, and you will be directed to your care team on CENTL 1.

## 2025-02-27 NOTE — ANESTHESIA PREPROCEDURE EVALUATION
Patient: Melissa Marinelli    Procedure Information       Date/Time: 01/23/25 1300    Procedure: SHRUTHI DCCV    [place] [facility] [id number] EP PRE/POST BAY 1 / [facility] 3529 Cardiac Cath Lab    Providers: Emilee Brewster MD            Relevant Problems   Anesthesia (within normal limits)      Cardiac   (+) Acute on chronic heart failure with normal ejection fraction   (+) Atrial fib/flutter, transient (Multi)   (+) Atrial flutter (Multi)   (+) CAD (coronary artery disease)   (+) Hyperlipidemia   (+) Hypertension   (+) MI, acute, non ST segment elevation (Multi)   (+) Presence of automatic (implantable) cardiac defibrillator   (+) Stable angina (CMS-HCC)   (+) Subsequent non-ST elevation (NSTEMI) myocardial infarction   (+) Ventricular tachycardia (paroxysmal) (Multi)      Pulmonary   (+) Aspiration pneumonia (Multi)   (+) Asthma   (+) COPD (chronic obstructive pulmonary disease) (Multi)      Neuro   (+) Anxiety   (+) Major depressive disorder      GI   (+) Gastro-esophageal reflux disease without esophagitis      /Renal (within normal limits)      Liver   (+) Cholelithiasis      Endocrine   (+) Obesity, unspecified      Hematology   (+) Anemia due to chronic kidney disease   (+) Iron deficiency anemia      Musculoskeletal   (+) Lumbar spondylosis      HEENT (within normal limits)      ID   (+) Aspiration pneumonia (Multi)   (+) Dermatophytosis of nail      Skin (within normal limits)      GYN (within normal limits)       Clinical information reviewed:   Tobacco  Allergies  Meds   Med Hx  Surg Hx  OB Status  Fam Hx  Soc   Hx        NPO Detail:  No data recorded     Physical Exam    Airway  Mallampati: II     Cardiovascular    Dental    Pulmonary - normal exam     Abdominal            Anesthesia Plan    History of general anesthesia?: yes  History of complications of general anesthesia?: no    ASA 3     general       Plan discussed with CAA and attending.

## 2025-02-28 ENCOUNTER — HOME CARE VISIT (OUTPATIENT)
Dept: HOME HEALTH SERVICES | Facility: HOME HEALTH | Age: 70
End: 2025-02-28
Payer: COMMERCIAL

## 2025-02-28 ENCOUNTER — HOME INFUSION (OUTPATIENT)
Dept: INFUSION THERAPY | Age: 70
End: 2025-02-28
Payer: COMMERCIAL

## 2025-02-28 ENCOUNTER — APPOINTMENT (OUTPATIENT)
Dept: CARDIOLOGY | Facility: HOSPITAL | Age: 70
End: 2025-02-28
Payer: COMMERCIAL

## 2025-02-28 VITALS
SYSTOLIC BLOOD PRESSURE: 99 MMHG | HEART RATE: 115 BPM | WEIGHT: 134 LBS | TEMPERATURE: 97.3 F | HEIGHT: 64 IN | DIASTOLIC BLOOD PRESSURE: 65 MMHG | OXYGEN SATURATION: 97 % | BODY MASS INDEX: 22.88 KG/M2 | RESPIRATION RATE: 19 BRPM

## 2025-02-28 LAB
ALBUMIN SERPL BCP-MCNC: 3.6 G/DL (ref 3.4–5)
ANION GAP SERPL CALC-SCNC: 13 MMOL/L (ref 10–20)
BASOPHILS # BLD AUTO: 0.02 X10*3/UL (ref 0–0.1)
BASOPHILS NFR BLD AUTO: 0.8 %
BUN SERPL-MCNC: 28 MG/DL (ref 6–23)
CALCIUM SERPL-MCNC: 8.3 MG/DL (ref 8.6–10.6)
CHLORIDE SERPL-SCNC: 101 MMOL/L (ref 98–107)
CO2 SERPL-SCNC: 21 MMOL/L (ref 21–32)
CREAT SERPL-MCNC: 2.32 MG/DL (ref 0.5–1.05)
EGFRCR SERPLBLD CKD-EPI 2021: 22 ML/MIN/1.73M*2
EOSINOPHIL # BLD AUTO: 0.04 X10*3/UL (ref 0–0.7)
EOSINOPHIL NFR BLD AUTO: 1.6 %
ERYTHROCYTE [DISTWIDTH] IN BLOOD BY AUTOMATED COUNT: 15.9 % (ref 11.5–14.5)
GLUCOSE BLD MANUAL STRIP-MCNC: 94 MG/DL (ref 74–99)
GLUCOSE SERPL-MCNC: 74 MG/DL (ref 74–99)
HCT VFR BLD AUTO: 32.4 % (ref 36–46)
HGB BLD-MCNC: 9.9 G/DL (ref 12–16)
IMM GRANULOCYTES # BLD AUTO: 0.01 X10*3/UL (ref 0–0.7)
IMM GRANULOCYTES NFR BLD AUTO: 0.4 % (ref 0–0.9)
LYMPHOCYTES # BLD AUTO: 0.59 X10*3/UL (ref 1.2–4.8)
LYMPHOCYTES NFR BLD AUTO: 23.5 %
MAGNESIUM SERPL-MCNC: 2.3 MG/DL (ref 1.6–2.4)
MCH RBC QN AUTO: 29.5 PG (ref 26–34)
MCHC RBC AUTO-ENTMCNC: 30.6 G/DL (ref 32–36)
MCV RBC AUTO: 96 FL (ref 80–100)
MONOCYTES # BLD AUTO: 0.21 X10*3/UL (ref 0.1–1)
MONOCYTES NFR BLD AUTO: 8.4 %
NEUTROPHILS # BLD AUTO: 1.64 X10*3/UL (ref 1.2–7.7)
NEUTROPHILS NFR BLD AUTO: 65.3 %
NRBC BLD-RTO: 0 /100 WBCS (ref 0–0)
PHOSPHATE SERPL-MCNC: 3.2 MG/DL (ref 2.5–4.9)
PLATELET # BLD AUTO: 159 X10*3/UL (ref 150–450)
POTASSIUM SERPL-SCNC: 4.7 MMOL/L (ref 3.5–5.3)
RBC # BLD AUTO: 3.36 X10*6/UL (ref 4–5.2)
SODIUM SERPL-SCNC: 130 MMOL/L (ref 136–145)
WBC # BLD AUTO: 2.5 X10*3/UL (ref 4.4–11.3)

## 2025-02-28 PROCEDURE — 83735 ASSAY OF MAGNESIUM: CPT

## 2025-02-28 PROCEDURE — 82947 ASSAY GLUCOSE BLOOD QUANT: CPT

## 2025-02-28 PROCEDURE — 2500000001 HC RX 250 WO HCPCS SELF ADMINISTERED DRUGS (ALT 637 FOR MEDICARE OP)

## 2025-02-28 PROCEDURE — 2500000002 HC RX 250 W HCPCS SELF ADMINISTERED DRUGS (ALT 637 FOR MEDICARE OP, ALT 636 FOR OP/ED): Performed by: INTERNAL MEDICINE

## 2025-02-28 PROCEDURE — 80069 RENAL FUNCTION PANEL: CPT

## 2025-02-28 PROCEDURE — 2500000002 HC RX 250 W HCPCS SELF ADMINISTERED DRUGS (ALT 637 FOR MEDICARE OP, ALT 636 FOR OP/ED)

## 2025-02-28 PROCEDURE — 94640 AIRWAY INHALATION TREATMENT: CPT

## 2025-02-28 PROCEDURE — 99239 HOSP IP/OBS DSCHRG MGMT >30: CPT

## 2025-02-28 PROCEDURE — 85025 COMPLETE CBC W/AUTO DIFF WBC: CPT

## 2025-02-28 PROCEDURE — 36416 COLLJ CAPILLARY BLOOD SPEC: CPT

## 2025-02-28 RX ORDER — GRANULES FOR ORAL 3 G/1
3 POWDER ORAL ONCE
Status: COMPLETED | OUTPATIENT
Start: 2025-02-28 | End: 2025-02-28

## 2025-02-28 RX ORDER — CIPROFLOXACIN 250 MG/1
250 TABLET, FILM COATED ORAL EVERY 24 HOURS
Qty: 5 TABLET | Refills: 0 | Status: CANCELLED | OUTPATIENT
Start: 2025-02-28 | End: 2025-03-05

## 2025-02-28 RX ADMIN — SPIRONOLACTONE 12.5 MG: 25 TABLET, FILM COATED ORAL at 08:31

## 2025-02-28 RX ADMIN — ASPIRIN 81 MG: 81 TABLET, COATED ORAL at 08:31

## 2025-02-28 RX ADMIN — GRANULES FOR ORAL SOLUTION 3 G: 3 POWDER ORAL at 13:42

## 2025-02-28 RX ADMIN — AMIODARONE HYDROCHLORIDE 200 MG: 200 TABLET ORAL at 08:32

## 2025-02-28 RX ADMIN — Medication 2000 UNITS: at 08:32

## 2025-02-28 RX ADMIN — IPRATROPIUM BROMIDE AND ALBUTEROL SULFATE 3 ML: .5; 3 SOLUTION RESPIRATORY (INHALATION) at 13:00

## 2025-02-28 RX ADMIN — IPRATROPIUM BROMIDE AND ALBUTEROL SULFATE 3 ML: .5; 3 SOLUTION RESPIRATORY (INHALATION) at 04:02

## 2025-02-28 RX ADMIN — APIXABAN 5 MG: 5 TABLET, FILM COATED ORAL at 08:32

## 2025-02-28 RX ADMIN — FAMOTIDINE 40 MG: 20 TABLET, FILM COATED ORAL at 08:31

## 2025-02-28 ASSESSMENT — COGNITIVE AND FUNCTIONAL STATUS - GENERAL
CLIMB 3 TO 5 STEPS WITH RAILING: A LOT
DRESSING REGULAR UPPER BODY CLOTHING: A LITTLE
DRESSING REGULAR LOWER BODY CLOTHING: A LITTLE
DAILY ACTIVITIY SCORE: 21
HELP NEEDED FOR BATHING: A LITTLE
MOBILITY SCORE: 21
WALKING IN HOSPITAL ROOM: A LITTLE

## 2025-02-28 ASSESSMENT — PAIN - FUNCTIONAL ASSESSMENT: PAIN_FUNCTIONAL_ASSESSMENT: 0-10

## 2025-02-28 ASSESSMENT — PAIN SCALES - GENERAL: PAINLEVEL_OUTOF10: 0 - NO PAIN

## 2025-02-28 NOTE — DISCHARGE SUMMARY
Discharge Diagnosis  Dizziness  Symptomatic hypotension  A flutter / A fib with LA thrombus    Issues Requiring Follow-Up  HFrEF, symptomatic hypotension - Follow-up with Dr. Rivera, Cardiology to discuss further management and Entresto   A fib/flutter with LA thrombus - Follow-up with Dr. Rivera or EP for further consideration of cardioversion    Discharge Meds     Medication List      START taking these medications     milrinone 1 mg/mL injection; Commonly known as: Primacor; Infuse 0.0152   mg/min at 0.91 mL/hr into a venous catheter continuously.     CHANGE how you take these medications     apixaban 5 mg tablet; Commonly known as: Eliquis; Take 2 tablets (10 mg)   by mouth 2 times a day for 7 days, THEN 1 tablet (5 mg) 2 times a day.;   Start taking on: January 24, 2025; What changed: See the new instructions.     CONTINUE taking these medications     acetaminophen 500 mg tablet; Commonly known as: Tylenol   albuterol 90 mcg/actuation inhaler; Inhale 2 puffs every 4 hours if   needed for wheezing or shortness of breath.   amiodarone 200 mg tablet; Commonly known as: Pacerone; Take 1 tablet   (200 mg) by mouth once daily.   aspirin 81 mg EC tablet; Take 1 tablet (81 mg) by mouth once daily.   bumetanide 0.5 mg tablet; Commonly known as: Bumex; Take 1 tablet (0.5   mg) by mouth once daily. Do not fill before February 10, 2025.   famotidine 40 mg tablet; Commonly known as: Pepcid   Farxiga 10 mg; Generic drug: dapagliflozin propanediol; Take 1 tablet   (10 mg) by mouth once daily.   heparin flush(porcine)-0.9NaCl 100 unit/mL kit   hydrocortisone 1 % ointment; Apply topically 2 times a day as needed for   irritation. Chest wall   ipratropium-albuteroL 0.5-2.5 mg/3 mL nebulizer solution; Commonly known   as: Duo-Neb   milrinone in 5 % dextrose 20 mg/100 mL (200 mcg/mL) infusion; Commonly   known as: Primacor; Infuse 15.5 mcg/min at 4.65 mL/hr into a venous   catheter continuously. Do not fill before January 28,  2025.   mupirocin 2 % ointment; Commonly known as: Bactroban   OneTouch Verio Flex meter misc; Generic drug: blood-glucose meter   sodium chloride 0.65 % nasal spray; Commonly known as: Ocean   sodium chloride 0.9% flush   spironolactone 25 mg tablet; Commonly known as: Aldactone; Take 0.5   tablets (12.5 mg) by mouth once daily.   Symbicort 80-4.5 mcg/actuation inhaler; Generic drug:   budesonide-formoteroL   Vitamin D3 50 MCG (2000 UT) tablet; Generic drug: cholecalciferol     STOP taking these medications     sacubitriL-valsartan 24-26 mg tablet; Commonly known as: Entresto     ASK your doctor about these medications     alteplase 2 mg injection; Commonly known as: Cathflo Activase; 2 mL (2   mg) by intra-catheter route 1 time for 1 dose.; Ask about: Should I take   this medication?       Test Results Pending At Discharge  Pending Labs       Order Current Status    Type And Screen Collected (02/26/25 0107)    Blood Culture Preliminary result    Blood Culture Preliminary result            Hospital Course  Melissa Marinelli is a 70yo female with PMHx ICM/ HFrEF (last EF 10-15% 1/2025, on palliative milrinone infusion since 2/2024, s/p [employer] ICD 5/2020), CAD (s/p NSTEMI, s/p RIRI to LCX 1/2016), MVR (s/p mitral valve repair 6/2019; 29 mm Epic bioprosthetic valve with Dr. Montana), COPD (No PFTs on file), HTN, HL, GERD, hx of CVA, and DM, presenting to the ED via EMS for symptomatic hypotension. Patient's home health nurse measured BP 66/26 and contacted patient's cardiologist, who recommended ED evaluation.     Patient states that this morning, she started feeling dizzy, especially upon standing up. She also had shortness of breath that resolved after she used her inhaler. Of note, she was recently admitted from 2/5-2/9 for HF exacerbation, for which she was started on Entresto and also found to have a DEVIKA thrombus. She had been asymptomatic ever since discharge, until this morning. She attributes her dizziness and  hypotension to Entresto, and states that she was also started on Entresto a couple years ago, but discontinued it due to hypotension and dizziness. Upon questioning, patient states she had been taking one pill of Entresto per day, instead of the prescribed two pills per day. She realized this yesterday, and started taking two pills Entresto per day since then. Patient states that her systolic BP usually runs around 80-90s at baseline, but her systolic BP was 90s-100s during her most recent hospitalization. Her dizziness had resolved once she arrived to the ED. She also developed cough and rhinorrhea a couple days ago, and with a recent sick contact (daughter) who had similar symptoms last week. Endorses chronic palpitations and constipation (last BM this morning). Denies falls, fever, headache, chest pain, orthopnea, n/v, changes in PO intake, diarrhea, dysuria, urinary changes, edema. Overall, patient says she feels like she is at her baseline.      In the ED, patient remained hypotensive 80s-90s/50s, and mildly tachycardic 100s, but satting appropriately. Labs were notable for elevated but downtrending troponin 347->293, elevated , mild acidosis 7.32 with normal lactate, and elevated Cr 2.68 (baseline 1.9-2.3). ICD was interrogated and showed no arrhythmias, and CXR showed no acute processes. Negative for flu/covid.    Patient is feeling better today, no dizziness since yesterday. Labs today look within her baseline. Tele reviewed, no significant increase of HR from her baseline, no new changes on her rhythm. PATSY is getting better, and electrolytes within normal limits. Team suggest stop doing entresto until follow up outpatient with HF cardiologist.     Patient with A flutter and Afib noted since previous admission. 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 To avoid recurrent admissions and potential benefit of cardioversion, we  will re attempt cardioversion while inpatient. However, SHRUTHI revealed that thrombus is smaller but still there, so no cardioversion was attempted. We recommend continue follow up with HF cardiologist, Dr. Rivera.     Patient had fever overnight (02/26) along urinary symptoms, she was started on antibiotics empirically for UTI. BC for now negative, procalcitonin positive, and UA with just +1 bacteria, pending urine culture. Initially started on ciprofloxacin, however due to concern for qtc prolongation with amiodarone on board, patient switched to fosfomycin on 02/28, which completed UTI treatment course.    Patient deemed ready for discharge today, continue rest of medications and milrinone palliative infusion.     Pertinent Physical Exam At Time of Discharge  Visit Vitals  BP 99/65 (Patient Position: Lying)   Pulse (!) 115   Temp 36.3 °C (97.3 °F) (Temporal)   Resp 19        Physical Exam  Constitutional:       General: She is not in acute distress.     Appearance: Normal appearance.   HENT:      Head: Normocephalic and atraumatic.      Nose: Nose normal.      Mouth/Throat:      Mouth: Mucous membranes are moist.      Pharynx: Oropharynx is clear.   Eyes:      General: No scleral icterus.     Extraocular Movements: Extraocular movements intact.      Conjunctiva/sclera: Conjunctivae normal.   Cardiovascular:      Rate and Rhythm: Tachycardia present. Rhythm irregular.      Heart sounds: Normal heart sounds.   Pulmonary:      Effort: Pulmonary effort is normal.      Breath sounds: Normal breath sounds. No wheezing, rhonchi or rales.   Abdominal:      General: Abdomen is flat. Bowel sounds are normal.      Palpations: Abdomen is soft.   Musculoskeletal:         General: Normal range of motion.      Cervical back: Normal range of motion and neck supple.      Right lower leg: No edema.      Left lower leg: No edema.   Skin:     General: Skin is warm and dry.   Neurological:      General: No focal deficit present.       Mental Status: She is alert and oriented to person, place, and time. Mental status is at baseline.   Psychiatric:         Thought Content: Thought content normal.       Outpatient Follow-Up  Future Appointments   Date Time Provider Department Center   3/2/2025 To Be Determined Natacha Meeks RN [facility]   3/10/2025  9:20 AM Kathy Rivera MD PhD [id number] [facility]       Loida Keene MD  Internal Medicine, PGY-1

## 2025-02-28 NOTE — PROGRESS NOTES
2/28/2025 Care Coordination  [facility] RN  completed bedside Hookup for Milrinone infusion.. Daughter aware of next Pharmacy delivery and RICHIE with [facility]. Discharge today

## 2025-02-28 NOTE — CONSULTS
Reason For Consult  Afib with RVR    History Of Present Illness  Melissa Marinelli is a 70yo female with PMHx ICM/ HFrEF (last EF 10-15% 1/2025, on palliative milrinone infusion since 2/204, s/p [employer] ICD 5/2020), CAD (s/p NSTEMI, s/p RIRI to LCX 1/2016), MVR (s/p mitral valve repair 6/2019; 29 mm Epic bioprosthetic valve with Dr. Montana), COPD (no pfts), HTN, HL, hx of CVA, and DM, presenting to the ED via EMS for symptomatic hypotension.      Patient with A flutter and Afib noted since previous admission. 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 Plan was to repeat SHRUTHI/DCCV but SHRUTHI showed persistent DEVIKA thrombus so DCCV was canceled    EKG 2/27: typical AF with 2:1 conduction    SHRUTHI 2/26/25:  1. Left ventricular ejection fraction is severely decreased, by visual estimate at 10-15%.   2. There is global hypokinesis of the left ventricle with minor regional variations.   3. There is reduced right ventricular systolic function.   4. The left atrium is enlarged.   5. Left atrial appendage demonstrating thrombus measuring 1 cm x 1.75 cm along with dense sponatenous contrast.   6. There is an Epic mitral valve bioprosthesis, 29 mm reported size. Gradients across the mtiral valve were not assessed.   7. Compared with study dated 1/23/2025, there is redemonstration of a DEVIKA thrombus, that appears similair in size to prior SHRUTHI.    SHRUTHI 1/23/25:   1. Left ventricular ejection fraction is severely decreased, by visual estimate at 10-15%.   2. Left ventricular diastolic filling cannot be determined, due to mitral valve repair/replacement.   3. The left atrium is enlarged.   4. The left atrial appendage appears dilated, with very dense spontaneous contrast, flow ectasia, and an image suggestive of a thrombus.   5. Here is a 4x6mm mobile echogenic image that appears to be attached to the device lead (Clip41).   6. There is an unknown bioprosthetic  type mitral valve prosthesis.   7. Mildly elevated right ventricular systolic pressure.   8. Moderate tricuspid regurgitation visualized.   9. Mild aortic valve regurgitation.  10. There is plaque visualized in the descending aorta.    TTE 7/24:   1. The left ventricular systolic function is severely decreased, with a visually estimated ejection fraction of 15-20%.   2. There is global hypokinesis of the left ventricle with minor regional variations.   3. Spectral Doppler shows an abnormal pattern of left ventricular diastolic filling.   4. Left ventricular cavity size is severely dilated.   5. No left ventricular thrombus visualized.   6. There is reduced right ventricular systolic function.   7. The left atrium is severely dilated.   8. The DI is normal, consistent with normal prosthetic mitral valve function. The mean diastolic pressure is 8 mmHg at a heart rate of 136 bpm.   9. Mild to moderately elevated right ventricular systolic pressure.  10. Mild aortic valve regurgitation.        Past Medical History  She has a past medical history of Asthma, CAD (coronary artery disease), CHF (congestive heart failure), CKD (chronic kidney disease), COPD (chronic obstructive pulmonary disease) (Multi), CVA (cerebral vascular accident) (Multi), Diabetes mellitus (Multi), GERD (gastroesophageal reflux disease), Hyperlipidemia, Hypertension, NSTEMI (non-ST elevated myocardial infarction) (Multi), and Unspecified systolic (congestive) heart failure (08/11/2022).    Surgical History  She has a past surgical history that includes Other surgical history (08/11/2022); Other surgical history (08/11/2022); Other surgical history (08/11/2022); Mitral valve replacement (06/2019); Cardiac catheterization (N/A, 1/26/2024); Cardiac electrophysiology procedure (N/A, 1/23/2025); and Cardiac electrophysiology procedure (N/A, 2/27/2025).     Social History  She reports that she quit smoking 5 days ago. Her smoking use included cigarettes.  "She has never used smokeless tobacco. She reports current drug use. Drug: Marijuana. She reports that she does not drink alcohol.    Family History  Family History   Problem Relation Name Age of Onset    Other (CVA) Brother          Allergies  Metoprolol, Ticagrelor, Gadolinium-containing contrast media, Iodinated contrast media, Statins-hmg-coa reductase inhibitors, Red dye, Ace inhibitors, Fentanyl, Hydralazine, Nicorette [nicotine (polacrilex)], Nicotine, Penicillins, and Prednisone    Review of Systems  12 point ROS reviewed and -ve       Physical Exam  ***     Last Recorded Vitals  Blood pressure 99/65, pulse (!) 115, temperature 36.3 °C (97.3 °F), temperature source Temporal, resp. rate 19, height 1.626 m (5' 4\"), weight 60.8 kg (134 lb), SpO2 97%.    Relevant Results  LABS:  CMP:  Results from last 7 days   Lab Units 02/27/25  1238 02/27/25  0111 02/26/25  0107 02/25/25  1642   SODIUM mmol/L 135* 134* 139 137   POTASSIUM mmol/L 4.9 4.5 4.8 4.5   CHLORIDE mmol/L 105 105 107 104   CO2 mmol/L 19* 20* 22 23   ANION GAP mmol/L 16 14 15 15   BUN mg/dL 30* 31* 33* 33*   CREATININE mg/dL 2.22* 2.31* 2.37* 2.68*   EGFR mL/min/1.73m*2 23* 22* 22* 19*   MAGNESIUM mg/dL 2.48*  --   --  2.52*   ALBUMIN g/dL 3.7 3.4 3.6 4.1   ALT U/L  --   --   --  17   AST U/L  --   --   --  20   BILIRUBIN TOTAL mg/dL  --   --   --  0.4     CBC:  Results from last 7 days   Lab Units 02/27/25  1238 02/27/25  0111 02/25/25  1642   WBC AUTO x10*3/uL 3.6* 3.0* 3.0*   HEMOGLOBIN g/dL 10.6* 10.0* 11.3*   HEMATOCRIT % 33.3* 31.3* 34.5*   PLATELETS AUTO x10*3/uL 160 166 192   MCV fL 94 94 91     COAG:   Results from last 7 days   Lab Units 02/26/25  0214   INR  1.6*     ABO:   ABO TYPE   Date Value Ref Range Status   02/26/2025 O  Final     HEME/ENDO:     CARDIAC:   Results from last 7 days   Lab Units 02/25/25  1801 02/25/25  1642   TROPHSCMC ng/L 293* 347*   BNP pg/mL  --  416*     Recent Labs     09/01/23  0524 05/21/20  2102 11/11/19  0248 " 11/08/19 0356 11/07/19 1957   CHOL 227* 109  --   --  215*   LDLF 150* 51  --   --  148*   HDL 56.9 26.9*  --   --  46.5   TRIG 103 156* 131   < > 103    < > = values in this interval not displayed.      Scheduled medications  amiodarone, 200 mg, oral, Daily  apixaban, 5 mg, oral, BID  aspirin, 81 mg, oral, Daily  [Held by provider] bumetanide, 0.5 mg, oral, Daily  cholecalciferol, 2,000 Units, oral, Daily  ciprofloxacin, 250 mg, oral, q24h  dapagliflozin propanediol, 10 mg, oral, Daily  famotidine, 40 mg, oral, Daily  fluticasone furoate-vilanteroL, 1 puff, inhalation, Daily  insulin lispro, 0-5 Units, subcutaneous, TID AC  nicotine, 1 patch, transdermal, q24h  polyethylene glycol, 17 g, oral, Daily  [START ON 3/2/2025] sodium chloride 0.9%, 10 mL, intravenous, Every Sunday  spironolactone, 12.5 mg, oral, Daily      Continuous medications  milrinone, 0.25 mcg/kg/min, Last Rate: 0.25 mcg/kg/min (02/27/25 1656)      PRN medications  PRN medications: acetaminophen **OR** acetaminophen **OR** acetaminophen, albuterol, benzocaine, dextrose, dextrose, glucagon, glucagon, heparin flush, hydrocortisone, ipratropium-albuteroL, lidocaine, lidocaine, melatonin, sodium chloride       Assessment/Plan     ***    I spent *** minutes in the professional and overall care of this patient.      Awa Valdez MD

## 2025-02-28 NOTE — PROGRESS NOTES
Patient admitted to [facility] on 02/25 with hypotension     Milrinone pump and already delivered medication from homecare will be used at discharge for RN hookup in hospital. Per referral notes, dose to remain at home rate off 4.6ml/hr upon discharge     Rph spoke with daughter and confirmed discharge plans.  Will deliver 3 more milrinone bags to patient home after 3pm today. Send standard supplies and flushes.    Dispense x3 48 hour infusions of Milrinone with tubing attached  Delivery 2/28 between 3-9pm  Bag changes on 3/04 3/06 and 3/08       NF 3/07 - straight - check progress and weight. Refill milrinone for straight delivery

## 2025-03-01 NOTE — DOCUMENTATION CLARIFICATION NOTE
PATIENT:               [facility] RIOS  [id number]  MRN:                       [id number]  :                       1955  ADMIT DATE:       2025 2:41 PM  DISCH DATE:        2025 2:07 PM  RESPONDING PROVIDER #:        [id number]          PROVIDER RESPONSE TEXT:    Patient treated for UTI without Sepsis    CDI QUERY TEXT:    Clarification    Instruction:  Based on your assessment of the patient and the clinical information, please provide the requested documentation by clicking on the appropriate radio button and enter any additional information if prompted.    Question: Is there a diagnosis indicative of a patient meeting SIRS criteria and with organ dysfunction in the setting of UTI    When answering this query, please exercise your independent professional judgment. The fact that a question is being asked, does not imply that any particular answer is desired or expected.    The patient's clinical indicators include:  Clinical Information:  70yo female admitted for symptomatic hypotension and dizziness.    Clinical Indicators:  -pH: 7.32  -WBC: : 3.0  : 3.6  : 2.5  -proca/27: 0.26  -Lactate: : 1.2  -Cr: : 2.68 (baseline 1.9-2.3)  -T: : 38.1  -HR: : 111, 106, 108, 107, 106, 108, 112  : 114, 113, 115, 111, 114  : 110, 112, 109, 113, 115, 112  : 113, 111, 115  -RR: :  20, 5, 21, 31, 36  -BP: : 89/58, 88/59  : 88/55, 62/38, 80/48    Treatment: Zosyn, Vancomycin    Risk Factors: hypotension, PATSY, aflutter, acidosis, possible UTI, DM  Options provided:  -- Sepsis with organ dysfunction of PATSY and hypotension, POA, Please specify sepsis associated organ dysfunction below  -- Sepsis with organ dysfunction of PATSY and hypotension, not POA  -- Patient treated for UTI without Sepsis  -- Other - I will add my own diagnosis  -- Refer to Clinical Documentation Reviewer    Query created by: Buffy Brush on 2025 12:03  PM      Electronically signed by:  MARY NARVAEZ MD 3/1/2025 6:45 AM

## 2025-03-02 ENCOUNTER — HOME CARE VISIT (OUTPATIENT)
Dept: HOME HEALTH SERVICES | Facility: HOME HEALTH | Age: 70
End: 2025-03-02
Payer: COMMERCIAL

## 2025-03-02 VITALS
SYSTOLIC BLOOD PRESSURE: 96 MMHG | DIASTOLIC BLOOD PRESSURE: 60 MMHG | OXYGEN SATURATION: 98 % | HEART RATE: 112 BPM | RESPIRATION RATE: 16 BRPM | TEMPERATURE: 99.1 F

## 2025-03-02 LAB
BACTERIA BLD CULT: NORMAL
BACTERIA BLD CULT: NORMAL

## 2025-03-02 PROCEDURE — G0299 HHS/HOSPICE OF RN EA 15 MIN: HCPCS | Mod: HHH

## 2025-03-02 ASSESSMENT — ENCOUNTER SYMPTOMS
DENIES PAIN: 1
APPETITE LEVEL: POOR
CHANGE IN APPETITE: VARYING
MUSCLE WEAKNESS: 1
LAST BOWEL MOVEMENT: 67266
FATIGUES EASILY: 1
HYPOTENSION: 1
STOOL FREQUENCY: DAILY
APPETITE LEVEL: FAIR
CHANGE IN APPETITE: UNCHANGED
DIARRHEA: 1
DYSPNEA ON EXERTION: 1
DRY SKIN: 1
MUSCLE WEAKNESS: 1
PERSON REPORTING PAIN: PATIENT

## 2025-03-02 ASSESSMENT — ACTIVITIES OF DAILY LIVING (ADL)
OASIS_M1830: 02
ENTERING_EXITING_HOME: MODERATE ASSIST

## 2025-03-03 LAB
BACTERIA BLD CULT: NORMAL
BACTERIA BLD CULT: NORMAL

## 2025-03-06 ENCOUNTER — HOME INFUSION (OUTPATIENT)
Dept: INFUSION THERAPY | Age: 70
End: 2025-03-06
Payer: COMMERCIAL

## 2025-03-06 NOTE — PROGRESS NOTES
PATIENT CONTINUES MILRONONE INFUSIONS FOR CHF.  T/C TO PATIENT AND NO ANSWER CALLED DAUGHTER ELIESER AND SHE SIAD HER MOM IS DOING FINE.  CONFIRMS SHE HAS BAG FOR 3/8 H/U.  SHE DOESN'T HAVE A RECENT WEIGHT FOR HER.  SHE AGREED TO DELIVERY FRIDAY WITH STANDARD SUPPLIES.  THEY WILL BE MOVING SATURDAY SO FUTURE DELIVERIES AFTER TOMORROW TO GO TO NEW ADDRESS:    [facility] [address]. [place] [address].    PROCESSED FILL FOR 3 48HR BAGS FOR MIX AND DEL. FRI.  FOR H/U ON 3/10, 3/12, 3/14.  NF FOR 3/13 FOR STRAIGHT DELIVERY. DSL

## 2025-03-10 ENCOUNTER — APPOINTMENT (OUTPATIENT)
Dept: CARDIOLOGY | Facility: CLINIC | Age: 70
End: 2025-03-10
Payer: COMMERCIAL

## 2025-03-10 ENCOUNTER — HOME CARE VISIT (OUTPATIENT)
Dept: HOME HEALTH SERVICES | Facility: HOME HEALTH | Age: 70
End: 2025-03-10
Payer: COMMERCIAL

## 2025-03-10 VITALS
OXYGEN SATURATION: 97 % | HEART RATE: 110 BPM | SYSTOLIC BLOOD PRESSURE: 102 MMHG | TEMPERATURE: 98.9 F | RESPIRATION RATE: 16 BRPM | DIASTOLIC BLOOD PRESSURE: 56 MMHG

## 2025-03-10 PROCEDURE — G0299 HHS/HOSPICE OF RN EA 15 MIN: HCPCS | Mod: HHH

## 2025-03-10 RX ADMIN — Medication 30 ML: at 13:47

## 2025-03-12 ENCOUNTER — HOME INFUSION (OUTPATIENT)
Dept: INFUSION THERAPY | Age: 70
End: 2025-03-12

## 2025-03-12 ENCOUNTER — APPOINTMENT (OUTPATIENT)
Dept: CARDIOLOGY | Facility: CLINIC | Age: 70
End: 2025-03-12
Payer: COMMERCIAL

## 2025-03-12 NOTE — PROGRESS NOTES
Patient continues milrinone infusions for CHF. Dr. Kathy Rivera follows outpatient. Current rate is 4.6mL/hr    [facility] spoke with daughter Sarah, no issues with infusions or bags. Weight today was 127lbs. Patient changed bag yesterday and has one bag left for tomorrow 3/13, unsure how they got off schedule. Agreeable to delivery tonight 3/13 for hookup 3/15, 3/17, 3/19 with supplies to match.  to call on the way. [address] [facility] [address]    Pharmacy to dispense the following 3/12 straight:  3x milrinone 48hr bags  HU 3/15 3/17 3/19    NF 3/18 straight - check weight

## 2025-03-12 NOTE — PATIENT INSTRUCTIONS
Melissa Marinelli was admitted 2/2025  In the ED, patient remained hypotensive 80s-90s/50s, and mildly tachycardic 100s, but satting appropriately. Labs were notable for elevated but downtrending troponin 347->293, elevated , mild acidosis 7.32 with normal lactate, and elevated Cr 2.68 (baseline 1.9-2.3). ICD was interrogated and showed no arrhythmias, and CXR showed no acute processes. Negative for flu/covid.     Patient is feeling better today, no dizziness since yesterday. Labs today look within her baseline. Tele reviewed, no significant increase of HR from her baseline, no new changes on her rhythm. PATSY is getting better, and electrolytes within normal limits. Team suggest stop doing entresto until follow up outpatient with HF cardiologist.     Patient with A flutter and Afib noted since previous admission. 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 To avoid recurrent admissions and potential benefit of cardioversion, we will re attempt cardioversion while inpatient. However, SHRUTHI revealed that thrombus is smaller but still there, so no cardioversion was attempted. We recommend continue follow up with HF cardiologist, Dr. Rivera.      Patient had fever overnight (02/26) along urinary symptoms, she was started on antibiotics empirically for UTI. BC for now negative, procalcitonin positive, and UA with just +1 bacteria, pending urine culture. Initially started on ciprofloxacin, however due to concern for qtc prolongation with amiodarone on board, patient switched to fosfomycin on 02/28, which completed UTI treatment course.     Patient deemed ready for discharge today, continue rest of medications and milrinone palliative infusion.      Thank you for coming in today. If you have any questions or concerns, you may call the Heart Failure Office at [phone] option 6, or [phone].  You may also contact our heart failure nursing  team via email on [email or web address].    For quicker results set-up your  Xtime account to receive results and other correspondence directly to your phone.    Please bring all your pills/medications to your Cardiology appointments.    **  - No new medication changes today    -We will renew your heart medications today    - Please make the following medication changes:  1.     - Please have the following tests done:  1.Blood tests just before your next visit (RFP, BNP, CBC, iron, TIBC, ferritin)    2.    -  Please get an echocardiogram, we can schedule this for you today before you leave, or you may CALL  [phone]   OR    [phone] to schedule      - Please get a cardiac MRI ( structure, function, morphology, regadenoson protocol). CALL [phone] to schedule this test    - You will be referred to the following teams, CALL  [phone] to schedule your appointments with:  1.     -You will be referred to our pharmacy assistance team to help with medication cost. Please CALL them directly on [phone] to make your first appointment    - You will be referred for consideration for the optimizer device.  Please call Dr. Celso Ordoñez's office on [phone] to make an appointment    - Please make an appointment to be seen in    (VIRTUAL)    (IN OFFICE)      - Call [phone] ([phone]) to  schedule follow up with Sleep Medicine    -You have been referred to the bariatric surgery (weight loss) team. Please call [phone] or visit [email or web address] for new patient information sessions.     - We have referred you for cardiac rehab. If you are not contacted soon please call:  [facility] [phone]  [place] [phone]  [place] [phone]  [place] [phone]  [place] [phone]  [place] [phone]  [facility]) [phone]  [facility]) [phone]  [facility] [phone]  [place] [phone]     Please call to schedule  with the device clinic on Tel [phone]    We discussed the C procedure in detail. Provided with [email or web address]  Read this information for more details about a heart cath procedure     We will arrange for you to be seen at the [facility] on *****.  The clinic is located at [address] [place] [address] [address], [place] [address].  Their number is [phone].  They will call you with the time of your appointment  To  refer to a specialty [facility] patients should call [phone].    [facility], call [phone]  For radiology scheduling (Cardiac calcium score, CTA with HeartFlow, Cardiac MRI, NM cardiac stress test, PET viability study) the phone number is [phone].  [facility]  Nuclear pharm stress test arrange  by calling Tel   Please get an echocardiogram done at [facility], please call  Tel  to arrange  To arrange Psychiatry follow up, CALL [phone]  The Alcoholics Anonymous team can help.  You can call them on [phone]   OR find them on the Internet at [email or web address]  You can get more information on LVADs online at [email or web address]

## 2025-03-13 ENCOUNTER — APPOINTMENT (OUTPATIENT)
Dept: CARDIOLOGY | Facility: CLINIC | Age: 70
End: 2025-03-13
Payer: COMMERCIAL

## 2025-03-13 ASSESSMENT — ENCOUNTER SYMPTOMS
SHORTNESS OF BREATH: 1
DRY SKIN: 1
DYSPNEA ON EXERTION: 1
MUSCLE WEAKNESS: 1
DENIES PAIN: 1
COUGH: 1
COUGH CHARACTERISTICS: NON-PRODUCTIVE
PERSON REPORTING PAIN: PATIENT
APPETITE LEVEL: FAIR
DYSPNEA ACTIVITY LEVEL: AFTER AMBULATING MORE THAN 20 FT
CHANGE IN APPETITE: UNCHANGED

## 2025-03-17 ENCOUNTER — HOME CARE VISIT (OUTPATIENT)
Dept: HOME HEALTH SERVICES | Facility: HOME HEALTH | Age: 70
End: 2025-03-17
Payer: COMMERCIAL

## 2025-03-17 ENCOUNTER — HOME INFUSION (OUTPATIENT)
Dept: INFUSION THERAPY | Age: 70
End: 2025-03-17
Payer: COMMERCIAL

## 2025-03-17 VITALS
SYSTOLIC BLOOD PRESSURE: 108 MMHG | HEIGHT: 64 IN | HEART RATE: 114 BPM | WEIGHT: 126 LBS | TEMPERATURE: 99.4 F | BODY MASS INDEX: 21.51 KG/M2 | OXYGEN SATURATION: 99 % | DIASTOLIC BLOOD PRESSURE: 60 MMHG | RESPIRATION RATE: 16 BRPM

## 2025-03-17 PROCEDURE — G0299 HHS/HOSPICE OF RN EA 15 MIN: HCPCS | Mod: HHH

## 2025-03-17 RX ADMIN — Medication 10 ML: at 13:15

## 2025-03-17 ASSESSMENT — ENCOUNTER SYMPTOMS
CHANGE IN APPETITE: UNCHANGED
DYSPNEA ON EXERTION: 1
DYSPNEA ACTIVITY LEVEL: AFTER AMBULATING 10 - 20 FT
PERSON REPORTING PAIN: PATIENT
APPETITE LEVEL: FAIR
SHORTNESS OF BREATH: 1
DRY SKIN: 1
DENIES PAIN: 1
MUSCLE WEAKNESS: 1

## 2025-03-17 ASSESSMENT — ACTIVITIES OF DAILY LIVING (ADL)
AMBULATION ASSISTANCE: ONE PERSON
CURRENT_FUNCTION: STAND BY ASSIST

## 2025-03-17 NOTE — PROGRESS NOTES
Patient continues milrinone infusion for Stage D CHF. Dr. Kathy iRvera follows outpatient. Current rate is 4.6mL/hr     [facility] spoke with patient, no issues with infusions or bags. Weight today was 127lbs. Patient changed bag today and has one bag left for 3/19. Agreeable to delivery Tuesday 3/18 for hookup 3/21, 3/23, 3/25, and 03/27 with supplies to match.  to call on the way. [facility] [address] [facility] [place] [address].     Pharmacy to dispense the following 3/18 straight:  4x milrinone 48hr bags with tubing attached   3/21 3/23 3/25 3/27 marked on  bags     NF 3/26 straight - check weight

## 2025-03-19 ENCOUNTER — HOME CARE VISIT (OUTPATIENT)
Dept: HOME HEALTH SERVICES | Facility: HOME HEALTH | Age: 70
End: 2025-03-19
Payer: COMMERCIAL

## 2025-03-19 PROCEDURE — G0155 HHCP-SVS OF CSW,EA 15 MIN: HCPCS | Mod: HHH

## 2025-03-19 SDOH — HEALTH STABILITY: MENTAL HEALTH: STRESS FACTORS COMMENTS: ONGOING CARE NEEDS

## 2025-03-19 SDOH — HEALTH STABILITY: MENTAL HEALTH: SOCIAL ISOLATION: 1

## 2025-03-19 ASSESSMENT — ENCOUNTER SYMPTOMS: AGITATION: 1

## 2025-03-19 ASSESSMENT — ACTIVITIES OF DAILY LIVING (ADL)
SHOPPING_REQUIRES_ASSISTANCE: 1
LAUNDRY_REQUIRES_ASSISTANCE: 1

## 2025-03-24 ENCOUNTER — HOME CARE VISIT (OUTPATIENT)
Dept: HOME HEALTH SERVICES | Facility: HOME HEALTH | Age: 70
End: 2025-03-24
Payer: COMMERCIAL

## 2025-03-24 ENCOUNTER — DOCUMENTATION (OUTPATIENT)
Dept: INFUSION THERAPY | Age: 70
End: 2025-03-24
Payer: COMMERCIAL

## 2025-03-24 VITALS
WEIGHT: 125.1 LBS | DIASTOLIC BLOOD PRESSURE: 62 MMHG | TEMPERATURE: 98.9 F | BODY MASS INDEX: 21.47 KG/M2 | OXYGEN SATURATION: 97 % | HEART RATE: 108 BPM | SYSTOLIC BLOOD PRESSURE: 106 MMHG

## 2025-03-24 PROCEDURE — G0299 HHS/HOSPICE OF RN EA 15 MIN: HCPCS | Mod: HHH

## 2025-03-24 RX ADMIN — Medication 10 ML: at 17:19

## 2025-03-24 ASSESSMENT — ENCOUNTER SYMPTOMS
APPETITE LEVEL: GOOD
SHORTNESS OF BREATH: 1
PAIN: 1
HIGHEST PAIN SEVERITY IN PAST 24 HOURS: 7/10
DYSPNEA ACTIVITY LEVEL: AFTER AMBULATING 10 - 20 FT
DYSPNEA ON EXERTION: 1
DRY SKIN: 1
PAIN LOCATION - PAIN SEVERITY: 6/10
PAIN LOCATION - PAIN SEVERITY: 6/10
PERSON REPORTING PAIN: PATIENT
PAIN LOCATION: RIGHT LEG
LOWEST PAIN SEVERITY IN PAST 24 HOURS: 2/10
SUBJECTIVE PAIN PROGRESSION: WAXING AND WANING
MUSCLE WEAKNESS: 1
PAIN LOCATION: LEFT LEG
CHANGE IN APPETITE: UNCHANGED
PAIN SEVERITY GOAL: 0/10

## 2025-03-24 ASSESSMENT — ACTIVITIES OF DAILY LIVING (ADL)
AMBULATION ASSISTANCE: 1
AMBULATION ASSISTANCE: SUPERVISION

## 2025-03-24 NOTE — PROGRESS NOTES
PER [facility] RN REQUESTED TWO DRESSING KITS FOR DELIVERY TODAY...NOTHING ELSE NEEDED...

## 2025-03-25 ENCOUNTER — HOME INFUSION (OUTPATIENT)
Dept: INFUSION THERAPY | Age: 70
End: 2025-03-25
Payer: COMMERCIAL

## 2025-03-25 NOTE — PROGRESS NOTES
Patient continues milrinone infusions for CHF. Dr. Kathy Rivera follows outpatient. Current rate is 4.6mL/hr     [facility] spoke with daughter Sarah, no issues with infusions or bags. Weight today was 125lbs. Patient has one bag left for 3/27. Agreeable to delivery Wed 3/26 for hookup 3/29, 3/31, 4/2 with supplies to match.  to call on the way.     Pharmacy to dispense the following 3/26 straight:  3x milrinone 48hr bags  HU 3/29, 3/31, 4/2     NF 4/1 straight - check weight

## 2025-03-26 ENCOUNTER — DOCUMENTATION (OUTPATIENT)
Dept: INFUSION THERAPY | Age: 70
End: 2025-03-26
Payer: COMMERCIAL

## 2025-03-26 NOTE — PROGRESS NOTES
PER [facility] STR AGREED TO DELIVERY TODAY... WITH THREE MILRINONE BAGS OK SO SEND USUAL SUPPLIES...

## 2025-03-31 ENCOUNTER — HOME CARE VISIT (OUTPATIENT)
Dept: HOME HEALTH SERVICES | Facility: HOME HEALTH | Age: 70
End: 2025-03-31
Payer: COMMERCIAL

## 2025-03-31 ENCOUNTER — HOME INFUSION (OUTPATIENT)
Dept: INFUSION THERAPY | Age: 70
End: 2025-03-31
Payer: COMMERCIAL

## 2025-03-31 VITALS
TEMPERATURE: 98.5 F | SYSTOLIC BLOOD PRESSURE: 96 MMHG | HEART RATE: 103 BPM | BODY MASS INDEX: 22.14 KG/M2 | DIASTOLIC BLOOD PRESSURE: 58 MMHG | WEIGHT: 129 LBS | RESPIRATION RATE: 16 BRPM | OXYGEN SATURATION: 99 %

## 2025-03-31 PROCEDURE — G0299 HHS/HOSPICE OF RN EA 15 MIN: HCPCS | Mod: HHH

## 2025-03-31 RX ADMIN — Medication 10 ML: at 12:25

## 2025-03-31 ASSESSMENT — ENCOUNTER SYMPTOMS
APPETITE LEVEL: GOOD
HYPOTENSION: 1
FATIGUES EASILY: 1
DENIES PAIN: 1
LAST BOWEL MOVEMENT: 67295
MUSCLE WEAKNESS: 1
CHANGE IN APPETITE: UNCHANGED
BOWEL PATTERN NORMAL: 1

## 2025-03-31 NOTE — PROGRESS NOTES
Review of chart. Patient infuses continuous milrinone for CHF. Dr Kathy Rivera follows outpatient.     Telephone call with patient's daughter, Mary - on schedule with bag changes, weight stable 129 lbs - agreed to delivery on 4/1 of another 4 48 hour infusions with usual supplies. Requesting saline flushes with this delivery     Dispense x4 48 hour infusions of Milrinone for cmpd and delivery on 4/1  Bag changes 4/4, 4/6, 4/8, and 4/10     NF 4/9 Straight

## 2025-04-04 ENCOUNTER — APPOINTMENT (OUTPATIENT)
Dept: RADIOLOGY | Facility: HOSPITAL | Age: 70
DRG: 291 | End: 2025-04-04
Payer: COMMERCIAL

## 2025-04-04 ENCOUNTER — CLINICAL SUPPORT (OUTPATIENT)
Dept: EMERGENCY MEDICINE | Facility: HOSPITAL | Age: 70
DRG: 291 | End: 2025-04-04
Payer: COMMERCIAL

## 2025-04-04 ENCOUNTER — HOSPITAL ENCOUNTER (INPATIENT)
Facility: HOSPITAL | Age: 70
End: 2025-04-04
Attending: EMERGENCY MEDICINE | Admitting: INTERNAL MEDICINE
Payer: COMMERCIAL

## 2025-04-04 DIAGNOSIS — K59.00 CONSTIPATION, UNSPECIFIED CONSTIPATION TYPE: ICD-10-CM

## 2025-04-04 DIAGNOSIS — R05.9 COUGH, UNSPECIFIED TYPE: ICD-10-CM

## 2025-04-04 DIAGNOSIS — I48.92 ATRIAL FLUTTER, UNSPECIFIED TYPE (MULTI): ICD-10-CM

## 2025-04-04 DIAGNOSIS — I50.9 ACUTE ON CHRONIC CONGESTIVE HEART FAILURE, UNSPECIFIED HEART FAILURE TYPE: Primary | ICD-10-CM

## 2025-04-04 DIAGNOSIS — I50.23 ACUTE ON CHRONIC SYSTOLIC HEART FAILURE: ICD-10-CM

## 2025-04-04 DIAGNOSIS — I47.20 VENTRICULAR TACHYCARDIA (PAROXYSMAL): ICD-10-CM

## 2025-04-04 DIAGNOSIS — I50.43 ACUTE ON CHRONIC COMBINED SYSTOLIC (CONGESTIVE) AND DIASTOLIC (CONGESTIVE) HEART FAILURE: ICD-10-CM

## 2025-04-04 DIAGNOSIS — I50.20 HFREF (HEART FAILURE WITH REDUCED EJECTION FRACTION): ICD-10-CM

## 2025-04-04 DIAGNOSIS — I50.33 ACUTE ON CHRONIC HEART FAILURE WITH NORMAL EJECTION FRACTION: ICD-10-CM

## 2025-04-04 DIAGNOSIS — E87.5 HYPERKALEMIA: ICD-10-CM

## 2025-04-04 DIAGNOSIS — R79.89 ELEVATED TROPONIN: ICD-10-CM

## 2025-04-04 LAB
ALBUMIN SERPL BCP-MCNC: 3.2 G/DL (ref 3.4–5)
ALBUMIN SERPL BCP-MCNC: 3.3 G/DL (ref 3.4–5)
ALBUMIN SERPL BCP-MCNC: 3.6 G/DL (ref 3.4–5)
ALP SERPL-CCNC: 103 U/L (ref 33–136)
ALP SERPL-CCNC: 95 U/L (ref 33–136)
ALT SERPL W P-5'-P-CCNC: 108 U/L (ref 7–45)
ALT SERPL W P-5'-P-CCNC: 125 U/L (ref 7–45)
ANION GAP BLDV CALCULATED.4IONS-SCNC: 11 MMOL/L (ref 10–25)
ANION GAP BLDV CALCULATED.4IONS-SCNC: 12 MMOL/L (ref 10–25)
ANION GAP BLDV CALCULATED.4IONS-SCNC: ABNORMAL MMOL/L
ANION GAP SERPL CALC-SCNC: 13 MMOL/L (ref 10–20)
ANION GAP SERPL CALC-SCNC: 16 MMOL/L (ref 10–20)
ANION GAP SERPL CALC-SCNC: 18 MMOL/L (ref 10–20)
APTT PPP: 30 SECONDS (ref 26–36)
AST SERPL W P-5'-P-CCNC: 63 U/L (ref 9–39)
AST SERPL W P-5'-P-CCNC: 82 U/L (ref 9–39)
ATRIAL RATE: 117 BPM
BASE EXCESS BLDV CALC-SCNC: -3 MMOL/L (ref -2–3)
BASE EXCESS BLDV CALC-SCNC: -4 MMOL/L (ref -2–3)
BASE EXCESS BLDV CALC-SCNC: -4.1 MMOL/L (ref -2–3)
BASOPHILS # BLD AUTO: 0.03 X10*3/UL (ref 0–0.1)
BASOPHILS # BLD AUTO: 0.04 X10*3/UL (ref 0–0.1)
BASOPHILS NFR BLD AUTO: 0.6 %
BASOPHILS NFR BLD AUTO: 0.8 %
BILIRUB SERPL-MCNC: 0.7 MG/DL (ref 0–1.2)
BILIRUB SERPL-MCNC: 0.8 MG/DL (ref 0–1.2)
BNP SERPL-MCNC: 803 PG/ML (ref 0–99)
BODY TEMPERATURE: 37 DEGREES CELSIUS
BUN SERPL-MCNC: 37 MG/DL (ref 6–23)
BUN SERPL-MCNC: 38 MG/DL (ref 6–23)
BUN SERPL-MCNC: 43 MG/DL (ref 6–23)
CA-I BLDV-SCNC: 1.19 MMOL/L (ref 1.1–1.33)
CA-I BLDV-SCNC: 1.19 MMOL/L (ref 1.1–1.33)
CA-I BLDV-SCNC: 1.22 MMOL/L (ref 1.1–1.33)
CALCIUM SERPL-MCNC: 8.6 MG/DL (ref 8.6–10.6)
CALCIUM SERPL-MCNC: 8.7 MG/DL (ref 8.6–10.6)
CALCIUM SERPL-MCNC: 9 MG/DL (ref 8.6–10.6)
CARDIAC TROPONIN I PNL SERPL HS: 261 NG/L (ref 0–34)
CARDIAC TROPONIN I PNL SERPL HS: 292 NG/L (ref 0–34)
CARDIAC TROPONIN I PNL SERPL HS: 295 NG/L (ref 0–34)
CARDIAC TROPONIN I PNL SERPL HS: 302 NG/L (ref 0–34)
CHLORIDE BLDV-SCNC: 105 MMOL/L (ref 98–107)
CHLORIDE BLDV-SCNC: 105 MMOL/L (ref 98–107)
CHLORIDE BLDV-SCNC: ABNORMAL MMOL/L
CHLORIDE SERPL-SCNC: 101 MMOL/L (ref 98–107)
CHLORIDE SERPL-SCNC: 104 MMOL/L (ref 98–107)
CHLORIDE SERPL-SCNC: 104 MMOL/L (ref 98–107)
CO2 SERPL-SCNC: 18 MMOL/L (ref 21–32)
CO2 SERPL-SCNC: 21 MMOL/L (ref 21–32)
CO2 SERPL-SCNC: 23 MMOL/L (ref 21–32)
CREAT SERPL-MCNC: 2.14 MG/DL (ref 0.5–1.05)
CREAT SERPL-MCNC: 2.51 MG/DL (ref 0.5–1.05)
CREAT SERPL-MCNC: 2.75 MG/DL (ref 0.5–1.05)
EGFRCR SERPLBLD CKD-EPI 2021: 18 ML/MIN/1.73M*2
EGFRCR SERPLBLD CKD-EPI 2021: 20 ML/MIN/1.73M*2
EGFRCR SERPLBLD CKD-EPI 2021: 24 ML/MIN/1.73M*2
EOSINOPHIL # BLD AUTO: 0.07 X10*3/UL (ref 0–0.7)
EOSINOPHIL # BLD AUTO: 0.18 X10*3/UL (ref 0–0.7)
EOSINOPHIL NFR BLD AUTO: 1.4 %
EOSINOPHIL NFR BLD AUTO: 3.6 %
ERYTHROCYTE [DISTWIDTH] IN BLOOD BY AUTOMATED COUNT: 16.9 % (ref 11.5–14.5)
ERYTHROCYTE [DISTWIDTH] IN BLOOD BY AUTOMATED COUNT: 17.4 % (ref 11.5–14.5)
FERRITIN SERPL-MCNC: 25 NG/ML (ref 8–150)
FLUAV RNA RESP QL NAA+PROBE: NOT DETECTED
FLUBV RNA RESP QL NAA+PROBE: NOT DETECTED
FOLATE SERPL-MCNC: 10.1 NG/ML
GLUCOSE BLD MANUAL STRIP-MCNC: 162 MG/DL (ref 74–99)
GLUCOSE BLDV-MCNC: 105 MG/DL (ref 74–99)
GLUCOSE BLDV-MCNC: 106 MG/DL (ref 74–99)
GLUCOSE BLDV-MCNC: 65 MG/DL (ref 74–99)
GLUCOSE SERPL-MCNC: 105 MG/DL (ref 74–99)
GLUCOSE SERPL-MCNC: 141 MG/DL (ref 74–99)
GLUCOSE SERPL-MCNC: 60 MG/DL (ref 74–99)
HCO3 BLDV-SCNC: 20.7 MMOL/L (ref 22–26)
HCO3 BLDV-SCNC: 20.9 MMOL/L (ref 22–26)
HCO3 BLDV-SCNC: 22 MMOL/L (ref 22–26)
HCT VFR BLD AUTO: 23.8 % (ref 36–46)
HCT VFR BLD AUTO: 24.1 % (ref 36–46)
HCT VFR BLD EST: 24 % (ref 36–46)
HCT VFR BLD EST: 26 % (ref 36–46)
HCT VFR BLD EST: 27 % (ref 36–46)
HGB BLD-MCNC: 7.3 G/DL (ref 12–16)
HGB BLD-MCNC: 8 G/DL (ref 12–16)
HGB BLDV-MCNC: 8 G/DL (ref 12–16)
HGB BLDV-MCNC: 8.5 G/DL (ref 12–16)
HGB BLDV-MCNC: 8.9 G/DL (ref 12–16)
IMM GRANULOCYTES # BLD AUTO: 0.02 X10*3/UL (ref 0–0.7)
IMM GRANULOCYTES # BLD AUTO: 0.03 X10*3/UL (ref 0–0.7)
IMM GRANULOCYTES NFR BLD AUTO: 0.4 % (ref 0–0.9)
IMM GRANULOCYTES NFR BLD AUTO: 0.6 % (ref 0–0.9)
INHALED O2 CONCENTRATION: 36 %
INR PPP: 2.1 (ref 0.9–1.1)
IRON SATN MFR SERPL: 4 % (ref 25–45)
IRON SERPL-MCNC: 12 UG/DL (ref 35–150)
LACTATE BLDV-SCNC: 1.3 MMOL/L (ref 0.4–2)
LACTATE BLDV-SCNC: 1.8 MMOL/L (ref 0.4–2)
LACTATE BLDV-SCNC: 1.9 MMOL/L (ref 0.4–2)
LYMPHOCYTES # BLD AUTO: 0.46 X10*3/UL (ref 1.2–4.8)
LYMPHOCYTES # BLD AUTO: 0.71 X10*3/UL (ref 1.2–4.8)
LYMPHOCYTES NFR BLD AUTO: 14.2 %
LYMPHOCYTES NFR BLD AUTO: 9.3 %
MAGNESIUM SERPL-MCNC: 2.52 MG/DL (ref 1.6–2.4)
MCH RBC QN AUTO: 27.4 PG (ref 26–34)
MCH RBC QN AUTO: 28.1 PG (ref 26–34)
MCHC RBC AUTO-ENTMCNC: 30.7 G/DL (ref 32–36)
MCHC RBC AUTO-ENTMCNC: 33.2 G/DL (ref 32–36)
MCV RBC AUTO: 85 FL (ref 80–100)
MCV RBC AUTO: 90 FL (ref 80–100)
MONOCYTES # BLD AUTO: 0.49 X10*3/UL (ref 0.1–1)
MONOCYTES # BLD AUTO: 0.67 X10*3/UL (ref 0.1–1)
MONOCYTES NFR BLD AUTO: 13.6 %
MONOCYTES NFR BLD AUTO: 9.8 %
NEUTROPHILS # BLD AUTO: 3.58 X10*3/UL (ref 1.2–7.7)
NEUTROPHILS # BLD AUTO: 3.67 X10*3/UL (ref 1.2–7.7)
NEUTROPHILS NFR BLD AUTO: 71.4 %
NEUTROPHILS NFR BLD AUTO: 74.3 %
NRBC BLD-RTO: 1.6 /100 WBCS (ref 0–0)
NRBC BLD-RTO: 1.8 /100 WBCS (ref 0–0)
OXYHGB MFR BLDV: 27.3 % (ref 45–75)
OXYHGB MFR BLDV: 41.7 % (ref 45–75)
OXYHGB MFR BLDV: 53.2 % (ref 45–75)
PCO2 BLDV: 35 MM HG (ref 41–51)
PCO2 BLDV: 37 MM HG (ref 41–51)
PCO2 BLDV: 38 MM HG (ref 41–51)
PH BLDV: 7.36 PH (ref 7.33–7.43)
PH BLDV: 7.37 PH (ref 7.33–7.43)
PH BLDV: 7.38 PH (ref 7.33–7.43)
PHOSPHATE SERPL-MCNC: 4 MG/DL (ref 2.5–4.9)
PHOSPHATE SERPL-MCNC: 4.4 MG/DL (ref 2.5–4.9)
PLATELET # BLD AUTO: 252 X10*3/UL (ref 150–450)
PLATELET # BLD AUTO: 253 X10*3/UL (ref 150–450)
PO2 BLDV: 21 MM HG (ref 35–45)
PO2 BLDV: 24 MM HG (ref 35–45)
PO2 BLDV: 30 MM HG (ref 35–45)
POTASSIUM BLDV-SCNC: 5.6 MMOL/L (ref 3.5–5.3)
POTASSIUM BLDV-SCNC: 5.7 MMOL/L (ref 3.5–5.3)
POTASSIUM BLDV-SCNC: 6 MMOL/L (ref 3.5–5.3)
POTASSIUM SERPL-SCNC: 5.4 MMOL/L (ref 3.5–5.3)
POTASSIUM SERPL-SCNC: 5.6 MMOL/L (ref 3.5–5.3)
POTASSIUM SERPL-SCNC: 6.3 MMOL/L (ref 3.5–5.3)
PR INTERVAL: 136 MS
PROT SERPL-MCNC: 6 G/DL (ref 6.4–8.2)
PROT SERPL-MCNC: 6.8 G/DL (ref 6.4–8.2)
PROTHROMBIN TIME: 23.6 SECONDS (ref 9.8–12.4)
Q ONSET: 216 MS
QRS COUNT: 19 BEATS
QRS DURATION: 108 MS
QT INTERVAL: 362 MS
QTC CALCULATION(BAZETT): 504 MS
QTC FREDERICIA: 452 MS
R AXIS: 59 DEGREES
RBC # BLD AUTO: 2.66 X10*6/UL (ref 4–5.2)
RBC # BLD AUTO: 2.85 X10*6/UL (ref 4–5.2)
RSV RNA RESP QL NAA+PROBE: NOT DETECTED
SAO2 % BLDV: 28 % (ref 45–75)
SAO2 % BLDV: 43 % (ref 45–75)
SAO2 % BLDV: 55 % (ref 45–75)
SARS-COV-2 RNA RESP QL NAA+PROBE: NOT DETECTED
SODIUM BLDV-SCNC: 132 MMOL/L (ref 136–145)
SODIUM SERPL-SCNC: 132 MMOL/L (ref 136–145)
SODIUM SERPL-SCNC: 134 MMOL/L (ref 136–145)
SODIUM SERPL-SCNC: 135 MMOL/L (ref 136–145)
T AXIS: 27 DEGREES
T OFFSET: 397 MS
TIBC SERPL-MCNC: 329 UG/DL (ref 240–445)
UIBC SERPL-MCNC: 317 UG/DL (ref 110–370)
VENTRICULAR RATE: 117 BPM
VIT B12 SERPL-MCNC: 1064 PG/ML (ref 211–911)
WBC # BLD AUTO: 4.9 X10*3/UL (ref 4.4–11.3)
WBC # BLD AUTO: 5 X10*3/UL (ref 4.4–11.3)

## 2025-04-04 PROCEDURE — 82746 ASSAY OF FOLIC ACID SERUM: CPT

## 2025-04-04 PROCEDURE — 2500000002 HC RX 250 W HCPCS SELF ADMINISTERED DRUGS (ALT 637 FOR MEDICARE OP, ALT 636 FOR OP/ED)

## 2025-04-04 PROCEDURE — 96375 TX/PRO/DX INJ NEW DRUG ADDON: CPT

## 2025-04-04 PROCEDURE — 2500000005 HC RX 250 GENERAL PHARMACY W/O HCPCS

## 2025-04-04 PROCEDURE — 83540 ASSAY OF IRON: CPT

## 2025-04-04 PROCEDURE — 97166 OT EVAL MOD COMPLEX 45 MIN: CPT | Mod: GO

## 2025-04-04 PROCEDURE — 99291 CRITICAL CARE FIRST HOUR: CPT | Performed by: EMERGENCY MEDICINE

## 2025-04-04 PROCEDURE — 87637 SARSCOV2&INF A&B&RSV AMP PRB: CPT

## 2025-04-04 PROCEDURE — 2500000001 HC RX 250 WO HCPCS SELF ADMINISTERED DRUGS (ALT 637 FOR MEDICARE OP)

## 2025-04-04 PROCEDURE — 71046 X-RAY EXAM CHEST 2 VIEWS: CPT | Mod: FOREIGN READ | Performed by: RADIOLOGY

## 2025-04-04 PROCEDURE — 84484 ASSAY OF TROPONIN QUANT: CPT

## 2025-04-04 PROCEDURE — 99291 CRITICAL CARE FIRST HOUR: CPT | Performed by: INTERNAL MEDICINE

## 2025-04-04 PROCEDURE — 2500000004 HC RX 250 GENERAL PHARMACY W/ HCPCS (ALT 636 FOR OP/ED)

## 2025-04-04 PROCEDURE — 85025 COMPLETE CBC W/AUTO DIFF WBC: CPT | Performed by: STUDENT IN AN ORGANIZED HEALTH CARE EDUCATION/TRAINING PROGRAM

## 2025-04-04 PROCEDURE — 84100 ASSAY OF PHOSPHORUS: CPT | Performed by: STUDENT IN AN ORGANIZED HEALTH CARE EDUCATION/TRAINING PROGRAM

## 2025-04-04 PROCEDURE — 84484 ASSAY OF TROPONIN QUANT: CPT | Performed by: STUDENT IN AN ORGANIZED HEALTH CARE EDUCATION/TRAINING PROGRAM

## 2025-04-04 PROCEDURE — 94640 AIRWAY INHALATION TREATMENT: CPT

## 2025-04-04 PROCEDURE — 82947 ASSAY GLUCOSE BLOOD QUANT: CPT

## 2025-04-04 PROCEDURE — 85025 COMPLETE CBC W/AUTO DIFF WBC: CPT

## 2025-04-04 PROCEDURE — 84132 ASSAY OF SERUM POTASSIUM: CPT

## 2025-04-04 PROCEDURE — 5A09357 ASSISTANCE WITH RESPIRATORY VENTILATION, LESS THAN 24 CONSECUTIVE HOURS, CONTINUOUS POSITIVE AIRWAY PRESSURE: ICD-10-PCS | Performed by: EMERGENCY MEDICINE

## 2025-04-04 PROCEDURE — 82435 ASSAY OF BLOOD CHLORIDE: CPT | Performed by: STUDENT IN AN ORGANIZED HEALTH CARE EDUCATION/TRAINING PROGRAM

## 2025-04-04 PROCEDURE — 84132 ASSAY OF SERUM POTASSIUM: CPT | Performed by: STUDENT IN AN ORGANIZED HEALTH CARE EDUCATION/TRAINING PROGRAM

## 2025-04-04 PROCEDURE — 85610 PROTHROMBIN TIME: CPT | Performed by: STUDENT IN AN ORGANIZED HEALTH CARE EDUCATION/TRAINING PROGRAM

## 2025-04-04 PROCEDURE — 99285 EMERGENCY DEPT VISIT HI MDM: CPT | Mod: 25 | Performed by: EMERGENCY MEDICINE

## 2025-04-04 PROCEDURE — 82728 ASSAY OF FERRITIN: CPT

## 2025-04-04 PROCEDURE — 94660 CPAP INITIATION&MGMT: CPT

## 2025-04-04 PROCEDURE — 93005 ELECTROCARDIOGRAM TRACING: CPT

## 2025-04-04 PROCEDURE — 96374 THER/PROPH/DIAG INJ IV PUSH: CPT

## 2025-04-04 PROCEDURE — 82435 ASSAY OF BLOOD CHLORIDE: CPT

## 2025-04-04 PROCEDURE — 84295 ASSAY OF SERUM SODIUM: CPT

## 2025-04-04 PROCEDURE — 93010 ELECTROCARDIOGRAM REPORT: CPT | Performed by: EMERGENCY MEDICINE

## 2025-04-04 PROCEDURE — 83880 ASSAY OF NATRIURETIC PEPTIDE: CPT

## 2025-04-04 PROCEDURE — 83735 ASSAY OF MAGNESIUM: CPT | Performed by: STUDENT IN AN ORGANIZED HEALTH CARE EDUCATION/TRAINING PROGRAM

## 2025-04-04 PROCEDURE — 82330 ASSAY OF CALCIUM: CPT | Performed by: STUDENT IN AN ORGANIZED HEALTH CARE EDUCATION/TRAINING PROGRAM

## 2025-04-04 PROCEDURE — 2020000001 HC ICU ROOM DAILY

## 2025-04-04 PROCEDURE — 82607 VITAMIN B-12: CPT

## 2025-04-04 PROCEDURE — 83605 ASSAY OF LACTIC ACID: CPT

## 2025-04-04 PROCEDURE — 37799 UNLISTED PX VASCULAR SURGERY: CPT | Performed by: STUDENT IN AN ORGANIZED HEALTH CARE EDUCATION/TRAINING PROGRAM

## 2025-04-04 PROCEDURE — 71046 X-RAY EXAM CHEST 2 VIEWS: CPT

## 2025-04-04 RX ORDER — DEXTROSE 50 % IN WATER (D50W) INTRAVENOUS SYRINGE
25 ONCE
Status: CANCELLED | OUTPATIENT
Start: 2025-04-04 | End: 2025-04-04

## 2025-04-04 RX ORDER — ALBUTEROL SULFATE 90 UG/1
2 INHALANT RESPIRATORY (INHALATION) EVERY 4 HOURS PRN
Status: DISPENSED | OUTPATIENT
Start: 2025-04-04

## 2025-04-04 RX ORDER — AMOXICILLIN 250 MG
2 CAPSULE ORAL NIGHTLY
Status: DISPENSED | OUTPATIENT
Start: 2025-04-04

## 2025-04-04 RX ORDER — ALBUTEROL SULFATE 5 MG/ML
10 SOLUTION RESPIRATORY (INHALATION) ONCE
Status: DISCONTINUED | OUTPATIENT
Start: 2025-04-04 | End: 2025-04-04

## 2025-04-04 RX ORDER — FUROSEMIDE 10 MG/ML
40 INJECTION INTRAMUSCULAR; INTRAVENOUS ONCE
Status: COMPLETED | OUTPATIENT
Start: 2025-04-04 | End: 2025-04-04

## 2025-04-04 RX ORDER — FUROSEMIDE 10 MG/ML
20 INJECTION INTRAMUSCULAR; INTRAVENOUS ONCE
Status: COMPLETED | OUTPATIENT
Start: 2025-04-04 | End: 2025-04-04

## 2025-04-04 RX ORDER — FLUTICASONE FUROATE AND VILANTEROL 100; 25 UG/1; UG/1
1 POWDER RESPIRATORY (INHALATION)
Status: DISPENSED | OUTPATIENT
Start: 2025-04-04

## 2025-04-04 RX ORDER — ALBUTEROL SULFATE 0.83 MG/ML
2.5 SOLUTION RESPIRATORY (INHALATION) ONCE
Status: COMPLETED | OUTPATIENT
Start: 2025-04-04 | End: 2025-04-04

## 2025-04-04 RX ORDER — BUMETANIDE 1 MG/1
1 TABLET ORAL DAILY
Status: DISPENSED | OUTPATIENT
Start: 2025-04-05

## 2025-04-04 RX ORDER — CHOLECALCIFEROL (VITAMIN D3) 25 MCG
2000 TABLET ORAL DAILY
Status: DISPENSED | OUTPATIENT
Start: 2025-04-04

## 2025-04-04 RX ORDER — MUPIROCIN 20 MG/G
OINTMENT TOPICAL 3 TIMES DAILY
Status: ACTIVE | OUTPATIENT
Start: 2025-04-04

## 2025-04-04 RX ORDER — DAPAGLIFLOZIN 10 MG/1
10 TABLET, FILM COATED ORAL DAILY
Status: DISPENSED | OUTPATIENT
Start: 2025-04-04

## 2025-04-04 RX ORDER — FAMOTIDINE 20 MG/1
40 TABLET, FILM COATED ORAL DAILY
Status: DISPENSED | OUTPATIENT
Start: 2025-04-04

## 2025-04-04 RX ORDER — IPRATROPIUM BROMIDE AND ALBUTEROL SULFATE 2.5; .5 MG/3ML; MG/3ML
3 SOLUTION RESPIRATORY (INHALATION)
Status: DISCONTINUED | OUTPATIENT
Start: 2025-04-04 | End: 2025-04-04

## 2025-04-04 RX ORDER — ONDANSETRON HYDROCHLORIDE 2 MG/ML
4 INJECTION, SOLUTION INTRAVENOUS ONCE
Status: COMPLETED | OUTPATIENT
Start: 2025-04-04 | End: 2025-04-04

## 2025-04-04 RX ORDER — MILRINONE LACTATE 0.2 MG/ML
0.25 INJECTION, SOLUTION INTRAVENOUS CONTINUOUS
Status: DISPENSED | OUTPATIENT
Start: 2025-04-04

## 2025-04-04 RX ORDER — AMIODARONE HYDROCHLORIDE 200 MG/1
200 TABLET ORAL DAILY
Status: DISPENSED | OUTPATIENT
Start: 2025-04-04

## 2025-04-04 RX ORDER — ALBUTEROL SULFATE 5 MG/ML
10 SOLUTION RESPIRATORY (INHALATION) ONCE
Status: CANCELLED | OUTPATIENT
Start: 2025-04-04 | End: 2025-04-04

## 2025-04-04 RX ORDER — PANTOPRAZOLE SODIUM 40 MG/1
40 TABLET, DELAYED RELEASE ORAL
Status: DISCONTINUED | OUTPATIENT
Start: 2025-04-05 | End: 2025-04-04

## 2025-04-04 RX ORDER — ACETAMINOPHEN 325 MG/1
1000 TABLET ORAL EVERY 8 HOURS PRN
Status: DISPENSED | OUTPATIENT
Start: 2025-04-04

## 2025-04-04 RX ORDER — ASPIRIN 81 MG/1
81 TABLET ORAL DAILY
Status: DISPENSED | OUTPATIENT
Start: 2025-04-05

## 2025-04-04 RX ORDER — METOCLOPRAMIDE HYDROCHLORIDE 5 MG/ML
5 INJECTION INTRAMUSCULAR; INTRAVENOUS ONCE
Status: COMPLETED | OUTPATIENT
Start: 2025-04-04 | End: 2025-04-04

## 2025-04-04 RX ORDER — FUROSEMIDE 10 MG/ML
INJECTION INTRAMUSCULAR; INTRAVENOUS
Status: COMPLETED
Start: 2025-04-04 | End: 2025-04-04

## 2025-04-04 RX ORDER — IPRATROPIUM BROMIDE AND ALBUTEROL SULFATE 2.5; .5 MG/3ML; MG/3ML
3 SOLUTION RESPIRATORY (INHALATION) EVERY 6 HOURS PRN
Status: DISCONTINUED | OUTPATIENT
Start: 2025-04-04 | End: 2025-04-06

## 2025-04-04 RX ORDER — SPIRONOLACTONE 25 MG/1
12.5 TABLET ORAL DAILY
Status: DISCONTINUED | OUTPATIENT
Start: 2025-04-04 | End: 2025-04-05

## 2025-04-04 RX ORDER — BUMETANIDE 0.5 MG/1
0.5 TABLET ORAL DAILY
Status: DISCONTINUED | OUTPATIENT
Start: 2025-04-04 | End: 2025-04-04

## 2025-04-04 RX ORDER — NAPROXEN SODIUM 220 MG/1
324 TABLET, FILM COATED ORAL ONCE
Status: COMPLETED | OUTPATIENT
Start: 2025-04-04 | End: 2025-04-04

## 2025-04-04 RX ORDER — DEXTROSE 50 % IN WATER (D50W) INTRAVENOUS SYRINGE
25 ONCE
Status: COMPLETED | OUTPATIENT
Start: 2025-04-04 | End: 2025-04-04

## 2025-04-04 RX ORDER — HEPARIN SODIUM 5000 [USP'U]/ML
5000 INJECTION, SOLUTION INTRAVENOUS; SUBCUTANEOUS EVERY 8 HOURS SCHEDULED
Status: DISCONTINUED | OUTPATIENT
Start: 2025-04-04 | End: 2025-04-04

## 2025-04-04 RX ADMIN — DEXTROSE MONOHYDRATE 25 G: 25 INJECTION, SOLUTION INTRAVENOUS at 03:43

## 2025-04-04 RX ADMIN — FUROSEMIDE 20 MG: 10 INJECTION, SOLUTION INTRAMUSCULAR; INTRAVENOUS at 04:28

## 2025-04-04 RX ADMIN — Medication 2000 UNITS: at 09:26

## 2025-04-04 RX ADMIN — IPRATROPIUM BROMIDE AND ALBUTEROL SULFATE 3 ML: .5; 3 SOLUTION RESPIRATORY (INHALATION) at 22:09

## 2025-04-04 RX ADMIN — SENNOSIDES AND DOCUSATE SODIUM 2 TABLET: 50; 8.6 TABLET ORAL at 20:10

## 2025-04-04 RX ADMIN — BUMETANIDE 0.5 MG: 0.5 TABLET ORAL at 09:26

## 2025-04-04 RX ADMIN — FAMOTIDINE 40 MG: 20 TABLET ORAL at 09:26

## 2025-04-04 RX ADMIN — ALBUTEROL SULFATE 2.5 MG: 2.5 SOLUTION RESPIRATORY (INHALATION) at 03:53

## 2025-04-04 RX ADMIN — IRON SUCROSE 200 MG: 20 INJECTION, SOLUTION INTRAVENOUS at 16:12

## 2025-04-04 RX ADMIN — APIXABAN 5 MG: 5 TABLET, FILM COATED ORAL at 20:10

## 2025-04-04 RX ADMIN — AMIODARONE HYDROCHLORIDE 200 MG: 200 TABLET ORAL at 09:26

## 2025-04-04 RX ADMIN — APIXABAN 5 MG: 5 TABLET, FILM COATED ORAL at 09:26

## 2025-04-04 RX ADMIN — FUROSEMIDE 40 MG: 10 INJECTION INTRAMUSCULAR; INTRAVENOUS at 21:56

## 2025-04-04 RX ADMIN — MILRINONE LACTATE IN DEXTROSE 0.25 MCG/KG/MIN: 200 INJECTION, SOLUTION INTRAVENOUS at 09:24

## 2025-04-04 RX ADMIN — MILRINONE LACTATE IN DEXTROSE 0.25 MCG/KG/MIN: 200 INJECTION, SOLUTION INTRAVENOUS at 22:40

## 2025-04-04 RX ADMIN — FUROSEMIDE 40 MG: 10 INJECTION, SOLUTION INTRAMUSCULAR; INTRAVENOUS at 21:56

## 2025-04-04 RX ADMIN — INSULIN HUMAN 10 UNITS: 100 INJECTION, SOLUTION PARENTERAL at 03:43

## 2025-04-04 RX ADMIN — ONDANSETRON 4 MG: 2 INJECTION INTRAMUSCULAR; INTRAVENOUS at 03:39

## 2025-04-04 RX ADMIN — ASPIRIN 324 MG: 81 TABLET, CHEWABLE ORAL at 06:19

## 2025-04-04 RX ADMIN — METOCLOPRAMIDE 5 MG: 5 INJECTION, SOLUTION INTRAMUSCULAR; INTRAVENOUS at 05:20

## 2025-04-04 RX ADMIN — DAPAGLIFLOZIN 10 MG: 10 TABLET, FILM COATED ORAL at 09:26

## 2025-04-04 SDOH — ECONOMIC STABILITY: HOUSING INSECURITY: IN THE LAST 12 MONTHS, WAS THERE A TIME WHEN YOU WERE NOT ABLE TO PAY THE MORTGAGE OR RENT ON TIME?: NO

## 2025-04-04 SDOH — SOCIAL STABILITY: SOCIAL INSECURITY: ARE THERE ANY APPARENT SIGNS OF INJURIES/BEHAVIORS THAT COULD BE RELATED TO ABUSE/NEGLECT?: NO

## 2025-04-04 SDOH — SOCIAL STABILITY: SOCIAL INSECURITY: WITHIN THE LAST YEAR, HAVE YOU BEEN AFRAID OF YOUR PARTNER OR EX-PARTNER?: NO

## 2025-04-04 SDOH — ECONOMIC STABILITY: TRANSPORTATION INSECURITY: IN THE PAST 12 MONTHS, HAS LACK OF TRANSPORTATION KEPT YOU FROM MEDICAL APPOINTMENTS OR FROM GETTING MEDICATIONS?: NO

## 2025-04-04 SDOH — SOCIAL STABILITY: SOCIAL INSECURITY: DO YOU FEEL ANYONE HAS EXPLOITED OR TAKEN ADVANTAGE OF YOU FINANCIALLY OR OF YOUR PERSONAL PROPERTY?: NO

## 2025-04-04 SDOH — ECONOMIC STABILITY: INCOME INSECURITY: IN THE PAST 12 MONTHS HAS THE ELECTRIC, GAS, OIL, OR WATER COMPANY THREATENED TO SHUT OFF SERVICES IN YOUR HOME?: NO

## 2025-04-04 SDOH — ECONOMIC STABILITY: HOUSING INSECURITY: AT ANY TIME IN THE PAST 12 MONTHS, WERE YOU HOMELESS OR LIVING IN A SHELTER (INCLUDING NOW)?: NO

## 2025-04-04 SDOH — ECONOMIC STABILITY: FOOD INSECURITY: WITHIN THE PAST 12 MONTHS, YOU WORRIED THAT YOUR FOOD WOULD RUN OUT BEFORE YOU GOT THE MONEY TO BUY MORE.: NEVER TRUE

## 2025-04-04 SDOH — SOCIAL STABILITY: SOCIAL INSECURITY: HAVE YOU HAD THOUGHTS OF HARMING ANYONE ELSE?: NO

## 2025-04-04 SDOH — SOCIAL STABILITY: SOCIAL INSECURITY: HAS ANYONE EVER THREATENED TO HURT YOUR FAMILY OR YOUR PETS?: NO

## 2025-04-04 SDOH — SOCIAL STABILITY: SOCIAL INSECURITY: WITHIN THE LAST YEAR, HAVE YOU BEEN HUMILIATED OR EMOTIONALLY ABUSED IN OTHER WAYS BY YOUR PARTNER OR EX-PARTNER?: NO

## 2025-04-04 SDOH — ECONOMIC STABILITY: FOOD INSECURITY: HOW HARD IS IT FOR YOU TO PAY FOR THE VERY BASICS LIKE FOOD, HOUSING, MEDICAL CARE, AND HEATING?: NOT VERY HARD

## 2025-04-04 SDOH — ECONOMIC STABILITY: FOOD INSECURITY: WITHIN THE PAST 12 MONTHS, THE FOOD YOU BOUGHT JUST DIDN'T LAST AND YOU DIDN'T HAVE MONEY TO GET MORE.: NEVER TRUE

## 2025-04-04 SDOH — SOCIAL STABILITY: SOCIAL INSECURITY: DO YOU FEEL UNSAFE GOING BACK TO THE PLACE WHERE YOU ARE LIVING?: NO

## 2025-04-04 SDOH — SOCIAL STABILITY: SOCIAL INSECURITY: ARE YOU OR HAVE YOU BEEN THREATENED OR ABUSED PHYSICALLY, EMOTIONALLY, OR SEXUALLY BY ANYONE?: NO

## 2025-04-04 SDOH — ECONOMIC STABILITY: HOUSING INSECURITY: IN THE PAST 12 MONTHS, HOW MANY TIMES HAVE YOU MOVED WHERE YOU WERE LIVING?: 0

## 2025-04-04 SDOH — SOCIAL STABILITY: SOCIAL INSECURITY: DOES ANYONE TRY TO KEEP YOU FROM HAVING/CONTACTING OTHER FRIENDS OR DOING THINGS OUTSIDE YOUR HOME?: NO

## 2025-04-04 ASSESSMENT — COGNITIVE AND FUNCTIONAL STATUS - GENERAL
MOVING FROM LYING ON BACK TO SITTING ON SIDE OF FLAT BED WITH BEDRAILS: A LITTLE
HELP NEEDED FOR BATHING: A LOT
EATING MEALS: A LITTLE
CLIMB 3 TO 5 STEPS WITH RAILING: TOTAL
MOBILITY SCORE: 13
TURNING FROM BACK TO SIDE WHILE IN FLAT BAD: A LITTLE
DAILY ACTIVITIY SCORE: 16
MOVING TO AND FROM BED TO CHAIR: A LOT
TOILETING: A LOT
STANDING UP FROM CHAIR USING ARMS: A LOT
WALKING IN HOSPITAL ROOM: A LOT
DRESSING REGULAR UPPER BODY CLOTHING: A LITTLE
PERSONAL GROOMING: A LOT
HELP NEEDED FOR BATHING: A LITTLE
DAILY ACTIVITIY SCORE: 14
PATIENT BASELINE BEDBOUND: NO
PERSONAL GROOMING: A LITTLE
DRESSING REGULAR UPPER BODY CLOTHING: A LOT
TOILETING: A LOT
DRESSING REGULAR LOWER BODY CLOTHING: A LOT
DRESSING REGULAR LOWER BODY CLOTHING: A LOT

## 2025-04-04 ASSESSMENT — ENCOUNTER SYMPTOMS
ALLERGIC/IMMUNOLOGIC NEGATIVE: 1
ACTIVITY CHANGE: 1
NEUROLOGICAL NEGATIVE: 1
PSYCHIATRIC NEGATIVE: 1
MUSCULOSKELETAL NEGATIVE: 1
CARDIOVASCULAR NEGATIVE: 1
COUGH: 1
ENDOCRINE NEGATIVE: 1
SHORTNESS OF BREATH: 1
UNEXPECTED WEIGHT CHANGE: 1
HEMATOLOGIC/LYMPHATIC NEGATIVE: 1
EYES NEGATIVE: 1
GASTROINTESTINAL NEGATIVE: 1

## 2025-04-04 ASSESSMENT — PAIN - FUNCTIONAL ASSESSMENT
PAIN_FUNCTIONAL_ASSESSMENT: 0-10

## 2025-04-04 ASSESSMENT — ACTIVITIES OF DAILY LIVING (ADL)
JUDGMENT_ADEQUATE_SAFELY_COMPLETE_DAILY_ACTIVITIES: YES
JUDGMENT_ADEQUATE_SAFELY_COMPLETE_DAILY_ACTIVITIES: YES
GROOMING: NEEDS ASSISTANCE
GROOMING: NEEDS ASSISTANCE
HEARING - RIGHT EAR: FUNCTIONAL
FEEDING YOURSELF: INDEPENDENT
HEARING - RIGHT EAR: FUNCTIONAL
DRESSING YOURSELF: NEEDS ASSISTANCE
DRESSING YOURSELF: NEEDS ASSISTANCE
ADEQUATE_TO_COMPLETE_ADL: YES
TOILETING: NEEDS ASSISTANCE
HEARING - LEFT EAR: FUNCTIONAL
ADL_ASSISTANCE: INDEPENDENT
ADEQUATE_TO_COMPLETE_ADL: YES
WALKS IN HOME: NEEDS ASSISTANCE
ASSISTIVE_DEVICE: WALKER
BATHING: NEEDS ASSISTANCE
HEARING - LEFT EAR: FUNCTIONAL
LACK_OF_TRANSPORTATION: NO
ASSISTIVE_DEVICE: EYEGLASSES;WALKER;CANE
FEEDING YOURSELF: INDEPENDENT
BATHING_ASSISTANCE: MINIMAL
LACK_OF_TRANSPORTATION: NO
PATIENT'S MEMORY ADEQUATE TO SAFELY COMPLETE DAILY ACTIVITIES?: YES
TOILETING: NEEDS ASSISTANCE
WALKS IN HOME: NEEDS ASSISTANCE
LACK_OF_TRANSPORTATION: NO

## 2025-04-04 ASSESSMENT — PAIN SCALES - GENERAL
PAINLEVEL_OUTOF10: 0 - NO PAIN

## 2025-04-04 ASSESSMENT — LIFESTYLE VARIABLES
HOW MANY STANDARD DRINKS CONTAINING ALCOHOL DO YOU HAVE ON A TYPICAL DAY: PATIENT DOES NOT DRINK
HOW OFTEN DO YOU HAVE 6 OR MORE DRINKS ON ONE OCCASION: NEVER
SKIP TO QUESTIONS 9-10: 1
HOW OFTEN DO YOU HAVE A DRINK CONTAINING ALCOHOL: NEVER
AUDIT-C TOTAL SCORE: 0
AUDIT-C TOTAL SCORE: 0

## 2025-04-04 ASSESSMENT — PATIENT HEALTH QUESTIONNAIRE - PHQ9
1. LITTLE INTEREST OR PLEASURE IN DOING THINGS: NOT AT ALL
2. FEELING DOWN, DEPRESSED OR HOPELESS: NOT AT ALL
SUM OF ALL RESPONSES TO PHQ9 QUESTIONS 1 & 2: 0

## 2025-04-04 NOTE — H&P
"[facility]  HFICU HISTORY AND PHYSICAL     Melissa Marinelli/[id number]    Admit Date: 2025  Hospital Length of Stay: 0   ICU Length of Stay: 1h   Primary Service:   Primary HF Cardiologist:   Referring:    HPI:     Melissa Marinelli is a 69 y.o. female with a PMHx. significant for ICM/ HFrEF (last EF 10-15% 2025, on palliative milrinone infusion since 2024, s/p [employer] Gregorio ICD 2020), CAD (s/p NSTEMI, s/p RIRI to LCX 2016), MVR (s/p mitral valve repair 2019; 29 mm Epic bioprosthetic valve with Dr. Montana), COPD (No PFTs on file), HTN, HL, GERD, hx of CVA, and DM, presenting to the ED with chief complaints of shortness of breath and lethargy. Of note, she ran out of her bumex for at least 1 week.     In the ED she was treated with 20 mg Furosemide IVP and placed on BIPAP mask. Lab results significant for hyperkalemia (K+ 6.3) treated with insulin / dextrose. Her troponin is elevated in the setting of heart failure: Trend: 261 -> 302 -> 292. Her clinical scenario is very similar to her previous admission  - 2025. Of note, this is her 4th admission this year.     Upon arrival to the HFICU, her SpO2 is 100% on 2 liters /min O2 via NC. Her breath sounds are normal. She is pleasant, alert and oriented in no distress. She does endorse feeling tired \"always.\" Her daughter, Sarah, was contacted to provide an update. Sarah states patient has had at least two falls at home since previous admission (PT made aware).  Her vitals are stable and all home meds, with the exception of spironolactone (hyperkalemia), have been resumed. Her Bumex dose is increased to 1 mg daily.        Cardiac Tests:  EK/4: EKG obtained at 0026 and interpreted by me: 117 bpm, sinus tachycardia. , , QTc 504. Variable baseline, however no ST elevations or depressions concerning for ischemia. Patient does have T wave inversions in the far lateral leads V5 and V6. Compared with prior EKG from " 2/27/2025, atrial flutter with variable block has been replaced by sinus tachycardia.       Chest Radiograph: 4/4/2025:  IMPRESSION:  Suggest volume overload/heart failure with chronic pulmonary  vasculature and a trace left pleural effusion..  No definite focal  consolidation.       Echocardiogram: 2/27/2025:  CONCLUSIONS:   1. Left ventricular ejection fraction is severely decreased, by visual estimate at 10-15%.   2. There is global hypokinesis of the left ventricle with minor regional variations.   3. There is reduced right ventricular systolic function.   4. The left atrium is enlarged.   5. Left atrial appendage demonstrating thrombus measuring 1 cm x 1.75 cm along with dense sponatenous contrast.   6. There is an Epic mitral valve bioprosthesis, 29 mm reported size. Gradients across the mtiral valve were not assessed.   7. Compared with study dated 1/23/2025, there is redemonstration of a DEVIKA thrombus, that appears similair in size to prior SHRUTHI.      Cardiac Catheterization:  1/26/2024:  CONCLUSIONS:   1. Elevated right_[RA 12, RVEDP 13] and left_[PCWP 26 mmHg] sided filling pressure, PA pressures of 46/29 with a mean of 37 mmHg, and low assumed Chirag cardiac output at 2.3 L/min and cardiac index of 1.3 L/min/mï¿½. SVR 2,886 dynes/seconds/cm-5.   2. -[place]-Alex was sutured in at an external marker m of 55 cm.   3. Patient will be transferred to heart failure ICU, further management as per heart failure ICU team.    Coronary angiogram: 4/15/2019:  CONCLUSIONS:   1. Mild non-obstructive coronary artery disease.   2. RHC with mildly elevated filling pressues and mildly reduced CO/CI.      Past Medical History:  Past Medical History:   Diagnosis Date    Asthma     CAD (coronary artery disease)     CHF (congestive heart failure)     CKD (chronic kidney disease)     COPD (chronic obstructive pulmonary disease) (Multi)     CVA (cerebral vascular accident) (Multi)     Diabetes mellitus (Multi)     GERD  (gastroesophageal reflux disease)     Hyperlipidemia     Hypertension     NSTEMI (non-ST elevated myocardial infarction) (Multi)     Unspecified systolic (congestive) heart failure 2022    HFrEF (heart failure with reduced ejection fraction)       Past Surgical History:  Past Surgical History:   Procedure Laterality Date    CARDIAC CATHETERIZATION N/A 2024    Procedure: Right Heart Cath;  Surgeon: Cuate Dodson MD;  Location: [place] [address] Cardiac Cath Lab;  Service: Cardiovascular;  Laterality: N/A;    CARDIAC ELECTROPHYSIOLOGY PROCEDURE N/A 2025    Procedure: SHRUTHI DCCV;  Surgeon: Emilee Brewster MD;  Location: [place] [address] Cardiac Cath Lab;  Service: Electrophysiology;  Laterality: N/A;    CARDIAC ELECTROPHYSIOLOGY PROCEDURE N/A 2025    Procedure: Cardioversion;  Surgeon: Celso Ordoñez MD;  Location: [place] [address] Cardiac Cath Lab;  Service: Electrophysiology;  Laterality: N/A;  SHRUTHI-guided cardioversion. Primary team ([facility]) to arrange SHRUTHI part. Has single chamber SJM ICD    MITRAL VALVE REPLACEMENT  2019    OTHER SURGICAL HISTORY  2022    Tonsillectomy    OTHER SURGICAL HISTORY  2022    Right ventricular assist device placement    OTHER SURGICAL HISTORY  2022    Mitral valve replacement       Family History:  Family History   Problem Relation Name Age of Onset    Other (CVA) Brother         Social History:  Social History     Socioeconomic History    Marital status: Single     Spouse name: Not on file    Number of children: Not on file    Years of education: Not on file    Highest education level: Not on file   Occupational History    Not on file   Tobacco Use    Smoking status: Former     Current packs/day: 0.00     Types: Cigarettes     Quit date: 2025     Years since quittin.1    Smokeless tobacco: Never   Vaping Use    Vaping status: Never Used   Substance and Sexual Activity    Alcohol use: Never    Drug use: Yes     Types: Marijuana     Comment:  "rarely    Sexual activity: Not on file   Other Topics Concern    Not on file   Social History Narrative    Not on file     Social Drivers of Health     Financial Resource Strain: Low Risk  (2/26/2025)    Overall Financial Resource Strain (CARDIA)     Difficulty of Paying Living Expenses: Not very hard   Food Insecurity: No Food Insecurity (2/26/2025)    Hunger Vital Sign     Worried About Running Out of Food in the Last Year: Never true     Ran Out of Food in the Last Year: Never true   Transportation Needs: Unmet Transportation Needs (3/2/2025)    OASIS : Transportation     Lack of Transportation (Medical): Yes     Lack of Transportation (Non-Medical): No     Patient Unable or Declines to Respond: No   Physical Activity: Inactive (2/5/2025)    Exercise Vital Sign     Days of Exercise per Week: 0 days     Minutes of Exercise per Session: 0 min   Stress: No Stress Concern Present (2/5/2025)    [employer] - Occupational Stress Questionnaire     Feeling of Stress : Not at all   Social Connections: Feeling Socially Integrated (3/2/2025)    OASIS : Social Isolation     Frequency of experiencing loneliness or isolation: Never   Intimate Partner Violence: Not At Risk (2/26/2025)    Humiliation, Afraid, Rape, and Kick questionnaire     Fear of Current or Ex-Partner: No     Emotionally Abused: No     Physically Abused: No     Sexually Abused: No   Housing Stability: Low Risk  (2/26/2025)    Housing Stability Vital Sign     Unable to Pay for Housing in the Last Year: No     Number of Times Moved in the Last Year: 0     Homeless in the Last Year: No       Allergies:  Allergies   Allergen Reactions    Metoprolol Other, Shortness of breath and Respiratory depression    Ticagrelor Anaphylaxis and Other     Feels a severe \"burning feeling\" in her chest    Gadolinium-Containing Contrast Media Hives and Other    Iodinated Contrast Media Hives and Other    Statins-Hmg-Coa Reductase Inhibitors " Myalgia, Other and Unknown     Atorvastatin - hair loss    Red Dye Unknown    Ace Inhibitors Other and Cough     COUGH    Fentanyl Dizziness and Drowsiness    Hydralazine Dermatitis, Rash and Nausea/vomiting    Nicorette [Nicotine (Polacrilex)] Nausea/vomiting     Nicorette gums and lozenges only. Okay with nicotine patches    Nicotine Nausea/vomiting    Penicillins Rash    Prednisone Other     Increased blood sugar       Prior to Admission Meds:  Medications Prior to Admission   Medication Sig Dispense Refill Last Dose/Taking    acetaminophen (Tylenol) 500 mg tablet Take 2 tablets (1,000 mg) by mouth every 8 hours if needed for mild pain (1 - 3).       albuterol 90 mcg/actuation inhaler Inhale 2 puffs every 4 hours if needed for wheezing or shortness of breath. 8.5 g 11     amiodarone (Pacerone) 200 mg tablet Take 1 tablet (200 mg) by mouth once daily. 30 tablet 1     apixaban (Eliquis) 5 mg tablet Take 2 tablets (10 mg) by mouth 2 times a day for 7 days, THEN 1 tablet (5 mg) 2 times a day. (Patient taking differently: Take 1 tablet (5 mg) 2 times a day.) 180 tablet 0     aspirin 81 mg EC tablet Take 1 tablet (81 mg) by mouth once daily. 30 tablet 11     bumetanide (Bumex) 0.5 mg tablet Take 1 tablet (0.5 mg) by mouth once daily. Do not fill before February 10, 2025. 40 tablet 1     cholecalciferol (Vitamin D3) 50 MCG (2000 UT) tablet Take 1 tablet (2,000 Units) by mouth once daily.       famotidine (Pepcid) 40 mg tablet Take 1 tablet (40 mg) by mouth once daily.       Farxiga 10 mg Take 1 tablet (10 mg) by mouth once daily. 30 tablet 11     heparin flush,porcine,-0.9NaCl 100 unit/mL kit 5 mL by central venous line infusion route 1 (one) time per week. Indications: prevent clot from blocking an intravenous catheter       hydrocortisone 1 % ointment Apply topically 2 times a day as needed for irritation. Chest wall       ipratropium-albuteroL (Duo-Neb) 0.5-2.5 mg/3 mL nebulizer solution Take 3 mL by nebulization  "every 6 hours.       milrinone (Primacor) 1 mg/mL injection Infuse 0.0152 mg/min at 0.91 mL/hr into a venous catheter continuously. 50 mL 11     milrinone in 5 % dextrose (Primacor) 20 mg/100 mL (200 mcg/mL) infusion Infuse 15.5 mcg/min at 4.65 mL/hr into a venous catheter continuously. Do not fill before January 28, 2025. 100 mL 3     mupirocin (Bactroban) 2 % ointment Apply topically. PRN for nose bleed       OneTouch Verio Flex meter [facility] USE AS DIRECTED THREE TIMES DAILY       sodium chloride (Ocean) 0.65 % nasal spray Administer 1 spray into each nostril if needed for congestion.       sodium chloride 0.9% (sodium chloride) flush Infuse 10 mL into a venous catheter 1 (one) time per week. If drawing labs flush with 20ML  Indications: flush picc line       spironolactone (Aldactone) 25 mg tablet Take 0.5 tablets (12.5 mg) by mouth once daily. 15 tablet 11     Symbicort 80-4.5 mcg/actuation inhaler Inhale 2 puffs twice a day.          Current Medications:  Infusions:  milrinone in 5 % dextrose      Scheduled:  amiodarone, 200 mg, Daily  apixaban, 5 mg, BID  [START ON 4/5/2025] aspirin, 81 mg, Daily  bumetanide, 0.5 mg, Daily  cholecalciferol, 2,000 Units, Daily  dapagliflozin propanediol, 10 mg, Daily  famotidine, 40 mg, Daily  fluticasone furoate-vilanteroL, 1 puff, Daily  [Held by provider] mupirocin, , TID  [Held by provider] spironolactone, 12.5 mg, Daily      PRN:  acetaminophen, 975 mg, q8h PRN  albuterol, 2 puff, q4h PRN  ipratropium-albuteroL, 3 mL, q6h PRN  sodium chloride, 1 spray, PRN          PHYSICAL EXAM:   Visit Vitals  BP 98/64   Pulse (!) 111   Temp 36.1 °C (97 °F) (Temporal)   Resp (!) 24   Ht 1.651 m (5' 5\")   Wt 67.4 kg (148 lb 9.4 oz)   SpO2 100%   BMI 24.73 kg/m²   OB Status Postmenopausal   Smoking Status Former   BSA 1.76 m²       Wt Readings from Last 5 Encounters:   04/04/25 67.4 kg (148 lb 9.4 oz)   03/31/25 58.5 kg (129 lb)   03/24/25 56.7 kg (125 lb 1.6 oz)   03/17/25 57.2 kg (126 lb) "   02/27/25 60.8 kg (134 lb)       INTAKE/OUTPUT:  No intake/output data recorded.     Physical Exam  Constitutional:       Appearance: Normal appearance.   HENT:      Head: Normocephalic.      Nose: Nose normal.      Mouth/Throat:      Mouth: Mucous membranes are moist.   Eyes:      Pupils: Pupils are equal, round, and reactive to light.   Cardiovascular:      Rate and Rhythm: Tachycardia present. Rhythm irregular.      Pulses: Normal pulses.      Heart sounds: Murmur heard.   Pulmonary:      Effort: Pulmonary effort is normal.      Breath sounds: Normal breath sounds.   Abdominal:      Palpations: Abdomen is soft.   Musculoskeletal:         General: Normal range of motion.      Cervical back: Normal range of motion.   Skin:     General: Skin is warm and dry.      Capillary Refill: Capillary refill takes less than 2 seconds.   Neurological:      Mental Status: She is alert and oriented to person, place, and time.   Psychiatric:         Mood and Affect: Mood normal.         Behavior: Behavior normal.         Thought Content: Thought content normal.         Judgment: Judgment normal.       Review of Systems   Constitutional:  Positive for activity change and unexpected weight change.   HENT:  Positive for nosebleeds.    Eyes: Negative.    Respiratory:  Positive for cough and shortness of breath.    Cardiovascular: Negative.    Gastrointestinal: Negative.    Endocrine: Negative.    Genitourinary: Negative.    Musculoskeletal: Negative.    Skin: Negative.    Allergic/Immunologic: Negative.    Neurological: Negative.    Hematological: Negative.    Psychiatric/Behavioral: Negative.         DATA:  CMP:  Recent Labs     04/04/25  0047 02/28/25  0938 02/27/25  1238 02/27/25  0111 02/26/25  0107 02/25/25  1642 02/18/25  1243 02/08/25  1858 02/07/25  1821 02/07/25  0438 02/05/25  2131 02/05/25  1824 02/05/25  1339 01/24/25  0715 01/23/25  1916 01/23/25  0649 01/22/25  1247 01/22/25  0511   * 130* 135* 134* 139 137 137  133* 130* 131*   < >  --    < > 134*   < > 133*   < > 134*   K 6.3* 4.7 4.9 4.5 4.8 4.5 5.3 4.4 4.5 4.3   < >  --    < > 4.5   < > 4.7   < > 5.4*    101 105 105 107 104 104 99 96* 94*   < >  --    < > 99   < > 99   < > 98   CO2 18* 21 19* 20* 22 23 24 24 25 27   < >  --    < > 26   < > 23   < > 22   ANIONGAP 18 13 16 14 15 15 9 14 14 14   < >  --    < > 14   < > 16   < > 19   BUN 37* 28* 30* 31* 33* 33* 29* 35* 38* 42*   < >  --    < > 33*   < > 37*   < > 31*   CREATININE 2.14* 2.32* 2.22* 2.31* 2.37* 2.68* 2.27* 1.96* 2.21* 2.01*   < >  --    < > 2.09*   < > 2.18*   < > 1.91*   EGFR 24* 22* 23* 22* 22* 19* 23* 27* 23* 26*   < >  --    < > 25*   < > 24*   < > 28*   MG  --  2.30 2.48*  --   --  2.52*  --  2.44* 2.76* 2.52*  --  2.89*  --  2.40  --  2.55*  --  2.55*    < > = values in this interval not displayed.     Recent Labs     04/04/25  0047 02/28/25  0938 02/27/25  1238 02/27/25  0111 02/26/25  0107 02/25/25  1642 02/08/25  1858 02/07/25  1821 02/05/25  1824 02/05/25  1339 01/21/25  1919 01/21/25  0149 01/12/25  0320 01/11/25  0132 09/07/24  1008 08/19/24  1104 07/09/24  0527 07/08/24  1137 11/21/23  0315 11/20/23  1548 08/31/23  1041 08/31/23  0430 08/31/23  0356 03/21/23 2005 03/21/23  0203 03/20/23  2235 12/23/21  0454 12/20/21  0701 07/14/21  1234 07/13/21  0700   ALBUMIN 3.6 3.6 3.7 3.4 3.6 4.1 3.4 3.3*   < > 4.3   < > 4.0 3.5   < > 4.3 4.4   < > 4.1   < > 4.0   < >  --  3.8   < > 4.3 CANCELED   < > 4.4   < > 4.0   *  --   --   --   --  17  --   --   --  42  --  42 7  --  13 10  --  13   < > 15   < >  --  9  --  21 CANCELED   < > 10  --  10   AST 82*  --   --   --   --  20  --   --   --  35  --  26 12  --  21 19  --  23   < > 20   < >  --  13  --  38 CANCELED   < > 19  --  21   BILITOT 0.8  --   --   --   --  0.4  --   --   --  0.5  --  0.5 0.3  --  0.4 0.3  --  0.4   < > 0.4   < >  --  0.3  --  0.4 CANCELED   < > 0.3  --  0.4   LIPASE  --   --   --   --   --   --   --   --   --   --   --    --   --   --   --   --   --   --   --  22  --  CANCELED 54  --  59 CANCELED  --  96*  --  75    < > = values in this interval not displayed.     CBC:  Recent Labs     04/04/25  0751 04/04/25  0047 02/28/25  0938 02/27/25  1238 02/27/25  0111 02/25/25  1642 02/08/25  1859 02/07/25  1821   WBC 4.9 5.0 2.5* 3.6* 3.0* 3.0* 3.7* 5.6   HGB 7.3* 8.0* 9.9* 10.6* 10.0* 11.3* 10.9* 10.3*   HCT 23.8* 24.1* 32.4* 33.3* 31.3* 34.5* 37.2 33.1*    252 159 160 166 192 191 212   MCV 90 85 96 94 94 91 102* 96     COAG:   Recent Labs     04/04/25  0753 02/26/25  0214 02/05/25  2131 01/21/25  0149 09/08/24  0647 07/08/24  1137 07/08/24  0643 06/02/24  0329   INR 2.1* 1.6* 2.0* 1.1 1.0 1.0 0.9 1.0     ABO:   Recent Labs     02/26/25  0116   ABO O     HEME/ENDO:  Recent Labs     02/05/25  1824 02/05/25  1433 02/05/25  1339 01/10/25  2003 08/19/24  1104 07/08/24  1137   FERRITIN 81  --   --   --  63 74   IRONSAT  --   --  21*  --  28 18*   TSH  --  1.40  --  0.57  --  2.87   HGBA1C 5.7*  --   --   --   --  5.2      CARDIAC:   Recent Labs     04/04/25  0428 04/04/25  0235 04/04/25  0047 02/25/25  1801 02/25/25  1642 02/05/25  1433 02/05/25  1339 01/21/25  0445 01/21/25  0149 01/10/25  2003 01/10/25  1845 09/07/24  1008 08/19/24  1104 07/08/24 2007 07/08/24  1137 05/02/21  1255 05/21/20  2102 12/06/19  1942 11/29/19  0937 11/22/19  0418 11/21/19  0513 11/20/19  0327 11/19/19  0411 11/18/19  0808 11/17/19  0436   LDH  --   --   --   --   --   --   --   --   --   --   --   --   --   --   --   --  293*  --  414* 481* 463* 539* 479* 528* 585*   TROPHS 295* 302* 261* 293* 347* 212* 220* 175* 180*   < > 257* 240*  --    < > 628*   < >  --   --   --   --   --   --   --   --   --    BNP  --   --  803*  --  416*  --  886*  --  785*  --  1,044* 195* 297*  --  2,095*   < >  --    < >  --   --   --   --   --   --   --     < > = values in this interval not displayed.     Recent Labs     07/10/24  1320 07/10/24  0547 07/09/24  2338 07/09/24  1727  "07/09/24  1250 07/08/24  1416 01/27/24  0432 01/25/24  2333 01/23/24  1121 11/21/23  0321 08/31/23  0844   LACMX  --   --   --   --  1.4 3.1*  --   --   --   --   --    LACTATEART  --   --   --   --   --   --   --  1.6 2.3*  --  1.6   SO2MV 61 65 61 61 73 84*   < >  --   --    < >  --    O2CMX 59.8 63.2 59.3 59.9 71.5 81.8*   < >  --   --    < >  --     < > = values in this interval not displayed.     No results for input(s): \"TACROLIMUS\", \"SIROLIMUS\", \"CYCLOSPORINE\" in the last 45830 hours.      ASSESSMENT AND PLAN:     Melissa Marinelli is a 69 y.o. female with a PMHx. significant for ICM/ HFrEF (last EF 10-15% 1/2025, on palliative milrinone infusion since 2/2024, s/p [employer] ICD 5/2020), CAD (s/p NSTEMI, s/p RIRI to LCX 1/2016), MVR (s/p mitral valve repair 6/2019; 29 mm Epic bioprosthetic valve with Dr. Montana), COPD (No PFTs on file), HTN, HL, GERD, hx of CVA, and DM, presenting to the ED with chief complaints of shortness of breath and lethargy. Of note, she ran out of her bumex for at least 1 week.      Neuro:  #Anxiety. Depression, Acute pain   #Lethargy  - Serial neuro and pain assessments   - PO Tylenol PRN for pain  - PT/OT Consult, OOB to chair  - CAM ICU score every shift  - Sleep/wake cycle normalization    #Recent falls x 2 at home  - Fall risk  - PT evaluation for discharge planning     #Substance abuse  -Alcohol abuse/Alcohol dependence: Denies  -Tobacco use/Nicotine Dependence: Denies     Cardiovascular:  #AHA Stage D ICM systolic HFrEF   - SHRUTHI: 2/27/25  - TTE: 7/8/24   - admit weight: 67.4 kgs (BMI: 24.7)  - admit BNP: 4/4: 803  - C/w Aspirin for MI prevention   - Not able to tolerate ACEi, ARB, ARNi (Entresto discontinued during last admission) - AHA Stage D  - C/w dapagliflozin 10 mg daily  - HOLDING Spironolactone 2/2 hyperkalemia   - Increase Bumex dose to 1 mg PO daily  - C/w Home Milrinone dose 0.25 mcg/kg/min -> Palliative Inotropes   - Daily standing weights, 2gm sodium diet, 2L fluid " restriction, strict I&Os    #CAD   -[facility]: 4/15/2019  -C/w Aspirin for coronary MI prevention      #Paroxysmal Atrial Fibrillation / flutter   -Device: [employer] Gregorio ICD 5/2020   -C/w Home Amiodarone 200 mg daily    #Electrolyte Disturbances  #Hyperkalemia (improved)   - Trend lytes for hyperkalemia     Pulmonary:   #COPD  #Shortness of breath   -Monitor and maintain SpO2 > 92%  -C/w albuterol 2 puffs every 4 hours as needed   -C/w Duo-Nebs scheduled every 6 hours   -C/w Breo Ellipta 100-25 mcg/dose 1 puff daily       GI:  - Bowel regimen: Stefany-Colace 2 tablets nightly   - PPI: Famotidine 40 mg daily        :  #CKD (stage 3):  -Baseline BUN/Cr: 30 - 38 / 2.2 - 2.7  -Admit BUN/Cr: 38 / 2.51  -I/Os  -avoid hypotension and nephrotoxic agents  -C/w home Bumex PO 1 mg daily     Heme:  #Anemia in the setting of iron deficiency anemia (reports nose bleeds at home)   #DEVIKA thrombus  - Labs: Hgb: 7.3, TIBC: 329, ferritin: 25, serum Fe: 12, folate: 10.1, B12: 1.064, % sat: 4  - Initiate IV Venofer x 5 days   - C/w Apixaban 5 mg twice daily     # Coagulopathy due to home apixaban (DEVIKA thrombus) / aspirin      Endo:  - Euglycemic  - hgbA1c: 2/5/25: 5.7    #Thyroid  -TSH: 2/5/25: 1.4 /, Free T4: 1.04     ID:  -afebrile, nontoxic   -no s/s infx  -trend temps q4h    PHYSICAL AND OCCUPATIONAL THERAPY: Consult placed     LINES:  PIVs     DVT: Apixaban   VAP BUNDLE: NA  CENTRAL LINE BUNDLE: NA  ULCER PPX: Famotidine daily   GLYCEMIC CONTROL: NA - euglycemic  BOWEL CARE:: Stefany-Colace scheduled 2 tablets nightly   INDWELLING CATHETER: NA  NUTRITION: Adult diet Regular; 2000 mL fluid      EMERGENCY CONTACT: Extended Emergency Contact Information  Primary Emergency Contact: ShadyMary  Mobile Phone: [phone]  Relation: Daughter   needed? No  Secondary Emergency Contact: Elin Marinelli  Mobile Phone: [phone]  Relation: Daughter  Preferred language: English   needed? No  FAMILY UPDATE: Contacted daughterSarah,  to provide an update  CODE STATUS: Full Code  DISPO: Transfer to lower level of care     Patient seen and assessed with Dr. Villegas    I personally spent 60 minutes of critical care time directly and personally managing the patient exclusive of separately billable procedures   _________________________________________________  Foreign Padilla, APRN-CNP

## 2025-04-04 NOTE — PROGRESS NOTES
Occupational Therapy    Evaluation    Patient Name: Melissa Marinelli  MRN: [id number]  Today's Date: 4/4/2025  Room: 12/12-A  Time Calculation  Start Time: 1526  Stop Time: 1555  Time Calculation (min): 29 min    Assessment  IP OT Assessment  OT Assessment: Pt is a  68yo  female presenting with deficits in ADLs, IADLs, functional activity tolerance, transfers, bed mobility and functional mobility. Pt would benefit from skilled OT intervention to return to PLOF. Pt demonstrates ability to complete transfers with Min A, but is limited this date by fatigue. Pt is appropriate for XXX intensity OT intervention at this time to return to highest PLOF safely.     Prognosis: Good  Barriers to Discharge Home: Cognition needs, Physical needs  Cognition Needs: Cognition-related high falls risk, 24hr supervision for safety awareness needed  Physical Needs: Intermittent mobility assistance needed, Intermittent ADL assistance needed  Evaluation/Treatment Tolerance: Patient limited by fatigue  Medical Staff Made Aware: Yes  End of Session Communication: Bedside nurse  End of Session Patient Position: Up in chair, Alarm off, caregiver present  Plan:  Inpatient Plan  Treatment Interventions: ADL retraining, Functional transfer training, UE strengthening/ROM, Endurance training, Cognitive reorientation, Patient/family training, Compensatory technique education  OT Frequency: 3 times per week  OT Discharge Recommendations: Low intensity level of continued care  Equipment Recommended upon Discharge: Wheeled walker  OT Recommended Transfer Status: Assist of 1  OT - OK to Discharge: Yes  OT Assessment  OT Assessment Results: Decreased ADL status, Decreased upper extremity strength, Decreased endurance, Decreased functional mobility, Decreased IADLs, Decreased cognition, Decreased safe judgment during ADL  Prognosis: Good  Evaluation/Treatment Tolerance: Patient limited by fatigue  Medical Staff Made Aware: Yes  Strengths: Ability to acquire  knowledge, Capable of completing ADLs semi/independent, Housing layout, Premorbid level of function, Support of Caregivers  Barriers to Participation: Comorbidities, Insight into problems    Subjective   Current Problem:  1. Acute on chronic congestive heart failure, unspecified heart failure type        2. Hyperkalemia        3. Elevated troponin        4. HFrEF (heart failure with reduced ejection fraction)  CANCELED: Cardiac device check - Inpatient    CANCELED: Cardiac device check - Inpatient        General:  Reason for Referral: presents to ED with 1 week of shortness of breath.  Past Medical History Relevant to Rehab: CHF (EF 10 to 15% 1/2025) on palliative milrinone infusion, s/p St Gregorio ICD (2020), prior NSTEMI s/p PCI (2016), MVR s/p MV repair 2019, A-fib on Eliquis, COPD, htn, T2DM  Prior to Session Communication: Bedside nurse, Physician (NP)  Patient Position Received: Bed, 3 rail up, Alarm off, not on at start of session  Family/Caregiver Present: No  General Comment: Pt supine in bed upn entry to room, pt falling asleep during conversation. Requires frequent cueing to maintain alertness. Pleasant throughout. Joaquim, .25 mcg milrinone   Precautions:  Medical Precautions: Fall precautions, Cardiac precautions  Vital Signs:   Date/Time Vitals Session Patient Position Pulse Resp SpO2 BP MAP (mmHg)    04/04/25 1500 --  --  109  --  100 %  110/71  85     04/04/25 1526 Pre OT  --  103  --  100 %  110/71  85     04/04/25 1555 Post OT  --  106  --  100 %  106/88  95           Pain:  Pain Assessment  Pain Assessment: 0-10  0-10 (Numeric) Pain Score: 0 - No pain  Lines/Tubes/Drains:  CVC 09/09/24 Single lumen Tunneled Right Subclavian (Active)   Number of days: 207       External Urinary Catheter (Active)   Number of days: 0         Objective   Cognition:  Overall Cognitive Status: Impaired (low arousal initially, limited by fatigue)  Arousal/Alertness: Delayed responses to stimuli  Cognition Comments: cueing  throughout to maintain eyes open  Insight: Mild  Impulsive: Within functional limits  Processing Speed: Delayed           Home Living:  Type of Home: House  Lives With: Adult children, Grandchildren  Home Adaptive Equipment: Walker rolling or standard, Cane  Home Layout: One level  Home Access: Level entry  Bathroom Shower/Tub: Tub/shower unit  Bathroom Toilet: Standard  Bathroom Equipment: Grab bars in shower, Shower chair with back  Home Living Comments: has moved since previous admit to a  home.   Prior Function:  Level of [place]: Independent with ADLs and functional transfers, Needs assistance with homemaking  Receives Help From: Family  ADL Assistance: Independent  Homemaking Assistance: Needs assistance  Ambulatory Assistance: Independent (with cane)  Vocational: Retired  Leisure: enjoys singing and dancing, lately has had too low of energy, reports enjoying sleeping  Prior Function Comments: pt reports she is alone during the day and has been sleeping most of the day. Pt reports she stays on couch and can use bathroom when dtr is gone. Endorses 2 recent falls  IADL History:     ADL:  Eating Assistance: Independent  Grooming Assistance:  (anticipated setup)  Bathing Assistance: Minimal (anticipated)  UE Dressing Assistance: Stand by (anticipated)  LE Dressing Assistance: Minimal (anticipated)  Toileting Assistance with Device: Minimal (anticipated)  ADL Comments: decreased alertness throughout session  Activity Tolerance:  Endurance: Tolerates 10 - 20 min exercise with multiple rests  Early Mobility/Exercise Safety Screen: Proceed with mobilization - No exclusion criteria met  Balance:     Bed Mobility/Transfers: Bed Mobility  Bed Mobility: Yes  Bed Mobility 1  Bed Mobility 1: Supine to sitting  Level of Assistance 1: Minimum assistance  Bed Mobility Comments 1: HOB elevated, max cues   and Transfers  Transfer: Yes  Transfer 1  Transfer From 1: Bed to  Transfer to 1: Stand  Technique 1: Sit to  stand  Transfer Device 1: Walker  Transfer Level of Assistance 1: Minimum assistance  Trials/Comments 1: x2 trials  Transfers 2  Transfer From 2: Stand to  Transfer to 2: Chair with arms  Transfer Device 2: Walker  Transfer Level of Assistance 2: Minimum assistance  Transfers 3  Transfer From 3: Stand to, Sit to  Transfer to 3: Sit, Stand  Technique 3: Stand to sit, Sit to stand  Transfer Device 3: Walker  Transfer Level of Assistance 3: Minimum assistance  Trials/Comments 3: cues for hand placement, decreased eccentric control  IADL's:      Vision: Vision - Basic Assessment  Current Vision: No visual deficits   and    Sensation:  Light Touch: No apparent deficits  Strength:  Strength Comments: BUE WFL  Perception:     Coordination:  Movements are Fluid and Coordinated: No  Upper Body Coordination: bradykinetic 2/2 fatigue   Hand Function:  Hand Function  Gross Grasp: Functional  Coordination: Functional  Extremities: RUE   RUE : Within Functional Limits, LUE   LUE: Within Functional Limits, RLE   RLE : Within Functional Limits, and LLE   LLE : Within Functional Limits    Outcome Measures: [facility] Daily Activity  Putting on and taking off regular lower body clothing: A lot  Bathing (including washing, rinsing, drying): A little  Putting on and taking off regular upper body clothing: A little  Toileting, which includes using toilet, bedpan or urinal: A lot  Taking care of personal grooming such as brushing teeth: A little  Eating Meals: A little  Daily Activity - Total Score: 16    Confusion Assessment Method-ICU (CAM-ICU)  Feature 1: Acute Onset or Fluctuating Course: Positive  Feature 2: Inattention: Positive  Feature 4: Disorganized Thinking: Positive (limited by fatigue)  Overall CAM-ICU: Positive   ICU Mobility Screen  Early Mobility/Exercise Safety Screen: Proceed with mobilization - No exclusion criteria met  ICU Mobility Scale: Transferring bed to chair,          Education Documentation  No documentation  found.  Education Comments  No comments found.        Goals:     Encounter Problems       Encounter Problems (Active)       ADLs       Patient with complete lower body dressing with independent level of assistance donning and doffing all LE clothes  with PRN adaptive equipment while edge of bed  (Progressing)       Start:  04/04/25    Expected End:  04/25/25            Patient will complete toileting including hygiene clothing management/hygiene with independent level of assistance. (Progressing)       Start:  04/04/25    Expected End:  04/25/25               COGNITION/SAFETY       Patient will score WFL on cognitive assessment with no external cues for >75% of sessions (Progressing)       Start:  04/04/25    Expected End:  04/25/25               EXERCISE/STRENGTHENING       Pt will demonstrate I with energy conservation techniques to increase functional activity tolerance to x15+ min without rest breaks/while maintaining O2 sats.  (Progressing)       Start:  04/04/25    Expected End:  04/25/25               MOBILITY       Patient will perform Functional mobility min Household distances/Community Distances with modified independent level of assistance and least restrictive device in order to improve safety and functional mobility. (Progressing)       Start:  04/04/25    Expected End:  04/25/25               TRANSFERS       Patient will complete all functional transfers  with least restrictive device with modified independent level of assistance. (Progressing)       Start:  04/04/25    Expected End:  04/25/25 04/04/25 at 4:15 PM   Reshma Warren OT   Rehab Office: [phone]

## 2025-04-04 NOTE — PROGRESS NOTES
Pharmacy Medication History Review    Melissa Marinelli is a 69 y.o. female admitted for Acute on chronic congestive heart failure, unspecified heart failure type. Pharmacy reviewed the patient's iojku-ia-jsfaftwrx medications and allergies for accuracy.    The list below reflects the updated PTA list.   Prior to Admission Medications   Prescriptions Last Dose Informant   Farxiga 10 mg  Child   Sig: Take 1 tablet (10 mg) by mouth once daily.   OneTouch Verio Flex meter misc  Child   Sig: USE AS DIRECTED THREE TIMES DAILY   Symbicort 80-4.5 mcg/actuation inhaler  Child   Sig: Inhale 2 puffs twice a day.   acetaminophen (Tylenol) 500 mg tablet  Child   Sig: Take 2 tablets (1,000 mg) by mouth every 8 hours if needed for mild pain (1 - 3).   albuterol 90 mcg/actuation inhaler  Child   Sig: Inhale 2 puffs every 4 hours if needed for wheezing or shortness of breath.   amiodarone (Pacerone) 200 mg tablet  Child   Sig: Take 1 tablet (200 mg) by mouth once daily.   apixaban (Eliquis) 5 mg tablet  Self   Sig: Take 2 tablets (10 mg) by mouth 2 times a day for 7 days, THEN 1 tablet (5 mg) 2 times a day.   Patient taking differently: Take 1 tablet (5 mg) 2 times a day.   aspirin 81 mg EC tablet  Child   Sig: Take 1 tablet (81 mg) by mouth once daily.   bumetanide (Bumex) 0.5 mg tablet  Child   Sig: Take 1 tablet (0.5 mg) by mouth once daily. Do not fill before February 10, 2025.   cholecalciferol (Vitamin D3) 50 MCG (2000 UT) tablet  Child   Sig: Take 1 tablet (2,000 Units) by mouth once daily.   famotidine (Pepcid) 40 mg tablet  Child   Sig: Take 1 tablet (40 mg) by mouth once daily as needed for heartburn.   heparin flush,porcine,-0.9NaCl 100 unit/mL kit  Child   Si mL by central venous line infusion route 1 (one) time per week. Indications: prevent clot from blocking an intravenous catheter   hydrocortisone 1 % ointment  Child   Sig: Apply topically 2 times a day as needed for irritation. Chest wall   ipratropium-albuteroL  "(Duo-Neb) 0.5-2.5 mg/3 mL nebulizer solution  Child   Sig: Take 3 mL by nebulization every 6 hours.   milrinone (Primacor) 1 mg/mL injection  Child   Sig: Infuse 0.0152 mg/min at 0.91 mL/hr into a venous catheter continuously.   milrinone in 5 % dextrose (Primacor) 20 mg/100 mL (200 mcg/mL) infusion  Self   Sig: Infuse 15.5 mcg/min at 4.65 mL/hr into a venous catheter continuously. Do not fill before January 28, 2025.   mupirocin (Bactroban) 2 % ointment  Child   Sig: Apply topically. PRN for nose bleed   sodium chloride (Ocean) 0.65 % nasal spray  Child   Sig: Administer 1 spray into each nostril if needed for congestion.   sodium chloride 0.9% (sodium chloride) flush  Child   Sig: Infuse 10 mL into a venous catheter 1 (one) time per week. If drawing labs flush with 20ML   spironolactone (Aldactone) 25 mg tablet  Child   Sig: Take 0.5 tablets (12.5 mg) by mouth once daily.      Facility-Administered Medications: None        The list below reflects the updated allergy list. Please review each documented allergy for additional clarification and justification.  Allergies  Reviewed by Indira Barger, PharmD on 4/4/2025        Severity Reactions Comments    Metoprolol High Other, Shortness of breath, Respiratory depression     Ticagrelor High Anaphylaxis, Other Feels a severe \"burning feeling\" in her chest    Gadolinium-containing Contrast Media Medium Hives, Other     Iodinated Contrast Media Medium Hives, Other     Statins-hmg-coa Reductase Inhibitors Medium Myalgia, Other, Unknown Atorvastatin - hair loss    Red Dye Not Specified Unknown     Ace Inhibitors Low Other, Cough COUGH    Fentanyl Low Dizziness, Drowsiness     Hydralazine Low Dermatitis, Rash, Nausea/vomiting     Nicorette [nicotine (polacrilex)] Low Nausea/vomiting Nicorette gums and lozenges only. Okay with nicotine patches    Nicotine Low Nausea/vomiting     Penicillins Low Rash     Prednisone Low Other Increased blood sugar            Patient accepts M2B " at discharge.     Sources used to complete the med history include:    [facility]  Pharmacy dispense history  Patient Interview Unable to provide any details  [facility] historian  Chart Review  Care Everywhere     Below are additional concerns with the patient's PTA list.  Spoke with patient’s daughter, who is well informed and accurately aware of all the patient’s current medications.  Daughter reports that patient has not been consistent with taking her Vitamin D.    Medications ADDED:  none  Medications CHANGED:  none  Medications REMOVED:   none    Indira Barger PharmD  Transitions of Care Pharmacist  [facility]  Please reach out via Secure Chat for questions, or if no response call [employer] or [facility]

## 2025-04-04 NOTE — PROGRESS NOTES
HFICU Attending Note      briefly patient with longstanding HF on chronic inotropes, not a candidate for advanced therapies, she is admitted with ADHF after running out of bumex she states she had called her primary care but not our clinic, she is volume overloaded but appears adequately perfused we confirmed this with a mixed venous from her PICC line at this point no plans to proceed with a [place]-Alex catheter particularly given that her milrinone is palliative only she is more anemic with significant iron deficiency we will consider reduction in the apixaban dose given her age and renal function as well as weight I suspect her thrombus is calcified at this point      This critically ill patient continues to be at-risk for clinically significant deterioration / failure due to the above mentioned dysfunctional, unstable organ systems.  I have personally identified and managed all complex critical care issues to prevent aforementioned clinical deterioration.  Critical care time is spent at bedside and/or the immediate area and has included, but is not limited to, the review of diagnostic tests, labs, radiographs, serial assessments of hemodynamics, respiratory status, ventilatory management, and family updates.  Time spent in procedures and teaching are reported separately.     Critical care time: _44___ minutes      Patient Active Problem List    Diagnosis Date Noted    Vascular catheter dysfunction 09/08/2024    Problem with vascular access 09/07/2024    Acute on chronic congestive heart failure, unspecified heart failure type 04/04/2025    Dizziness 02/25/2025    Acute on chronic systolic heart failure 02/05/2025    Atrial fib/flutter, transient (Multi) 01/22/2025    Ventricular tachycardia (paroxysmal) 01/11/2025    Shortness of breath 07/08/2024    Acute on chronic heart failure with normal ejection fraction 02/18/2024    Hypertension 02/17/2024    Acute gastritis 11/26/2023    Epistaxis 11/21/2023    HFrEF  (heart failure with reduced ejection fraction) 11/21/2023    Open wound 11/21/2023    Acidosis, unspecified 11/20/2023    Contact with and (suspected) exposure to covid-19 09/02/2023    Hyperlipidemia 09/02/2023    Personal history of COVID-19 09/02/2023    History of transient ischemic attack 09/02/2023    Nicotine dependence 06/22/2023    Deviated nasal septum 06/13/2023    Asthma 04/10/2023    Bilateral pleural effusion 04/10/2023    Cholelithiasis 04/10/2023    Allergic rhinitis due to pollen 04/10/2023    History of hemolytic anemia 04/10/2023    Iron deficiency anemia 04/10/2023    Obstructive sleep apnea syndrome 04/10/2023    Subsequent non-ST elevation (NSTEMI) myocardial infarction 03/21/2023    Anxiety 03/20/2023    Cardiomyopathy 03/20/2023    History of mitral valve replacement 12/30/2022    Tachycardia 12/30/2022    Presence of automatic (implantable) cardiac defibrillator 05/29/2020    Ischemic cardiomyopathy 05/21/2020    Aspiration pneumonia (Multi) 05/18/2020    Anemia due to chronic kidney disease 05/14/2020    CKD (chronic kidney disease) 05/14/2020    Stable angina (CMS-[facility]) 12/02/2019    Gastro-esophageal reflux disease without esophagitis 12/02/2019    Major depressive disorder 12/02/2019    Diabetes (Multi) 05/10/2019    Chronic systolic heart failure 05/29/2015    CAD (coronary artery disease) 11/18/2014    COPD (chronic obstructive pulmonary disease) (Multi) 11/18/2014    Hypotension 11/18/2014    MI, acute, non ST segment elevation (Multi) 10/07/2014    Low back pain 04/06/2011    Lumbar spondylosis 04/06/2011    Dermatophytosis of nail 12/20/2010    Difficulty walking 12/13/2010    Chronic leg pain 09/03/2010    Obesity, unspecified 09/03/2010    Atrial flutter (Multi) 02/25/2025    Acute on chronic combined systolic (congestive) and diastolic (congestive) heart failure 07/08/2024

## 2025-04-04 NOTE — ED PROVIDER NOTES
Emergency Department Provider Note        History of Present Illness     History provided by: Patient  Limitations to History: None  External Records Reviewed with Brief Summary: Discharge Summary from 2/28/2025 which showed admission for HFrEF with symptomatic hypotension, started on milrinone infusion; A-fib/flutter with LA thrombus on Eliquis    HPI:  Melissa Marinelli is a 69 y.o. female with PMHx CHF (EF 10 to 15% 1/2025) on palliative milrinone infusion, s/p St Gregorio ICD (2020), prior NSTEMI s/p PCI (2016), MVR s/p MV repair 2019, A-fib on Eliquis, COPD, htn, T2DM who presents to ED with 1 week of shortness of breath.  Dyspnea worse with exertion, attempts at lying flat.  Has also had PND.  Patient reports she has been out of her home diuretic for approximately 7 days.  During that time, she has also had intermittent nausea and low appetite. No abdominal or back pain, no urinary symptoms.  Patient does state that she has been trialing breathing treatments at home without improvement in her shortness of breath.    Milrinone bag most recently changed 4/3 per patient.    Physical Exam   Triage vitals:  T 36.5 °C (97.7 °F)  HR (!) 118  /77  RR 20  O2 98 % None (Room air)    Physical exam:   Triage vitals reviewed.  Constitutional: Elderly, thin adult in no acute distress, non toxic in appearance  Head: Normocephalic, atraumatic  Skin: Intact, dry. No rashes or lesions.  Eyes: Pupils are equal. No conjunctival injection.  Pulmonary: Normal work of breathing with no accessory muscle use noted.  Bilateral rhonchi at bases, no wheezes or rales.  Cardiovascular: Tachycardic rate, regular rhythm. No murmurs/gallops/rubs appreciated. 2+ radial pulses bilaterally.   Abdomen: Soft, nondistended. Nontender to palpation.  Extremities: No gross deformities.  Moving all extremities spontaneously without difficulty. Mild swelling around bilateral ankles, no pitting LE edema.  Neuro: Awake and alert. Face is symmetric.  Speech is clear. Strength 4/5 in bilateral upper and lower extremities. Sensation intact to light touch throughout.      Medical Decision Making & ED Course   Medical Decision Makin y.o. female with PMHx CHF with EF 10 to 15% on milrinone, A-fib on Eliquis, CKD, COPD, T2DM who presents to the emergency department for shortness of breath.  On arrival she was afebrile, tachycardic to 117, normotensive, SpO2 98% on room air.  Zofran given for nausea.    Clinical presentation and history most concerning for CHF exacerbation in the setting of missed doses of home Bumex.  Vital signs not consistent with decompensated heart failure or SCAPE.  No wheezing on exam so lower concern for COPD exacerbation at this time.    Pain and concern for impending respiratory failure patient was started on BiPAP.  She already BiPAP well, however later became nauseated and BiPAP was paused.  Patient did not have relief of her nausea with Zofran previously.  Reglan given for nausea with some improvement.    Laboratory workup significant for hyperkalemia with K 6.3.  Patient was given Lasix, albuterol, insulin/dextrose for hyperkalemia.  Laboratory workup also significant for elevated troponin of 261, rising to 302.  Suspect her elevated troponin is secondary to CHF exacerbation, however given elevation on delta troponin, patient was given 324 mg ASA.    Discussed with heart failure ICU attending Dr. Fernandez.  I do anticipate patient will require BiPAP during her stay, and accordingly will need ICU level of care.  Dr. Fernandez accepted patient for admission to heart failure ICU for likely positive pressure as well as diuretic treatment.  I do anticipate that her length of stay will exceed 2 midnights.  Discussed with patient and she was in agreement with admission.      ----    Differential diagnoses considered include but are not limited to: CHF exacerbation, pneumonia, viral infection, COPD exacerbation     Social Determinants of  Health which Significantly Impact Care: None identified     EKG Independent Interpretation: EKG interpreted by myself. Please see ED Course for full interpretation.    Independent Result Review and Interpretation: Relevant laboratory and radiographic results were reviewed and independently interpreted by myself.  As necessary, they are commented on in the ED Course.    Chronic conditions affecting the patient's care: As documented above in [facility]    The patient was discussed with the following consultants/services: Dr. Fernandez, ICU attending    Care Considerations: As documented above in [facility]    ED Course:  ED Course as of 04/04/25 0643   Fri Apr 04, 2025   0104 ECG 12 lead  EKG obtained at 0026 and interpreted by me: 117 bpm, sinus tachycardia.  , , QTc 504.  Variable baseline, however no ST elevations or depressions concerning for ischemia.  Patient does have T wave inversions in the far lateral leads V5 and V6. Compared with prior EKG from 2/27/2025, atrial flutter with variable block has been replaced by sinus tachycardia. [HH]   0119 XR chest 2 views  XR reviewed without obvious PNA, no evidence of PTX or widened mediastinum.  Radiology report reviewed with evidence of vascular congestion concerning for CHF/volume overload trace left pleural effusion [HH]   0257 BNP(!): 803 [HH]      ED Course User Index  [HH] Alma Valderrama MD         Diagnoses as of 04/04/25 0643   Acute on chronic congestive heart failure, unspecified heart failure type   Hyperkalemia   Elevated troponin     Disposition   As a result of their workup, the patient will require admission to the hospital.  The patient was informed of her diagnosis.  The patient was given the opportunity to ask questions and I answered them. The patient agreed to be admitted to the hospital.    Patient seen and discussed with ED attending physician.    Alma Valderrama MD  Emergency Medicine     Alma Valderrama MD  Resident  04/04/25 2344

## 2025-04-04 NOTE — PROGRESS NOTES
SOCIAL WORK NOTE      04/04/25 1134   Discharge Planning   Living Arrangements Children   Support Systems Family members   Assistance Needed needs assistance   Type of Residence Private residence   Number of Stairs to Enter Residence 4   Number of Stairs Within Residence 12   Do you have animals or pets at home? No   Home or Post Acute Services In home services   Type of Home Care Services Home nursing visits   Expected Discharge Disposition Home   Financial Resource Strain   How hard is it for you to pay for the very basics like food, housing, medical care, and heating? Not hard   Housing Stability   In the last 12 months, was there a time when you were not able to pay the mortgage or rent on time? N   In the past 12 months, how many times have you moved where you were living? 0   At any time in the past 12 months, were you homeless or living in a shelter (including now)? N   Transportation Needs   In the past 12 months, has lack of transportation kept you from medical appointments or from getting medications? no   In the past 12 months, has lack of transportation kept you from meetings, work, or from getting things needed for daily living? No     NOK: daughters   DME: cane, walker   Home Care: [facility] [place] [facility] (Milrinone)  HD: denied  PCP: Bi   Additional information:  SW met with patient at bedside to complete assessment. Patient normally lives at home with daughterMary. Daughter helps with care tasks and serves as HHA. No reported issues with SDOH when prompted. Currently denied needs for social work. Social work to follow.   Carmen Salguero, MSW, LISW-S ([id number])

## 2025-04-04 NOTE — CARE PLAN
The patient's goals for the shift include  to decrease SOB    The clinical goals for the shift include remain hemodynamically stable

## 2025-04-05 LAB
ALBUMIN SERPL BCP-MCNC: 3.1 G/DL (ref 3.4–5)
ALBUMIN SERPL BCP-MCNC: 3.1 G/DL (ref 3.4–5)
ALBUMIN SERPL BCP-MCNC: 3.2 G/DL (ref 3.4–5)
ALP SERPL-CCNC: 114 U/L (ref 33–136)
ALT SERPL W P-5'-P-CCNC: 151 U/L (ref 7–45)
ANION GAP BLDV CALCULATED.4IONS-SCNC: 13 MMOL/L (ref 10–25)
ANION GAP SERPL CALC-SCNC: 16 MMOL/L (ref 10–20)
ANION GAP SERPL CALC-SCNC: 16 MMOL/L (ref 10–20)
ANION GAP SERPL CALC-SCNC: 18 MMOL/L (ref 10–20)
APTT PPP: 33 SECONDS (ref 26–36)
AST SERPL W P-5'-P-CCNC: 114 U/L (ref 9–39)
BASE EXCESS BLDV CALC-SCNC: -3.4 MMOL/L (ref -2–3)
BASOPHILS # BLD AUTO: 0.03 X10*3/UL (ref 0–0.1)
BASOPHILS NFR BLD AUTO: 0.6 %
BILIRUB SERPL-MCNC: 0.7 MG/DL (ref 0–1.2)
BODY TEMPERATURE: 37 DEGREES CELSIUS
BUN SERPL-MCNC: 44 MG/DL (ref 6–23)
BUN SERPL-MCNC: 44 MG/DL (ref 6–23)
BUN SERPL-MCNC: 45 MG/DL (ref 6–23)
CA-I BLDV-SCNC: 1.2 MMOL/L (ref 1.1–1.33)
CALCIUM SERPL-MCNC: 8.5 MG/DL (ref 8.6–10.6)
CALCIUM SERPL-MCNC: 8.6 MG/DL (ref 8.6–10.6)
CALCIUM SERPL-MCNC: 8.8 MG/DL (ref 8.6–10.6)
CHLORIDE BLDV-SCNC: 99 MMOL/L (ref 98–107)
CHLORIDE SERPL-SCNC: 96 MMOL/L (ref 98–107)
CHLORIDE SERPL-SCNC: 97 MMOL/L (ref 98–107)
CHLORIDE SERPL-SCNC: 99 MMOL/L (ref 98–107)
CHLORIDE UR-SCNC: 140 MMOL/L
CHLORIDE/CREATININE (MMOL/G) IN URINE: 289 MMOL/G CREAT (ref 38–318)
CO2 SERPL-SCNC: 22 MMOL/L (ref 21–32)
CREAT SERPL-MCNC: 3.07 MG/DL (ref 0.5–1.05)
CREAT SERPL-MCNC: 3.11 MG/DL (ref 0.5–1.05)
CREAT SERPL-MCNC: 3.15 MG/DL (ref 0.5–1.05)
CREAT UR-MCNC: 48.5 MG/DL (ref 20–320)
EGFRCR SERPLBLD CKD-EPI 2021: 15 ML/MIN/1.73M*2
EGFRCR SERPLBLD CKD-EPI 2021: 16 ML/MIN/1.73M*2
EGFRCR SERPLBLD CKD-EPI 2021: 16 ML/MIN/1.73M*2
EOSINOPHIL # BLD AUTO: 0.22 X10*3/UL (ref 0–0.7)
EOSINOPHIL NFR BLD AUTO: 4.1 %
ERYTHROCYTE [DISTWIDTH] IN BLOOD BY AUTOMATED COUNT: 16.7 % (ref 11.5–14.5)
GLUCOSE BLDV-MCNC: 157 MG/DL (ref 74–99)
GLUCOSE SERPL-MCNC: 100 MG/DL (ref 74–99)
GLUCOSE SERPL-MCNC: 108 MG/DL (ref 74–99)
GLUCOSE SERPL-MCNC: 117 MG/DL (ref 74–99)
HCO3 BLDV-SCNC: 21.3 MMOL/L (ref 22–26)
HCT VFR BLD AUTO: 22 % (ref 36–46)
HCT VFR BLD EST: 25 % (ref 36–46)
HGB BLD-MCNC: 7.5 G/DL (ref 12–16)
HGB BLDV-MCNC: 8.2 G/DL (ref 12–16)
IMM GRANULOCYTES # BLD AUTO: 0.02 X10*3/UL (ref 0–0.7)
IMM GRANULOCYTES NFR BLD AUTO: 0.4 % (ref 0–0.9)
INHALED O2 CONCENTRATION: 21 %
INR PPP: 3.6 (ref 0.9–1.1)
LACTATE BLDV-SCNC: 1.8 MMOL/L (ref 0.4–2)
LYMPHOCYTES # BLD AUTO: 0.67 X10*3/UL (ref 1.2–4.8)
LYMPHOCYTES NFR BLD AUTO: 12.5 %
MAGNESIUM SERPL-MCNC: 2.6 MG/DL (ref 1.6–2.4)
MCH RBC QN AUTO: 28.3 PG (ref 26–34)
MCHC RBC AUTO-ENTMCNC: 34.1 G/DL (ref 32–36)
MCV RBC AUTO: 83 FL (ref 80–100)
MONOCYTES # BLD AUTO: 0.65 X10*3/UL (ref 0.1–1)
MONOCYTES NFR BLD AUTO: 12.1 %
NEUTROPHILS # BLD AUTO: 3.76 X10*3/UL (ref 1.2–7.7)
NEUTROPHILS NFR BLD AUTO: 70.3 %
NRBC BLD-RTO: 2.8 /100 WBCS (ref 0–0)
OXYHGB MFR BLDV: 55.6 % (ref 45–75)
PCO2 BLDV: 36 MM HG (ref 41–51)
PH BLDV: 7.38 PH (ref 7.33–7.43)
PHOSPHATE SERPL-MCNC: 4.5 MG/DL (ref 2.5–4.9)
PHOSPHATE SERPL-MCNC: 4.6 MG/DL (ref 2.5–4.9)
PLATELET # BLD AUTO: 236 X10*3/UL (ref 150–450)
PO2 BLDV: 34 MM HG (ref 35–45)
POTASSIUM BLDV-SCNC: 5.8 MMOL/L (ref 3.5–5.3)
POTASSIUM SERPL-SCNC: 5 MMOL/L (ref 3.5–5.3)
POTASSIUM SERPL-SCNC: 5.5 MMOL/L (ref 3.5–5.3)
POTASSIUM SERPL-SCNC: 5.5 MMOL/L (ref 3.5–5.3)
POTASSIUM UR-SCNC: 49 MMOL/L
POTASSIUM/CREAT UR-RTO: 101 MMOL/G CREAT
PROT SERPL-MCNC: 6.1 G/DL (ref 6.4–8.2)
PROTHROMBIN TIME: 40.3 SECONDS (ref 9.8–12.4)
RBC # BLD AUTO: 2.65 X10*6/UL (ref 4–5.2)
SAO2 % BLDV: 57 % (ref 45–75)
SODIUM BLDV-SCNC: 127 MMOL/L (ref 136–145)
SODIUM SERPL-SCNC: 129 MMOL/L (ref 136–145)
SODIUM SERPL-SCNC: 131 MMOL/L (ref 136–145)
SODIUM SERPL-SCNC: 131 MMOL/L (ref 136–145)
SODIUM UR-SCNC: 95 MMOL/L
SODIUM/CREAT UR-RTO: 196 MMOL/G CREAT
TSH SERPL-ACNC: 0.71 MIU/L (ref 0.44–3.98)
WBC # BLD AUTO: 5.4 X10*3/UL (ref 4.4–11.3)

## 2025-04-05 PROCEDURE — 82435 ASSAY OF BLOOD CHLORIDE: CPT

## 2025-04-05 PROCEDURE — 2020000001 HC ICU ROOM DAILY

## 2025-04-05 PROCEDURE — 85025 COMPLETE CBC W/AUTO DIFF WBC: CPT | Performed by: STUDENT IN AN ORGANIZED HEALTH CARE EDUCATION/TRAINING PROGRAM

## 2025-04-05 PROCEDURE — 37799 UNLISTED PX VASCULAR SURGERY: CPT

## 2025-04-05 PROCEDURE — 84132 ASSAY OF SERUM POTASSIUM: CPT

## 2025-04-05 PROCEDURE — 2500000004 HC RX 250 GENERAL PHARMACY W/ HCPCS (ALT 636 FOR OP/ED)

## 2025-04-05 PROCEDURE — 82330 ASSAY OF CALCIUM: CPT

## 2025-04-05 PROCEDURE — 80069 RENAL FUNCTION PANEL: CPT | Mod: CCI | Performed by: STUDENT IN AN ORGANIZED HEALTH CARE EDUCATION/TRAINING PROGRAM

## 2025-04-05 PROCEDURE — 84443 ASSAY THYROID STIM HORMONE: CPT

## 2025-04-05 PROCEDURE — 85610 PROTHROMBIN TIME: CPT | Performed by: STUDENT IN AN ORGANIZED HEALTH CARE EDUCATION/TRAINING PROGRAM

## 2025-04-05 PROCEDURE — 82436 ASSAY OF URINE CHLORIDE: CPT

## 2025-04-05 PROCEDURE — 83735 ASSAY OF MAGNESIUM: CPT | Performed by: STUDENT IN AN ORGANIZED HEALTH CARE EDUCATION/TRAINING PROGRAM

## 2025-04-05 PROCEDURE — 2500000002 HC RX 250 W HCPCS SELF ADMINISTERED DRUGS (ALT 637 FOR MEDICARE OP, ALT 636 FOR OP/ED)

## 2025-04-05 PROCEDURE — 84100 ASSAY OF PHOSPHORUS: CPT | Performed by: STUDENT IN AN ORGANIZED HEALTH CARE EDUCATION/TRAINING PROGRAM

## 2025-04-05 PROCEDURE — 2500000001 HC RX 250 WO HCPCS SELF ADMINISTERED DRUGS (ALT 637 FOR MEDICARE OP)

## 2025-04-05 PROCEDURE — 94640 AIRWAY INHALATION TREATMENT: CPT

## 2025-04-05 PROCEDURE — 99291 CRITICAL CARE FIRST HOUR: CPT | Performed by: INTERNAL MEDICINE

## 2025-04-05 PROCEDURE — 83935 ASSAY OF URINE OSMOLALITY: CPT

## 2025-04-05 RX ORDER — ONDANSETRON HYDROCHLORIDE 2 MG/ML
4 INJECTION, SOLUTION INTRAVENOUS ONCE
Status: COMPLETED | OUTPATIENT
Start: 2025-04-05 | End: 2025-04-05

## 2025-04-05 RX ORDER — SODIUM POLYSTYRENE SULFONATE 15 G/60ML
15 SUSPENSION ORAL; RECTAL ONCE
Status: COMPLETED | OUTPATIENT
Start: 2025-04-05 | End: 2025-04-05

## 2025-04-05 RX ADMIN — ASPIRIN 81 MG: 81 TABLET, COATED ORAL at 08:00

## 2025-04-05 RX ADMIN — IPRATROPIUM BROMIDE AND ALBUTEROL SULFATE 3 ML: .5; 3 SOLUTION RESPIRATORY (INHALATION) at 04:45

## 2025-04-05 RX ADMIN — IPRATROPIUM BROMIDE AND ALBUTEROL SULFATE 3 ML: .5; 3 SOLUTION RESPIRATORY (INHALATION) at 19:58

## 2025-04-05 RX ADMIN — MILRINONE LACTATE IN DEXTROSE 0.25 MCG/KG/MIN: 200 INJECTION, SOLUTION INTRAVENOUS at 18:50

## 2025-04-05 RX ADMIN — BUMETANIDE 1 MG: 1 TABLET ORAL at 08:00

## 2025-04-05 RX ADMIN — SENNOSIDES AND DOCUSATE SODIUM 2 TABLET: 50; 8.6 TABLET ORAL at 21:15

## 2025-04-05 RX ADMIN — ACETAMINOPHEN 975 MG: 325 TABLET ORAL at 21:15

## 2025-04-05 RX ADMIN — DAPAGLIFLOZIN 10 MG: 10 TABLET, FILM COATED ORAL at 08:00

## 2025-04-05 RX ADMIN — ONDANSETRON 4 MG: 2 INJECTION INTRAMUSCULAR; INTRAVENOUS at 12:36

## 2025-04-05 RX ADMIN — FAMOTIDINE 40 MG: 20 TABLET ORAL at 08:00

## 2025-04-05 RX ADMIN — AMIODARONE HYDROCHLORIDE 200 MG: 200 TABLET ORAL at 08:00

## 2025-04-05 RX ADMIN — IRON SUCROSE 200 MG: 20 INJECTION, SOLUTION INTRAVENOUS at 05:19

## 2025-04-05 RX ADMIN — SODIUM POLYSTYRENE SULFONATE 15 G: 15 SUSPENSION ORAL; RECTAL at 06:44

## 2025-04-05 RX ADMIN — IPRATROPIUM BROMIDE AND ALBUTEROL SULFATE 3 ML: .5; 3 SOLUTION RESPIRATORY (INHALATION) at 12:23

## 2025-04-05 RX ADMIN — Medication 2000 UNITS: at 08:00

## 2025-04-05 RX ADMIN — APIXABAN 5 MG: 5 TABLET, FILM COATED ORAL at 08:00

## 2025-04-05 RX ADMIN — APIXABAN 2.5 MG: 2.5 TABLET, FILM COATED ORAL at 21:15

## 2025-04-05 ASSESSMENT — PAIN - FUNCTIONAL ASSESSMENT
PAIN_FUNCTIONAL_ASSESSMENT: 0-10

## 2025-04-05 ASSESSMENT — PAIN SCALES - GENERAL
PAINLEVEL_OUTOF10: 0 - NO PAIN

## 2025-04-05 ASSESSMENT — COGNITIVE AND FUNCTIONAL STATUS - GENERAL
TOILETING: A LOT
MOVING TO AND FROM BED TO CHAIR: A LOT
MOBILITY SCORE: 14
TURNING FROM BACK TO SIDE WHILE IN FLAT BAD: A LITTLE
DRESSING REGULAR LOWER BODY CLOTHING: A LOT
WALKING IN HOSPITAL ROOM: A LOT
STANDING UP FROM CHAIR USING ARMS: A LOT
DAILY ACTIVITIY SCORE: 14
DRESSING REGULAR UPPER BODY CLOTHING: A LOT
CLIMB 3 TO 5 STEPS WITH RAILING: TOTAL
PERSONAL GROOMING: A LOT
HELP NEEDED FOR BATHING: A LOT

## 2025-04-05 NOTE — CARE PLAN
The clinical goals for the shift include patient will remain hemodynamically stable throughout the shift      Problem: Fall/Injury  Goal: Not fall by end of shift  Outcome: Progressing  Goal: Be free from injury by end of the shift  Outcome: Progressing     Problem: Safety - Adult  Goal: Free from fall injury  Outcome: Progressing     Problem: Discharge Planning  Goal: Discharge to home or other facility with appropriate resources  Outcome: Progressing     Problem: Chronic Conditions and Co-morbidities  Goal: Patient's chronic conditions and co-morbidity symptoms are monitored and maintained or improved  Outcome: Progressing     Problem: Skin  Goal: Prevent/minimize sheer/friction injuries  Outcome: Progressing  Flowsheets (Taken 4/4/2025 2224)  Prevent/minimize sheer/friction injuries: Turn/reposition every 2 hours/use positioning/transfer devices     Problem: Heart Failure  Goal: Reduction in peripheral edema within 24 hours  Outcome: Progressing

## 2025-04-05 NOTE — PROGRESS NOTES
"[facility]  HFICU PROGRESS NOTE    Melissa Marinelli/[id number]    Admit Date: 4/4/2025  Hospital Length of Stay: 1   ICU Length of Stay: 1d 6h   Primary Service:   Primary HF Cardiologist:   Referring:    INTERVAL EVENTS / PERTINENT ROS:     No acute events overnight. Telemetry reviewed showing sinus tachycardia with frequent PVC's. Her urine output was 1.1 liters over the previous 24 hours. She states she feels great today and denies headache, dizziness, vision changes, chest pain, palpitations, shortness of breath, cough, ABD pain, constipation, or diarrhea. She did have some nausea this morning treated with IV zofran. She remains hyperkalemic (5.5 this morning) and was treated overnight with kayexalate and 40 mg furosemide IVP. Venous blood gas analyzed 2/2 worsening renal function with venous gas of 57% on continuous home palliative inotrope dose of Milrinone @ 0.25 mcg/kg/min. Her MAP's have been in the 70's and 80's.     Plan:  - Recheck afternoon chemistry panel   - Send urine electrolytes  - Check TSH  - C/w current Milrinone dose   - Consider reducing apixaban dose to 2.5 mg daily     MEDICATIONS  Infusions:  milrinone in 5 % dextrose, Last Rate: 0.25 mcg/kg/min (04/05/25 0700)      Scheduled:  amiodarone, 200 mg, Daily  apixaban, 5 mg, BID  aspirin, 81 mg, Daily  bumetanide, 1 mg, Daily  cholecalciferol, 2,000 Units, Daily  dapagliflozin propanediol, 10 mg, Daily  famotidine, 40 mg, Daily  fluticasone furoate-vilanteroL, 1 puff, Daily  iron sucrose, 200 mg, Daily  [Held by provider] mupirocin, , TID  sennosides-docusate sodium, 2 tablet, Nightly      PRN:  acetaminophen, 975 mg, q8h PRN  albuterol, 2 puff, q4h PRN  ipratropium-albuteroL, 3 mL, q6h PRN  sodium chloride, 1 spray, PRN          PHYSICAL EXAM:   Visit Vitals  /75   Pulse (!) 115   Temp 36.8 °C (98.2 °F)   Resp 24   Ht 1.651 m (5' 5\")   Wt 62 kg (136 lb 11 oz)   SpO2 100%   BMI 22.75 kg/m²   OB Status " Postmenopausal   Smoking Status Former   BSA 1.69 m²       Wt Readings from Last 5 Encounters:   04/05/25 62 kg (136 lb 11 oz)   03/31/25 58.5 kg (129 lb)   03/24/25 56.7 kg (125 lb 1.6 oz)   03/17/25 57.2 kg (126 lb)   02/27/25 60.8 kg (134 lb)       INTAKE/OUTPUT:  I/O last 3 completed shifts:  In: 1055.8 (15.5 mL/kg) [P.O.:960; I.V.:95.8 (1.4 mL/kg)]  Out: 1100 (16.2 mL/kg) [Urine:1100 (0.4 mL/kg/hr)]  Weight: 68 kg      Physical Exam  HENT:      Head: Normocephalic.      Nose: Nose normal.      Mouth/Throat:      Mouth: Mucous membranes are moist.   Eyes:      Pupils: Pupils are equal, round, and reactive to light.   Cardiovascular:      Rate and Rhythm: Regular rhythm. Tachycardia present.      Pulses: Normal pulses.      Heart sounds: Normal heart sounds.   Pulmonary:      Breath sounds: Normal breath sounds.   Abdominal:      General: Bowel sounds are normal.      Palpations: Abdomen is soft.   Musculoskeletal:         General: Normal range of motion.      Cervical back: Normal range of motion and neck supple.   Skin:     General: Skin is warm and dry.      Capillary Refill: Capillary refill takes less than 2 seconds.   Neurological:      Mental Status: She is alert and oriented to person, place, and time.   Psychiatric:         Mood and Affect: Mood normal.         Behavior: Behavior normal.         Thought Content: Thought content normal.         Judgment: Judgment normal.         DATA:  CMP:  Results from last 7 days   Lab Units 04/05/25  1238 04/05/25  0820 04/05/25  0435 04/04/25 2011 04/04/25  0751 04/04/25  0047   SODIUM mmol/L 129* 131* 131* 132* 135* 134*   POTASSIUM mmol/L 5.0 5.5* 5.5* 5.6* 5.4* 6.3*   CHLORIDE mmol/L 96* 97* 99 101 104 104   CO2 mmol/L 22 22 22 21 23 18*   ANION GAP mmol/L 16 18 16 16 13 18   BUN mg/dL 44* 45* 44* 43* 38* 37*   CREATININE mg/dL 3.07* 3.15* 3.11* 2.75* 2.51* 2.14*   EGFR mL/min/1.73m*2 16* 15* 16* 18* 20* 24*   MAGNESIUM mg/dL  --   --  2.60*  --  2.52*  --   "  ALBUMIN g/dL 3.2* 3.1* 3.1* 3.3* 3.2* 3.6   ALT U/L 151*  --   --   --  108* 125*   AST U/L 114*  --   --   --  63* 82*   BILIRUBIN TOTAL mg/dL 0.7  --   --   --  0.7 0.8     CBC:  Results from last 7 days   Lab Units 04/05/25  0435 04/04/25  0751 04/04/25  0047   WBC AUTO x10*3/uL 5.4 4.9 5.0   HEMOGLOBIN g/dL 7.5* 7.3* 8.0*   HEMATOCRIT % 22.0* 23.8* 24.1*   PLATELETS AUTO x10*3/uL 236 253 252   MCV fL 83 90 85     COAG:   Results from last 7 days   Lab Units 04/05/25  0435 04/04/25  0753   INR  3.6* 2.1*     ABO: No results found for: \"ABO\"  HEME/ENDO:  Results from last 7 days   Lab Units 04/05/25  0820 04/04/25  0753   FERRITIN ng/mL  --  25   IRON SATURATION %  --  4*   TSH mIU/L 0.71  --       CARDIAC:   Results from last 7 days   Lab Units 04/04/25  0751 04/04/25  0428 04/04/25  0235 04/04/25  0047   TROPHSCMC ng/L 292* 295* 302* 261*   BNP pg/mL  --   --   --  803*       ASSESSMENT AND PLAN:     Melissa Marinelli is a 69 y.o. female with a PMHx. significant for ICM/ HFrEF (last EF 10-15% 1/2025, on palliative milrinone infusion since 2/2024, s/p [employer] ICD 5/2020), CAD (s/p NSTEMI, s/p RIRI to LCX 1/2016), MVR (s/p mitral valve repair 6/2019; 29 mm Epic bioprosthetic valve with Dr. Montana), COPD (No PFTs on file), HTN, HL, GERD, hx of CVA, and DM, presenting to the ED with chief complaints of shortness of breath and lethargy. Of note, she ran out of her bumex for at least 1 week.      In the ED she was treated with 20 mg Furosemide IVP and placed on BIPAP mask. Lab results significant for hyperkalemia (K+ 6.3) treated with insulin / dextrose. Her troponin is elevated in the setting of heart failure: Trend: 261 -> 302 -> 292. Her clinical scenario is very similar to her previous admission 2/25 - 2/28/2025. Of note, this is her 4th admission this year.      Upon arrival to the HFICU, her SpO2 is 100% on 2 liters /min O2 via NC. Her breath sounds are normal. She is pleasant, alert and oriented in no " "distress. She does endorse feeling tired \"always.\" Her daughter, Sarah, was contacted to provide an update. Sarah states patient has had at least two falls at home since previous admission (PT made aware).  Her vitals are stable and all home meds, with the exception of spironolactone (hyperkalemia), have been resumed. Her Bumex dose is increased to 1 mg daily.       Neuro:  #Anxiety. Depression, Acute pain   #Lethargy (heart failure vs. anemia)  - Serial neuro and pain assessments   - PO Tylenol PRN for pain  - PT/OT Consult, OOB to chair  - CAM ICU score every shift  - Sleep/wake cycle normalization     #Recent falls x 2 at home  - Fall risk  - PT evaluation for discharge planning     #Substance abuse  -Alcohol abuse/Alcohol dependence: Denies  -Tobacco use/Nicotine Dependence: Denies      Cardiovascular:  #AHA Stage D ICM systolic HFrEF   - SHRUTHI: 2/27/25  - TTE: 7/8/24   - admit weight: 67.4 kgs (BMI: 24.7)  - admit BNP: 4/4: 803  - C/w Aspirin for MI prevention   - Not able to tolerate ACEi, ARB, ARNi (Entresto discontinued during last admission) - AHA Stage D  - C/w dapagliflozin 10 mg daily  - DISCONTINUED Spironolactone 2/2 hyperkalemia   - C/w Bumex dose to 1 mg PO daily  - C/w Home Milrinone dose 0.25 mcg/kg/min -> Palliative Inotropes   - Daily standing weights, 2gm sodium diet, 2L fluid restriction, strict I&Os     #CAD   -[facility]: 4/15/2019  -C/w Aspirin for coronary MI prevention      #Paroxysmal Atrial Fibrillation / flutter   -Device: [employer] ICD 5/2020   -C/w Home Amiodarone 200 mg daily     #Electrolyte Disturbances  #Hyperkalemia (resolving)  - Trend lytes for hyperkalemia   - Received Kayexalate x 1 overnight 4/5/25 for hyperkalemia      Pulmonary:   #COPD  #Shortness of breath   -Monitor and maintain SpO2 > 92%  -C/w albuterol 2 puffs every 4 hours as needed   -C/w Duo-Nebs scheduled every 6 hours   -C/w Breo Ellipta 100-25 mcg/dose 1 puff daily       GI:  - Bowel regimen: Stefany-Colace 2 tablets " nightly   - PPI: Famotidine 40 mg daily       :  #CKD (stage 3):  -Baseline BUN/Cr: 30 - 38 / 2.2 - 2.7  -Admit BUN/Cr: 38 / 2.51  -Daily BUN / Creatinine: 4/5/25: 44 / 3.07  -I/Os  -avoid hypotension and nephrotoxic agents  -C/w home Bumex PO 1 mg daily      Heme:  #Anemia in the setting of iron deficiency anemia (reports nose bleeds at home)   #DEVIKA thrombus  - Labs: Hgb: 7.3, TIBC: 329, ferritin: 25, serum Fe: 12, folate: 10.1, B12: 1.064, % sat: 4  - Initiate IV Venofer x 5 days   - C/w Apixaban 5 mg twice daily - may consider reducing dose      # Coagulopathy due to home apixaban (DEVIKA thrombus) / aspirin      Endo:  - Euglycemic  - hgbA1c: 2/5/25: 5.7     #Thyroid  -TSH: 2/5/25: 1.4 /, Free T4: 1.04      ID:  -afebrile, nontoxic   -no s/s infx  -trend temps q4h      PHYSICAL AND OCCUPATIONAL THERAPY: Ordered     LINES:  PIVs  Wiley     DVT: apixaban   VAP BUNDLE: NA  CENTRAL LINE BUNDLE: NA  ULCER PPX: Famotidine   GLYCEMIC CONTROL: Euglycemic   BOWEL CARE: Stefany-colace   INDWELLING CATHETER: NA  NUTRITION: Adult diet Regular; 2000 mL fluid      EMERGENCY CONTACT: Extended Emergency Contact Information  Primary Emergency Contact: Mary Caruso  Mobile Phone: [phone]  Relation: Daughter   needed? No  Secondary Emergency Contact: Elin Marinelli  Mobile Phone: [phone]  Relation: Daughter  Preferred language: English   needed? No  FAMILY UPDATE: Sarah Luevano   CODE STATUS: Full Code  DISPO: Maintain in HFICU     Patient seen and assessed with Dr. Nehemiah ESPINAL personally spent 60 minutes of critical care time directly and personally managing the patient exclusive of separately billable procedures   _________________________________________________  LAWSON Álvarez-CNP

## 2025-04-06 VITALS
SYSTOLIC BLOOD PRESSURE: 89 MMHG | HEIGHT: 65 IN | RESPIRATION RATE: 18 BRPM | OXYGEN SATURATION: 100 % | TEMPERATURE: 97.5 F | DIASTOLIC BLOOD PRESSURE: 63 MMHG | HEART RATE: 103 BPM | BODY MASS INDEX: 24.43 KG/M2 | WEIGHT: 146.61 LBS

## 2025-04-06 LAB
ABO GROUP (TYPE) IN BLOOD: NORMAL
ALBUMIN SERPL BCP-MCNC: 3 G/DL (ref 3.4–5)
ANION GAP SERPL CALC-SCNC: 13 MMOL/L (ref 10–20)
ANTIBODY SCREEN: NORMAL
APTT PPP: 34 SECONDS (ref 26–36)
BASOPHILS # BLD AUTO: 0.03 X10*3/UL (ref 0–0.1)
BASOPHILS NFR BLD AUTO: 0.6 %
BLOOD EXPIRATION DATE: NORMAL
BUN SERPL-MCNC: 44 MG/DL (ref 6–23)
CALCIUM SERPL-MCNC: 8.6 MG/DL (ref 8.6–10.6)
CHLORIDE SERPL-SCNC: 95 MMOL/L (ref 98–107)
CO2 SERPL-SCNC: 24 MMOL/L (ref 21–32)
CREAT SERPL-MCNC: 2.91 MG/DL (ref 0.5–1.05)
DISPENSE STATUS: NORMAL
EGFRCR SERPLBLD CKD-EPI 2021: 17 ML/MIN/1.73M*2
EOSINOPHIL # BLD AUTO: 0.2 X10*3/UL (ref 0–0.7)
EOSINOPHIL NFR BLD AUTO: 3.9 %
ERYTHROCYTE [DISTWIDTH] IN BLOOD BY AUTOMATED COUNT: 16.2 % (ref 11.5–14.5)
ERYTHROCYTE [DISTWIDTH] IN BLOOD BY AUTOMATED COUNT: 17 % (ref 11.5–14.5)
GLUCOSE SERPL-MCNC: 143 MG/DL (ref 74–99)
HCT VFR BLD AUTO: 23.1 % (ref 36–46)
HCT VFR BLD AUTO: 28.6 % (ref 36–46)
HGB BLD-MCNC: 7.2 G/DL (ref 12–16)
HGB BLD-MCNC: 9.1 G/DL (ref 12–16)
IMM GRANULOCYTES # BLD AUTO: 0.04 X10*3/UL (ref 0–0.7)
IMM GRANULOCYTES NFR BLD AUTO: 0.8 % (ref 0–0.9)
INR PPP: 3 (ref 0.9–1.1)
LYMPHOCYTES # BLD AUTO: 0.78 X10*3/UL (ref 1.2–4.8)
LYMPHOCYTES NFR BLD AUTO: 15.1 %
MAGNESIUM SERPL-MCNC: 2.23 MG/DL (ref 1.6–2.4)
MCH RBC QN AUTO: 27.4 PG (ref 26–34)
MCH RBC QN AUTO: 27.7 PG (ref 26–34)
MCHC RBC AUTO-ENTMCNC: 31.2 G/DL (ref 32–36)
MCHC RBC AUTO-ENTMCNC: 31.8 G/DL (ref 32–36)
MCV RBC AUTO: 87 FL (ref 80–100)
MCV RBC AUTO: 88 FL (ref 80–100)
MONOCYTES # BLD AUTO: 0.59 X10*3/UL (ref 0.1–1)
MONOCYTES NFR BLD AUTO: 11.4 %
NEUTROPHILS # BLD AUTO: 3.52 X10*3/UL (ref 1.2–7.7)
NEUTROPHILS NFR BLD AUTO: 68.2 %
NRBC BLD-RTO: 3.3 /100 WBCS (ref 0–0)
NRBC BLD-RTO: 3.3 /100 WBCS (ref 0–0)
PHOSPHATE SERPL-MCNC: 4.1 MG/DL (ref 2.5–4.9)
PLATELET # BLD AUTO: 228 X10*3/UL (ref 150–450)
PLATELET # BLD AUTO: 229 X10*3/UL (ref 150–450)
POTASSIUM SERPL-SCNC: 4.2 MMOL/L (ref 3.5–5.3)
PRODUCT BLOOD TYPE: 5100
PRODUCT CODE: NORMAL
PROTHROMBIN TIME: 33 SECONDS (ref 9.8–12.4)
RBC # BLD AUTO: 2.63 X10*6/UL (ref 4–5.2)
RBC # BLD AUTO: 3.28 X10*6/UL (ref 4–5.2)
RH FACTOR (ANTIGEN D): NORMAL
SODIUM SERPL-SCNC: 128 MMOL/L (ref 136–145)
UNIT ABO: NORMAL
UNIT NUMBER: NORMAL
UNIT RH: NORMAL
UNIT VOLUME: 350
WBC # BLD AUTO: 5.2 X10*3/UL (ref 4.4–11.3)
WBC # BLD AUTO: 6.3 X10*3/UL (ref 4.4–11.3)
XM INTEP: NORMAL

## 2025-04-06 PROCEDURE — 83735 ASSAY OF MAGNESIUM: CPT | Performed by: STUDENT IN AN ORGANIZED HEALTH CARE EDUCATION/TRAINING PROGRAM

## 2025-04-06 PROCEDURE — 2020000001 HC ICU ROOM DAILY

## 2025-04-06 PROCEDURE — 86923 COMPATIBILITY TEST ELECTRIC: CPT

## 2025-04-06 PROCEDURE — 2500000002 HC RX 250 W HCPCS SELF ADMINISTERED DRUGS (ALT 637 FOR MEDICARE OP, ALT 636 FOR OP/ED): Performed by: NURSE PRACTITIONER

## 2025-04-06 PROCEDURE — 2500000004 HC RX 250 GENERAL PHARMACY W/ HCPCS (ALT 636 FOR OP/ED)

## 2025-04-06 PROCEDURE — 37799 UNLISTED PX VASCULAR SURGERY: CPT | Performed by: STUDENT IN AN ORGANIZED HEALTH CARE EDUCATION/TRAINING PROGRAM

## 2025-04-06 PROCEDURE — 99291 CRITICAL CARE FIRST HOUR: CPT | Performed by: INTERNAL MEDICINE

## 2025-04-06 PROCEDURE — 37799 UNLISTED PX VASCULAR SURGERY: CPT | Performed by: NURSE PRACTITIONER

## 2025-04-06 PROCEDURE — 85730 THROMBOPLASTIN TIME PARTIAL: CPT | Performed by: STUDENT IN AN ORGANIZED HEALTH CARE EDUCATION/TRAINING PROGRAM

## 2025-04-06 PROCEDURE — 86850 RBC ANTIBODY SCREEN: CPT | Performed by: NURSE PRACTITIONER

## 2025-04-06 PROCEDURE — 2500000002 HC RX 250 W HCPCS SELF ADMINISTERED DRUGS (ALT 637 FOR MEDICARE OP, ALT 636 FOR OP/ED)

## 2025-04-06 PROCEDURE — 80069 RENAL FUNCTION PANEL: CPT | Performed by: STUDENT IN AN ORGANIZED HEALTH CARE EDUCATION/TRAINING PROGRAM

## 2025-04-06 PROCEDURE — 86901 BLOOD TYPING SEROLOGIC RH(D): CPT | Performed by: NURSE PRACTITIONER

## 2025-04-06 PROCEDURE — 2500000004 HC RX 250 GENERAL PHARMACY W/ HCPCS (ALT 636 FOR OP/ED): Mod: TB | Performed by: NURSE PRACTITIONER

## 2025-04-06 PROCEDURE — 36430 TRANSFUSION BLD/BLD COMPNT: CPT

## 2025-04-06 PROCEDURE — 85610 PROTHROMBIN TIME: CPT | Performed by: STUDENT IN AN ORGANIZED HEALTH CARE EDUCATION/TRAINING PROGRAM

## 2025-04-06 PROCEDURE — 2500000001 HC RX 250 WO HCPCS SELF ADMINISTERED DRUGS (ALT 637 FOR MEDICARE OP)

## 2025-04-06 PROCEDURE — 85025 COMPLETE CBC W/AUTO DIFF WBC: CPT | Performed by: STUDENT IN AN ORGANIZED HEALTH CARE EDUCATION/TRAINING PROGRAM

## 2025-04-06 PROCEDURE — P9016 RBC LEUKOCYTES REDUCED: HCPCS

## 2025-04-06 PROCEDURE — 2500000001 HC RX 250 WO HCPCS SELF ADMINISTERED DRUGS (ALT 637 FOR MEDICARE OP): Performed by: NURSE PRACTITIONER

## 2025-04-06 PROCEDURE — 94640 AIRWAY INHALATION TREATMENT: CPT

## 2025-04-06 PROCEDURE — 99291 CRITICAL CARE FIRST HOUR: CPT | Performed by: EMERGENCY MEDICINE

## 2025-04-06 PROCEDURE — 85027 COMPLETE CBC AUTOMATED: CPT | Performed by: NURSE PRACTITIONER

## 2025-04-06 RX ORDER — IPRATROPIUM BROMIDE AND ALBUTEROL SULFATE 2.5; .5 MG/3ML; MG/3ML
3 SOLUTION RESPIRATORY (INHALATION)
Status: DISPENSED | OUTPATIENT
Start: 2025-04-06

## 2025-04-06 RX ORDER — DIPHENHYDRAMINE HCL 25 MG
25 CAPSULE ORAL ONCE
Status: COMPLETED | OUTPATIENT
Start: 2025-04-06 | End: 2025-04-06

## 2025-04-06 RX ORDER — POLYETHYLENE GLYCOL 3350 17 G/17G
17 POWDER, FOR SOLUTION ORAL DAILY PRN
Status: DISPENSED | OUTPATIENT
Start: 2025-04-06

## 2025-04-06 RX ORDER — BUMETANIDE 0.25 MG/ML
2 INJECTION, SOLUTION INTRAMUSCULAR; INTRAVENOUS ONCE
Status: COMPLETED | OUTPATIENT
Start: 2025-04-06 | End: 2025-04-06

## 2025-04-06 RX ADMIN — Medication 2000 UNITS: at 08:36

## 2025-04-06 RX ADMIN — IPRATROPIUM BROMIDE AND ALBUTEROL SULFATE 3 ML: .5; 3 SOLUTION RESPIRATORY (INHALATION) at 01:58

## 2025-04-06 RX ADMIN — APIXABAN 2.5 MG: 2.5 TABLET, FILM COATED ORAL at 08:36

## 2025-04-06 RX ADMIN — AMIODARONE HYDROCHLORIDE 200 MG: 200 TABLET ORAL at 08:36

## 2025-04-06 RX ADMIN — ASPIRIN 81 MG: 81 TABLET, COATED ORAL at 08:36

## 2025-04-06 RX ADMIN — IPRATROPIUM BROMIDE AND ALBUTEROL SULFATE 3 ML: .5; 3 SOLUTION RESPIRATORY (INHALATION) at 14:14

## 2025-04-06 RX ADMIN — IPRATROPIUM BROMIDE AND ALBUTEROL SULFATE 3 ML: .5; 3 SOLUTION RESPIRATORY (INHALATION) at 10:23

## 2025-04-06 RX ADMIN — GUAIFENESIN AND DEXTROMETHORPHAN HYDROBROMIDE 1 TABLET: 600; 30 TABLET, EXTENDED RELEASE ORAL at 08:36

## 2025-04-06 RX ADMIN — APIXABAN 2.5 MG: 2.5 TABLET, FILM COATED ORAL at 20:13

## 2025-04-06 RX ADMIN — FAMOTIDINE 40 MG: 20 TABLET ORAL at 08:36

## 2025-04-06 RX ADMIN — DAPAGLIFLOZIN 10 MG: 10 TABLET, FILM COATED ORAL at 08:36

## 2025-04-06 RX ADMIN — IPRATROPIUM BROMIDE AND ALBUTEROL SULFATE 3 ML: .5; 3 SOLUTION RESPIRATORY (INHALATION) at 20:36

## 2025-04-06 RX ADMIN — POLYETHYLENE GLYCOL 3350 17 G: 17 POWDER, FOR SOLUTION ORAL at 10:12

## 2025-04-06 RX ADMIN — POLYETHYLENE GLYCOL 3350 17 G: 17 POWDER, FOR SOLUTION ORAL at 20:13

## 2025-04-06 RX ADMIN — MILRINONE LACTATE IN DEXTROSE 0.25 MCG/KG/MIN: 200 INJECTION, SOLUTION INTRAVENOUS at 17:06

## 2025-04-06 RX ADMIN — SENNOSIDES AND DOCUSATE SODIUM 2 TABLET: 50; 8.6 TABLET ORAL at 20:12

## 2025-04-06 RX ADMIN — IRON SUCROSE 200 MG: 20 INJECTION, SOLUTION INTRAVENOUS at 06:09

## 2025-04-06 RX ADMIN — BUMETANIDE 2 MG: 0.25 INJECTION INTRAMUSCULAR; INTRAVENOUS at 12:45

## 2025-04-06 RX ADMIN — DIPHENHYDRAMINE HYDROCHLORIDE 25 MG: 25 CAPSULE ORAL at 20:13

## 2025-04-06 RX ADMIN — BUMETANIDE 1 MG: 1 TABLET ORAL at 08:36

## 2025-04-06 ASSESSMENT — PAIN SCALES - GENERAL
PAINLEVEL_OUTOF10: 0 - NO PAIN

## 2025-04-06 ASSESSMENT — COGNITIVE AND FUNCTIONAL STATUS - GENERAL
DAILY ACTIVITIY SCORE: 14
PERSONAL GROOMING: A LOT
HELP NEEDED FOR BATHING: A LOT
CLIMB 3 TO 5 STEPS WITH RAILING: A LITTLE
WALKING IN HOSPITAL ROOM: A LOT
TURNING FROM BACK TO SIDE WHILE IN FLAT BAD: A LITTLE
MOBILITY SCORE: 17
DRESSING REGULAR UPPER BODY CLOTHING: A LOT
MOVING TO AND FROM BED TO CHAIR: A LITTLE
TOILETING: A LOT
STANDING UP FROM CHAIR USING ARMS: A LOT
DRESSING REGULAR LOWER BODY CLOTHING: A LOT

## 2025-04-06 ASSESSMENT — PAIN - FUNCTIONAL ASSESSMENT
PAIN_FUNCTIONAL_ASSESSMENT: 0-10

## 2025-04-06 NOTE — PROGRESS NOTES
HFICU Attending Note      hyperkalemia improved after diuresis but she continues to have dyspnea and fatigue hemoglobin has trended down despite the addition of IV iron we have reduced her apixaban dose given her combination of renal dysfunction age and BMI.  Overall potential benefit of right heart catheterization seems low her blood pressure and mixed venous are not suggestive of an ongoing low output state and her blood pressure is adequate she appears clearly hypervolemic will give 1 unit PRBCs followed by diuresis keep in the ICU for now    This critically ill patient continues to be at-risk for clinically significant deterioration / failure due to the above mentioned dysfunctional, unstable organ systems.  I have personally identified and managed all complex critical care issues to prevent aforementioned clinical deterioration.  Critical care time is spent at bedside and/or the immediate area and has included, but is not limited to, the review of diagnostic tests, labs, radiographs, serial assessments of hemodynamics, respiratory status, ventilatory management, and family updates.  Time spent in procedures and teaching are reported separately.     Critical care time: __32__ minutes        Patient Active Problem List    Diagnosis Date Noted    Vascular catheter dysfunction 09/08/2024    Problem with vascular access 09/07/2024    Acute on chronic congestive heart failure, unspecified heart failure type 04/04/2025    Dizziness 02/25/2025    Acute on chronic systolic heart failure 02/05/2025    Atrial fib/flutter, transient (Multi) 01/22/2025    Ventricular tachycardia (paroxysmal) 01/11/2025    Shortness of breath 07/08/2024    Acute on chronic heart failure with normal ejection fraction 02/18/2024    Hypertension 02/17/2024    Acute gastritis 11/26/2023    Epistaxis 11/21/2023    HFrEF (heart failure with reduced ejection fraction) 11/21/2023    Open wound 11/21/2023    Acidosis, unspecified 11/20/2023     Contact with and (suspected) exposure to covid-19 09/02/2023    Hyperlipidemia 09/02/2023    Personal history of COVID-19 09/02/2023    History of transient ischemic attack 09/02/2023    Nicotine dependence 06/22/2023    Deviated nasal septum 06/13/2023    Asthma 04/10/2023    Bilateral pleural effusion 04/10/2023    Cholelithiasis 04/10/2023    Allergic rhinitis due to pollen 04/10/2023    History of hemolytic anemia 04/10/2023    Iron deficiency anemia 04/10/2023    Obstructive sleep apnea syndrome 04/10/2023    Subsequent non-ST elevation (NSTEMI) myocardial infarction 03/21/2023    Anxiety 03/20/2023    Cardiomyopathy 03/20/2023    History of mitral valve replacement 12/30/2022    Tachycardia 12/30/2022    Presence of automatic (implantable) cardiac defibrillator 05/29/2020    Ischemic cardiomyopathy 05/21/2020    Aspiration pneumonia (Multi) 05/18/2020    Anemia due to chronic kidney disease 05/14/2020    CKD (chronic kidney disease) 05/14/2020    Stable angina (CMS-[facility]) 12/02/2019    Gastro-esophageal reflux disease without esophagitis 12/02/2019    Major depressive disorder 12/02/2019    Diabetes (Multi) 05/10/2019    Chronic systolic heart failure 05/29/2015    CAD (coronary artery disease) 11/18/2014    COPD (chronic obstructive pulmonary disease) (Multi) 11/18/2014    Hypotension 11/18/2014    MI, acute, non ST segment elevation (Multi) 10/07/2014    Low back pain 04/06/2011    Lumbar spondylosis 04/06/2011    Dermatophytosis of nail 12/20/2010    Difficulty walking 12/13/2010    Chronic leg pain 09/03/2010    Obesity, unspecified 09/03/2010    Atrial flutter (Multi) 02/25/2025    Acute on chronic combined systolic (congestive) and diastolic (congestive) heart failure 07/08/2024        Detail Level: Detailed

## 2025-04-06 NOTE — PROGRESS NOTES
HFICU Attending Note     She received additional diuretics yesterday with improvement in her serum potassium however renal function has not yet returned to normal we did obtain a mixed venous saturation suggesting adequate cardiac index (2.9 estimated) which does not support but she is currently in a low output state we also repeated her thyroid level given her complaint of fatigue although this is somewhat chronic we will keep her in the ICU for now until her organ function stabilizes a bit more      This critically ill patient continues to be at-risk for clinically significant deterioration / failure due to the above mentioned dysfunctional, unstable organ systems.  I have personally identified and managed all complex critical care issues to prevent aforementioned clinical deterioration.  Critical care time is spent at bedside and/or the immediate area and has included, but is not limited to, the review of diagnostic tests, labs, radiographs, serial assessments of hemodynamics, respiratory status, ventilatory management, and family updates.  Time spent in procedures and teaching are reported separately.     Critical care time: __33__ minutes      Patient Active Problem List    Diagnosis Date Noted    Vascular catheter dysfunction 09/08/2024    Problem with vascular access 09/07/2024    Acute on chronic congestive heart failure, unspecified heart failure type 04/04/2025    Dizziness 02/25/2025    Acute on chronic systolic heart failure 02/05/2025    Atrial fib/flutter, transient (Multi) 01/22/2025    Ventricular tachycardia (paroxysmal) 01/11/2025    Shortness of breath 07/08/2024    Acute on chronic heart failure with normal ejection fraction 02/18/2024    Hypertension 02/17/2024    Acute gastritis 11/26/2023    Epistaxis 11/21/2023    HFrEF (heart failure with reduced ejection fraction) 11/21/2023    Open wound 11/21/2023    Acidosis, unspecified 11/20/2023    Contact with and (suspected) exposure to covid-19  09/02/2023    Hyperlipidemia 09/02/2023    Personal history of COVID-19 09/02/2023    History of transient ischemic attack 09/02/2023    Nicotine dependence 06/22/2023    Deviated nasal septum 06/13/2023    Asthma 04/10/2023    Bilateral pleural effusion 04/10/2023    Cholelithiasis 04/10/2023    Allergic rhinitis due to pollen 04/10/2023    History of hemolytic anemia 04/10/2023    Iron deficiency anemia 04/10/2023    Obstructive sleep apnea syndrome 04/10/2023    Subsequent non-ST elevation (NSTEMI) myocardial infarction 03/21/2023    Anxiety 03/20/2023    Cardiomyopathy 03/20/2023    History of mitral valve replacement 12/30/2022    Tachycardia 12/30/2022    Presence of automatic (implantable) cardiac defibrillator 05/29/2020    Ischemic cardiomyopathy 05/21/2020    Aspiration pneumonia (Multi) 05/18/2020    Anemia due to chronic kidney disease 05/14/2020    CKD (chronic kidney disease) 05/14/2020    Stable angina (CMS-[facility]) 12/02/2019    Gastro-esophageal reflux disease without esophagitis 12/02/2019    Major depressive disorder 12/02/2019    Diabetes (Multi) 05/10/2019    Chronic systolic heart failure 05/29/2015    CAD (coronary artery disease) 11/18/2014    COPD (chronic obstructive pulmonary disease) (Multi) 11/18/2014    Hypotension 11/18/2014    MI, acute, non ST segment elevation (Multi) 10/07/2014    Low back pain 04/06/2011    Lumbar spondylosis 04/06/2011    Dermatophytosis of nail 12/20/2010    Difficulty walking 12/13/2010    Chronic leg pain 09/03/2010    Obesity, unspecified 09/03/2010    Atrial flutter (Multi) 02/25/2025    Acute on chronic combined systolic (congestive) and diastolic (congestive) heart failure 07/08/2024

## 2025-04-06 NOTE — ED PROCEDURE NOTE
Procedure  Critical Care    Performed by: Gayle Roe DO  Authorized by: Gayle Roe DO    Critical care provider statement:     Critical care time (minutes):  40    Critical care time was exclusive of:  Separately billable procedures and treating other patients and teaching time    Critical care was necessary to treat or prevent imminent or life-threatening deterioration of the following conditions: Patient with severe heart failure on continuous milrinone drip presenting in exacerbation with impending respiratory failure requiring BiPAP.    Critical care was time spent personally by me on the following activities:  Development of treatment plan with patient or surrogate, evaluation of patient's response to treatment, examination of patient, obtaining history from patient or surrogate, ordering and performing treatments and interventions, ordering and review of laboratory studies, ordering and review of radiographic studies, pulse oximetry and re-evaluation of patient's condition    Care discussed with: admitting provider                 Gayle Roe DO  04/06/25 0951

## 2025-04-06 NOTE — PROGRESS NOTES
"[facility]  HFICU PROGRESS NOTE    Melissa Marinelli/[id number]    Admit Date: 4/4/2025  Hospital Length of Stay: 2   ICU Length of Stay: 2d 4h   Primary Service:   Primary HF Cardiologist:   Referring:    INTERVAL EVENTS / PERTINENT ROS:     No acute events overnight. Telemetry reviewed showing sinus tachycardia with frequent PVC's and short 10beat run NSVT earlier this am. She c/o nausea / vomiting with iron infusion, but this has since resolved. She continues to c/o dyspnea. She denies any c/o chest pain or discomfort. She is c/o a productive cough with clear sputum production. Nebs adjusted to TID after discussing use with RT.  Last BM 4/3 - miralax added.  Remains on home palliative inotrope, Milrinone @ 0.25 mcg/kg/min. .     Plan:  - 1unit LR-PRBC  - Trend H&H  - Bumex 2mg IV x 1 post transfusion  - C/w Milrinone 0.25mcg/kg/min    MEDICATIONS  Infusions:  milrinone in 5 % dextrose, Last Rate: 0.25 mcg/kg/min (04/06/25 0600)      Scheduled:  amiodarone, 200 mg, Daily  apixaban, 2.5 mg, BID  aspirin, 81 mg, Daily  bumetanide, 1 mg, Daily  cholecalciferol, 2,000 Units, Daily  dapagliflozin propanediol, 10 mg, Daily  famotidine, 40 mg, Daily  fluticasone furoate-vilanteroL, 1 puff, Daily  ipratropium-albuteroL, 3 mL, q6h  [START ON 4/7/2025] iron sucrose, 200 mg, Daily  [Held by provider] mupirocin, , TID  sennosides-docusate sodium, 2 tablet, Nightly      PRN:  acetaminophen, 975 mg, q8h PRN  albuterol, 2 puff, q4h PRN  dextromethorphan-guaifenesin, 1 tablet, BID PRN  polyethylene glycol, 17 g, Daily PRN  sodium chloride, 1 spray, PRN          PHYSICAL EXAM:   Visit Vitals  /68   Pulse 109   Temp 36.2 °C (97.2 °F)   Resp 21   Ht 1.651 m (5' 5\")   Wt 65 kg (143 lb 4.8 oz)   SpO2 100%   BMI 23.85 kg/m²   OB Status Postmenopausal   Smoking Status Former   BSA 1.73 m²       Wt Readings from Last 5 Encounters:   04/06/25 65 kg (143 lb 4.8 oz)   03/31/25 58.5 kg (129 lb)   03/24/25 56.7 " kg (125 lb 1.6 oz)   03/17/25 57.2 kg (126 lb)   02/27/25 60.8 kg (134 lb)       INTAKE/OUTPUT:  I/O last 3 completed shifts:  In: 1407.4 (21.7 mL/kg) [P.O.:1200; I.V.:207.4 (3.2 mL/kg)]  Out: 1850 (28.5 mL/kg) [Urine:1850 (0.8 mL/kg/hr)]  Weight: 65 kg      Physical Exam  HENT:      Head: Normocephalic.      Nose: Nose normal.      Mouth/Throat:      Mouth: Mucous membranes are moist.   Eyes:      Pupils: Pupils are equal, round, and reactive to light.   Neck:      Vascular: JVD present.   Cardiovascular:      Rate and Rhythm: Regular rhythm. Tachycardia present.      Pulses: Normal pulses.      Heart sounds: Murmur (holosystolic) heard.   Pulmonary:      Breath sounds: Examination of the right-lower field reveals rales. Examination of the left-lower field reveals rales. Decreased breath sounds and rales present.      Comments: Midline rt chest  Abdominal:      General: Bowel sounds are normal.      Palpations: Abdomen is soft.   Musculoskeletal:         General: Normal range of motion.      Cervical back: Normal range of motion and neck supple.   Skin:     General: Skin is warm and dry.      Capillary Refill: Capillary refill takes less than 2 seconds.   Neurological:      Mental Status: She is alert and oriented to person, place, and time.   Psychiatric:         Mood and Affect: Mood normal.         Behavior: Behavior normal.         Thought Content: Thought content normal.         Judgment: Judgment normal.         DATA:  CMP:  Results from last 7 days   Lab Units 04/06/25  0318 04/05/25  1238 04/05/25  0820 04/05/25  0435 04/04/25 2011 04/04/25  0751 04/04/25  0047   SODIUM mmol/L 128* 129* 131* 131* 132* 135* 134*   POTASSIUM mmol/L 4.2 5.0 5.5* 5.5* 5.6* 5.4* 6.3*   CHLORIDE mmol/L 95* 96* 97* 99 101 104 104   CO2 mmol/L 24 22 22 22 21 23 18*   ANION GAP mmol/L 13 16 18 16 16 13 18   BUN mg/dL 44* 44* 45* 44* 43* 38* 37*   CREATININE mg/dL 2.91* 3.07* 3.15* 3.11* 2.75* 2.51* 2.14*   EGFR mL/min/1.73m*2 17*  "16* 15* 16* 18* 20* 24*   MAGNESIUM mg/dL 2.23  --   --  2.60*  --  2.52*  --    ALBUMIN g/dL 3.0* 3.2* 3.1* 3.1* 3.3* 3.2* 3.6   ALT U/L  --  151*  --   --   --  108* 125*   AST U/L  --  114*  --   --   --  63* 82*   BILIRUBIN TOTAL mg/dL  --  0.7  --   --   --  0.7 0.8     CBC:  Results from last 7 days   Lab Units 04/06/25  0318 04/05/25  0435 04/04/25  0751 04/04/25  0047   WBC AUTO x10*3/uL 5.2 5.4 4.9 5.0   HEMOGLOBIN g/dL 7.2* 7.5* 7.3* 8.0*   HEMATOCRIT % 23.1* 22.0* 23.8* 24.1*   PLATELETS AUTO x10*3/uL 229 236 253 252   MCV fL 88 83 90 85     COAG:   Results from last 7 days   Lab Units 04/06/25 0318 04/05/25  0435 04/04/25  0753   INR  3.0* 3.6* 2.1*     ABO: No results found for: \"ABO\"  HEME/ENDO:  Results from last 7 days   Lab Units 04/05/25  0820 04/04/25  0753   FERRITIN ng/mL  --  25   IRON SATURATION %  --  4*   TSH mIU/L 0.71  --       CARDIAC:   Results from last 7 days   Lab Units 04/04/25  0751 04/04/25  0428 04/04/25  0235 04/04/25  0047   TROPHSCMC ng/L 292* 295* 302* 261*   BNP pg/mL  --   --   --  803*       ASSESSMENT AND PLAN:     Melissa Marinelli is a 69 y.o. female with a PMHx. significant for ICM/ HFrEF (last EF 10-15% 1/2025, on palliative milrinone infusion since 2/2024, s/p [employer] ICD 5/2020), CAD (s/p NSTEMI, s/p RIRI to LCX 1/2016), MVR (s/p mitral valve repair 6/2019; 29 mm Epic bioprosthetic valve with Dr. Montana), COPD (No PFTs on file), HTN, HL, GERD, hx of CVA, and DM, presenting to the ED with chief complaints of shortness of breath and lethargy. Of note, she ran out of her bumex for at least 1 week.      In the ED she was treated with 20 mg Furosemide IVP and placed on BIPAP mask. Lab results significant for hyperkalemia (K+ 6.3) treated with insulin / dextrose. Her troponin is elevated in the setting of heart failure: Trend: 261 -> 302 -> 292. Her clinical scenario is very similar to her previous admission 2/25 - 2/28/2025. Of note, this is her 4th admission this year. " "     Upon arrival to the HFICU, her SpO2 is 100% on 2 liters /min O2 via NC. Her breath sounds are normal. She is pleasant, alert and oriented in no distress. She does endorse feeling tired \"always.\" Her daughter, Sarah, was contacted to provide an update. Sarah states patient has had at least two falls at home since previous admission (PT made aware).  Her vitals are stable and all home meds, with the exception of spironolactone (hyperkalemia), have been resumed. Her Bumex dose is increased to 1 mg daily.       Neuro:  #Anxiety. Depression, Acute pain   #Lethargy (heart failure vs. anemia)  - Serial neuro and pain assessments   - PO Tylenol PRN for pain  - PT/OT Consult, OOB to chair  - CAM ICU score every shift  - Sleep/wake cycle normalization     #Recent falls x 2 at home  - Fall risk  - PT evaluation for discharge planning     #Substance abuse  -Alcohol abuse/Alcohol dependence: Denies  -Tobacco use/Nicotine Dependence: Denies      Cardiovascular:  #AHA Stage D ICM systolic HFrEF   - SHRUTHI: 2/27/25  - TTE: 7/8/24   - admit weight: 67.4 kgs (BMI: 24.7)  - admit BNP: 4/4: 803  - C/w Aspirin for MI prevention   - Not able to tolerate ACEi, ARB, ARNi (Entresto discontinued during last admission) - AHA Stage D  - C/w dapagliflozin 10 mg daily  - DISCONTINUED Spironolactone 2/2 hyperkalemia   - C/w Bumex dose to 1 mg PO daily  - Bumex 2mg IV x 1 post transfusion 4/6  - C/w Home Milrinone dose 0.25 mcg/kg/min -> Palliative Inotropes   - Daily standing weights, 2gm sodium diet, 2L fluid restriction, strict I&Os     #CAD   -LHC: 4/15/2019  -C/w Aspirin for coronary MI prevention      #Paroxysmal Atrial Fibrillation / flutter   -Device: [employer] ICD 5/2020   -C/w Home Amiodarone 200 mg daily  - C/w Eliquis 2.5mg PO BID (dose reduced 4/5)     #Electrolyte Disturbances  #Hyperkalemia (resolved)  - Trend lytes for hyperkalemia   - Received Kayexalate x 1 overnight 4/5/25 for hyperkalemia   - Continue to hold home dose " angeli     Pulmonary:   #COPD  #Shortness of breath   -Monitor and maintain SpO2 > 92%  -C/w albuterol 2 puffs every 4 hours as needed   -C/w Duo-Nebs scheduled every 6 hours   -C/w Breo Ellipta 100-25 mcg/dose 1 puff daily       GI:  - Bowel regimen: Stefany-Colace 2 tablets nightly, miralax prn  - PPI: Famotidine 40 mg daily       :  #CKD (stage 3):  -Baseline BUN/Cr: 30 - 38 / 2.2 - 2.7  -Admit BUN/Cr: 38 / 2.51  -Daily BUN / Creatinine: 4/6/25: 44 / 2.9  -I/Os  -avoid hypotension and nephrotoxic agents  -C/w home Bumex PO 1 mg daily      Heme:  #Anemia in the setting of iron deficiency anemia (reports nose bleeds at home)   #DEVIKA thrombus  - Labs: Hgb: 7.3, TIBC: 329, ferritin: 25, serum Fe: 12, folate: 10.1, B12: 1.064, % sat: 4  - Initiate IV Venofer x 5 days   - C/w Apixaban 2.5 mg twice daily (dose reduced 4/5)      # Coagulopathy due to home apixaban (DEVIKA thrombus) / aspirin      Endo:  - Euglycemic  - hgbA1c: 2/5/25: 5.7     #Thyroid  -TSH: 2/5/25: 1.4 /, Free T4: 1.04      ID:  -afebrile, nontoxic   -no s/s infx  -trend temps q4h      PHYSICAL AND OCCUPATIONAL THERAPY: Ordered     LINES:  PIVs  Wiley     DVT: apixaban   VAP BUNDLE: NA  CENTRAL LINE BUNDLE: NA  ULCER PPX: Famotidine   GLYCEMIC CONTROL: Euglycemic   BOWEL CARE: Stefany-colace   INDWELLING CATHETER: NA  NUTRITION: Adult diet Regular; 2000 mL fluid    EMERGENCY CONTACT: Extended Emergency Contact Information  Primary Emergency Contact: ShadyMary  Mobile Phone: [phone]  Relation: Daughter   needed? No  Secondary Emergency Contact: Elin Marinelli  Mobile Phone: [phone]  Relation: Daughter  Preferred language: English   needed? No  FAMILY UPDATE: DaughterSarah   CODE STATUS: Full Code  DISPO: Maintain in HFICU     Patient seen and assessed with Dr. Cerna     I personally spent 60 minutes of critical care time directly and personally managing the patient exclusive of separately billable procedures    _________________________________________________  JASMIN Shore

## 2025-04-07 ENCOUNTER — HOME CARE VISIT (OUTPATIENT)
Dept: HOME HEALTH SERVICES | Facility: HOME HEALTH | Age: 70
End: 2025-04-07
Payer: COMMERCIAL

## 2025-04-07 LAB
ALBUMIN SERPL BCP-MCNC: 2.9 G/DL (ref 3.4–5)
ALBUMIN SERPL BCP-MCNC: 3 G/DL (ref 3.4–5)
ALP SERPL-CCNC: 98 U/L (ref 33–136)
ALT SERPL W P-5'-P-CCNC: 172 U/L (ref 7–45)
ANION GAP SERPL CALC-SCNC: 15 MMOL/L (ref 10–20)
APTT PPP: 32 SECONDS (ref 26–36)
AST SERPL W P-5'-P-CCNC: 124 U/L (ref 9–39)
BASOPHILS # BLD AUTO: 0.02 X10*3/UL (ref 0–0.1)
BASOPHILS NFR BLD AUTO: 0.3 %
BILIRUB DIRECT SERPL-MCNC: 0.3 MG/DL (ref 0–0.3)
BILIRUB SERPL-MCNC: 0.9 MG/DL (ref 0–1.2)
BUN SERPL-MCNC: 38 MG/DL (ref 6–23)
CALCIUM SERPL-MCNC: 8.5 MG/DL (ref 8.6–10.6)
CHLORIDE SERPL-SCNC: 94 MMOL/L (ref 98–107)
CO2 SERPL-SCNC: 24 MMOL/L (ref 21–32)
CREAT SERPL-MCNC: 2.65 MG/DL (ref 0.5–1.05)
EGFRCR SERPLBLD CKD-EPI 2021: 19 ML/MIN/1.73M*2
EOSINOPHIL # BLD AUTO: 0.18 X10*3/UL (ref 0–0.7)
EOSINOPHIL NFR BLD AUTO: 3 %
ERYTHROCYTE [DISTWIDTH] IN BLOOD BY AUTOMATED COUNT: 16.5 % (ref 11.5–14.5)
GLUCOSE SERPL-MCNC: 83 MG/DL (ref 74–99)
HCT VFR BLD AUTO: 26 % (ref 36–46)
HGB BLD-MCNC: 8.3 G/DL (ref 12–16)
IMM GRANULOCYTES # BLD AUTO: 0.04 X10*3/UL (ref 0–0.7)
IMM GRANULOCYTES NFR BLD AUTO: 0.7 % (ref 0–0.9)
INR PPP: 2.6 (ref 0.9–1.1)
LYMPHOCYTES # BLD AUTO: 0.5 X10*3/UL (ref 1.2–4.8)
LYMPHOCYTES NFR BLD AUTO: 8.3 %
MAGNESIUM SERPL-MCNC: 2.11 MG/DL (ref 1.6–2.4)
MCH RBC QN AUTO: 27.9 PG (ref 26–34)
MCHC RBC AUTO-ENTMCNC: 31.9 G/DL (ref 32–36)
MCV RBC AUTO: 87 FL (ref 80–100)
MONOCYTES # BLD AUTO: 0.58 X10*3/UL (ref 0.1–1)
MONOCYTES NFR BLD AUTO: 9.6 %
NEUTROPHILS # BLD AUTO: 4.73 X10*3/UL (ref 1.2–7.7)
NEUTROPHILS NFR BLD AUTO: 78.1 %
NRBC BLD-RTO: 2.3 /100 WBCS (ref 0–0)
PHOSPHATE SERPL-MCNC: 4.2 MG/DL (ref 2.5–4.9)
PLATELET # BLD AUTO: 214 X10*3/UL (ref 150–450)
POTASSIUM SERPL-SCNC: 4.1 MMOL/L (ref 3.5–5.3)
PROT SERPL-MCNC: 5.8 G/DL (ref 6.4–8.2)
PROTHROMBIN TIME: 28.4 SECONDS (ref 9.8–12.4)
RBC # BLD AUTO: 2.98 X10*6/UL (ref 4–5.2)
SODIUM SERPL-SCNC: 129 MMOL/L (ref 136–145)
WBC # BLD AUTO: 6.1 X10*3/UL (ref 4.4–11.3)

## 2025-04-07 PROCEDURE — 2500000001 HC RX 250 WO HCPCS SELF ADMINISTERED DRUGS (ALT 637 FOR MEDICARE OP)

## 2025-04-07 PROCEDURE — 85610 PROTHROMBIN TIME: CPT | Performed by: STUDENT IN AN ORGANIZED HEALTH CARE EDUCATION/TRAINING PROGRAM

## 2025-04-07 PROCEDURE — 2500000002 HC RX 250 W HCPCS SELF ADMINISTERED DRUGS (ALT 637 FOR MEDICARE OP, ALT 636 FOR OP/ED): Performed by: NURSE PRACTITIONER

## 2025-04-07 PROCEDURE — 2500000002 HC RX 250 W HCPCS SELF ADMINISTERED DRUGS (ALT 637 FOR MEDICARE OP, ALT 636 FOR OP/ED)

## 2025-04-07 PROCEDURE — 2500000004 HC RX 250 GENERAL PHARMACY W/ HCPCS (ALT 636 FOR OP/ED): Performed by: NURSE PRACTITIONER

## 2025-04-07 PROCEDURE — 94640 AIRWAY INHALATION TREATMENT: CPT

## 2025-04-07 PROCEDURE — 2020000001 HC ICU ROOM DAILY

## 2025-04-07 PROCEDURE — 84075 ASSAY ALKALINE PHOSPHATASE: CPT | Performed by: NURSE PRACTITIONER

## 2025-04-07 PROCEDURE — 2500000004 HC RX 250 GENERAL PHARMACY W/ HCPCS (ALT 636 FOR OP/ED)

## 2025-04-07 PROCEDURE — 85025 COMPLETE CBC W/AUTO DIFF WBC: CPT | Performed by: STUDENT IN AN ORGANIZED HEALTH CARE EDUCATION/TRAINING PROGRAM

## 2025-04-07 PROCEDURE — 83735 ASSAY OF MAGNESIUM: CPT | Performed by: STUDENT IN AN ORGANIZED HEALTH CARE EDUCATION/TRAINING PROGRAM

## 2025-04-07 PROCEDURE — 80069 RENAL FUNCTION PANEL: CPT | Performed by: STUDENT IN AN ORGANIZED HEALTH CARE EDUCATION/TRAINING PROGRAM

## 2025-04-07 PROCEDURE — 2500000004 HC RX 250 GENERAL PHARMACY W/ HCPCS (ALT 636 FOR OP/ED): Mod: TB | Performed by: NURSE PRACTITIONER

## 2025-04-07 PROCEDURE — 99291 CRITICAL CARE FIRST HOUR: CPT | Performed by: SPECIALIST

## 2025-04-07 PROCEDURE — 37799 UNLISTED PX VASCULAR SURGERY: CPT | Performed by: STUDENT IN AN ORGANIZED HEALTH CARE EDUCATION/TRAINING PROGRAM

## 2025-04-07 RX ORDER — BUMETANIDE 0.25 MG/ML
1 INJECTION, SOLUTION INTRAMUSCULAR; INTRAVENOUS ONCE
Status: COMPLETED | OUTPATIENT
Start: 2025-04-07 | End: 2025-04-07

## 2025-04-07 RX ADMIN — DAPAGLIFLOZIN 10 MG: 10 TABLET, FILM COATED ORAL at 08:09

## 2025-04-07 RX ADMIN — POLYETHYLENE GLYCOL 3350 17 G: 17 POWDER, FOR SOLUTION ORAL at 10:01

## 2025-04-07 RX ADMIN — CHOLECALCIFEROL TAB 25 MCG (1000 UNIT) 2000 UNITS: 25 TAB at 08:08

## 2025-04-07 RX ADMIN — BUMETANIDE 1 MG: 1 TABLET ORAL at 08:09

## 2025-04-07 RX ADMIN — IPRATROPIUM BROMIDE AND ALBUTEROL SULFATE 3 ML: .5; 3 SOLUTION RESPIRATORY (INHALATION) at 13:29

## 2025-04-07 RX ADMIN — ASPIRIN 81 MG: 81 TABLET, COATED ORAL at 08:09

## 2025-04-07 RX ADMIN — BUMETANIDE 1 MG: 0.25 INJECTION INTRAMUSCULAR; INTRAVENOUS at 10:01

## 2025-04-07 RX ADMIN — AMIODARONE HYDROCHLORIDE 200 MG: 200 TABLET ORAL at 08:09

## 2025-04-07 RX ADMIN — APIXABAN 2.5 MG: 2.5 TABLET, FILM COATED ORAL at 08:09

## 2025-04-07 RX ADMIN — IPRATROPIUM BROMIDE AND ALBUTEROL SULFATE 3 ML: .5; 3 SOLUTION RESPIRATORY (INHALATION) at 19:33

## 2025-04-07 RX ADMIN — SENNOSIDES AND DOCUSATE SODIUM 2 TABLET: 50; 8.6 TABLET ORAL at 20:08

## 2025-04-07 RX ADMIN — APIXABAN 2.5 MG: 2.5 TABLET, FILM COATED ORAL at 20:08

## 2025-04-07 RX ADMIN — IPRATROPIUM BROMIDE AND ALBUTEROL SULFATE 3 ML: .5; 3 SOLUTION RESPIRATORY (INHALATION) at 08:03

## 2025-04-07 RX ADMIN — IPRATROPIUM BROMIDE AND ALBUTEROL SULFATE 3 ML: .5; 3 SOLUTION RESPIRATORY (INHALATION) at 01:16

## 2025-04-07 RX ADMIN — ACETAMINOPHEN 975 MG: 325 TABLET, FILM COATED ORAL at 20:45

## 2025-04-07 RX ADMIN — FAMOTIDINE 40 MG: 20 TABLET ORAL at 08:08

## 2025-04-07 RX ADMIN — MILRINONE LACTATE IN DEXTROSE 0.25 MCG/KG/MIN: 200 INJECTION, SOLUTION INTRAVENOUS at 16:37

## 2025-04-07 ASSESSMENT — PAIN SCALES - GENERAL
PAINLEVEL_OUTOF10: 0 - NO PAIN
PAINLEVEL_OUTOF10: 2
PAINLEVEL_OUTOF10: 0 - NO PAIN

## 2025-04-07 ASSESSMENT — PAIN - FUNCTIONAL ASSESSMENT
PAIN_FUNCTIONAL_ASSESSMENT: 0-10

## 2025-04-07 ASSESSMENT — PAIN DESCRIPTION - ORIENTATION: ORIENTATION: RIGHT;LEFT

## 2025-04-07 ASSESSMENT — PAIN DESCRIPTION - LOCATION: LOCATION: LEG

## 2025-04-07 NOTE — CARE PLAN
The patient's goals for the shift include have increased rest by the end of shift.     The clinical goals for the shift include Patient will have improved breathing with exertion.    Over the shift, the patient did not make progress toward the following goals.   Problem: Fall/Injury  Goal: Verbalize understanding of personal risk factors for fall in the hospital  Outcome: Progressing     Problem: Fall/Injury  Goal: Verbalize understanding of risk factor reduction measures to prevent injury from fall in the home  Outcome: Progressing     Problem: Fall/Injury  Goal: Pace activities to prevent fatigue by end of the shift  Outcome: Progressing     Problem: Discharge Planning  Goal: Discharge to home or other facility with appropriate resources  Outcome: Progressing  Flowsheets (Taken 4/6/2025 2038)  Discharge to home or other facility with appropriate resources:   Arrange for needed discharge resources and transportation as appropriate   Identify discharge learning needs (meds, wound care, etc)   Refer to discharge planning if patient needs post-hospital services based on physician order or complex needs related to functional status, cognitive ability or social support system     Problem: Chronic Conditions and Co-morbidities  Goal: Patient's chronic conditions and co-morbidity symptoms are monitored and maintained or improved  Outcome: Progressing  Flowsheets (Taken 4/6/2025 2038)  Care Plan - Patient's Chronic Conditions and Co-Morbidity Symptoms are Monitored and Maintained or Improved:   Monitor and assess patient's chronic conditions and comorbid symptoms for stability, deterioration, or improvement   Collaborate with multidisciplinary team to address chronic and comorbid conditions and prevent exacerbation or deterioration   Update acute care plan with appropriate goals if chronic or comorbid symptoms are exacerbated and prevent overall improvement and discharge     Problem: Nutrition  Goal: Nutrient intake  appropriate for maintaining nutritional needs  Outcome: Progressing     Problem: Skin  Goal: Promote/optimize nutrition  Outcome: Progressing  Flowsheets (Taken 4/6/2025 2038)  Promote/optimize nutrition:   Assist with feeding   Monitor/record intake including meals   Reassess MST if dietician not consulted   Offer water/supplements/favorite foods     Problem: Heart Failure  Goal: Improved gas exchange this shift  Outcome: Progressing  Flowsheets (Taken 4/6/2025 2038)  Improved gas exchange this shift:   Assist with pulmonary hygiene and secretion clearance   Prepare for use of devices/procedures to correct dysrhythmia   Position to promote circulation/maximize ventilation     Problem: Heart Failure  Goal: Improved urinary output this shift  Outcome: Progressing  Flowsheets (Taken 4/6/2025 2038)  Improved urinary output this shift:   Med administration/monitor effect   Monitor intake/output and daily weight   Monitor serum electrolytes/replace per order     Problem: Heart Failure  Goal: Report improvement of dyspnea/breathlessness this shift  Outcome: Progressing  Flowsheets (Taken 4/6/2025 2038)  Report improvement of dyspnea/breathlessness this shift:   Ambulate/OOB 3 times daily   Encourage activity/mobility/ROM   Incentive spirometry/deep breathing /HOB elevated elevated   Consider PT/OT consult   Facilitate activity/mobility per patient tolerance/advance     Problem: Heart Failure  Goal: Weight from fluid excess reduced over 2-3 days, then stabilize  Outcome: Progressing  Flowsheets (Taken 4/6/2025 2038)  Weight from fluid excess reduced over 2-3 days, then stabilize:   Med administration/monitor effect   Monitor serum electrolytes/replace per order   Monitor bowel elimination   Monitor intake/output and daily weight  Barriers to progression include ***. Recommendations to address these barriers include ***.     activities, education on benefits of working with PT/OT as able, educate and encourage incentive spirometer and review medications and make changes as appropriate .

## 2025-04-07 NOTE — PROGRESS NOTES
"[facility]  HFICU PROGRESS NOTE    Melissa Marinelli/[id number]    Admit Date: 4/4/2025  Hospital Length of Stay: 3   ICU Length of Stay: 3d   Primary Service:   Primary HF Cardiologist:   Referring:    INTERVAL EVENTS / PERTINENT ROS:     No acute events overnight. Telemetry reviewed showing sinus tachycardia with frequent PVC's . C/o nausea / vomiting with iron infusion, Iron IV stopped after 3 doses yesterday. Remains on home palliative inotrope, Milrinone @ 0.25 mcg/kg/min.     No new complaints today.    Plan:  - C/w Milrinone 0.25mcg/kg/min  - Bumex 1 mg x1  IVP     MEDICATIONS  Infusions:  milrinone in 5 % dextrose, Last Rate: 0.25 mcg/kg/min (04/07/25 0700)      Scheduled:  amiodarone, 200 mg, Daily  apixaban, 2.5 mg, BID  aspirin, 81 mg, Daily  bumetanide, 1 mg, Daily  cholecalciferol, 2,000 Units, Daily  dapagliflozin propanediol, 10 mg, Daily  famotidine, 40 mg, Daily  ipratropium-albuteroL, 3 mL, q6h  [Held by provider] mupirocin, , TID  sennosides-docusate sodium, 2 tablet, Nightly      PRN:  acetaminophen, 975 mg, q8h PRN  albuterol, 2 puff, q4h PRN  dextromethorphan-guaifenesin, 1 tablet, BID PRN  polyethylene glycol, 17 g, Daily PRN  sodium chloride, 1 spray, PRN          PHYSICAL EXAM:   Visit Vitals  BP 96/54   Pulse 106   Temp 36.6 °C (97.9 °F)   Resp 21   Ht 1.651 m (5' 5\")   Wt 61.5 kg (135 lb 9.3 oz)   SpO2 100%   BMI 22.56 kg/m²   OB Status Postmenopausal   Smoking Status Former   BSA 1.68 m²       Wt Readings from Last 5 Encounters:   04/07/25 61.5 kg (135 lb 9.3 oz)   03/31/25 58.5 kg (129 lb)   03/24/25 56.7 kg (125 lb 1.6 oz)   03/17/25 57.2 kg (126 lb)   02/27/25 60.8 kg (134 lb)       INTAKE/OUTPUT:  I/O last 3 completed shifts:  In: 1778.6 (28.9 mL/kg) [P.O.:1260; I.V.:218.6 (3.6 mL/kg); Blood:300]  Out: 2150 (35 mL/kg) [Urine:2150 (1 mL/kg/hr)]  Weight: 61.5 kg      Physical Exam  HENT:      Head: Normocephalic.      Nose: Nose normal.      Mouth/Throat:      " Mouth: Mucous membranes are moist.   Eyes:      Pupils: Pupils are equal, round, and reactive to light.   Neck:      Vascular: JVD present.   Cardiovascular:      Rate and Rhythm: Regular rhythm. Tachycardia present.      Pulses: Normal pulses.      Heart sounds: Murmur (holosystolic) heard.   Pulmonary:      Breath sounds: Examination of the right-lower field reveals rales. Examination of the left-lower field reveals rales. Decreased breath sounds and rales present.      Comments: Midline rt chest  Abdominal:      General: Bowel sounds are normal.      Palpations: Abdomen is soft.   Musculoskeletal:         General: Normal range of motion.      Cervical back: Normal range of motion and neck supple.   Skin:     General: Skin is warm and dry.      Capillary Refill: Capillary refill takes less than 2 seconds.   Neurological:      Mental Status: She is alert and oriented to person, place, and time.   Psychiatric:         Mood and Affect: Mood normal.         Behavior: Behavior normal.         Thought Content: Thought content normal.         Judgment: Judgment normal.         DATA:  CMP:  Results from last 7 days   Lab Units 04/07/25  0442 04/06/25  0318 04/05/25  1238 04/05/25  0820 04/05/25  0435 04/04/25 2011 04/04/25  0751 04/04/25  0047   SODIUM mmol/L 129* 128* 129* 131* 131* 132* 135* 134*   POTASSIUM mmol/L 4.1 4.2 5.0 5.5* 5.5* 5.6* 5.4* 6.3*   CHLORIDE mmol/L 94* 95* 96* 97* 99 101 104 104   CO2 mmol/L 24 24 22 22 22 21 23 18*   ANION GAP mmol/L 15 13 16 18 16 16 13 18   BUN mg/dL 38* 44* 44* 45* 44* 43* 38* 37*   CREATININE mg/dL 2.65* 2.91* 3.07* 3.15* 3.11* 2.75* 2.51* 2.14*   EGFR mL/min/1.73m*2 19* 17* 16* 15* 16* 18* 20* 24*   MAGNESIUM mg/dL 2.11 2.23  --   --  2.60*  --  2.52*  --    ALBUMIN g/dL 3.0* 3.0* 3.2* 3.1* 3.1* 3.3* 3.2* 3.6   ALT U/L  --   --  151*  --   --   --  108* 125*   AST U/L  --   --  114*  --   --   --  63* 82*   BILIRUBIN TOTAL mg/dL  --   --  0.7  --   --   --  0.7 0.8      CBC:  Results from last 7 days   Lab Units 04/07/25  0442 04/06/25  1547 04/06/25  0318 04/05/25  0435 04/04/25  0751 04/04/25  0047   WBC AUTO x10*3/uL 6.1 6.3 5.2 5.4 4.9 5.0   HEMOGLOBIN g/dL 8.3* 9.1* 7.2* 7.5* 7.3* 8.0*   HEMATOCRIT % 26.0* 28.6* 23.1* 22.0* 23.8* 24.1*   PLATELETS AUTO x10*3/uL 214 228 229 236 253 252   MCV fL 87 87 88 83 90 85     COAG:   Results from last 7 days   Lab Units 04/07/25  0442 04/06/25  0318 04/05/25  0435 04/04/25  0753   INR  2.6* 3.0* 3.6* 2.1*     ABO:   ABO TYPE   Date Value Ref Range Status   04/06/2025 O  Final     HEME/ENDO:  Results from last 7 days   Lab Units 04/05/25  0820 04/04/25  0753   FERRITIN ng/mL  --  25   IRON SATURATION %  --  4*   TSH mIU/L 0.71  --       CARDIAC:   Results from last 7 days   Lab Units 04/04/25  0751 04/04/25  0428 04/04/25  0235 04/04/25  0047   TROPHSCMC ng/L 292* 295* 302* 261*   BNP pg/mL  --   --   --  803*       ASSESSMENT AND PLAN:     Melissa Marinelli is a 69 y.o. female with a PMHx. significant for ICM/ HFrEF (last EF 10-15% 1/2025, on palliative milrinone infusion since 2/2024, s/p [employer] ICD 5/2020), CAD (s/p NSTEMI, s/p RIRI to LCX 1/2016), MVR (s/p mitral valve repair 6/2019; 29 mm Epic bioprosthetic valve with Dr. Montana), COPD (No PFTs on file), HTN, HL, GERD, hx of CVA, and DM, presenting to the ED with chief complaints of shortness of breath and lethargy. Of note, she ran out of her bumex for at least 1 week.      In the ED she was treated with 20 mg Furosemide IVP and placed on BIPAP mask. Lab results significant for hyperkalemia (K+ 6.3) treated with insulin / dextrose. Her troponin is elevated in the setting of heart failure: Trend: 261 -> 302 -> 292. Her clinical scenario is very similar to her previous admission 2/25 - 2/28/2025. Of note, this is her 4th admission this year.      Upon arrival to the HFICU, her SpO2 is 100% on 2 liters /min O2 via NC. Her breath sounds are normal. She is pleasant, alert and  "oriented in no distress. She does endorse feeling tired \"always.\" Her daughter, Sarah, was contacted to provide an update. Sarah states patient has had at least two falls at home since previous admission (PT made aware).  Her vitals are stable and all home meds, with the exception of spironolactone (hyperkalemia), have been resumed. Her Bumex dose is increased to 1 mg daily.       Neuro:  #Anxiety. Depression, Acute pain   #Lethargy (heart failure vs. anemia)  - Serial neuro and pain assessments   - PO Tylenol PRN for pain  - PT/OT Consult, OOB to chair  - CAM ICU score every shift  - Sleep/wake cycle normalization     #Recent falls x 2 at home  - Fall risk  - PT evaluation for discharge planning     #Substance abuse  -Alcohol abuse/Alcohol dependence: Denies  -Tobacco use/Nicotine Dependence: Denies      Cardiovascular:  #AHA Stage D ICM systolic HFrEF   - SHRUTHI: 2/27/25  - TTE: 7/8/24   - admit weight: 67.4 kgs (BMI: 24.7)  - admit BNP: 4/4: 803  - C/w Aspirin for MI prevention   - Not able to tolerate ACEi, ARB, ARNi (Entresto discontinued during last admission) - AHA Stage D  - C/w dapagliflozin 10 mg daily  - DISCONTINUED Spironolactone 2/2 hyperkalemia   - C/w Bumex dose to 1 mg PO daily  - Bumex 2mg IV x 2 post transfusion 4/6, bumex 1 mg IVP x1 on 4/7  - C/w Home Milrinone dose 0.25 mcg/kg/min -> Palliative Inotropes   - Daily standing weights, 2gm sodium diet, 2L fluid restriction, strict I&Os     #CAD   -LHC: 4/15/2019  -C/w Aspirin for coronary MI prevention      #Paroxysmal Atrial Fibrillation / flutter   -Device: [employer] ICD 5/2020   -C/w Home Amiodarone 200 mg daily  - C/w Eliquis 2.5mg PO BID (dose reduced 4/5)     #Electrolyte Disturbances  #Hyperkalemia (resolved)  - Trend lytes for hyperkalemia   - Received Kayexalate x 1 overnight 4/5/25 for hyperkalemia   - Continue to hold home dose angeli     Pulmonary:   #COPD  #Shortness of breath   -Monitor and maintain SpO2 > 92%  -C/w albuterol 2 puffs " every 4 hours as needed   -C/w Duo-Nebs scheduled every 6 hours   -C/w Breo Ellipta 100-25 mcg/dose 1 puff daily       GI:  - Bowel regimen: Stefany-Colace 2 tablets nightly, miralax prn  - PPI: Famotidine 40 mg daily       :  #CKD (stage 3):  -Baseline BUN/Cr: 30 - 38 / 2.2 - 2.7  -Admit BUN/Cr: 38 / 2.51  -Daily BUN / Creatinine: 4/6/25: 44 / 2.9  -I/Os  -avoid hypotension and nephrotoxic agents  -C/w home Bumex PO 1 mg daily      Heme:  #Anemia in the setting of iron deficiency anemia (reports nose bleeds at home)   #DEVIKA thrombus  - Labs: Hgb: 7.3, TIBC: 329, ferritin: 25, serum Fe: 12, folate: 10.1, B12: 1.064, % sat: 4  - Initiate IV Venofer x 5 days   - C/w Apixaban 2.5 mg twice daily (dose reduced 4/5)      # Coagulopathy due to home apixaban (DEVIKA thrombus) / aspirin      Endo:  - Euglycemic  - hgbA1c: 2/5/25: 5.7     #Thyroid  -TSH: 2/5/25: 1.4 /, Free T4: 1.04      ID:  -afebrile, nontoxic   -no s/s infx  -trend temps q4h      PHYSICAL AND OCCUPATIONAL THERAPY: Ordered     LINES:  PIVs  Wiley     DVT: apixaban   VAP BUNDLE: NA  CENTRAL LINE BUNDLE: NA  ULCER PPX: Famotidine   GLYCEMIC CONTROL: Euglycemic   BOWEL CARE: Stefany-colace   INDWELLING CATHETER: NA  NUTRITION: Adult diet Regular; 2000 mL fluid    EMERGENCY CONTACT: Extended Emergency Contact Information  Primary Emergency Contact: Mary Caruso  Mobile Phone: [phone]  Relation: Daughter   needed? No  Secondary Emergency Contact: Elin Marinelli  Mobile Phone: [phone]  Relation: Daughter  Preferred language: English   needed? No  FAMILY UPDATE: Sarah Luevano   CODE STATUS: Full Code  DISPO: Maintain in HFICU     Patient seen and assessed with Dr. Medina    I personally spent 60 minutes of critical care time directly and personally managing the patient exclusive of separately billable procedures   _________________________________________________  JASMIN Da Silva

## 2025-04-07 NOTE — CARE PLAN
Problem: Fall/Injury  Goal: Verbalize understanding of personal risk factors for fall in the hospital  Outcome: Progressing  Goal: Verbalize understanding of risk factor reduction measures to prevent injury from fall in the home  Outcome: Progressing  Goal: Pace activities to prevent fatigue by end of the shift  Outcome: Progressing     Problem: Discharge Planning  Goal: Discharge to home or other facility with appropriate resources  Outcome: Progressing  Flowsheets (Taken 4/7/2025 0845)  Discharge to home or other facility with appropriate resources:   Identify barriers to discharge with patient and caregiver   Arrange for needed discharge resources and transportation as appropriate   Arrange for interpreters to assist at discharge as needed   Identify discharge learning needs (meds, wound care, etc)     Problem: Chronic Conditions and Co-morbidities  Goal: Patient's chronic conditions and co-morbidity symptoms are monitored and maintained or improved  Flowsheets (Taken 4/7/2025 0845)  Care Plan - Patient's Chronic Conditions and Co-Morbidity Symptoms are Monitored and Maintained or Improved:   Monitor and assess patient's chronic conditions and comorbid symptoms for stability, deterioration, or improvement   Collaborate with multidisciplinary team to address chronic and comorbid conditions and prevent exacerbation or deterioration     Problem: Nutrition  Goal: Nutrient intake appropriate for maintaining nutritional needs  Outcome: Progressing     Problem: Skin  Goal: Promote/optimize nutrition  Outcome: Progressing  Flowsheets (Taken 4/7/2025 0845)  Promote/optimize nutrition:   Assist with feeding   Consume > 50% meals/supplements     Problem: Heart Failure  Goal: Improved gas exchange this shift  Outcome: Progressing  Flowsheets (Taken 4/7/2025 0845)  Improved gas exchange this shift:   Assist with pulmonary hygiene and secretion clearance   Position to promote circulation/maximize ventilation   Prepare for  use of devices/procedures to correct dysrhythmia   The patient's goals for the shift include      The clinical goals for the shift include pt will remain HDS throughout shift

## 2025-04-07 NOTE — PROGRESS NOTES
SOCIAL WORK NOTE   -ICU Treatment Plan: Per notes and reports, improved, considering  floor   -Support System: Daughters   -Planned Disposition: Resume [facility], from home with palliative inotrope   -Additional information:  Social work to follow.   CHRIS Paula, LISW-S ([id number])

## 2025-04-07 NOTE — PROGRESS NOTES
HFICU Attending Note  69 y.o. female with a PMHx. significant for ICM/ HFrEF (last EF 10-15% 1/2025, on palliative milrinone infusion since 2/2024, s/p [employer] ICD 5/2020), CAD (s/p NSTEMI, s/p RIRI to LCX 1/2016), MVR (s/p mitral valve repair 6/2019; 29 mm Epic bioprosthetic valve with Dr. Montana), COPD (No PFTs on file), HTN, HL, GERD, hx of CVA, and DM, presenting to the ED with chief complaints of shortness of breath and lethargy. Of note, she ran out of her bumex for at least 1 week.   Plan  Improved but still with volume, and continued soft Bps.  Will diurese, continue to hold angeli (hyperkalemia)  H/H stable post transfusion.   Will watch in unit overnight.      This critically ill patient continues to be at-risk for clinically significant deterioration / failure due to the above mentioned dysfunctional, unstable organ systems.  I have personally identified and managed all complex critical care issues to prevent aforementioned clinical deterioration.  Critical care time is spent at bedside and/or the immediate area and has included, but is not limited to, the review of diagnostic tests, labs, radiographs, serial assessments of hemodynamics, respiratory status, ventilatory management, and family updates.  Time spent in procedures and teaching are reported separately.     Critical care time: __35__ minutes

## 2025-04-08 LAB
ALBUMIN SERPL BCP-MCNC: 2.9 G/DL (ref 3.4–5)
ALBUMIN SERPL BCP-MCNC: 3 G/DL (ref 3.4–5)
ANION GAP SERPL CALC-SCNC: 10 MMOL/L (ref 10–20)
ANION GAP SERPL CALC-SCNC: 15 MMOL/L (ref 10–20)
APTT PPP: 32 SECONDS (ref 26–36)
BASOPHILS # BLD AUTO: 0.02 X10*3/UL (ref 0–0.1)
BASOPHILS NFR BLD AUTO: 0.4 %
BNP SERPL-MCNC: 992 PG/ML (ref 0–99)
BUN SERPL-MCNC: 38 MG/DL (ref 6–23)
BUN SERPL-MCNC: 39 MG/DL (ref 6–23)
CALCIUM SERPL-MCNC: 8.3 MG/DL (ref 8.6–10.6)
CALCIUM SERPL-MCNC: 8.4 MG/DL (ref 8.6–10.6)
CHLORIDE SERPL-SCNC: 88 MMOL/L (ref 98–107)
CHLORIDE SERPL-SCNC: 95 MMOL/L (ref 98–107)
CO2 SERPL-SCNC: 24 MMOL/L (ref 21–32)
CO2 SERPL-SCNC: 24 MMOL/L (ref 21–32)
CREAT SERPL-MCNC: 2.56 MG/DL (ref 0.5–1.05)
CREAT SERPL-MCNC: 2.72 MG/DL (ref 0.5–1.05)
EGFRCR SERPLBLD CKD-EPI 2021: 18 ML/MIN/1.73M*2
EGFRCR SERPLBLD CKD-EPI 2021: 20 ML/MIN/1.73M*2
EOSINOPHIL # BLD AUTO: 0.15 X10*3/UL (ref 0–0.7)
EOSINOPHIL NFR BLD AUTO: 3 %
ERYTHROCYTE [DISTWIDTH] IN BLOOD BY AUTOMATED COUNT: 16 % (ref 11.5–14.5)
GLUCOSE BLD MANUAL STRIP-MCNC: 173 MG/DL (ref 74–99)
GLUCOSE SERPL-MCNC: 141 MG/DL (ref 74–99)
GLUCOSE SERPL-MCNC: 74 MG/DL (ref 74–99)
HCT VFR BLD AUTO: 25.9 % (ref 36–46)
HGB BLD-MCNC: 8.8 G/DL (ref 12–16)
IMM GRANULOCYTES # BLD AUTO: 0.03 X10*3/UL (ref 0–0.7)
IMM GRANULOCYTES NFR BLD AUTO: 0.6 % (ref 0–0.9)
INR PPP: 2.2 (ref 0.9–1.1)
LYMPHOCYTES # BLD AUTO: 0.59 X10*3/UL (ref 1.2–4.8)
LYMPHOCYTES NFR BLD AUTO: 12 %
MAGNESIUM SERPL-MCNC: 2.05 MG/DL (ref 1.6–2.4)
MCH RBC QN AUTO: 28.7 PG (ref 26–34)
MCHC RBC AUTO-ENTMCNC: 34 G/DL (ref 32–36)
MCV RBC AUTO: 84 FL (ref 80–100)
MONOCYTES # BLD AUTO: 0.58 X10*3/UL (ref 0.1–1)
MONOCYTES NFR BLD AUTO: 11.8 %
NEUTROPHILS # BLD AUTO: 3.55 X10*3/UL (ref 1.2–7.7)
NEUTROPHILS NFR BLD AUTO: 72.2 %
NRBC BLD-RTO: 1 /100 WBCS (ref 0–0)
OSMOLALITY SERPL: 271 MOSM/KG (ref 280–300)
OSMOLALITY UR: 412 MOSM/KG (ref 200–1200)
PHOSPHATE SERPL-MCNC: 3.8 MG/DL (ref 2.5–4.9)
PHOSPHATE SERPL-MCNC: 4.1 MG/DL (ref 2.5–4.9)
PLATELET # BLD AUTO: 200 X10*3/UL (ref 150–450)
POTASSIUM SERPL-SCNC: 4.2 MMOL/L (ref 3.5–5.3)
POTASSIUM SERPL-SCNC: 4.3 MMOL/L (ref 3.5–5.3)
PROTHROMBIN TIME: 24.4 SECONDS (ref 9.8–12.4)
RBC # BLD AUTO: 3.07 X10*6/UL (ref 4–5.2)
SODIUM SERPL-SCNC: 123 MMOL/L (ref 136–145)
SODIUM SERPL-SCNC: 125 MMOL/L (ref 136–145)
WBC # BLD AUTO: 4.9 X10*3/UL (ref 4.4–11.3)

## 2025-04-08 PROCEDURE — 83735 ASSAY OF MAGNESIUM: CPT | Performed by: STUDENT IN AN ORGANIZED HEALTH CARE EDUCATION/TRAINING PROGRAM

## 2025-04-08 PROCEDURE — 85025 COMPLETE CBC W/AUTO DIFF WBC: CPT | Performed by: STUDENT IN AN ORGANIZED HEALTH CARE EDUCATION/TRAINING PROGRAM

## 2025-04-08 PROCEDURE — 97530 THERAPEUTIC ACTIVITIES: CPT | Mod: GP

## 2025-04-08 PROCEDURE — 2500000001 HC RX 250 WO HCPCS SELF ADMINISTERED DRUGS (ALT 637 FOR MEDICARE OP)

## 2025-04-08 PROCEDURE — 1200000002 HC GENERAL ROOM WITH TELEMETRY DAILY

## 2025-04-08 PROCEDURE — 97161 PT EVAL LOW COMPLEX 20 MIN: CPT | Mod: GP

## 2025-04-08 PROCEDURE — 2500000002 HC RX 250 W HCPCS SELF ADMINISTERED DRUGS (ALT 637 FOR MEDICARE OP, ALT 636 FOR OP/ED)

## 2025-04-08 PROCEDURE — 85610 PROTHROMBIN TIME: CPT | Performed by: STUDENT IN AN ORGANIZED HEALTH CARE EDUCATION/TRAINING PROGRAM

## 2025-04-08 PROCEDURE — 84100 ASSAY OF PHOSPHORUS: CPT

## 2025-04-08 PROCEDURE — 2500000004 HC RX 250 GENERAL PHARMACY W/ HCPCS (ALT 636 FOR OP/ED): Performed by: NURSE PRACTITIONER

## 2025-04-08 PROCEDURE — 2500000002 HC RX 250 W HCPCS SELF ADMINISTERED DRUGS (ALT 637 FOR MEDICARE OP, ALT 636 FOR OP/ED): Performed by: NURSE PRACTITIONER

## 2025-04-08 PROCEDURE — 99233 SBSQ HOSP IP/OBS HIGH 50: CPT | Performed by: SPECIALIST

## 2025-04-08 PROCEDURE — 37799 UNLISTED PX VASCULAR SURGERY: CPT

## 2025-04-08 PROCEDURE — 82947 ASSAY GLUCOSE BLOOD QUANT: CPT

## 2025-04-08 PROCEDURE — 94640 AIRWAY INHALATION TREATMENT: CPT

## 2025-04-08 PROCEDURE — 2500000001 HC RX 250 WO HCPCS SELF ADMINISTERED DRUGS (ALT 637 FOR MEDICARE OP): Performed by: NURSE PRACTITIONER

## 2025-04-08 PROCEDURE — 83930 ASSAY OF BLOOD OSMOLALITY: CPT

## 2025-04-08 PROCEDURE — 37799 UNLISTED PX VASCULAR SURGERY: CPT | Performed by: STUDENT IN AN ORGANIZED HEALTH CARE EDUCATION/TRAINING PROGRAM

## 2025-04-08 PROCEDURE — 83880 ASSAY OF NATRIURETIC PEPTIDE: CPT

## 2025-04-08 PROCEDURE — 80069 RENAL FUNCTION PANEL: CPT | Performed by: STUDENT IN AN ORGANIZED HEALTH CARE EDUCATION/TRAINING PROGRAM

## 2025-04-08 RX ORDER — AMOXICILLIN 250 MG
2 CAPSULE ORAL ONCE
Status: COMPLETED | OUTPATIENT
Start: 2025-04-08 | End: 2025-04-08

## 2025-04-08 RX ORDER — POLYETHYLENE GLYCOL 3350 17 G/17G
17 POWDER, FOR SOLUTION ORAL DAILY
Status: DISCONTINUED | OUTPATIENT
Start: 2025-04-09 | End: 2025-04-12 | Stop reason: HOSPADM

## 2025-04-08 RX ORDER — BENZONATATE 100 MG/1
100 CAPSULE ORAL 3 TIMES DAILY PRN
Status: DISCONTINUED | OUTPATIENT
Start: 2025-04-08 | End: 2025-04-12 | Stop reason: HOSPADM

## 2025-04-08 RX ORDER — IPRATROPIUM BROMIDE AND ALBUTEROL SULFATE 2.5; .5 MG/3ML; MG/3ML
3 SOLUTION RESPIRATORY (INHALATION) 3 TIMES DAILY
Status: DISCONTINUED | OUTPATIENT
Start: 2025-04-09 | End: 2025-04-10

## 2025-04-08 RX ORDER — BUMETANIDE 1 MG/1
0.5 TABLET ORAL DAILY
Status: DISCONTINUED | OUTPATIENT
Start: 2025-04-09 | End: 2025-04-10

## 2025-04-08 RX ORDER — BISACODYL 10 MG/1
10 SUPPOSITORY RECTAL DAILY PRN
Status: DISCONTINUED | OUTPATIENT
Start: 2025-04-08 | End: 2025-04-12 | Stop reason: HOSPADM

## 2025-04-08 RX ADMIN — CHOLECALCIFEROL TAB 25 MCG (1000 UNIT) 2000 UNITS: 25 TAB at 08:25

## 2025-04-08 RX ADMIN — AMIODARONE HYDROCHLORIDE 200 MG: 200 TABLET ORAL at 08:25

## 2025-04-08 RX ADMIN — IPRATROPIUM BROMIDE AND ALBUTEROL SULFATE 3 ML: .5; 3 SOLUTION RESPIRATORY (INHALATION) at 07:19

## 2025-04-08 RX ADMIN — BUMETANIDE 1 MG: 1 TABLET ORAL at 08:25

## 2025-04-08 RX ADMIN — POLYETHYLENE GLYCOL 3350 17 G: 17 POWDER, FOR SOLUTION ORAL at 08:25

## 2025-04-08 RX ADMIN — ACETAMINOPHEN 975 MG: 325 TABLET, FILM COATED ORAL at 14:44

## 2025-04-08 RX ADMIN — FAMOTIDINE 40 MG: 20 TABLET ORAL at 08:25

## 2025-04-08 RX ADMIN — IPRATROPIUM BROMIDE AND ALBUTEROL SULFATE 3 ML: .5; 3 SOLUTION RESPIRATORY (INHALATION) at 02:08

## 2025-04-08 RX ADMIN — APIXABAN 2.5 MG: 2.5 TABLET, FILM COATED ORAL at 08:25

## 2025-04-08 RX ADMIN — ASPIRIN 81 MG: 81 TABLET, COATED ORAL at 08:25

## 2025-04-08 RX ADMIN — IPRATROPIUM BROMIDE AND ALBUTEROL SULFATE 3 ML: .5; 3 SOLUTION RESPIRATORY (INHALATION) at 20:28

## 2025-04-08 RX ADMIN — BENZOCAINE AND MENTHOL 1 LOZENGE: 15; 3.6 LOZENGE ORAL at 14:44

## 2025-04-08 RX ADMIN — APIXABAN 2.5 MG: 2.5 TABLET, FILM COATED ORAL at 21:04

## 2025-04-08 RX ADMIN — SENNOSIDES AND DOCUSATE SODIUM 2 TABLET: 50; 8.6 TABLET ORAL at 12:05

## 2025-04-08 RX ADMIN — DAPAGLIFLOZIN 10 MG: 10 TABLET, FILM COATED ORAL at 08:25

## 2025-04-08 RX ADMIN — SENNOSIDES AND DOCUSATE SODIUM 2 TABLET: 50; 8.6 TABLET ORAL at 21:04

## 2025-04-08 RX ADMIN — BENZONATATE 100 MG: 100 CAPSULE ORAL at 14:44

## 2025-04-08 ASSESSMENT — PAIN - FUNCTIONAL ASSESSMENT
PAIN_FUNCTIONAL_ASSESSMENT: 0-10

## 2025-04-08 ASSESSMENT — PAIN DESCRIPTION - DESCRIPTORS: DESCRIPTORS: ACHING

## 2025-04-08 ASSESSMENT — COGNITIVE AND FUNCTIONAL STATUS - GENERAL
WALKING IN HOSPITAL ROOM: A LITTLE
STANDING UP FROM CHAIR USING ARMS: A LITTLE
MOVING FROM LYING ON BACK TO SITTING ON SIDE OF FLAT BED WITH BEDRAILS: A LITTLE
MOBILITY SCORE: 16
CLIMB 3 TO 5 STEPS WITH RAILING: TOTAL
MOVING TO AND FROM BED TO CHAIR: A LITTLE
TURNING FROM BACK TO SIDE WHILE IN FLAT BAD: A LITTLE

## 2025-04-08 ASSESSMENT — PAIN SCALES - GENERAL
PAINLEVEL_OUTOF10: 0 - NO PAIN
PAINLEVEL_OUTOF10: 5 - MODERATE PAIN
PAINLEVEL_OUTOF10: 0 - NO PAIN
PAINLEVEL_OUTOF10: 0 - NO PAIN

## 2025-04-08 ASSESSMENT — ACTIVITIES OF DAILY LIVING (ADL): ADL_ASSISTANCE: INDEPENDENT

## 2025-04-08 NOTE — PROGRESS NOTES
Physical Therapy    Physical Therapy Evaluation & Treatment    Patient Name: Melissa Marinelli  MRN: [id number]  Department: [facility] HFICU  Room: 12/12-A  Today's Date: 4/8/2025   Time Calculation  Start Time: 1326  Stop Time: 1357  Time Calculation (min): 31 min    Assessment/Plan   PT Assessment  PT Assessment Results: Decreased strength, Decreased endurance, Impaired balance, Decreased mobility  Rehab Prognosis: Good  Barriers to Discharge Home: Caregiver assistance, Physical needs  Caregiver Assistance: Caregiver assistance needed per identified barriers - however, level of patient's required assistance exceeds assistance available at home  Physical Needs: Ambulating household distances limited by function/safety, Intermittent mobility assistance needed, Intermittent ADL assistance needed, High falls risk due to function or environment  Evaluation/Treatment Tolerance: Patient limited by fatigue  Medical Staff Made Aware: Yes  Strengths: Ability to acquire knowledge, Access to adaptive/assistive products, Housing layout, Premorbid level of function, Support of Caregivers  Barriers to Participation: Comorbidities  End of Session Communication: Bedside nurse  Assessment Comment: Pt. is a 69 yof that presents with impairments including decreased functional strength, decreased activity tolerance/endurance, impaired balance, and increased difficulty with functional mobility compared to baseline level of function. Pt. will benefit from skilled PT intervention while inpatient to address the above deficits and maximize return to PLOF. Pt endorsing x2 recent falls at home and does not have 24/7 assist. Pt will benefit from MOD intensity PT upon discharge.  End of Session Patient Position: Bed, 3 rail up, Alarm on     IP OR SWING BED PT PLAN  Inpatient or Swing Bed: Inpatient  PT Plan  Treatment/Interventions: Bed mobility, Transfer training, Gait training, Balance training, Stair training, Strengthening, Endurance training,  Therapeutic exercise, Therapeutic activity, Home exercise program  PT Plan: Ongoing PT  PT Frequency: 3 times per week  PT Discharge Recommendations: Moderate intensity level of continued care  Equipment Recommended upon Discharge: Wheeled walker  PT Recommended Transfer Status: Assist x1, Assistive device  PT - OK to Discharge: Yes      Subjective     General Visit Information:  General  Reason for Referral: Pt presented w/ SOB and lethargy  Past Medical History Relevant to Rehab: ICM/ HFrEF (last EF 10-15% 1/2025, on palliative milrinone infusion since 2/2024, s/p [employer] ICD 5/2020), CAD (s/p NSTEMI, s/p RIRI to LCX 1/2016), MVR (s/p mitral valve repair 6/2019; 29 mm Epic bioprosthetic valve with Dr. Montana), COPD (No PFTs on file), HTN, HL, GERD, hx of CVA, and DM  Family/Caregiver Present: No  Prior to Session Communication: Bedside nurse  Patient Position Received: Bed, 3 rail up, Alarm off, not on at start of session  Preferred Learning Style: auditory, verbal  General Comment: Pt received supine in bed, endorsing generalized fatigue though agreeable to participate in session. (.25 mcg milrinone, tele)    Home Living:  Home Living  Type of Home: House  Lives With: Adult children, Grandchildren  Home Adaptive Equipment: Walker rolling or standard, Cane  Home Layout: One level  Home Access: Level entry  Bathroom Shower/Tub: Tub/shower unit  Bathroom Equipment: Grab bars in shower, Shower chair with back  Home Living Comments: Reports DTR works and grandson is in school so she is alone during the day.    Prior Level of Function:  Prior Function Per Pt/Caregiver Report  Level of [place]: Independent with ADLs and functional transfers, Needs assistance with homemaking  Receives Help From: Family  ADL Assistance: Independent  Homemaking Assistance: Needs assistance  Ambulatory Assistance: Independent (Effie with cane, occassionally uses rollator. Endorses 2 recent falls 2/2 weakness)  Vocational:  Retired  Prior Function Comments: Reports mainly lays around throughout the day, able to negotiate bedroom<>bathroom    Precautions:  Precautions  Hearing/Visual Limitations: WFL  Medical Precautions: Fall precautions, Cardiac precautions     Date/Time Vitals Session Patient Position Pulse Resp SpO2 BP MAP (mmHg)    04/08/25 1326 Pre PT  Lying  107  15  100 %  102/63  76     04/08/25 1357 Post PT  Lying  108  22  100 %  97/57  70                Objective   Pain:  Pain Assessment  Pain Assessment: 0-10  0-10 (Numeric) Pain Score: 0 - No pain    Cognition:  Cognition  Overall Cognitive Status: Within Functional Limits  Arousal/Alertness: Appropriate responses to stimuli  Orientation Level: Oriented X4  Following Commands: Follows one step commands consistently    General Assessments:  Activity Tolerance  Endurance: Tolerates 10 - 20 min exercise with multiple rests  Early Mobility/Exercise Safety Screen: Proceed with mobilization - No exclusion criteria met  Activity Tolerance Comments: Endorsing generalized fatigue and weakness throughout session    Sensation  Light Touch: No apparent deficits  Sensation Comment: Denied N/T    Postural Control  Postural Control: Within Functional Limits    Static Sitting Balance  Static Sitting-Balance Support: Bilateral upper extremity supported, Feet supported  Static Sitting-Level of Assistance: Close supervision  Dynamic Sitting Balance  Dynamic Sitting-Balance Support: Bilateral upper extremity supported, Feet supported  Dynamic Sitting-Level of Assistance: Close supervision    Static Standing Balance  Static Standing-Balance Support: Bilateral upper extremity supported  Static Standing-Level of Assistance: Contact guard  Dynamic Standing Balance  Dynamic Standing-Balance Support: Bilateral upper extremity supported  Dynamic Standing-Level of Assistance:  (CGA initially though progressed to minAx1 with fatigue)    Functional Assessments:  Bed Mobility  Bed Mobility: Yes  Bed  Mobility 1  Bed Mobility 1: Supine to sitting  Level of Assistance 1: Minimum assistance  Bed Mobility Comments 1: HOB elevated, increased time to complete, minAx1 to scoot towards EOB  Bed Mobility 2  Bed Mobility  2: Sitting to supine  Level of Assistance 2: Minimum assistance  Bed Mobility Comments 2: HOB elevated, assist to elevate LEs onto bed    Transfers  Transfer: Yes  Transfer 1  Transfer From 1: Bed to  Transfer to 1: Chair with arms  Technique 1: Sit to stand, Stand to sit  Transfer Device 1:  (HHA on L)  Transfer Level of Assistance 1: Minimum assistance  Trials/Comments 1: Completed short steps to L from bed>chair  Transfers 2  Transfer From 2: Chair with arms to  Transfer to 2: Commode-standard  Technique 2: Sit to stand, Stand to sit  Transfer Device 2:  (HHA on L)  Transfer Level of Assistance 2: Minimum assistance  Trials/Comments 2: Completed short steps from chair>BSC on L  Transfers 3  Transfer From 3: Commode-standard to  Transfer to 3: Stand  Technique 3: Sit to stand  Transfer Device 3: Walker  Transfer Level of Assistance 3: Minimum assistance, Minimal verbal cues  Trials/Comments 3: Cues for proper UE placement and technique  Transfers 4  Transfer From 4: Stand to  Transfer to 4: Bed  Technique 4: Stand to sit  Transfer Device 4: Walker  Transfer Level of Assistance 4: Contact guard  Trials/Comments 4: Demos decreased eccentric control  Transfers 5  Transfer From 5: Bed to, Stand to  Transfer to 5: Stand, Bed  Technique 5: Sit to stand, Stand to sit  Transfer Device 5:  (HHA)  Transfer Level of Assistance 5: Contact guard  Trials/Comments 5: bed elevated    Ambulation/Gait Training  Ambulation/Gait Training Performed: Yes  Ambulation/Gait Training 1  Surface 1: Level tile  Device 1:  (HHA)  Assistance 1: Minimum assistance, Minimal verbal cues  Quality of Gait 1: Narrow base of support, Decreased step length, Forward flexed posture (slow katie, notable instability)  Comments/Distance (ft)  1: 3 sidesteps to L x2 trials  Ambulation/Gait Training 2  Surface 2: Level tile  Device 2: Rolling walker  Assistance 2:  (CGAx1 initially though fatigued quickly requiring minAx1)  Quality of Gait 2: Narrow base of support, Decreased step length, Forward flexed posture, Soft knee(s) (slow katie, notable instability, increased hesitancy with change in directions)  Comments/Distance (ft) 2: 15 ft    Stairs  Stairs: No    Extremity/Trunk Assessments:  RLE   RLE : Within Functional Limits  LLE   LLE : Within Functional Limits    Treatments:  Educated pt on proper technique for bed mobility and hand placement and technique during transfers. Pt completed multiple transfers, short distance ambulation from bed>chair and chair>BSC as well as progressed to ambulating within room x 15ft with front wheeled walker and CGA-minAx1. Instructed pt in proper sequencing of gait with walker. Encouraged pursed-lip breathing during activity and promoted upright functional posture. Provided skillful monitoring of vital signs throughout mobility to ensure safety with continued activity.    Therapeutic Activity  Therapeutic Activity Performed: Yes  Therapeutic Activity 1: Seated rest break in chair ~5 min 2/2 fatigue. Assisted pt with gown chair 2/2 soiled gown  Therapeutic Activity 2: x2 trials of dynamic standing to perform hygiene tasks 2/2 utilizing BSC. MinAx1 for standing balance    Outcome Measures:  [facility] Basic Mobility  Turning from your back to your side while in a flat bed without using bedrails: A little  Moving from lying on your back to sitting on the side of a flat bed without using bedrails: A little  Moving to and from bed to chair (including a wheelchair): A little  Standing up from a chair using your arms (e.g. wheelchair or bedside chair): A little  To walk in hospital room: A little  Climbing 3-5 steps with railing: Total  Basic Mobility - Total Score: 16    FSS-ICU  Ambulation: Walks <50 feet with any assistance x1  or walks any distance with assistance x2 people  Rolling: Supervision or set-up only  Sitting: Supervision or set-up only  Transfer Sit-to-Stand: Minimal assistance (performs 75% or more of task)  Transfer Supine-to-Sit: Minimal assistance (performs 75% or more of task)  Total Score: 19      Early Mobility/Exercise Safety Screen: Proceed with mobilization - No exclusion criteria met  ICU Mobility Scale: Walking with assistance of 1 person [8]  E = Exercise and Early Mobility  Early Mobility/Exercise Safety Screen: Proceed with mobilization - No exclusion criteria met  ICU Mobility Scale: Walking with assistance of 1 person    Encounter Problems       Encounter Problems (Active)       Balance       Patient to demo static standing with unilateral UE support, performing single UE task with SBA, no sway or LOB x 2 mins for functional carryover        Start:  04/08/25    Expected End:  04/22/25            Pt will score >/= 24/28 on Tinetti balance assessment to indicate low falls risk.         Start:  04/08/25    Expected End:  04/22/25               Mobility       STG - Patient will ambulate >/= 100 ft Effie with LRAD, proper form, and no acute LOB       Start:  04/08/25    Expected End:  04/22/25            Pt. will tolerate >/= 15  minutes of OOB mobility without seated rest break and VSS to demo improved activity tolerance/endurance.        Start:  04/08/25    Expected End:  04/22/25            Pt will complete 5xSTS in </= 12 seconds        Start:  04/08/25    Expected End:  04/22/25               PT Transfers       STG - Patient will perform bed mobility IND with HOB flat and no rails       Start:  04/08/25    Expected End:  04/22/25            STG - Patient will transfer sit to and from stand Effie with LRAD       Start:  04/08/25    Expected End:  04/22/25               Pain - Adult              Education Documentation  Home Exercise Program, taught by Teri Sharma, PT at 4/8/2025  3:19 PM.  Learner:  Patient  Readiness: Acceptance  Method: Explanation, Demonstration  Response: Verbalizes Understanding  Comment: transfer techniques, postural awareness, activity pacing    Body Mechanics, taught by Teri Sharma PT at 4/8/2025  3:19 PM.  Learner: Patient  Readiness: Acceptance  Method: Explanation, Demonstration  Response: Verbalizes Understanding  Comment: transfer techniques, postural awareness, activity pacing    Mobility Training, taught by Teri Sharma PT at 4/8/2025  3:19 PM.  Learner: Patient  Readiness: Acceptance  Method: Explanation, Demonstration  Response: Verbalizes Understanding  Comment: transfer techniques, postural awareness, activity pacing    Education Comments  No comments found.        04/08/25 at 3:24 PM - Teri Sharma PT

## 2025-04-08 NOTE — CARE PLAN
Problem: Skin  Goal: Promote/optimize nutrition  Outcome: Progressing   The patient's goals for the shift include      The clinical goals for the shift include pt will remain HDS throughout shift

## 2025-04-08 NOTE — CARE PLAN
Problem: Fall/Injury  Goal: Verbalize understanding of risk factor reduction measures to prevent injury from fall in the home  Outcome: Progressing  Goal: Pace activities to prevent fatigue by end of the shift  Outcome: Progressing     Problem: Heart Failure  Goal: Improved urinary output this shift  Outcome: Progressing  Flowsheets (Taken 4/8/2025 1103)  Improved urinary output this shift:   Med administration/monitor effect   Monitor intake/output and daily weight   Monitor serum electrolytes/replace per order  Goal: Report improvement of dyspnea/breathlessness this shift  Outcome: Progressing  Flowsheets (Taken 4/8/2025 1103)  Report improvement of dyspnea/breathlessness this shift:   Ambulate/OOB 3 times daily   Consider PT/OT consult   Encourage activity/mobility/ROM  Goal: Weight from fluid excess reduced over 2-3 days, then stabilize  Outcome: Progressing  Flowsheets (Taken 4/8/2025 1103)  Weight from fluid excess reduced over 2-3 days, then stabilize:   Med administration/monitor effect   Monitor bowel elimination   Monitor intake/output and daily weight   The patient's goals for the shift include      The clinical goals for the shift include pt will remain HDS throughout shift

## 2025-04-08 NOTE — PROGRESS NOTES
[facility]  HFICU PROGRESS NOTE    Melissa Marinelli/[id number]    Admit Date: 4/4/2025  Hospital Length of Stay: 4   ICU Length of Stay: 4d 4h   Primary Service:   Primary HF Cardiologist:   Referring:    INTERVAL EVENTS / PERTINENT ROS:     No acute events overnight. Telemetry reviewed showing sinus tachycardia (baseline since admission -  - 120). Cardiac support continues with Palliative Inotrope Milrinone gtt @ 0.25 mcg/kg/min. Her CBC is stable (Hgb: 8.8) since blood transfusion x 1 unit PRBC's on 4/6. Chemistry panel reviewed concerning for hyponatremia. Her creatinine slightly elevated from yesterday (2.72 -> from 2.65) after receiving additional diuresis of 1 mg Bumex IVP (4/7)+ standing PO bumex 1 mg daily. She endorses improvement in her breathing and feels less lethargic today. She denies, dizziness, headache, vision changes, chest pain, palpitations, cough, ABD pain, or diarrhea. She does endorse constipation without a BM since admission.         Plan:  - C/w Milrinone 0.25mcg/kg/min  - Reduce PO bumex to 0.5 mg daily   - Fluid restriction for tx of hyponatremia  - Afternoon labs 1300 RFP - if looks stable, will transfer to floor   - Goals of care conversation with code status discussion prior to transferring to floor  - Escalate bowel regimen: Additional 2 tabs Stefany-Colace now; increase Miralax to standing daily, and PRN suppository if no BM by 4/9    MEDICATIONS  Infusions:  milrinone in 5 % dextrose, Last Rate: 0.25 mcg/kg/min (04/08/25 1100)      Scheduled:  amiodarone, 200 mg, Daily  apixaban, 2.5 mg, BID  aspirin, 81 mg, Daily  [START ON 4/9/2025] bumetanide, 0.5 mg, Daily  cholecalciferol, 2,000 Units, Daily  dapagliflozin propanediol, 10 mg, Daily  famotidine, 40 mg, Daily  ipratropium-albuteroL, 3 mL, q6h  [Held by provider] mupirocin, , TID  [START ON 4/9/2025] polyethylene glycol, 17 g, Daily  sennosides-docusate sodium, 2 tablet, Nightly  sennosides-docusate  "sodium, 2 tablet, Once      PRN:  acetaminophen, 975 mg, q8h PRN  albuterol, 2 puff, q4h PRN  bisacodyl, 10 mg, Daily PRN  dextromethorphan-guaifenesin, 1 tablet, BID PRN  sodium chloride, 1 spray, PRN          PHYSICAL EXAM:   Visit Vitals  /61   Pulse 107   Temp 36.4 °C (97.5 °F) (Temporal)   Resp 11   Ht 1.651 m (5' 5\")   Wt 62.5 kg (137 lb 12.6 oz)   SpO2 100%   BMI 22.93 kg/m²   OB Status Postmenopausal   Smoking Status Former   BSA 1.69 m²       Wt Readings from Last 5 Encounters:   04/08/25 62.5 kg (137 lb 12.6 oz)   03/31/25 58.5 kg (129 lb)   03/24/25 56.7 kg (125 lb 1.6 oz)   03/17/25 57.2 kg (126 lb)   02/27/25 60.8 kg (134 lb)       INTAKE/OUTPUT:  I/O last 3 completed shifts:  In: 1727.4 (28.1 mL/kg) [P.O.:1560; I.V.:167.4 (2.7 mL/kg)]  Out: 2475 (40.2 mL/kg) [Urine:2475 (1.1 mL/kg/hr)]  Weight: 61.5 kg      Physical Exam  HENT:      Head: Normocephalic.      Nose: Nose normal.      Mouth/Throat:      Mouth: Mucous membranes are moist.   Eyes:      Pupils: Pupils are equal, round, and reactive to light.   Neck:      Vascular: JVD present.   Cardiovascular:      Rate and Rhythm: Regular rhythm. Tachycardia present.      Pulses: Normal pulses.      Heart sounds: Murmur (holosystolic) heard.   Pulmonary:      Effort: Pulmonary effort is normal.      Breath sounds: Normal breath sounds.      Comments: Midline rt chest  Abdominal:      General: Bowel sounds are normal.      Palpations: Abdomen is soft.   Musculoskeletal:         General: Normal range of motion.      Cervical back: Normal range of motion and neck supple.   Skin:     General: Skin is warm and dry.      Capillary Refill: Capillary refill takes less than 2 seconds.   Neurological:      Mental Status: She is alert and oriented to person, place, and time.   Psychiatric:         Mood and Affect: Mood normal.         Behavior: Behavior normal.         Thought Content: Thought content normal.         Judgment: Judgment normal. "         DATA:  CMP:  Results from last 7 days   Lab Units 04/08/25  0613 04/07/25  0442 04/06/25  0318 04/05/25  1238 04/05/25  0820 04/05/25 0435 04/04/25 2011 04/04/25 0751 04/04/25  0047   SODIUM mmol/L 125* 129* 128* 129* 131* 131* 132* 135* 134*   POTASSIUM mmol/L 4.3 4.1 4.2 5.0 5.5* 5.5* 5.6* 5.4* 6.3*   CHLORIDE mmol/L 95* 94* 95* 96* 97* 99 101 104 104   CO2 mmol/L 24 24 24 22 22 22 21 23 18*   ANION GAP mmol/L 10 15 13 16 18 16 16 13 18   BUN mg/dL 38* 38* 44* 44* 45* 44* 43* 38* 37*   CREATININE mg/dL 2.72* 2.65* 2.91* 3.07* 3.15* 3.11* 2.75* 2.51* 2.14*   EGFR mL/min/1.73m*2 18* 19* 17* 16* 15* 16* 18* 20* 24*   MAGNESIUM mg/dL 2.05 2.11 2.23  --   --  2.60*  --  2.52*  --    ALBUMIN g/dL 2.9* 2.9*  3.0* 3.0* 3.2* 3.1* 3.1* 3.3* 3.2* 3.6   ALT U/L  --  172*  --  151*  --   --   --  108* 125*   AST U/L  --  124*  --  114*  --   --   --  63* 82*   BILIRUBIN TOTAL mg/dL  --  0.9  --  0.7  --   --   --  0.7 0.8     CBC:  Results from last 7 days   Lab Units 04/08/25  0613 04/07/25 0442 04/06/25  1547 04/06/25 0318 04/05/25 0435 04/04/25 0751 04/04/25  0047   WBC AUTO x10*3/uL 4.9 6.1 6.3 5.2 5.4 4.9 5.0   HEMOGLOBIN g/dL 8.8* 8.3* 9.1* 7.2* 7.5* 7.3* 8.0*   HEMATOCRIT % 25.9* 26.0* 28.6* 23.1* 22.0* 23.8* 24.1*   PLATELETS AUTO x10*3/uL 200 214 228 229 236 253 252   MCV fL 84 87 87 88 83 90 85     COAG:   Results from last 7 days   Lab Units 04/08/25  0613 04/07/25  0442 04/06/25  0318 04/05/25  0435 04/04/25  0753   INR  2.2* 2.6* 3.0* 3.6* 2.1*     ABO:   ABO TYPE   Date Value Ref Range Status   04/06/2025 O  Final     HEME/ENDO:  Results from last 7 days   Lab Units 04/05/25  0820 04/04/25  0753   FERRITIN ng/mL  --  25   IRON SATURATION %  --  4*   TSH mIU/L 0.71  --       CARDIAC:   Results from last 7 days   Lab Units 04/08/25  0613 04/04/25  0751 04/04/25  0428 04/04/25  0235 04/04/25  0047   TROPHSCMC ng/L  --  292* 295* 302* 261*   BNP pg/mL 992*  --   --   --  803*       ASSESSMENT AND  "PLAN:     Melissa Marinelli is a 69 y.o. female with a PMHx. significant for ICM/ HFrEF (last EF 10-15% 1/2025, on palliative milrinone infusion since 2/2024, s/p [employer] ICD 5/2020), CAD (s/p NSTEMI, s/p RIRI to LCX 1/2016), MVR (s/p mitral valve repair 6/2019; 29 mm Epic bioprosthetic valve with Dr. Montana), COPD (No PFTs on file), HTN, HL, GERD, hx of CVA, and DM, presenting to the ED with chief complaints of shortness of breath and lethargy. Of note, she ran out of her bumex for at least 1 week.      In the ED she was treated with 20 mg Furosemide IVP and placed on BIPAP mask. Lab results significant for hyperkalemia (K+ 6.3) treated with insulin / dextrose. Her troponin is elevated in the setting of heart failure: Trend: 261 -> 302 -> 292. Her clinical scenario is very similar to her previous admission 2/25 - 2/28/2025. Of note, this is her 4th admission this year.      Upon arrival to the HFICU, her SpO2 is 100% on 2 liters /min O2 via NC. Her breath sounds are normal. She is pleasant, alert and oriented in no distress. She does endorse feeling tired \"always.\" Her daughter, Sarah, was contacted to provide an update. Sarah states patient has had at least two falls at home since previous admission (PT made aware).  Her vitals are stable and all home meds, with the exception of spironolactone (hyperkalemia), have been resumed. Her Bumex dose is no decreased to home dose 0.5 mg PO daily.      Neuro:  #Anxiety. Depression, Acute pain   #Lethargy (heart failure vs. anemia)  - Serial neuro and pain assessments   - PO Tylenol PRN for pain  - PT/OT Consult, OOB to chair  - CAM ICU score every shift  - Sleep/wake cycle normalization     #Recent falls x 2 at home  - Fall risk  - PT evaluation for discharge planning     #Substance abuse  -Alcohol abuse/Alcohol dependence: Denies  -Tobacco use/Nicotine Dependence: Denies      Cardiovascular:  #AHA Stage D ICM systolic HFrEF   - SHRUTHI: 2/27/25  - TTE: 7/8/24   - admit " weight: 67.4 kgs (BMI: 24.7)  - admit BNP: 4/4: 803  - C/w Aspirin for MI prevention   - Not able to tolerate ACEi, ARB, ARNi (Entresto discontinued during last admission) - AHA Stage D  - C/w dapagliflozin 10 mg daily  - DISCONTINUED Spironolactone 2/2 hyperkalemia   - Reduce Bumex dose to 0.5 mg PO daily  - Bumex 2mg IV x 2 post transfusion 4/6, bumex 1 mg IVP x1 on 4/7  - C/w Home Milrinone dose 0.25 mcg/kg/min -> Palliative Inotropes   - Daily standing weights, 2gm sodium diet, 2L fluid restriction, strict I&Os     #CAD   -LHC: 4/15/2019  -C/w Aspirin for coronary MI prevention      #Paroxysmal Atrial Fibrillation / flutter   -Device: [employer] ICD 5/2020   -C/w Home Amiodarone 200 mg daily  -C/w Eliquis 2.5mg PO BID (dose reduced 4/5)     #Electrolyte Disturbances  #Hyperkalemia (resolved)  - Trend lytes for hyperkalemia   - Received Kayexalate x 1 overnight 4/5/25 for hyperkalemia   - Continue to hold home dose angeli     Pulmonary:   #COPD  #Shortness of breath   -Monitor and maintain SpO2 > 92%  -C/w albuterol 2 puffs every 4 hours as needed   -C/w Duo-Nebs scheduled every 6 hours   -C/w Breo Ellipta 100-25 mcg/dose 1 puff daily       GI:  - Bowel regimen: Stefany-Colace 2 tablets nightly, miralax prn  - Increased bowel regimen 4/8 2/2 constipation   - PPI: Famotidine 40 mg daily       :  #CKD (stage 3):  -Baseline BUN/Cr: 30 - 38 / 2.2 - 2.7  -Admit BUN/Cr: 38 / 2.51  -Daily BUN / Creatinine: 4/8/25: 38 / 2.7  -I/Os  -avoid hypotension and nephrotoxic agents  -C/w home Bumex PO 1 mg daily      Heme:  #Anemia in the setting of iron deficiency anemia (reports nose bleeds at home)   #DEVIKA thrombus  - Labs: Hgb: 7.3, TIBC: 329, ferritin: 25, serum Fe: 12, folate: 10.1, B12: 1.064, % sat: 4  - Initiate IV Venofer x 5 days   - C/w Apixaban 2.5 mg twice daily (dose reduced 4/5)      # Coagulopathy due to home apixaban (DEVIKA thrombus) / aspirin      Endo:  - Euglycemic  - hgbA1c: 2/5/25: 5.7     #Thyroid  -TSH: 2/5/25:  1.4 /, Free T4: 1.04      ID:  -afebrile, nontoxic   -no s/s infx  -trend temps q4h      PHYSICAL AND OCCUPATIONAL THERAPY: Ordered     LINES:  PIVs  Wiley     DVT: apixaban   VAP BUNDLE: NA  CENTRAL LINE BUNDLE: NA  ULCER PPX: Famotidine   GLYCEMIC CONTROL: Euglycemic   BOWEL CARE: Stefany-colace   INDWELLING CATHETER: NA  NUTRITION: Adult diet Regular; 1000 mL fluid    EMERGENCY CONTACT: Extended Emergency Contact Information  Primary Emergency Contact: Mary Caruso  Mobile Phone: [phone]  Relation: Daughter   needed? No  Secondary Emergency Contact: MarinelliElin  Mobile Phone: [phone]  Relation: Daughter  Preferred language: English   needed? No  FAMILY UPDATE: Sarah Luevano   CODE STATUS: Full Code  DISPO: Maintain in HFICU     Patient seen and assessed with Dr. Carol ESPINAL personally spent 60 minutes of critical care time directly and personally managing the patient exclusive of separately billable procedures   _________________________________________________  Foreign Padilla, APRN-CNP

## 2025-04-08 NOTE — SIGNIFICANT EVENT
"Melissa Marinelli is a 69 y.o. female with a PMHx. significant for ICM/ HFrEF (last EF 10-15% 1/2025, on palliative milrinone infusion since 2/2024, s/p [employer] ICD 5/2020), CAD (s/p NSTEMI, s/p RIRI to LCX 1/2016), MVR (s/p mitral valve repair 6/2019; 29 mm Epic bioprosthetic valve with Dr. Montana), COPD (No PFTs on file), HTN, HL, GERD, hx of CVA, and DM, presenting to the ED with chief complaints of shortness of breath and lethargy. Of note, she ran out of her bumex for at least 1 week.      In the ED she was treated with 20 mg Furosemide IVP and placed on BIPAP mask. Lab results significant for hyperkalemia (K+ 6.3) treated with insulin / dextrose. Her troponin is elevated in the setting of heart failure: Trend: 261 -> 302 -> 292. Her clinical scenario is very similar to her previous admission 2/25 - 2/28/2025. Of note, this is her 4th admission this year.      Upon arrival to the HFICU, her SpO2 is 100% on 2 liters /min O2 via NC. Her breath sounds are normal. She is pleasant, alert and oriented in no distress. She does endorse feeling tired \"always.\" Her daughter, Sarah, was contacted to provide an update. Sarah states patient has had at least two falls at home since previous admission (PT made aware).  Her vitals are stable and all home meds, with the exception of spironolactone (hyperkalemia), have been resumed. Her Bumex dose is no decreased to home dose 0.5 mg PO daily. Now transferred to [facility] for optimization. Na continues to downtrend now 123, holding further diuresis for now, fluid restriction. Patient with no acute complaints, vitals stable. Orders reviewed and updated for the floor. To be staffed by attending in the AM.    Sung Valles DNP, APRN-CNP  Cardiology Consults    "

## 2025-04-09 ENCOUNTER — APPOINTMENT (OUTPATIENT)
Dept: RADIOLOGY | Facility: HOSPITAL | Age: 70
DRG: 291 | End: 2025-04-09
Payer: COMMERCIAL

## 2025-04-09 LAB
ALBUMIN SERPL BCP-MCNC: 2.9 G/DL (ref 3.4–5)
ALBUMIN SERPL BCP-MCNC: 2.9 G/DL (ref 3.4–5)
ALBUMIN SERPL BCP-MCNC: 3 G/DL (ref 3.4–5)
ALP SERPL-CCNC: 90 U/L (ref 33–136)
ALT SERPL W P-5'-P-CCNC: 161 U/L (ref 7–45)
ANION GAP SERPL CALC-SCNC: 13 MMOL/L (ref 10–20)
ANION GAP SERPL CALC-SCNC: 13 MMOL/L (ref 10–20)
APTT PPP: 31 SECONDS (ref 26–36)
AST SERPL W P-5'-P-CCNC: 99 U/L (ref 9–39)
BASOPHILS # BLD AUTO: 0.02 X10*3/UL (ref 0–0.1)
BASOPHILS NFR BLD AUTO: 0.4 %
BILIRUB DIRECT SERPL-MCNC: 0.5 MG/DL (ref 0–0.3)
BILIRUB SERPL-MCNC: 0.7 MG/DL (ref 0–1.2)
BUN SERPL-MCNC: 40 MG/DL (ref 6–23)
BUN SERPL-MCNC: 40 MG/DL (ref 6–23)
CALCIUM SERPL-MCNC: 8.3 MG/DL (ref 8.6–10.6)
CALCIUM SERPL-MCNC: 8.4 MG/DL (ref 8.6–10.6)
CHLORIDE SERPL-SCNC: 91 MMOL/L (ref 98–107)
CHLORIDE SERPL-SCNC: 92 MMOL/L (ref 98–107)
CHLORIDE UR-SCNC: <15 MMOL/L
CHLORIDE/CREATININE (MMOL/G) IN URINE: NORMAL
CO2 SERPL-SCNC: 21 MMOL/L (ref 21–32)
CO2 SERPL-SCNC: 24 MMOL/L (ref 21–32)
CREAT SERPL-MCNC: 2.48 MG/DL (ref 0.5–1.05)
CREAT SERPL-MCNC: 2.49 MG/DL (ref 0.5–1.05)
CREAT UR-MCNC: 43.8 MG/DL (ref 20–320)
EGFRCR SERPLBLD CKD-EPI 2021: 20 ML/MIN/1.73M*2
EGFRCR SERPLBLD CKD-EPI 2021: 20 ML/MIN/1.73M*2
EOSINOPHIL # BLD AUTO: 0.17 X10*3/UL (ref 0–0.7)
EOSINOPHIL NFR BLD AUTO: 3.1 %
ERYTHROCYTE [DISTWIDTH] IN BLOOD BY AUTOMATED COUNT: 17.4 % (ref 11.5–14.5)
GLUCOSE BLD MANUAL STRIP-MCNC: 112 MG/DL (ref 74–99)
GLUCOSE BLD MANUAL STRIP-MCNC: 115 MG/DL (ref 74–99)
GLUCOSE SERPL-MCNC: 89 MG/DL (ref 74–99)
GLUCOSE SERPL-MCNC: 99 MG/DL (ref 74–99)
HCT VFR BLD AUTO: 27.5 % (ref 36–46)
HGB BLD-MCNC: 8.9 G/DL (ref 12–16)
IMM GRANULOCYTES # BLD AUTO: 0.04 X10*3/UL (ref 0–0.7)
IMM GRANULOCYTES NFR BLD AUTO: 0.7 % (ref 0–0.9)
INR PPP: 2.3 (ref 0.9–1.1)
LACTATE SERPL-SCNC: 1.1 MMOL/L (ref 0.4–2)
LYMPHOCYTES # BLD AUTO: 0.53 X10*3/UL (ref 1.2–4.8)
LYMPHOCYTES NFR BLD AUTO: 9.6 %
MAGNESIUM SERPL-MCNC: 2.2 MG/DL (ref 1.6–2.4)
MCH RBC QN AUTO: 28.6 PG (ref 26–34)
MCHC RBC AUTO-ENTMCNC: 32.4 G/DL (ref 32–36)
MCV RBC AUTO: 88 FL (ref 80–100)
MONOCYTES # BLD AUTO: 0.58 X10*3/UL (ref 0.1–1)
MONOCYTES NFR BLD AUTO: 10.5 %
NEUTROPHILS # BLD AUTO: 4.16 X10*3/UL (ref 1.2–7.7)
NEUTROPHILS NFR BLD AUTO: 75.7 %
NRBC BLD-RTO: 0.4 /100 WBCS (ref 0–0)
PHOSPHATE SERPL-MCNC: 3.3 MG/DL (ref 2.5–4.9)
PHOSPHATE SERPL-MCNC: 3.8 MG/DL (ref 2.5–4.9)
PLATELET # BLD AUTO: 184 X10*3/UL (ref 150–450)
POTASSIUM SERPL-SCNC: 4.1 MMOL/L (ref 3.5–5.3)
POTASSIUM SERPL-SCNC: 4.3 MMOL/L (ref 3.5–5.3)
POTASSIUM UR-SCNC: 19 MMOL/L
POTASSIUM/CREAT UR-RTO: 43 MMOL/G CREAT
PROT SERPL-MCNC: 5.8 G/DL (ref 6.4–8.2)
PROTHROMBIN TIME: 25.4 SECONDS (ref 9.8–12.4)
RBC # BLD AUTO: 3.11 X10*6/UL (ref 4–5.2)
SODIUM SERPL-SCNC: 122 MMOL/L (ref 136–145)
SODIUM SERPL-SCNC: 124 MMOL/L (ref 136–145)
SODIUM UR-SCNC: 10 MMOL/L
SODIUM/CREAT UR-RTO: 23 MMOL/G CREAT
WBC # BLD AUTO: 5.5 X10*3/UL (ref 4.4–11.3)

## 2025-04-09 PROCEDURE — 82040 ASSAY OF SERUM ALBUMIN: CPT | Performed by: NURSE PRACTITIONER

## 2025-04-09 PROCEDURE — 83605 ASSAY OF LACTIC ACID: CPT | Performed by: NURSE PRACTITIONER

## 2025-04-09 PROCEDURE — 82947 ASSAY GLUCOSE BLOOD QUANT: CPT

## 2025-04-09 PROCEDURE — 84075 ASSAY ALKALINE PHOSPHATASE: CPT | Performed by: NURSE PRACTITIONER

## 2025-04-09 PROCEDURE — 85610 PROTHROMBIN TIME: CPT | Performed by: NURSE PRACTITIONER

## 2025-04-09 PROCEDURE — 82436 ASSAY OF URINE CHLORIDE: CPT | Performed by: NURSE PRACTITIONER

## 2025-04-09 PROCEDURE — 1200000002 HC GENERAL ROOM WITH TELEMETRY DAILY

## 2025-04-09 PROCEDURE — 2500000002 HC RX 250 W HCPCS SELF ADMINISTERED DRUGS (ALT 637 FOR MEDICARE OP, ALT 636 FOR OP/ED): Performed by: NURSE PRACTITIONER

## 2025-04-09 PROCEDURE — 85025 COMPLETE CBC W/AUTO DIFF WBC: CPT | Performed by: NURSE PRACTITIONER

## 2025-04-09 PROCEDURE — 71046 X-RAY EXAM CHEST 2 VIEWS: CPT | Performed by: RADIOLOGY

## 2025-04-09 PROCEDURE — 2500000001 HC RX 250 WO HCPCS SELF ADMINISTERED DRUGS (ALT 637 FOR MEDICARE OP): Performed by: NURSE PRACTITIONER

## 2025-04-09 PROCEDURE — 83735 ASSAY OF MAGNESIUM: CPT | Performed by: NURSE PRACTITIONER

## 2025-04-09 PROCEDURE — 99232 SBSQ HOSP IP/OBS MODERATE 35: CPT | Performed by: STUDENT IN AN ORGANIZED HEALTH CARE EDUCATION/TRAINING PROGRAM

## 2025-04-09 PROCEDURE — 2500000004 HC RX 250 GENERAL PHARMACY W/ HCPCS (ALT 636 FOR OP/ED): Performed by: NURSE PRACTITIONER

## 2025-04-09 PROCEDURE — 71046 X-RAY EXAM CHEST 2 VIEWS: CPT

## 2025-04-09 PROCEDURE — 99223 1ST HOSP IP/OBS HIGH 75: CPT

## 2025-04-09 PROCEDURE — 97530 THERAPEUTIC ACTIVITIES: CPT | Mod: GP | Performed by: PHYSICAL THERAPIST

## 2025-04-09 PROCEDURE — 99497 ADVNCD CARE PLAN 30 MIN: CPT

## 2025-04-09 PROCEDURE — 94640 AIRWAY INHALATION TREATMENT: CPT

## 2025-04-09 RX ORDER — BUMETANIDE 1 MG/1
0.5 TABLET ORAL ONCE
Status: COMPLETED | OUTPATIENT
Start: 2025-04-09 | End: 2025-04-09

## 2025-04-09 RX ORDER — ADHESIVE BANDAGE
30 BANDAGE TOPICAL DAILY PRN
Status: DISCONTINUED | OUTPATIENT
Start: 2025-04-09 | End: 2025-04-12 | Stop reason: HOSPADM

## 2025-04-09 RX ADMIN — IPRATROPIUM BROMIDE AND ALBUTEROL SULFATE 3 ML: .5; 3 SOLUTION RESPIRATORY (INHALATION) at 08:17

## 2025-04-09 RX ADMIN — MAGNESIUM HYDROXIDE 30 ML: 400 SUSPENSION ORAL at 15:24

## 2025-04-09 RX ADMIN — CHOLECALCIFEROL TAB 25 MCG (1000 UNIT) 2000 UNITS: 25 TAB at 08:40

## 2025-04-09 RX ADMIN — APIXABAN 2.5 MG: 2.5 TABLET, FILM COATED ORAL at 08:40

## 2025-04-09 RX ADMIN — AMIODARONE HYDROCHLORIDE 200 MG: 200 TABLET ORAL at 08:40

## 2025-04-09 RX ADMIN — ACETAMINOPHEN 975 MG: 325 TABLET, FILM COATED ORAL at 15:03

## 2025-04-09 RX ADMIN — BENZOCAINE AND MENTHOL 1 LOZENGE: 15; 3.6 LOZENGE ORAL at 12:09

## 2025-04-09 RX ADMIN — APIXABAN 2.5 MG: 2.5 TABLET, FILM COATED ORAL at 20:37

## 2025-04-09 RX ADMIN — MILRINONE LACTATE IN DEXTROSE 0.25 MCG/KG/MIN: 200 INJECTION, SOLUTION INTRAVENOUS at 07:01

## 2025-04-09 RX ADMIN — BUMETANIDE 0.5 MG: 1 TABLET ORAL at 17:58

## 2025-04-09 RX ADMIN — SENNOSIDES AND DOCUSATE SODIUM 2 TABLET: 50; 8.6 TABLET ORAL at 20:37

## 2025-04-09 RX ADMIN — POLYETHYLENE GLYCOL 3350 17 G: 17 POWDER, FOR SOLUTION ORAL at 08:44

## 2025-04-09 RX ADMIN — BUMETANIDE 0.5 MG: 1 TABLET ORAL at 08:40

## 2025-04-09 RX ADMIN — BISACODYL 10 MG: 10 SUPPOSITORY RECTAL at 01:03

## 2025-04-09 RX ADMIN — IPRATROPIUM BROMIDE AND ALBUTEROL SULFATE 3 ML: .5; 3 SOLUTION RESPIRATORY (INHALATION) at 12:53

## 2025-04-09 RX ADMIN — FAMOTIDINE 40 MG: 20 TABLET ORAL at 08:40

## 2025-04-09 RX ADMIN — DAPAGLIFLOZIN 10 MG: 10 TABLET, FILM COATED ORAL at 08:40

## 2025-04-09 RX ADMIN — ASPIRIN 81 MG: 81 TABLET, COATED ORAL at 08:40

## 2025-04-09 ASSESSMENT — COGNITIVE AND FUNCTIONAL STATUS - GENERAL
CLIMB 3 TO 5 STEPS WITH RAILING: A LITTLE
TURNING FROM BACK TO SIDE WHILE IN FLAT BAD: A LITTLE
DAILY ACTIVITIY SCORE: 14
DRESSING REGULAR UPPER BODY CLOTHING: A LOT
TOILETING: A LOT
MOVING TO AND FROM BED TO CHAIR: A LITTLE
HELP NEEDED FOR BATHING: A LOT
MOBILITY SCORE: 17
WALKING IN HOSPITAL ROOM: A LOT
CLIMB 3 TO 5 STEPS WITH RAILING: TOTAL
STANDING UP FROM CHAIR USING ARMS: A LOT
MOVING TO AND FROM BED TO CHAIR: A LITTLE
DRESSING REGULAR LOWER BODY CLOTHING: A LOT
PERSONAL GROOMING: A LOT
WALKING IN HOSPITAL ROOM: A LOT
MOBILITY SCORE: 15
STANDING UP FROM CHAIR USING ARMS: A LITTLE
MOVING FROM LYING ON BACK TO SITTING ON SIDE OF FLAT BED WITH BEDRAILS: A LITTLE
TURNING FROM BACK TO SIDE WHILE IN FLAT BAD: A LITTLE

## 2025-04-09 ASSESSMENT — PAIN - FUNCTIONAL ASSESSMENT
PAIN_FUNCTIONAL_ASSESSMENT: 0-10

## 2025-04-09 ASSESSMENT — PAIN DESCRIPTION - ORIENTATION: ORIENTATION: RIGHT;LEFT

## 2025-04-09 ASSESSMENT — PAIN SCALES - GENERAL
PAINLEVEL_OUTOF10: 0 - NO PAIN
PAINLEVEL_OUTOF10: 10 - WORST POSSIBLE PAIN
PAINLEVEL_OUTOF10: 0 - NO PAIN
PAINLEVEL_OUTOF10: 0 - NO PAIN

## 2025-04-09 ASSESSMENT — PAIN DESCRIPTION - LOCATION: LOCATION: LEG

## 2025-04-09 ASSESSMENT — PAIN DESCRIPTION - DESCRIPTORS: DESCRIPTORS: ACHING

## 2025-04-09 NOTE — CARE PLAN
The patient's goals for the shift include  Pt will ambulate out of bed and sit up in the chair for meals this shift    The clinical goals for the shift include Pt will remain fall free and HDs throughout this shift    Over the shift, the patient got up with assist to the chair for meals and up to the bedside commode. Continuous milrinone infusing through CVC line. CHG bath completed. Provider ordered repeated CXR which was completed. Pt c/o pain Bilateral leg, see MAR for intervention. Milk of Mg ordered and given as pt continue to c/o nausea which the Provider relates to as discomfort for ongoing constipation  issues. Palliative team met with pt, and plan to follow up with pt's daughter. Fall and safety precaution in place. Will continue to monitor pt.

## 2025-04-09 NOTE — HOSPITAL COURSE
"Melissa Marinelli is a 69 y.o. female with a PMHx significant for ICM/ HFrEF (last EF 10-15% 2025, on palliative milrinone infusion since 2024, s/p [employer] ICD 2020), CAD (s/p NSTEMI, s/p RIRI to LCX 2016), MVR (s/p mitral valve repair 2019; 29 mm Epic bioprosthetic valve with Dr. Montana), COPD (No PFTs on file), HTN, HLD, GERD, hx of CVA, and DM, presenting to the ED with chief complaints of shortness of breath and lethargy. Of note, she ran out of her bumex for at least 1 week.      In the ED she was treated with 20 mg Furosemide IVP and placed on BIPAP mask. Lab results significant for hyperkalemia (K+ 6.3) treated with insulin / dextrose. Her troponin is elevated in the setting of heart failure: Trend: 261 -> 302 -> 292. Her clinical scenario is very similar to her previous admission  - 2025. Of note, this is her 4th admission this year.      Upon arrival to the HFICU, her SpO2 is 100% on 2 liters /min O2 via NC. Her breath sounds are normal. She is pleasant, alert and oriented in no distress. She does endorse feeling tired \"always.\" Her daughter, Sarah, was contacted to provide an update. Sarah states patient has had at least two falls at home since previous admission (PT made aware).  Her vitals are stable and all home meds, with the exception of spironolactone (hyperkalemia), have been resumed. Her Bumex dose is increased to 1 mg daily.       Cardiac Tests:  EK/4: EKG obtained at 0026 and interpreted by me: 117 bpm, sinus tachycardia. , , QTc 504. Variable baseline, however no ST elevations or depressions concerning for ischemia. Patient does have T wave inversions in the far lateral leads V5 and V6. Compared with prior EKG from 2025, atrial flutter with variable block has been replaced by sinus tachycardia.      Chest Radiograph: 2025:  IMPRESSION:  Suggest volume overload/heart failure with chronic pulmonary vasculature and a trace left pleural effusion..  No " definite focal consolidation.      Echocardiogram: 2/27/2025:  CONCLUSIONS:   1. Left ventricular ejection fraction is severely decreased, by visual estimate at 10-15%.   2. There is global hypokinesis of the left ventricle with minor regional variations.   3. There is reduced right ventricular systolic function.   4. The left atrium is enlarged.   5. Left atrial appendage demonstrating thrombus measuring 1 cm x 1.75 cm along with dense sponatenous contrast.   6. There is an Epic mitral valve bioprosthesis, 29 mm reported size. Gradients across the mtiral valve were not assessed.   7. Compared with study dated 1/23/2025, there is redemonstration of a DEVIKA thrombus, that appears similair in size to prior SHRUTHI.     Cardiac Catheterization:  1/26/2024:  CONCLUSIONS:   1. Elevated right_[RA 12, RVEDP 13] and left_[PCWP 26 mmHg] sided filling pressure, PA pressures of 46/29 with a mean of 37 mmHg, and low assumed Chirag cardiac output at 2.3 L/min and cardiac index of 1.3 L/min/mï¿½. SVR 2,886 dynes/seconds/cm-5.   2. -Chester-Alex was sutured in at an external marker m of 55 cm.   3. Patient will be transferred to heart failure ICU, further management as per heart failure ICU team.     Coronary angiogram: 4/15/2019:  CONCLUSIONS:   1. Mild non-obstructive coronary artery disease.   2. RHC with mildly elevated filling pressues and mildly reduced CO/CI.    Floor Course:  Palliative care consulted and family meeting arranged to discuss GOC. Family meeting involving patient, Mary (daughter), Pall team (NP, SW, and ), Ethics Dr. Astorga, and [facility] [place] CNP. Together, the team discussed patient's current condition and worries. Discussed signs of poor appetite and functional decline as signs of end stages of HF/disease worsening. Discussed d/t severity of disease, CPR/intubation will not be offered as doing so would not provide patient benefit of more time or increased QOL, only suffering and pain. Patient's code status changed to  DNR/DNI with ongoing patient and family home support. Aware of still having ICD abilities but not in the setting of asystole.     Due to soft BP's, bumex dose adjusted to 0.5mg daily. Further GDMT limited by renal function and hypotension.    Ongoing hyponatremia during hospital course, italo of 122, improved to 125 on date of discharge. On date of discharge, patient on room air and resting nearly flat in bed without dyspnea. She denies feeling fluid overloaded and is requesting to discharge. Increasing home bumex dose limited by borderline blood pressures. Home care involved for home inotrope therapy and labs (RFP and BNP) requested prior to next outpatient heart failure appointment.     During hospital course, patient's eliquis dosage was reduced to 2.5 mg BID due to renal function and weight. Weights are borderline for eliquis adjustment. Would recommend outpatient follow-up for future eliquis dosage changes based on these variables.     Pt known to palliative services, has been on palliative milrinone since 2/2024, accepted outpt Palliative Navigator, referral placed.    Discharge weight: 62.2 kg    After all labs and VS were reviewed the decision was made that the patient was medically stable for discharge.  The patient was discharged in satisfactory condition.    More than 30 minutes were spent in coordinating patient discharge.

## 2025-04-09 NOTE — CONSULTS
"Inpatient consult to Palliative Care  Consult performed by: JASMIN Arellano  Consult ordered by: JASMIN Owens          Palliative Medicine Consult  Complex medical decision making, symptom management, patient/family support    History obtained from chart review including ED note, H&P, patient's daily progress notes, review of lab/test results, and discussion with primary team and bedside RN.    Subjective    History of Present Illness  Melissa Marinelli is a 69 y.o. female with a PMHx. significant for ICM/ HFrEF (last EF 10-15% 1/2025, on palliative milrinone infusion since 2/2024, s/p [employer] ICD 5/2020), CAD (s/p NSTEMI, s/p RIRI to LCX 1/2016), MVR (s/p mitral valve repair 6/2019; 29 mm Epic bioprosthetic valve with Dr. Montana), COPD (No PFTs on file), HTN, HL, GERD, hx of CVA, and DM, presenting to the ED with chief complaints of shortness of breath and lethargy. Of note, she ran out of her bumex for at least 1 week.      In the ED she was treated with 20 mg Furosemide IVP and placed on BIPAP mask. Lab results significant for hyperkalemia (K+ 6.3) treated with insulin / dextrose. Her troponin is elevated in the setting of heart failure: Trend: 261 -> 302 -> 292. Her clinical scenario is very similar to her previous admission 2/25 - 2/28/2025. Of note, this is her 4th admission this year.      Upon arrival to the HFICU, her SpO2 is 100% on 2 liters /min O2 via NC. Her breath sounds are normal. She is pleasant, alert and oriented in no distress. She does endorse feeling tired \"always.\" Her daughter, Sarah, was contacted to provide an update. Sarah states patient has had at least two falls at home since previous admission (PT made aware).  Her vitals are stable and all home meds, with the exception of spironolactone (hyperkalemia), have been resumed.      Introduction to Palliative Medicine  Met with pt at bedsideMary over the phone.   Patient alert and oriented, has capacity to make " "their own medical decisions at this time.   Staff present: Shadia Sigala CNP.  Palliative Medicine was introduced as a specialty service for patients with serious illness to help with symptom management, improve quality of life, assist with goals of care conversations, navigate complex decision making, and provide support to patients and families. Support and empathy was provided throughout the encounter. Provided reflective listening and presence.     Symptoms  Pain: bilateral leg cramping  Dyspnea: not anymore  Fatigue: yes, taking naps throughout the day  Insomnia: yes d/t sleeping during the day  Drowsiness: yes, taking naps throughout the day  Constipation: -  Nausea: denies  Appetite: poor  Anxiety: yes r/t health  Depression: yes r/t health    Palliative Medicine Social History:  The patient is not , she has 2 daughters, pt lives with Mary. Pt states she does not cook meals but is able to ambulate and perform her own hygiene. Pt states she has had less of an appetite and over time, has been more tired. Mary confirms pt has slowly been declining since February and understands it is likely r/t to HF progression and her body not responding to medications like it once did. Pt states she does not do much that she enjoys other than watch movies and play video games on her phone/computer. Mary confirms that pt often stays in her room and does not seem to be happy very often. She has declined even working with PT. Pt is Rastafarian and believes in God. She uses her ingrid to assist with her coping. Mary feels like a  prayer would be helpful.    Objective    Last Recorded Vitals  /57 (BP Location: Right arm, Patient Position: Lying)   Pulse 93   Temp 36.5 °C (97.7 °F) (Temporal)   Resp 20   Ht 1.651 m (5' 5\")   Wt 62.5 kg (137 lb 12.6 oz)   SpO2 96%   BMI 22.93 kg/m²      Physical Exam  Constitutional:       Appearance: She is normal weight.   HENT:      Mouth/Throat:      Pharynx: " Oropharynx is clear.      Comments: edentulous  Cardiovascular:      Rate and Rhythm: Tachycardia present.   Pulmonary:      Breath sounds: Decreased breath sounds present.   Abdominal:      Palpations: Abdomen is soft.   Musculoskeletal:         General: Normal range of motion.   Skin:     General: Skin is warm and dry.   Neurological:      General: No focal deficit present.   Psychiatric:         Attention and Perception: She is inattentive.         Mood and Affect: Affect is blunt.         Speech: Speech normal.         Behavior: Behavior is uncooperative.          Relevant Results  Results for orders placed or performed during the hospital encounter of 04/04/25 (from the past 24 hours)   POCT GLUCOSE   Result Value Ref Range    POCT Glucose 173 (H) 74 - 99 mg/dL   POCT GLUCOSE   Result Value Ref Range    POCT Glucose 112 (H) 74 - 99 mg/dL   CBC and Auto Differential   Result Value Ref Range    WBC 5.5 4.4 - 11.3 x10*3/uL    nRBC 0.4 (H) 0.0 - 0.0 /100 WBCs    RBC 3.11 (L) 4.00 - 5.20 x10*6/uL    Hemoglobin 8.9 (L) 12.0 - 16.0 g/dL    Hematocrit 27.5 (L) 36.0 - 46.0 %    MCV 88 80 - 100 fL    MCH 28.6 26.0 - 34.0 pg    MCHC 32.4 32.0 - 36.0 g/dL    RDW 17.4 (H) 11.5 - 14.5 %    Platelets 184 150 - 450 x10*3/uL    Neutrophils % 75.7 40.0 - 80.0 %    Immature Granulocytes %, Automated 0.7 0.0 - 0.9 %    Lymphocytes % 9.6 13.0 - 44.0 %    Monocytes % 10.5 2.0 - 10.0 %    Eosinophils % 3.1 0.0 - 6.0 %    Basophils % 0.4 0.0 - 2.0 %    Neutrophils Absolute 4.16 1.20 - 7.70 x10*3/uL    Immature Granulocytes Absolute, Automated 0.04 0.00 - 0.70 x10*3/uL    Lymphocytes Absolute 0.53 (L) 1.20 - 4.80 x10*3/uL    Monocytes Absolute 0.58 0.10 - 1.00 x10*3/uL    Eosinophils Absolute 0.17 0.00 - 0.70 x10*3/uL    Basophils Absolute 0.02 0.00 - 0.10 x10*3/uL   Renal function panel   Result Value Ref Range    Glucose 99 74 - 99 mg/dL    Sodium 122 (L) 136 - 145 mmol/L    Potassium 4.3 3.5 - 5.3 mmol/L    Chloride 92 (L) 98 - 107  mmol/L    Bicarbonate 21 21 - 32 mmol/L    Anion Gap 13 10 - 20 mmol/L    Urea Nitrogen 40 (H) 6 - 23 mg/dL    Creatinine 2.49 (H) 0.50 - 1.05 mg/dL    eGFR 20 (L) >60 mL/min/1.73m*2    Calcium 8.4 (L) 8.6 - 10.6 mg/dL    Phosphorus 3.8 2.5 - 4.9 mg/dL    Albumin 2.9 (L) 3.4 - 5.0 g/dL   Magnesium   Result Value Ref Range    Magnesium 2.20 1.60 - 2.40 mg/dL   Coagulation Screen   Result Value Ref Range    Protime 25.4 (H) 9.8 - 12.4 seconds    INR 2.3 (H) 0.9 - 1.1    aPTT 31 26 - 36 seconds   Hepatic function panel   Result Value Ref Range    Albumin 2.9 (L) 3.4 - 5.0 g/dL    Bilirubin, Total 0.7 0.0 - 1.2 mg/dL    Bilirubin, Direct 0.5 (H) 0.0 - 0.3 mg/dL    Alkaline Phosphatase 90 33 - 136 U/L     (H) 7 - 45 U/L    AST 99 (H) 9 - 39 U/L    Total Protein 5.8 (L) 6.4 - 8.2 g/dL   Urine electrolytes   Result Value Ref Range    Sodium, Urine Random 10 mmol/L    Sodium/Creatinine Ratio 23 Not established. mmol/g Creat    Potassium, Urine Random 19 mmol/L    Potassium/Creatinine Ratio 43 Not established mmol/g Creat    Chloride, Urine Random <15 mmol/L    Chloride/Creatinine Ratio      Creatinine, Urine Random 43.8 20.0 - 320.0 mg/dL     *Note: Due to a large number of results and/or encounters for the requested time period, some results have not been displayed. A complete set of results can be found in Results Review.      ECG 12 lead  Sinus tachycardia with Fusion complexes  Low voltage QRS  Cannot rule out Anterior infarct (cited on or before 05-FEB-2025)  T wave abnormality, consider lateral ischemia  Abnormal ECG  When compared with ECG of 27-FEB-2025 15:28,  Sinus rhythm has replaced Atrial flutter  Non-specific change in ST segment in Inferior leads  ST no longer depressed in Lateral leads  Confirmed by Trevor Caceres ([id number]) on 4/4/2025 8:02:14 PM  XR chest 2 views  Narrative: STUDY:  Chest Radiographs;  4/4/2025 at 1:03 AM  INDICATION:  Evaluate for pulmonary edema versus pneumonia.  Severe  CHF.  COMPARISON:  XR chest 2/25/2025, 2/5/2025, 1/21/2025, 9/7/2024.  ACCESSION NUMBER(S):  [id number]  ORDERING CLINICIAN:  FINN FRIAS  TECHNIQUE:  Frontal and lateral chest.   FINDINGS:  Right sided venous catheter terminates over the lower SVC.  CARDIOMEDIASTINAL SILHOUETTE:  Cardiomediastinal silhouette is normal in size and configuration.   Left-sided pacemaker defibrillator and sternal wires noted.     LUNGS:  Prominent pulmonary vasculature.  No definite focal consolidation.   Trace left pleural effusion.  No pneumothorax.     ABDOMEN:  No remarkable upper abdominal findings.     BONES:  No acute osseous changes.  Impression: Suggest volume overload/heart failure with chronic pulmonary  vasculature and a trace left pleural effusion..  No definite focal  consolidation.  Signed by Ronaldo Brennan MD     Encounter Date: 04/04/25   ECG 12 lead   Result Value    Ventricular Rate 117    Atrial Rate 117    GA Interval 136    QRS Duration 108    QT Interval 362    QTC Calculation(Bazett) 504    R Axis 59    T Axis 27    QRS Count 19    Q Onset 216    T Offset 397    QTC Fredericia 452    Narrative    Sinus tachycardia with Fusion complexes  Low voltage QRS  Cannot rule out Anterior infarct (cited on or before 05-FEB-2025)  T wave abnormality, consider lateral ischemia  Abnormal ECG  When compared with ECG of 27-FEB-2025 15:28,  Sinus rhythm has replaced Atrial flutter  Non-specific change in ST segment in Inferior leads  ST no longer depressed in Lateral leads  Confirmed by Trevor Caceres ([id number]) on 4/4/2025 8:02:14 PM        Allergies  Metoprolol, Ticagrelor, Gadolinium-containing contrast media, Iodinated contrast media, Statins-hmg-coa reductase inhibitors, Red dye, Ace inhibitors, Fentanyl, Hydralazine, Nicorette [nicotine (polacrilex)], Nicotine, Penicillins, and Prednisone    Scheduled medications  amiodarone, 200 mg, oral, Daily  apixaban, 2.5 mg, oral, BID  aspirin, 81 mg, oral, Daily  bumetanide, 0.5  "mg, oral, Daily  cholecalciferol, 2,000 Units, oral, Daily  dapagliflozin propanediol, 10 mg, oral, Daily  famotidine, 40 mg, oral, Daily  ipratropium-albuteroL, 3 mL, nebulization, TID  [Held by provider] mupirocin, , Topical, TID  polyethylene glycol, 17 g, oral, Daily  sennosides-docusate sodium, 2 tablet, oral, Nightly      Continuous medications  milrinone in 5 % dextrose, 0.25 mcg/kg/min, Last Rate: 0.25 mcg/kg/min (04/09/25 0701)      PRN medications  PRN medications: acetaminophen, albuterol, benzocaine-menthol, benzonatate, bisacodyl, dextromethorphan-guaifenesin, magnesium hydroxide, sodium chloride     Assessment/Plan    Melissa Marinelli is a 69 y.o. female with a PMHx. significant for ICM/ HFrEF (last EF 10-15% 1/2025, on palliative milrinone infusion since 2/2024, s/p [employer] ICD 5/2020), CAD (s/p NSTEMI, s/p RIRI to LCX 1/2016), MVR (s/p mitral valve repair 6/2019; 29 mm Epic bioprosthetic valve with Dr. Montana), COPD (No PFTs on file), HTN, HL, GERD, hx of CVA, and DM, presenting to the ED with chief complaints of shortness of breath and lethargy. Of note, she ran out of her bumex for at least 1 week.      In the ED she was treated with 20 mg Furosemide IVP and placed on BIPAP mask. Lab results significant for hyperkalemia (K+ 6.3) treated with insulin / dextrose. Her troponin is elevated in the setting of heart failure: Trend: 261 -> 302 -> 292. Her clinical scenario is very similar to her previous admission 2/25 - 2/28/2025. Of note, this is her 4th admission this year.      Upon arrival to the HFICU, her SpO2 is 100% on 2 liters /min O2 via NC. Her breath sounds are normal. She is pleasant, alert and oriented in no distress. She does endorse feeling tired \"always.\" Her daughter, Sarah, was contacted to provide an update. Sarah states patient has had at least two falls at home since previous admission (PT made aware).  Her vitals are stable and all home meds, with the exception of spironolactone " "(hyperkalemia), have been resumed.    4/9: Palliative consulted r/t HFrEF and palliative milrinone greater than 1 year to discuss further GOC.  Pt does not wish to discuss anything negative, particularly about her demise. Spoke to Mary, planned a 2:30pm meeting Friday to further discuss GOC/code.    Palliative Performance Scale (PPS): 40-50    ----------------------------------------------------------------------------------------------------------------------------------------------------------------------------------------------------------------------------------------------------------------------------------------------------------------------------------------------------------------------  Advanced Care Planning  Patient and/or family consented to a voluntary Advanced Care Planning meeting.   Serious Illness Assessment and Counseling:  Life Limiting Disease: HfrEF on palliative milrinone posing threat to life and function.    Disease Specific Information Provided/Prognosis Discussed: Patient's current clinical condition, including diagnosis, prognosis, and management plan were discussed.   Counseling provided on HF, prognosis and what to expect with disease progression (that milrinone and oral meds will no longer work as they once were.)  Counseling provided on the irreversible and progressive nature of patient's heart failure.     Understanding/Overall Impression: Patient expressing limited-fair understanding of overall health status and severity of illness. Mary expressing clear understanding.    Goals/Hopes: Discussion ensued about patient's goals for their medical care going forward. Allowed patient time to talk about his/her current quality of life, disease course/progression, and symptom and treatment burden. Discussed goal is to live as long as she can.     Fears/Worries/Concerns: \"having 6 months to live.\"    Patient's Perception of Functional Status: needing a walker now    Resuscitation " "Assessment: With Mary, Pall NP provided counseling on the benefit versus burden of CPR in the setting of patient's overall health status. Expressed that with ongoing HF decline, Icd may fire d/t increased risk of arrhythmia. Explained that with asystole (lack of electrical conduction) ICD will not fire and was recommended not to initiate CPR/intubation as likelihood of survival or increased QOL would not be likely and only induce pain and suffering.  Mary mentions this is something she would like to have a meeting with pt and further discuss. Meeting arranged for Friday 4/11 at 2:30pm.    Advanced Directives:  Surrogate Health Care Decision Maker: Mary Caruso (Daughter)  [phone]   HPOA: not on file  Living will: not on file      Code Status: Decision to keep code status FULL CODE at this time.         I spent 43 minutes in providing separately identifiable ACP services with the patient and/or surrogate decision maker in a voluntary conversation discussing the patient's wishes and goals as detailed in the above note.   ----------------------------------------------------------------------------------------------------------------------------------------------------------------------------------------------------------------------------------------------------------------------------------------------------------------------------------------------------------------------    #Complex Medical Decision Making  #Goals of Care  #Advanced Care Planning  - Code status: Full code  - Surrogate decision maker: Mary Caruso (Daughter)  [phone]   - Goals are survival and time based   - 4/9: Met with pt at bedside, spoke to dtr Mary via phone. Able to glean more information r/t pt's life, wishes, goals, worries, and health preferences. See above/consult for supportive detail.    Mary feels as if pt has been slowly declining and that pt \"is at the end\". Mary states pt has always been able to \"bounce back\" and since " "February, she hasn't been able, only declining further. NP validated Mary's feelings and addressed her heartache. Goal from Mary is to continue to honor pt's goal of more time, but would like to see pt with more energy and \"happier\". Np discussed severity of disease and adjusted expectations that her presentation is consistent with disease progression and nearing end of life; that getting better/stronger may not be achievable. NP explained that pt too, seems to have a better understanding of her severity of illness, and it is reflecting in her mood.     Discussed with Mary the benefit versus burden of CPR in the setting of patient's overall health status. Expressed that with ongoing HF decline, her ICD may fire d/t increased risk of arrhythmia. NP compassionately explained that with asystole (lack of electrical conduction) ICD will not fire as the heart is dead. It was recommended not to initiate CPR/intubation as likelihood of survival or increased QOL would not be likely. It was expressed that doing CPR would not be beneficial and only induce pain and suffering when her heart has shown it is tired/too sick to continue. Mary mentions this is something she would like to have a meeting with pt and further discuss. Meeting arranged for Friday 4/11 at 2:30pm.    To support Mary and pt's goals, both are accepting of Palliative Navigator re-involvement.           #HFrEF  - Pt known to palliative services, has been on palliative milrinone since 2/2024, accepted outpt Palliative Navigator.  - Came in with [facility] after not having bumex for 1 week, unable to get a refill at the pharmacy. Pt states dtr forgot to call Dr. Rivera for a refill.   - Pt states she came in for worsening HF. Pt not wishing to talk about anything negative, particularly her demise. Agreeable for Palliative follow up next day/Friday. Meeting planned for Friday 2:30pm with pt and Mary. HF/HVI provider encouraged.      #Psychosocial Support  - " Ongoing pt and family support and care navigation  - Spiritual Care Support- prayer requested  - SW support  - Agreeable to Palliative Navigator again.    Plan of Care discussed with: Updated primary and bedside RN on goals of care decision, medication adjustments, and code status     Medical Decision Making was high level due to high complexity of problems, extensive data review, and high risk of management/treatment.     - HfrEF on palliative milrinone posing threat to life and function.  - Reviewed external notes from   - Reviews results from  which were used in decision making for   - Recommended the following tests:   - Assessment required independent historian: HF booker, primary, and dtr.  - Independent interpretation of test: labs and imaging  - Discussion of management with: dtr and primary  - Drug therapy requiring intensive monitoring for toxicity: amio,   - Decision regarding elective major surgery with identified patient or procedure risk factors:   - Decision regarding emergency major surgery:   - Decision regarding hospitalization or escalation of hospital-level care:   - Decision not to resuscitate or to de-escalate care because of poor prognosis:   - Parenteral controlled substances:     Thank you for allowing us to participate in the care of this patient. Palliative will continue to follow as needed. Palliative medicine is available Monday-Friday, 8a-6p. Please contact team with any questions or concerns.  Team pager [phone] (weekdays)  Jose Sigala CNP (on EPIC secure chat)

## 2025-04-09 NOTE — PROGRESS NOTES
Physical Therapy    Physical Therapy Treatment    Patient Name: Melissa Marinelli  MRN: [id number]  Department: [place] [address]  Room: [facility]  Today's Date: 4/9/2025  Time Calculation  Start Time: 1000  Stop Time: 1024  Time Calculation (min): 24 min         Assessment/Plan   PT Assessment  PT Assessment Results: Decreased strength, Decreased endurance, Impaired balance, Decreased mobility  Rehab Prognosis: Good  Barriers to Discharge Home: Caregiver assistance, Physical needs  Caregiver Assistance: Caregiver assistance needed per identified barriers - however, level of patient's required assistance exceeds assistance available at home  Physical Needs: Ambulating household distances limited by function/safety, Intermittent mobility assistance needed, Intermittent ADL assistance needed, High falls risk due to function or environment  Evaluation/Treatment Tolerance: Patient limited by fatigue  Medical Staff Made Aware: Yes  End of Session Communication: Bedside nurse  Assessment Comment: Pt participated in transfer training during session. During this task the patient demonstrated impaired dynamic balance and strength. Pt would benefit from continued skilled physical therapy to improve overall functional mobility.  End of Session Patient Position: Bed, 3 rail up, Alarm on  PT Plan  Inpatient/Swing Bed or Outpatient: Inpatient  PT Plan  Treatment/Interventions: Bed mobility, Transfer training, Gait training, Stair training, Strengthening, Neuromuscular re-education, Balance training, Endurance training, Therapeutic exercise, Therapeutic activity, Home exercise program, Positioning, Postural re-education  PT Plan: Ongoing PT  PT Frequency: 3 times per week  PT Discharge Recommendations: Moderate intensity level of continued care  Equipment Recommended upon Discharge: Wheeled walker  PT Recommended Transfer Status: Assist x1  PT - OK to Discharge: Yes (Once medically cleared)      General Visit Information:   PT  Visit  PT  Received On: 04/09/25  General  Reason for Referral: Pt presented w/ SOB and lethargy  Past Medical History Relevant to Rehab: ICM/ HFrEF (last EF 10-15% 1/2025, on palliative milrinone infusion since 2/2024, s/p [employer] ICD 5/2020), CAD (s/p NSTEMI, s/p RIRI to LCX 1/2016), MVR (s/p mitral valve repair 6/2019; 29 mm Epic bioprosthetic valve with Dr. Montana), COPD (No PFTs on file), HTN, HL, GERD, hx of CVA, and DM  Family/Caregiver Present: No  Prior to Session Communication: Bedside nurse  Patient Position Received: Bed, 3 rail up, Alarm off, not on at start of session  Preferred Learning Style: auditory, verbal  General Comment: Pt found supine in bed and was agreeable to therapy    Subjective   Precautions:  Precautions  Hearing/Visual Limitations: WFL  Medical Precautions: Fall precautions, Cardiac precautions    Vital Signs     Vitals Session Pre PT During PT Post PT   Heart Rate      SpO2      BP  Sitting in chair: 89/59 Supine in bed: 87/55   MAP (mmHg)            Objective   Pain:  Pain Assessment  Pain Assessment: 0-10  0-10 (Numeric) Pain Score: 0 - No pain  Cognition:  Cognition  Orientation Level: Oriented X4  Coordination:     Postural Control:  Static Sitting Balance  Static Sitting-Balance Support: Bilateral upper extremity supported, Feet supported  Static Sitting-Level of Assistance: Close supervision  Dynamic Sitting Balance  Dynamic Sitting-Balance Support: Bilateral upper extremity supported, Feet supported  Dynamic Sitting-Level of Assistance: Close supervision  Static Standing Balance  Static Standing-Balance Support: Bilateral upper extremity supported  Static Standing-Level of Assistance: Minimum assistance  Dynamic Standing Balance  Dynamic Standing-Balance Support: Bilateral upper extremity supported  Dynamic Standing-Level of Assistance: Minimum assistance    Activity Tolerance:  Activity Tolerance  Endurance: Tolerates 10 - 20 min exercise with multiple rests  Early Mobility/Exercise  Safety Screen: Proceed with mobilization - No exclusion criteria met  Treatments:  Bed Mobility  Bed Mobility: Yes  Bed Mobility 1  Bed Mobility 1: Supine to sitting  Level of Assistance 1: Minimum assistance  Bed Mobility Comments 1: Performed with HOB elevated  Bed Mobility 2  Bed Mobility  2: Sitting to supine  Level of Assistance 2: Minimum assistance    Ambulation/Gait Training  Ambulation/Gait Training Performed: Yes  Ambulation/Gait Training 1  Surface 1: Level tile  Device 1: Rolling walker  Assistance 1: Minimum assistance, Minimal verbal cues  Quality of Gait 1: Narrow base of support, Decreased step length, Forward flexed posture  Comments/Distance (ft) 1: two bouts of three feet  Transfers  Transfer: Yes  Transfer 1  Transfer From 1: Bed to, Stand to, Sit to  Transfer to 1: Sit, Stand  Technique 1: Sit to stand, Stand to sit  Transfer Device 1: Walker  Transfer Level of Assistance 1: Minimum assistance  Trials/Comments 1: x2 trials  Transfers 2  Transfer From 2: Bed to  Transfer to 2: Chair with arms  Technique 2: To right  Transfer Device 2: Walker  Transfer Level of Assistance 2: Minimum assistance  Trials/Comments 2: Pt required cues for sequencing  Transfers 3  Transfer From 3: Chair with arms to  Transfer to 3: Bed  Technique 3: To left  Transfer Device 3: Walker  Transfer Level of Assistance 3: Minimum assistance, Minimal verbal cues  Trials/Comments 3: Pt required cues for sequencing    Outcome Measures:  AMPAC Basic Mobility  Turning from your back to your side while in a flat bed without using bedrails: A little  Moving from lying on your back to sitting on the side of a flat bed without using bedrails: A little  Moving to and from bed to chair (including a wheelchair): A little  Standing up from a chair using your arms (e.g. wheelchair or bedside chair): A little  To walk in hospital room: A lot  Climbing 3-5 steps with railing: Total  Basic Mobility - Total Score: 15    Education  Documentation  Home Exercise Program, taught by Mariah Betts PT at 4/9/2025  1:24 PM.  Learner: Patient  Readiness: Acceptance  Method: Explanation  Response: Verbalizes Understanding  Comment: Pt educated on PT plan of care    Body Mechanics, taught by Mariah Betts PT at 4/9/2025  1:24 PM.  Learner: Patient  Readiness: Acceptance  Method: Explanation  Response: Verbalizes Understanding  Comment: Pt educated on PT plan of care    Mobility Training, taught by Mariah Betts, PT at 4/9/2025  1:24 PM.  Learner: Patient  Readiness: Acceptance  Method: Explanation  Response: Verbalizes Understanding  Comment: Pt educated on PT plan of care    Education Comments  No comments found.        OP EDUCATION:       Encounter Problems       Encounter Problems (Active)       Balance       Patient to demo static standing with unilateral UE support, performing single UE task with SBA, no sway or LOB x 2 mins for functional carryover  (Progressing)       Start:  04/08/25    Expected End:  04/22/25            Pt will score >/= 24/28 on Tinetti balance assessment to indicate low falls risk.   (Progressing)       Start:  04/08/25    Expected End:  04/22/25               Mobility       STG - Patient will ambulate >/= 100 ft Effie with LRAD, proper form, and no acute LOB (Progressing)       Start:  04/08/25    Expected End:  04/22/25            Pt. will tolerate >/= 15  minutes of OOB mobility without seated rest break and VSS to demo improved activity tolerance/endurance.  (Progressing)       Start:  04/08/25    Expected End:  04/22/25            Pt will complete 5xSTS in </= 12 seconds  (Progressing)       Start:  04/08/25    Expected End:  04/22/25               PT Transfers       STG - Patient will perform bed mobility IND with HOB flat and no rails (Progressing)       Start:  04/08/25    Expected End:  04/22/25            STG - Patient will transfer sit to and from stand Effie with LRAD (Progressing)       Start:   04/08/25    Expected End:  04/22/25               Pain - Adult

## 2025-04-09 NOTE — PROGRESS NOTES
"Subjective Data:  \"I feel nauseous.\" Patient states she had small BM last night after 2 suppositories and an enema. Worked with PT this am and sat in chair. Denies CP or SOB. Not quite back to baseline per her report. Na now 123. Cough noted during interview, patient confirms this is not new.     Overnight Events:    NA      Objective Data:  Last Recorded Vitals:  Vitals:    04/09/25 0423 04/09/25 0755 04/09/25 1019 04/09/25 1159   BP: 94/65 94/60 87/55 102/57   BP Location: Left arm Left arm  Right arm   Patient Position: Lying Lying Lying Lying   Pulse: 97 106 103 93   Resp: 16 18  20   Temp: 36.4 °C (97.5 °F) 36 °C (96.8 °F)  36.5 °C (97.7 °F)   TempSrc: Temporal Temporal  Temporal   SpO2: 96% 98%  96%   Weight: 62.5 kg (137 lb 12.6 oz)      Height:           Last Labs:  CBC - 4/9/2025:  7:12 AM  5.5 8.9 184    27.5      CMP - 4/9/2025:  7:12 AM  8.4 5.8 99 --- 0.7   3.8 2.9; 2.9 161 90      PTT - 4/9/2025:  7:12 AM  2.3   25.4 31     TROPHS   Date/Time Value Ref Range Status   04/04/2025 07:51  0 - 34 ng/L Final     Comment:     Previous result verified on 4/4/2025 0321 on specimen/case [id number] called with component [facility] for procedure Troponin I, High Sensitivity, Initial with value 261 ng/L.   04/04/2025 04:28  0 - 34 ng/L Final     Comment:     Previous result verified on 4/4/2025 0321 on specimen/case [id number] called with component [facility]PH[facility] for procedure Troponin I, High Sensitivity, Initial with value 261 ng/L.   04/04/2025 02:35  0 - 34 ng/L Final     Comment:     Previous result verified on 4/4/2025 0321 on specimen/case [id number] called with component TRPHS for procedure Troponin I, High Sensitivity, Initial with value 261 ng/L.   02/17/2024 03:47  0 - 34 ng/L Final     Comment:     Previous result verified on 2/17/2024 0325 on specimen/case [id number] called with component [facility] for procedure Troponin I, High Sensitivity, Initial with value 170 ng/L. "   02/17/2024 02:52  0 - 34 ng/L Final   01/26/2024 07:15  0 - 34 ng/L Final     BNP   Date/Time Value Ref Range Status   04/08/2025 06:13  0 - 99 pg/mL Final   04/04/2025 12:47  0 - 99 pg/mL Final     HGBA1C   Date/Time Value Ref Range Status   02/05/2025 06:24 PM 5.7 See comment % Final   07/08/2024 11:37 AM 5.2 see below % Final     VLDL   Date/Time Value Ref Range Status   09/01/2023 05:24 AM 21 0 - 40 mg/dL Final   05/21/2020 09:02 PM 31 0 - 40 mg/dL Final   11/07/2019 07:57 PM 21 0 - 40 mg/dL Final      Last I/O:  I/O last 3 completed shifts:  In: 832.3 (13.3 mL/kg) [P.O.:730; I.V.:102.3 (1.6 mL/kg)]  Out: 1750 (28 mL/kg) [Urine:1750 (0.8 mL/kg/hr)]  Weight: 62.5 kg     Past Cardiology Tests (Last 3 Years):  EKG:  ECG 12 lead 04/04/2025      ECG 12 lead 02/25/2025      ECG 12 Lead 02/06/2025      ECG 12 lead 02/05/2025      ECG 12 Lead 01/21/2025      ECG 12 lead 01/21/2025      ECG 12 lead 01/10/2025      ECG 12 lead 08/21/2024      Electrocardiogram, 12-lead PRN ACS symtpoms 07/08/2024      ECG 12 lead 07/08/2024      ECG 12 lead 07/08/2024      ECG 12 Lead 01/28/2024      Electrocardiogram, 12-lead PRN ACS symptoms 01/25/2024      ECG 12 Lead 01/24/2024      ECG 12 lead 01/23/2024      ECG 12 lead 01/23/2024      ECG 12 lead 01/22/2024      ECG 12 Lead 01/22/2024      ECG 12 Lead 12/01/2023      ECG 12 Lead       ECG 12 lead 11/21/2023      ECG 12 lead 11/21/2023    Echo:  Transesophageal Echo (SHRUTHI) With Possible Cardioversion 02/27/2025      Transesophageal Echo (SHRUTHI) 01/23/2025      Transthoracic Echo (TTE) Complete 07/08/2024      Transthoracic Echo (TTE) Limited 01/24/2024      Transthoracic Echo (TTE) Complete 11/21/2023    Ejection Fractions:  EF   Date/Time Value Ref Range Status   02/27/2025 09:39 AM 13 %    01/23/2025 02:02 PM 13 %    07/08/2024 04:30 PM 18 %      Cath:  Cardiac catheterization - non-coronary 01/26/2024    Stress Test:  No results found for this or any  previous visit from the past 1095 days.    Cardiac Imaging:  No results found for this or any previous visit from the past 1095 days.      Inpatient Medications:  Scheduled medications   Medication Dose Route Frequency    amiodarone  200 mg oral Daily    apixaban  2.5 mg oral BID    aspirin  81 mg oral Daily    bumetanide  0.5 mg oral Daily    cholecalciferol  2,000 Units oral Daily    dapagliflozin propanediol  10 mg oral Daily    famotidine  40 mg oral Daily    ipratropium-albuteroL  3 mL nebulization TID    [Held by provider] mupirocin   Topical TID    polyethylene glycol  17 g oral Daily    sennosides-docusate sodium  2 tablet oral Nightly     PRN medications   Medication    acetaminophen    albuterol    benzocaine-menthol    benzonatate    bisacodyl    dextromethorphan-guaifenesin    magnesium hydroxide    sodium chloride     Continuous Medications   Medication Dose Last Rate    milrinone in 5 % dextrose  0.25 mcg/kg/min 0.25 mcg/kg/min (04/09/25 0701)       Physical Exam:  General: NAD, lying in bed  Skin: warm and dry   Head/ neck: + JVD seen at 90 degrees  Cardiac: RRR, S1, S2   Pulm: Crackles bilateral bases posteriorly, room air   GI: soft, nontender   Extremities: no LE edema   Neuro: no focal neuro deficits   Psych: appropriate mood and behavior        Assessment/Plan   Melissa Marinelli is a 69 y.o. female with a PMHx. significant for ICM/ HFrEF (last EF 10-15% 1/2025, on palliative milrinone infusion since 2/2024, s/p [employer] ICD 5/2020), CAD (s/p NSTEMI, s/p RIRI to LCX 1/2016), MVR (s/p mitral valve repair 6/2019; 29 mm Epic bioprosthetic valve with Dr. Montana), COPD (No PFTs on file), HTN, HL, GERD, hx of CVA, and DM, presenting to the ED with chief complaints of shortness of breath and lethargy. Of note, she ran out of her bumex for at least 1 week.      In the ED she was treated with 20 mg Furosemide IVP and placed on BIPAP mask. Lab results significant for hyperkalemia (K+ 6.3) treated with  "insulin / dextrose. Her troponin is elevated in the setting of heart failure: Trend: 261 -> 302 -> 292. Her clinical scenario is very similar to her previous admission 2/25 - 2/28/2025. Of note, this is her 4th admission this year.      Upon arrival to the HFICU, her SpO2 is 100% on 2 liters /min O2 via NC. Her breath sounds are normal. She is pleasant, alert and oriented in no distress. She does endorse feeling tired \"always.\" Her daughter, Sarah, was contacted to provide an update. Sarah states patient has had at least two falls at home since previous admission (PT made aware).  Her vitals are stable and all home meds, with the exception of spironolactone (hyperkalemia), have been resumed. Her Bumex dose is no decreased to home dose 0.5 mg PO daily.        AHA Stage D ICM systolic HFrEF   - SHRUTHI: 2/27/25 EF 10-15%, LV thrombus   - TTE: 7/8/24   - admit weight: 67.4 kgs (BMI: 24.7)  - Weight today 62.5 (62.5)  - admit BNP: 4/4: 803  - C/w Aspirin for MI prevention   - Not able to tolerate ACEi, ARB, ARNi (Entresto discontinued during last admission) - AHA Stage D  - C/w dapagliflozin 10 mg daily  - DISCONTINUED Spironolactone 2/2 hyperkalemia   - 4/8 Reduce Bumex dose to 0.5 mg PO daily  - Bumex 2mg IV x 2 post transfusion 4/6, bumex 1 mg IVP x1 on 4/7  - C/w Home Milrinone dose 0.25 mcg/kg/min -> Palliative Inotropes   - Daily standing weights, 2gm sodium diet, 2L fluid restriction, strict I&Os  - 4/9 Palliative care consult: patient informed of prognosis in ICU, upset, and would like more information regarding GOC.   - 4/9 patient having cough, no fever or elevated WBC. Crackles on exam. Will get repeat CXR and lactate, IS      Hyponatremia  - Na today 122, downtrending. 135 on admit  - RFP BID  - no confusion or AMS  - urine electrolytes WNL  - may need to hold diuretic     CAD   -LHC: 4/15/2019  -C/w Aspirin for coronary MI prevention  - no statin due to elevated LFT      Paroxysmal Atrial Fibrillation / " flutter   -Device: [employer] Gregorio ICD 5/2020   -C/w Home Amiodarone 200 mg daily  -C/w Eliquis 2.5mg PO BID (dose reduced 4/5)    Elevated LFT  - AST today 99 (124, 114, 63, 82)  - ALT today 161 (172, 151, 108, 125)  - likely due to congestion  - on no statin  - repeat hepatic panel ordered, see above, trending down      Electrolyte Disturbances  Hyperkalemia (resolved)  - Trend lytes for hyperkalemia   - Received Kayexalate x 1 overnight 4/5/25 for hyperkalemia   - Continue to hold home dose angeli       COPD  -Monitor and maintain SpO2 > 92%  -C/w albuterol 2 puffs every 4 hours as needed   -C/w Duo-Nebs scheduled every 6 hours   -C/w Breo Ellipta 100-25 mcg/dose 1 puff daily       Constipation  - Bowel regimen: Stefany-Colace 2 tablets nightly, miralax prn  - Increased bowel regimen 4/8 2/2 constipation   - PPI: Famotidine 40 mg daily   - 4/9 patient only had small BM after enema and 2 suppositories 4/8, add MOM        CKD (stage 3):  -Baseline BUN/Cr: 30 - 38 / 2.2 - 2.7  -Admit BUN/Cr: 38 / 2.51  -Daily BUN / Creatinine: 4/8/25: 38 / 2.7  -I/Os  -avoid hypotension and nephrotoxic agents         Anemia in the setting of iron deficiency anemia (reports nose bleeds at home)   DEVIKA thrombus  - Labs: Hgb: 7.3, TIBC: 329, ferritin: 25, serum Fe: 12, folate: 10.1, B12: 1.064, % sat: 4  - Initiate IV Venofer x 5 days   - C/w Apixaban 2.5 mg twice daily (dose reduced 4/5)        Endo:  - Euglycemic  - hgbA1c: 2/5/25: 5.7   -TSH: 2/5/25: 1.4 /, Free T4: 1.04     Anxiety. Depression, Acute pain   Lethargy (heart failure vs. anemia)  - Serial neuro and pain assessments   - PO Tylenol PRN for pain  - PT/OT Consult, OOB to chair  - Sleep/wake cycle normalization     Recent falls x 2 at home  - Fall risk  - PT evaluation for discharge planning rec mod intensity. Patient declines SNF, wants to continue to go home with home care.      PHYSICAL AND OCCUPATIONAL THERAPY: Ordered      LINES:  PIVs  [place]      DVT: apixaban   VAP BUNDLE:  NA  CENTRAL LINE BUNDLE: NA  ULCER PPX: Famotidine   GLYCEMIC CONTROL: Euglycemic   BOWEL CARE: Stefany-colace   INDWELLING CATHETER: NA  NUTRITION: Adult diet Regular; 1000 mL fluid     EMERGENCY CONTACT: Extended Emergency Contact Information  Primary Emergency Contact: Mary Caruso  Mobile Phone: [phone]  Relation: Daughter   needed? No  Secondary Emergency Contact: Elin Marinelli  Mobile Phone: [phone]  Relation: Daughter  Preferred language: English   needed? No  FAMILY UPDATE: Sarah Luevano updated during rounds, agreeable to palliative care consult         CVC 09/09/24 Single lumen Tunneled Right Subclavian (Active)   Line Necessity Intravenous medication therapy 04/09/25 0835   Site Assessment Clean;Dry;Intact 04/09/25 0835   Proximal Lumen Status Infusing 04/09/25 0835   Dressing Status Clean;Dry;Occlusive 04/09/25 0835   Number of days: 212       Peripheral IV 04/04/25 20 G Right Antecubital (Active)   Site Assessment Clean;Dry;Intact 04/09/25 0835   Line Status Saline locked 04/09/25 0835   Dressing Status Clean;Dry;Occlusive 04/09/25 0835   Number of days: 5       Code Status:  Full Code  All labs, vital signs, tests & imaging results, and medications were reviewed.    Seen and discussed with Dr. Joe Naik, APRN-CNP

## 2025-04-10 ENCOUNTER — APPOINTMENT (OUTPATIENT)
Dept: RADIOLOGY | Facility: HOSPITAL | Age: 70
DRG: 291 | End: 2025-04-10
Payer: COMMERCIAL

## 2025-04-10 LAB
ALBUMIN SERPL BCP-MCNC: 3 G/DL (ref 3.4–5)
ALBUMIN SERPL BCP-MCNC: 3.3 G/DL (ref 3.4–5)
ANION GAP SERPL CALC-SCNC: 14 MMOL/L
ANION GAP SERPL CALC-SCNC: 14 MMOL/L (ref 10–20)
APTT PPP: 32 SECONDS (ref 26–36)
BASOPHILS # BLD AUTO: 0.03 X10*3/UL (ref 0–0.1)
BASOPHILS NFR BLD AUTO: 0.5 %
BUN SERPL-MCNC: 37 MG/DL (ref 6–23)
BUN SERPL-MCNC: 38 MG/DL (ref 6–23)
CALCIUM SERPL-MCNC: 8.4 MG/DL (ref 8.6–10.6)
CALCIUM SERPL-MCNC: 8.5 MG/DL (ref 8.6–10.6)
CHLORIDE SERPL-SCNC: 90 MMOL/L (ref 98–107)
CHLORIDE SERPL-SCNC: 90 MMOL/L (ref 98–107)
CO2 SERPL-SCNC: 24 MMOL/L (ref 21–32)
CO2 SERPL-SCNC: 24 MMOL/L (ref 21–32)
CREAT SERPL-MCNC: 2.33 MG/DL (ref 0.5–1.05)
CREAT SERPL-MCNC: 2.35 MG/DL (ref 0.5–1.05)
EGFRCR SERPLBLD CKD-EPI 2021: 22 ML/MIN/1.73M*2
EGFRCR SERPLBLD CKD-EPI 2021: 22 ML/MIN/1.73M*2
EOSINOPHIL # BLD AUTO: 0.14 X10*3/UL (ref 0–0.7)
EOSINOPHIL NFR BLD AUTO: 2.4 %
ERYTHROCYTE [DISTWIDTH] IN BLOOD BY AUTOMATED COUNT: 18.4 % (ref 11.5–14.5)
GLUCOSE BLD MANUAL STRIP-MCNC: 103 MG/DL (ref 74–99)
GLUCOSE BLD MANUAL STRIP-MCNC: 130 MG/DL (ref 74–99)
GLUCOSE BLD MANUAL STRIP-MCNC: 226 MG/DL (ref 74–99)
GLUCOSE BLD MANUAL STRIP-MCNC: 95 MG/DL (ref 74–99)
GLUCOSE SERPL-MCNC: 81 MG/DL (ref 74–99)
GLUCOSE SERPL-MCNC: 85 MG/DL (ref 74–99)
HCT VFR BLD AUTO: 27.7 % (ref 36–46)
HGB BLD-MCNC: 9 G/DL (ref 12–16)
IMM GRANULOCYTES # BLD AUTO: 0.03 X10*3/UL (ref 0–0.7)
IMM GRANULOCYTES NFR BLD AUTO: 0.5 % (ref 0–0.9)
INR PPP: 2.2 (ref 0.9–1.1)
LACTATE SERPL-SCNC: 1.2 MMOL/L (ref 0.4–2)
LYMPHOCYTES # BLD AUTO: 0.63 X10*3/UL (ref 1.2–4.8)
LYMPHOCYTES NFR BLD AUTO: 11 %
MAGNESIUM SERPL-MCNC: 2.34 MG/DL (ref 1.6–2.4)
MCH RBC QN AUTO: 28.6 PG (ref 26–34)
MCHC RBC AUTO-ENTMCNC: 32.5 G/DL (ref 32–36)
MCV RBC AUTO: 88 FL (ref 80–100)
MONOCYTES # BLD AUTO: 0.55 X10*3/UL (ref 0.1–1)
MONOCYTES NFR BLD AUTO: 9.6 %
NEUTROPHILS # BLD AUTO: 4.34 X10*3/UL (ref 1.2–7.7)
NEUTROPHILS NFR BLD AUTO: 76 %
NRBC BLD-RTO: 0.5 /100 WBCS (ref 0–0)
PHOSPHATE SERPL-MCNC: 3.1 MG/DL (ref 2.5–4.9)
PHOSPHATE SERPL-MCNC: 3.1 MG/DL (ref 2.5–4.9)
PLATELET # BLD AUTO: 187 X10*3/UL (ref 150–450)
POTASSIUM SERPL-SCNC: 4.6 MMOL/L (ref 3.5–5.3)
POTASSIUM SERPL-SCNC: 4.8 MMOL/L (ref 3.5–5.3)
PROTHROMBIN TIME: 24.4 SECONDS (ref 9.8–12.4)
RBC # BLD AUTO: 3.15 X10*6/UL (ref 4–5.2)
SODIUM SERPL-SCNC: 123 MMOL/L (ref 136–145)
SODIUM SERPL-SCNC: 123 MMOL/L (ref 136–145)
WBC # BLD AUTO: 5.7 X10*3/UL (ref 4.4–11.3)

## 2025-04-10 PROCEDURE — 2500000001 HC RX 250 WO HCPCS SELF ADMINISTERED DRUGS (ALT 637 FOR MEDICARE OP): Performed by: NURSE PRACTITIONER

## 2025-04-10 PROCEDURE — 1200000002 HC GENERAL ROOM WITH TELEMETRY DAILY

## 2025-04-10 PROCEDURE — 2500000004 HC RX 250 GENERAL PHARMACY W/ HCPCS (ALT 636 FOR OP/ED): Performed by: NURSE PRACTITIONER

## 2025-04-10 PROCEDURE — 83605 ASSAY OF LACTIC ACID: CPT | Performed by: NURSE PRACTITIONER

## 2025-04-10 PROCEDURE — 85025 COMPLETE CBC W/AUTO DIFF WBC: CPT | Performed by: NURSE PRACTITIONER

## 2025-04-10 PROCEDURE — 82947 ASSAY GLUCOSE BLOOD QUANT: CPT

## 2025-04-10 PROCEDURE — 2500000002 HC RX 250 W HCPCS SELF ADMINISTERED DRUGS (ALT 637 FOR MEDICARE OP, ALT 636 FOR OP/ED): Performed by: NURSE PRACTITIONER

## 2025-04-10 PROCEDURE — 85610 PROTHROMBIN TIME: CPT | Performed by: NURSE PRACTITIONER

## 2025-04-10 PROCEDURE — 99232 SBSQ HOSP IP/OBS MODERATE 35: CPT | Performed by: STUDENT IN AN ORGANIZED HEALTH CARE EDUCATION/TRAINING PROGRAM

## 2025-04-10 PROCEDURE — 80069 RENAL FUNCTION PANEL: CPT | Performed by: NURSE PRACTITIONER

## 2025-04-10 PROCEDURE — 83735 ASSAY OF MAGNESIUM: CPT | Performed by: NURSE PRACTITIONER

## 2025-04-10 PROCEDURE — 94640 AIRWAY INHALATION TREATMENT: CPT

## 2025-04-10 PROCEDURE — 74018 RADEX ABDOMEN 1 VIEW: CPT | Performed by: RADIOLOGY

## 2025-04-10 PROCEDURE — 74018 RADEX ABDOMEN 1 VIEW: CPT

## 2025-04-10 RX ORDER — PROCHLORPERAZINE 25 MG/1
25 SUPPOSITORY RECTAL EVERY 12 HOURS PRN
Status: DISCONTINUED | OUTPATIENT
Start: 2025-04-10 | End: 2025-04-12

## 2025-04-10 RX ORDER — PROCHLORPERAZINE EDISYLATE 5 MG/ML
10 INJECTION INTRAMUSCULAR; INTRAVENOUS EVERY 6 HOURS PRN
Status: DISCONTINUED | OUTPATIENT
Start: 2025-04-10 | End: 2025-04-12 | Stop reason: HOSPADM

## 2025-04-10 RX ORDER — IPRATROPIUM BROMIDE AND ALBUTEROL SULFATE 2.5; .5 MG/3ML; MG/3ML
3 SOLUTION RESPIRATORY (INHALATION) 4 TIMES DAILY PRN
Status: DISCONTINUED | OUTPATIENT
Start: 2025-04-10 | End: 2025-04-10

## 2025-04-10 RX ORDER — MILRINONE LACTATE 0.2 MG/ML
0.25 INJECTION, SOLUTION INTRAVENOUS CONTINUOUS
Qty: 100 ML | Refills: 0 | Status: SHIPPED
Start: 2025-04-10 | End: 2025-04-17

## 2025-04-10 RX ORDER — PROCHLORPERAZINE MALEATE 10 MG
10 TABLET ORAL EVERY 6 HOURS PRN
Status: DISCONTINUED | OUTPATIENT
Start: 2025-04-10 | End: 2025-04-12

## 2025-04-10 RX ORDER — IPRATROPIUM BROMIDE AND ALBUTEROL SULFATE 2.5; .5 MG/3ML; MG/3ML
3 SOLUTION RESPIRATORY (INHALATION)
Status: DISCONTINUED | OUTPATIENT
Start: 2025-04-10 | End: 2025-04-11

## 2025-04-10 RX ORDER — BUMETANIDE 1 MG/1
0.5 TABLET ORAL
Status: DISCONTINUED | OUTPATIENT
Start: 2025-04-10 | End: 2025-04-12 | Stop reason: HOSPADM

## 2025-04-10 RX ADMIN — AMIODARONE HYDROCHLORIDE 200 MG: 200 TABLET ORAL at 10:30

## 2025-04-10 RX ADMIN — ASPIRIN 81 MG: 81 TABLET, COATED ORAL at 10:30

## 2025-04-10 RX ADMIN — BUMETANIDE 0.5 MG: 1 TABLET ORAL at 10:30

## 2025-04-10 RX ADMIN — CHOLECALCIFEROL TAB 25 MCG (1000 UNIT) 2000 UNITS: 25 TAB at 10:30

## 2025-04-10 RX ADMIN — IPRATROPIUM BROMIDE AND ALBUTEROL SULFATE 3 ML: .5; 3 SOLUTION RESPIRATORY (INHALATION) at 09:35

## 2025-04-10 RX ADMIN — BUMETANIDE 0.5 MG: 1 TABLET ORAL at 16:19

## 2025-04-10 RX ADMIN — FAMOTIDINE 40 MG: 20 TABLET ORAL at 10:30

## 2025-04-10 RX ADMIN — APIXABAN 2.5 MG: 2.5 TABLET, FILM COATED ORAL at 21:04

## 2025-04-10 RX ADMIN — IPRATROPIUM BROMIDE AND ALBUTEROL SULFATE 3 ML: .5; 3 SOLUTION RESPIRATORY (INHALATION) at 15:50

## 2025-04-10 RX ADMIN — IPRATROPIUM BROMIDE AND ALBUTEROL SULFATE 3 ML: .5; 3 SOLUTION RESPIRATORY (INHALATION) at 21:28

## 2025-04-10 RX ADMIN — MILRINONE LACTATE IN DEXTROSE 0.25 MCG/KG/MIN: 200 INJECTION, SOLUTION INTRAVENOUS at 03:49

## 2025-04-10 RX ADMIN — POLYETHYLENE GLYCOL 3350 17 G: 17 POWDER, FOR SOLUTION ORAL at 10:29

## 2025-04-10 RX ADMIN — DAPAGLIFLOZIN 10 MG: 10 TABLET, FILM COATED ORAL at 10:31

## 2025-04-10 RX ADMIN — PROCHLORPERAZINE MALEATE 10 MG: 10 TABLET, FILM COATED ORAL at 10:31

## 2025-04-10 RX ADMIN — APIXABAN 2.5 MG: 2.5 TABLET, FILM COATED ORAL at 10:30

## 2025-04-10 ASSESSMENT — COGNITIVE AND FUNCTIONAL STATUS - GENERAL
HELP NEEDED FOR BATHING: A LOT
MOVING TO AND FROM BED TO CHAIR: A LITTLE
MOBILITY SCORE: 16
DRESSING REGULAR LOWER BODY CLOTHING: A LOT
WALKING IN HOSPITAL ROOM: A LOT
DRESSING REGULAR UPPER BODY CLOTHING: A LOT
STANDING UP FROM CHAIR USING ARMS: A LOT
CLIMB 3 TO 5 STEPS WITH RAILING: A LOT
DAILY ACTIVITIY SCORE: 14
TURNING FROM BACK TO SIDE WHILE IN FLAT BAD: A LITTLE
PERSONAL GROOMING: A LOT
TOILETING: A LOT

## 2025-04-10 ASSESSMENT — PAIN SCALES - GENERAL: PAINLEVEL_OUTOF10: 0 - NO PAIN

## 2025-04-10 ASSESSMENT — PAIN - FUNCTIONAL ASSESSMENT: PAIN_FUNCTIONAL_ASSESSMENT: 0-10

## 2025-04-10 NOTE — PROGRESS NOTES
"Subjective Data:  \"I feel nauseous.\" Patient states she had small BM last night after 2 suppositories and an enema 4/9. Had MOM and still no BM today. Vomited this am.     Overnight Events:    NA      Objective Data:  Last Recorded Vitals:  Vitals:    04/09/25 2339 04/10/25 0432 04/10/25 0750 04/10/25 0805   BP: 104/67 102/67  96/66   BP Location:       Patient Position:       Pulse: 107 99  107   Resp: 18 18  18   Temp: 36.3 °C (97.3 °F) 36.7 °C (98.1 °F)  36.1 °C (97 °F)   TempSrc: Temporal Temporal  Temporal   SpO2: 96% 97%  99%   Weight:   62.6 kg (138 lb 0.1 oz)    Height:           Last Labs:  CBC - 4/10/2025:  6:56 AM  5.7 9.0 187    27.7      CMP - 4/10/2025:  6:56 AM  8.5 5.8 99 --- 0.7   3.1 3.3 161 90      PTT - 4/10/2025:  6:56 AM  2.2   24.4 32     TROPHS   Date/Time Value Ref Range Status   04/04/2025 07:51  0 - 34 ng/L Final     Comment:     Previous result verified on 4/4/2025 0321 on specimen/case [id number] called with component "[employer]"HS for procedure Troponin I, High Sensitivity, Initial with value 261 ng/L.   04/04/2025 04:28  0 - 34 ng/L Final     Comment:     Previous result verified on 4/4/2025 0321 on specimen/case [id number] called with component "[employer]"HS for procedure Troponin I, High Sensitivity, Initial with value 261 ng/L.   04/04/2025 02:35  0 - 34 ng/L Final     Comment:     Previous result verified on 4/4/2025 0321 on specimen/case [id number] called with component "[employer]"HS for procedure Troponin I, High Sensitivity, Initial with value 261 ng/L.   02/17/2024 03:47  0 - 34 ng/L Final     Comment:     Previous result verified on 2/17/2024 0325 on specimen/case [id number] called with component [facility] for procedure Troponin I, High Sensitivity, Initial with value 170 ng/L.   02/17/2024 02:52  0 - 34 ng/L Final   01/26/2024 07:15  0 - 34 ng/L Final     BNP   Date/Time Value Ref Range Status   04/08/2025 06:13  0 - 99 pg/mL Final "   04/04/2025 12:47  0 - 99 pg/mL Final     HGBA1C   Date/Time Value Ref Range Status   02/05/2025 06:24 PM 5.7 See comment % Final   07/08/2024 11:37 AM 5.2 see below % Final     VLDL   Date/Time Value Ref Range Status   09/01/2023 05:24 AM 21 0 - 40 mg/dL Final   05/21/2020 09:02 PM 31 0 - 40 mg/dL Final   11/07/2019 07:57 PM 21 0 - 40 mg/dL Final      Last I/O:  I/O last 3 completed shifts:  In: 480 (7.7 mL/kg) [P.O.:480]  Out: 1775 (28.4 mL/kg) [Urine:1775 (0.8 mL/kg/hr)]  Weight: 62.5 kg     Past Cardiology Tests (Last 3 Years):  EKG:  ECG 12 lead 04/04/2025      ECG 12 lead 02/25/2025      ECG 12 Lead 02/06/2025      ECG 12 lead 02/05/2025      ECG 12 Lead 01/21/2025      ECG 12 lead 01/21/2025      ECG 12 lead 01/10/2025      ECG 12 lead 08/21/2024      Electrocardiogram, 12-lead PRN ACS symtpoms 07/08/2024      ECG 12 lead 07/08/2024      ECG 12 lead 07/08/2024      ECG 12 Lead 01/28/2024      Electrocardiogram, 12-lead PRN ACS symptoms 01/25/2024      ECG 12 Lead 01/24/2024      ECG 12 lead 01/23/2024      ECG 12 lead 01/23/2024      ECG 12 lead 01/22/2024      ECG 12 Lead 01/22/2024      ECG 12 Lead 12/01/2023      ECG 12 Lead       ECG 12 lead 11/21/2023      ECG 12 lead 11/21/2023    Echo:  Transesophageal Echo (SHRUTHI) With Possible Cardioversion 02/27/2025      Transesophageal Echo (SHRUTHI) 01/23/2025      Transthoracic Echo (TTE) Complete 07/08/2024      Transthoracic Echo (TTE) Limited 01/24/2024      Transthoracic Echo (TTE) Complete 11/21/2023    Ejection Fractions:  EF   Date/Time Value Ref Range Status   02/27/2025 09:39 AM 13 %    01/23/2025 02:02 PM 13 %    07/08/2024 04:30 PM 18 %      Cath:  Cardiac catheterization - non-coronary 01/26/2024    Stress Test:  No results found for this or any previous visit from the past 1095 days.    Cardiac Imaging:  No results found for this or any previous visit from the past 1095 days.      Inpatient Medications:  Scheduled medications   Medication Dose  Route Frequency    amiodarone  200 mg oral Daily    apixaban  2.5 mg oral BID    aspirin  81 mg oral Daily    bumetanide  0.5 mg oral Daily    cholecalciferol  2,000 Units oral Daily    dapagliflozin propanediol  10 mg oral Daily    famotidine  40 mg oral Daily    ipratropium-albuteroL  3 mL nebulization q6h    [Held by provider] mupirocin   Topical TID    polyethylene glycol  17 g oral Daily    sennosides-docusate sodium  2 tablet oral Nightly     PRN medications   Medication    acetaminophen    albuterol    benzocaine-menthol    benzonatate    bisacodyl    dextromethorphan-guaifenesin    magnesium hydroxide    prochlorperazine    Or    prochlorperazine    Or    prochlorperazine    sodium chloride     Continuous Medications   Medication Dose Last Rate    milrinone in 5 % dextrose  0.25 mcg/kg/min 0.25 mcg/kg/min (04/10/25 0349)       Physical Exam:  General: NAD, lying in bed  Skin: warm and dry   Head/ neck: no JVD seen at 90 degrees  Cardiac: RRR, S1, S2   Pulm: CTA, room air   GI: soft, nontender   Extremities: no LE edema , legs cool to touch   Neuro: no focal neuro deficits   Psych: appropriate mood and behavior        Assessment/Plan   Melissa Marinelli is a 69 y.o. female with a PMHx. significant for ICM/ HFrEF (last EF 10-15% 1/2025, on palliative milrinone infusion since 2/2024, s/p [employer] ICD 5/2020), CAD (s/p NSTEMI, s/p RIRI to LCX 1/2016), MVR (s/p mitral valve repair 6/2019; 29 mm Epic bioprosthetic valve with Dr. Montana), COPD (No PFTs on file), HTN, HL, GERD, hx of CVA, and DM, presenting to the ED with chief complaints of shortness of breath and lethargy. Of note, she ran out of her bumex for at least 1 week.      In the ED she was treated with 20 mg Furosemide IVP and placed on BIPAP mask. Lab results significant for hyperkalemia (K+ 6.3) treated with insulin / dextrose. Her troponin is elevated in the setting of heart failure: Trend: 261 -> 302 -> 292. Her clinical scenario is very similar to  "her previous admission 2/25 - 2/28/2025. Of note, this is her 4th admission this year.      Upon arrival to the HFICU, her SpO2 is 100% on 2 liters /min O2 via NC. Her breath sounds are normal. She is pleasant, alert and oriented in no distress. She does endorse feeling tired \"always.\" Her daughter, Sarah, was contacted to provide an update. Sarah states patient has had at least two falls at home since previous admission (PT made aware).  Her vitals are stable and all home meds, with the exception of spironolactone (hyperkalemia), have been resumed. Her Bumex dose is no decreased to home dose 0.5 mg PO daily.        AHA Stage D ICM systolic HFrEF   - SHRUTHI: 2/27/25 EF 10-15%, LV thrombus   - TTE: 7/8/24   - admit weight: 67.4 kgs (BMI: 24.7)  - Weight today 62.5 (62.5)  - admit BNP: 4/4: 803  - C/w Aspirin for MI prevention   - Not able to tolerate ACEi, ARB, ARNi (Entresto discontinued during last admission) - AHA Stage D  - C/w dapagliflozin 10 mg daily  - DISCONTINUED Spironolactone 2/2 hyperkalemia   - 4/8 Reduce Bumex dose to 0.5 mg PO daily  - Bumex 2mg IV x 2 post transfusion 4/6, bumex 1 mg IVP x1 on 4/7  - C/w Home Milrinone dose 0.25 mcg/kg/min -> Palliative Inotropes   - Daily standing weights, 2gm sodium diet, 2L fluid restriction, strict I&Os  - 4/9 Palliative care consult: patient informed of prognosis in ICU, upset, and would like more information regarding GOC.  Plan for family meeting Friday at 2:30 with daughter, patient, palliative and HVI team. (Daughter verbalized mom weaker over the past few months)   - 4/9 patient having cough, no fever or elevated WBC. Crackles on exam. repeat CXR showed worsening congestion and lactate 1.1, IS-->additional bumex 0.5mg PO X1   - 4/10 continue bumex 0.5mg PO BID. Will assess urine output today, goal 1.5L, if suboptimal, plan to give additional bumex.       Hyponatremia  - Na today 123, downtrending. 135 on admit  - RFP BID  - no confusion or AMS  - urine " electrolytes WNL  - may need to hold diuretic    CAD   -[facility]: 4/15/2019  -C/w Aspirin for coronary MI prevention  - no statin due to elevated LFT      Paroxysmal Atrial Fibrillation / flutter   -Device: [employer] ICD 5/2020   -C/w Home Amiodarone 200 mg daily  -C/w Eliquis 2.5mg PO BID (dose reduced 4/5)    Elevated LFT  - AST 4/9 99 (124, 114, 63, 82)  - ALT 4/9  161 (172, 151, 108, 125)  - likely due to congestion  - on no statin  - repeat hepatic panel in 3 days to monitor trend     Electrolyte Disturbances  Hyperkalemia (resolved)  - Trend lytes for hyperkalemia   - Received Kayexalate x 1 overnight 4/5/25 for hyperkalemia   - Continue to hold home dose angeli       COPD  -Monitor and maintain SpO2 > 92%  -C/w albuterol 2 puffs every 4 hours as needed   -C/w Duo-Nebs scheduled every 6 hours (patient requested it often when it was PRN)  -C/w Breo Ellipta 100-25 mcg/dose 1 puff daily       Constipation  - Bowel regimen: Stefany-Colace 2 tablets nightly, miralax prn  - Increased bowel regimen 4/8 2/2 constipation   - PPI: Famotidine 40 mg daily   - 4/9 patient only had small BM after enema and 2 suppositories 4/8, add MOM  - 4/10 nauseated, vomited. Add compazine, KUB, lactate. Repeat enema today.         CKD (stage 3):  -Baseline BUN/Cr: 30 - 38 / 2.2 - 2.7  -Admit BUN/Cr: 38 / 2.51  -I/Os  -avoid hypotension and nephrotoxic agents         Anemia in the setting of iron deficiency anemia (reports nose bleeds at home)   DEVIKA thrombus  - Hgb as low as 7.2 S/P 1 unit PRBC on 4/6  - Labs:, TIBC: 329, ferritin: 25, serum Fe: 12, folate: 10.1, B12: 1.064, % sat: 4  - Initiate IV Venofer x 5 days   - C/w Apixaban 2.5 mg twice daily (dose reduced 4/5)   - Hgb stable at 9 today   - holding initiation of ferrous sulfate due to constipation        Endo:  - Euglycemic  - hgbA1c: 2/5/25: 5.7   -TSH: 2/5/25: 1.4 /, Free T4: 1.04     Anxiety. Depression, Acute pain   Lethargy (heart failure vs. anemia) Improved   - Serial neuro and pain  assessments   - PO Tylenol PRN for pain  - PT/OT Consult, OOB to chair  - Sleep/wake cycle normalization     Recent falls x 2 at home  - Fall risk  - PT evaluation for discharge planning rec mod intensity. Patient declines SNF, wants to continue to go home with home care.      PHYSICAL AND OCCUPATIONAL THERAPY: Ordered      LINES:  PIVs  [place]      DVT: apixaban   VAP BUNDLE: NA  CENTRAL LINE BUNDLE: NA  ULCER PPX: Famotidine   GLYCEMIC CONTROL: Euglycemic   BOWEL CARE: Stefany-colace   INDWELLING CATHETER: NA  NUTRITION: Adult diet Regular; 1000 mL fluid     EMERGENCY CONTACT: Extended Emergency Contact Information  Primary Emergency Contact: Mary Caruso  Mobile Phone: [phone]  Relation: Daughter   needed? No  Secondary Emergency Contact: Elin Marinelli  Mobile Phone: [phone]  Relation: Daughter  Preferred language: English   needed? No           CVC 09/09/24 Single lumen Tunneled Right Subclavian (Active)   Line Necessity Intravenous medication therapy 04/09/25 0835   Site Assessment Clean;Dry;Intact 04/09/25 0835   Proximal Lumen Status Infusing 04/09/25 0835   Dressing Status Clean;Dry;Occlusive 04/09/25 0835   Number of days: 212       Peripheral IV 04/04/25 20 G Right Antecubital (Active)   Site Assessment Clean;Dry;Intact 04/09/25 0835   Line Status Saline locked 04/09/25 0835   Dressing Status Clean;Dry;Occlusive 04/09/25 0835   Number of days: 5       Code Status:  Full Code  All labs, vital signs, tests & imaging results, and medications were reviewed.    Seen and discussed with Dr. Joe Naik, APRN-CNP

## 2025-04-10 NOTE — PROGRESS NOTES
TCC aware that patient is not medically cleared for discharge. Patient will resume home care services with [facility] once medically cleared; referral sent over. Patient is on Milrinone; scheduled for a GOC meeting on 04/11/25 at 1430. TCC will continue to follow for discharge planning.      MELLISSA Lewis, RN  [facility], [facility]  Transitional Care Coordinator, Mon-Fri  Cell: [phone], Office: [phone]  Email: [email or web address]

## 2025-04-10 NOTE — CARE PLAN
The patient's goals for the shift include  pt will have a BM    The clinical goals for the shift include Pt will remain fall free and have a BM this shift    Over the shift, the patient a KUB due to nausea and constipation. Compazine order and given, and scheduled laxative. Pt had a large BM and felt relieve. Continuous milrinone infusing through CVC line. CHG bath completed. Fall and safety precaution in place. Will continue to monitor pt.      Pt extremely tired/lethargic. Provider notified and came to bedside to assess pt.

## 2025-04-10 NOTE — PROGRESS NOTES
Spiritual Care Visit  Spiritual Care Request    Reason for Visit:  Routine Visit: Introduction  Continue Visiting: Yes     Focus of Care:  Visited With: Patient not available, Health care provider (Pt working with Bedside RN.)

## 2025-04-11 ENCOUNTER — HOME CARE VISIT (OUTPATIENT)
Dept: HOME HEALTH SERVICES | Facility: HOME HEALTH | Age: 70
End: 2025-04-11
Payer: COMMERCIAL

## 2025-04-11 ENCOUNTER — HOME INFUSION (OUTPATIENT)
Dept: INFUSION THERAPY | Age: 70
End: 2025-04-11
Payer: COMMERCIAL

## 2025-04-11 LAB
ALBUMIN SERPL BCP-MCNC: 3 G/DL (ref 3.4–5)
ANION GAP SERPL CALC-SCNC: 16 MMOL/L (ref 10–20)
APTT PPP: 32 SECONDS (ref 26–36)
BASOPHILS # BLD AUTO: 0.03 X10*3/UL (ref 0–0.1)
BASOPHILS NFR BLD AUTO: 0.5 %
BUN SERPL-MCNC: 38 MG/DL (ref 6–23)
CALCIUM SERPL-MCNC: 8.4 MG/DL (ref 8.6–10.6)
CHLORIDE SERPL-SCNC: 89 MMOL/L (ref 98–107)
CO2 SERPL-SCNC: 24 MMOL/L (ref 21–32)
CREAT SERPL-MCNC: 2.58 MG/DL (ref 0.5–1.05)
EGFRCR SERPLBLD CKD-EPI 2021: 19 ML/MIN/1.73M*2
EOSINOPHIL # BLD AUTO: 0.07 X10*3/UL (ref 0–0.7)
EOSINOPHIL NFR BLD AUTO: 1.2 %
ERYTHROCYTE [DISTWIDTH] IN BLOOD BY AUTOMATED COUNT: 18.8 % (ref 11.5–14.5)
GLUCOSE BLD MANUAL STRIP-MCNC: 120 MG/DL (ref 74–99)
GLUCOSE BLD MANUAL STRIP-MCNC: 152 MG/DL (ref 74–99)
GLUCOSE BLD MANUAL STRIP-MCNC: 165 MG/DL (ref 74–99)
GLUCOSE SERPL-MCNC: 86 MG/DL (ref 74–99)
HCT VFR BLD AUTO: 27.3 % (ref 36–46)
HGB BLD-MCNC: 8.6 G/DL (ref 12–16)
IMM GRANULOCYTES # BLD AUTO: 0.03 X10*3/UL (ref 0–0.7)
IMM GRANULOCYTES NFR BLD AUTO: 0.5 % (ref 0–0.9)
INR PPP: 2.4 (ref 0.9–1.1)
LYMPHOCYTES # BLD AUTO: 0.53 X10*3/UL (ref 1.2–4.8)
LYMPHOCYTES NFR BLD AUTO: 8.8 %
MAGNESIUM SERPL-MCNC: 2.42 MG/DL (ref 1.6–2.4)
MCH RBC QN AUTO: 28.4 PG (ref 26–34)
MCHC RBC AUTO-ENTMCNC: 31.5 G/DL (ref 32–36)
MCV RBC AUTO: 90 FL (ref 80–100)
MONOCYTES # BLD AUTO: 0.51 X10*3/UL (ref 0.1–1)
MONOCYTES NFR BLD AUTO: 8.5 %
NEUTROPHILS # BLD AUTO: 4.83 X10*3/UL (ref 1.2–7.7)
NEUTROPHILS NFR BLD AUTO: 80.5 %
NRBC BLD-RTO: 0 /100 WBCS (ref 0–0)
PHOSPHATE SERPL-MCNC: 3 MG/DL (ref 2.5–4.9)
PLATELET # BLD AUTO: 170 X10*3/UL (ref 150–450)
POTASSIUM SERPL-SCNC: 5 MMOL/L (ref 3.5–5.3)
PROTHROMBIN TIME: 26.1 SECONDS (ref 9.8–12.4)
RBC # BLD AUTO: 3.03 X10*6/UL (ref 4–5.2)
SODIUM SERPL-SCNC: 124 MMOL/L (ref 136–145)
WBC # BLD AUTO: 6 X10*3/UL (ref 4.4–11.3)

## 2025-04-11 PROCEDURE — 82947 ASSAY GLUCOSE BLOOD QUANT: CPT

## 2025-04-11 PROCEDURE — 2500000002 HC RX 250 W HCPCS SELF ADMINISTERED DRUGS (ALT 637 FOR MEDICARE OP, ALT 636 FOR OP/ED): Performed by: NURSE PRACTITIONER

## 2025-04-11 PROCEDURE — 85610 PROTHROMBIN TIME: CPT | Performed by: NURSE PRACTITIONER

## 2025-04-11 PROCEDURE — 1200000002 HC GENERAL ROOM WITH TELEMETRY DAILY

## 2025-04-11 PROCEDURE — 36416 COLLJ CAPILLARY BLOOD SPEC: CPT | Performed by: NURSE PRACTITIONER

## 2025-04-11 PROCEDURE — 2500000004 HC RX 250 GENERAL PHARMACY W/ HCPCS (ALT 636 FOR OP/ED): Performed by: NURSE PRACTITIONER

## 2025-04-11 PROCEDURE — 99233 SBSQ HOSP IP/OBS HIGH 50: CPT

## 2025-04-11 PROCEDURE — 80069 RENAL FUNCTION PANEL: CPT | Performed by: NURSE PRACTITIONER

## 2025-04-11 PROCEDURE — 97116 GAIT TRAINING THERAPY: CPT | Mod: GP

## 2025-04-11 PROCEDURE — 2500000001 HC RX 250 WO HCPCS SELF ADMINISTERED DRUGS (ALT 637 FOR MEDICARE OP): Performed by: NURSE PRACTITIONER

## 2025-04-11 PROCEDURE — 97530 THERAPEUTIC ACTIVITIES: CPT | Mod: GP

## 2025-04-11 PROCEDURE — 99232 SBSQ HOSP IP/OBS MODERATE 35: CPT | Performed by: STUDENT IN AN ORGANIZED HEALTH CARE EDUCATION/TRAINING PROGRAM

## 2025-04-11 PROCEDURE — 99497 ADVNCD CARE PLAN 30 MIN: CPT

## 2025-04-11 PROCEDURE — 85025 COMPLETE CBC W/AUTO DIFF WBC: CPT | Performed by: NURSE PRACTITIONER

## 2025-04-11 PROCEDURE — 94640 AIRWAY INHALATION TREATMENT: CPT

## 2025-04-11 PROCEDURE — 83735 ASSAY OF MAGNESIUM: CPT | Performed by: NURSE PRACTITIONER

## 2025-04-11 RX ORDER — IPRATROPIUM BROMIDE AND ALBUTEROL SULFATE 2.5; .5 MG/3ML; MG/3ML
3 SOLUTION RESPIRATORY (INHALATION)
Status: DISCONTINUED | OUTPATIENT
Start: 2025-04-12 | End: 2025-04-12 | Stop reason: HOSPADM

## 2025-04-11 RX ADMIN — APIXABAN 2.5 MG: 2.5 TABLET, FILM COATED ORAL at 20:27

## 2025-04-11 RX ADMIN — ASPIRIN 81 MG: 81 TABLET, COATED ORAL at 09:52

## 2025-04-11 RX ADMIN — APIXABAN 2.5 MG: 2.5 TABLET, FILM COATED ORAL at 09:51

## 2025-04-11 RX ADMIN — MILRINONE LACTATE IN DEXTROSE 0.25 MCG/KG/MIN: 200 INJECTION, SOLUTION INTRAVENOUS at 02:47

## 2025-04-11 RX ADMIN — IPRATROPIUM BROMIDE AND ALBUTEROL SULFATE 3 ML: .5; 3 SOLUTION RESPIRATORY (INHALATION) at 20:12

## 2025-04-11 RX ADMIN — IPRATROPIUM BROMIDE AND ALBUTEROL SULFATE 3 ML: .5; 3 SOLUTION RESPIRATORY (INHALATION) at 09:54

## 2025-04-11 RX ADMIN — IPRATROPIUM BROMIDE AND ALBUTEROL SULFATE 3 ML: .5; 3 SOLUTION RESPIRATORY (INHALATION) at 16:11

## 2025-04-11 RX ADMIN — ACETAMINOPHEN 975 MG: 325 TABLET, FILM COATED ORAL at 17:33

## 2025-04-11 RX ADMIN — IPRATROPIUM BROMIDE AND ALBUTEROL SULFATE 3 ML: .5; 3 SOLUTION RESPIRATORY (INHALATION) at 02:23

## 2025-04-11 RX ADMIN — DAPAGLIFLOZIN 10 MG: 10 TABLET, FILM COATED ORAL at 09:51

## 2025-04-11 RX ADMIN — POLYETHYLENE GLYCOL 3350 17 G: 17 POWDER, FOR SOLUTION ORAL at 09:51

## 2025-04-11 RX ADMIN — MILRINONE LACTATE IN DEXTROSE 0.25 MCG/KG/MIN: 200 INJECTION, SOLUTION INTRAVENOUS at 17:30

## 2025-04-11 RX ADMIN — FAMOTIDINE 40 MG: 20 TABLET ORAL at 09:51

## 2025-04-11 RX ADMIN — CHOLECALCIFEROL TAB 25 MCG (1000 UNIT) 2000 UNITS: 25 TAB at 09:51

## 2025-04-11 RX ADMIN — AMIODARONE HYDROCHLORIDE 200 MG: 200 TABLET ORAL at 09:51

## 2025-04-11 ASSESSMENT — PAIN SCALES - GENERAL
PAINLEVEL_OUTOF10: 0 - NO PAIN
PAINLEVEL_OUTOF10: 5 - MODERATE PAIN
PAINLEVEL_OUTOF10: 0 - NO PAIN

## 2025-04-11 ASSESSMENT — COGNITIVE AND FUNCTIONAL STATUS - GENERAL
DRESSING REGULAR LOWER BODY CLOTHING: A LITTLE
TOILETING: A LITTLE
MOVING FROM LYING ON BACK TO SITTING ON SIDE OF FLAT BED WITH BEDRAILS: A LITTLE
EATING MEALS: A LITTLE
TURNING FROM BACK TO SIDE WHILE IN FLAT BAD: A LITTLE
HELP NEEDED FOR BATHING: A LITTLE
PERSONAL GROOMING: A LITTLE
MOVING FROM LYING ON BACK TO SITTING ON SIDE OF FLAT BED WITH BEDRAILS: A LITTLE
DAILY ACTIVITIY SCORE: 18
DRESSING REGULAR UPPER BODY CLOTHING: A LITTLE
WALKING IN HOSPITAL ROOM: A LITTLE
MOVING TO AND FROM BED TO CHAIR: A LITTLE
MOBILITY SCORE: 16
CLIMB 3 TO 5 STEPS WITH RAILING: A LITTLE
TURNING FROM BACK TO SIDE WHILE IN FLAT BAD: A LITTLE
MOVING TO AND FROM BED TO CHAIR: A LITTLE
STANDING UP FROM CHAIR USING ARMS: A LITTLE
MOBILITY SCORE: 18
CLIMB 3 TO 5 STEPS WITH RAILING: TOTAL
STANDING UP FROM CHAIR USING ARMS: A LITTLE
WALKING IN HOSPITAL ROOM: A LITTLE

## 2025-04-11 ASSESSMENT — PAIN - FUNCTIONAL ASSESSMENT
PAIN_FUNCTIONAL_ASSESSMENT: 0-10
PAIN_FUNCTIONAL_ASSESSMENT: 0-10

## 2025-04-11 NOTE — PROGRESS NOTES
Interval Events:  No acute events overnight.     Subjective Data:  Patient seen and assessed this morning, lying in bed, NAD. Denies any CP, reports chronic stable SOB, comfortable on RA. Patient reports having large BM last night, nausea resolved. Updated on plan of care, questions answered.      Today's Plan/Updates:   - pending family mtg, this afternoon at 2:30  - plan for home pump connection and discharge tomorrow 4/12      Objective Data:  Last Recorded Vitals:  Vitals:    04/11/25 0736 04/11/25 0941 04/11/25 0954 04/11/25 1126   BP: 86/51 (!) 87/49  93/60   BP Location: Left arm   Left arm   Patient Position: Lying   Sitting   Pulse: 103 101 101 102   Resp: 18  16 16   Temp: 36.7 °C (98.1 °F)   36.6 °C (97.9 °F)   TempSrc: Temporal   Temporal   SpO2: 97%  97% 97%   Weight:       Height:           Last Labs:  Results for orders placed or performed during the hospital encounter of 04/04/25 (from the past 24 hours)   POCT GLUCOSE   Result Value Ref Range    POCT Glucose 103 (H) 74 - 99 mg/dL   Renal Function Panel   Result Value Ref Range    Glucose 81 74 - 99 mg/dL    Sodium 123 (L) 136 - 145 mmol/L    Potassium 4.6 3.5 - 5.3 mmol/L    Chloride 90 (L) 98 - 107 mmol/L    Bicarbonate 24 21 - 32 mmol/L    Anion Gap 14 mmol/L    Urea Nitrogen 37 (H) 6 - 23 mg/dL    Creatinine 2.33 (H) 0.50 - 1.05 mg/dL    eGFR 22 (L) >60 mL/min/1.73m*2    Calcium 8.4 (L) 8.6 - 10.6 mg/dL    Phosphorus 3.1 2.5 - 4.9 mg/dL    Albumin 3.0 (L) 3.4 - 5.0 g/dL   POCT GLUCOSE   Result Value Ref Range    POCT Glucose 226 (H) 74 - 99 mg/dL   POCT GLUCOSE   Result Value Ref Range    POCT Glucose 120 (H) 74 - 99 mg/dL   CBC and Auto Differential   Result Value Ref Range    WBC 6.0 4.4 - 11.3 x10*3/uL    nRBC 0.0 0.0 - 0.0 /100 WBCs    RBC 3.03 (L) 4.00 - 5.20 x10*6/uL    Hemoglobin 8.6 (L) 12.0 - 16.0 g/dL    Hematocrit 27.3 (L) 36.0 - 46.0 %    MCV 90 80 - 100 fL    MCH 28.4 26.0 - 34.0 pg    MCHC 31.5 (L) 32.0 - 36.0 g/dL    RDW 18.8 (H)  11.5 - 14.5 %    Platelets 170 150 - 450 x10*3/uL    Neutrophils % 80.5 40.0 - 80.0 %    Immature Granulocytes %, Automated 0.5 0.0 - 0.9 %    Lymphocytes % 8.8 13.0 - 44.0 %    Monocytes % 8.5 2.0 - 10.0 %    Eosinophils % 1.2 0.0 - 6.0 %    Basophils % 0.5 0.0 - 2.0 %    Neutrophils Absolute 4.83 1.20 - 7.70 x10*3/uL    Immature Granulocytes Absolute, Automated 0.03 0.00 - 0.70 x10*3/uL    Lymphocytes Absolute 0.53 (L) 1.20 - 4.80 x10*3/uL    Monocytes Absolute 0.51 0.10 - 1.00 x10*3/uL    Eosinophils Absolute 0.07 0.00 - 0.70 x10*3/uL    Basophils Absolute 0.03 0.00 - 0.10 x10*3/uL   Renal function panel   Result Value Ref Range    Glucose 86 74 - 99 mg/dL    Sodium 124 (L) 136 - 145 mmol/L    Potassium 5.0 3.5 - 5.3 mmol/L    Chloride 89 (L) 98 - 107 mmol/L    Bicarbonate 24 21 - 32 mmol/L    Anion Gap 16 10 - 20 mmol/L    Urea Nitrogen 38 (H) 6 - 23 mg/dL    Creatinine 2.58 (H) 0.50 - 1.05 mg/dL    eGFR 19 (L) >60 mL/min/1.73m*2    Calcium 8.4 (L) 8.6 - 10.6 mg/dL    Phosphorus 3.0 2.5 - 4.9 mg/dL    Albumin 3.0 (L) 3.4 - 5.0 g/dL   Magnesium   Result Value Ref Range    Magnesium 2.42 (H) 1.60 - 2.40 mg/dL   Coagulation Screen   Result Value Ref Range    Protime 26.1 (H) 9.8 - 12.4 seconds    INR 2.4 (H) 0.9 - 1.1    aPTT 32 26 - 36 seconds   POCT GLUCOSE   Result Value Ref Range    POCT Glucose 152 (H) 74 - 99 mg/dL     *Note: Due to a large number of results and/or encounters for the requested time period, some results have not been displayed. A complete set of results can be found in Results Review.        TROPHS   Date/Time Value Ref Range Status   04/04/2025 07:51  0 - 34 ng/L Final     Comment:     Previous result verified on 4/4/2025 0321 on specimen/case [id number] called with component [facility] for procedure Troponin I, High Sensitivity, Initial with value 261 ng/L.   04/04/2025 04:28  0 - 34 ng/L Final     Comment:     Previous result verified on 4/4/2025 0321 on specimen/case  [id number] called with component TRPHS for procedure Troponin I, High Sensitivity, Initial with value 261 ng/L.   04/04/2025 02:35  0 - 34 ng/L Final     Comment:     Previous result verified on 4/4/2025 0321 on specimen/case [id number] called with component TRPHS for procedure Troponin I, High Sensitivity, Initial with value 261 ng/L.   02/17/2024 03:47  0 - 34 ng/L Final     Comment:     Previous result verified on 2/17/2024 0325 on specimen/case [id number] called with component TRPHS for procedure Troponin I, High Sensitivity, Initial with value 170 ng/L.   02/17/2024 02:52  0 - 34 ng/L Final   01/26/2024 07:15  0 - 34 ng/L Final     BNP   Date/Time Value Ref Range Status   04/08/2025 06:13  0 - 99 pg/mL Final   04/04/2025 12:47  0 - 99 pg/mL Final     HGBA1C   Date/Time Value Ref Range Status   02/05/2025 06:24 PM 5.7 See comment % Final   07/08/2024 11:37 AM 5.2 see below % Final     VLDL   Date/Time Value Ref Range Status   09/01/2023 05:24 AM 21 0 - 40 mg/dL Final   05/21/2020 09:02 PM 31 0 - 40 mg/dL Final   11/07/2019 07:57 PM 21 0 - 40 mg/dL Final      Last I/O:  I/O last 3 completed shifts:  In: 960 (15.4 mL/kg) [P.O.:960]  Out: 1725 (27.6 mL/kg) [Urine:1725 (0.8 mL/kg/hr)]  Weight: 62.5 kg     Past Cardiology Tests (Last 3 Years):  EKG:  ECG 12 lead 04/04/2025  Sinus tachycardia with Fusion complexes  Low voltage QRS  Cannot rule out Anterior infarct (cited on or before 05-FEB-2025)  T wave abnormality, consider lateral ischemia  Abnormal ECG  When compared with ECG of 27-FEB-2025 15:28,  Sinus rhythm has replaced Atrial flutter  Non-specific change in ST segment in Inferior leads  ST no longer depressed in Lateral leads  Confirmed by Trevor Caceres ([id number]) on 4/4/2025 8:02:14 PM    ECG 12 lead 02/25/2025  Accelerated Junctional rhythm with retrograde conduction  Rightward axis  Low voltage QRS  Septal infarct , age undetermined  ST & T wave  abnormality, consider inferolateral ischemia  Abnormal ECG  When compared with ECG of 06-FEB-2025 10:08,  Junctional rhythm has replaced Wide QRS rhythm     See ED provider note for full interpretation and clinical correlation  Confirmed by Alycia Joyner ([id number]) on 2/25/2025 9:42:26 PM    Echo:  Transesophageal Echo (SHRUTHI) With Possible Cardioversion 02/27/2025  CONCLUSIONS:   1. Left ventricular ejection fraction is severely decreased, by visual estimate at 10-15%.   2. There is global hypokinesis of the left ventricle with minor regional variations.   3. There is reduced right ventricular systolic function.   4. The left atrium is enlarged.   5. Left atrial appendage demonstrating thrombus measuring 1 cm x 1.75 cm along with dense sponatenous contrast.   6. There is an Epic mitral valve bioprosthesis, 29 mm reported size. Gradients across the mtiral valve were not assessed.   7. Compared with study dated 1/23/2025, there is redemonstration of a DEVIKA thrombus, that appears similair in size to prior SHRUTHI.    Transesophageal Echo (SHRUTHI) 01/23/2025  CONCLUSIONS:   1. Left ventricular ejection fraction is severely decreased, by visual estimate at 10-15%.   2. Left ventricular diastolic filling cannot be determined, due to mitral valve repair/replacement.   3. The left atrium is enlarged.   4. The left atrial appendage appears dilated, with very dense spontaneous contrast, flow ectasia, and an image suggestive of a thrombus.   5. Here is a 4x6mm mobile echogenic image that appears to be attached to the device lead (Clip41).   6. There is an unknown bioprosthetic type mitral valve prosthesis.   7. Mildly elevated right ventricular systolic pressure.   8. Moderate tricuspid regurgitation visualized.   9. Mild aortic valve regurgitation.  10. There is plaque visualized in the descending aorta.    Transthoracic Echo (TTE) Complete 07/08/2024  CONCLUSIONS:   1. The left ventricular systolic function is severely decreased, with a  visually estimated ejection fraction of 15-20%.   2. There is global hypokinesis of the left ventricle with minor regional variations.   3. Spectral Doppler shows an abnormal pattern of left ventricular diastolic filling.   4. Left ventricular cavity size is severely dilated.   5. No left ventricular thrombus visualized.   6. There is reduced right ventricular systolic function.   7. The left atrium is severely dilated.   8. The DI is normal, consistent with normal prosthetic mitral valve function. The mean diastolic pressure is 8 mmHg at a heart rate of 136 bpm.   9. Mild to moderately elevated right ventricular systolic pressure.  10. Mild aortic valve regurgitation.    Ejection Fractions:  EF   Date/Time Value Ref Range Status   02/27/2025 09:39 AM 13 %    01/23/2025 02:02 PM 13 %    07/08/2024 04:30 PM 18 %      Cath:  Cardiac catheterization - non-coronary 01/26/2024  Right Heart Catheterization:  Cardiac output was calculated via the Chirag method. Elevated left sided filling pressures with low cardiac output. -Elevated right_[RA 12, RVEDP 13] and left_[PCWP 26 mmHg] sided filling pressure, PA pressures of 46/29 with a mean of 37 mmHg, and low assumed Chirag cardiac output at 2.3 L/min and cardiac index of 1.3 L/min/mï¿½. SVR 2,886 dynes/seconds/cm-5    Inpatient Medications:  Scheduled medications   Medication Dose Route Frequency    amiodarone  200 mg oral Daily    apixaban  2.5 mg oral BID    aspirin  81 mg oral Daily    bumetanide  0.5 mg oral BID    cholecalciferol  2,000 Units oral Daily    dapagliflozin propanediol  10 mg oral Daily    famotidine  40 mg oral Daily    ipratropium-albuteroL  3 mL nebulization q6h    [Held by provider] mupirocin   Topical TID    polyethylene glycol  17 g oral Daily    sennosides-docusate sodium  2 tablet oral Nightly     PRN medications   Medication    acetaminophen    albuterol    benzocaine-menthol    benzonatate    bisacodyl    dextromethorphan-guaifenesin    magnesium  "hydroxide    prochlorperazine    Or    prochlorperazine    Or    prochlorperazine    sodium chloride     Continuous Medications   Medication Dose Last Rate    milrinone in 5 % dextrose  0.25 mcg/kg/min 0.25 mcg/kg/min (04/11/25 0247)       Physical Exam:  General: NAD, lying in bed  Skin: warm and dry   Head/ neck: no JVD seen at 90 degrees  Cardiac: RRR, S1, S2   Pulm: CTA, room air   GI: soft, nontender   Extremities: no LE edema , legs cool to touch   Neuro: no focal neuro deficits   Psych: appropriate mood and behavior        Assessment/Plan   Melissa Marinelli is a 69 y.o. female with a PMHx. significant for ICM/ HFrEF (last EF 10-15% 1/2025, on palliative milrinone infusion since 2/2024, s/p [employer] ICD 5/2020), CAD (s/p NSTEMI, s/p RIRI to LCX 1/2016), MVR (s/p mitral valve repair 6/2019; 29 mm Epic bioprosthetic valve with Dr. Montana), COPD (No PFTs on file), HTN, HL, GERD, hx of CVA, and DM, presenting to the ED with chief complaints of shortness of breath and lethargy. Of note, she ran out of her bumex for at least 1 week.      In the ED she was treated with 20 mg Furosemide IVP and placed on BIPAP mask. Lab results significant for hyperkalemia (K+ 6.3) treated with insulin / dextrose. Her troponin is elevated in the setting of heart failure: Trend: 261 -> 302 -> 292. Her clinical scenario is very similar to her previous admission 2/25 - 2/28/2025. Of note, this is her 4th admission this year.      Upon arrival to the HFICU, her SpO2 is 100% on 2 liters /min O2 via NC. Her breath sounds are normal. She is pleasant, alert and oriented in no distress. She does endorse feeling tired \"always.\" Her daughter, Sarah, was contacted to provide an update. Sarah states patient has had at least two falls at home since previous admission (PT made aware).  Her vitals are stable and all home meds, with the exception of spironolactone (hyperkalemia), have been resumed. Her Bumex dose is no decreased to home dose 0.5 mg " PO daily.      Acute on Chronic combined systolic and diastolic Heart Failure  - AHA Stage D, ICM systolic HFrEF   - SHRUTHI: 2/27/25 EF 10-15%, LV thrombus   - TTE 7/8/24: EF 15-20%, GHK, abnormal DF, LV severely dilated, reduced RV function, LA severely dilated  - admit weight: 67.4 kgs (BMI: 24.7)  - Weight today: 62.5 kg (62.6, 62.5)  - admit BNP: 4/4: 803  - 4/6 Bumex 2mg IV x 2 post transfusion, bumex 1 mg IVP x1 on 4/7  - 4/8 Reduce Bumex dose to 0.5 mg PO daily  - 4/9 worsening cough, no fever, WBC wnl. Crackles on exam. CXR showed worsening congestion and lactate 1.1, IS-->additional bumex 0.5mg PO X1   - cont bumex 0.5mg PO BID, goal UO 1.5L, if suboptimal, increase Bumex dose as tolerated  - cont home Milrinone dose 0.25 mcg/kg/min -> Palliative Inotropes   - Palliative care consulted/following: pt informed of prognosis in ICU, became upset, would like more information regarding GOC.    - pending family mtg, this afternoon at 2:30 with daughter, patient, palliative and HVI team. (Daughter verbalized mom has been weaker over the past few months)   - Not able to tolerate ACEi, ARB, ARNi (Entresto discontinued during last admission)   - DISCONTINUED Spironolactone 2/2 hyperkalemia   - C/w Aspirin for secondary MI prevention   - C/w dapagliflozin 10 mg daily  - Daily standing weights, 2gm sodium diet, 2L fluid restriction, strict I&Os    Hyponatremia  - Na today 124, was 135 on admit  - no confusion or AMS  - urine electrolytes WNL  - cont to monitor daily RFP    CAD   -C: 4/15/2019  -C/w Aspirin for coronary MI prevention  - no statin due to elevated LFT      Paroxysmal Atrial Fibrillation / flutter   -Device: [employer] ICD 5/2020   -C/w Home Amiodarone 200 mg daily  -C/w Eliquis 2.5mg PO BID (dose reduced 4/5)    Elevated LFT  - AST 4/9 99 (124, 114, 63, 82)  - ALT 4/9  161 (172, 151, 108, 125)  - likely due to congestion  - no statin  - repeat hepatic panel in 3 days to monitor trend, ordered 4/12      Electrolyte Disturbances  Hyperkalemia (resolved)  - Trend lytes for hyperkalemia   - Received Kayexalate x 1 overnight 4/5/25 for hyperkalemia   - Continue to hold home dose angeli     COPD  -Monitor and maintain SpO2 > 92%  -C/w albuterol 2 puffs every 4 hours as needed   -C/w Duo-Nebs scheduled q6h (patient requested it often when it was PRN)  -C/w Breo Ellipta 100-25 mcg/dose 1 puff daily       Constipation, resolved  Nausea, resolved  - Bowel regimen: Stefany-Colace 2 tablets nightly, miralax prn  - Increased bowel regimen 4/8 2/2 constipation   - 4/9 had small BM after enema and 2 suppositories, add MOM  - 4/10 nauseated, vomited, add PRN compazine, KUB: Cholelithiasis. No acute abnormality, lactate 1.2, repeat enema  - reports large BM last evening, now resolved  - cont PPI: Famotidine 40 mg daily       CKD (stage 3):  -Baseline BUN/Cr: 30 - 38 / 2.2 - 2.7  -Admit BUN/Cr: 38 / 2.51  - remains within baseline  - Avoid nephrotoxins and hypotension, trend daily RFP     Iron deficiency anemia (reports nose bleeds at home)   DEVIKA thrombus  - Hgb as low as 7.2 S/P 1 unit PRBC on 4/6  - Labs:, TIBC: 329, ferritin: 25, serum Fe: 12, folate: 10.1, B12: 1.064, % sat: 4  - s/p IV Venofer x 5 days   - Hgb stable ~8-9  - holding initiation of PO ferrous sulfate due to constipation   - C/w Apixaban 2.5 mg twice daily (dose reduced 4/5)      Endo  - Euglycemic  - hgbA1c: 2/5/25: 5.7   -TSH: 2/5/25: 1.4 /, Free T4: 1.04     Anxiety. Depression, Acute pain   Lethargy (heart failure vs. anemia) Improved   - Serial neuro and pain assessments   - PO Tylenol PRN for pain  - PT/OT Consult, OOB to chair  - Sleep/wake cycle normalization     Recent falls x 2 at home  - Fall risk  - PT evaluation for discharge planning rec mod intensity. Patient declines SNF, wants to continue to go home with home care.     Dispo: - PT rec Moderate, OT rec Low, pt declines SNF/not compatible with inotrope infusion  - plan for home pump connection and  discharge tomorrow 4/12, to resume [facility]    DVT ppx: Eliquis    Code Status: Full Code   Emergency Contact: Mary Caruso, daughter: [phone]  Secondary Contact: Elin Marinelli, daughter:[phone]    All labs, vital signs, tests & imaging results, and medications were reviewed.    Seen and discussed with Dr. Brandin Melgar, APRN-CNP

## 2025-04-11 NOTE — PROGRESS NOTES
Patient continues milrinone infusion for Stage D CHF. Dr. Kathy Rivera follows outpatient. Is currently at [place] [id number] and is discharging 04/12/25. Family meeting today will confirm plans, but pharmacy will deliver medication today. Current rate is 4.65mL/hr (weight based) but patient to discharge at home rate of 4.6ml/hr.     [facility] spoke with patient daughter, pump is being kept at hospital. Agrees to delivery today to hospital by 4pm. [facility] sent message to [facility] for floor nurse to know of delivery. Daughter stated no need to call patient.     Pharmacy to dispense the following 4/11 straight by 4pm to [facility] LT 5 Room 5072-A:  3x milrinone 48hr bags with tubing attached plus all supplies. No pump needed.  HU 4/12 4/14 4/16 marked on  bags     NF 4/15 straight - check weight and progress

## 2025-04-11 NOTE — PROGRESS NOTES
Physical Therapy    Physical Therapy Treatment    Patient Name: Melissa Marinelli  MRN: [id number]  Department: [place] [address]  Room: [address] [place] [address]  Today's Date: 4/11/2025  Time Calculation  Start Time: 1536  Stop Time: 1612  Time Calculation (min): 36 min       Assessment/Plan   PT Assessment  PT Assessment Results: Decreased strength, Decreased endurance, Impaired balance, Decreased mobility  Barriers to Discharge Home: Caregiver assistance, Physical needs  Caregiver Assistance: Caregiver assistance needed per identified barriers - however, level of patient's required assistance exceeds assistance available at home  Physical Needs: Ambulating household distances limited by function/safety, Intermittent mobility assistance needed, Intermittent ADL assistance needed, High falls risk due to function or environment  Evaluation/Treatment Tolerance: Patient limited by fatigue  End of Session Communication: Bedside nurse  Assessment Comment: Pt able to amb into hallway this date. Pt requested to have a sit break at the bench & started excessively coughing during this time. Pt then requested her inhaler & was able to administer indep (RN aware). Coughing subsided & pt able to amb back to room. Direct handoff to RT for breathing treatment. Pt would cont to benefit from skilled therapy while IP in order to improve functional mobility & endurance.  End of Session Patient Position: Bed, 2 rail up, Alarm off, not on at start of session (Direct handoff to RT)  PT Plan  Inpatient/Swing Bed or Outpatient: Inpatient  PT Plan  Treatment/Interventions: Bed mobility, Transfer training, Gait training, Stair training, Strengthening, Neuromuscular re-education, Balance training, Endurance training, Therapeutic exercise, Therapeutic activity, Home exercise program, Positioning, Postural re-education  PT Plan: Ongoing PT  PT Frequency: 3 times per week  PT Discharge Recommendations: Moderate intensity level of continued care  Equipment Recommended  upon Discharge: Wheeled walker  PT Recommended Transfer Status: Assist x1, Assistive device  PT - OK to Discharge: Yes (Once medically cleared)    General Visit Information:   PT  Visit  PT Received On: 04/11/25  General  Family/Caregiver Present: Yes  Caregiver Feedback: Pt's dtr present & engaged during session.  Prior to Session Communication: Bedside nurse  Patient Position Received: Bed, 3 rail up, Alarm off, not on at start of session  General Comment: Pt lying in bed upon PT entry into room. Agreeable to therapy. Noted tele. Pt's dtr present & supportive during session.    Subjective     Precautions:  Precautions  Medical Precautions: Fall precautions, Cardiac precautions     Date/Time Vitals Session Patient Position Pulse Resp SpO2 BP MAP (mmHg)    04/11/25 1611 --  --  99  16  96 %  --  --     04/11/25 1647 --  --  105  16  100 %  87/72  77               Objective     Pain:  Pain Assessment  Pain Assessment: 0-10  0-10 (Numeric) Pain Score: 0 - No pain    Cognition:  Cognition  Overall Cognitive Status: Within Functional Limits    Coordination:  Movements are Fluid and Coordinated: Yes    Postural Control:  Postural Control  Postural Control: Within Functional Limits  Posture Comment: Forward flexed posture  Static Sitting Balance  Static Sitting-Balance Support: Bilateral upper extremity supported, Feet supported  Static Sitting-Level of Assistance: Close supervision  Dynamic Sitting Balance  Dynamic Sitting-Balance Support: Bilateral upper extremity supported, Feet supported  Dynamic Sitting-Level of Assistance: Close supervision  Dynamic Sitting-Comments: Scooting to EOB  Static Standing Balance  Static Standing-Balance Support: Bilateral upper extremity supported (FWW)  Static Standing-Level of Assistance: Contact guard  Dynamic Standing Balance  Dynamic Standing-Balance Support: Bilateral upper extremity supported (FWW)  Dynamic Standing-Level of Assistance: Minimum assistance  Dynamic  Standing-Balance: Turning    Activity Tolerance:  Activity Tolerance  Endurance: Tolerates 10 - 20 min exercise with multiple rests  Early Mobility/Exercise Safety Screen: Proceed with mobilization - No exclusion criteria met  Activity Tolerance Comments: Fatigue & SOB (requiring inhaler)    Treatments:  Therapeutic Exercise  Therapeutic Exercise Performed: No    Therapeutic Activity  Therapeutic Activity Performed: Yes  Therapeutic Activity 1: Bed mobility, transfers, ambulation- see flowsheet for details  Therapeutic Activity 2: Donning gown at EOB    Bed Mobility  Bed Mobility: Yes  Bed Mobility 1  Bed Mobility 1: Supine to sitting  Level of Assistance 1: Minimum assistance  Bed Mobility Comments 1: HOB elevated    Ambulation/Gait Training  Ambulation/Gait Training Performed: Yes  Ambulation/Gait Training 1  Surface 1: Level tile  Device 1: Rolling walker  Assistance 1: Minimum assistance, Minimal verbal cues  Quality of Gait 1: Narrow base of support, Diminished heel strike, Inconsistent stride length, Decreased step length, Shuffling gait, Forward flexed posture, Soft knee(s)  Comments/Distance (ft) 1: 55' x2 - one LOB during amb that required min A to recover. Pt required extended sitting rest break in between amb bouts d/t fatigue & SOB. Pt started excessively coughing during this time & requested her inhaler. Pt was able to adminster her inhaler indep & amb back to room. (RN aware)    Transfers  Transfer: Yes  Transfer 1  Transfer From 1: Sit to, Stand to  Transfer to 1: Stand, Sit  Transfer Device 1: Walker  Transfer Level of Assistance 1: Minimum assistance  Trials/Comments 1: x2 reps    Stairs  Stairs: No    Outcome Measures:  [facility] Basic Mobility  Turning from your back to your side while in a flat bed without using bedrails: A little  Moving from lying on your back to sitting on the side of a flat bed without using bedrails: A little  Moving to and from bed to chair (including a wheelchair): A  little  Standing up from a chair using your arms (e.g. wheelchair or bedside chair): A little  To walk in hospital room: A little  Climbing 3-5 steps with railing: Total  Basic Mobility - Total Score: 16    Education Documentation  Body Mechanics, taught by Gerald Iqbal, PT at 4/11/2025  4:53 PM.  Learner: Family, Patient  Readiness: Acceptance  Method: Explanation, Demonstration  Response: Verbalizes Understanding, Demonstrated Understanding, Needs Reinforcement  Comment: Mobility training    Mobility Training, taught by Gerald Iqbal, PT at 4/11/2025  4:53 PM.  Learner: Family, Patient  Readiness: Acceptance  Method: Explanation, Demonstration  Response: Verbalizes Understanding, Demonstrated Understanding, Needs Reinforcement  Comment: Mobility training    Education Comments  No comments found.         Encounter Problems       Encounter Problems (Active)       Balance       Patient to demo static standing with unilateral UE support, performing single UE task with SBA, no sway or LOB x 2 mins for functional carryover  (Progressing)       Start:  04/08/25    Expected End:  04/22/25            Pt will score >/= 24/28 on Tinetti balance assessment to indicate low falls risk.   (Progressing)       Start:  04/08/25    Expected End:  04/22/25               Mobility       STG - Patient will ambulate >/= 100 ft [place] with LRAD, proper form, and no acute LOB (Progressing)       Start:  04/08/25    Expected End:  04/22/25            Pt. will tolerate >/= 15  minutes of OOB mobility without seated rest break and VSS to demo improved activity tolerance/endurance.  (Progressing)       Start:  04/08/25    Expected End:  04/22/25            Pt will complete 5xSTS in </= 12 seconds  (Progressing)       Start:  04/08/25    Expected End:  04/22/25               PT Transfers       STG - Patient will perform bed mobility IND with HOB flat and no rails (Progressing)       Start:  04/08/25    Expected End:  04/22/25            STG -  Patient will transfer sit to and from [facility] [place] with LRAD (Progressing)       Start:  04/08/25    Expected End:  04/22/25                 Gerald Iqbal, PT, DPT

## 2025-04-11 NOTE — PROGRESS NOTES
TCC aware that patient will discharge home on Saturday, 04/12/25. [facility] will come and do Milrin[place] setup before patient discharges. DO NOT DISCHARGE until this step is completed. TCC will continue to follow for discharge.      MELLISSA Lewis, RN  [facility], [facility]  Transitional Care Coordinator, Mon-Fri  Cell: [phone], Office: [phone]  Email: [email or web address]

## 2025-04-11 NOTE — PROGRESS NOTES
Spiritual Care Visit  Spiritual Care Request    Reason for Visit:  Routine Visit: Follow-up  Continue Visiting: Yes     Focus of Care:  Visited With: Patient and family together     Met with patient (who is known to this  from previous visit), patient's daughter Mary along with Primary CNP, Ethicist, Palliative CNP, SW and Ethics Learner to discuss patient's condition, potential medical procedures, medical trajectory and systems of support. Provided non-anxious, supportive presence, reflective listening, naming of emotions, affirmation of experience. Will continue to follow.

## 2025-04-11 NOTE — PROGRESS NOTES
"Melissa Marinelli is a 69 y.o. female on day 7 of admission presenting with Acute on chronic congestive heart failure, unspecified heart failure type.  Participated in family meeting including pt and daughter, Mary, with Shadia Sigala CNP, & Rev. Carrie Dunham from Palliative care, Jimenez Melgar CNP from Cardiology and Dr Astorga, Ethics.  Met with family and team to further discuss GOC and code status.  Pt was reluctant to have discussion about her medical condition stating \" I don't want to talk about anything negative\" .  They were updated that her medications are no longer effective on her HF.  Counseling provided about her prognosis and disease progression.  Pt stated that she didn't want to talk about what she already knows.  Pt has been showing symptoms of her disease progression at home per daughter as evidenced by decreased energy, decreased mobility, increased falls, fatigue, & SOB.  Daughter states that pt never returned to her baseline after her hospitalization in Feb 2025.  Pt is a DNR/ DNI.  SW spoke with daughter after the meeting to discuss  increase resources in the home.  Discussed home care and increasing her hours from [employer].  SW called her  [facility] - [phone] and left a message. Palliative care team can continue to follow and refer pt to outpt palliative care to follow her upon discharge home.  Provided reflective listening and presence.    SW made referral to [facility]- Navigator program.   PARESH HOLCOMB      "

## 2025-04-11 NOTE — PROGRESS NOTES
"Melissa Marinelli is a 69 y.o. female on day 7 of admission presenting with Acute on chronic congestive heart failure, unspecified heart failure type.    Palliative Medicine following for:  Complex medical decision making, symptom management, patient/family support    History obtained from chart review including ED note, H&P, patient's daily progress notes, review of lab/test results, and discussion with primary team and bedside RN.    Subjective    History of Present Illness  Melissa Marinelli is a 69 y.o. female with a PMHx. significant for ICM/ HFrEF (last EF 10-15% 1/2025, on palliative milrinone infusion since 2/2024, s/p [employer] ICD 5/2020), CAD (s/p NSTEMI, s/p RIRI to LCX 1/2016), MVR (s/p mitral valve repair 6/2019; 29 mm Epic bioprosthetic valve with Dr. Montana), COPD (No PFTs on file), HTN, HL, GERD, hx of CVA, and DM, presenting to the ED with chief complaints of shortness of breath and lethargy. Of note, she ran out of her bumex for at least 1 week.      In the ED she was treated with 20 mg Furosemide IVP and placed on BIPAP mask. Lab results significant for hyperkalemia (K+ 6.3) treated with insulin / dextrose. Her troponin is elevated in the setting of heart failure: Trend: 261 -> 302 -> 292. Her clinical scenario is very similar to her previous admission 2/25 - 2/28/2025. Of note, this is her 4th admission this year, including 2 falls at home.       Symptoms  Pain: improved  Dyspnea: yes still with minimal exertion  Fatigue: yes  Insomnia: denies  Drowsiness: yes  Constipation: denies, is resolving  Nausea: denies  Appetite: poor would like a regular diet  Anxiety: sometimes  Depression: sometimes    Objective    Last Recorded Vitals  BP 93/60 (BP Location: Left arm, Patient Position: Sitting)   Pulse 102   Temp 36.6 °C (97.9 °F) (Temporal)   Resp 16   Ht 1.651 m (5' 5\")   Wt 62.5 kg (137 lb 12.6 oz)   SpO2 97%   BMI 22.93 kg/m²      Physical Exam   Constitutional:       Appearance: She is normal " weight.   HENT:      Mouth/Throat:      Pharynx: Oropharynx is clear.      Comments: edentulous  Cardiovascular:      Rate and Rhythm: Tachycardia present.   Pulmonary:      Breath sounds: Decreased breath sounds present.   Abdominal:      Palpations: Abdomen is soft.   Musculoskeletal:         General: Normal range of motion. Notable atrophy to the legs.  Skin:     General: Skin is warm and dry.   Neurological:      General: No focal deficit present.   Psychiatric:         Attention and Perception: She is attentive but not wishing for ongoing conversation about her care. Asked to talk elsewhere.         Mood and Affect: Affect is blunt.         Speech: Speech normal.          Relevant Results   Results for orders placed or performed during the hospital encounter of 04/04/25 (from the past 24 hours)   POCT GLUCOSE   Result Value Ref Range    POCT Glucose 103 (H) 74 - 99 mg/dL   Renal Function Panel   Result Value Ref Range    Glucose 81 74 - 99 mg/dL    Sodium 123 (L) 136 - 145 mmol/L    Potassium 4.6 3.5 - 5.3 mmol/L    Chloride 90 (L) 98 - 107 mmol/L    Bicarbonate 24 21 - 32 mmol/L    Anion Gap 14 mmol/L    Urea Nitrogen 37 (H) 6 - 23 mg/dL    Creatinine 2.33 (H) 0.50 - 1.05 mg/dL    eGFR 22 (L) >60 mL/min/1.73m*2    Calcium 8.4 (L) 8.6 - 10.6 mg/dL    Phosphorus 3.1 2.5 - 4.9 mg/dL    Albumin 3.0 (L) 3.4 - 5.0 g/dL   POCT GLUCOSE   Result Value Ref Range    POCT Glucose 226 (H) 74 - 99 mg/dL   POCT GLUCOSE   Result Value Ref Range    POCT Glucose 120 (H) 74 - 99 mg/dL   CBC and Auto Differential   Result Value Ref Range    WBC 6.0 4.4 - 11.3 x10*3/uL    nRBC 0.0 0.0 - 0.0 /100 WBCs    RBC 3.03 (L) 4.00 - 5.20 x10*6/uL    Hemoglobin 8.6 (L) 12.0 - 16.0 g/dL    Hematocrit 27.3 (L) 36.0 - 46.0 %    MCV 90 80 - 100 fL    MCH 28.4 26.0 - 34.0 pg    MCHC 31.5 (L) 32.0 - 36.0 g/dL    RDW 18.8 (H) 11.5 - 14.5 %    Platelets 170 150 - 450 x10*3/uL    Neutrophils % 80.5 40.0 - 80.0 %    Immature Granulocytes %, Automated  0.5 0.0 - 0.9 %    Lymphocytes % 8.8 13.0 - 44.0 %    Monocytes % 8.5 2.0 - 10.0 %    Eosinophils % 1.2 0.0 - 6.0 %    Basophils % 0.5 0.0 - 2.0 %    Neutrophils Absolute 4.83 1.20 - 7.70 x10*3/uL    Immature Granulocytes Absolute, Automated 0.03 0.00 - 0.70 x10*3/uL    Lymphocytes Absolute 0.53 (L) 1.20 - 4.80 x10*3/uL    Monocytes Absolute 0.51 0.10 - 1.00 x10*3/uL    Eosinophils Absolute 0.07 0.00 - 0.70 x10*3/uL    Basophils Absolute 0.03 0.00 - 0.10 x10*3/uL   Renal function panel   Result Value Ref Range    Glucose 86 74 - 99 mg/dL    Sodium 124 (L) 136 - 145 mmol/L    Potassium 5.0 3.5 - 5.3 mmol/L    Chloride 89 (L) 98 - 107 mmol/L    Bicarbonate 24 21 - 32 mmol/L    Anion Gap 16 10 - 20 mmol/L    Urea Nitrogen 38 (H) 6 - 23 mg/dL    Creatinine 2.58 (H) 0.50 - 1.05 mg/dL    eGFR 19 (L) >60 mL/min/1.73m*2    Calcium 8.4 (L) 8.6 - 10.6 mg/dL    Phosphorus 3.0 2.5 - 4.9 mg/dL    Albumin 3.0 (L) 3.4 - 5.0 g/dL   Magnesium   Result Value Ref Range    Magnesium 2.42 (H) 1.60 - 2.40 mg/dL   Coagulation Screen   Result Value Ref Range    Protime 26.1 (H) 9.8 - 12.4 seconds    INR 2.4 (H) 0.9 - 1.1    aPTT 32 26 - 36 seconds   POCT GLUCOSE   Result Value Ref Range    POCT Glucose 152 (H) 74 - 99 mg/dL     *Note: Due to a large number of results and/or encounters for the requested time period, some results have not been displayed. A complete set of results can be found in Results Review.        Allergies  Metoprolol, Ticagrelor, Gadolinium-containing contrast media, Iodinated contrast media, Statins-hmg-coa reductase inhibitors, Red dye, Ace inhibitors, Fentanyl, Hydralazine, Nicorette [nicotine (polacrilex)], Nicotine, Penicillins, and Prednisone  Medications  Scheduled medications  amiodarone, 200 mg, oral, Daily  apixaban, 2.5 mg, oral, BID  aspirin, 81 mg, oral, Daily  bumetanide, 0.5 mg, oral, BID  cholecalciferol, 2,000 Units, oral, Daily  dapagliflozin propanediol, 10 mg, oral, Daily  famotidine, 40 mg, oral,  Daily  ipratropium-albuteroL, 3 mL, nebulization, q6h  [Held by provider] mupirocin, , Topical, TID  polyethylene glycol, 17 g, oral, Daily  sennosides-docusate sodium, 2 tablet, oral, Nightly      Continuous medications  milrinone in 5 % dextrose, 0.25 mcg/kg/min, Last Rate: 0.25 mcg/kg/min (04/11/25 0247)      PRN medications  PRN medications: acetaminophen, albuterol, benzocaine-menthol, benzonatate, bisacodyl, dextromethorphan-guaifenesin, magnesium hydroxide, prochlorperazine **OR** prochlorperazine **OR** prochlorperazine, sodium chloride     Assessment/Plan    Melissa Marinelli is a 69 y.o. female with a PMHx. significant for ICM/ HFrEF (last EF 10-15% 1/2025, on palliative milrinone infusion since 2/2024, s/p [employer] ICD 5/2020), CAD (s/p NSTEMI, s/p RIRI to LCX 1/2016), MVR (s/p mitral valve repair 6/2019; 29 mm Epic bioprosthetic valve with Dr. Montana), COPD (No PFTs on file), HTN, HL, GERD, hx of CVA, and DM, presenting to the ED with chief complaints of shortness of breath and lethargy. Of note, she ran out of her bumex for at least 1 week.      In the ED she was treated with 20 mg Furosemide IVP and placed on BIPAP mask. Lab results significant for hyperkalemia (K+ 6.3) treated with insulin / dextrose. Her troponin is elevated in the setting of heart failure: Trend: 261 -> 302 -> 292. Her clinical scenario is very similar to her previous admission 2/25 - 2/28/2025. Of note, this is her 4th admission this year, including 2 falls at home.      4/9: Palliative consulted r/t HFrEF and palliative milrinone greater than 1 year to discuss further GOC.  Pt does not wish to discuss anything negative, particularly about her demise. Spoke to Mary, planned a 2:30pm meeting Friday to further discuss GOC/code.    4/11: Family meeting, DNR/DNI with ongoing pt and family home support.        Palliative Performance Scale (PPS):  "40-50    ----------------------------------------------------------------------------------------------------------------------------------------------------------------------------------------------------------------------------------------------------------------------------------------------------------------------------------------------------------------------      I spent 35 minutes in providing separately identifiable ACP services with the patient and/or surrogate decision maker in a voluntary conversation discussing the patient's wishes and goals as detailed in the above note.   ----------------------------------------------------------------------------------------------------------------------------------------------------------------------------------------------------------------------------------------------------------------------------------------------------------------------------------------------------------------------    #Complex Medical Decision Making  #Goals of Care  #Advanced Care Planning  - Code status: DNR/DNI   - Surrogate decision maker: Mary Caruso (Daughter)  [phone]   - Goals are survival and time based   - 4/9: Met with pt at bedside, spoke to dtr Mary via phone. Able to glean more information r/t pt's life, wishes, goals, worries, and health preferences. See above/consult for supportive detail.    Mary feels as if pt has been slowly declining and that pt \"is at the end\". Mary states pt has always been able to \"bounce back\" and since February, she hasn't been able, only declining further. NP validated Mary's feelings and addressed her heartache. Goal from Mary is to continue to honor pt's goal of more time, but would like to see pt with more energy and \"happier\". Np discussed severity of disease and adjusted expectations that her presentation is consistent with disease progression and nearing end of life; that getting better/stronger may not be achievable. NP explained " "that pt too, seems to have a better understanding of her severity of illness, and it is reflecting in her mood.      Discussed with Mary the benefit versus burden of CPR in the setting of patient's overall health status. Expressed that with ongoing HF decline, her ICD may fire d/t increased risk of arrhythmia. NP compassionately explained that with asystole (lack of electrical conduction) ICD will not fire as the heart is dead. It was recommended not to initiate CPR/intubation as likelihood of survival or increased QOL would not be likely. It was expressed that doing CPR would not be beneficial and only induce pain and suffering when her heart has shown it is tired/too sick to continue. Mary mentions this is something she would like to have a meeting with pt and further discuss. Meeting arranged for Friday 4/11 at 2:30pm.     To support Mary and pt's goals, both are accepting of Palliative Navigator re-involvement.      4/11: Family meeting involving pt, Mary, Pall team (NP, SW, and ), Ethics Dr. Astorga, and KYLIE Gaona CNP. Together, discussed pt's current condition and worries. Discussed signs of poor appetite and functional decline as signs of end stages of HF/disease worsening.     Discussed that we honor pt's goal of longevity and want to ensure we are doing things that best promote that without suffering. Discussed d/t severity of disease, CPR/intubation will not be offered as doing so would not provide pt benefit of more time or increased QOL, only suffering and pain. It was discussed that CPR/intubation is a non beneficial intervention at this time. Discussed that care, hospitalizations, medications etc do not change with DNR/DNI. DNR/DNI described as a \"safety blanket\" when her body tells us that it is too tired to keep fighting and her heart stops. Aware of still having ICD abilities but not in the setting of asystole.     Discussed pt's goal of more time and extra supports at home. Previously " agreeable to palliative navigator and home care. Mary requesting more help as pt now requires. SW involved and discussing options.         #HFrEF  - Pt known to palliative services, has been on palliative milrinone since 2/2024, accepted outpt Palliative Navigator, referral placed.  - Came in with [facility] after not having bumex for 1 week, unable to get a refill at the pharmacy. Pt states dtr forgot to call Dr. Rivera for a refill.   - Pt states she came in for worsening HF. Pt not wishing to talk about anything negative, particularly her demise. Agreeable for Palliative follow up next day/Friday. Meeting planned for Friday 2:30pm with pt and Mary.   - 4/11 meeting, established DNR/DNI, keep ICD on. SW following for extra home supports/help.        #Psychosocial Support  - Ongoing pt and family support and care navigation  - Spiritual Care Support- prayer requested  - SW support  - Agreeable to Palliative Navigator again- referral placed.     Plan of Care discussed with: Updated primary and bedside RN on goals of care decision, medication adjustments, and code status      Medical Decision Making was high level due to high complexity of problems, extensive data review, and high risk of management/treatment.     - HfrEF on palliative milrinone posing threat to life and function.  - Reviewed external notes from   - Reviews results from  which were used in decision making for   - Recommended the following tests:   - Assessment required independent historian: HF booker, primary, and dtr.  - Independent interpretation of test: labs and imaging  - Discussion of management with: dtr and primary  - Drug therapy requiring intensive monitoring for toxicity: amio,   - Decision regarding elective major surgery with identified patient or procedure risk factors:   - Decision regarding emergency major surgery:   - Decision regarding hospitalization or escalation of hospital-level care:   - Decision not to resuscitate or to  de-escalate care because of poor prognosis: provider/attending decision not to offer CPR/intubation r/t severity of disease/ non beneficial care.  - Parenteral controlled substances:       Thank you for allowing us to care for this patient. Palliative Team will continue to follow as needed. Please contact team with any questions or concerns.   Team pager [phone] (weekdays)    LAWSON Arellano-CNP

## 2025-04-11 NOTE — CONSULTS
ETHICS CONSULTATION NOTE    CONSULT REQUESTER: Nurse Practitioner/Physician Assistant: JASMIN Arellano    ETHICS QUESTION: Ethics was consulted for assistance with goals of care planning for a critically ill patient.    BACKGROUND:    Process Steps: Information about Ms. Marinelli's case was gathered through discussion with JEAN-CLAUDE Sigala and through chart review.     A goals of care meeting was held this afternoon at patient's bedside. Ms. Marinelli was supported by her daughter, Mary. Also in attendance were JEAN-CLAUDE Melgar of Cardiology; JEAN-CLAUDE Sigala, Rajwinder YDOER, and Chaplain Mervat Dunham of Palliative Medicine; and Ethics. Details of the meeting given below.    Ethically Relevant Medical Information: Melissa Marinelli is a 68 yo F who has had frequent recent admissions for symptoms relating to chronic congestive heart failure. She has medical history of ischemic cardiopathy/HFrEF, coronary artery disease (s/p NSTEMI), MVR, HTN, HLD, GERD, CVA, DM. She has an ICD and is on palliative milrinone infusion. On this admission, she presented to ED with shortness of breath and lethargy.    In today's [facility] meeting, Ms. Marinelli acknowledged that her heart is bad and that her condition is getting worse. Her daughter, Mary, spoke to her observations of declines in Ms. Marinelli's health, ability to function, and her overall enjoyment of life.    Code Status: Full code    Capacity/Decision-Making Considerations: Ms. Marinelli has full decisional capacity.    Advance Directives/Family/Support System: We have no advance care planning documentation on file for Ms. Marinelli.    Ms. Marinelli has two daughters, Mary Caruso ([phone]) and Elin Marinelli ([phone]). The GO meeting proceeded before Elin arrived, with Ms. Marinelli's unsolicited permission.    Other Consult-Specific Information: As noted above, Ms. Marinelli demonstrated understanding of her condition and gave indications that she is aware that she is near  the end of her life. This is notably distressing to her and she prefers not to talk about it. Nevertheless, she was bravely willing to discuss her condition in broad measures.    Assurances were given to Ms. Elliott that all beneficial care will be provided on her behalf. We also clarified that we would not initiate any measures that would bring harm and suffering to Ms. Marinelli without bringing her greater benefit. She expressed understanding. Ms. Elliott were then informed that, for that reason, we would not perform CPR on her or intubate her should her heart stop. JEAN-CLAUDE Sigala emphasized that this is a safety net to protect and that all care that she is currently receiving will continue. Ms. Elliott expressed understanding. Mary added that, while she understood the reasons for changing code status to DNR/DNI, she did not feel that she could make that decision. She was told that code status is a medical order that a provider can place in Ms. Marinelli's chart, sparing Mary the burden of making this decision. She expressed understanding.    ETHICS DISCUSSION & ANALYSIS:    Rec 1: The medical team informed Ms. Elliott that performing a code on her would be non-beneficial. There is no ethical obligation to offer interventions to patients that are non-beneficial. Per  policy CP-104 an intervention is non-beneficial if any of four criteria are met:  There is no meaningful chance of achieving the intended medical outcome,   It will cause harm or suffering which significantly outweigh benefit to the patient,  The intervention offers little, if any, hope of survival outside an ICU,   The intervention is inconsistent with recognized professional standards of care.      ETHICS RECOMMENDATIONS:    Given the medical team's determination that performing a code on Ms. Marinelli would not be of benefit to her (per the criteria listed above), there is ethical support to change her code status  to DNR and No Intubation.      FOLLOW UP:   The Ethics Consultation Service remains available.  Please call with any questions or additional concerns..  The Ethics Consultation Service (ECS) can be reached by paging [phone]..     Juan Pablo Astorga DBE MACIEJTaylor  Clinical Ethicist

## 2025-04-12 ENCOUNTER — PHARMACY VISIT (OUTPATIENT)
Dept: PHARMACY | Facility: CLINIC | Age: 70
End: 2025-04-12
Payer: COMMERCIAL

## 2025-04-12 ENCOUNTER — DOCUMENTATION (OUTPATIENT)
Dept: HOME HEALTH SERVICES | Facility: HOME HEALTH | Age: 70
End: 2025-04-12
Payer: COMMERCIAL

## 2025-04-12 VITALS
SYSTOLIC BLOOD PRESSURE: 96 MMHG | DIASTOLIC BLOOD PRESSURE: 59 MMHG | RESPIRATION RATE: 16 BRPM | TEMPERATURE: 97.9 F | OXYGEN SATURATION: 95 % | HEART RATE: 70 BPM | BODY MASS INDEX: 22.85 KG/M2 | HEIGHT: 65 IN | WEIGHT: 137.13 LBS

## 2025-04-12 LAB
ALBUMIN SERPL BCP-MCNC: 3.2 G/DL (ref 3.4–5)
ALP SERPL-CCNC: 108 U/L (ref 33–136)
ALT SERPL W P-5'-P-CCNC: 106 U/L (ref 7–45)
ANION GAP SERPL CALC-SCNC: 17 MMOL/L (ref 10–20)
AST SERPL W P-5'-P-CCNC: 58 U/L (ref 9–39)
BASOPHILS # BLD AUTO: 0.02 X10*3/UL (ref 0–0.1)
BASOPHILS NFR BLD AUTO: 0.4 %
BILIRUB DIRECT SERPL-MCNC: 0.4 MG/DL (ref 0–0.3)
BILIRUB SERPL-MCNC: 1 MG/DL (ref 0–1.2)
BNP SERPL-MCNC: 2132 PG/ML (ref 0–99)
BUN SERPL-MCNC: 40 MG/DL (ref 6–23)
CALCIUM SERPL-MCNC: 8.9 MG/DL (ref 8.6–10.6)
CHLORIDE SERPL-SCNC: 90 MMOL/L (ref 98–107)
CO2 SERPL-SCNC: 23 MMOL/L (ref 21–32)
CREAT SERPL-MCNC: 2.67 MG/DL (ref 0.5–1.05)
EGFRCR SERPLBLD CKD-EPI 2021: 19 ML/MIN/1.73M*2
EOSINOPHIL # BLD AUTO: 0.05 X10*3/UL (ref 0–0.7)
EOSINOPHIL NFR BLD AUTO: 1 %
ERYTHROCYTE [DISTWIDTH] IN BLOOD BY AUTOMATED COUNT: 19.4 % (ref 11.5–14.5)
GLUCOSE BLD MANUAL STRIP-MCNC: 110 MG/DL (ref 74–99)
GLUCOSE SERPL-MCNC: 98 MG/DL (ref 74–99)
HCT VFR BLD AUTO: 28.1 % (ref 36–46)
HGB BLD-MCNC: 9.3 G/DL (ref 12–16)
IMM GRANULOCYTES # BLD AUTO: 0.03 X10*3/UL (ref 0–0.7)
IMM GRANULOCYTES NFR BLD AUTO: 0.6 % (ref 0–0.9)
LYMPHOCYTES # BLD AUTO: 0.46 X10*3/UL (ref 1.2–4.8)
LYMPHOCYTES NFR BLD AUTO: 8.9 %
MAGNESIUM SERPL-MCNC: 2.62 MG/DL (ref 1.6–2.4)
MCH RBC QN AUTO: 29.3 PG (ref 26–34)
MCHC RBC AUTO-ENTMCNC: 33.1 G/DL (ref 32–36)
MCV RBC AUTO: 89 FL (ref 80–100)
MONOCYTES # BLD AUTO: 0.41 X10*3/UL (ref 0.1–1)
MONOCYTES NFR BLD AUTO: 7.9 %
NEUTROPHILS # BLD AUTO: 4.21 X10*3/UL (ref 1.2–7.7)
NEUTROPHILS NFR BLD AUTO: 81.2 %
NRBC BLD-RTO: 0.4 /100 WBCS (ref 0–0)
PHOSPHATE SERPL-MCNC: 3.5 MG/DL (ref 2.5–4.9)
PLATELET # BLD AUTO: 172 X10*3/UL (ref 150–450)
POTASSIUM SERPL-SCNC: 5.1 MMOL/L (ref 3.5–5.3)
PROT SERPL-MCNC: 6.2 G/DL (ref 6.4–8.2)
RBC # BLD AUTO: 3.17 X10*6/UL (ref 4–5.2)
SODIUM SERPL-SCNC: 125 MMOL/L (ref 136–145)
WBC # BLD AUTO: 5.2 X10*3/UL (ref 4.4–11.3)

## 2025-04-12 PROCEDURE — 83880 ASSAY OF NATRIURETIC PEPTIDE: CPT

## 2025-04-12 PROCEDURE — 99239 HOSP IP/OBS DSCHRG MGMT >30: CPT | Performed by: STUDENT IN AN ORGANIZED HEALTH CARE EDUCATION/TRAINING PROGRAM

## 2025-04-12 PROCEDURE — 94640 AIRWAY INHALATION TREATMENT: CPT

## 2025-04-12 PROCEDURE — 85025 COMPLETE CBC W/AUTO DIFF WBC: CPT | Performed by: NURSE PRACTITIONER

## 2025-04-12 PROCEDURE — 80048 BASIC METABOLIC PNL TOTAL CA: CPT | Performed by: NURSE PRACTITIONER

## 2025-04-12 PROCEDURE — 2500000001 HC RX 250 WO HCPCS SELF ADMINISTERED DRUGS (ALT 637 FOR MEDICARE OP): Performed by: NURSE PRACTITIONER

## 2025-04-12 PROCEDURE — 82947 ASSAY GLUCOSE BLOOD QUANT: CPT

## 2025-04-12 PROCEDURE — 2500000002 HC RX 250 W HCPCS SELF ADMINISTERED DRUGS (ALT 637 FOR MEDICARE OP, ALT 636 FOR OP/ED)

## 2025-04-12 PROCEDURE — 83735 ASSAY OF MAGNESIUM: CPT | Performed by: NURSE PRACTITIONER

## 2025-04-12 PROCEDURE — RXMED WILLOW AMBULATORY MEDICATION CHARGE

## 2025-04-12 PROCEDURE — 2500000002 HC RX 250 W HCPCS SELF ADMINISTERED DRUGS (ALT 637 FOR MEDICARE OP, ALT 636 FOR OP/ED): Performed by: NURSE PRACTITIONER

## 2025-04-12 PROCEDURE — 84100 ASSAY OF PHOSPHORUS: CPT | Performed by: NURSE PRACTITIONER

## 2025-04-12 PROCEDURE — 84075 ASSAY ALKALINE PHOSPHATASE: CPT

## 2025-04-12 RX ORDER — BENZONATATE 100 MG/1
100 CAPSULE ORAL 3 TIMES DAILY PRN
Qty: 20 CAPSULE | Refills: 0 | Status: SHIPPED | OUTPATIENT
Start: 2025-04-12

## 2025-04-12 RX ORDER — AMOXICILLIN 250 MG
2 CAPSULE ORAL DAILY PRN
Qty: 60 TABLET | Refills: 3 | Status: SHIPPED | OUTPATIENT
Start: 2025-04-12

## 2025-04-12 RX ORDER — AMIODARONE HYDROCHLORIDE 200 MG/1
200 TABLET ORAL DAILY
Qty: 30 TABLET | Refills: 3 | Status: SHIPPED | OUTPATIENT
Start: 2025-04-12 | End: 2025-04-17

## 2025-04-12 RX ORDER — DAPAGLIFLOZIN 10 MG/1
10 TABLET, FILM COATED ORAL DAILY
Qty: 30 TABLET | Refills: 3 | Status: SHIPPED | OUTPATIENT
Start: 2025-04-12

## 2025-04-12 RX ORDER — BUMETANIDE 0.5 MG/1
0.5 TABLET ORAL DAILY
Qty: 30 TABLET | Refills: 3 | Status: SHIPPED | OUTPATIENT
Start: 2025-04-12 | End: 2025-04-17

## 2025-04-12 RX ADMIN — IPRATROPIUM BROMIDE AND ALBUTEROL SULFATE 3 ML: .5; 3 SOLUTION RESPIRATORY (INHALATION) at 07:59

## 2025-04-12 RX ADMIN — AMIODARONE HYDROCHLORIDE 200 MG: 200 TABLET ORAL at 09:35

## 2025-04-12 RX ADMIN — DAPAGLIFLOZIN 10 MG: 10 TABLET, FILM COATED ORAL at 09:33

## 2025-04-12 RX ADMIN — IPRATROPIUM BROMIDE AND ALBUTEROL SULFATE 3 ML: .5; 3 SOLUTION RESPIRATORY (INHALATION) at 02:25

## 2025-04-12 RX ADMIN — APIXABAN 2.5 MG: 2.5 TABLET, FILM COATED ORAL at 09:35

## 2025-04-12 RX ADMIN — CHOLECALCIFEROL TAB 25 MCG (1000 UNIT) 2000 UNITS: 25 TAB at 09:33

## 2025-04-12 RX ADMIN — ASPIRIN 81 MG: 81 TABLET, COATED ORAL at 09:34

## 2025-04-12 RX ADMIN — FAMOTIDINE 40 MG: 20 TABLET ORAL at 09:34

## 2025-04-12 RX ADMIN — BUMETANIDE 0.5 MG: 1 TABLET ORAL at 09:34

## 2025-04-12 ASSESSMENT — COGNITIVE AND FUNCTIONAL STATUS - GENERAL
TOILETING: A LITTLE
WALKING IN HOSPITAL ROOM: A LITTLE
HELP NEEDED FOR BATHING: A LOT
MOVING TO AND FROM BED TO CHAIR: A LITTLE
STANDING UP FROM CHAIR USING ARMS: A LITTLE
DRESSING REGULAR UPPER BODY CLOTHING: A LITTLE
CLIMB 3 TO 5 STEPS WITH RAILING: A LOT
MOBILITY SCORE: 19
DRESSING REGULAR LOWER BODY CLOTHING: A LITTLE
DAILY ACTIVITIY SCORE: 19

## 2025-04-12 ASSESSMENT — PAIN SCALES - GENERAL: PAINLEVEL_OUTOF10: 0 - NO PAIN

## 2025-04-12 ASSESSMENT — PAIN - FUNCTIONAL ASSESSMENT: PAIN_FUNCTIONAL_ASSESSMENT: 0-10

## 2025-04-12 NOTE — HH CARE COORDINATION
Home Care received a Referral for Infusion, Nursing, Physical Therapy, and Occupational Therapy. We have processed the referral for a Start of Care on 4/14  hospital HOOK UP ON 4/12.     If you have any questions or concerns, please feel free to contact us at [phone]. Follow the prompts, enter your five digit zip code, and you will be directed to your care team on [facility].

## 2025-04-12 NOTE — CARE PLAN
Patient safe and stable throughout shift. Patient discharged and expressed understanding of AVS and discharge instructions. Meds to beds delivered to beside. [facility] to bedside to hook up milrinone. Patient taken off telemetry monitoring. Left for home with belongings, meds to beds, and milrinone bags via wheelchair.       Problem: Fall/Injury  Goal: Verbalize understanding of personal risk factors for fall in the hospital  Outcome: Met  Goal: Verbalize understanding of risk factor reduction measures to prevent injury from fall in the home  Outcome: Met  Goal: Pace activities to prevent fatigue by end of the shift  Outcome: Met     Problem: Discharge Planning  Goal: Discharge to home or other facility with appropriate resources  Outcome: Met     Problem: Chronic Conditions and Co-morbidities  Goal: Patient's chronic conditions and co-morbidity symptoms are monitored and maintained or improved  Outcome: Met     Problem: Nutrition  Goal: Nutrient intake appropriate for maintaining nutritional needs  Outcome: Met     Problem: Skin  Goal: Promote/optimize nutrition  Outcome: Met     Problem: Heart Failure  Goal: Improved gas exchange this shift  Outcome: Met  Goal: Improved urinary output this shift  Outcome: Met  Goal: Report improvement of dyspnea/breathlessness this shift  Outcome: Met  Goal: Weight from fluid excess reduced over 2-3 days, then stabilize  Outcome: Met

## 2025-04-12 NOTE — DISCHARGE SUMMARY
"Discharge Diagnosis  Acute on chronic congestive heart failure, unspecified heart failure type    Issues Requiring Follow-Up  Home inotrope therapy, heart failure follow-up  RFP, BNP prior to HF follow- up appointment   Hyponatremia monitoring    Hospital Course  Melissa Marinelli is a 69 y.o. female with a PMHx significant for ICM/ HFrEF (last EF 10-15% 2025, on palliative milrinone infusion since 2024, s/p [employer] ICD 2020), CAD (s/p NSTEMI, s/p RIRI to LCX 2016), MVR (s/p mitral valve repair 2019; 29 mm Epic bioprosthetic valve with Dr. Montana), COPD (No PFTs on file), HTN, HLD, GERD, hx of CVA, and DM, presenting to the ED with chief complaints of shortness of breath and lethargy. Of note, she ran out of her bumex for at least 1 week.      In the ED she was treated with 20 mg Furosemide IVP and placed on BIPAP mask. Lab results significant for hyperkalemia (K+ 6.3) treated with insulin / dextrose. Her troponin is elevated in the setting of heart failure: Trend: 261 -> 302 -> 292. Her clinical scenario is very similar to her previous admission  - 2025. Of note, this is her 4th admission this year.      Upon arrival to the HFICU, her SpO2 is 100% on 2 liters /min O2 via NC. Her breath sounds are normal. She is pleasant, alert and oriented in no distress. She does endorse feeling tired \"always.\" Her daughter, Sarah, was contacted to provide an update. Sarah states patient has had at least two falls at home since previous admission (PT made aware).  Her vitals are stable and all home meds, with the exception of spironolactone (hyperkalemia), have been resumed. Her Bumex dose is increased to 1 mg daily.       Cardiac Tests:  EK/4: EKG obtained at 0026 and interpreted by me: 117 bpm, sinus tachycardia. , , QTc 504. Variable baseline, however no ST elevations or depressions concerning for ischemia. Patient does have T wave inversions in the far lateral leads V5 and V6. Compared " with prior EKG from 2/27/2025, atrial flutter with variable block has been replaced by sinus tachycardia.      Chest Radiograph: 4/4/2025:  IMPRESSION:  Suggest volume overload/heart failure with chronic pulmonary vasculature and a trace left pleural effusion..  No definite focal consolidation.      Echocardiogram: 2/27/2025:  CONCLUSIONS:   1. Left ventricular ejection fraction is severely decreased, by visual estimate at 10-15%.   2. There is global hypokinesis of the left ventricle with minor regional variations.   3. There is reduced right ventricular systolic function.   4. The left atrium is enlarged.   5. Left atrial appendage demonstrating thrombus measuring 1 cm x 1.75 cm along with dense sponatenous contrast.   6. There is an Epic mitral valve bioprosthesis, 29 mm reported size. Gradients across the mtiral valve were not assessed.   7. Compared with study dated 1/23/2025, there is redemonstration of a DEVIKA thrombus, that appears similair in size to prior SHRUTHI.     Cardiac Catheterization:  1/26/2024:  CONCLUSIONS:   1. Elevated right_[RA 12, RVEDP 13] and left_[PCWP 26 mmHg] sided filling pressure, PA pressures of 46/29 with a mean of 37 mmHg, and low assumed Chirag cardiac output at 2.3 L/min and cardiac index of 1.3 L/min/mï¿½. SVR 2,886 dynes/seconds/cm-5.   2. -[place]-Alex was sutured in at an external marker m of 55 cm.   3. Patient will be transferred to heart failure ICU, further management as per heart failure ICU team.     Coronary angiogram: 4/15/2019:  CONCLUSIONS:   1. Mild non-obstructive coronary artery disease.   2. RHC with mildly elevated filling pressues and mildly reduced CO/CI.    Floor Course:  Palliative care consulted and family meeting arranged to discuss GOC. Family meeting involving patient, Mary (daughter), Pall team (NP, SW, and ), Ethics Dr. Astorga, and I CNP. Together, the team discussed patient's current condition and worries. Discussed signs of poor appetite and  functional decline as signs of end stages of HF/disease worsening. Discussed d/t severity of disease, CPR/intubation will not be offered as doing so would not provide patient benefit of more time or increased QOL, only suffering and pain. Patient's code status changed to DNR/DNI with ongoing patient and family home support. Aware of still having ICD abilities but not in the setting of asystole.     Due to soft BP's, bumex dose adjusted to 0.5mg daily. Further GDMT limited by renal function and hypotension.    Ongoing hyponatremia during hospital course, italo of 122, improved to 125 on date of discharge. On date of discharge, patient on room air and resting nearly flat in bed without dyspnea. She denies feeling fluid overloaded and is requesting to discharge. Increasing home bumex dose limited by borderline blood pressures. Home care involved for home inotrope therapy and labs (RFP and BNP) requested prior to next outpatient heart failure appointment.     Pt known to palliative services, has been on palliative milrinone since 2/2024, accepted outpt Palliative Navigator, referral placed.    Discharge weight: 62.2 kg    After all labs and VS were reviewed the decision was made that the patient was medically stable for discharge.  The patient was discharged in satisfactory condition.    More than 30 minutes were spent in coordinating patient discharge.        Pertinent Physical Exam At Time of Discharge  Physical Exam  Vitals reviewed.   Constitutional:       General: She is not in acute distress.     Comments: Chronically ill appearing    HENT:      Mouth/Throat:      Mouth: Mucous membranes are moist.   Eyes:      Extraocular Movements: Extraocular movements intact.   Cardiovascular:      Rate and Rhythm: Normal rate. Rhythm irregularly irregular.      Comments: Warm and well perfused   Pulmonary:      Effort: Pulmonary effort is normal. No respiratory distress.      Breath sounds: No wheezing or rales.   Abdominal:       General: There is no distension.      Tenderness: There is no abdominal tenderness.   Musculoskeletal:      Right lower leg: No edema.      Left lower leg: No edema.   Skin:     General: Skin is warm.   Neurological:      General: No focal deficit present.      Mental Status: She is alert and oriented to person, place, and time.   Psychiatric:         Mood and Affect: Mood normal.         Behavior: Behavior normal.       Home Medications     Medication List      START taking these medications     benzonatate 100 mg capsule; Commonly known as: Tessalon; Take 1 capsule   (100 mg) by mouth 3 times a day as needed for cough. Do not crush or chew.   Stool Softener-Laxative 8.6-50 mg tablet; Generic drug:   sennosides-docusate sodium; Take 2 tablets by mouth once daily as needed   for constipation.     CHANGE how you take these medications     Eliquis 2.5 mg tablet; Generic drug: apixaban; Take 1 tablet (2.5 mg) by   mouth 2 times a day.; What changed: medication strength, See the new   instructions.     CONTINUE taking these medications     acetaminophen 500 mg tablet; Commonly known as: Tylenol   albuterol 90 mcg/actuation inhaler; Inhale 2 puffs every 4 hours if   needed for wheezing or shortness of breath.   amiodarone 200 mg tablet; Commonly known as: Pacerone; Take 1 tablet   (200 mg) by mouth once daily.   aspirin 81 mg EC tablet; Take 1 tablet (81 mg) by mouth once daily.   bumetanide 0.5 mg tablet; Commonly known as: Bumex; Take 1 tablet (0.5   mg) by mouth once daily.   famotidine 40 mg tablet; Commonly known as: Pepcid   Farxiga 10 mg tablet; Generic drug: dapagliflozin propanediol; Take 1   tablet (10 mg) by mouth once daily.   heparin flush(porcine)-0.9NaCl 100 unit/mL kit   hydrocortisone 1 % ointment; Apply topically 2 times a day as needed for   irritation. Chest wall   ipratropium-albuteroL 0.5-2.5 mg/3 mL nebulizer solution; Commonly known   as: Duo-Neb   milrinone in 5 % dextrose 20 mg/100 mL (200  mcg/mL) infusion; Commonly   known as: Primacor; Infuse 15.5 mcg/min at 4.65 mL/hr into a venous   catheter continuously.   mupirocin 2 % ointment; Commonly known as: Bactroban   OneTouch Verio Flex meter misc; Generic drug: blood-glucose meter   sodium chloride 0.65 % nasal spray; Commonly known as: Ocean   sodium chloride 0.9% flush   Symbicort 80-4.5 mcg/actuation inhaler; Generic drug:   budesonide-formoterol   Vitamin D3 50 mcg (2,000 units) tablet; Generic drug: cholecalciferol     STOP taking these medications     milrinone 1 mg/mL injection; Commonly known as: Primacor   spironolactone 25 mg tablet; Commonly known as: Aldactone       Outpatient Follow-Up  Future Appointments   Date Time Provider Department Center   4/14/2025 To Be Determined Sammi Han RN [facility]   4/15/2025 To Be Determined Danny Toledo, PT [facility]   4/17/2025 To Be Determined Nicki Headley OT [facility]   4/28/2025 10:20 AM Kathy Rivera MD PhD [id number]       Bel Chilel, APRN-CNP

## 2025-04-14 ENCOUNTER — APPOINTMENT (OUTPATIENT)
Dept: CARDIOLOGY | Facility: CLINIC | Age: 70
End: 2025-04-14
Payer: COMMERCIAL

## 2025-04-15 PROBLEM — R53.1 GENERALIZED WEAKNESS: Status: ACTIVE | Noted: 2025-01-01

## 2025-04-15 NOTE — ED PROVIDER NOTES
History of Present Illness     History provided by: Patient and daughter at bedside  Limitations to History: None   External Records Reviewed with Brief Summary: I reviewed the patient's discharge summary from 4/12/2025 where she was admitted for acute on chronic congestive heart failure.    HPI:  Melissa Marinelli is a 69 y.o. female PMHx significant for ICM/ HFrEF (last EF 10-15% 1/2025, on palliative milrinone infusion since 2/2024, s/p [employer] ICD 5/2020), CAD (s/p NSTEMI, s/p RIRI to LCX 1/2016), MVR (s/p mitral valve repair 6/2019; 29 mm Epic bioprosthetic valve with Dr. Montana), COPD (No PFTs on file), HTN, HLD, GERD, hx of CVA, and DM presenting to the emergency department today with concern for decreased appetite and generalized weakness.  Additionally she notes that she has now been unable to walk, unable to eat had a fall where she had head strike is on blood thinners.  She has also been incontinent of bowel and bladder that started yesterday.  Family at bedside states that the initial fall was not witnessed however when they found her she had a pillow underneath her head and was on the floor and the second fall was not really a fall she felt like her legs were going to give out and the family was able to help her down to the floor.  They do not think there was any head strike associated with this however the patient states that there was a head strike.  Patient has had continued decline in care with appetite and symptoms.  Physical therapy saw her earlier today and noted that her oxygen saturation was low her heart rate was high and they recommended that she come into the hospital for further evaluation.  Per family oxygen saturation was around 86% she was told to be placed on oxygen previously however has not been on oxygen until now.  No other associated signs or symptoms report this time.    Physical Exam   Triage vitals:  T 36.7 °C (98.1 °F)  HR (!) 118  BP 90/60  RR 17  O2 98 % (2L NC)  Supplemental oxygen (3L)    General: Awake, alert, in no acute distress.  Somnolent and slow to respond.  Eyes: Gaze conjugate.  No scleral icterus or injection  HENT: Normo-cephalic, atraumatic. No stridor  CV: Tachycardic rate, irregular rhythm. Radial pulses 2+ bilaterally, 2+ DP pulses.  Trace pedal edema bilaterally.  Resp: Breathing non-labored, speaking in full sentences.  Clear to auscultation bilaterally  GI: Soft, non-distended, non-tender. No rebound or guarding.  MSK/Extremities: No gross bony deformities. Moving all extremities  Skin: Warm. Appropriate color.  Has a right third midline that is connected to the wound, dressing is clean nonerythematous nontender over the site.  Neuro: Alert. Oriented. Face symmetric. Speech is fluent.  Gross strength and sensation intact in b/l UE and LEs  Psych: Appropriate mood and affect    Medical Decision Making & ED Course   Medical Decision Makin y.o. female with the above past medical she presented to emergency department today with concern for bowel and urinary incontinence in addition to worsening shortness of breath.  Was sent in by her physical therapist for further evaluation in the hospital.  The patient appears to be nontoxic-appearing but appears unwell and sick.  Based off the patient's initial vitals were concerned that she was in cardiogenic shock.  Labs were obtained to further evaluate her symptoms, labs were stable and consistent with most recent labs that she was discharged with 2 days ago. Labs that show concern for worsening hyponatremia.  X-ray showed pulmonary edema that was stable compared to previous.  CT head was also obtained to evaluate for intracranial hemorrhage or bleed secondary to the falls that she had experienced.  CT head showed completed lacunar defect-the right cerebellum. It was very difficult to obtain IV access on the patient which delayed her care multiple ultrasound IVs were placed and it which would function  momentarily before infiltrating.  She did have an elevated troponin at 368 when she was evaluated and seen by me it was very difficult to obtain a second troponin to send off, once the patient was admitted ultrasound IV team in addition to IV team came down multiple providers from their team came down to obtain ultrasound access and were unsuccessful.  We were able to obtain blood which was sent off.  Elevated BNP consistent with her history of congestive heart failure concerned that this is acute on chronic heart failure with concern for cardiogenic shock, however the patient's blood pressure improved and remained stable with the exception of the intermittent drop in blood pressure with a MAP of less than 60.  I do not think this is sepsis, she is afebrile here, does not have a significant leukocytosis, lactate is normal.  Labs that show concern for worsening hyponatremia.  Her creatinine is stable compared to previous.  She is alert and oriented, slow to respond, not lethargic but very somnolent.  Please see ED course for the conversation I had with family and patient in regards to her CODE STATUS. X-ray showed pulmonary edema that was stable compared to previous.  CT head was also obtained to evaluate for intracranial hemorrhage or bleed secondary to the falls that she had experienced.  CT head showed completed lacunar defect-the right cerebellum.Once the patient was admitted I did talk to the the heart failure ICU attending in regards to starting the patient on pressors he was agreeable with the plan of starting the patient on nor epi and recommended getting an A-line placed.  However during my shift the patient's blood pressure and MAP remained greater than 65.  Systolics remained in the 90s.  Held off on starting on any nor epi or placing an A-line due to the difficulty in obtaining access on the patient.  She does have a midline in place where her milrinone pump is running.  Patient was admitted to the  failure ICU in stable condition however was continued monitored and managed by me during my shift.  Was signed out to the oncoming provider in guarded condition.  ----    Differential diagnoses considered include but are not limited to: Acute on chronic heart failure, failure to thrive, intracranial hemorrhage, urinary tract infection     Social Determinants of Health which Significantly Impact Care: None identified     EKG Independent Interpretation: See ED Course    Independent Result Review and Interpretation: See ED course    Chronic conditions affecting the patient's care: See HPI    The patient was discussed with the following consultants/services:  Heart failure ICU attending Dr. Brandin Keene    Care Considerations: See MDM    ED Course:  ED Course as of 04/16/25 2032 Tue Apr 15, 2025   1831 CT head wo IV contrast  IMPRESSION:  1. No acute intracranial abnormality or calvarial fracture.  2. A completed lacunar type defect is present within the right  cerebellum.  3. Sequelae of chronic small-vessel ischemic changes, similar to  prior.   [KD]   1832 XR chest 1 view  IMPRESSION:  1. Similar appearance of alveolar and interstitial pulmonary edema,  with bilateral trace pleural effusions. No pneumothorax. Correlate  with volume status.  2. Medical devices as above. [KD]   1920 I had a conversation with the patient and the family in regards to the patient's DNR/DNI status and it seems like this was a conversation had with palliative and ethics involved.  The patient and family were unaware of this or did not seem to recall this conversation.  They were informed that if providing CPR and intubation can cause pain and suffering or will be futile it can be withheld as it will be in this situation per her CODE STATUS change.  If they have further questions and concerns ethics can be reengaged while she is admitted.  They are currently understanding that this is the current CODE STATUS and if her heart were to  stop and we were to do CPR or if we were to intubate her we would cause her more pain and suffering and the chances of benefit and survival in her situation. [KD]      ED Course User Index  [KD] Angelica Montoya DO         Diagnoses as of 04/16/25 2032   Generalized weakness   Acute on chronic heart failure, unspecified heart failure type   Failure to thrive in adult     Disposition   As a result of their workup, the patient will require admission to the hospital.  The patient was informed of her diagnosis.  The patient was given the opportunity to ask questions and I answered them. The patient agreed to be admitted to the hospital.    Seen and discussed with ED Attending  Angelica Montoya DO, PGY-2  Emergency Medicine     Angelica Montoya DO  Resident  04/16/25 2023       Angelica Montoya DO  Resident  04/16/25 2032

## 2025-04-15 NOTE — ED TRIAGE NOTES
Pt to ed with complaints of generalized weakness for the past 2 weeks. Pt has been unable to walk, unable to eat properly and is slow to respond in her words. Pt states she's been incontinent of bowel and bladder as of recent. Pt has had a few falls recently , hit head , on thinners , and on milrinone. Pt has chf, copd, dm. Pt alert and oriented but slow to respond. Afebrile

## 2025-04-15 NOTE — ED PROCEDURE NOTE
Procedure    Performed by: Bijan Keenan DO  Authorized by: Angelica Montoya DO    Cardiac Indications: hypotension and tachycardia  Consitutional Indications: Fatigue                Procedure: Cardiac Ultrasound    Findings:   Views: parasternal long, parasternal short and apical four  The pericardial space was visualized and was NEGATIVE for a significant pericardial effusion.  Activity: Ventricular contractions were visualized.  LV: LV systolic function was DECREASED.  RV: RV size was NORMAL.    Impression:  Cardiac: The focused cardiac ultrasound exam had ABNORMAL findings as specified.                   Bijan Keenan DO  Resident  04/15/25 [id number]

## 2025-04-15 NOTE — PROGRESS NOTES
Review of chart. Patient with order for continuous milrinone for Stage D CHF. Dr Kathy Rivera follows at home.    Tel call with daughter, Mary. She confirmed that infusions were going well, but she was not sure about the inventory in the home and the last bag change. Initially she thought that there were 2 bags left for hook up tomorrow and Friday, but then she admitted to just having had a difficult conversation about her mother and not being able to think clearly. She confirmed that her mother did get hooked up in the hospital on 4/12 and then discharging home and that she threw away old bags left in the home from prior to most recent hospital admit and that they are only using from the new supply that was sent on 4/11 (3 bags). Daughter was agreeable to sending a delivery today based on previous note and following up next week on inventory if able.    Processed fill for 3 x 48hr milrinone bags for mix and straight delivery 4/15 to cover doses 4/18 4/20 and  4/22/25.    Follow up 4/21/25 with next fill straight. Check inventory and weight if able.

## 2025-04-16 NOTE — PROGRESS NOTES
Melissa Marinelli is a 69 y.o. female on day 1 of admission presenting with Generalized weakness.  Met with pts family and extended family along with Shadia De La O CNP from palliative care.  The medical team reviewed current medical status. Family in complete understanding of pt's prognosis. They want to honor her and are all in agreement to transition pt to comfort care. They will take some time today to have family visit  and transition to comfort care today.   SW contacted Child Life Specialist to assist with grandchildren.    Palliative care team will continue to follow for support through this difficult  transition.     PARESH HOLCOMB

## 2025-04-16 NOTE — CONSULTS
Vancomycin Dosing by Pharmacy- INITIAL    Melissa Marinelli is a 69 y.o. year old female who Pharmacy has been consulted for vancomycin dosing for other sepsis . Based on the patient's indication and renal status this patient will be dosed based on a goal trough/random level of 15-20.     Renal function is currently estimated at 19.3 mL/min. Unknown if this is baseline or patient has an PATSY.     Visit Vitals  BP (!) 78/42   Pulse (!) 128   Temp 36.7 °C (98.1 °F) (Temporal)   Resp (!) 29        Lab Results   Component Value Date    CREATININE 2.33 (H) 2025    CREATININE 2.63 (H) 04/15/2025    CREATININE 2.67 (H) 2025    CREATININE 2.58 (H) 2025    CREATININE 2.27 (H) 2025        Patient weight is as follows:   Vitals:    25 0408   Weight: 54.4 kg (119 lb 14.9 oz)       Cultures:  No results found for the encounter in last 14 days.        I/O last 3 completed shifts:  In: 102.2 (1.9 mL/kg) [I.V.:102.2 (1.9 mL/kg)]  Out: - (0 mL/kg)   Weight: 54.4 kg   I/O during current shift:  No intake/output data recorded.    Temp (24hrs), Av.1 °C (98.8 °F), Min:36.5 °C (97.7 °F), Max:38 °C (100.4 °F)         Assessment/Plan     Patient will be given a loading dose of 1500 mg.  Will initiate vancomycin maintenance dose by level.   Follow-up level will be ordered on 25 with morning labs, unless clinically indicated sooner.  Will continue to monitor renal function daily while on vancomycin and order serum creatinine at least every 48 hours if not already ordered.  Follow for continued vancomycin needs, clinical response, and signs/symptoms of toxicity.       Analisa Melendrez, PharmD

## 2025-04-16 NOTE — H&P
Cardiac ICU H&P        Chief concern:   Chief Complaint   Patient presents with    Weakness, Gen       History Of Present Illness  Melissa Marinelli is a 69 y.o. female w/a PMH of CM, HFrEF (EF 10-15% ), CAD (s/p NSTEMI with RIRI to LCx 2016), s/p bioprosthetic MVR (2019), and [facility] ICD (2020) on palliative milrinone since 2/2024, COPD, CKD3, HTN, HLD, DM, and prior CVA, presenting with progressive functional decline, decreased appetite, and multiple recent falls. She was recently discharged (4/12/25) after her fourth HF admission this year and re-presented 4/15/25 with worsening frailty, somnolence, incontinence, and recurrent falls. Vitals showed hypotension and sinus tachycardia. Labs notable for worsening hyponatremia, persistent renal dysfunction, and chronic anemia. Imaging showed no acute intracranial pathology but stable pulmonary edema. Functional status has markedly declined; now largely non-ambulatory and reliant on caregivers.     Upon arrival to the unit, the patient was hypotensive/cold requiring phenylephrine x1, started on Levo and continued on Milrinone, tachycardic started on Amiodarone gtt,  saturating well on NC, was given 4mg Bumex IV,  had a fever of 38 for which she was started on Vanc/Zosyn. CBC w/o leukocytosis,Hg stable at 8.3, plt 191, CMP with Na 132, Cr 2.33, BUN 46. Troponin 502-->466 (similar to prior)        Review of Systems  As stated in HPI    Past Medical History  As stated in HPI   Medical History[1]    Surgical History  Surgical History[2]     Family History  Family History[3]    Social History  Living Situation:  Occupation:   Alcohol:   Tobacco:   Illicit Drugs:      Allergies  Metoprolol, Ticagrelor, Gadolinium-containing contrast media, Iodinated contrast media, Statins-hmg-coa reductase inhibitors, Red dye, Ace inhibitors, Fentanyl, Hydralazine, Nicorette [nicotine (polacrilex)], Nicotine, Penicillins, and Prednisone    Home  Medications  Current Discharge Medication List        CONTINUE these medications which have NOT CHANGED    Details   acetaminophen (Tylenol) 500 mg tablet Take 2 tablets (1,000 mg) by mouth every 8 hours if needed for mild pain (1 - 3).      albuterol 90 mcg/actuation inhaler Inhale 2 puffs every 4 hours if needed for wheezing or shortness of breath.  Qty: 8.5 g, Refills: 11    Associated Diagnoses: Acute on chronic respiratory failure with hypoxia; Chronic obstructive pulmonary disease, unspecified COPD type (Multi)      amiodarone (Pacerone) 200 mg tablet Take 1 tablet (200 mg) by mouth once daily.  Qty: 30 tablet, Refills: 3    Comments: Meds to beds today 4/12 [place] [address] bed 72  Associated Diagnoses: Ventricular tachycardia (paroxysmal)      apixaban (Eliquis) 2.5 mg tablet Take 1 tablet (2.5 mg) by mouth 2 times a day.  Qty: 60 tablet, Refills: 3    Comments: Meds to beds today 4/12 [place] [address] bed 72  Associated Diagnoses: Atrial flutter, unspecified type (Multi)      aspirin 81 mg EC tablet Take 1 tablet (81 mg) by mouth once daily.  Qty: 30 tablet, Refills: 11    Associated Diagnoses: Acute on chronic heart failure with normal ejection fraction      benzonatate (Tessalon) 100 mg capsule Take 1 capsule (100 mg) by mouth 3 times a day as needed for cough. Do not crush or chew.  Qty: 20 capsule, Refills: 0    Comments: Meds to beds today 4/12/24 [place] [address] bed 72  Associated Diagnoses: Cough, unspecified type      bumetanide (Bumex) 0.5 mg tablet Take 1 tablet (0.5 mg) by mouth once daily.  Qty: 30 tablet, Refills: 3    Comments: Meds to beds today 4/12/24 [place] [address] bed 72  Associated Diagnoses: Acute on chronic heart failure with normal ejection fraction      cholecalciferol (Vitamin D3) 50 MCG (2000 UT) tablet Take 1 tablet (2,000 Units) by mouth once daily.      famotidine (Pepcid) 40 mg tablet Take 1 tablet (40 mg) by mouth once daily as needed for heartburn.      Farxiga 10 mg tablet Take 1  tablet (10 mg) by mouth once daily.  Qty: 30 tablet, Refills: 3    Comments: Meds to beds today 4/12/24 [address]  Associated Diagnoses: Acute on chronic heart failure with normal ejection fraction      heparin flush,porcine,-0.9NaCl 100 unit/mL kit 5 mL by central venous line infusion route 1 (one) time per week. Indications: prevent clot from blocking an intravenous catheter      hydrocortisone 1 % ointment Apply topically 2 times a day as needed for irritation. Chest wall    Associated Diagnoses: Problem with vascular access      ipratropium-albuteroL (Duo-Neb) 0.5-2.5 mg/3 mL nebulizer solution Take 3 mL by nebulization every 6 hours.      milrinone in 5 % dextrose (Primacor) 20 mg/100 mL (200 mcg/mL) infusion Infuse 15.5 mcg/min at 4.65 mL/hr into a venous catheter continuously.  Qty: 100 mL, Refills: 0    Associated Diagnoses: Acute on chronic combined systolic (congestive) and diastolic (congestive) heart failure      mupirocin (Bactroban) 2 % ointment Apply topically. PRN for nose bleed      OneTouch Verio Flex meter misc USE AS DIRECTED THREE TIMES DAILY      sennosides-docusate sodium (Stefany-Colace) 8.6-50 mg tablet Take 2 tablets by mouth once daily as needed for constipation.  Qty: 60 tablet, Refills: 3    Comments: Meds to beds today 4/12/24 [address] 72  Associated Diagnoses: Constipation, unspecified constipation type      sodium chloride (Ocean) 0.65 % nasal spray Administer 1 spray into each nostril if needed for congestion.      sodium chloride 0.9% (sodium chloride) flush Infuse 10 mL into a venous catheter 1 (one) time per week. If drawing labs flush with 20ML      Symbicort 80-4.5 mcg/actuation inhaler Inhale 2 puffs twice a day.             Objective   Vitals: I/O:   Vitals:    04/16/25 0700   BP:    Pulse: (!) 125   Resp: (!) 30   Temp:    SpO2: 92%        24hr Min/Max:  Temp  Min: 36.5 °C (97.7 °F)  Max: 38 °C (100.4 °F)  Pulse  Min: 105  Max: 129  BP  Min: 48/39  Max:  114/56  Resp  Min: 12  Max: 38  SpO2  Min: 86 %  Max: 100 %   Intake/Output Summary (Last 24 hours) at 4/16/2025 0711  Last data filed at 4/16/2025 0600  Gross per 24 hour   Intake 102.24 ml   Output --   Net 102.24 ml        Net IO Since Admission: 102.24 mL [04/16/25 0711]      Physical Exam:  General: lethargic, looks ill.   HEENT: Pupils equal and round, no scleral icterus or conjunctivitis  Skin: No suspect lesions or rashes noted on visible skin  Chest: Ctab, normal respiratory effort, not on supplemental oxygen  Cardiac: Regular rate and rhythm, normal s1, s2, no M/R/G, no JVD  Abdomen: Soft, ND, NT, no involuntary guarding  : No flank pain or indwelling urinary catheter  EXT: periphral edema  MSK: No focal joint swelling noted  Neuro:  moving all limbs spontaneously, follows commands  Psych: Coherent thought process, appropriate mood and affect    LABS:  CBC RFP   Lab Results   Component Value Date    WBC 7.5 04/16/2025    HGB 8.3 (L) 04/16/2025    HCT 26.0 (L) 04/16/2025    MCV 90 04/16/2025     04/16/2025    NEUTROABS 6.47 04/15/2025    Lab Results   Component Value Date     (L) 04/16/2025    K 4.7 04/16/2025    CL 96 (L) 04/16/2025    CO2 23 04/16/2025    BUN 46 (H) 04/16/2025    CREATININE 2.33 (H) 04/16/2025    CREATININE 2.63 (H) 04/15/2025     Lab Results   Component Value Date    MG 2.56 (H) 04/16/2025    PHOS 4.3 04/16/2025    CALCIUM 8.5 (L) 04/16/2025    ALBUMINELP 4.2 08/19/2024         Hepatic Function ABG/VBG   Lab Results   Component Value Date    ALT 93 (H) 04/16/2025    AST 85 (H) 04/16/2025    ALKPHOS 110 04/16/2025     Lab Results   Component Value Date    BILIDIR 1.0 (H) 04/16/2025      Lab Results   Component Value Date    PROTIME 26.1 (H) 04/11/2025    APTT 32 04/11/2025    INR 2.4 (H) 04/11/2025    ALBUMINELP 4.2 08/19/2024    Lab Results   Component Value Date    LACTATE 1.4 04/15/2025        Micro/culture data:  No results found for the last 90  days.      IMAGING:  Imaging  CT head wo IV contrast  Result Date: 4/15/2025  1. No acute intracranial abnormality or calvarial fracture. 2. A completed lacunar type defect is present within the right cerebellum. 3. Sequelae of chronic small-vessel ischemic changes, similar to prior.   I personally reviewed the images/study and I agree with the findings as stated. This study was interpreted at [place], [place].   MACRO: None.   Signed by: Wero Lucero 4/15/2025 6:20 PM Dictation workstation:   [id number]    XR chest 1 view  Result Date: 4/15/2025  1. Similar appearance of alveolar and interstitial pulmonary edema, with bilateral trace pleural effusions. No pneumothorax. Correlate with volume status. 2. Medical devices as above.   I personally reviewed the images/study and I agree with the findings as stated. This study was interpreted at [place], [place].   Signed by: Tyler Mcdonald 4/15/2025 5:58 PM Dictation workstation:   [id number]      Cardiology, Vascular, and Other Imaging  Point of Care Ultrasound  Result Date: 4/15/2025  Bijan Keenan DO     4/15/2025  5:28 PM Performed by: Bijan Keenan DO Authorized by: Angelica Montoya DO  Cardiac Indications: hypotension and tachycardia Consitutional Indications: Fatigue Procedure: Cardiac Ultrasound Findings:  Views: parasternal long, parasternal short and apical four The pericardial space was visualized and was NEGATIVE for a significant pericardial effusion. Activity: Ventricular contractions were visualized. LV: LV systolic function was DECREASED. RV: RV size was NORMAL. Impression: Cardiac: The focused cardiac ultrasound exam had ABNORMAL findings as specified.          - EKG:   Encounter Date: 04/04/25   ECG 12 lead   Result Value    Ventricular Rate 117    Atrial Rate 117    CA Interval 136    QRS Duration 108    QT Interval 362    QTC Calculation(Bazett) 504     R Axis 59    T Axis 27    QRS Count 19    Q Onset 216    T Offset 397    QTC Fredericia 452    Narrative    Sinus tachycardia with Fusion complexes  Low voltage QRS  Cannot rule out Anterior infarct (cited on or before 05-FEB-2025)  T wave abnormality, consider lateral ischemia  Abnormal ECG  When compared with ECG of 27-FEB-2025 15:28,  Sinus rhythm has replaced Atrial flutter  Non-specific change in ST segment in Inferior leads  ST no longer depressed in Lateral leads  Confirmed by Trevor Caceres ([id number]) on 4/4/2025 8:02:14 PM       - TTE:   -No results found for this or any previous visit from the past 1095 days.     -Transesophageal Echo (SHRUTHI) With Possible Cardioversion  Result Date: 2/27/2025   [facility], [address] [place] [address]                Tel [phone] and Fax [phone] TRANSESOPHAGEAL ECHOCARDIOGRAM REPORT  Patient Name:       FANNIE RIOS      Reading Physician:    [id number] Yamini Carmona MD Study Date:         2/27/2025           Ordering Provider:    [id number] MAMADOU GHOSH MRN/PID:            [id number]            Fellow:               [id number] Wei Jose MD Accession#:         [id number]        Nurse:                Neva Barrera RN Date of Birth/Age:  1955 / 69      Sonographer:                     years Gender assigned at  F                   Additional Staff:     Lety Tang RN Birth: Height:             162.00 cm           Admit Date:           2/25/2025 Weight:             62.00 kg            Admission Status:     Inpatient -                                                               Routine BSA / BMI:          1.66 m2 / 23.62     Encounter#:           [id number]                     kg/m2 Blood Pressure:     97/50 mmHg          Department Location:   [facility]                                                               Non Invasive Study Type:    TRANSESOPHAGEAL ECHO (SHRUTHI) W/ POSSIBLE CARDIOVERSION Diagnosis/ICD: Unspecified atrial flutter-I48.92; Atypical atrial flutter-I48.4 Indication:    Aflutter CPT Code:      SHRUTHI Complete-87401; Doppler Limited-54670; Color Doppler-09084 Patient History: Allergies:         Metoprolol, ticagrelor, contrast media, statins, red dye, ace                    inhibitors, fentanyl, hydralazine, nicotine, PCNs,                    prednisone. Pertinent History: CAD, NSTEMI, ICM, CHF, HTN, VT, anxiety, MVR, DM, Af/Afl,                    HLD, COPD, GERD, CKD, anemia, TRISHA, asthma. Study Detail: A bubble study was not performed.  PHYSICIAN INTERPRETATION: SHRUTHI Details: Technically adequate omniplane transesophageal echocardiogram performed. SHRUTHI Medication: The pharynx was anesthetized with lidocaine ointment, viscous lidocaine and topex spray. The patient was sedated by Anesthesia; please refer to anesthesia flow sheet for medications used. SRHUTHI Procedure: The probe was passed without difficulty. Complications encountered during procedure: Patient tolerated the procedure well without any apparent complications. Left Ventricle: Left ventricular ejection fraction is severely decreased, by visual estimate at 10-15%. There is global hypokinesis of the left ventricle with minor regional variations. The left ventricular cavity size was not assessed. Left ventricular diastolic filling was not assessed. Left Atrium: The left atrium is enlarged. A bubble study using agitated saline was not performed. There is left atrial appendage sludge noted and there is a thrombus in the left atrial appendage. Left atrial appendage demonstrating thrombus measuring 1 cm x 1.75 cm along with dense sponatenous contrast. Right Ventricle: The right ventricle was not assessed. There is reduced right ventricular systolic function. A device is visualized in the  right ventricle. Right Atrium: The right atrial size was not assessed. There is a device visualized in the right atrium. Aortic Valve: The aortic valve is trileaflet. There is trace aortic valve regurgitation. Mitral Valve: There is a prosthetic mitral valve present. There is mild mitral valve regurgitation. There is an Epic mitral valve bioprosthesis, 29 mm reported size. Gradients across the mtiral valve were not assessed. Tricuspid Valve: The tricuspid valve is structurally normal. There is mild tricuspid regurgitation. Pulmonic Valve: The pulmonic valve is structurally normal. There is no indication of pulmonic valve regurgitation. Pericardium: There is no pericardial effusion noted. Aorta: The aortic root is normal. In comparison to the previous echocardiogram(s): Compared with study dated 1/23/2025, there is redemonstration of a DEVIKA thrombus, that appears similair in size to prior SHRUTHI.  CONCLUSIONS:  1. Left ventricular ejection fraction is severely decreased, by visual estimate at 10-15%.  2. There is global hypokinesis of the left ventricle with minor regional variations.  3. There is reduced right ventricular systolic function.  4. The left atrium is enlarged.  5. Left atrial appendage demonstrating thrombus measuring 1 cm x 1.75 cm along with dense sponatenous contrast.  6. There is an Epic mitral valve bioprosthesis, 29 mm reported size. Gradients across the mtiral valve were not assessed.  7. Compared with study dated 1/23/2025, there is redemonstration of a DEVIKA thrombus, that appears similair in size to prior SHRUTHI. QUANTITATIVE DATA SUMMARY:  LV SYSTOLIC FUNCTION:                      Normal Ranges: EF-Visual:      13 % LV EF Reported: 13 %  [id number] Yamini Carmona MD Electronically signed on 2/27/2025 at 12:26:05 PM  ** Final **     Transesophageal Echo (SHRUTHI)  Result Date: 1/23/2025   [facility], [address] [place] [address]                Tel [phone] and Fax [phone]  TRANSESOPHAGEAL ECHOCARDIOGRAM REPORT  Patient Name:       FANNIE RIOS      Reading Physician:    [id number] Junior Valadez MD Study Date:         1/23/2025           Ordering Provider:    [id number] JEREMIAS BAILON MRN/PID:            [id number]            Fellow: Accession#:         [id number]        Nurse:                Ileana Prieto RN Date of Birth/Age:  1955 / 69      Sonographer:                     years Gender assigned at  F                   Additional Staff:     Neva Barrera RN Birth: Height:             162.56 cm           Admit Date:           1/21/2025 Weight:             62.14 kg            Admission Status:     Inpatient -                                                               Routine BSA / BMI:          1.67 m2 / 23.52     Encounter#:           [id number]                     kg/m2 Blood Pressure:     94/57 mmHg          Department Location:  [facility]                                                               Non Invasive Study Type:    TRANSESOPHAGEAL ECHO (SHRUTHI) Diagnosis/ICD: Unspecified atrial fibrillation-I48.91 CPT Code:      SHRUTHI Complete-42059; Doppler Limited-67416; Color Doppler-76847 Patient History: Allergies:         Metoprolol, brillinta, iodinated contrast, statins, ACE                    inhibitors, fentanyl, hydralazine, nicorette, nicotine,                    penicillins, prednisone. Diabetes:          Yes Pertinent History: A-Fib, COPD, CAD, HTN and Hyperlipidemia. ESRD, AICD, asthma,                    heart failure, anxiety, cardiomyopathy, GERD, mitral valve                    replacement, anemia, TRISHA, NSTEMI.  PHYSICIAN INTERPRETATION: SHRUTHI Details: Technically adequate omniplane transesophageal echocardiogram performed. SHRUTHI Medication: The pharynx was anesthetized with  Cetacaine spray and viscous lidocaine. The patient was sedated by Anesthesia; please refer to anesthesia flow sheet for medications used. SHRUTHI Procedure: The probe was passed without difficulty. Complications encountered during procedure: Patient tolerated the procedure well without any apparent complications. Left Ventricle: Left ventricular ejection fraction is severely decreased, by visual estimate at 10-15%. The left ventricular cavity size was not assessed. Left ventricular diastolic filling cannot be determined, due to mitral valve repair/replacement. Left Atrium: The left atrium is enlarged. There is left atrial appendage sludge noted, there is a thrombus in the left atrial appendage and the left atrial appendage is enlarged. The left atrial appendage appears dilated, with very dense spontaneous contrast, flow ectasia, and an image suggestive of a thrombus. Right Ventricle: The right ventricle was not assessed. Right ventricular systolic function not assessed. A device is visualized in the right ventricle. Right Atrium: The right atrium was not assessed. There is a device visualized in the right atrium. Here is a 4x6mm mobile echogenic image that appears to be attached to the device lead (Clip41). Aortic Valve: The aortic valve is trileaflet. There is mild aortic valve regurgitation. Mitral Valve: There is a prosthetic mitral valve present. There is an unknown bioprosthetic type mitral valve prosthesis. There is mild prosthetic mitral valve regurgitation. There is mild mitral valve regurgitation. Tricuspid Valve: The tricuspid valve is structurally normal. There is moderate tricuspid regurgitation. The Doppler estimated RVSP is mildly elevated at 38.3 mmHg. Pulmonic Valve: The pulmonic valve is structurally normal. There is trace pulmonic valve regurgitation. Pericardium: There is no pericardial effusion noted. Aorta: The aortic root is normal. There is plaque visualized in the descending aorta which is  classified as a Grade 2 [mild (focal or diffuse) intimal thickening of 2-3 mm] atherosclerosis.  CONCLUSIONS:  1. Left ventricular ejection fraction is severely decreased, by visual estimate at 10-15%.  2. Left ventricular diastolic filling cannot be determined, due to mitral valve repair/replacement.  3. The left atrium is enlarged.  4. The left atrial appendage appears dilated, with very dense spontaneous contrast, flow ectasia, and an image suggestive of a thrombus.  5. Here is a 4x6mm mobile echogenic image that appears to be attached to the device lead (Clip41).  6. There is an unknown bioprosthetic type mitral valve prosthesis.  7. Mildly elevated right ventricular systolic pressure.  8. Moderate tricuspid regurgitation visualized.  9. Mild aortic valve regurgitation. 10. There is plaque visualized in the descending aorta. QUANTITATIVE DATA SUMMARY:  LV SYSTOLIC FUNCTION BY 2D PLANIMETRY (MOD):                      Normal Ranges: EF-Visual:      13 % LV EF Reported: 13 %  TRICUSPID VALVE/RVSP:          Normal Ranges: Peak TR Velocity:     2.97 m/s RV Syst Pressure:     38 mmHg  (< 30mmHg)  [id number] Junior Valadez MD Electronically signed on 1/23/2025 at 2:44:43 PM  ** Final **     Transthoracic Echo (TTE) Complete  Result Date: 7/8/2024   [facility], [place], [address] [place] [address]                Tel [phone] and Fax [phone] TRANSTHORACIC ECHOCARDIOGRAM REPORT  Patient Name:      FANNIE RIOS       Reading Physician:    [id number] Kory Rizo MD Study Date:        7/8/2024             Ordering Provider:    [id number] TOBI HUGGINS MRN/PID:           [id number]             Fellow: Accession#:        [id number]         Nurse: Date of Birth/Age: 1955 / 68 years Sonographer:          Kary Tran RDCS Gender:            F                    Additional Staff:  Height:            162.56 cm            Admit Date:           7/8/2024 Weight:            58.06 kg             Admission Status:     Inpatient - STAT BSA / BMI:         1.62 m2 / 21.97      Encounter#:           [id number]                    kg/m2 Blood Pressure:    142/98 mmHg          Department Location:  [address] Study Type:    TRANSTHORACIC ECHO (TTE) COMPLETE Diagnosis/ICD: Acute combined systolic (congestive) and diastolic (congestive)                heart failure (CHF)-I50.41 Indication:    Congestive Heart Failure CPT Code:      Echo Complete w Full Doppler-38670 Patient History: Pertinent History: CAD, Chest Pain, HTN and COPD. MVR 2019 29mm Epic, CHF, Low                    EF, H/O MI,CKD. Study Detail: The following Echo studies were performed: 2D, M-Mode, Doppler and               color flow. Technically challenging study due to poor acoustic               windows. Definity used as a contrast agent for endocardial border               definition. Total contrast used for this procedure was 1.0 mL via               IV push.  PHYSICIAN INTERPRETATION: Left Ventricle: The left ventricular systolic function is severely decreased, with a visually estimated ejection fraction of 15-20%. There is global hypokinesis of the left ventricle with minor regional variations. The left ventricular cavity size is severely dilated. Spectral Doppler shows an abnormal pattern of left ventricular diastolic filling. There is no definite left ventricular thrombus visualized. Left Atrium: The left atrium is severely dilated. Right Ventricle: The right ventricle is normal in size. There is reduced right ventricular systolic function. A device is visualized in the right ventricle. Right Atrium: The right atrium is normal in size. Aortic Valve: The aortic valve is trileaflet. There is mild aortic valve regurgitation. The peak instantaneous gradient of the aortic valve is 3.1 mmHg. There is nodular calcium on the noncoronary  cusp. Mitral Valve: The mitral valve is abnormal. There is a Epic mitral valve bioprosthesis with a 29 mm reported size. There is no prosthetic mitral valve regurgitaion. There is trace to mild mitral valve regurgitation. The DI is normal, consistent with normal prosthetic mitral valve function. The mean diastolic pressure is 8 mmHg at a heart rate of 136 bpm. Tricuspid Valve: The tricuspid valve is structurally normal. There is mild tricuspid regurgitation. The Doppler estimated RVSP is mild to moderately elevated at 47.9 mmHg. Pulmonic Valve: The pulmonic valve is structurally normal. The pulmonic valve regurgitation was not well visualized. Pericardium: There is no pericardial effusion noted. Aorta: The aortic root is normal. Systemic Veins: The inferior vena cava appears to be of normal size. There is IVC inspiratory collapse greater than 50%. In comparison to the previous echocardiogram(s): Compared with study dated 1/24/2024, no significant change.  CONCLUSIONS:  1. The left ventricular systolic function is severely decreased, with a visually estimated ejection fraction of 15-20%.  2. There is global hypokinesis of the left ventricle with minor regional variations.  3. Spectral Doppler shows an abnormal pattern of left ventricular diastolic filling.  4. Left ventricular cavity size is severely dilated.  5. No left ventricular thrombus visualized.  6. There is reduced right ventricular systolic function.  7. The left atrium is severely dilated.  8. The DI is normal, consistent with normal prosthetic mitral valve function. The mean diastolic pressure is 8 mmHg at a heart rate of 136 bpm.  9. Mild to moderately elevated right ventricular systolic pressure. 10. Mild aortic valve regurgitation. QUANTITATIVE DATA SUMMARY: 2D MEASUREMENTS:                           Normal Ranges: Ao Root d:     2.60 cm    (2.0-3.7cm) IVSd:          0.80 cm    (0.6-1.1cm) LVPWd:         0.80 cm    (0.6-1.1cm) LVIDd:         6.90 cm     (3.9-5.9cm) LVIDs:         6.40 cm LV Mass Index: 147.2 g/m2 LV % FS        7.2 % LA VOLUME:                             Normal Ranges: LA Volume Index: 68.9 ml/m2 RA VOLUME BY A/L METHOD:                       Normal Ranges: RA Area A4C: 12.9 cm2 LV SYSTOLIC FUNCTION BY 2D PLANIMETRY (MOD):                      Normal Ranges: EF-A4C View:    19 % (>=55%) EF-A2C View:    17 % EF-Biplane:     17 % EF-Visual:      18 % LV EF Reported: 18 % MITRAL VALVE:                       Normal Ranges: MV Vmax:    2.28 m/s  (<=1.3m/s) MV peak P.8 mmHg (<5mmHg) MV mean P.0 mmHg  (<2mmHg) AORTIC VALVE:                         Normal Ranges: AoV Vmax:      0.88 m/s (<=1.7m/s) AoV Peak PG:   3.1 mmHg (<20mmHg) LVOT Max Edvin:  0.71 m/s (<=1.1m/s) LVOT VTI:      9.09 cm LVOT Diameter: 1.60 cm  (1.8-2.4cm) AoV Area,Vmax: 1.63 cm2 (2.5-4.5cm2)  RIGHT VENTRICLE: RV Basal 2.49 cm RV Mid   1.86 cm RV Major 6.6 cm RV s'    0.06 m/s TRICUSPID VALVE/RVSP:                             Normal Ranges: Peak TR Velocity: 3.35 m/s RV Syst Pressure: 47.9 mmHg (< 30mmHg)  [id number] Kory Rizo MD Electronically signed on 2024 at 5:38:02 PM  ** Final **       Medications:  Scheduled Medications:  PRN Medications:    Scheduled Medications[4] PRN Medications[5]    Continuous Medications[6]         Assessment/Plan:   Melissa Marinelli is a 69 y.o. female with PMHx  ICM, HFrEF (EF 10-15% ), CAD (s/p NSTEMI with RIRI to LCx ), s/p bioprosthetic MVR (), and [employer] ICD () on palliative milrinone since 2024, COPD, CKD3, HTN, HLD, DM, and prior CVA, presenting with progressive functional decline and hypotension now on Levo/Milrinone, palliative onboard, undergoing GOC discussion.       Neurologic   #Lethargic likely in the setting of hypotension   :CTH non-acute   :CTM      Cardiovascular  #stage D HF on palliative Milrinone (since 2024)   #ADHF   - AHA Stage D, ICM systolic HFrEF   - SHRUTHI: 25 EF 10-15%, LA thrombus   - TTE 24: EF  15-20%, GHK, abnormal DF, LV severely dilated, reduced RV function, LA severely dilated  - admit weight: 54.4 kgs (BMI: 20.59)  - admit BNP: 4/16:1637  :S/p Bumex 4mg IV , phenyl x1, Levo and Milrinone   :Spoke with daughter 04/16, her goal is go back home with home hospice. No invasive  procedures to be done , code is DNR/DNI. As per palliative team, can't go back on Milirine if she becomes hospice and ICD will need to be turned off.    -Consult Hospice/palliative   -Continue with Milrinone 0.25  -Wean down Levo   -Diureiss with Bumex IV bolueses and Gtt @2mg/hr   - Not able to tolerate ACEi, ARB, ARNi (Entresto discontinued during last admission)   - DISCONTINUED Spironolactone 2/2 hyperkalemia last admission   - C/w Aspirin for secondary MI prevention   - Holding dapagliflozin 10 mg daily        #Paroxysmal Atrial Fibrillation / flutter   #DEVIKA thrombus   -Device: [employer] ICD 5/2020   -C/w Home Amiodarone 200 mg daily  -C/w Eliquis 2.5mg PO BID (dose reduced 4/5)        #CAD   -[facility]: 4/15/2019  -C/w Aspirin for coronary MI prevention  - no statin due to elevated LFT        Respiratory  #AHRF likely 2/2 pulm edema    -Diuresis as above.      #COPD  -Monitor and maintain SpO2 > 92%  -C/w albuterol 2 puffs every 4 hours as needed   -C/w Duo-Nebs scheduled every 6 hours (patient requested it often when it was PRN)  -C/w Breo Ellipta 100-25 mcg/dose 1 puff daily     Gastrointestinal  DEMETRIA      Renal  CKD (stage 3)  -Baseline BUN/Cr: 30 - 38 / 2.2 - 2.7  -Admit BUN/Cr: 46 / 2.33  -I/Os  -avoid hypotension and nephrotoxic agents      #Hyponatremia  likely I/s/o ESHF   - Na today 132  - RFP BID       Hematologic  #Anemia in the setting of iron deficiency anemia   #DEVIKA thrombus  :Hg today 8.3   :Labs:, TIBC: 329, ferritin: 25, serum Fe: 12, folate: 10.1, B12: 1.064, % sat: 4  :s/p IV Venofer x 5 days on previous admission   - C/w Apixaban 2.5 mg twice daily (dose reduced 4/5)   -CTM       Infectious Disease  #C/f sepsis    :Pt with fever of 38 C, tachypnea, hypotension.   :No obvious source of infection .   -Bcx, Ucx   -Vanc/Zosyn 04/16-p        Endocrine  - Euglycemic  - hgbA1c: 2/5/25: 5.7   -TSH: 2/5/25: 1.4 /, Free T4: 1.04            F: Replete PRN  E: Replete PRN  N: Cardiac  Access/Lines: PIV, [place], Subclavian line      DVT Ppx: Eliquis 2.5mg   GI Ppx: Pantoprazole     Abx: Vanc/Zosyn  O2: 4L NC    Pain regimen: Tylenol / ASA / Oxy  GI Laxative: None     Code status: DNR and No Intubation  NOK/Surrogate Decision Maker: Mary Caruso (Daughter)  [phone]              [1]   Past Medical History:  Diagnosis Date    Asthma     CAD (coronary artery disease)     CHF (congestive heart failure)     CKD (chronic kidney disease)     COPD (chronic obstructive pulmonary disease) (Multi)     CVA (cerebral vascular accident) (Multi)     Diabetes mellitus (Multi)     GERD (gastroesophageal reflux disease)     Hyperlipidemia     Hypertension     NSTEMI (non-ST elevated myocardial infarction) (Multi)     Unspecified systolic (congestive) heart failure 08/11/2022    HFrEF (heart failure with reduced ejection fraction)   [2]   Past Surgical History:  Procedure Laterality Date    CARDIAC CATHETERIZATION N/A 1/26/2024    Procedure: Right Heart Cath;  Surgeon: Cuate Dodson MD;  Location: [place] [address] Cardiac Cath Lab;  Service: Cardiovascular;  Laterality: N/A;    CARDIAC ELECTROPHYSIOLOGY PROCEDURE N/A 1/23/2025    Procedure: SHRUTHI DCCV;  Surgeon: Emilee Brewster MD;  Location: [place] [address] Cardiac Cath Lab;  Service: Electrophysiology;  Laterality: N/A;    CARDIAC ELECTROPHYSIOLOGY PROCEDURE N/A 2/27/2025    Procedure: Cardioversion;  Surgeon: Celso Ordoñez MD;  Location: [place] [address] Cardiac Cath Lab;  Service: Electrophysiology;  Laterality: N/A;  SHRUTHI-guided cardioversion. Primary team ([facility]) to arrange SHRUTHI part. Has single chamber SJM ICD    MITRAL VALVE REPLACEMENT  06/2019    OTHER SURGICAL HISTORY  08/11/2022     Tonsillectomy    OTHER SURGICAL HISTORY  08/11/2022    Right ventricular assist device placement    OTHER SURGICAL HISTORY  08/11/2022    Mitral valve replacement   [3]   Family History  Problem Relation Name Age of Onset    Other (CVA) Brother     [4] albuterol, , ,   apixaban, 2.5 mg, oral, BID  aspirin, 81 mg, oral, Daily  piperacillin-tazobactam, 2.25 g, intravenous, q6h  vancomycin, 1,000 mg, intravenous, Once  [5] PRN medications: acetaminophen, albuterol, albuterol, benzonatate, vancomycin  [6] milrinone, 0.25 mcg/kg/min, Last Rate: 0.25 mcg/kg/min (04/16/25 0600)  norepinephrine, 0.01-1 mcg/kg/min, Last Rate: 0.1 mcg/kg/min (04/16/25 0608)

## 2025-04-16 NOTE — SIGNIFICANT EVENT
RT at bedside for patient in respiratory distress. ABG obtained and hypoxia confirmed. Airvo initially trialed at 50L/95%, no improvement in SpO2. CPAP then initiated at +12/100% with minimal improvement in SpO2. Team aware of modality attempts. Patient's daughter en route to discuss care options.

## 2025-04-16 NOTE — SIGNIFICANT EVENT
Called by bedside RN @ 3:25 PM for patient unresponsiveness. On exam, patient has no response to verbal, physical, or painful stimuli. Pupils fixed and dilated. Absent heart and breath sounds for greater than 1 minute. Absent peripheral pulses. Patient pronounced dead at 3:28 PM.

## 2025-04-16 NOTE — PROGRESS NOTES
Physical Therapy                 Therapy Communication Note    Patient Name: Melissa Marinelli  MRN: [id number]  Department: [facility]  Room: 20/20-A  Today's Date: 4/16/2025     Discipline: Physical Therapy    PT Missed Visit: Yes     Missed Visit Reason: Missed Visit Reason:  (Pt now comfort care and not appropriate for PT.) Will discontinue PT orders at this time.     Missed Time: Attempt    Comment:

## 2025-04-16 NOTE — PROCEDURES
Arterial Line Insertion    Date/Time: 4/16/2025 6:41 AM    Performed by: Yola Hills MD  Authorized by: Yola Hills MD    Consent:     Consent obtained:  Emergent situation    Consent given by:  Patient    Risks discussed:  Repeat procedure, bleeding and ischemia  Universal protocol:     Procedure explained and questions answered to patient or proxy's satisfaction: yes      Relevant documents present and verified: yes      Test results available: no      Imaging studies available: no      Required blood products, implants, devices, and special equipment available: no      Site/side marked: no      Immediately prior to procedure, a time out was called: yes      Patient identity confirmed:  Hospital-assigned identification number  Indications:     Indications: hemodynamic monitoring and multiple ABGs    Pre-procedure details:     Skin preparation:  Chlorhexidine  Sedation:     Sedation type:  None  Anesthesia:     Anesthesia method:  Local infiltration    Local anesthetic:  Lidocaine 1% w/o epi  Procedure details:     Location:  R radial    Anival's test performed: no      Needle gauge:  20 G    Placement technique:  Ultrasound guided    Number of attempts:  1    Transducer: waveform confirmed    Post-procedure details:     Post-procedure:  Biopatch applied and secured with tape    CMS:  Normal    Procedure completion:  Tolerated

## 2025-04-16 NOTE — PROGRESS NOTES
Pharmacy Medication History Review    Melissa Marinelli is a 69 y.o. female admitted for Generalized weakness. Pharmacy reviewed the patient's bmast-ve-gmynnyhsx medications and allergies for accuracy.    Medications ADDED:  None  Medications CHANGED:  None  Medications REMOVED:   None     The list below reflects the updated PTA list.   Prior to Admission Medications   Prescriptions Informant   Farxiga 10 mg tablet Other   Sig: Take 1 tablet (10 mg) by mouth once daily.   OneTouch Verio Flex meter misc Other   Sig: USE AS DIRECTED THREE TIMES DAILY   Symbicort 80-4.5 mcg/actuation inhaler Other   Sig: Inhale 2 puffs twice a day.   acetaminophen (Tylenol) 500 mg tablet Other   Sig: Take 2 tablets (1,000 mg) by mouth every 8 hours if needed for mild pain (1 - 3).   albuterol 90 mcg/actuation inhaler Other   Sig: Inhale 2 puffs every 4 hours if needed for wheezing or shortness of breath.   amiodarone (Pacerone) 200 mg tablet Other   Sig: Take 1 tablet (200 mg) by mouth once daily.   apixaban (Eliquis) 2.5 mg tablet Other   Sig: Take 1 tablet (2.5 mg) by mouth 2 times a day.   aspirin 81 mg EC tablet Other   Sig: Take 1 tablet (81 mg) by mouth once daily.   benzonatate (Tessalon) 100 mg capsule Other   Sig: Take 1 capsule (100 mg) by mouth 3 times a day as needed for cough. Do not crush or chew.   bumetanide (Bumex) 0.5 mg tablet Other   Sig: Take 1 tablet (0.5 mg) by mouth once daily.   cholecalciferol (Vitamin D3) 50 MCG (2000 UT) tablet Other   Sig: Take 1 tablet (50 mcg) by mouth once daily.   famotidine (Pepcid) 40 mg tablet Other   Sig: Take 1 tablet (40 mg) by mouth once daily as needed for heartburn.   heparin flush,porcine,-0.9NaCl 100 unit/mL kit Other   Si mL by central venous line infusion route 1 (one) time per week. Indications: prevent clot from blocking an intravenous catheter   hydrocortisone 1 % ointment Other   Sig: Apply topically 2 times a day as needed for irritation. Chest wall  "  ipratropium-albuteroL (Duo-Neb) 0.5-2.5 mg/3 mL nebulizer solution Other   Sig: Take 3 mL by nebulization every 6 hours.   milrinone in 5 % dextrose (Primacor) 20 mg/100 mL (200 mcg/mL) infusion Other   Sig: Infuse 15.5 mcg/min at 4.65 mL/hr into a venous catheter continuously.   mupirocin (Bactroban) 2 % ointment Other   Sig: Apply topically. PRN for nose bleed   sennosides-docusate sodium (Stefany-Colace) 8.6-50 mg tablet Other   Sig: Take 2 tablets by mouth once daily as needed for constipation.   sodium chloride (Ocean) 0.65 % nasal spray Other   Sig: Administer 1 spray into each nostril if needed for congestion.   sodium chloride 0.9% (sodium chloride) flush Other   Sig: Infuse 10 mL into a venous catheter 1 (one) time per week. If drawing labs flush with 20ML      Facility-Administered Medications: None        The list below reflects the updated allergy list. Please review each documented allergy for additional clarification and justification.  Allergies  Reviewed by Bel Garland RN on 4/15/2025        Severity Reactions Comments    Metoprolol High Other, Shortness of breath, Respiratory depression     Ticagrelor High Anaphylaxis, Other Feels a severe \"burning feeling\" in her chest    Gadolinium-containing Contrast Media Medium Hives, Other     Iodinated Contrast Media Medium Hives, Other     Statins-hmg-coa Reductase Inhibitors Medium Myalgia, Other, Unknown Atorvastatin - hair loss    Red Dye Not Specified Unknown     Ace Inhibitors Low Other, Cough COUGH    Fentanyl Low Dizziness, Drowsiness     Hydralazine Low Dermatitis, Rash, Nausea/vomiting     Nicorette [nicotine (polacrilex)] Low Nausea/vomiting Nicorette gums and lozenges only. Okay with nicotine patches    Nicotine Low Nausea/vomiting     Penicillins Low Rash     Prednisone Low Other Increased blood sugar            Patient was unable to be assessed for M2B at discharge.     Sources:   [facility]  Pharmacy dispense history  Patient Interview Unable " "to provide any details  Chart Review  Care Everywhere  4/12/2025  cardiology discharge summary    Additional Comments:  No recent dispense history per [place]AR[place] report  Patient recently discharged on 4/12/2025. Admit med rec completed per [place]ARRS, pharmacy dispense history, chart review, Care Everywhere, 4/12/2025  cardiology discharge summary and prior pharmacy admit med rec notes      Andreas Schulz, Arnel  Transitions of Care Pharmacist  04/16/25     Secure Chat preferred   If no response call [id number] or [email or web address] \"Med Rec\"    "

## 2025-04-16 NOTE — PROGRESS NOTES
Spiritual Care Visit  Spiritual Care Request    Reason for Visit:  Routine Visit: Follow-up  Continue Visiting: No  Crisis Visit: Patient actively dying, Death     Focus of Care:  Visited With: Patient and family together       Accompanied family and patient through patient's end of life and death.

## 2025-04-16 NOTE — DISCHARGE SUMMARY
Discharge Diagnosis  Generalized weakness        Discharge Meds      Test Results Pending At Discharge  Pending Labs       Order Current Status    Extra Urine Gray Tube Collected (04/15/25 1654)    Urinalysis with Reflex Culture and Microscopic Collected (04/15/25 1654)    Urinalysis with Reflex Culture and Microscopic Collected (04/15/25 1654)    Extra Urine Gray Tube In process    Urinalysis with Reflex Culture and Microscopic In process            Hospital Course  Melissa Marinelli is a 69 y.o. female w/a PMH of CM, HFrEF (EF 10-15% ), CAD (s/p NSTEMI with RIRI to LCx 2016), s/p bioprosthetic MVR (2019), and [employer] ICD (2020) on palliative milrinone since 2/2024, COPD, CKD3, HTN, HLD, DM, and prior CVA, presenting with progressive functional decline, decreased appetite, and multiple recent falls. She was recently discharged (4/12/25) after her fourth HF admission this year and re-presented 4/15/25 with worsening frailty, somnolence, incontinence, and recurrent falls. Vitals showed hypotension and sinus tachycardia. Labs notable for worsening hyponatremia, persistent renal dysfunction, and chronic anemia. Imaging showed no acute intracranial pathology but stable pulmonary edema. Functional status has markedly declined; now largely non-ambulatory and reliant on caregivers.  Pt was admitted to CICU, she was started on levo/Milrinone /Bumex gtt, her condition worsened with oxygen saturation dropped to 60s while on CPAP,, decreased UOP despite Bumex gtt. GOC discussion with family at bedside and the decision was made to transition her to comfort care. Pt pronounced dead  at 3:27PM on 04/16/2025.     Pertinent Physical Exam At Time of Discharge  Patient unresponsive   Pupils dilated and fixed   Corneal reflex absent   Absent carotid pulse x2 min  Absent breath sounds x2 min   Absent heart sounds x2 min          Lavern Villeda MD  PGY3 IM Resident

## 2025-04-16 NOTE — NURSING NOTE
Phillip consulted for PIV placement by bedside RN. Phillip RN to bedside to assess. Scanned bilateral upper extremities, no sustainable vessels noted for IV cannulation. Bedside RN made aware, alternative access recommended based on assessment, patient's arms are cool, veins laying flat, patient has EF low per treating RN, perfusion to extremities compromised, patient lethargic responding to the needle stick and my voice. Lab obtained and provided to bedside nurse, unable to gain additional access for IV, to draw labs from. Patient has a mid line for medications.  Patient will have alternative access placed in ICU.

## 2025-04-18 LAB
ATRIAL RATE: 202 BPM
Q ONSET: 213 MS
QRS COUNT: 19 BEATS
QRS DURATION: 120 MS
QT INTERVAL: 368 MS
QTC CALCULATION(BAZETT): 517 MS
QTC FREDERICIA: 462 MS
R AXIS: 65 DEGREES
T AXIS: 225 DEGREES
T OFFSET: 397 MS
VENTRICULAR RATE: 119 BPM

## 2025-04-21 ENCOUNTER — HOME CARE VISIT (OUTPATIENT)
Dept: HOME HEALTH SERVICES | Facility: HOME HEALTH | Age: 70
End: 2025-04-21
Payer: COMMERCIAL

## 2025-04-22 NOTE — DOCUMENTATION CLARIFICATION NOTE
"    PATIENT:               FANNIE RIOS  ACCT #:                  [id number]  MRN:                       [id number]  :                       1955  ADMIT DATE:       4/15/2025 3:54 PM  DISCH DATE:        2025 3:28 PM  RESPONDING PROVIDER #:        [id number]          PROVIDER RESPONSE TEXT:    Cardiogenic shock    CDI QUERY TEXT:    Clarification        Instruction:    Based on your assessment of the patient and the clinical information, please provide the requested documentation by clicking on the appropriate radio button and enter any additional information if prompted.    Question: Is there a diagnosis associated with the clinical information    When answering this query, please exercise your independent professional judgment. The fact that a question is being asked, does not imply that any particular answer is desired or expected.    The patient's clinical indicators include:  Clinical Information: 69yr old female admitted with acute decompensated heart failure    Clinical Indicators:  CICU H&P- \"Upon arrival to the unit, the patient was hypotensive/cold requiring phenylephrine x1, started on Levo and continued on Milrinone, tachycardic started on Amiodarone gtt\"    25 0700 VS- BP  113/59, , RR 30    Treatment:  -phenylephrine x1  -Levophed  -Milrinone  -Amiodarone gtt    Risk Factors:  -ADHF  Options provided:  -- Cardiogenic shock  -- Hypotension only without shock  -- Other - I will add my own diagnosis  -- Refer to Clinical Documentation Reviewer    Query created by: Aura Caal on 2025 6:54 AM      Electronically signed by:  NOHEMI MCCLENDON MD 2025 7:19 AM          "

## 2025-04-28 ENCOUNTER — APPOINTMENT (OUTPATIENT)
Dept: CARDIOLOGY | Facility: CLINIC | Age: 70
End: 2025-04-28
Payer: COMMERCIAL

## (undated) DEVICE — GUIDEWIRE, AMPLATZ SUPER STIFF, STR, .035 IN X 75CM

## (undated) DEVICE — SHEATH, PINNACLE, 10 CM,  6FR INTRODUCER, 6FR DIA, 2.5 CM DIALATOR

## (undated) DEVICE — CATHETER, THERMODILUTION, SWAN GANZ, VIP, 5 LUMEN, 7.5 FR, 110 CM

## (undated) DEVICE — INTRODUCER KIT, HEMOSTASIS, VALVE/SIDEPORT, W/GUIDEWIRE, 0.035 IN, 8.5 FR, 10 CM, LF

## (undated) DEVICE — SHEATH, PINNACLE, 10 CM,  7FR INTRODUCER, 7FR DIA, 2.5 CM DIALATOR

## (undated) DEVICE — ELECTRODE, QUICK-COMBO, REDI PACK

## (undated) DEVICE — ACCESS KIT, S-MAK MINI, 4FR 10CM 0.018IN 40CM, NT/PT, ECHO ENHANCE NEEDLE

## (undated) DEVICE — CATHETER, THERMODILUTION, SWAN GANZ, HI-SHORE, COATED, W/GUIDEWIRE, 7 FR, 110 CM